# Patient Record
Sex: FEMALE | Race: WHITE | Employment: FULL TIME | URBAN - METROPOLITAN AREA
[De-identification: names, ages, dates, MRNs, and addresses within clinical notes are randomized per-mention and may not be internally consistent; named-entity substitution may affect disease eponyms.]

---

## 2017-01-04 ENCOUNTER — TELEPHONE (OUTPATIENT)
Dept: NEUROLOGY | Age: 27
End: 2017-01-04

## 2017-01-05 ENCOUNTER — TELEPHONE (OUTPATIENT)
Dept: NEUROLOGY | Age: 27
End: 2017-01-05

## 2017-01-05 NOTE — TELEPHONE ENCOUNTER
Attempted to contact patient regarding Botox received. No answer. Left message to return call to office.

## 2017-01-06 ENCOUNTER — OFFICE VISIT (OUTPATIENT)
Dept: NEUROLOGY | Age: 27
End: 2017-01-06

## 2017-01-06 VITALS
TEMPERATURE: 97.6 F | OXYGEN SATURATION: 98 % | HEART RATE: 96 BPM | SYSTOLIC BLOOD PRESSURE: 98 MMHG | RESPIRATION RATE: 18 BRPM | DIASTOLIC BLOOD PRESSURE: 60 MMHG

## 2017-01-06 DIAGNOSIS — R63.4 WEIGHT LOSS: ICD-10-CM

## 2017-01-06 DIAGNOSIS — G43.719 INTRACTABLE CHRONIC MIGRAINE WITHOUT AURA AND WITHOUT STATUS MIGRAINOSUS: Primary | ICD-10-CM

## 2017-01-06 NOTE — PROGRESS NOTES
Botox Injection Note     2017     Patient:  Chen Baxter   YOB: 1990  Date of Visit: 2017      Indication: patient has chronic migraine headaches greater than 15 days per month lasting more than 4 hours each. She has failed or not tolerated 2 or more medication preventatives. Written consent was signed and verified by me. Risks and benefits were discussed to include possible cosmetic asymmetry which is not permament or life threatening. Patient name and  was confirmed prior to procedure. Time out performed. Procedure:   Botox concentration: 200 units in 2 ml of preservative-free normal saline. 1:1 dilution. LOT#:  [id number]  EXP:  AUG 2019    Injections and distribution as follow :      Units/site  Sites Loc Subtotal    procerus 5 1 ML 5   corrugaters 2.5 2 BL 5   frontalis 5 8 BL 40   Temporalis 10 4 BL 40   Occipitalis 10 2 BL 20   Cervical paraspinals 5 4 BL 20   Trapezius 10 4 BL 40         200 units Botox were reconstituted, 170 units injected as above and the remainder was unavoidably wasted. Patient tolerated procedure well. Return in 3 months for repeat injections.     _____________________________   Fernandez Goff DO  [address], ABPN

## 2017-01-06 NOTE — MR AVS SNAPSHOT
Visit Information Date & Time Provider Department Dept. Phone Encounter #  
 1/6/2017  8:05 AM DO [facility] at [address] [id number] Upcoming Health Maintenance Date Due  
 PAP AKA CERVICAL CYTOLOGY 6/29/2009 INFLUENZA AGE 9 TO ADULT 8/1/2016 DTaP/Tdap/Td series (2 - Td) 11/24/2022 Allergies as of 1/6/2017  Review Complete On: 1/6/2017 By: Javon Ovalle LPN Severity Noted Reaction Type Reactions Compazine [Prochlorperazine Edisylate]  11/21/2016    Anxiety Haldol [Haloperidol Lactate]  04/15/2016    Other (comments) Promethazine  01/06/2016    Other (comments) \"It makes me feel really funny, nausea and dizzy\" Reglan [Metoclopramide]  04/15/2016    Other (comments) Current Immunizations  Reviewed on 1/17/2016 Name Date DTaP 11/24/2012 Hib 7/23/2015 Influenza Vaccine 10/24/2015 Rho(D) Immune Globulin - IM 6/4/2016  4:46 PM  
  
 Not reviewed this visit You Were Diagnosed With   
  
 Codes Comments Intractable chronic migraine without aura and without status migrainosus    -  Primary ICD-10-CM: Y17.335 ICD-9-CM: 346.71 Weight loss     ICD-10-CM: R63.4 ICD-9-CM: 783.21 Vitals BP Pulse Temp Resp SpO2 OB Status 98/60 96 97.6 °F (36.4 °C) 18 98% Having regular periods Smoking Status Former Smoker Preferred Pharmacy Pharmacy Name Phone [facility] [phone] Your Updated Medication List  
  
   
This list is accurate as of: 1/6/17  8:11 AM.  Always use your most recent med list.  
  
  
  
  
 BOTOX INJECTION  
by IntraMUSCular route every three (3) months. clonazePAM 1 mg tablet Commonly known as:  [place] Take 2 mg by mouth nightly. ibuprofen 800 mg tablet Commonly known as:  MOTRIN  
 Take 1 Tab by mouth every eight (8) hours as needed for Pain. LEXAPRO 20 mg tablet Generic drug:  escitalopram oxalate Take 20 mg by mouth nightly. Indications: GENERALIZED ANXIETY DISORDER  
  
 oxyCODONE 5 mg capsule Commonly known as:  OXYIR Take 1 Cap by mouth every four (4) hours as needed. Max Daily Amount: 30 mg.  
  
 oxyCODONE-acetaminophen 5-325 mg per tablet Commonly known as:  PERCOCET Take 1 Tab by mouth every four (4) hours as needed for Pain. Max Daily Amount: 6 Tabs. * topiramate 50 mg Cspx Commonly known as:  QUDEXY XR Take 1 Cap by mouth nightly. * topiramate 50 mg Cspx Commonly known as:  QUDEXY XR Take 50 mg by mouth daily. * Notice: This list has 2 medication(s) that are the same as other medications prescribed for you. Read the directions carefully, and ask your doctor or other care provider to review them with you. Patient Instructions PRESCRIPTION REFILL POLICY [facility] Statement to Patients April 1, 2014 In an effort to ensure the large volume of patient prescription refills is processed in the most efficient and expeditious manner, we are asking our patients to assist us by calling your Pharmacy for all prescription refills, this will include also your  Mail Order Pharmacy. The pharmacy will contact our office electronically to continue the refill process. Please do not wait until the last minute to call your pharmacy. We need at least 48 hours (2days) to fill prescriptions. We also encourage you to call your pharmacy before going to  your prescription to make sure it is ready. With regard to controlled substance prescription refill requests (narcotic refills) that need to be picked up at our office, we ask your cooperation by providing us with at least 72 hours (3days) notice that you will need a refill.  
 
We will not refill narcotic prescription refill requests after 4:00pm on any weekday, Monday through Thursday, or after 2:00pm on Fridays, or on the weekends. We encourage everyone to explore another way of getting your prescription refill request processed using Pubster, our patient web portal through our electronic medical record system. Pubster is an efficient and effective way to communicate your medication request directly to the office and  downloadable as an charlee on your smart phone . Pubster also features a review functionality that allows you to view your medication list as well as leave messages for your physician. Are you ready to get connected? If so please review the attatched instructions or speak to any of our staff to get you set up right away! Thank you so much for your cooperation. Should you have any questions please contact our Practice Administrator. The Physicians and Staff,  [facility] Thank you for choosing [facility] for your  
 
care. You may receive a survey about your visit. We appreciate you taking time  
 
to complete this survey as we use your feedback to improve our services. We  
 
realize we are not perfect, but we strive to provide excellent care. Introducing [facility] & HEALTH SERVICES! [facility] introduces Pubster patient portal. Now you can access parts of your medical record, email your doctor's office, and request medication refills online. 1. In your internet browser, go to [email or web address] 2. Click on the First Time User? Click Here link in the Sign In box. You will see the New Member Sign Up page. 3. Enter your Pubster Access Code exactly as it appears below. You will not need to use this code after youve completed the sign-up process. If you do not sign up before the expiration date, you must request a new code. · Pubster Access Code: [email or web address] Expires: 1/11/2017 11:53 AM 
 
 4. Enter the last four digits of your Social Security Number (xxxx) and Date of Birth (mm/dd/yyyy) as indicated and click Submit. You will be taken to the next sign-up page. 5. Create a Maven ID. This will be your Maven login ID and cannot be changed, so think of one that is secure and easy to remember. 6. Create a Maven password. You can change your password at any time. 7. Enter your Password Reset Question and Answer. This can be used at a later time if you forget your password. 8. Enter your e-mail address. You will receive e-mail notification when new information is available in [address] 9. Click Sign Up. You can now view and download portions of your medical record. 10. Click the Download Summary menu link to download a portable copy of your medical information. If you have questions, please visit the Frequently Asked Questions section of the Maven website. Remember, Maven is NOT to be used for urgent needs. For medical emergencies, dial 911. Now available from your iPhone and Android! Please provide this summary of care documentation to your next provider. Your primary care clinician is listed as NONE. If you have any questions after today's visit, please call [phone].

## 2017-01-06 NOTE — PATIENT INSTRUCTIONS
[facility]   Statement to Patients  April 1, 2014      In an effort to ensure the large volume of patient prescription refills is processed in the most efficient and expeditious manner, we are asking our patients to assist us by calling your Pharmacy for all prescription refills, this will include also your  Mail Order Pharmacy. The pharmacy will contact our office electronically to continue the refill process. Please do not wait until the last minute to call your pharmacy. We need at least 48 hours (2days) to fill prescriptions. We also encourage you to call your pharmacy before going to  your prescription to make sure it is ready. With regard to controlled substance prescription refill requests (narcotic refills) that need to be picked up at our office, we ask your cooperation by providing us with at least 72 hours (3days) notice that you will need a refill. We will not refill narcotic prescription refill requests after 4:00pm on any weekday, Monday through Thursday, or after 2:00pm on Fridays, or on the weekends. We encourage everyone to explore another way of getting your prescription refill request processed using vChatter, our patient web portal through our electronic medical record system. vChatter is an efficient and effective way to communicate your medication request directly to the office and  downloadable as an charlee on your smart phone . vChatter also features a review functionality that allows you to view your medication list as well as leave messages for your physician. Are you ready to get connected? If so please review the attatched instructions or speak to any of our staff to get you set up right away! Thank you so much for your cooperation. Should you have any questions please contact our Practice Administrator.     The Physicians and Staff,  [facility]     Thank you for choosing [facility] for your     care. You may receive a survey about your visit. We appreciate you taking time     to complete this survey as we use your feedback to improve our services. We     realize we are not perfect, but we strive to provide excellent care.

## 2017-01-08 ENCOUNTER — IP HISTORICAL/CONVERTED ENCOUNTER (OUTPATIENT)
Dept: OTHER | Age: 27
End: 2017-01-08

## 2017-01-27 ENCOUNTER — OFFICE VISIT (OUTPATIENT)
Dept: FAMILY MEDICINE CLINIC | Age: 27
End: 2017-01-27

## 2017-01-27 VITALS
WEIGHT: 134 LBS | HEIGHT: 68 IN | BODY MASS INDEX: 20.31 KG/M2 | DIASTOLIC BLOOD PRESSURE: 70 MMHG | HEART RATE: 97 BPM | RESPIRATION RATE: 18 BRPM | SYSTOLIC BLOOD PRESSURE: 109 MMHG | TEMPERATURE: 97 F | OXYGEN SATURATION: 98 %

## 2017-01-27 DIAGNOSIS — Z00.00 WELL ADULT EXAM: ICD-10-CM

## 2017-01-27 DIAGNOSIS — J00 ACUTE NASOPHARYNGITIS: Primary | ICD-10-CM

## 2017-01-27 RX ORDER — BENZONATATE 100 MG/1
200 CAPSULE ORAL
Qty: 30 CAP | Refills: 0 | Status: SHIPPED | OUTPATIENT
Start: 2017-01-27 | End: 2017-02-03

## 2017-01-27 RX ORDER — AZITHROMYCIN 250 MG/1
250 TABLET, FILM COATED ORAL SEE ADMIN INSTRUCTIONS
Qty: 6 TAB | Refills: 0 | Status: SHIPPED | OUTPATIENT
Start: 2017-01-27 | End: 2017-02-01

## 2017-01-27 RX ORDER — TOPIRAMATE 100 MG/1
100 TABLET, FILM COATED ORAL DAILY
COMMUNITY
End: 2017-03-03

## 2017-01-27 NOTE — PROGRESS NOTES
SUBJECTIVE:   Chris Falcon is a 27 y.o. female who complains of congestion, sore throat, swollen glands, nasal blockage, dry cough and bilateral sinus pain for 3 days. She denies a history of chest pain, dizziness and fevers and denies a history of asthma. Patient denies smoke cigarettes. Respiratory ROS: positive for - cough    ROS:  Per HPI    Allergies   Allergen Reactions    Compazine [Prochlorperazine Edisylate] Anxiety    Haldol [Haloperidol Lactate] Other (comments)    Promethazine Other (comments)     \"It makes me feel really funny, nausea and dizzy\"    Reglan [Metoclopramide] Other (comments)       Outpatient Prescriptions Marked as Taking for the 1/27/17 encounter (Office Visit) with Rex Ramesh NP   Medication Sig Dispense Refill    topiramate (TOPAMAX) 100 mg tablet Take 100 mg by mouth daily.  azithromycin (ZITHROMAX) 250 mg tablet Take 1 Tab by mouth See Admin Instructions for 5 days. Take 2 tabs today by mouth, then take 1 tab daily for 4 more days 6 Tab 0    benzonatate (TESSALON) 100 mg capsule Take 2 Caps by mouth three (3) times daily as needed for Cough for up to 7 days. 30 Cap 0    ONABOTULINUMTOXINA (BOTOX INJECTION) by IntraMUSCular route every three (3) months. Indications: Given by Neuro into shoulders/ neck      clonazePAM (KLONOPIN) 1 mg tablet Take 2 mg by mouth nightly. 5    escitalopram (LEXAPRO) 20 mg tablet Take 20 mg by mouth nightly.  Indications: GENERALIZED ANXIETY DISORDER         Past Medical History   Diagnosis Date    Anxiety     Depression     Endometriosis     Migraine     Neurological disorder      migraines    Ovarian cyst     Panic attack        History   Smoking Status    Former Smoker   Smokeless Tobacco    Never Used       Social History     Social History    Marital status: SINGLE     Spouse name: N/A    Number of children: N/A    Years of education: N/A     Social History Main Topics    Smoking status: Former Smoker    Smokeless tobacco: Never Used    Alcohol use Yes      Comment: social    Drug use: No    Sexual activity: Yes     Partners: Male     Birth control/ protection: Pill     Other Topics Concern    None     Social History Narrative       Family History   Problem Relation Age of Onset    Diabetes Maternal Aunt     Heart Disease Maternal Aunt     Stroke Maternal Aunt     Heart Disease Maternal Grandmother     No Known Problems Mother          PE:  Visit Vitals    /70    Pulse 97    Temp 97 °F (36.1 °C) (Oral)    Resp 18    Ht 5' 8\" (1.727 m)    Wt 134 lb (60.8 kg)    LMP 01/08/2017 (Exact Date)    SpO2 98%    BMI 20.37 kg/m2     Gen: alert, oriented, no acute distress  Head: normocephalic, atraumatic  Ears: external auditory canals clear, TMs  bilaterally  Eyes:  sclera clear, conjunctiva clear  Nose: Sinuses nontender to palpation. Normal turbinates. No nasal drainage. Oral: moist mucus membranes, no oral lesions, no pharyngeal exudate or erythema  Neck:  No LAD  Resp: Normal work of breathing, lungs CTAB, no w/r/r  CV: S1, S2 normal.  No murmurs, rubs, or gallops. ASSESSMENT:   1. Acute nasopharyngitis          PLAN:  Symptomatic therapy suggested: push fluids, rest and return office visit prn if symptoms persist or worsen. Lack of antibiotic effectiveness discussed with her. Call or return to clinic prn if these symptoms worsen or fail to improve as anticipated. Orders Placed This Encounter    topiramate (TOPAMAX) 100 mg tablet     Sig: Take 100 mg by mouth daily.  azithromycin (ZITHROMAX) 250 mg tablet     Sig: Take 1 Tab by mouth See Admin Instructions for 5 days. Take 2 tabs today by mouth, then take 1 tab daily for 4 more days     Dispense:  6 Tab     Refill:  0    benzonatate (TESSALON) 100 mg capsule     Sig: Take 2 Caps by mouth three (3) times daily as needed for Cough for up to 7 days.      Dispense:  30 Cap     Refill:  0       Verbal and written instructions (see AVS) provided.  Patient expresses understanding of diagnosis and treatment plan.     Rex Ramesh NP

## 2017-01-27 NOTE — PATIENT INSTRUCTIONS

## 2017-01-27 NOTE — PROGRESS NOTES
Chief Complaint   Patient presents with    Cold Symptoms     cough, sore throat and runny nose for 1 week, has taken Mucinex and Zyrtec, have tested self for RSV, Flu and Strep and all were negative, mother and daughter have Bronchitis

## 2017-01-27 NOTE — MR AVS SNAPSHOT
Visit Information Date & Time Provider Department Dept. Phone Encounter #  
 1/27/2017  9:30 AM Virginia Jacobson NP [address] [facility] [phone] [id number] Follow-up Instructions Return if symptoms worsen or fail to improve. Your Appointments 1/27/2017  9:30 AM  
WALK IN with Virginia Jacobson NP [address] [facility] ([address]) Appt Note: cough, sore throat, runny nose [address] [facility] 204 [address] [facility] [address]  
[phone]  
  
   
 [address] [facility] [address]  
  
    
 2/17/2017  1:20 PM  
Follow Up with DO Qing Pires [facility] [address] [facility]) [address] [place] [address]  
[phone]  
  
   
 [address] [address] Upcoming Health Maintenance Date Due  
 PAP AKA CERVICAL CYTOLOGY 6/29/2009 INFLUENZA AGE 9 TO ADULT 8/1/2016 DTaP/Tdap/Td series (2 - Td) 11/24/2022 Allergies as of 1/27/2017  Review Complete On: 1/27/2017 By: Rosalva Barrett LPN Severity Noted Reaction Type Reactions Compazine [Prochlorperazine Edisylate]  11/21/2016    Anxiety Haldol [Haloperidol Lactate]  04/15/2016    Other (comments) Promethazine  01/06/2016    Other (comments) \"It makes me feel really funny, nausea and dizzy\" Reglan [Metoclopramide]  04/15/2016    Other (comments) Current Immunizations  Reviewed on 1/17/2016 Name Date DTaP 11/24/2012 Hib 7/23/2015 Influenza Vaccine 10/24/2015 Rho(D) Immune Globulin - IM 6/4/2016  4:46 PM  
  
 Not reviewed this visit You Were Diagnosed With   
  
 Codes Comments Acute nasopharyngitis    -  Primary ICD-10-CM: Milligan Aydee ICD-9-CM: 279 Vitals BP Pulse Temp Resp Height(growth percentile) Weight(growth percentile) 109/70 97 97 °F (36.1 °C) (Oral) 18 5' 8\" (1.727 m) 134 lb (60.8 kg) LMP SpO2 BMI OB Status Smoking Status 01/08/2017 (Exact Date) 98% 20.37 kg/m2 Having regular periods Former Smoker BMI and BSA Data Body Mass Index Body Surface Area  
 20.37 kg/m 2 1.71 m 2 Preferred Pharmacy Pharmacy Name Phone [facility] [phone] Your Updated Medication List  
  
   
This list is accurate as of: 1/27/17  9:02 AM.  Always use your most recent med list.  
  
  
  
  
 azithromycin 250 mg tablet Commonly known as:  Mike Rowels Take 1 Tab by mouth See Admin Instructions for 5 days. Take 2 tabs today by mouth, then take 1 tab daily for 4 more days  
  
 benzonatate 100 mg capsule Commonly known as:  TESSALON Take 2 Caps by mouth three (3) times daily as needed for Cough for up to 7 days. BOTOX INJECTION  
by IntraMUSCular route every three (3) months. Indications: Given by Neuro into shoulders/ neck  
  
 clonazePAM 1 mg tablet Commonly known as:  Elta Halsted Take 2 mg by mouth nightly. LEXAPRO 20 mg tablet Generic drug:  escitalopram oxalate Take 20 mg by mouth nightly. Indications: GENERALIZED ANXIETY DISORDER  
  
 TOPAMAX 100 mg tablet Generic drug:  topiramate Take 100 mg by mouth daily. Prescriptions Sent to Pharmacy Refills  
 azithromycin (ZITHROMAX) 250 mg tablet 0 Sig: Take 1 Tab by mouth See Admin Instructions for 5 days. Take 2 tabs today by mouth, then take 1 tab daily for 4 more days Class: Normal  
 Pharmacy: [address] #: [phone] Route: Oral  
 benzonatate (TESSALON) 100 mg capsule 0 Sig: Take 2 Caps by mouth three (3) times daily as needed for Cough for up to 7 days. Class: Normal  
 Pharmacy: [place], [address] Ph #: [phone] Route: Oral  
  
Follow-up Instructions Return if symptoms worsen or fail to improve. Patient Instructions Upper Respiratory Infection (Cold): Care Instructions Your Care Instructions An upper respiratory infection, or URI, is an infection of the nose, sinuses, or throat. URIs are spread by coughs, sneezes, and direct contact. The common cold is the most frequent kind of URI. The flu and sinus infections are other kinds of URIs. Almost all URIs are caused by viruses. Antibiotics won't cure them. But you can treat most infections with home care. This may include drinking lots of fluids and taking over-the-counter pain medicine. You will probably feel better in 4 to 10 days. The doctor has checked you carefully, but problems can develop later. If you notice any problems or new symptoms, get medical treatment right away. Follow-up care is a key part of your treatment and safety. Be sure to make and go to all appointments, and call your doctor if you are having problems. It's also a good idea to know your test results and keep a list of the medicines you take. How can you care for yourself at home? · To prevent dehydration, drink plenty of fluids, enough so that your urine is light yellow or clear like water. Choose water and other caffeine-free clear liquids until you feel better. If you have kidney, heart, or liver disease and have to limit fluids, talk with your doctor before you increase the amount of fluids you drink. · Take an over-the-counter pain medicine, such as acetaminophen (Tylenol), ibuprofen (Advil, Motrin), or naproxen (Aleve). Read and follow all instructions on the label. · Before you use cough and cold medicines, check the label. These medicines may not be safe for young children or for people with certain health problems. · Be careful when taking over-the-counter cold or flu medicines and Tylenol at the same time. Many of these medicines have acetaminophen, which is Tylenol.  Read the labels to make sure that you are not taking more than the recommended dose. Too much acetaminophen (Tylenol) can be harmful. · Get plenty of rest. 
· Do not smoke or allow others to smoke around you. If you need help quitting, talk to your doctor about stop-smoking programs and medicines. These can increase your chances of quitting for good. When should you call for help? Call 911 anytime you think you may need emergency care. For example, call if: 
· You have severe trouble breathing. Call your doctor now or seek immediate medical care if: 
· You seem to be getting much sicker. · You have new or worse trouble breathing. · You have a new or higher fever. · You have a new rash. Watch closely for changes in your health, and be sure to contact your doctor if: 
· You have a new symptom, such as a sore throat, an earache, or sinus pain. · You cough more deeply or more often, especially if you notice more mucus or a change in the color of your mucus. · You do not get better as expected. Where can you learn more? Go to http://michele-lucy.info/. Enter C722 in the search box to learn more about \"Upper Respiratory Infection (Cold): Care Instructions. \" Current as of: June 30, 2016 Content Version: 11.1 © 6148-7869 [employer]. Care instructions adapted under license by [employer] (which disclaims liability or warranty for this information). If you have questions about a medical condition or this instruction, always ask your healthcare professional. [place] [address] any warranty or liability for your use of this information. Introducing [facility] & HEALTH SERVICES! [facility] introduces ProcessUnity patient portal. Now you can access parts of your medical record, email your doctor's office, and request medication refills online. 1. In your internet browser, go to [email or web address] 2. Click on the First Time User? Click Here link in the Sign In box.  You will see the New Member Sign Up page. 3. Enter your Shmoop Access Code exactly as it appears below. You will not need to use this code after youve completed the sign-up process. If you do not sign up before the expiration date, you must request a new code. · Shmoop Access Code: [email or web address] Expires: 4/27/2017  9:02 AM 
 
4. Enter the last four digits of your Social Security Number (xxxx) and Date of Birth (mm/dd/yyyy) as indicated and click Submit. You will be taken to the next sign-up page. 5. Create a Shmoop ID. This will be your Shmoop login ID and cannot be changed, so think of one that is secure and easy to remember. 6. Create a Shmoop password. You can change your password at any time. 7. Enter your Password Reset Question and Answer. This can be used at a later time if you forget your password. 8. Enter your e-mail address. You will receive e-mail notification when new information is available in [address] 9. Click Sign Up. You can now view and download portions of your medical record. 10. Click the Download Summary menu link to download a portable copy of your medical information. If you have questions, please visit the Frequently Asked Questions section of the Shmoop website. Remember, Shmoop is NOT to be used for urgent needs. For medical emergencies, dial 911. Now available from your iPhone and Android! Please provide this summary of care documentation to your next provider. Your primary care clinician is listed as NONE. If you have any questions after today's visit, please call [phone].

## 2017-02-16 ENCOUNTER — TELEPHONE (OUTPATIENT)
Dept: NEUROLOGY | Age: 27
End: 2017-02-16

## 2017-02-16 NOTE — TELEPHONE ENCOUNTER
Pt was sent home sick from work with the flu, was to have f/u appt with Dr. Barney Salazar tomorrow to review medication, pt asked for a phone call from the nurse.

## 2017-03-03 ENCOUNTER — HOSPITAL ENCOUNTER (EMERGENCY)
Age: 27
Discharge: HOME | End: 2017-03-03
Attending: EMERGENCY MEDICINE
Payer: COMMERCIAL

## 2017-03-03 VITALS
DIASTOLIC BLOOD PRESSURE: 77 MMHG | SYSTOLIC BLOOD PRESSURE: 121 MMHG | OXYGEN SATURATION: 98 % | HEIGHT: 68 IN | TEMPERATURE: 97.3 F | WEIGHT: 135 LBS | RESPIRATION RATE: 18 BRPM | BODY MASS INDEX: 20.46 KG/M2 | HEART RATE: 77 BPM

## 2017-03-03 DIAGNOSIS — R51.9 NONINTRACTABLE HEADACHE, UNSPECIFIED CHRONICITY PATTERN, UNSPECIFIED HEADACHE TYPE: Primary | ICD-10-CM

## 2017-03-03 PROCEDURE — 96372 THER/PROPH/DIAG INJ SC/IM: CPT

## 2017-03-03 PROCEDURE — 74011250637 HC RX REV CODE- 250/637: Performed by: EMERGENCY MEDICINE

## 2017-03-03 PROCEDURE — 96365 THER/PROPH/DIAG IV INF INIT: CPT

## 2017-03-03 PROCEDURE — 96375 TX/PRO/DX INJ NEW DRUG ADDON: CPT

## 2017-03-03 PROCEDURE — 99284 EMERGENCY DEPT VISIT MOD MDM: CPT

## 2017-03-03 PROCEDURE — 74011250636 HC RX REV CODE- 250/636: Performed by: EMERGENCY MEDICINE

## 2017-03-03 PROCEDURE — 74011636637 HC RX REV CODE- 636/637: Performed by: EMERGENCY MEDICINE

## 2017-03-03 RX ORDER — KETOROLAC TROMETHAMINE 30 MG/ML
30 INJECTION, SOLUTION INTRAMUSCULAR; INTRAVENOUS
Status: COMPLETED | OUTPATIENT
Start: 2017-03-03 | End: 2017-03-03

## 2017-03-03 RX ORDER — BUSPIRONE HYDROCHLORIDE 15 MG/1
15 TABLET ORAL DAILY
COMMUNITY
End: 2017-09-19

## 2017-03-03 RX ORDER — DEXAMETHASONE SODIUM PHOSPHATE 10 MG/ML
10 INJECTION INTRAMUSCULAR; INTRAVENOUS
Status: COMPLETED | OUTPATIENT
Start: 2017-03-03 | End: 2017-03-03

## 2017-03-03 RX ORDER — DIPHENHYDRAMINE HYDROCHLORIDE 50 MG/ML
25 INJECTION, SOLUTION INTRAMUSCULAR; INTRAVENOUS
Status: COMPLETED | OUTPATIENT
Start: 2017-03-03 | End: 2017-03-03

## 2017-03-03 RX ORDER — SUMATRIPTAN 6 MG/.5ML
6 INJECTION, SOLUTION SUBCUTANEOUS
Status: COMPLETED | OUTPATIENT
Start: 2017-03-03 | End: 2017-03-03

## 2017-03-03 RX ORDER — MAGNESIUM SULFATE HEPTAHYDRATE 40 MG/ML
2 INJECTION, SOLUTION INTRAVENOUS ONCE
Status: COMPLETED | OUTPATIENT
Start: 2017-03-03 | End: 2017-03-03

## 2017-03-03 RX ADMIN — SODIUM CHLORIDE 1000 ML: 900 INJECTION, SOLUTION INTRAVENOUS at 21:22

## 2017-03-03 RX ADMIN — MAGNESIUM SULFATE HEPTAHYDRATE 2 G: 40 INJECTION, SOLUTION INTRAVENOUS at 21:36

## 2017-03-03 RX ADMIN — SUMATRIPTAN 6 MG: 6 INJECTION, SOLUTION SUBCUTANEOUS at 21:31

## 2017-03-03 RX ADMIN — DIPHENHYDRAMINE HYDROCHLORIDE 25 MG: 50 INJECTION, SOLUTION INTRAMUSCULAR; INTRAVENOUS at 21:25

## 2017-03-03 RX ADMIN — DEXAMETHASONE SODIUM PHOSPHATE 10 MG: 10 INJECTION, SOLUTION INTRAMUSCULAR; INTRAVENOUS at 21:26

## 2017-03-03 RX ADMIN — KETOROLAC TROMETHAMINE 30 MG: 30 INJECTION, SOLUTION INTRAMUSCULAR at 21:24

## 2017-03-04 ENCOUNTER — APPOINTMENT (OUTPATIENT)
Dept: CT IMAGING | Age: 27
End: 2017-03-04
Attending: EMERGENCY MEDICINE
Payer: COMMERCIAL

## 2017-03-04 ENCOUNTER — HOSPITAL ENCOUNTER (EMERGENCY)
Age: 27
Discharge: HOME | End: 2017-03-04
Attending: EMERGENCY MEDICINE
Payer: COMMERCIAL

## 2017-03-04 VITALS
WEIGHT: 135 LBS | OXYGEN SATURATION: 97 % | HEIGHT: 68 IN | BODY MASS INDEX: 20.46 KG/M2 | RESPIRATION RATE: 16 BRPM | HEART RATE: 70 BPM | DIASTOLIC BLOOD PRESSURE: 82 MMHG | SYSTOLIC BLOOD PRESSURE: 112 MMHG | TEMPERATURE: 98.5 F

## 2017-03-04 DIAGNOSIS — G43.019 INTRACTABLE MIGRAINE WITHOUT AURA AND WITHOUT STATUS MIGRAINOSUS: Primary | ICD-10-CM

## 2017-03-04 LAB
ALBUMIN SERPL BCP-MCNC: 3.8 G/DL (ref 3.5–5)
ALBUMIN/GLOB SERPL: 1.3 {RATIO} (ref 1.1–2.2)
ALP SERPL-CCNC: 44 U/L (ref 45–117)
ALT SERPL-CCNC: 17 U/L (ref 12–78)
ANION GAP BLD CALC-SCNC: 10 MMOL/L (ref 5–15)
AST SERPL W P-5'-P-CCNC: 13 U/L (ref 15–37)
BASOPHILS # BLD AUTO: 0 K/UL (ref 0–0.1)
BASOPHILS # BLD: 0 % (ref 0–1)
BILIRUB SERPL-MCNC: 0.4 MG/DL (ref 0.2–1)
BUN SERPL-MCNC: 9 MG/DL (ref 6–20)
BUN/CREAT SERPL: 13 (ref 12–20)
CALCIUM SERPL-MCNC: 8.2 MG/DL (ref 8.5–10.1)
CHLORIDE SERPL-SCNC: 105 MMOL/L (ref 97–108)
CO2 SERPL-SCNC: 27 MMOL/L (ref 21–32)
CREAT SERPL-MCNC: 0.71 MG/DL (ref 0.55–1.02)
EOSINOPHIL # BLD: 0 K/UL (ref 0–0.4)
EOSINOPHIL NFR BLD: 0 % (ref 0–7)
ERYTHROCYTE [DISTWIDTH] IN BLOOD BY AUTOMATED COUNT: 13.1 % (ref 11.5–14.5)
GLOBULIN SER CALC-MCNC: 2.9 G/DL (ref 2–4)
GLUCOSE SERPL-MCNC: 93 MG/DL (ref 65–100)
HCT VFR BLD AUTO: 33.5 % (ref 35–47)
HGB BLD-MCNC: 10.6 G/DL (ref 11.5–16)
LYMPHOCYTES # BLD AUTO: 33 % (ref 12–49)
LYMPHOCYTES # BLD: 2.9 K/UL (ref 0.8–3.5)
MCH RBC QN AUTO: 27.1 PG (ref 26–34)
MCHC RBC AUTO-ENTMCNC: 31.6 G/DL (ref 30–36.5)
MCV RBC AUTO: 85.7 FL (ref 80–99)
MONOCYTES # BLD: 0.8 K/UL (ref 0–1)
MONOCYTES NFR BLD AUTO: 9 % (ref 5–13)
NEUTS SEG # BLD: 5.2 K/UL (ref 1.8–8)
NEUTS SEG NFR BLD AUTO: 58 % (ref 32–75)
PLATELET # BLD AUTO: 217 K/UL (ref 150–400)
POTASSIUM SERPL-SCNC: 3.3 MMOL/L (ref 3.5–5.1)
PROT SERPL-MCNC: 6.7 G/DL (ref 6.4–8.2)
RBC # BLD AUTO: 3.91 M/UL (ref 3.8–5.2)
SODIUM SERPL-SCNC: 142 MMOL/L (ref 136–145)
WBC # BLD AUTO: 9 K/UL (ref 3.6–11)

## 2017-03-04 PROCEDURE — 74011250636 HC RX REV CODE- 250/636: Performed by: EMERGENCY MEDICINE

## 2017-03-04 PROCEDURE — 99284 EMERGENCY DEPT VISIT MOD MDM: CPT

## 2017-03-04 PROCEDURE — 80053 COMPREHEN METABOLIC PANEL: CPT | Performed by: EMERGENCY MEDICINE

## 2017-03-04 PROCEDURE — 70450 CT HEAD/BRAIN W/O DYE: CPT

## 2017-03-04 PROCEDURE — 96372 THER/PROPH/DIAG INJ SC/IM: CPT

## 2017-03-04 PROCEDURE — 36415 COLL VENOUS BLD VENIPUNCTURE: CPT | Performed by: EMERGENCY MEDICINE

## 2017-03-04 PROCEDURE — 96361 HYDRATE IV INFUSION ADD-ON: CPT

## 2017-03-04 PROCEDURE — 96365 THER/PROPH/DIAG IV INF INIT: CPT

## 2017-03-04 PROCEDURE — 85025 COMPLETE CBC W/AUTO DIFF WBC: CPT | Performed by: EMERGENCY MEDICINE

## 2017-03-04 PROCEDURE — 96375 TX/PRO/DX INJ NEW DRUG ADDON: CPT

## 2017-03-04 RX ORDER — KETOROLAC TROMETHAMINE 30 MG/ML
30 INJECTION, SOLUTION INTRAMUSCULAR; INTRAVENOUS
Status: COMPLETED | OUTPATIENT
Start: 2017-03-04 | End: 2017-03-04

## 2017-03-04 RX ORDER — ONDANSETRON 2 MG/ML
8 INJECTION INTRAMUSCULAR; INTRAVENOUS
Status: COMPLETED | OUTPATIENT
Start: 2017-03-04 | End: 2017-03-04

## 2017-03-04 RX ORDER — DEXAMETHASONE SODIUM PHOSPHATE 10 MG/ML
10 INJECTION INTRAMUSCULAR; INTRAVENOUS
Status: COMPLETED | OUTPATIENT
Start: 2017-03-04 | End: 2017-03-04

## 2017-03-04 RX ORDER — DEXAMETHASONE SODIUM PHOSPHATE 4 MG/ML
10 INJECTION, SOLUTION INTRA-ARTICULAR; INTRALESIONAL; INTRAMUSCULAR; INTRAVENOUS; SOFT TISSUE
Status: DISCONTINUED | OUTPATIENT
Start: 2017-03-04 | End: 2017-03-04

## 2017-03-04 RX ORDER — MAGNESIUM SULFATE HEPTAHYDRATE 40 MG/ML
2 INJECTION, SOLUTION INTRAVENOUS ONCE
Status: COMPLETED | OUTPATIENT
Start: 2017-03-04 | End: 2017-03-04

## 2017-03-04 RX ORDER — ONDANSETRON 8 MG/1
8 TABLET, ORALLY DISINTEGRATING ORAL
Qty: 15 TAB | Refills: 0 | Status: SHIPPED | OUTPATIENT
Start: 2017-03-04 | End: 2017-04-20

## 2017-03-04 RX ORDER — DIPHENHYDRAMINE HYDROCHLORIDE 50 MG/ML
50 INJECTION, SOLUTION INTRAMUSCULAR; INTRAVENOUS
Status: COMPLETED | OUTPATIENT
Start: 2017-03-04 | End: 2017-03-04

## 2017-03-04 RX ORDER — NAPROXEN 500 MG/1
500 TABLET ORAL 2 TIMES DAILY WITH MEALS
Qty: 20 TAB | Refills: 0 | Status: SHIPPED | OUTPATIENT
Start: 2017-03-04 | End: 2017-03-14

## 2017-03-04 RX ADMIN — DEXAMETHASONE SODIUM PHOSPHATE 10 MG: 10 INJECTION, SOLUTION INTRAMUSCULAR; INTRAVENOUS at 22:03

## 2017-03-04 RX ADMIN — SODIUM CHLORIDE 2000 ML: 900 INJECTION, SOLUTION INTRAVENOUS at 20:39

## 2017-03-04 RX ADMIN — DIPHENHYDRAMINE HYDROCHLORIDE 50 MG: 50 INJECTION, SOLUTION INTRAMUSCULAR; INTRAVENOUS at 20:39

## 2017-03-04 RX ADMIN — ONDANSETRON 8 MG: 2 INJECTION INTRAMUSCULAR; INTRAVENOUS at 20:40

## 2017-03-04 RX ADMIN — MAGNESIUM SULFATE HEPTAHYDRATE 2 G: 40 INJECTION, SOLUTION INTRAVENOUS at 22:03

## 2017-03-04 RX ADMIN — KETOROLAC TROMETHAMINE 30 MG: 30 INJECTION, SOLUTION INTRAMUSCULAR at 20:40

## 2017-03-04 NOTE — DISCHARGE INSTRUCTIONS
Headache: Care Instructions  Your Care Instructions    Headaches have many possible causes. Most headaches aren't a sign of a more serious problem, and they will get better on their own. Home treatment may help you feel better faster. The doctor has checked you carefully, but problems can develop later. If you notice any problems or new symptoms, get medical treatment right away. Follow-up care is a key part of your treatment and safety. Be sure to make and go to all appointments, and call your doctor if you are having problems. It's also a good idea to know your test results and keep a list of the medicines you take. How can you care for yourself at home? · Do not drive if you have taken a prescription pain medicine. · Rest in a quiet, dark room until your headache is gone. Close your eyes and try to relax or go to sleep. Don't watch TV or read. · Put a cold, moist cloth or cold pack on the painful area for 10 to 20 minutes at a time. Put a thin cloth between the cold pack and your skin. · Use a warm, moist towel or a heating pad set on low to relax tight shoulder and neck muscles. · Have someone gently massage your neck and shoulders. · Take pain medicines exactly as directed. ¨ If the doctor gave you a prescription medicine for pain, take it as prescribed. ¨ If you are not taking a prescription pain medicine, ask your doctor if you can take an over-the-counter medicine. · Be careful not to take pain medicine more often than the instructions allow, because you may get worse or more frequent headaches when the medicine wears off. · Do not ignore new symptoms that occur with a headache, such as a fever, weakness or numbness, vision changes, or confusion. These may be signs of a more serious problem. To prevent headaches  · Keep a headache diary so you can figure out what triggers your headaches. Avoiding triggers may help you prevent headaches.  Record when each headache began, how long it lasted, and what the pain was like (throbbing, aching, stabbing, or dull). Write down any other symptoms you had with the headache, such as nausea, flashing lights or dark spots, or sensitivity to bright light or loud noise. Note if the headache occurred near your period. List anything that might have triggered the headache, such as certain foods (chocolate, cheese, wine) or odors, smoke, bright light, stress, or lack of sleep. · Find healthy ways to deal with stress. Headaches are most common during or right after stressful times. Take time to relax before and after you do something that has caused a headache in the past.  · Try to keep your muscles relaxed by keeping good posture. Check your jaw, face, neck, and shoulder muscles for tension, and try relaxing them. When sitting at a desk, change positions often, and stretch for 30 seconds each hour. · Get plenty of sleep and exercise. · Eat regularly and well. Long periods without food can trigger a headache. · Treat yourself to a massage. Some people find that regular massages are very helpful in relieving tension. · Limit caffeine by not drinking too much coffee, tea, or soda. But don't quit caffeine suddenly, because that can also give you headaches. · Reduce eyestrain from computers by blinking frequently and looking away from the computer screen every so often. Make sure you have proper eyewear and that your monitor is set up properly, about an arm's length away. · Seek help if you have depression or anxiety. Your headaches may be linked to these conditions. Treatment can both prevent headaches and help with symptoms of anxiety or depression. When should you call for help? Call 911 anytime you think you may need emergency care. For example, call if:  · You have signs of a stroke. These may include:  ¨ Sudden numbness, paralysis, or weakness in your face, arm, or leg, especially on only one side of your body. ¨ Sudden vision changes.   ¨ Sudden trouble speaking. ¨ Sudden confusion or trouble understanding simple statements. ¨ Sudden problems with walking or balance. ¨ A sudden, severe headache that is different from past headaches. Call your doctor now or seek immediate medical care if:  · You have a new or worse headache. · Your headache gets much worse. Where can you learn more? Go to http://michele-lucy.info/. Enter M271 in the search box to learn more about \"Headache: Care Instructions. \"  Current as of: February 19, 2016  Content Version: 11.1  © 3632-1466 [employer]. Care instructions adapted under license by [employer] (which disclaims liability or warranty for this information). If you have questions about a medical condition or this instruction, always ask your healthcare professional. [address] any warranty or liability for your use of this information. We hope that we have addressed all of your medical concerns. The examination and treatment you received in the Emergency Department were for an emergent problem and were not intended as complete care. It is important that you follow up with your healthcare provider(s) for ongoing care. If your symptoms worsen or do not improve as expected, and you are unable to reach your usual health care provider(s), you should return to the Emergency Department. Today's healthcare is undergoing tremendous change, and patient satisfaction surveys are one of the many tools to assess the quality of medical care. You may receive a survey from the [employer] regarding your experience in the Emergency Department. I hope that your experience has been completely positive, particularly the medical care that I provided. As such, please participate in the survey; anything less than excellent does not meet my expectations or intentions.         [address] [facility] and [address] [place] [address] participate in nationally recognized quality of care measures. If your blood pressure is greater than 120/80, as reported below, we urge that you seek medical care to address the potential of high blood pressure, commonly known as hypertension. Hypertension can be hereditary or can be caused by certain medical conditions, pain, stress, or \"white coat syndrome. \"       Please make an appointment with your health care provider(s) for follow up of your Emergency Department visit. VITALS:   Patient Vitals for the past 8 hrs:   Temp Pulse Resp BP SpO2   03/03/17 2200 - - - 121/77 98 %   03/03/17 2136 - 77 - 109/73 -   03/03/17 2034 97.3 °F (36.3 °C) 72 18 119/80 98 %          Thank you for allowing us to provide you with medical care today. We realize that you have many choices for your emergency care needs. Please choose us in the future for any continued health care needs. Regards,           Bunny Bustillo MD    [address] [facility].   Office: [phone]            No results found for this or any previous visit (from the past 24 hour(s)). No results found.

## 2017-03-04 NOTE — ED PROVIDER NOTES
HPI Comments: 27 y.o. female with past medical history significant for migraines, panic attack, anxiety, depression, endometriosis, ovarian cyst, and lumpectomy who presents from home via EMS with chief complaint of HA. Pt reports that she woke up with a HA, went to work, came back from work, took 4 Advil, and went to sleep. Pt reports that when she woke up around 1730, the HA was ten times worse. Pt also reports seeing \"polka dots\". Pt ate dinner and tried to go back to sleep but could not due to the pain. Pt called her neurologist who recommended taking Compazine and Benadryl or visiting the ED. Pt reports that Compazine and Benadryl makes her go to sleep. Imitrex and a steroid medication has been used in the ED setting in the past. Pt reports that she started to receive Botox. Pt has not had a FITZPATRICK like this in about a year. Pt denies fever and vomiting. Pt denies taking other medications other than Advil PTA. There are no other acute medical concerns at this time. Social hx:   PCP: None  Dr. Arcelia Pringle MD   Note written by Hany Brunson, as dictated by Tigist Camilo MD 9:03 PM      The history is provided by the patient. No  was used.         Past Medical History:   Diagnosis Date    Anxiety     Depression     Endometriosis     Migraine     Neurological disorder     migraines    Ovarian cyst     Panic attack        Past Surgical History:   Procedure Laterality Date    HX BREAST LUMPECTOMY Right     HX BUNIONECTOMY      HX CYST REMOVAL      HX OTHER SURGICAL      laparascopy    HX WISDOM TEETH EXTRACTION           Family History:   Problem Relation Age of Onset    Diabetes Maternal Aunt     Heart Disease Maternal Aunt     Stroke Maternal Aunt     Heart Disease Maternal Grandmother     No Known Problems Mother        Social History     Social History    Marital status: SINGLE     Spouse name: N/A    Number of children: N/A    Years of education: N/A Occupational History    Not on file. Social History Main Topics    Smoking status: Former Smoker    Smokeless tobacco: Never Used    Alcohol use No      Comment: social    Drug use: No    Sexual activity: Yes     Partners: Male     Birth control/ protection: Pill     Other Topics Concern    Not on file     Social History Narrative         ALLERGIES: Compazine [prochlorperazine edisylate]; Haldol [haloperidol lactate]; Promethazine; and Reglan [metoclopramide]    Review of Systems   Constitutional: Negative for fever. HENT: Negative for facial swelling. Eyes: Positive for visual disturbance (polka dots). Respiratory: Negative for chest tightness. Cardiovascular: Negative for chest pain. Gastrointestinal: Negative for abdominal pain and vomiting. Genitourinary: Negative for dysuria. Musculoskeletal: Negative for arthralgias. Skin: Negative for rash. Neurological: Positive for headaches. Negative for dizziness. Hematological: Negative for adenopathy. Psychiatric/Behavioral: Negative for suicidal ideas. All other systems reviewed and are negative. Vitals:    03/03/17 2034   BP: 119/80   Pulse: 72   Resp: 18   Temp: 97.3 °F (36.3 °C)   SpO2: 98%   Weight: 61.2 kg (135 lb)   Height: 5' 8\" (1.727 m)            Physical Exam   Constitutional: She is oriented to person, place, and time. She appears well-developed and well-nourished. No distress. HENT:   Head: Normocephalic and atraumatic. Mouth/Throat: Oropharynx is clear and moist.   Eyes: Pupils are equal, round, and reactive to light. No scleral icterus. Neck: Normal range of motion. Neck supple. No thyromegaly present. Cardiovascular: Normal rate, regular rhythm, normal heart sounds and intact distal pulses. No murmur heard. Pulmonary/Chest: Effort normal and breath sounds normal. No respiratory distress. Abdominal: Soft. Bowel sounds are normal. She exhibits no distension. There is no tenderness. Musculoskeletal: Normal range of motion. She exhibits no edema. Neurological: She is alert and oriented to person, place, and time. Skin: Skin is warm and dry. No rash noted. She is not diaphoretic. Nursing note and vitals reviewed. Note written by Hany Early, as dictated by Aden Marlow MD 9:04 PM      [facility]  ED Course       Procedures    Assessment:  27 y.o. Female with no sig pmh presents with headache. No relief from home medications. Pt well appearing and in no distress. Afebrile with normal VS.  No concerning features suggestive of serious intracranial pathology such as ICH, meningitis/encephalitis, tumor/mass, trauma. DDx includes:  Migraine, tension, cluster, sinus disease, dehydration, visual disturbance, viral illness. Plan:  IVF  Decadron  magnesium  Benadryl  Toradol  Reassess    10:15 PM  Patient resting comfortably with no complaints at this time. VS remain stable. Repeat physical exam is unremarkable. Pt ambulatory without difficulty and tolerating po's well.

## 2017-03-04 NOTE — ED TRIAGE NOTES
Pt arives today with c/o a migraine that started today at around 1100 this AM.  Pt has a history of migrans and was receiving botox injections as treatment. Pt was having 4 to 5 a month prior to treatment.  Pt has not had a migraine for about a year

## 2017-03-04 NOTE — ED NOTES
Pt. Discharged by provider. Pt. Wheeled out to waiting room to wait for ride. Pt. Pain is much better, headache is almost gone per patient.

## 2017-03-05 NOTE — DISCHARGE INSTRUCTIONS
We hope that we have addressed all of your medical concerns. The examination and treatment you received in the Emergency Department were for an emergent problem and were not intended as complete care. It is important that you follow up with your healthcare provider(s) for ongoing care. If your symptoms worsen or do not improve as expected, and you are unable to reach your usual health care provider(s), you should return to the Emergency Department. Today's healthcare is undergoing tremendous change, and patient satisfaction surveys are one of the many tools to assess the quality of medical care. You may receive a survey from the [employer] regarding your experience in the Emergency Department. I hope that your experience has been completely positive, particularly the medical care that I provided. As such, please participate in the survey; anything less than excellent does not meet my expectations or intentions. [facility] and [facility] participate in nationally recognized quality of care measures. If your blood pressure is greater than 120/80, as reported below, we urge that you seek medical care to address the potential of high blood pressure, commonly known as hypertension. Hypertension can be hereditary or can be caused by certain medical conditions, pain, stress, or \"white coat syndrome. \"       Please make an appointment with your health care provider(s) for follow up of your Emergency Department visit. VITALS:   Patient Vitals for the past 8 hrs:   Temp Pulse Resp BP SpO2   03/04/17 2215 - - - 115/78 97 %   03/04/17 2203 - 70 - 117/81 -   03/04/17 2145 - - - 117/76 98 %   03/04/17 2115 - - - 127/84 100 %   03/04/17 2045 - - - 132/89 100 %   03/04/17 2030 - - - 109/71 99 %   03/04/17 2026 98.5 °F (36.9 °C) 78 16 110/73 100 %          Thank you for allowing us to provide you with medical care today.   We realize that you have many choices for your emergency care needs. Please choose us in the future for any continued health care needs. Sterling Cummings MD    [address] [facility].   Office: [phone]            Recent Results (from the past 24 hour(s))   CBC WITH AUTOMATED DIFF    Collection Time: 03/04/17  8:35 PM   Result Value Ref Range    WBC 9.0 3.6 - 11.0 K/uL    RBC 3.91 3.80 - 5.20 M/uL    HGB 10.6 (L) 11.5 - 16.0 g/dL    HCT 33.5 (L) 35.0 - 47.0 %    MCV 85.7 80.0 - 99.0 FL    MCH 27.1 26.0 - 34.0 PG    MCHC 31.6 30.0 - 36.5 g/dL    RDW 13.1 11.5 - 14.5 %    PLATELET 496 541 - 026 K/uL    NEUTROPHILS 58 32 - 75 %    LYMPHOCYTES 33 12 - 49 %    MONOCYTES 9 5 - 13 %    EOSINOPHILS 0 0 - 7 %    BASOPHILS 0 0 - 1 %    ABS. NEUTROPHILS 5.2 1.8 - 8.0 K/UL    ABS. LYMPHOCYTES 2.9 0.8 - 3.5 K/UL    ABS. MONOCYTES 0.8 0.0 - 1.0 K/UL    ABS. EOSINOPHILS 0.0 0.0 - 0.4 K/UL    ABS. BASOPHILS 0.0 0.0 - 0.1 K/UL   METABOLIC PANEL, COMPREHENSIVE    Collection Time: 03/04/17  8:35 PM   Result Value Ref Range    Sodium 142 136 - 145 mmol/L    Potassium 3.3 (L) 3.5 - 5.1 mmol/L    Chloride 105 97 - 108 mmol/L    CO2 27 21 - 32 mmol/L    Anion gap 10 5 - 15 mmol/L    Glucose 93 65 - 100 mg/dL    BUN 9 6 - 20 MG/DL    Creatinine 0.71 0.55 - 1.02 MG/DL    BUN/Creatinine ratio 13 12 - 20      GFR est AA >60 >60 ml/min/1.73m2    GFR est non-AA >60 >60 ml/min/1.73m2    Calcium 8.2 (L) 8.5 - 10.1 MG/DL    Bilirubin, total 0.4 0.2 - 1.0 MG/DL    ALT (SGPT) 17 12 - 78 U/L    AST (SGOT) 13 (L) 15 - 37 U/L    Alk. phosphatase 44 (L) 45 - 117 U/L    Protein, total 6.7 6.4 - 8.2 g/dL    Albumin 3.8 3.5 - 5.0 g/dL    Globulin 2.9 2.0 - 4.0 g/dL    A-G Ratio 1.3 1.1 - 2.2         Ct Head Wo Cont    Result Date: 3/4/2017  EXAM:  CT HEAD WO CONT INDICATION: Migraine headache and light sensitivity for 3 hours. Similar episodes last night. COMPARISON: CT head on 9/14/2016. MRI brain on 1/6/2016.  TECHNIQUE: Noncontrast head CT. Coronal and sagittal reformats. CT dose reduction was achieved through the use of a standardized protocol tailored for this examination and automatic exposure control for dose modulation. FINDINGS: The ventricles and sulci are age-appropriate without hydrocephalus. There is no mass effect or midline shift. There is no intracranial hemorrhage or extra-axial fluid collection. There is no abnormal area of decreased density to suggest infarct. The calvarium is intact. The visualized paranasal sinuses and mastoid air cells are clear. IMPRESSION: Normal noncontrast head CT. No change. Migraine Headache: Care Instructions  Your Care Instructions  Migraines are painful, throbbing headaches that often start on one side of the head. They may cause nausea and vomiting and make you sensitive to light, sound, or smell. Without treatment, migraines can last from 4 hours to a few days. Medicines can help prevent migraines or stop them after they have started. Your doctor can help you find which ones work best for you. Follow-up care is a key part of your treatment and safety. Be sure to make and go to all appointments, and call your doctor if you are having problems. It's also a good idea to know your test results and keep a list of the medicines you take. How can you care for yourself at home? · Do not drive if you have taken a prescription pain medicine. · Rest in a quiet, dark room until your headache is gone. Close your eyes, and try to relax or go to sleep. Don't watch TV or read. · Put a cold, moist cloth or cold pack on the painful area for 10 to 20 minutes at a time. Put a thin cloth between the cold pack and your skin. · Use a warm, moist towel or a heating pad set on low to relax tight shoulder and neck muscles. · Have someone gently massage your neck and shoulders. · Take your medicines exactly as prescribed. Call your doctor if you think you are having a problem with your medicine.  You will get more details on the specific medicines your doctor prescribes. · Be careful not to take pain medicine more often than the instructions allow. You could get worse or more frequent headaches when the medicine wears off. To prevent migraines  · Keep a headache diary so you can figure out what triggers your headaches. Avoiding triggers may help you prevent headaches. Record when each headache began, how long it lasted, and what the pain was like. (Was it throbbing, aching, stabbing, or dull?) Write down any other symptoms you had with the headache, such as nausea, flashing lights or dark spots, or sensitivity to bright light or loud noise. Note if the headache occurred near your period. List anything that might have triggered the headache. Triggers may include certain foods (chocolate, cheese, wine) or odors, smoke, bright light, stress, or lack of sleep. · If your doctor has prescribed medicine for your migraines, take it as directed. You may have medicine that you take only when you get a migraine and medicine that you take all the time to help prevent migraines. ¨ If your doctor has prescribed medicine for when you get a headache, take it at the first sign of a migraine, unless your doctor has given you other instructions. ¨ If your doctor has prescribed medicine to prevent migraines, take it exactly as prescribed. Call your doctor if you think you are having a problem with your medicine. · Find healthy ways to deal with stress. Migraines are most common during or right after stressful times. Take time to relax before and after you do something that has caused a migraine in the past.  · Try to keep your muscles relaxed by keeping good posture. Check your jaw, face, neck, and shoulder muscles for tension. Try to relax them. When you sit at a desk, change positions often. And make sure to stretch for 30 seconds each hour. · Get plenty of sleep and exercise. · Eat meals on a regular schedule.  Avoid foods and drinks that often trigger migraines. These include chocolate, alcohol (especially red wine and port), aspartame, monosodium glutamate (MSG), and some additives found in foods (such as hot dogs, boyce, cold cuts, aged cheeses, and pickled foods). · Limit caffeine. Don't drink too much coffee, tea, or soda. But don't quit caffeine suddenly. That can also give you migraines. · Do not smoke or allow others to smoke around you. If you need help quitting, talk to your doctor about stop-smoking programs and medicines. These can increase your chances of quitting for good. · If you are taking birth control pills or hormone therapy, talk to your doctor about whether they are triggering your migraines. When should you call for help? Call 911 anytime you think you may need emergency care. For example, call if:  · You have signs of a stroke. These may include:  ¨ Sudden numbness, paralysis, or weakness in your face, arm, or leg, especially on only one side of your body. ¨ Sudden vision changes. ¨ Sudden trouble speaking. ¨ Sudden confusion or trouble understanding simple statements. ¨ Sudden problems with walking or balance. ¨ A sudden, severe headache that is different from past headaches. Call your doctor now or seek immediate medical care if:  · You have new or worse nausea and vomiting. · You have a new or higher fever. · Your headache gets much worse. Watch closely for changes in your health, and be sure to contact your doctor if:  · You are not getting better after 2 days (48 hours). Where can you learn more? Go to http://michele-lucy.info/. Enter S310 in the search box to learn more about \"Migraine Headache: Care Instructions. \"  Current as of: February 19, 2016  Content Version: 11.1  © 0534-9695 [employer]. Care instructions adapted under license by [employer] (which disclaims liability or warranty for this information).  If you have questions about a medical condition or this instruction, always ask your healthcare professional. [place] [address] any warranty or liability for your use of this information.

## 2017-03-05 NOTE — ED NOTES
Patient discharged home by provider, patient returned verbal understanding of instructions.   Patient denies pain, transported home by friend

## 2017-03-05 NOTE — ED PROVIDER NOTES
HPI Comments: 27 y.o. female with past medical history significant for migraines, endometriosis, anxiety, depression, panic attacks who presents from home via EMS with chief complaint of headache. Pt reports 3 hour hx of moderate generalized headache. Pt notes hx of headaches and receives botox injectrons every 3 months for symptoms. Current headache with pain radiating down neck and into shoulders b/l. She states neck pain is atypical in comparison to previous migraines. Pt also c/o nausea and light sensitivity. Pt states she allergic to Compazine, Haldol, Promethazine and Reglan. She states \"Compazine and Reglan make me very anxious\". Pt denies vomiting, fever, chills, appetite change, cough, congestion, chest pain, SOB, abdominal pain, back pain, urinary symptoms, numbness, tingling or weakness. There are no other acute medical concerns at this time. Old chart review: Pt evaluated in ED yesterday for similar symptoms. She was given Toradol, Benadryl, Decadron and Imetrex. P tdischarged home to f/u with Neurologist.    Neurologist: Leora Guevara DO  PCP: None    Note written by Hany Luis, as dictated by Helen Ayoub MD 8:48 PM    The history is provided by the patient. No  was used.         Past Medical History:   Diagnosis Date    Anxiety     Depression     Endometriosis     Migraine     Neurological disorder     migraines    Ovarian cyst     Panic attack        Past Surgical History:   Procedure Laterality Date    HX BREAST LUMPECTOMY Right     HX BUNIONECTOMY      HX CYST REMOVAL      HX OTHER SURGICAL      laparascopy    HX WISDOM TEETH EXTRACTION           Family History:   Problem Relation Age of Onset    Diabetes Maternal Aunt     Heart Disease Maternal Aunt     Stroke Maternal Aunt     Heart Disease Maternal Grandmother     No Known Problems Mother        Social History     Social History    Marital status: SINGLE     Spouse name: N/A   Jeromysohaparrish Koch Number of children: N/A    Years of education: N/A     Occupational History    Not on file. Social History Main Topics    Smoking status: Former Smoker    Smokeless tobacco: Never Used    Alcohol use No      Comment: social    Drug use: No    Sexual activity: Yes     Partners: Male     Birth control/ protection: Pill     Other Topics Concern    Not on file     Social History Narrative         ALLERGIES: Compazine [prochlorperazine edisylate]; Haldol [haloperidol lactate]; Promethazine; and Reglan [metoclopramide]    Review of Systems   Constitutional: Negative for appetite change, chills and fever. HENT: Negative for congestion. Eyes: Positive for photophobia. Respiratory: Negative for cough and shortness of breath. Cardiovascular: Negative for chest pain. Gastrointestinal: Positive for nausea. Negative for abdominal pain, constipation, diarrhea and vomiting. Genitourinary: Negative for difficulty urinating. Musculoskeletal: Positive for neck pain. Negative for back pain. Neurological: Positive for headaches. Negative for weakness and numbness. All other systems reviewed and are negative. There were no vitals filed for this visit. Physical Exam   Nursing note and vitals reviewed. CONSTITUTIONAL: Well-appearing; well-nourished; in no apparent distress  HEAD: Normocephalic; atraumatic  EYES: PERRL; EOM intact; conjunctiva and sclera are clear bilaterally. ENT: No rhinorrhea; normal pharynx with no tonsillar hypertrophy; mucous membranes pink/moist, no erythema, no exudate. NECK: Supple; non-tender; no cervical lymphadenopathy  CARD: Normal S1, S2; no murmurs, rubs, or gallops. Regular rate and rhythm. RESP: Normal respiratory effort; breath sounds clear and equal bilaterally; no wheezes, rhonchi, or rales. ABD: Normal bowel sounds; non-distended; non-tender; no palpable organomegaly, no masses, no bruits.   Back Exam: Normal inspection; no vertebral point tenderness, no CVA tenderness. Normal range of motion. EXT: Normal ROM in all four extremities; non-tender to palpation; no swelling or deformity; distal pulses are normal, no edema. SKIN: Warm; dry; no rash. NEURO:Alert and oriented x 3, coherent, ARON-XII grossly intact, sensory and motor are non-focal.        MDM  Number of Diagnoses or Management Options  Intractable migraine without aura and without status migrainosus:   Diagnosis management comments: Assessment: acute exacerbation of chronic migraine headaches/ anxiety/ rule out ICH. Plan: CT scan head/ lab/ IVF/ antiemetics with analgesia/ steroid/ p.o. challenge/ Monitor and Reevaluate. Amount and/or Complexity of Data Reviewed  Clinical lab tests: ordered and reviewed  Tests in the radiology section of CPT®: ordered and reviewed  Tests in the medicine section of CPT®: reviewed and ordered  Discussion of test results with the performing providers: yes  Decide to obtain previous medical records or to obtain history from someone other than the patient: yes  Obtain history from someone other than the patient: yes  Review and summarize past medical records: yes  Discuss the patient with other providers: yes  Independent visualization of images, tracings, or specimens: yes    Risk of Complications, Morbidity, and/or Mortality  Presenting problems: moderate  Diagnostic procedures: moderate      ED Course       Procedures    Progress Note:   Pt has been reexamined by Myah Hernandez MD. Pt is feeling much better. Symptoms have improved. All available results have been reviewed with pt and any available family. Pt understands sx, dx, and tx in ED. Care plan has been outlined and questions have been answered. Pt is ready to go home. Will send home on migraine headaches instructions. Prescription of Zofran and naproxenOutpatient referral with PCP as needed. Written by Myah Hernandez MD,2:44 AM    .   .

## 2017-03-06 ENCOUNTER — TELEPHONE (OUTPATIENT)
Dept: NEUROLOGY | Age: 27
End: 2017-03-06

## 2017-03-06 ENCOUNTER — HOSPITAL ENCOUNTER (EMERGENCY)
Age: 27
Discharge: HOME | End: 2017-03-06
Attending: EMERGENCY MEDICINE
Payer: COMMERCIAL

## 2017-03-06 VITALS
WEIGHT: 136 LBS | HEART RATE: 80 BPM | SYSTOLIC BLOOD PRESSURE: 120 MMHG | OXYGEN SATURATION: 99 % | BODY MASS INDEX: 20.61 KG/M2 | HEIGHT: 68 IN | TEMPERATURE: 98.3 F | DIASTOLIC BLOOD PRESSURE: 70 MMHG | RESPIRATION RATE: 18 BRPM

## 2017-03-06 DIAGNOSIS — G43.801 OTHER MIGRAINE WITH STATUS MIGRAINOSUS, NOT INTRACTABLE: Primary | ICD-10-CM

## 2017-03-06 PROCEDURE — 74011636637 HC RX REV CODE- 636/637: Performed by: EMERGENCY MEDICINE

## 2017-03-06 PROCEDURE — 96375 TX/PRO/DX INJ NEW DRUG ADDON: CPT

## 2017-03-06 PROCEDURE — 96366 THER/PROPH/DIAG IV INF ADDON: CPT

## 2017-03-06 PROCEDURE — 96365 THER/PROPH/DIAG IV INF INIT: CPT

## 2017-03-06 PROCEDURE — 96372 THER/PROPH/DIAG INJ SC/IM: CPT

## 2017-03-06 PROCEDURE — 74011250636 HC RX REV CODE- 250/636: Performed by: EMERGENCY MEDICINE

## 2017-03-06 PROCEDURE — 99282 EMERGENCY DEPT VISIT SF MDM: CPT

## 2017-03-06 RX ORDER — MAGNESIUM SULFATE HEPTAHYDRATE 40 MG/ML
2 INJECTION, SOLUTION INTRAVENOUS ONCE
Status: COMPLETED | OUTPATIENT
Start: 2017-03-06 | End: 2017-03-06

## 2017-03-06 RX ORDER — KETOROLAC TROMETHAMINE 30 MG/ML
30 INJECTION, SOLUTION INTRAMUSCULAR; INTRAVENOUS
Status: COMPLETED | OUTPATIENT
Start: 2017-03-06 | End: 2017-03-06

## 2017-03-06 RX ORDER — DEXAMETHASONE SODIUM PHOSPHATE 10 MG/ML
10 INJECTION INTRAMUSCULAR; INTRAVENOUS ONCE
Status: COMPLETED | OUTPATIENT
Start: 2017-03-06 | End: 2017-03-06

## 2017-03-06 RX ORDER — DIPHENHYDRAMINE HYDROCHLORIDE 50 MG/ML
25 INJECTION, SOLUTION INTRAMUSCULAR; INTRAVENOUS
Status: COMPLETED | OUTPATIENT
Start: 2017-03-06 | End: 2017-03-06

## 2017-03-06 RX ORDER — SUMATRIPTAN 6 MG/.5ML
6 INJECTION, SOLUTION SUBCUTANEOUS ONCE
Status: COMPLETED | OUTPATIENT
Start: 2017-03-06 | End: 2017-03-06

## 2017-03-06 RX ADMIN — SUMATRIPTAN 6 MG: 6 INJECTION, SOLUTION SUBCUTANEOUS at 22:07

## 2017-03-06 RX ADMIN — DEXAMETHASONE SODIUM PHOSPHATE 10 MG: 10 INJECTION, SOLUTION INTRAMUSCULAR; INTRAVENOUS at 21:59

## 2017-03-06 RX ADMIN — DIPHENHYDRAMINE HYDROCHLORIDE 25 MG: 50 INJECTION, SOLUTION INTRAMUSCULAR; INTRAVENOUS at 22:01

## 2017-03-06 RX ADMIN — MAGNESIUM SULFATE HEPTAHYDRATE 2 G: 40 INJECTION, SOLUTION INTRAVENOUS at 22:03

## 2017-03-06 RX ADMIN — SODIUM CHLORIDE 1000 ML: 900 INJECTION, SOLUTION INTRAVENOUS at 21:58

## 2017-03-06 RX ADMIN — KETOROLAC TROMETHAMINE 30 MG: 30 INJECTION, SOLUTION INTRAMUSCULAR at 21:58

## 2017-03-06 NOTE — TELEPHONE ENCOUNTER
Pt at the ER this weekend, spoke with Dr. Rosario Bardales, she was told to call and ask Luz Maria Golden to call in Imitrex injections, to [facility] pharmacy.  Pt would also like a call please, would like to f/u on botox injections due to cancellations due to her GI bug.

## 2017-03-06 NOTE — TELEPHONE ENCOUNTER
Pt said her Imitrex was never called in. Please give her a call back, she would like it called in today.

## 2017-03-06 NOTE — TELEPHONE ENCOUNTER
Attempted to return call, no answer. Left message to  medication in office per MD. Matthias Brooks with MD order for [facility] patient may .

## 2017-03-07 ENCOUNTER — TELEPHONE (OUTPATIENT)
Dept: NEUROLOGY | Age: 27
End: 2017-03-07

## 2017-03-07 RX ORDER — DEXAMETHASONE 2 MG/1
TABLET ORAL
Qty: 35 TAB | Refills: 0 | Status: SHIPPED | OUTPATIENT
Start: 2017-03-07 | End: 2017-03-07 | Stop reason: SDUPTHER

## 2017-03-07 RX ORDER — DEXAMETHASONE 2 MG/1
TABLET ORAL
Qty: 35 TAB | Refills: 0 | Status: SHIPPED | OUTPATIENT
Start: 2017-03-07 | End: 2017-04-20

## 2017-03-07 NOTE — ED PROVIDER NOTES
HPI Comments: 27 y.o. female with past medical history significant for anxiety/depression, endometriosis, and migraines who presents from home with chief complaint of headache. Pt states that over the course of the past few days, she has been experiencing severe headaches which have prompted her to go to the ED on 3/3, 3/4 and today. Pt reports that she developed another severe frontal HA with associated photophobia, nausea and vomiting around 1800 today. Pt notes that she contacted her neurologist earlier today about possibly refilling her Imitrex and pt claims that the neurologist told her to come to the ED and that they would see the pt in the morning. Pt says that she was unable to get Rx for Imitrex today. Pt claims that she hasn't needed the Imitrex in over a year due to success with Botox injections. Pt states that the medications she received in the ED 3 days ago relieved her HA. Pt denies hx of heart disease or diabetes. There are no other acute medical concerns at this time. Social hx: Denies use of EtOH or tobacco.    Old Chart Review:  Pt was seen in the ED on 3/3 and 3/4, given Toradol, Decadron, Magnesium and IV fluids w/ relief both days. Pt's neurologist is Dr. Davin Waddell. Pt tried to call clinic earlier today to get Imitrex refilled. Note written by Dorothea Krueger. Genevia File, as dictated by Arnold Carlton MD 9:29 PM      The history is provided by the patient. No  was used.         Past Medical History:   Diagnosis Date    Anxiety     Depression     Endometriosis     Migraine     Neurological disorder     migraines    Ovarian cyst     Panic attack        Past Surgical History:   Procedure Laterality Date    HX BREAST LUMPECTOMY Right     HX BUNIONECTOMY      HX CYST REMOVAL      HX OTHER SURGICAL      laparascopy    HX WISDOM TEETH EXTRACTION           Family History:   Problem Relation Age of Onset    Diabetes Maternal Aunt     Heart Disease Maternal Aunt     Stroke Maternal Aunt     Heart Disease Maternal Grandmother     No Known Problems Mother        Social History     Social History    Marital status: SINGLE     Spouse name: N/A    Number of children: N/A    Years of education: N/A     Occupational History    Not on file. Social History Main Topics    Smoking status: Former Smoker    Smokeless tobacco: Never Used    Alcohol use No      Comment: social    Drug use: No    Sexual activity: Yes     Partners: Male     Birth control/ protection: Pill     Other Topics Concern    Not on file     Social History Narrative         ALLERGIES: Compazine [prochlorperazine edisylate]; Haldol [haloperidol lactate]; Promethazine; and Reglan [metoclopramide]    Review of Systems   Constitutional: Negative for fever. HENT: Negative for facial swelling and nosebleeds. Eyes: Positive for photophobia. Negative for pain. Respiratory: Negative for cough, chest tightness and shortness of breath. Cardiovascular: Negative for chest pain and leg swelling. Gastrointestinal: Positive for nausea and vomiting. Negative for abdominal pain and diarrhea. Endocrine: Negative for polyuria. Genitourinary: Negative for difficulty urinating and flank pain. Musculoskeletal: Negative for arthralgias and back pain. Skin: Negative for color change. Allergic/Immunologic: Negative for immunocompromised state. Neurological: Positive for headaches. Negative for dizziness. Hematological: Does not bruise/bleed easily. Psychiatric/Behavioral: Negative for agitation. All other systems reviewed and are negative. Vitals:    03/06/17 2111   BP: 122/69   Pulse: 87   Resp: 16   Temp: 98.3 °F (36.8 °C)   SpO2: 99%   Weight: 61.7 kg (136 lb)   Height: 5' 8\" (1.727 m)            Physical Exam   Constitutional: She is oriented to person, place, and time. She appears well-developed and well-nourished. HENT:   Head: Normocephalic and atraumatic.    Right Ear: External ear normal.   Left Ear: External ear normal.   Nose: Nose normal.   Mouth/Throat: Oropharynx is clear and moist.   Eyes: EOM are normal. Pupils are equal, round, and reactive to light. No scleral icterus. Photophobic, pupils 4 mm and reactive. Neck: Normal range of motion. Neck supple. No JVD present. No tracheal deviation present. No thyromegaly present. Cardiovascular: Normal rate, regular rhythm, normal heart sounds and intact distal pulses. Exam reveals no friction rub. No murmur heard. Pulmonary/Chest: Effort normal and breath sounds normal. No stridor. No respiratory distress. She has no wheezes. She has no rales. She exhibits no tenderness. Abdominal: Soft. Bowel sounds are normal. She exhibits no distension. There is no tenderness. There is no rebound and no guarding. Musculoskeletal: Normal range of motion. She exhibits no edema or tenderness. Lymphadenopathy:     She has no cervical adenopathy. Neurological: She is alert and oriented to person, place, and time. She has normal reflexes. No cranial nerve deficit. Coordination normal.   No facial droop. 5/5 hand  and 5/5 plantar dorsi flexion of feet. Normal sensation of arms and legs. Skin: Skin is warm and dry. No rash noted. No erythema. Psychiatric: She has a normal mood and affect. Her behavior is normal. Judgment and thought content normal.   Nursing note and vitals reviewed. Note written by Simran Vences. Jacob Jones, as dictated by Saji Wray MD 9:29 PM      MDM  Number of Diagnoses or Management Options  Diagnosis management comments: 22-year-old white female presents to the emergency department for migraine headaches. Patient has been here 3 times in the last 4 days with migraine headaches. She says the medication she received 3 days ago helped her. Patient reports she cannot take nausea medications because they make her anxious.  She reports that when she was here and received Decadron, Imitrex, magnesium, Toradol, these helped her symptoms. She has an appointment with her neurologist at 8 AM tomorrow to discuss additional medications. We'll give her the pre-mentioned medications. We'll reassess her after treatment. Patient and significant other agree with this plan       Amount and/or Complexity of Data Reviewed  Decide to obtain previous medical records or to obtain history from someone other than the patient: yes  Obtain history from someone other than the patient: yes  Review and summarize past medical records: yes  Independent visualization of images, tracings, or specimens: yes    Risk of Complications, Morbidity, and/or Mortality  Presenting problems: moderate  Diagnostic procedures: moderate  Management options: moderate    Patient Progress  Patient progress: improved    ED Course       Procedures    PROGRESS NOTE:  10:42 PM  Pt is feeling much better, HA has resolved and pt would like to go home. Will follow up with neurology tomorrow. Good return precautions given to patient. Close follow up with PCP recommended. Patient and/or family voices understanding of this plan. Discharge instructions were explained by me and all concerns were addressed.

## 2017-03-07 NOTE — DISCHARGE INSTRUCTIONS
Migraine Headache: Care Instructions  Your Care Instructions  Migraines are painful, throbbing headaches that often start on one side of the head. They may cause nausea and vomiting and make you sensitive to light, sound, or smell. Without treatment, migraines can last from 4 hours to a few days. Medicines can help prevent migraines or stop them after they have started. Your doctor can help you find which ones work best for you. Follow-up care is a key part of your treatment and safety. Be sure to make and go to all appointments, and call your doctor if you are having problems. It's also a good idea to know your test results and keep a list of the medicines you take. How can you care for yourself at home? · Do not drive if you have taken a prescription pain medicine. · Rest in a quiet, dark room until your headache is gone. Close your eyes, and try to relax or go to sleep. Don't watch TV or read. · Put a cold, moist cloth or cold pack on the painful area for 10 to 20 minutes at a time. Put a thin cloth between the cold pack and your skin. · Use a warm, moist towel or a heating pad set on low to relax tight shoulder and neck muscles. · Have someone gently massage your neck and shoulders. · Take your medicines exactly as prescribed. Call your doctor if you think you are having a problem with your medicine. You will get more details on the specific medicines your doctor prescribes. · Be careful not to take pain medicine more often than the instructions allow. You could get worse or more frequent headaches when the medicine wears off. To prevent migraines  · Keep a headache diary so you can figure out what triggers your headaches. Avoiding triggers may help you prevent headaches. Record when each headache began, how long it lasted, and what the pain was like.  (Was it throbbing, aching, stabbing, or dull?) Write down any other symptoms you had with the headache, such as nausea, flashing lights or dark spots, or sensitivity to bright light or loud noise. Note if the headache occurred near your period. List anything that might have triggered the headache. Triggers may include certain foods (chocolate, cheese, wine) or odors, smoke, bright light, stress, or lack of sleep. · If your doctor has prescribed medicine for your migraines, take it as directed. You may have medicine that you take only when you get a migraine and medicine that you take all the time to help prevent migraines. ¨ If your doctor has prescribed medicine for when you get a headache, take it at the first sign of a migraine, unless your doctor has given you other instructions. ¨ If your doctor has prescribed medicine to prevent migraines, take it exactly as prescribed. Call your doctor if you think you are having a problem with your medicine. · Find healthy ways to deal with stress. Migraines are most common during or right after stressful times. Take time to relax before and after you do something that has caused a migraine in the past.  · Try to keep your muscles relaxed by keeping good posture. Check your jaw, face, neck, and shoulder muscles for tension. Try to relax them. When you sit at a desk, change positions often. And make sure to stretch for 30 seconds each hour. · Get plenty of sleep and exercise. · Eat meals on a regular schedule. Avoid foods and drinks that often trigger migraines. These include chocolate, alcohol (especially red wine and port), aspartame, monosodium glutamate (MSG), and some additives found in foods (such as hot dogs, boyce, cold cuts, aged cheeses, and pickled foods). · Limit caffeine. Don't drink too much coffee, tea, or soda. But don't quit caffeine suddenly. That can also give you migraines. · Do not smoke or allow others to smoke around you. If you need help quitting, talk to your doctor about stop-smoking programs and medicines. These can increase your chances of quitting for good.   · If you are taking birth control pills or hormone therapy, talk to your doctor about whether they are triggering your migraines. When should you call for help? Call 911 anytime you think you may need emergency care. For example, call if:  · You have signs of a stroke. These may include:  ¨ Sudden numbness, paralysis, or weakness in your face, arm, or leg, especially on only one side of your body. ¨ Sudden vision changes. ¨ Sudden trouble speaking. ¨ Sudden confusion or trouble understanding simple statements. ¨ Sudden problems with walking or balance. ¨ A sudden, severe headache that is different from past headaches. Call your doctor now or seek immediate medical care if:  · You have new or worse nausea and vomiting. · You have a new or higher fever. · Your headache gets much worse. Watch closely for changes in your health, and be sure to contact your doctor if:  · You are not getting better after 2 days (48 hours). Where can you learn more? Go to http://michele-lucy.info/. Enter G072 in the search box to learn more about \"Migraine Headache: Care Instructions. \"  Current as of: February 19, 2016  Content Version: 11.1  © 3475-3338 [email or web address]. Care instructions adapted under license by [employer] (which disclaims liability or warranty for this information). If you have questions about a medical condition or this instruction, always ask your healthcare professional. [address] any warranty or liability for your use of this information. We hope that we have addressed all of your medical concerns. The examination and treatment you received in the Emergency Department were for an emergent problem and were not intended as complete care. It is important that you follow up with your healthcare provider(s) for ongoing care.  If your symptoms worsen or do not improve as expected, and you are unable to reach your usual health care provider(s), you should return to the Emergency Department. Today's healthcare is undergoing tremendous change, and patient satisfaction surveys are one of the many tools to assess the quality of medical care. You may receive a survey from the [employer] regarding your experience in the Emergency Department. I hope that your experience has been completely positive, particularly the medical care that I provided. As such, please participate in the survey; anything less than excellent does not meet my expectations or intentions. [facility] and [facility] participate in nationally recognized quality of care measures. If your blood pressure is greater than 120/80, as reported below, we urge that you seek medical care to address the potential of high blood pressure, commonly known as hypertension. Hypertension can be hereditary or can be caused by certain medical conditions, pain, stress, or \"white coat syndrome. \"       Please make an appointment with your health care provider(s) for follow up of your Emergency Department visit. VITALS:   Patient Vitals for the past 8 hrs:   Temp Pulse Resp BP SpO2   03/06/17 2111 98.3 °F (36.8 °C) 87 16 122/69 99 %          Thank you for allowing us to provide you with medical care today. We realize that you have many choices for your emergency care needs. Please choose us in the future for any continued health care needs. MD NICK Cline [facility]: [phone]            No results found for this or any previous visit (from the past 24 hour(s)). No results found.

## 2017-03-28 ENCOUNTER — HOSPITAL ENCOUNTER (EMERGENCY)
Age: 27
Discharge: HOME | End: 2017-03-29
Attending: EMERGENCY MEDICINE
Payer: COMMERCIAL

## 2017-03-28 DIAGNOSIS — G43.009 MIGRAINE WITHOUT AURA AND WITHOUT STATUS MIGRAINOSUS, NOT INTRACTABLE: Primary | ICD-10-CM

## 2017-03-28 PROCEDURE — 96372 THER/PROPH/DIAG INJ SC/IM: CPT

## 2017-03-28 PROCEDURE — 74011250636 HC RX REV CODE- 250/636: Performed by: EMERGENCY MEDICINE

## 2017-03-28 PROCEDURE — 96365 THER/PROPH/DIAG IV INF INIT: CPT

## 2017-03-28 PROCEDURE — 99284 EMERGENCY DEPT VISIT MOD MDM: CPT

## 2017-03-28 PROCEDURE — 96375 TX/PRO/DX INJ NEW DRUG ADDON: CPT

## 2017-03-28 RX ORDER — MAGNESIUM SULFATE 1 G/100ML
1 INJECTION INTRAVENOUS
Status: COMPLETED | OUTPATIENT
Start: 2017-03-28 | End: 2017-03-29

## 2017-03-28 RX ORDER — SUMATRIPTAN 6 MG/.5ML
6 INJECTION, SOLUTION SUBCUTANEOUS
Status: COMPLETED | OUTPATIENT
Start: 2017-03-28 | End: 2017-03-28

## 2017-03-28 RX ORDER — DIPHENHYDRAMINE HYDROCHLORIDE 50 MG/ML
50 INJECTION, SOLUTION INTRAMUSCULAR; INTRAVENOUS
Status: COMPLETED | OUTPATIENT
Start: 2017-03-28 | End: 2017-03-28

## 2017-03-28 RX ORDER — KETOROLAC TROMETHAMINE 30 MG/ML
30 INJECTION, SOLUTION INTRAMUSCULAR; INTRAVENOUS
Status: COMPLETED | OUTPATIENT
Start: 2017-03-28 | End: 2017-03-28

## 2017-03-28 RX ADMIN — KETOROLAC TROMETHAMINE 30 MG: 30 INJECTION, SOLUTION INTRAMUSCULAR at 22:48

## 2017-03-28 RX ADMIN — DIPHENHYDRAMINE HYDROCHLORIDE 50 MG: 50 INJECTION, SOLUTION INTRAMUSCULAR; INTRAVENOUS at 22:46

## 2017-03-28 RX ADMIN — SUMATRIPTAN 6 MG: 6 INJECTION, SOLUTION SUBCUTANEOUS at 22:51

## 2017-03-28 RX ADMIN — MAGNESIUM SULFATE HEPTAHYDRATE 1 G: 1 INJECTION, SOLUTION INTRAVENOUS at 22:49

## 2017-03-29 VITALS
HEART RATE: 88 BPM | OXYGEN SATURATION: 99 % | DIASTOLIC BLOOD PRESSURE: 80 MMHG | TEMPERATURE: 98.4 F | RESPIRATION RATE: 18 BRPM | HEIGHT: 68 IN | SYSTOLIC BLOOD PRESSURE: 109 MMHG | WEIGHT: 131 LBS | BODY MASS INDEX: 19.85 KG/M2

## 2017-03-29 NOTE — ED PROVIDER NOTES
HPI Comments: The patient is a 59-year-old female  With a past medical history significant for panic attacks, anxiety, depression, endometriosis, migraine headaches who presents to the ED with complaint of intractable headache that began approximately 3 hours ago, described as throbbing in nature, severity 8/10, accompanied by nausea, vomiting, light sensitivity and photophobia. The symptoms are similar to prior episodes of migraine headaches. The patient denies any fever, cough, congestion, neck and back pain, chest pain, shortness of breath, dizziness, weakness and numbness. The patient usually get Botox for preventative treatment as well as Topamax. Past Medical History:   Diagnosis Date    Anxiety     Depression     Endometriosis     Migraine     Neurological disorder     migraines    Ovarian cyst     Panic attack        Past Surgical History:   Procedure Laterality Date    HX BREAST LUMPECTOMY Right     HX BUNIONECTOMY      HX CYST REMOVAL      HX OTHER SURGICAL      laparascopy    HX WISDOM TEETH EXTRACTION           Family History:   Problem Relation Age of Onset    Diabetes Maternal Aunt     Heart Disease Maternal Aunt     Stroke Maternal Aunt     Heart Disease Maternal Grandmother     No Known Problems Mother        Social History     Social History    Marital status: SINGLE     Spouse name: N/A    Number of children: N/A    Years of education: N/A     Occupational History    Not on file. Social History Main Topics    Smoking status: Former Smoker    Smokeless tobacco: Never Used    Alcohol use No      Comment: social    Drug use: No    Sexual activity: Yes     Partners: Male     Birth control/ protection: Pill     Other Topics Concern    Not on file     Social History Narrative         ALLERGIES: Compazine [prochlorperazine edisylate]; Haldol [haloperidol lactate];  Promethazine; Reglan [metoclopramide]; and Zofran [ondansetron hcl (pf)]    Review of Systems   All other systems reviewed and are negative. Vitals:    03/28/17 2249 03/28/17 2300 03/28/17 2315 03/28/17 2330   BP: 109/73 (!) 135/98 (!) 125/91 117/84   Pulse: 88      Resp:       Temp:       SpO2:  99% 98% 99%   Weight:       Height:                Physical Exam   Nursing note and vitals reviewed. CONSTITUTIONAL: Well-appearing; well-nourished; in no apparent distress  HEAD: Normocephalic; atraumatic  EYES: PERRL; EOM intact; conjunctiva and sclera are clear bilaterally. ENT: No rhinorrhea; normal pharynx with no tonsillar hypertrophy; mucous membranes pink/moist, no erythema, no exudate. NECK: Supple; non-tender; no cervical lymphadenopathy  CARD: Normal S1, S2; no murmurs, rubs, or gallops. Regular rate and rhythm. RESP: Normal respiratory effort; breath sounds clear and equal bilaterally; no wheezes, rhonchi, or rales. ABD: Normal bowel sounds; non-distended; non-tender; no palpable organomegaly, no masses, no bruits. Back Exam: Normal inspection; no vertebral point tenderness, no CVA tenderness. Normal range of motion. EXT: Normal ROM in all four extremities; non-tender to palpation; no swelling or deformity; distal pulses are normal, no edema. SKIN: Warm; dry; no rash. NEURO:Alert and oriented x 3, coherent, ARON-XII grossly intact, sensory and motor are non-focal.        MDM  Number of Diagnoses or Management Options  Migraine without aura and without status migrainosus, not intractable:   Diagnosis management comments: Assessment: acute exacerbation of migraine headache    Plan: IV fluid/ antiemetics and analgesia/ steroid and magnesium/ serial exam/ Monitor and Reevaluate.          Amount and/or Complexity of Data Reviewed  Clinical lab tests: ordered and reviewed  Tests in the radiology section of CPT®: ordered and reviewed  Tests in the medicine section of CPT®: ordered and reviewed  Discussion of test results with the performing providers: yes  Decide to obtain previous medical records or to obtain history from someone other than the patient: yes  Obtain history from someone other than the patient: yes  Review and summarize past medical records: yes  Discuss the patient with other providers: yes  Independent visualization of images, tracings, or specimens: yes    Risk of Complications, Morbidity, and/or Mortality  Presenting problems: moderate  Diagnostic procedures: moderate  Management options: moderate      ED Course       Procedures      PROGRESS NOTE:  11:53 PM  The pt is feeling better. She will be discharged home. Progress Note:   Pt has been reexamined by Rodríguez Moran MD. Pt is feeling much better. Symptoms have improved. All available results have been reviewed with pt and any available family. Pt understands sx, dx, and tx in ED. Care plan has been outlined and questions have been answered. Pt is ready to go home. Will send home on Migraines Headaches Instructions. Prescription of Immitrex. Outpatient referral with PCP/ Neurology as needed. Written by Rodríguez Moran MD,11:56 PM    .   .

## 2017-03-29 NOTE — DISCHARGE INSTRUCTIONS
We hope that we have addressed all of your medical concerns. The examination and treatment you received in the Emergency Department were for an emergent problem and were not intended as complete care. It is important that you follow up with your healthcare provider(s) for ongoing care. If your symptoms worsen or do not improve as expected, and you are unable to reach your usual health care provider(s), you should return to the Emergency Department. Today's healthcare is undergoing tremendous change, and patient satisfaction surveys are one of the many tools to assess the quality of medical care. You may receive a survey from the [employer] regarding your experience in the Emergency Department. I hope that your experience has been completely positive, particularly the medical care that I provided. As such, please participate in the survey; anything less than excellent does not meet my expectations or intentions. Formerly [facility] and 8 [facility] participate in nationally recognized quality of care measures. If your blood pressure is greater than 120/80, as reported below, we urge that you seek medical care to address the potential of high blood pressure, commonly known as hypertension. Hypertension can be hereditary or can be caused by certain medical conditions, pain, stress, or \"white coat syndrome. \"       Please make an appointment with your health care provider(s) for follow up of your Emergency Department visit.        VITALS:   Patient Vitals for the past 8 hrs:   Temp Pulse Resp BP SpO2   03/28/17 2345 - - - 97/59 98 %   03/28/17 2330 - - - 117/84 99 %   03/28/17 2315 - - - (!) 125/91 98 %   03/28/17 2300 - - - (!) 135/98 99 %   03/28/17 2249 - 88 - 109/73 -   03/28/17 2247 - - - 109/73 100 %   03/28/17 2215 - - - 111/66 100 %   03/28/17 2203 98.4 °F (36.9 °C) 92 18 105/70 100 %   03/28/17 2202 - - - 105/70 -          Thank you for allowing us to provide you with medical care today. We realize that you have many choices for your emergency care needs. Please choose us in the future for any continued health care needs. MD NICK Goodwin [place] [facility] 70: [phone]            No results found for this or any previous visit (from the past 24 hour(s)). No results found. Migraine Headache: Care Instructions  Your Care Instructions  Migraines are painful, throbbing headaches that often start on one side of the head. They may cause nausea and vomiting and make you sensitive to light, sound, or smell. Without treatment, migraines can last from 4 hours to a few days. Medicines can help prevent migraines or stop them after they have started. Your doctor can help you find which ones work best for you. Follow-up care is a key part of your treatment and safety. Be sure to make and go to all appointments, and call your doctor if you are having problems. It's also a good idea to know your test results and keep a list of the medicines you take. How can you care for yourself at home? · Do not drive if you have taken a prescription pain medicine. · Rest in a quiet, dark room until your headache is gone. Close your eyes, and try to relax or go to sleep. Don't watch TV or read. · Put a cold, moist cloth or cold pack on the painful area for 10 to 20 minutes at a time. Put a thin cloth between the cold pack and your skin. · Use a warm, moist towel or a heating pad set on low to relax tight shoulder and neck muscles. · Have someone gently massage your neck and shoulders. · Take your medicines exactly as prescribed. Call your doctor if you think you are having a problem with your medicine. You will get more details on the specific medicines your doctor prescribes. · Be careful not to take pain medicine more often than the instructions allow.  You could get worse or more frequent headaches when the medicine wears off. To prevent migraines  · Keep a headache diary so you can figure out what triggers your headaches. Avoiding triggers may help you prevent headaches. Record when each headache began, how long it lasted, and what the pain was like. (Was it throbbing, aching, stabbing, or dull?) Write down any other symptoms you had with the headache, such as nausea, flashing lights or dark spots, or sensitivity to bright light or loud noise. Note if the headache occurred near your period. List anything that might have triggered the headache. Triggers may include certain foods (chocolate, cheese, wine) or odors, smoke, bright light, stress, or lack of sleep. · If your doctor has prescribed medicine for your migraines, take it as directed. You may have medicine that you take only when you get a migraine and medicine that you take all the time to help prevent migraines. ¨ If your doctor has prescribed medicine for when you get a headache, take it at the first sign of a migraine, unless your doctor has given you other instructions. ¨ If your doctor has prescribed medicine to prevent migraines, take it exactly as prescribed. Call your doctor if you think you are having a problem with your medicine. · Find healthy ways to deal with stress. Migraines are most common during or right after stressful times. Take time to relax before and after you do something that has caused a migraine in the past.  · Try to keep your muscles relaxed by keeping good posture. Check your jaw, face, neck, and shoulder muscles for tension. Try to relax them. When you sit at a desk, change positions often. And make sure to stretch for 30 seconds each hour. · Get plenty of sleep and exercise. · Eat meals on a regular schedule. Avoid foods and drinks that often trigger migraines.  These include chocolate, alcohol (especially red wine and port), aspartame, monosodium glutamate (MSG), and some additives found in foods (such as hot dogs, boyce, cold cuts, aged cheeses, and pickled foods). · Limit caffeine. Don't drink too much coffee, tea, or soda. But don't quit caffeine suddenly. That can also give you migraines. · Do not smoke or allow others to smoke around you. If you need help quitting, talk to your doctor about stop-smoking programs and medicines. These can increase your chances of quitting for good. · If you are taking birth control pills or hormone therapy, talk to your doctor about whether they are triggering your migraines. When should you call for help? Call 911 anytime you think you may need emergency care. For example, call if:  · You have signs of a stroke. These may include:  ¨ Sudden numbness, paralysis, or weakness in your face, arm, or leg, especially on only one side of your body. ¨ Sudden vision changes. ¨ Sudden trouble speaking. ¨ Sudden confusion or trouble understanding simple statements. ¨ Sudden problems with walking or balance. ¨ A sudden, severe headache that is different from past headaches. Call your doctor now or seek immediate medical care if:  · You have new or worse nausea and vomiting. · You have a new or higher fever. · Your headache gets much worse. Watch closely for changes in your health, and be sure to contact your doctor if:  · You are not getting better after 2 days (48 hours). Where can you learn more? Go to http://michele-lucy.info/. Enter K070 in the search box to learn more about \"Migraine Headache: Care Instructions. \"  Current as of: October 14, 2016  Content Version: 11.2  © 0455-5796 [employer]. Care instructions adapted under license by [employer] (which disclaims liability or warranty for this information). If you have questions about a medical condition or this instruction, always ask your healthcare professional. [address] any warranty or liability for your use of this information.

## 2017-04-20 ENCOUNTER — APPOINTMENT (OUTPATIENT)
Dept: ULTRASOUND IMAGING | Age: 27
End: 2017-04-20
Attending: EMERGENCY MEDICINE
Payer: COMMERCIAL

## 2017-04-20 ENCOUNTER — APPOINTMENT (OUTPATIENT)
Dept: CT IMAGING | Age: 27
End: 2017-04-20
Attending: EMERGENCY MEDICINE
Payer: COMMERCIAL

## 2017-04-20 ENCOUNTER — HOSPITAL ENCOUNTER (OUTPATIENT)
Age: 27
Setting detail: OBSERVATION
Discharge: HOME | End: 2017-04-22
Attending: EMERGENCY MEDICINE | Admitting: STUDENT IN AN ORGANIZED HEALTH CARE EDUCATION/TRAINING PROGRAM
Payer: COMMERCIAL

## 2017-04-20 DIAGNOSIS — R10.31 ABDOMINAL PAIN, RIGHT LOWER QUADRANT: Primary | ICD-10-CM

## 2017-04-20 PROBLEM — R10.9 ABDOMINAL PAIN: Status: ACTIVE | Noted: 2017-04-20

## 2017-04-20 LAB
ALBUMIN SERPL BCP-MCNC: 4.1 G/DL (ref 3.5–5)
ALBUMIN/GLOB SERPL: 1.2 {RATIO} (ref 1.1–2.2)
ALP SERPL-CCNC: 46 U/L (ref 45–117)
ALT SERPL-CCNC: 16 U/L (ref 12–78)
ANION GAP BLD CALC-SCNC: 12 MMOL/L (ref 5–15)
APPEARANCE UR: CLEAR
AST SERPL W P-5'-P-CCNC: 9 U/L (ref 15–37)
BACTERIA URNS QL MICRO: NEGATIVE /HPF
BASOPHILS # BLD AUTO: 0.1 K/UL (ref 0–0.1)
BASOPHILS # BLD: 1 % (ref 0–1)
BILIRUB SERPL-MCNC: 0.5 MG/DL (ref 0.2–1)
BILIRUB UR QL: NEGATIVE
BUN SERPL-MCNC: 9 MG/DL (ref 6–20)
BUN/CREAT SERPL: 11 (ref 12–20)
CALCIUM SERPL-MCNC: 8.9 MG/DL (ref 8.5–10.1)
CHLORIDE SERPL-SCNC: 109 MMOL/L (ref 97–108)
CHOLEST SERPL-MCNC: 186 MG/DL
CO2 SERPL-SCNC: 21 MMOL/L (ref 21–32)
COLOR UR: ABNORMAL
CREAT SERPL-MCNC: 0.82 MG/DL (ref 0.55–1.02)
EOSINOPHIL # BLD: 0.1 K/UL (ref 0–0.4)
EOSINOPHIL NFR BLD: 1 % (ref 0–7)
EPITH CASTS URNS QL MICRO: ABNORMAL /LPF
ERYTHROCYTE [DISTWIDTH] IN BLOOD BY AUTOMATED COUNT: 14.1 % (ref 11.5–14.5)
GLOBULIN SER CALC-MCNC: 3.4 G/DL (ref 2–4)
GLUCOSE SERPL-MCNC: 86 MG/DL (ref 65–100)
GLUCOSE UR STRIP.AUTO-MCNC: NEGATIVE MG/DL
HCG UR QL: NEGATIVE
HCT VFR BLD AUTO: 36.8 % (ref 35–47)
HDLC SERPL-MCNC: 94 MG/DL
HDLC SERPL: 2 {RATIO} (ref 0–5)
HGB BLD-MCNC: 12 G/DL (ref 11.5–16)
HGB UR QL STRIP: ABNORMAL
HYALINE CASTS URNS QL MICRO: ABNORMAL /LPF (ref 0–5)
KETONES UR QL STRIP.AUTO: NEGATIVE MG/DL
LACTATE SERPL-SCNC: 0.7 MMOL/L (ref 0.4–2)
LDLC SERPL CALC-MCNC: 81.6 MG/DL (ref 0–100)
LEUKOCYTE ESTERASE UR QL STRIP.AUTO: NEGATIVE
LIPASE SERPL-CCNC: 164 U/L (ref 73–393)
LIPID PROFILE,FLP: NORMAL
LYMPHOCYTES # BLD AUTO: 35 % (ref 12–49)
LYMPHOCYTES # BLD: 2.8 K/UL (ref 0.8–3.5)
MCH RBC QN AUTO: 26.7 PG (ref 26–34)
MCHC RBC AUTO-ENTMCNC: 32.6 G/DL (ref 30–36.5)
MCV RBC AUTO: 82 FL (ref 80–99)
MONOCYTES # BLD: 0.5 K/UL (ref 0–1)
MONOCYTES NFR BLD AUTO: 6 % (ref 5–13)
NEUTS SEG # BLD: 4.5 K/UL (ref 1.8–8)
NEUTS SEG NFR BLD AUTO: 57 % (ref 32–75)
NITRITE UR QL STRIP.AUTO: NEGATIVE
PH UR STRIP: 7.5 [PH] (ref 5–8)
PLATELET # BLD AUTO: 245 K/UL (ref 150–400)
POTASSIUM SERPL-SCNC: 3.5 MMOL/L (ref 3.5–5.1)
PROT SERPL-MCNC: 7.5 G/DL (ref 6.4–8.2)
PROT UR STRIP-MCNC: NEGATIVE MG/DL
RBC # BLD AUTO: 4.49 M/UL (ref 3.8–5.2)
RBC #/AREA URNS HPF: ABNORMAL /HPF (ref 0–5)
SODIUM SERPL-SCNC: 142 MMOL/L (ref 136–145)
SP GR UR REFRACTOMETRY: 1.01 (ref 1–1.03)
TRIGL SERPL-MCNC: 52 MG/DL (ref ?–150)
UROBILINOGEN UR QL STRIP.AUTO: 0.2 EU/DL (ref 0.2–1)
VLDLC SERPL CALC-MCNC: 10.4 MG/DL
WBC # BLD AUTO: 8 K/UL (ref 3.6–11)
WBC URNS QL MICRO: ABNORMAL /HPF (ref 0–4)

## 2017-04-20 PROCEDURE — 74011000250 HC RX REV CODE- 250: Performed by: EMERGENCY MEDICINE

## 2017-04-20 PROCEDURE — 96361 HYDRATE IV INFUSION ADD-ON: CPT

## 2017-04-20 PROCEDURE — 80053 COMPREHEN METABOLIC PANEL: CPT | Performed by: EMERGENCY MEDICINE

## 2017-04-20 PROCEDURE — 74011636320 HC RX REV CODE- 636/320: Performed by: EMERGENCY MEDICINE

## 2017-04-20 PROCEDURE — 74011000258 HC RX REV CODE- 258: Performed by: EMERGENCY MEDICINE

## 2017-04-20 PROCEDURE — 96375 TX/PRO/DX INJ NEW DRUG ADDON: CPT

## 2017-04-20 PROCEDURE — 74177 CT ABD & PELVIS W/CONTRAST: CPT

## 2017-04-20 PROCEDURE — 81001 URINALYSIS AUTO W/SCOPE: CPT | Performed by: EMERGENCY MEDICINE

## 2017-04-20 PROCEDURE — 96376 TX/PRO/DX INJ SAME DRUG ADON: CPT

## 2017-04-20 PROCEDURE — 96374 THER/PROPH/DIAG INJ IV PUSH: CPT

## 2017-04-20 PROCEDURE — 74011250636 HC RX REV CODE- 250/636: Performed by: FAMILY MEDICINE

## 2017-04-20 PROCEDURE — 99218 HC RM OBSERVATION: CPT

## 2017-04-20 PROCEDURE — 36415 COLL VENOUS BLD VENIPUNCTURE: CPT | Performed by: EMERGENCY MEDICINE

## 2017-04-20 PROCEDURE — 85025 COMPLETE CBC W/AUTO DIFF WBC: CPT | Performed by: EMERGENCY MEDICINE

## 2017-04-20 PROCEDURE — 74011250637 HC RX REV CODE- 250/637: Performed by: FAMILY MEDICINE

## 2017-04-20 PROCEDURE — 76856 US EXAM PELVIC COMPLETE: CPT

## 2017-04-20 PROCEDURE — 80061 LIPID PANEL: CPT | Performed by: FAMILY MEDICINE

## 2017-04-20 PROCEDURE — 83690 ASSAY OF LIPASE: CPT | Performed by: EMERGENCY MEDICINE

## 2017-04-20 PROCEDURE — 76830 TRANSVAGINAL US NON-OB: CPT

## 2017-04-20 PROCEDURE — 81025 URINE PREGNANCY TEST: CPT

## 2017-04-20 PROCEDURE — 94761 N-INVAS EAR/PLS OXIMETRY MLT: CPT

## 2017-04-20 PROCEDURE — 83605 ASSAY OF LACTIC ACID: CPT | Performed by: EMERGENCY MEDICINE

## 2017-04-20 PROCEDURE — 99285 EMERGENCY DEPT VISIT HI MDM: CPT

## 2017-04-20 PROCEDURE — 74011250636 HC RX REV CODE- 250/636: Performed by: EMERGENCY MEDICINE

## 2017-04-20 RX ORDER — SODIUM CHLORIDE 0.9 % (FLUSH) 0.9 %
10 SYRINGE (ML) INJECTION
Status: COMPLETED | OUTPATIENT
Start: 2017-04-20 | End: 2017-04-20

## 2017-04-20 RX ORDER — HYDROMORPHONE HYDROCHLORIDE 1 MG/ML
1 INJECTION, SOLUTION INTRAMUSCULAR; INTRAVENOUS; SUBCUTANEOUS
Status: COMPLETED | OUTPATIENT
Start: 2017-04-20 | End: 2017-04-20

## 2017-04-20 RX ORDER — FENTANYL CITRATE 50 UG/ML
25 INJECTION, SOLUTION INTRAMUSCULAR; INTRAVENOUS ONCE
Status: COMPLETED | OUTPATIENT
Start: 2017-04-20 | End: 2017-04-20

## 2017-04-20 RX ORDER — ESCITALOPRAM OXALATE 10 MG/1
20 TABLET ORAL
Status: DISCONTINUED | OUTPATIENT
Start: 2017-04-21 | End: 2017-04-22 | Stop reason: HOSPADM

## 2017-04-20 RX ORDER — DIAZEPAM 10 MG/2ML
2 INJECTION INTRAMUSCULAR
Status: COMPLETED | OUTPATIENT
Start: 2017-04-20 | End: 2017-04-20

## 2017-04-20 RX ORDER — CLONAZEPAM 1 MG/1
2 TABLET ORAL
Status: DISCONTINUED | OUTPATIENT
Start: 2017-04-20 | End: 2017-04-22 | Stop reason: HOSPADM

## 2017-04-20 RX ORDER — SODIUM CHLORIDE 0.9 % (FLUSH) 0.9 %
5-10 SYRINGE (ML) INJECTION EVERY 8 HOURS
Status: DISCONTINUED | OUTPATIENT
Start: 2017-04-20 | End: 2017-04-22 | Stop reason: HOSPADM

## 2017-04-20 RX ORDER — FAMOTIDINE 10 MG/ML
20 INJECTION INTRAVENOUS
Status: DISCONTINUED | OUTPATIENT
Start: 2017-04-20 | End: 2017-04-20 | Stop reason: SDUPTHER

## 2017-04-20 RX ORDER — KETOROLAC TROMETHAMINE 30 MG/ML
30 INJECTION, SOLUTION INTRAMUSCULAR; INTRAVENOUS
Status: COMPLETED | OUTPATIENT
Start: 2017-04-20 | End: 2017-04-20

## 2017-04-20 RX ORDER — TOPIRAMATE 25 MG/1
50 TABLET ORAL
Status: DISCONTINUED | OUTPATIENT
Start: 2017-04-20 | End: 2017-04-22 | Stop reason: HOSPADM

## 2017-04-20 RX ORDER — SODIUM CHLORIDE 9 MG/ML
100 INJECTION, SOLUTION INTRAVENOUS CONTINUOUS
Status: DISCONTINUED | OUTPATIENT
Start: 2017-04-20 | End: 2017-04-22 | Stop reason: HOSPADM

## 2017-04-20 RX ORDER — ONDANSETRON 2 MG/ML
4 INJECTION INTRAMUSCULAR; INTRAVENOUS
Status: COMPLETED | OUTPATIENT
Start: 2017-04-20 | End: 2017-04-20

## 2017-04-20 RX ORDER — TOPIRAMATE 50 MG/1
50 TABLET, FILM COATED ORAL
COMMUNITY
End: 2017-09-19

## 2017-04-20 RX ORDER — HYDROMORPHONE HYDROCHLORIDE 1 MG/ML
1 INJECTION, SOLUTION INTRAMUSCULAR; INTRAVENOUS; SUBCUTANEOUS
Status: DISCONTINUED | OUTPATIENT
Start: 2017-04-20 | End: 2017-04-22

## 2017-04-20 RX ORDER — SODIUM CHLORIDE 0.9 % (FLUSH) 0.9 %
5-10 SYRINGE (ML) INJECTION AS NEEDED
Status: DISCONTINUED | OUTPATIENT
Start: 2017-04-20 | End: 2017-04-22 | Stop reason: HOSPADM

## 2017-04-20 RX ADMIN — ONDANSETRON 4 MG: 2 INJECTION INTRAMUSCULAR; INTRAVENOUS at 15:14

## 2017-04-20 RX ADMIN — TOPIRAMATE 50 MG: 25 TABLET, FILM COATED ORAL at 21:56

## 2017-04-20 RX ADMIN — SODIUM CHLORIDE 1000 ML: 900 INJECTION, SOLUTION INTRAVENOUS at 15:13

## 2017-04-20 RX ADMIN — FAMOTIDINE 20 MG: 10 INJECTION, SOLUTION INTRAVENOUS at 15:18

## 2017-04-20 RX ADMIN — SODIUM CHLORIDE 100 ML: 900 INJECTION, SOLUTION INTRAVENOUS at 15:58

## 2017-04-20 RX ADMIN — KETOROLAC TROMETHAMINE 30 MG: 30 INJECTION, SOLUTION INTRAMUSCULAR at 15:15

## 2017-04-20 RX ADMIN — IOPAMIDOL 100 ML: 755 INJECTION, SOLUTION INTRAVENOUS at 15:57

## 2017-04-20 RX ADMIN — DIAZEPAM 2 MG: 5 INJECTION, SOLUTION INTRAMUSCULAR; INTRAVENOUS at 18:29

## 2017-04-20 RX ADMIN — HYDROMORPHONE HYDROCHLORIDE 1 MG: 1 INJECTION, SOLUTION INTRAMUSCULAR; INTRAVENOUS; SUBCUTANEOUS at 15:16

## 2017-04-20 RX ADMIN — HYDROMORPHONE HYDROCHLORIDE 1 MG: 1 INJECTION, SOLUTION INTRAMUSCULAR; INTRAVENOUS; SUBCUTANEOUS at 16:43

## 2017-04-20 RX ADMIN — HYDROMORPHONE HYDROCHLORIDE 1 MG: 1 INJECTION, SOLUTION INTRAMUSCULAR; INTRAVENOUS; SUBCUTANEOUS at 21:56

## 2017-04-20 RX ADMIN — Medication 10 ML: at 21:56

## 2017-04-20 RX ADMIN — SODIUM CHLORIDE 100 ML/HR: 900 INJECTION, SOLUTION INTRAVENOUS at 21:48

## 2017-04-20 RX ADMIN — Medication 10 ML: at 15:57

## 2017-04-20 RX ADMIN — CLONAZEPAM 2 MG: 1 TABLET ORAL at 21:56

## 2017-04-20 RX ADMIN — FENTANYL CITRATE 25 MCG: 50 INJECTION, SOLUTION INTRAMUSCULAR; INTRAVENOUS at 20:11

## 2017-04-20 NOTE — ED TRIAGE NOTES
Patient had an IUD placed x1 month ago and began to experience pain this morning. Patient seen at OBGYN this morning, IUD in place, no ovarian cysts seen. Possible appendicitis. Reports nausea. Pain to RLQ since last night.

## 2017-04-20 NOTE — CONSULTS
Surgery Consult    Subjective:      Krisnha Morrell is a 27 y.o. female who presents for evaluation of Abdominal pain. The patient began having lower abdominal pain this morning. She did have some vaginal bleeding that was not part of her Period. This is not completely abnormal for her. She does have a history of endometriosis but has not had a flare up of this many years. She is not on BCP due to headaches. She does have a history of ovarian cysts. The last one was a hemorraghic cyst. She states that most of her pain on the Right. She    Had a normal BM this morning. It was a little loose. States that she does get fiber in her diet. She has had this  Before and was worked up each time for appendicitis which has been negative. Past Medical History:   Diagnosis Date    Anxiety     Depression     Endometriosis     Migraine     Neurological disorder     migraines    Ovarian cyst     Panic attack      Past Surgical History:   Procedure Laterality Date    HX BREAST LUMPECTOMY Right     HX BUNIONECTOMY      HX CYST REMOVAL      HX OTHER SURGICAL      laparascopy    HX WISDOM TEETH EXTRACTION        Family History   Problem Relation Age of Onset    Diabetes Maternal Aunt     Heart Disease Maternal Aunt     Stroke Maternal Aunt     Heart Disease Maternal Grandmother     No Known Problems Mother      Social History     Social History    Marital status: SINGLE     Spouse name: N/A    Number of children: N/A    Years of education: N/A     Social History Main Topics    Smoking status: Former Smoker    Smokeless tobacco: Never Used    Alcohol use No      Comment: social    Drug use: No    Sexual activity: Yes     Partners: Male     Birth control/ protection: Pill     Other Topics Concern    None     Social History Narrative      Current Outpatient Prescriptions   Medication Sig Dispense Refill    Cetirizine (ZYRTEC) 10 mg cap Take 1 Cap by mouth daily.       levonorgestrel (MIRENA) 20 mcg/24 hr (5 years) IUD 1 Each by IntraUTERine route once.  topiramate (TOPAMAX) 50 mg tablet Take 50 mg by mouth nightly.  busPIRone (BUSPAR) 15 mg tablet Take 15 mg by mouth daily.  ONABOTULINUMTOXINA (BOTOX INJECTION) by IntraMUSCular route every three (3) months. Indications: Given by Neuro into shoulders/ neck      clonazePAM (KLONOPIN) 1 mg tablet Take 2 mg by mouth nightly. 5    escitalopram (LEXAPRO) 20 mg tablet Take 20 mg by mouth every morning. Indications: GENERALIZED ANXIETY DISORDER          Allergies   Allergen Reactions    Compazine [Prochlorperazine Edisylate] Anxiety    Haldol [Haloperidol Lactate] Other (comments)    Promethazine Other (comments)     \"It makes me feel really funny, nausea and dizzy\"    Reglan [Metoclopramide] Other (comments)    Zofran [Ondansetron Hcl (Pf)] Other (comments)     Restless legs and \"I want to crawl out of my skin\"       Review of Systems:  Constitutional: positive for chills and sweats  Eyes: negative  Ears, Nose, Mouth, Throat, and Face: negative  Respiratory: negative  Cardiovascular: negative  Gastrointestinal: positive for abdominal pain  Genitourinary:positive for ovarian cysts  Integument/Breast: negative  Hematologic/Lymphatic: negative  Musculoskeletal:negative  Neurological: negative  Behavioral/Psychiatric: negative  Endocrine: negative  Allergic/Immunologic: negative    Objective:      Patient Vitals for the past 8 hrs:   BP Temp Pulse Resp SpO2 Height Weight   17 1713 109/69 98.1 °F (36.7 °C) (!) 105 14 100 % - -   17 1448 105/73 98.2 °F (36.8 °C) (!) 104 20 97 % 5' 8\" (1.727 m) 135 lb (61.2 kg)       Temp (24hrs), Av.2 °F (36.8 °C), Min:98.1 °F (36.7 °C), Max:98.2 °F (36.8 °C)      Physical Exam:  GENERAL: alert, cooperative, mild distress, appears stated age, EYE: negative, THROAT & NECK: normal, LUNG: clear to auscultation bilaterally, HEART: regular rate and rhythm, ABDOMEN: soft tender in the RLQ and suprapubic region.  , EXTREMITIES:  no edema, SKIN: Normal., NEUROLOGIC: negative, PSYCHIATRIC: non focal    Imaging reviewed in person with Radiology  CT scan-  IMPRESSION:  2.6 cm left ovarian cyst. Intrauterine device satisfactory position. No acute  abnormality. Appendix - normal - no inflammation or secondary signs of infection  Colon- Stool filled    Ultrasound-   Simple cyst left ovary. Blood flow documented within both ovaries. Intrauterine device satisfactory position. Recent Results (from the past 24 hour(s))   CBC WITH AUTOMATED DIFF    Collection Time: 04/20/17  2:55 PM   Result Value Ref Range    WBC 8.0 3.6 - 11.0 K/uL    RBC 4.49 3.80 - 5.20 M/uL    HGB 12.0 11.5 - 16.0 g/dL    HCT 36.8 35.0 - 47.0 %    MCV 82.0 80.0 - 99.0 FL    MCH 26.7 26.0 - 34.0 PG    MCHC 32.6 30.0 - 36.5 g/dL    RDW 14.1 11.5 - 14.5 %    PLATELET 375 250 - 137 K/uL    NEUTROPHILS 57 32 - 75 %    LYMPHOCYTES 35 12 - 49 %    MONOCYTES 6 5 - 13 %    EOSINOPHILS 1 0 - 7 %    BASOPHILS 1 0 - 1 %    ABS. NEUTROPHILS 4.5 1.8 - 8.0 K/UL    ABS. LYMPHOCYTES 2.8 0.8 - 3.5 K/UL    ABS. MONOCYTES 0.5 0.0 - 1.0 K/UL    ABS. EOSINOPHILS 0.1 0.0 - 0.4 K/UL    ABS. BASOPHILS 0.1 0.0 - 0.1 K/UL   METABOLIC PANEL, COMPREHENSIVE    Collection Time: 04/20/17  2:55 PM   Result Value Ref Range    Sodium 142 136 - 145 mmol/L    Potassium 3.5 3.5 - 5.1 mmol/L    Chloride 109 (H) 97 - 108 mmol/L    CO2 21 21 - 32 mmol/L    Anion gap 12 5 - 15 mmol/L    Glucose 86 65 - 100 mg/dL    BUN 9 6 - 20 MG/DL    Creatinine 0.82 0.55 - 1.02 MG/DL    BUN/Creatinine ratio 11 (L) 12 - 20      GFR est AA >60 >60 ml/min/1.73m2    GFR est non-AA >60 >60 ml/min/1.73m2    Calcium 8.9 8.5 - 10.1 MG/DL    Bilirubin, total 0.5 0.2 - 1.0 MG/DL    ALT (SGPT) 16 12 - 78 U/L    AST (SGOT) 9 (L) 15 - 37 U/L    Alk.  phosphatase 46 45 - 117 U/L    Protein, total 7.5 6.4 - 8.2 g/dL    Albumin 4.1 3.5 - 5.0 g/dL    Globulin 3.4 2.0 - 4.0 g/dL    A-G Ratio 1.2 1.1 - 2.2     URINALYSIS W/ RFLX MICROSCOPIC    Collection Time: 04/20/17  2:55 PM   Result Value Ref Range    Color YELLOW/STRAW      Appearance CLEAR CLEAR      Specific gravity 1.006 1.003 - 1.030      pH (UA) 7.5 5.0 - 8.0      Protein NEGATIVE  NEG mg/dL    Glucose NEGATIVE  NEG mg/dL    Ketone NEGATIVE  NEG mg/dL    Bilirubin NEGATIVE  NEG      Blood SMALL (A) NEG      Urobilinogen 0.2 0.2 - 1.0 EU/dL    Nitrites NEGATIVE  NEG      Leukocyte Esterase NEGATIVE  NEG      WBC 0-4 0 - 4 /hpf    RBC 0-5 0 - 5 /hpf    Epithelial cells FEW FEW /lpf    Bacteria NEGATIVE  NEG /hpf    Hyaline cast 0-2 0 - 5 /lpf   LIPASE    Collection Time: 04/20/17  2:55 PM   Result Value Ref Range    Lipase 164 73 - 393 U/L   HCG URINE, QL. - POC    Collection Time: 04/20/17  3:00 PM   Result Value Ref Range    Pregnancy test,urine (POC) NEGATIVE  NEG     LACTIC ACID, PLASMA    Collection Time: 04/20/17  6:30 PM   Result Value Ref Range    Lactic acid 0.7 0.4 - 2.0 MMOL/L         Assessment:     Hospital Problems  Date Reviewed: 11/15/2016    None          Lower abdominal pain- It is unclear the source of the pain. While this seems on physical exam this may be appendiceal in nature , the appendix is visualized and  Completely normal on CT. She also has no elevated WBC and no left shift making appendicitis less likely. Plan:   I do  Not believe she requires emergent Lap Appendectomy at this time. Would get GYN consult as her previous pelvic issues have  Been GYN in nature. She has required 4-5 laparoscopies in the past for ovarian cysts and endometriosis. . I would say that should she require laparoscopy then it would be reasonable to remove her appendix at that time to take it out of the equation for further pelvic pain.     Signed By: Fransisco Ibarra MD     April 20, 2017

## 2017-04-20 NOTE — IP AVS SNAPSHOT
Summary of Care Report The Summary of Care report has been created to help improve care coordination. Users with access to [employer] or [employer] [address] (Web-based application) may access additional patient information including the Discharge Summary. If you are not currently a [address] user and need more information, please call the number listed below in the [place] [address] section and ask to be connected with Medical Records. Facility Information Name Address Phone [phone] [phone] Patient Information Patient Name Sex  Lawerence Harada ([id number]) Female 1990 Discharge Information Admitting Provider Service Area Unit Raphael Posey MD / [address] / [phone] Discharge Provider Discharge Date/Time Discharge Disposition Destination (none) 2017 (Pending) AHR (none) Patient Language Language ENGLISH [13] Hospital Problems as of 2017  Reviewed: 2017  8:04 PM by Garrett Dang MD  
  
  
  
 Class Noted - Resolved Last Modified POA Active Problems Depression (Chronic)  2016 - Present 2017 by Garrett Dang MD Yes Entered by Augustin Holden MD  
  Anxiety (Chronic)  2016 - Present 2017 by Garrett Dang MD Yes Entered by Augustin Holden MD  
  * (Principal)Abdominal pain  2017 - Present 2017 by Garrett Dang MD Unknown Entered by Garrett Dang MD  
  
Non-Hospital Problems as of 2017  Reviewed: 2017  8:04 PM by Garrett Dang MD  
  
  
  
 Class Noted - Resolved Last Modified Active Problems Status migrainosus  2016 - Present 2016 by Regina Hollingsworth,  Entered by Regina Hollingsworth, DO You are allergic to the following Allergen Reactions Compazine (Prochlorperazine Edisylate) Anxiety Haldol (Haloperidol Lactate) Other (comments) Promethazine Other (comments) \"It makes me feel really funny, nausea and dizzy\" Reglan (Metoclopramide) Other (comments) Current Discharge Medication List  
  
START taking these medications Dose & Instructions Dispensing Information Comments  
 oxyCODONE-acetaminophen 5-325 mg per tablet Commonly known as:  PERCOCET Dose:  1 Tab Take 1 Tab by mouth every four (4) hours as needed for Pain. Max Daily Amount: 6 Tabs. Quantity:  20 Tab Refills:  0 ASK your doctor about these medications Dose & Instructions Dispensing Information Comments BOTOX INJECTION  
 by IntraMUSCular route every three (3) months. Indications: Given by Neuro into shoulders/ neck Refills:  0  
   
 busPIRone 15 mg tablet Commonly known as:  BUSPAR Dose:  15 mg Take 15 mg by mouth daily. Refills:  0  
   
 clonazePAM 1 mg tablet Commonly known as:  Kasandra [place] Dose:  2 mg Take 2 mg by mouth nightly. Refills:  5 LEXAPRO 20 mg tablet Generic drug:  escitalopram oxalate Dose:  20 mg Take 20 mg by mouth every morning. Indications: GENERALIZED ANXIETY DISORDER Refills:  0 MIRENA 20 mcg/24 hr (5 years) IUD Generic drug:  levonorgestrel Dose:  1 Each  
1 Each by IntraUTERine route once. Refills:  0  
   
 TOPAMAX 50 mg tablet Generic drug:  topiramate Dose:  50 mg Take 50 mg by mouth nightly. Refills:  0 ZyrTEC 10 mg Cap Generic drug:  Cetirizine Dose:  1 Cap Take 1 Cap by mouth daily. Refills:  0 Current Immunizations Name Date DTaP 11/24/2012 Hib 7/23/2015 Influenza Vaccine 10/24/2015 Rho(D) Immune Globulin - IM 6/4/2016 Surgery Information ID Date/Time Status Primary Surgeon All Procedures Location  [id number] 4/21/2017 [address] [place] [address] MD Kamron LAPAROSCOPIC APPENDECTOMY [facility] MAIN OR Follow-up Information Follow up With Details Comments Contact Info None   None (395) Patient stated that they have no PCP Discharge Instructions 1. Do not drive while on pain medication. You should take a stool softener while on pain medication to prevent constipation. 2.  Do not lift anything greater than 15 pounds for 2 weeks. 3.  You may resume your home medications. 4.  You may shower but do not swim or soak in tub for 2 weeks. 5.  Please call [phone] to schedule a follow-up with Dr. Kelly Valdivia within 2 weeks. Appendectomy: What to Expect at [facility] Your Recovery Your doctor removed your appendix either by making many small cuts, called incisions, in your belly (laparoscopic surgery) or through open surgery. In open surgery, the doctor makes one large incision. The incisions leave scars that usually fade over time. After your surgery, it is normal to feel weak and tired for several days after you return home. Your belly may be swollen and may be painful. If you had laparoscopic surgery, you may have pain in your shoulder for about 24 hours. You may also feel sick to your stomach and have diarrhea, constipation, gas, or a headache. This usually goes away in a few days. Your recovery time depends on the type of surgery you had. If you had laparoscopic surgery, you will probably be able to return to work or a normal routine 1 to 3 weeks after surgery. If you had an open surgery, it may take 2 to 4 weeks. If your appendix ruptured, you may have a drain in your incision. Your body will work fine without an appendix. You will not have to make any changes in your diet or lifestyle. This care sheet gives you a general idea about how long it will take for you to recover. But each person recovers at a different pace. Follow the steps below to get better as quickly as possible. How can you care for yourself at home? Activity · Rest when you feel tired. Getting enough sleep will help you recover. · Try to walk each day. Start by walking a little more than you did the day before. Bit by bit, increase the amount you walk. Walking boosts blood flow and helps prevent pneumonia and constipation. · For about 2 weeks, avoid lifting anything that would make you strain. This may include a child, heavy grocery bags and milk containers, a heavy briefcase or backpack, cat litter or dog food bags, or a vacuum . · Avoid strenuous activities, such as bicycle riding, jogging, weight lifting, or aerobic exercise, until your doctor says it is okay. · You may be able to take showers (unless you have a drain near your incision) 24 to 48 hours after surgery. Pat the incision dry. Do not take a bath for the first 2 weeks, or until your doctor tells you it is okay. If you have a drain near your incision, follow your doctor's instructions. · You may drive when you are no longer taking pain medicine and can quickly move your foot from the gas pedal to the brake. You must also be able to sit comfortably for a long period of time, even if you do not plan on going far. You might get caught in traffic. · You will probably be able to go back to work in 1 to 3 weeks. If you had an open surgery, it may take 3 to 4 weeks. · Your doctor will tell you when you can have sex again. Diet · You can eat your normal diet. If your stomach is upset, try bland, low-fat foods like plain rice, broiled chicken, toast, and yogurt. · Drink plenty of fluids (unless your doctor tells you not to). · You may notice that your bowel movements are not regular right after your surgery. This is common. Try to avoid constipation and straining with bowel movements. You may want to take a fiber supplement every day. If you have not had a bowel movement after a couple of days, ask your doctor about taking a mild laxative. Medicines · Your doctor will tell you if and when you can restart your medicines. He or she will also give you instructions about taking any new medicines. · If you take blood thinners, such as warfarin (Coumadin), clopidogrel (Plavix), or aspirin, be sure to talk to your doctor. He or she will tell you if and when to start taking those medicines again. Make sure that you understand exactly what your doctor wants you to do. · If your appendix ruptured, you will need to take antibiotics. Take them as directed. Do not stop taking them just because you feel better. You need to take the full course of antibiotics. · Be safe with medicines. Take pain medicines exactly as directed. ¨ If the doctor gave you a prescription medicine for pain, take it as prescribed. ¨ If you are not taking a prescription pain medicine, take an over-the-counter medicine such as acetaminophen (Tylenol), ibuprofen (Advil, Motrin), or naproxen (Aleve). Read and follow all instructions on the label. ¨ Do not take two or more pain medicines at the same time unless the doctor told you to. Many pain medicines have acetaminophen, which is Tylenol. Too much Tylenol can be harmful. · If you think your pain medicine is making you sick to your stomach: 
¨ Take your medicine after meals (unless your doctor has told you not to). ¨ Ask your doctor for a different pain medicine. Incision care · If you had an open surgery, you may have staples in your incision. The doctor will take these out in 7 to 10 days. · If you have strips of tape on the incision, leave the tape on for a week or until it falls off. · You may wash the area with warm, soapy water 24 to 48 hours after your surgery, unless your doctor tells you not to. Pat the area dry. · Keep the area clean and dry. You may cover it with a gauze bandage if it weeps or rubs against clothing. Change the bandage every day. · If your appendix ruptured, you may have an incision with packing in it. Change the packing as often as your doctor tells you to. ¨ Packing changes may hurt at first. Taking pain medicine about half an hour before you change the dressing can help. ¨ If your dressing sticks to your wound, try soaking it with warm water for about 10 minutes before you remove it. You can do this in the shower or by placing a wet washcloth over the dressing. ¨ Remove the old packing and flush the incision with water. Gently pat the top area dry. ¨ The size of the incision determines how much gauze you need to put inside. Fold the gauze over once, but do not wad it up so that it hurts. Put it in the wound carefully. You want to keep the sides of the wound from touching. A cotton swab may help you push the gauze in as needed. ¨ Put a gauze pad over the wound, and tape it down. ¨ You may notice greenish gray fluid seeping from your wound as you start to heal. This is normal. It is a sign that your wound is healing. Follow-up care is a key part of your treatment and safety. Be sure to make and go to all appointments, and call your doctor if you are having problems. It's also a good idea to know your test results and keep a list of the medicines you take. When should you call for help? Call 911 anytime you think you may need emergency care. For example, call if: 
· You passed out (lost consciousness). · You have sudden chest pain and shortness of breath, or you cough up blood. · You have severe trouble breathing. Call your doctor now or seek immediate medical care if: 
· You are sick to your stomach and cannot drink fluids. · You have severe diarrhea. · You have pain that does not get better when you take your pain medicine. · You have signs of infection, such as: 
¨ Increased pain, swelling, warmth, or redness. ¨ Red streaks leading from the wound. ¨ Pus draining from the wound. ¨ A fever. · You have loose stitches, or your incision comes open. · Bright red blood has soaked through a large bandage. · You have signs of a blood clot, such as: 
¨ Pain in your calf, back of knee, thigh, or groin. ¨ Redness and swelling in your leg or groin. Watch closely for any changes in your health, and be sure to contact your doctor if: 
· You had a laparoscopic surgery and your shoulder pain lasts more than 24 hours. · You have leakage around your drain or you have no new fluid in the drain for 24 hours. · The amount of drainage suddenly increases, or the color and texture changes. · You do not have a bowel movement after taking a laxative. Where can you learn more? Go to http://michele-lucy.info/. Enter J776 in the search box to learn more about \"Appendectomy: What to Expect at Home. \" Current as of: August 9, 2016 Content Version: 11.2 © 5308-2868 [employer]. Care instructions adapted under license by [employer] (which disclaims liability or warranty for this information). If you have questions about a medical condition or this instruction, always ask your healthcare professional. [place] [address] any warranty or liability for your use of this information. Chart Review Routing History Recipient Method Report Sent By Celso Pacheco MD  
Fax: [phone] Phone: [phone] Fax David Barrios MD NOTES [employer] ROUT[employer] REPORT Mj Posadas MD [[id number]] 3/19/2015  1:02 PM 03/19/2015 Thee Pacheco MD  
Fax: [phone] Phone: [phone] Fax David Barrios MD NOTES [employer] ROUTING REPORT Berny Nuñez MD [[id number]] 3/21/2015  3:15 PM 03/21/2015 Nadya Wu MD  
Fax: [phone] Phone: [phone] [facility]ax [facility]O MO[facility] MD NOTES AUTO ROUTING REPORT Brittany Benites MD [[id number]] 1/6/2016 12:12 PM 01/06/2016 Nadya Wu MD  
Fax: [phone] Phone: [phone] Fax David Barrios MD NOTES [employer] ROUTING REPORT Berny Nuñez MD [[id number]] 1/9/2016 12:14 PM 01/09/2016 Jaswinder Rosa MD  
Fax: [phone] Phone: [phone] Fax Albina Ayoub MD NOTES [employer] ROUT[employer] REPORT Rizwan Fuller MD [[id number]] 1/9/2016 12:15 PM 01/09/2016 Jaswinder Rosa MD  
Fax: [phone] Phone: [phone] Fax Albina Ayoub MD NOTES [employer] ROUT[employer] REPORT Yoandy Servin MD [[id number]] 1/17/2016  1:47 PM 01/17/2016 Jaswinder Rosa MD  
Fax: [phone] Phone: [phone] Fax [facility] MD NOTES AUTO ROUTING REPORT Vernell Mccauley MD [[id number]] 1/20/2016  9:30 AM 01/20/2016

## 2017-04-20 NOTE — IP AVS SNAPSHOT
Current Discharge Medication List  
  
START taking these medications Dose & Instructions Dispensing Information Comments Morning Noon Evening Bedtime  
 oxyCODONE-acetaminophen 5-325 mg per tablet Commonly known as:  PERCOCET Your last dose was: Your next dose is:    
   
   
 Dose:  1 Tab Take 1 Tab by mouth every four (4) hours as needed for Pain. Max Daily Amount: 6 Tabs. Quantity:  20 Tab Refills:  0 ASK your doctor about these medications Dose & Instructions Dispensing Information Comments Morning Noon Evening Bedtime BOTOX INJECTION Your last dose was: Your next dose is:    
   
   
 by IntraMUSCular route every three (3) months. Indications: Given by Neuro into shoulders/ neck Refills:  0  
     
   
   
   
  
 busPIRone 15 mg tablet Commonly known as:  BUSPAR Your last dose was: Your next dose is:    
   
   
 Dose:  15 mg Take 15 mg by mouth daily. Refills:  0  
     
   
   
   
  
 clonazePAM 1 mg tablet Commonly known as:  Aleck Creamer Your last dose was: Your next dose is:    
   
   
 Dose:  2 mg Take 2 mg by mouth nightly. Refills:  5 LEXAPRO 20 mg tablet Generic drug:  escitalopram oxalate Your last dose was: Your next dose is:    
   
   
 Dose:  20 mg Take 20 mg by mouth every morning. Indications: GENERALIZED ANXIETY DISORDER Refills:  0 MIRENA 20 mcg/24 hr (5 years) IUD Generic drug:  levonorgestrel Your last dose was: Your next dose is:    
   
   
 Dose:  1 Each  
1 Each by IntraUTERine route once. Refills:  0  
     
   
   
   
  
 TOPAMAX 50 mg tablet Generic drug:  topiramate Your last dose was: Your next dose is:    
   
   
 Dose:  50 mg Take 50 mg by mouth nightly. Refills:  0 ZyrTEC 10 mg Cap Generic drug:  Cetirizine Your last dose was: Your next dose is:    
   
   
 Dose:  1 Cap Take 1 Cap by mouth daily. Refills:  0 Where to Get Your Medications Information on where to get these meds will be given to you by the nurse or doctor. ! Ask your nurse or doctor about these medications  
  oxyCODONE-acetaminophen 5-325 mg per tablet

## 2017-04-20 NOTE — ED PROVIDER NOTES
Patient is a 27 y.o. female presenting with abdominal pain. Abdominal Pain    Associated symptoms include nausea. Pertinent negatives include no diarrhea, no vomiting, no dysuria, no headaches and no back pain. Pt states that she had an abrupt onset of lower abdominal pain this morning. She had an IUD placed about 1 month ago so she went to her OB/GYN  (Dr. Cheryl Anand) who evaluated her and felt that the pain was not related to her pelvic organs or the IUD. She was referred to the ED to rule out appendicitis. Denies fever, cold symptoms, headache, neck pain, visual changes, focal weakness or rash. Denies any difficulty breathing, difficulty swallowing, SOB or chest pain. She reports nausea but denies any vomiting or diarrhea. Pt. Reports that she has not had any medications or food today prior to arrival.Pain worsens with walking and palpation. Past Medical History:   Diagnosis Date    Anxiety     Depression     Endometriosis     Migraine     Neurological disorder     migraines    Ovarian cyst     Panic attack        Past Surgical History:   Procedure Laterality Date    HX BREAST LUMPECTOMY Right     HX BUNIONECTOMY      HX CYST REMOVAL      HX OTHER SURGICAL      laparascopy    HX WISDOM TEETH EXTRACTION           Family History:   Problem Relation Age of Onset    Diabetes Maternal Aunt     Heart Disease Maternal Aunt     Stroke Maternal Aunt     Heart Disease Maternal Grandmother     No Known Problems Mother        Social History     Social History    Marital status: SINGLE     Spouse name: N/A    Number of children: N/A    Years of education: N/A     Occupational History    Not on file.      Social History Main Topics    Smoking status: Former Smoker    Smokeless tobacco: Never Used    Alcohol use No      Comment: social    Drug use: No    Sexual activity: Yes     Partners: Male     Birth control/ protection: Pill     Other Topics Concern    Not on file     Social History Narrative         ALLERGIES: Compazine [prochlorperazine edisylate]; Haldol [haloperidol lactate]; Promethazine; Reglan [metoclopramide]; and Zofran [ondansetron hcl (pf)]    Review of Systems   Constitutional: Negative for activity change and appetite change. HENT: Negative for facial swelling, sore throat and trouble swallowing. Eyes: Negative. Respiratory: Negative for shortness of breath. Cardiovascular: Negative. Gastrointestinal: Positive for abdominal pain and nausea. Negative for diarrhea and vomiting. Genitourinary: Negative for dysuria. Musculoskeletal: Negative for back pain and neck pain. Skin: Negative for color change. Neurological: Negative for headaches. Psychiatric/Behavioral: Negative. Vitals:    04/20/17 1448   BP: 105/73   Pulse: (!) 104   Resp: 20   Temp: 98.2 °F (36.8 °C)   SpO2: 97%   Weight: 61.2 kg (135 lb)   Height: 5' 8\" (1.727 m)            Physical Exam   Constitutional: She is oriented to person, place, and time. She appears well-nourished. White female; non smoker; health care provider   HENT:   Head: Normocephalic. Right Ear: External ear normal.   Left Ear: External ear normal.   Mouth/Throat: Oropharynx is clear and moist.   Eyes: Pupils are equal, round, and reactive to light. Neck: Normal range of motion. Neck supple. Cardiovascular: Normal rate and regular rhythm. Pulmonary/Chest: Effort normal and breath sounds normal.   Abdominal: Soft. Bowel sounds are normal. She exhibits no distension and no mass. There is tenderness. There is no rebound and no guarding. RLQ pain with palpation, walking and movement   Musculoskeletal: Normal range of motion. Neurological: She is alert and oriented to person, place, and time. Skin: Skin is warm and dry. No rash noted. Nursing note and vitals reviewed. [facility]  ED Course       Procedures    Pt has been re-examined and reports increased pain after her CT abdomen and pelvis.    Ultrasound was repeated without abnormality found. She continues to report uncontrollable pain in the RLQ. Dr. Sb Chavarria (covering for Dr. Cheryl Anand OB/GYN) consulted and does not feel that the pain is related to her pelvic organs. Dr. Sunni Chu (general surgery) was consulted and came to the ED to evaluate the pt. Discussed plan of care with Dr. Bladimir Gutierrez. Marquita Briones NP  Adult hospitalist consulted (Dr. Pavan Nieto)  7:27 PM  Patient's results and plan of care  have been reviewed with her and her mom. Patient has verbally conveyed her understanding and agreement of her signs, symptoms, diagnosis, treatment and prognosis and additionally agrees to be admitted.  Maruqita Briones NP

## 2017-04-20 NOTE — IP AVS SNAPSHOT
[address] [facility] 
[phone] Patient: Mendel Combes MRN: [id number] BKR:[id number] You are allergic to the following Allergen Reactions Compazine (Prochlorperazine Edisylate) Anxiety Haldol (Haloperidol Lactate) Other (comments) Promethazine Other (comments) \"It makes me feel really funny, nausea and dizzy\" Reglan (Metoclopramide) Other (comments) Recent Documentation Height Weight Breastfeeding? BMI OB Status Smoking Status 1.727 m 61.2 kg No 20.53 kg/m2 Having regular periods Former Smoker Emergency Contacts Name Discharge Info Relation Home Work Mobile AnaalisAlbin DISCHARGE CAREGIVER [3] Father [15] [phone] About your hospitalization You were admitted on:  April 20, 2017 You last received care in the:  [facility] [address] You were discharged on:  April 22, 2017 Unit phone number:  [phone] Why you were hospitalized Your primary diagnosis was:  Abdominal Pain Your diagnoses also included:  Depression, Migraine, Anxiety Providers Seen During Your Hospitalizations Provider Role Specialty Primary office phone Brynn Severin, MD Attending Provider Emergency Medicine [phone] Gil Adams MD Attending Provider Hospitalist [phone] Sharif Garcia MD Attending Provider Internal Medicine [phone] Your Primary Care Physician (PCP) Primary Care Physician Office Phone Office Fax NONE ** None ** ** None ** Follow-up Information Follow up With Details Comments Contact Info None   None (395) Patient stated that they have no PCP Current Discharge Medication List  
  
START taking these medications Dose & Instructions Dispensing Information Comments Morning Noon Evening Bedtime  
 oxyCODONE-acetaminophen 5-325 mg per tablet Commonly known as:  PERCOCET Your last dose was: Your next dose is:    
   
   
 Dose:  1 Tab Take 1 Tab by mouth every four (4) hours as needed for Pain. Max Daily Amount: 6 Tabs. Quantity:  20 Tab Refills:  0 ASK your doctor about these medications Dose & Instructions Dispensing Information Comments Morning Noon Evening Bedtime BOTOX INJECTION Your last dose was: Your next dose is:    
   
   
 by IntraMUSCular route every three (3) months. Indications: Given by Neuro into shoulders/ neck Refills:  0  
     
   
   
   
  
 busPIRone 15 mg tablet Commonly known as:  BUSPAR Your last dose was: Your next dose is:    
   
   
 Dose:  15 mg Take 15 mg by mouth daily. Refills:  0  
     
   
   
   
  
 clonazePAM 1 mg tablet Commonly known as:  Magdalena Terry Your last dose was: Your next dose is:    
   
   
 Dose:  2 mg Take 2 mg by mouth nightly. Refills:  5 LEXAPRO 20 mg tablet Generic drug:  escitalopram oxalate Your last dose was: Your next dose is:    
   
   
 Dose:  20 mg Take 20 mg by mouth every morning. Indications: GENERALIZED ANXIETY DISORDER Refills:  0 MIRENA 20 mcg/24 hr (5 years) IUD Generic drug:  levonorgestrel Your last dose was: Your next dose is:    
   
   
 Dose:  1 Each  
1 Each by IntraUTERine route once. Refills:  0  
     
   
   
   
  
 TOPAMAX 50 mg tablet Generic drug:  topiramate Your last dose was: Your next dose is:    
   
   
 Dose:  50 mg Take 50 mg by mouth nightly. Refills:  0 ZyrTEC 10 mg Cap Generic drug:  Cetirizine Your last dose was: Your next dose is:    
   
   
 Dose:  1 Cap Take 1 Cap by mouth daily. Refills:  0 Where to Get Your Medications Information on where to get these meds will be given to you by the nurse or doctor. ! Ask your nurse or doctor about these medications  
  oxyCODONE-acetaminophen 5-325 mg per tablet Discharge Instructions 1. Do not drive while on pain medication. You should take a stool softener while on pain medication to prevent constipation. 2.  Do not lift anything greater than 15 pounds for 2 weeks. 3.  You may resume your home medications. 4.  You may shower but do not swim or soak in tub for 2 weeks. 5.  Please call [phone] to schedule a follow-up with Dr. Lanre Larson within 2 weeks. Appendectomy: What to Expect at [facility] Your Recovery Your doctor removed your appendix either by making many small cuts, called incisions, in your belly (laparoscopic surgery) or through open surgery. In open surgery, the doctor makes one large incision. The incisions leave scars that usually fade over time. After your surgery, it is normal to feel weak and tired for several days after you return home. Your belly may be swollen and may be painful. If you had laparoscopic surgery, you may have pain in your shoulder for about 24 hours. You may also feel sick to your stomach and have diarrhea, constipation, gas, or a headache. This usually goes away in a few days. Your recovery time depends on the type of surgery you had. If you had laparoscopic surgery, you will probably be able to return to work or a normal routine 1 to 3 weeks after surgery. If you had an open surgery, it may take 2 to 4 weeks. If your appendix ruptured, you may have a drain in your incision. Your body will work fine without an appendix. You will not have to make any changes in your diet or lifestyle. This care sheet gives you a general idea about how long it will take for you to recover. But each person recovers at a different pace. Follow the steps below to get better as quickly as possible. How can you care for yourself at home? Activity · Rest when you feel tired. Getting enough sleep will help you recover. · Try to walk each day. Start by walking a little more than you did the day before. Bit by bit, increase the amount you walk. Walking boosts blood flow and helps prevent pneumonia and constipation. · For about 2 weeks, avoid lifting anything that would make you strain. This may include a child, heavy grocery bags and milk containers, a heavy briefcase or backpack, cat litter or dog food bags, or a vacuum . · Avoid strenuous activities, such as bicycle riding, jogging, weight lifting, or aerobic exercise, until your doctor says it is okay. · You may be able to take showers (unless you have a drain near your incision) 24 to 48 hours after surgery. Pat the incision dry. Do not take a bath for the first 2 weeks, or until your doctor tells you it is okay. If you have a drain near your incision, follow your doctor's instructions. · You may drive when you are no longer taking pain medicine and can quickly move your foot from the gas pedal to the brake. You must also be able to sit comfortably for a long period of time, even if you do not plan on going far. You might get caught in traffic. · You will probably be able to go back to work in 1 to 3 weeks. If you had an open surgery, it may take 3 to 4 weeks. · Your doctor will tell you when you can have sex again. Diet · You can eat your normal diet. If your stomach is upset, try bland, low-fat foods like plain rice, broiled chicken, toast, and yogurt. · Drink plenty of fluids (unless your doctor tells you not to). · You may notice that your bowel movements are not regular right after your surgery. This is common. Try to avoid constipation and straining with bowel movements. You may want to take a fiber supplement every day. If you have not had a bowel movement after a couple of days, ask your doctor about taking a mild laxative. Medicines · Your doctor will tell you if and when you can restart your medicines. He or she will also give you instructions about taking any new medicines. · If you take blood thinners, such as warfarin (Coumadin), clopidogrel (Plavix), or aspirin, be sure to talk to your doctor. He or she will tell you if and when to start taking those medicines again. Make sure that you understand exactly what your doctor wants you to do. · If your appendix ruptured, you will need to take antibiotics. Take them as directed. Do not stop taking them just because you feel better. You need to take the full course of antibiotics. · Be safe with medicines. Take pain medicines exactly as directed. ¨ If the doctor gave you a prescription medicine for pain, take it as prescribed. ¨ If you are not taking a prescription pain medicine, take an over-the-counter medicine such as acetaminophen (Tylenol), ibuprofen (Advil, Motrin), or naproxen (Aleve). Read and follow all instructions on the label. ¨ Do not take two or more pain medicines at the same time unless the doctor told you to. Many pain medicines have acetaminophen, which is Tylenol. Too much Tylenol can be harmful. · If you think your pain medicine is making you sick to your stomach: 
¨ Take your medicine after meals (unless your doctor has told you not to). ¨ Ask your doctor for a different pain medicine. Incision care · If you had an open surgery, you may have staples in your incision. The doctor will take these out in 7 to 10 days. · If you have strips of tape on the incision, leave the tape on for a week or until it falls off. · You may wash the area with warm, soapy water 24 to 48 hours after your surgery, unless your doctor tells you not to. Pat the area dry. · Keep the area clean and dry. You may cover it with a gauze bandage if it weeps or rubs against clothing. Change the bandage every day. · If your appendix ruptured, you may have an incision with packing in it. Change the packing as often as your doctor tells you to. ¨ Packing changes may hurt at first. Taking pain medicine about half an hour before you change the dressing can help. ¨ If your dressing sticks to your wound, try soaking it with warm water for about 10 minutes before you remove it. You can do this in the shower or by placing a wet washcloth over the dressing. ¨ Remove the old packing and flush the incision with water. Gently pat the top area dry. ¨ The size of the incision determines how much gauze you need to put inside. Fold the gauze over once, but do not wad it up so that it hurts. Put it in the wound carefully. You want to keep the sides of the wound from touching. A cotton swab may help you push the gauze in as needed. ¨ Put a gauze pad over the wound, and tape it down. ¨ You may notice greenish gray fluid seeping from your wound as you start to heal. This is normal. It is a sign that your wound is healing. Follow-up care is a key part of your treatment and safety. Be sure to make and go to all appointments, and call your doctor if you are having problems. It's also a good idea to know your test results and keep a list of the medicines you take. When should you call for help? Call 911 anytime you think you may need emergency care. For example, call if: 
· You passed out (lost consciousness). · You have sudden chest pain and shortness of breath, or you cough up blood. · You have severe trouble breathing. Call your doctor now or seek immediate medical care if: 
· You are sick to your stomach and cannot drink fluids. · You have severe diarrhea. · You have pain that does not get better when you take your pain medicine. · You have signs of infection, such as: 
¨ Increased pain, swelling, warmth, or redness. ¨ Red streaks leading from the wound. ¨ Pus draining from the wound. ¨ A fever. · You have loose stitches, or your incision comes open. · Bright red blood has soaked through a large bandage. · You have signs of a blood clot, such as: 
¨ Pain in your calf, back of knee, thigh, or groin. ¨ Redness and swelling in your leg or groin. Watch closely for any changes in your health, and be sure to contact your doctor if: 
· You had a laparoscopic surgery and your shoulder pain lasts more than 24 hours. · You have leakage around your drain or you have no new fluid in the drain for 24 hours. · The amount of drainage suddenly increases, or the color and texture changes. · You do not have a bowel movement after taking a laxative. Where can you learn more? Go to http://michele-lucy.info/. Enter B723 in the search box to learn more about \"Appendectomy: What to Expect at Home. \" Current as of: August 9, 2016 Content Version: 11.2 © 7082-8380 [employer]. Care instructions adapted under license by [employer] (which disclaims liability or warranty for this information). If you have questions about a medical condition or this instruction, always ask your healthcare professional. [place] [address] any warranty or liability for your use of this information. Discharge Orders None Introducing [facility]! [employer] introduces Osmopure patient portal. Now you can access parts of your medical record, email your doctor's office, and request medication refills online. 1. In your internet browser, go to [email or web address] 2. Click on the First Time User? Click Here link in the Sign In box. You will see the New Member Sign Up page. 3. Enter your Osmopure Access Code exactly as it appears below. You will not need to use this code after youve completed the sign-up process. If you do not sign up before the expiration date, you must request a new code. · Osmopure Access Code: [email or web address] Expires: 4/27/2017 10:02 AM 
 
 4. Enter the last four digits of your Social Security Number (xxxx) and Date of Birth (mm/dd/yyyy) as indicated and click Submit. You will be taken to the next sign-up page. 5. Create a MOAEC ID. This will be your MOAEC login ID and cannot be changed, so think of one that is secure and easy to remember. 6. Create a MOAEC password. You can change your password at any time. 7. Enter your Password Reset Question and Answer. This can be used at a later time if you forget your password. 8. Enter your e-mail address. You will receive e-mail notification when new information is available in [address] 9. Click Sign Up. You can now view and download portions of your medical record. 10. Click the Download Summary menu link to download a portable copy of your medical information. If you have questions, please visit the Frequently Asked Questions section of the MOAEC website. Remember, MOAEC is NOT to be used for urgent needs. For medical emergencies, dial 911. Now available from your iPhone and Android! General Information Please provide this summary of care documentation to your next provider. Patient Signature:  ____________________________________________________________ Date:  ____________________________________________________________  
  
[facility] Provider Signature:  ____________________________________________________________ Date:  ____________________________________________________________

## 2017-04-21 ENCOUNTER — ANESTHESIA EVENT (OUTPATIENT)
Dept: SURGERY | Age: 27
End: 2017-04-21
Payer: COMMERCIAL

## 2017-04-21 ENCOUNTER — ANESTHESIA (OUTPATIENT)
Dept: SURGERY | Age: 27
End: 2017-04-21
Payer: COMMERCIAL

## 2017-04-21 PROCEDURE — 77030005518 HC CATH URETH FOL 2W BARD -B: Performed by: OBSTETRICS & GYNECOLOGY

## 2017-04-21 PROCEDURE — 77030003580 HC NDL INSUF VERES J&J -B: Performed by: OBSTETRICS & GYNECOLOGY

## 2017-04-21 PROCEDURE — 74011250637 HC RX REV CODE- 250/637: Performed by: FAMILY MEDICINE

## 2017-04-21 PROCEDURE — 77030008756 HC TU IRR SUC STRY -B: Performed by: OBSTETRICS & GYNECOLOGY

## 2017-04-21 PROCEDURE — 77030032490 HC SLV COMPR SCD KNE COVD -B: Performed by: OBSTETRICS & GYNECOLOGY

## 2017-04-21 PROCEDURE — 77030022473 HC HNDL ENDO GIA UNIV USDA -C: Performed by: OBSTETRICS & GYNECOLOGY

## 2017-04-21 PROCEDURE — 74011000250 HC RX REV CODE- 250

## 2017-04-21 PROCEDURE — 96376 TX/PRO/DX INJ SAME DRUG ADON: CPT

## 2017-04-21 PROCEDURE — 77030002933 HC SUT MCRYL J&J -A: Performed by: OBSTETRICS & GYNECOLOGY

## 2017-04-21 PROCEDURE — 77030008598 HC TRCR ENDOSC BLDLS J&J -B: Performed by: OBSTETRICS & GYNECOLOGY

## 2017-04-21 PROCEDURE — 74011250636 HC RX REV CODE- 250/636: Performed by: ANESTHESIOLOGY

## 2017-04-21 PROCEDURE — 74011250636 HC RX REV CODE- 250/636: Performed by: OBSTETRICS & GYNECOLOGY

## 2017-04-21 PROCEDURE — 74011250636 HC RX REV CODE- 250/636: Performed by: NURSE PRACTITIONER

## 2017-04-21 PROCEDURE — 99218 HC RM OBSERVATION: CPT

## 2017-04-21 PROCEDURE — 88304 TISSUE EXAM BY PATHOLOGIST: CPT | Performed by: OBSTETRICS & GYNECOLOGY

## 2017-04-21 PROCEDURE — 74011250637 HC RX REV CODE- 250/637: Performed by: STUDENT IN AN ORGANIZED HEALTH CARE EDUCATION/TRAINING PROGRAM

## 2017-04-21 PROCEDURE — 77030011640 HC PAD GRND REM COVD -A: Performed by: OBSTETRICS & GYNECOLOGY

## 2017-04-21 PROCEDURE — 96361 HYDRATE IV INFUSION ADD-ON: CPT

## 2017-04-21 PROCEDURE — 77030031508 HC PRT CLSR SYS COOP -C: Performed by: OBSTETRICS & GYNECOLOGY

## 2017-04-21 PROCEDURE — 74011250636 HC RX REV CODE- 250/636

## 2017-04-21 PROCEDURE — 76060000034 HC ANESTHESIA 1.5 TO 2 HR: Performed by: OBSTETRICS & GYNECOLOGY

## 2017-04-21 PROCEDURE — 76010000153 HC OR TIME 1.5 TO 2 HR: Performed by: OBSTETRICS & GYNECOLOGY

## 2017-04-21 PROCEDURE — 74011250636 HC RX REV CODE- 250/636: Performed by: FAMILY MEDICINE

## 2017-04-21 PROCEDURE — 77030013079 HC BLNKT BAIR HGGR 3M -A: Performed by: ANESTHESIOLOGY

## 2017-04-21 PROCEDURE — 77030018835 HC SOL IRR LR ICUM -A: Performed by: OBSTETRICS & GYNECOLOGY

## 2017-04-21 PROCEDURE — 77030026438 HC STYL ET INTUB CARD -A: Performed by: ANESTHESIOLOGY

## 2017-04-21 PROCEDURE — 77030033271 HC TRCR ENDOSC EPATH2 J&J -B: Performed by: OBSTETRICS & GYNECOLOGY

## 2017-04-21 PROCEDURE — 77030008684 HC TU ET CUF COVD -B: Performed by: ANESTHESIOLOGY

## 2017-04-21 PROCEDURE — 74011250637 HC RX REV CODE- 250/637: Performed by: OBSTETRICS & GYNECOLOGY

## 2017-04-21 PROCEDURE — 76210000006 HC OR PH I REC 0.5 TO 1 HR: Performed by: OBSTETRICS & GYNECOLOGY

## 2017-04-21 PROCEDURE — 77030031139 HC SUT VCRL2 J&J -A: Performed by: OBSTETRICS & GYNECOLOGY

## 2017-04-21 PROCEDURE — 77030027497 HC RELD STLPR EGIA MTHK COVD -C: Performed by: OBSTETRICS & GYNECOLOGY

## 2017-04-21 PROCEDURE — 77030007955 HC PCH ENDOSC SPEC J&J -B: Performed by: OBSTETRICS & GYNECOLOGY

## 2017-04-21 PROCEDURE — 77030008771 HC TU NG SALEM SUMP -A: Performed by: ANESTHESIOLOGY

## 2017-04-21 RX ORDER — MIDAZOLAM HYDROCHLORIDE 1 MG/ML
1 INJECTION, SOLUTION INTRAMUSCULAR; INTRAVENOUS AS NEEDED
Status: DISCONTINUED | OUTPATIENT
Start: 2017-04-21 | End: 2017-04-21 | Stop reason: HOSPADM

## 2017-04-21 RX ORDER — SODIUM CHLORIDE 0.9 % (FLUSH) 0.9 %
5-10 SYRINGE (ML) INJECTION EVERY 8 HOURS
Status: DISCONTINUED | OUTPATIENT
Start: 2017-04-21 | End: 2017-04-22 | Stop reason: HOSPADM

## 2017-04-21 RX ORDER — OXYCODONE AND ACETAMINOPHEN 5; 325 MG/1; MG/1
1 TABLET ORAL
Status: DISCONTINUED | OUTPATIENT
Start: 2017-04-21 | End: 2017-04-22 | Stop reason: HOSPADM

## 2017-04-21 RX ORDER — DIPHENHYDRAMINE HYDROCHLORIDE 50 MG/ML
12.5 INJECTION, SOLUTION INTRAMUSCULAR; INTRAVENOUS
Status: DISCONTINUED | OUTPATIENT
Start: 2017-04-21 | End: 2017-04-22 | Stop reason: HOSPADM

## 2017-04-21 RX ORDER — SODIUM CHLORIDE 0.9 % (FLUSH) 0.9 %
5-10 SYRINGE (ML) INJECTION AS NEEDED
Status: DISCONTINUED | OUTPATIENT
Start: 2017-04-21 | End: 2017-04-21 | Stop reason: HOSPADM

## 2017-04-21 RX ORDER — ROCURONIUM BROMIDE 10 MG/ML
INJECTION, SOLUTION INTRAVENOUS AS NEEDED
Status: DISCONTINUED | OUTPATIENT
Start: 2017-04-21 | End: 2017-04-21 | Stop reason: HOSPADM

## 2017-04-21 RX ORDER — ZOLPIDEM TARTRATE 5 MG/1
5 TABLET ORAL
Status: DISCONTINUED | OUTPATIENT
Start: 2017-04-21 | End: 2017-04-22 | Stop reason: HOSPADM

## 2017-04-21 RX ORDER — GLYCOPYRROLATE 0.2 MG/ML
INJECTION INTRAMUSCULAR; INTRAVENOUS AS NEEDED
Status: DISCONTINUED | OUTPATIENT
Start: 2017-04-21 | End: 2017-04-21 | Stop reason: HOSPADM

## 2017-04-21 RX ORDER — DIPHENHYDRAMINE HYDROCHLORIDE 50 MG/ML
12.5 INJECTION, SOLUTION INTRAMUSCULAR; INTRAVENOUS AS NEEDED
Status: DISCONTINUED | OUTPATIENT
Start: 2017-04-21 | End: 2017-04-21 | Stop reason: HOSPADM

## 2017-04-21 RX ORDER — ROPIVACAINE HYDROCHLORIDE 5 MG/ML
30 INJECTION, SOLUTION EPIDURAL; INFILTRATION; PERINEURAL
Status: DISCONTINUED | OUTPATIENT
Start: 2017-04-21 | End: 2017-04-21 | Stop reason: HOSPADM

## 2017-04-21 RX ORDER — DOCUSATE SODIUM 100 MG/1
100 CAPSULE, LIQUID FILLED ORAL DAILY
Status: DISCONTINUED | OUTPATIENT
Start: 2017-04-21 | End: 2017-04-22 | Stop reason: HOSPADM

## 2017-04-21 RX ORDER — IBUPROFEN 400 MG/1
400 TABLET ORAL
Status: DISCONTINUED | OUTPATIENT
Start: 2017-04-21 | End: 2017-04-22 | Stop reason: HOSPADM

## 2017-04-21 RX ORDER — SODIUM CHLORIDE 9 MG/ML
25 INJECTION, SOLUTION INTRAVENOUS CONTINUOUS
Status: DISCONTINUED | OUTPATIENT
Start: 2017-04-21 | End: 2017-04-21 | Stop reason: HOSPADM

## 2017-04-21 RX ORDER — SODIUM CHLORIDE, SODIUM LACTATE, POTASSIUM CHLORIDE, CALCIUM CHLORIDE 600; 310; 30; 20 MG/100ML; MG/100ML; MG/100ML; MG/100ML
INJECTION, SOLUTION INTRAVENOUS
Status: DISCONTINUED | OUTPATIENT
Start: 2017-04-21 | End: 2017-04-21 | Stop reason: HOSPADM

## 2017-04-21 RX ORDER — ACETAMINOPHEN 10 MG/ML
INJECTION, SOLUTION INTRAVENOUS AS NEEDED
Status: DISCONTINUED | OUTPATIENT
Start: 2017-04-21 | End: 2017-04-21 | Stop reason: HOSPADM

## 2017-04-21 RX ORDER — LIDOCAINE HYDROCHLORIDE 20 MG/ML
INJECTION, SOLUTION EPIDURAL; INFILTRATION; INTRACAUDAL; PERINEURAL AS NEEDED
Status: DISCONTINUED | OUTPATIENT
Start: 2017-04-21 | End: 2017-04-21 | Stop reason: HOSPADM

## 2017-04-21 RX ORDER — SODIUM CHLORIDE, SODIUM LACTATE, POTASSIUM CHLORIDE, CALCIUM CHLORIDE 600; 310; 30; 20 MG/100ML; MG/100ML; MG/100ML; MG/100ML
125 INJECTION, SOLUTION INTRAVENOUS CONTINUOUS
Status: DISCONTINUED | OUTPATIENT
Start: 2017-04-21 | End: 2017-04-21 | Stop reason: HOSPADM

## 2017-04-21 RX ORDER — PROPOFOL 10 MG/ML
INJECTION, EMULSION INTRAVENOUS AS NEEDED
Status: DISCONTINUED | OUTPATIENT
Start: 2017-04-21 | End: 2017-04-21 | Stop reason: HOSPADM

## 2017-04-21 RX ORDER — NEOSTIGMINE METHYLSULFATE 1 MG/ML
INJECTION INTRAVENOUS AS NEEDED
Status: DISCONTINUED | OUTPATIENT
Start: 2017-04-21 | End: 2017-04-21 | Stop reason: HOSPADM

## 2017-04-21 RX ORDER — LIDOCAINE HYDROCHLORIDE 10 MG/ML
0.1 INJECTION, SOLUTION EPIDURAL; INFILTRATION; INTRACAUDAL; PERINEURAL AS NEEDED
Status: DISCONTINUED | OUTPATIENT
Start: 2017-04-21 | End: 2017-04-21 | Stop reason: HOSPADM

## 2017-04-21 RX ORDER — HYDROMORPHONE HYDROCHLORIDE 1 MG/ML
0.2 INJECTION, SOLUTION INTRAMUSCULAR; INTRAVENOUS; SUBCUTANEOUS
Status: DISCONTINUED | OUTPATIENT
Start: 2017-04-21 | End: 2017-04-21 | Stop reason: HOSPADM

## 2017-04-21 RX ORDER — KETOROLAC TROMETHAMINE 30 MG/ML
INJECTION, SOLUTION INTRAMUSCULAR; INTRAVENOUS AS NEEDED
Status: DISCONTINUED | OUTPATIENT
Start: 2017-04-21 | End: 2017-04-21 | Stop reason: HOSPADM

## 2017-04-21 RX ORDER — FENTANYL CITRATE 50 UG/ML
50 INJECTION, SOLUTION INTRAMUSCULAR; INTRAVENOUS AS NEEDED
Status: COMPLETED | OUTPATIENT
Start: 2017-04-21 | End: 2017-04-21

## 2017-04-21 RX ORDER — ONDANSETRON 2 MG/ML
4 INJECTION INTRAMUSCULAR; INTRAVENOUS
Status: DISCONTINUED | OUTPATIENT
Start: 2017-04-21 | End: 2017-04-21

## 2017-04-21 RX ORDER — MIDAZOLAM HYDROCHLORIDE 1 MG/ML
0.5 INJECTION, SOLUTION INTRAMUSCULAR; INTRAVENOUS
Status: DISCONTINUED | OUTPATIENT
Start: 2017-04-21 | End: 2017-04-21 | Stop reason: HOSPADM

## 2017-04-21 RX ORDER — ONDANSETRON 2 MG/ML
4 INJECTION INTRAMUSCULAR; INTRAVENOUS
Status: DISCONTINUED | OUTPATIENT
Start: 2017-04-21 | End: 2017-04-22 | Stop reason: HOSPADM

## 2017-04-21 RX ORDER — MORPHINE SULFATE 10 MG/ML
2 INJECTION, SOLUTION INTRAMUSCULAR; INTRAVENOUS
Status: DISCONTINUED | OUTPATIENT
Start: 2017-04-21 | End: 2017-04-21 | Stop reason: HOSPADM

## 2017-04-21 RX ORDER — SODIUM CHLORIDE 0.9 % (FLUSH) 0.9 %
5-10 SYRINGE (ML) INJECTION AS NEEDED
Status: DISCONTINUED | OUTPATIENT
Start: 2017-04-21 | End: 2017-04-22 | Stop reason: HOSPADM

## 2017-04-21 RX ORDER — FENTANYL CITRATE 50 UG/ML
INJECTION, SOLUTION INTRAMUSCULAR; INTRAVENOUS AS NEEDED
Status: DISCONTINUED | OUTPATIENT
Start: 2017-04-21 | End: 2017-04-21 | Stop reason: HOSPADM

## 2017-04-21 RX ORDER — HYDROMORPHONE HYDROCHLORIDE 1 MG/ML
INJECTION, SOLUTION INTRAMUSCULAR; INTRAVENOUS; SUBCUTANEOUS AS NEEDED
Status: DISCONTINUED | OUTPATIENT
Start: 2017-04-21 | End: 2017-04-21 | Stop reason: HOSPADM

## 2017-04-21 RX ORDER — FENTANYL CITRATE 50 UG/ML
25 INJECTION, SOLUTION INTRAMUSCULAR; INTRAVENOUS
Status: COMPLETED | OUTPATIENT
Start: 2017-04-21 | End: 2017-04-21

## 2017-04-21 RX ORDER — SODIUM CHLORIDE 0.9 % (FLUSH) 0.9 %
5-10 SYRINGE (ML) INJECTION EVERY 8 HOURS
Status: DISCONTINUED | OUTPATIENT
Start: 2017-04-21 | End: 2017-04-21 | Stop reason: HOSPADM

## 2017-04-21 RX ORDER — MIDAZOLAM HYDROCHLORIDE 1 MG/ML
INJECTION, SOLUTION INTRAMUSCULAR; INTRAVENOUS AS NEEDED
Status: DISCONTINUED | OUTPATIENT
Start: 2017-04-21 | End: 2017-04-21 | Stop reason: HOSPADM

## 2017-04-21 RX ORDER — HYDROMORPHONE HYDROCHLORIDE 1 MG/ML
1 INJECTION, SOLUTION INTRAMUSCULAR; INTRAVENOUS; SUBCUTANEOUS
Status: DISCONTINUED | OUTPATIENT
Start: 2017-04-21 | End: 2017-04-22 | Stop reason: HOSPADM

## 2017-04-21 RX ORDER — CEFAZOLIN SODIUM IN 0.9 % NACL 2 G/50 ML
2 INTRAVENOUS SOLUTION, PIGGYBACK (ML) INTRAVENOUS ONCE
Status: COMPLETED | OUTPATIENT
Start: 2017-04-21 | End: 2017-04-21

## 2017-04-21 RX ORDER — ONDANSETRON 2 MG/ML
INJECTION INTRAMUSCULAR; INTRAVENOUS AS NEEDED
Status: DISCONTINUED | OUTPATIENT
Start: 2017-04-21 | End: 2017-04-21 | Stop reason: HOSPADM

## 2017-04-21 RX ORDER — SUCCINYLCHOLINE CHLORIDE 20 MG/ML
INJECTION INTRAMUSCULAR; INTRAVENOUS AS NEEDED
Status: DISCONTINUED | OUTPATIENT
Start: 2017-04-21 | End: 2017-04-21 | Stop reason: HOSPADM

## 2017-04-21 RX ORDER — DEXAMETHASONE SODIUM PHOSPHATE 4 MG/ML
INJECTION, SOLUTION INTRA-ARTICULAR; INTRALESIONAL; INTRAMUSCULAR; INTRAVENOUS; SOFT TISSUE AS NEEDED
Status: DISCONTINUED | OUTPATIENT
Start: 2017-04-21 | End: 2017-04-21 | Stop reason: HOSPADM

## 2017-04-21 RX ORDER — MIDAZOLAM HYDROCHLORIDE 1 MG/ML
INJECTION, SOLUTION INTRAMUSCULAR; INTRAVENOUS
Status: DISPENSED
Start: 2017-04-21 | End: 2017-04-22

## 2017-04-21 RX ORDER — SODIUM CHLORIDE, SODIUM LACTATE, POTASSIUM CHLORIDE, CALCIUM CHLORIDE 600; 310; 30; 20 MG/100ML; MG/100ML; MG/100ML; MG/100ML
75 INJECTION, SOLUTION INTRAVENOUS CONTINUOUS
Status: DISCONTINUED | OUTPATIENT
Start: 2017-04-21 | End: 2017-04-21 | Stop reason: HOSPADM

## 2017-04-21 RX ORDER — NALOXONE HYDROCHLORIDE 0.4 MG/ML
0.4 INJECTION, SOLUTION INTRAMUSCULAR; INTRAVENOUS; SUBCUTANEOUS AS NEEDED
Status: DISCONTINUED | OUTPATIENT
Start: 2017-04-21 | End: 2017-04-22 | Stop reason: HOSPADM

## 2017-04-21 RX ADMIN — SUCCINYLCHOLINE CHLORIDE 120 MG: 20 INJECTION INTRAMUSCULAR; INTRAVENOUS at 18:19

## 2017-04-21 RX ADMIN — KETOROLAC TROMETHAMINE 30 MG: 30 INJECTION, SOLUTION INTRAMUSCULAR; INTRAVENOUS at 19:19

## 2017-04-21 RX ADMIN — HYDROMORPHONE HYDROCHLORIDE 1 MG: 1 INJECTION, SOLUTION INTRAMUSCULAR; INTRAVENOUS; SUBCUTANEOUS at 15:28

## 2017-04-21 RX ADMIN — ONDANSETRON 4 MG: 2 INJECTION INTRAMUSCULAR; INTRAVENOUS at 02:09

## 2017-04-21 RX ADMIN — FENTANYL CITRATE 25 MCG: 50 INJECTION, SOLUTION INTRAMUSCULAR; INTRAVENOUS at 19:55

## 2017-04-21 RX ADMIN — GLYCOPYRROLATE 0.5 MG: 0.2 INJECTION INTRAMUSCULAR; INTRAVENOUS at 19:22

## 2017-04-21 RX ADMIN — MIDAZOLAM HYDROCHLORIDE 2 MG: 1 INJECTION, SOLUTION INTRAMUSCULAR; INTRAVENOUS at 18:10

## 2017-04-21 RX ADMIN — FENTANYL CITRATE 25 MCG: 50 INJECTION, SOLUTION INTRAMUSCULAR; INTRAVENOUS at 20:05

## 2017-04-21 RX ADMIN — DOCUSATE SODIUM 100 MG: 100 CAPSULE, LIQUID FILLED ORAL at 11:56

## 2017-04-21 RX ADMIN — ONDANSETRON 4 MG: 2 INJECTION INTRAMUSCULAR; INTRAVENOUS at 08:05

## 2017-04-21 RX ADMIN — BUSPIRONE HYDROCHLORIDE 15 MG: 10 TABLET ORAL at 08:06

## 2017-04-21 RX ADMIN — HYDROMORPHONE HYDROCHLORIDE 1 MG: 1 INJECTION, SOLUTION INTRAMUSCULAR; INTRAVENOUS; SUBCUTANEOUS at 05:30

## 2017-04-21 RX ADMIN — ROCURONIUM BROMIDE 5 MG: 10 INJECTION, SOLUTION INTRAVENOUS at 18:18

## 2017-04-21 RX ADMIN — HYDROMORPHONE HYDROCHLORIDE 0.5 MG: 1 INJECTION, SOLUTION INTRAMUSCULAR; INTRAVENOUS; SUBCUTANEOUS at 19:45

## 2017-04-21 RX ADMIN — CEFAZOLIN 2 G: 1 INJECTION, POWDER, FOR SOLUTION INTRAMUSCULAR; INTRAVENOUS; PARENTERAL at 18:25

## 2017-04-21 RX ADMIN — PROPOFOL 180 MG: 10 INJECTION, EMULSION INTRAVENOUS at 18:18

## 2017-04-21 RX ADMIN — NEOSTIGMINE METHYLSULFATE 3 MG: 1 INJECTION INTRAVENOUS at 19:22

## 2017-04-21 RX ADMIN — SODIUM CHLORIDE, SODIUM LACTATE, POTASSIUM CHLORIDE, AND CALCIUM CHLORIDE 125 ML/HR: 600; 310; 30; 20 INJECTION, SOLUTION INTRAVENOUS at 17:15

## 2017-04-21 RX ADMIN — HYDROMORPHONE HYDROCHLORIDE 1 MG: 1 INJECTION, SOLUTION INTRAMUSCULAR; INTRAVENOUS; SUBCUTANEOUS at 01:43

## 2017-04-21 RX ADMIN — FENTANYL CITRATE 25 MCG: 50 INJECTION, SOLUTION INTRAMUSCULAR; INTRAVENOUS at 20:00

## 2017-04-21 RX ADMIN — ACETAMINOPHEN 1000 MG: 10 INJECTION, SOLUTION INTRAVENOUS at 18:33

## 2017-04-21 RX ADMIN — FENTANYL CITRATE 50 MCG: 50 INJECTION, SOLUTION INTRAMUSCULAR; INTRAVENOUS at 18:18

## 2017-04-21 RX ADMIN — ESCITALOPRAM OXALATE 20 MG: 10 TABLET ORAL at 08:05

## 2017-04-21 RX ADMIN — SODIUM CHLORIDE 100 ML/HR: 900 INJECTION, SOLUTION INTRAVENOUS at 05:30

## 2017-04-21 RX ADMIN — Medication 10 ML: at 12:08

## 2017-04-21 RX ADMIN — ONDANSETRON 4 MG: 2 INJECTION INTRAMUSCULAR; INTRAVENOUS at 19:19

## 2017-04-21 RX ADMIN — MIDAZOLAM HYDROCHLORIDE 2 MG: 1 INJECTION, SOLUTION INTRAMUSCULAR; INTRAVENOUS at 19:52

## 2017-04-21 RX ADMIN — FENTANYL CITRATE 50 MCG: 50 INJECTION, SOLUTION INTRAMUSCULAR; INTRAVENOUS at 18:15

## 2017-04-21 RX ADMIN — HYDROMORPHONE HYDROCHLORIDE 1 MG: 1 INJECTION, SOLUTION INTRAMUSCULAR; INTRAVENOUS; SUBCUTANEOUS at 12:07

## 2017-04-21 RX ADMIN — TOPIRAMATE 50 MG: 25 TABLET, FILM COATED ORAL at 22:12

## 2017-04-21 RX ADMIN — LIDOCAINE HYDROCHLORIDE 60 MG: 20 INJECTION, SOLUTION EPIDURAL; INFILTRATION; INTRACAUDAL; PERINEURAL at 18:18

## 2017-04-21 RX ADMIN — SODIUM CHLORIDE, SODIUM LACTATE, POTASSIUM CHLORIDE, AND CALCIUM CHLORIDE 75 ML/HR: 600; 310; 30; 20 INJECTION, SOLUTION INTRAVENOUS at 20:04

## 2017-04-21 RX ADMIN — FENTANYL CITRATE 25 MCG: 50 INJECTION, SOLUTION INTRAMUSCULAR; INTRAVENOUS at 20:10

## 2017-04-21 RX ADMIN — HYDROMORPHONE HYDROCHLORIDE 1 MG: 1 INJECTION, SOLUTION INTRAMUSCULAR; INTRAVENOUS; SUBCUTANEOUS at 09:04

## 2017-04-21 RX ADMIN — Medication 10 ML: at 05:30

## 2017-04-21 RX ADMIN — HYDROMORPHONE HYDROCHLORIDE 1 MG: 1 INJECTION, SOLUTION INTRAMUSCULAR; INTRAVENOUS; SUBCUTANEOUS at 22:13

## 2017-04-21 RX ADMIN — Medication 10 ML: at 09:05

## 2017-04-21 RX ADMIN — HYDROMORPHONE HYDROCHLORIDE 1 MG: 1 INJECTION, SOLUTION INTRAMUSCULAR; INTRAVENOUS; SUBCUTANEOUS at 00:04

## 2017-04-21 RX ADMIN — DEXAMETHASONE SODIUM PHOSPHATE 8 MG: 4 INJECTION, SOLUTION INTRA-ARTICULAR; INTRALESIONAL; INTRAMUSCULAR; INTRAVENOUS; SOFT TISSUE at 18:27

## 2017-04-21 RX ADMIN — FENTANYL CITRATE 50 MCG: 50 INJECTION, SOLUTION INTRAMUSCULAR; INTRAVENOUS at 17:17

## 2017-04-21 RX ADMIN — IBUPROFEN 400 MG: 400 TABLET, FILM COATED ORAL at 23:49

## 2017-04-21 RX ADMIN — HYDROMORPHONE HYDROCHLORIDE 0.5 MG: 1 INJECTION, SOLUTION INTRAMUSCULAR; INTRAVENOUS; SUBCUTANEOUS at 19:26

## 2017-04-21 RX ADMIN — ONDANSETRON 4 MG: 2 INJECTION INTRAMUSCULAR; INTRAVENOUS at 22:12

## 2017-04-21 RX ADMIN — SODIUM CHLORIDE, SODIUM LACTATE, POTASSIUM CHLORIDE, CALCIUM CHLORIDE: 600; 310; 30; 20 INJECTION, SOLUTION INTRAVENOUS at 17:59

## 2017-04-21 RX ADMIN — ONDANSETRON 4 MG: 2 INJECTION INTRAMUSCULAR; INTRAVENOUS at 14:25

## 2017-04-21 RX ADMIN — Medication 10 ML: at 08:06

## 2017-04-21 RX ADMIN — CLONAZEPAM 2 MG: 1 TABLET ORAL at 22:12

## 2017-04-21 RX ADMIN — FENTANYL CITRATE 50 MCG: 50 INJECTION, SOLUTION INTRAMUSCULAR; INTRAVENOUS at 17:29

## 2017-04-21 NOTE — CONSULTS
Gynecology Consult    Name: Calvin Paez MRN: [id number] SSN: [id number]    YOB: 1990  Age: 27 y.o. Sex: female       Subjective:      Chief complaint:  Pelvic pain    Kenia Carbajal is a 27 y.o.  female with a history of several laparoscopies for endometriosis (most recent 2 years ago with Dr Dory Sesay at which time IUD was also removed). Patient had Mirena IUD placed 3-4 weeks ago. She was seen in the office by Dr Cheryl Anand yesterday for evaluation of this pain and the ultrasound showed a small simple cyst on the left ovary and confirmed proper placement of the IUD. Due to patient discomfort in the rlq, there was concern for appedicitis. She was sent to the ER where a tvus and CT were done. CT scan and Ultrasound include 2cm left ovarian cyst and properly placed IUD and normal appearing appendix. Previous treatment measures include iv dilaudid which doesn't seem to be helping the pain which pt reports as constant. 8/10 with dilaudid. 10/10 without. She reports nausea but no vomiting/diarrhea/constipation. The current method of family planning is IUD.     Past Medical History:   Diagnosis Date    Anxiety     Depression     Endometriosis     Migraine     Neurological disorder     migraines    Ovarian cyst     Panic attack      Past Surgical History:   Procedure Laterality Date    HX BREAST LUMPECTOMY Right     HX BUNIONECTOMY      HX CYST REMOVAL      HX OTHER SURGICAL      laparascopy    HX WISDOM TEETH EXTRACTION       OB History     No data available        Allergies   Allergen Reactions    Compazine [Prochlorperazine Edisylate] Anxiety    Haldol [Haloperidol Lactate] Other (comments)    Promethazine Other (comments)     \"It makes me feel really funny, nausea and dizzy\"    Reglan [Metoclopramide] Other (comments)    Zofran [Ondansetron Hcl (Pf)] Other (comments)     Restless legs and \"I want to crawl out of my skin\"     Current Facility-Administered Medications   Medication Dose Route Frequency Provider Last Rate Last Dose    docusate sodium (COLACE) capsule 100 mg  100 mg Oral DAILY Daija Sharpe MD   100 mg at 04/21/17 1156    ondansetron (ZOFRAN) injection 4 mg  4 mg IntraVENous Q4H PRN Link Mac NP        busPIRone (BUSPAR) tablet 15 mg  15 mg Oral DAILY Pedro Reason, MD   15 mg at 04/21/17 0806    clonazePAM (KlonoPIN) tablet 2 mg  2 mg Oral QHS Pedro Reason, MD   2 mg at 04/20/17 2156    escitalopram oxalate (LEXAPRO) tablet 20 mg  20 mg Oral 7am Pedro Reason, MD   20 mg at 04/21/17 0805    topiramate (TOPAMAX) tablet 50 mg  50 mg Oral QHS Pedro Reason, MD   50 mg at 04/20/17 2156    sodium chloride (NS) flush 5-10 mL  5-10 mL IntraVENous Q8H Pedro Reason, MD   10 mL at 04/21/17 1208    sodium chloride (NS) flush 5-10 mL  5-10 mL IntraVENous PRN Pedro Reason, MD   10 mL at 04/21/17 0905    0.9% sodium chloride infusion  100 mL/hr IntraVENous CONTINUOUS Pedro Reason,  mL/hr at 04/21/17 0530 100 mL/hr at 04/21/17 0530    HYDROmorphone (PF) (DILAUDID) injection 1 mg  1 mg IntraVENous Q2H PRN Pedro Reason, MD   1 mg at 04/21/17 1207       Family History   Problem Relation Age of Onset    Diabetes Maternal Aunt     Heart Disease Maternal Aunt     Stroke Maternal Aunt     Heart Disease Maternal Grandmother     No Known Problems Mother      Social History     Social History    Marital status: SINGLE     Spouse name: N/A    Number of children: N/A    Years of education: N/A     Occupational History    Not on file. Social History Main Topics    Smoking status: Former Smoker    Smokeless tobacco: Never Used    Alcohol use No      Comment: social    Drug use: No    Sexual activity: Yes     Partners: Male     Birth control/ protection: Pill     Other Topics Concern    Not on file     Social History Narrative       Review of Systems:  Pertinent items are noted in the History of Present Illness.      Objective:     Vitals:    04/21/17 0004 04/21/17 0523 04/21/17 0755 04/21/17 1212   BP: 93/61 96/61 97/59 98/59   Pulse: 76 100 98 (!) 109   Resp: 14 18 16 16   Temp: 97.6 °F (36.4 °C) 97.8 °F (36.6 °C) 98.7 °F (37.1 °C) 98.9 °F (37.2 °C)   SpO2: 99% 100% 98% 96%   Weight:       Height:           General:  alert, cooperative, appears stated age, mild distress                   Lungs:  clear to auscultation bilaterally   Heart:  regular rate and rhythm, S1, S2 normal, no murmur, click, rub or gallop   Abdomen: Soft, non-distended, no rigidity but significant tenderness diffusely but worse in lower midline of abdomen and rlq, no rebound       Genitourinary: Dark bloody discharge; diffuse pelvic pain during bme   Extremities:  extremities normal, atraumatic, no cyanosis or edema, no edema             Assessment:     28 y/o with h/o endometriosis with severe pelvic pain not responding to iv pain medication. No evidence of infection by white count and afebrile state. Discussed with Dr Noe Wu who recommends proceeding with diagnostic laparoscopy with appendectomy. Pt desires to proceed. I discussed risks/benefits of procedure including anesthesia risk, bleeding, infection, injury to internal organs which may require additional surgery for repair. Pt verbalized understanding and desires to proceed. I have spoken with the OR to place pt on the schedule for this afternoon as well as the hospitalist who will contact the general surgeon to have them available for appendectomy at the time. Plan:        Discussed the risks of surgery including the risks of bleeding, infection, deep vein thrombosis, and surgical injuries to internal organs including but not limited to the bowels, bladder, rectum, and female reproductive organs. The patient understands the risks; any and all questions were answered to the patient's satisfaction.     Signed By:  Theresa Corbin DO     April 21, 2017

## 2017-04-21 NOTE — PROGRESS NOTES
Problem: Patient Education: Go to Patient Education Activity  Goal: Patient/Family Education  Outcome: Progressing Towards Goal  Patient is resting in bed call bell and bedside table in reach. Patient instructed to call for assistance if needed due to pain medication and pain. She verbalizes understanding    Comments:   Call bell and bedside table in reach.

## 2017-04-21 NOTE — PROGRESS NOTES
· Surgery Progress Note    4/21/2017    Admit Date: 4/20/2017    Subjective:       Pt has complaint of pain and nausea. Pts pain location, lower abdomen, started 2 days ago, hx of endometriosis with previous laparoscopies by GYN, . No SOB. No CP. Patient 's current diet is NPO. [place] Bending Pt reports  no vomiting. Pt reports no fever or chills    Bowel Movements: None        Objective:     Blood pressure 97/59, pulse 98, temperature 98.7 °F (37.1 °C), resp. rate 16, height 5' 8\" (1.727 m), weight 135 lb (61.2 kg), last menstrual period 03/25/2017, SpO2 98 %. General appearance: alert, fatigued, cooperative, no distress  Lungs: clear to auscultation bilaterally  Heart: regular rate and rhythm  Abd:soft, protuberant, tenderness mild - in the lower abdomen, without guarding, without rebound  Extremities: no edema  Neurologic: Grossly normal    Data Review  No results found for this or any previous visit (from the past 12 hour(s)).     Assessment:     Principal Problem:    Abdominal pain (4/20/2017)    Active Problems:    Depression (1/20/2016)      Anxiety (1/20/2016)        Plan:   DietNPO  Pain management  GI Prophylaxis  OOB  Antibiotics:  na  Ct without any surgical indications, Left sided ovarian cyst noted , GYN to see   No acute general surgery plan at present   Further plan per Dr. Wendy Lock PA-C

## 2017-04-21 NOTE — PERIOP NOTES
Patient: Gin Ibrahim MRN: [id number]  SSN: [id number]   YOB: 1990  Age: 27 y.o. Sex: female     Patient is status post Procedure(s):  LAPAROSCOPIC APPENDECTOMY.     Surgeon(s) and Role:     * Pramod Jenkins MD - Primary     * Jimmy Renner MD - Co-Surgeon    Local/Dose/Irrigation:  NONE                   Peripheral IV 04/20/17 Right Antecubital (Active)   Site Assessment Clean, dry, & intact 4/21/2017  3:29 PM   Phlebitis Assessment 0 4/21/2017  3:29 PM   Infiltration Assessment 0 4/21/2017  3:29 PM   Dressing Status Clean, dry, & intact 4/21/2017  3:29 PM   Dressing Type Transparent 4/21/2017  3:29 PM   Hub Color/Line Status Pink 4/21/2017  3:29 PM   Action Taken Blood drawn 4/20/2017  3:00 PM   Alcohol Cap Used Yes 4/21/2017  3:29 PM       Peripheral IV 04/21/17 Left Hand (Active)            Airway - Endotracheal Tube 04/21/17 Oral (Active)                   Dressing/Packing:  Wound Abdomen-DRESSING TYPE: Topical skin adhesive/glue (04/21/17 1926)

## 2017-04-21 NOTE — PROGRESS NOTES
Admission Medication Reconciliation:    Information obtained from:  Patient    Comments/Recommendations:     1)  Last doses as below. 2)  Mirena placement ~3 weeks ago    3)  Patient is due for Botox injection (Neurologist administered)       Significant PMH/Disease States:   Past Medical History:   Diagnosis Date    Anxiety     Depression     Endometriosis     Migraine     Neurological disorder     migraines    Ovarian cyst     Panic attack        Chief Complaint for this Admission:    Chief Complaint   Patient presents with    Abdominal Pain       Allergies:  Compazine [prochlorperazine edisylate]; Haldol [haloperidol lactate]; Promethazine; Reglan [metoclopramide]; and Zofran [ondansetron hcl (pf)]    Prior to Admission Medications:   Prior to Admission Medications   Prescriptions Last Dose Informant Patient Reported? Taking? Cetirizine (ZYRTEC) 10 mg cap 2017 at AM  Yes Yes   Sig: Take 1 Cap by mouth daily. ONABOTULINUMTOXINA (BOTOX INJECTION) 2017 at Unknown time  Yes Yes   Sig: by IntraMUSCular route every three (3) months. Indications: Given by Neuro into shoulders/ neck   busPIRone (BUSPAR) 15 mg tablet 2017 at AM  Yes Yes   Sig: Take 15 mg by mouth daily. clonazePAM (KLONOPIN) 1 mg tablet 2017 at HS Self Yes Yes   Sig: Take 2 mg by mouth nightly. escitalopram (LEXAPRO) 20 mg tablet 2017 at AM Self Yes Yes   Sig: Take 20 mg by mouth every morning. Indications: GENERALIZED ANXIETY DISORDER   levonorgestrel (MIRENA) 20 mcg/24 hr (5 years) IUD 3/27/2017 at Placed ~3 weeks ago  Yes Yes   Si Each by IntraUTERine route once. topiramate (TOPAMAX) 50 mg tablet 2017 at HS  Yes Yes   Sig: Take 50 mg by mouth nightly.       Facility-Administered Medications: None

## 2017-04-21 NOTE — PROGRESS NOTES
Hospitalist Progress Note  Valdez Jasso NP  Office: [phone]  Cell: [phone]      Date of Service:  2017  NAME:  Maybell Bence  :  1990  MRN:  [id number]      Admission Summary:   27 yof with pmh of ovarian cyst, migrainous headaches, dysmenorrhea, endometriosis, recent IUD placement who presented with abdominal pain. Interval history / Subjective:   Patient continues to complain of abd pain, notes pain medication is only taking the edge off. Patient also notes nausea and menstrual spotting. Denies any other acute complaints     Assessment & Plan:     Abdominal pain with nausea  -? If related to endometriosis   -Ct abd shows no acute abnormalities  -Pelvic ultrasound showed left ovary cyst however does not explain pain  -surgery consult, spoke with Dr Melvina Bingham this is not the appendix seem GYN in nature, if GYN decides to do exp lap surgery could remove appendix at the same time if patient wishes  -GYN consult- ? Exp lap this evening   -if work up negative may need to consult GI  -keep NPO   -pain mgmt, antiemetics    H/o migraine headaches   -continue home meds     Code status: Full    Care Plan discussed with: Patient/Family and Nurse Dr Riddhi Foster, Dr Melvina Bingham  Disposition: Home w/Family and TBD     Hospital Problems  Date Reviewed: 2017          Codes Class Noted POA    * (Principal)Abdominal pain ICD-10-CM: R10.9  ICD-9-CM: 789.00  2017 Unknown        Depression (Chronic) ICD-10-CM: F32.9  ICD-9-CM: 653  2016 Yes        Anxiety (Chronic) ICD-10-CM: F41.9  ICD-9-CM: 300.00  2016 Yes                Review of Systems:   A comprehensive review of systems was negative except for that written in the HPI. Vital Signs:    Last 24hrs VS reviewed since prior progress note.  Most recent are:  Visit Vitals    BP 98/59 (BP 1 Location: Left arm, BP Patient Position: At rest)    Pulse (!) 109    Temp 98.9 °F (37.2 °C)    Resp 16    Ht 5' 8\" (1.727 m)    Wt 61.2 kg (135 lb)    SpO2 96%    BMI 20.53 kg/m2       No intake or output data in the 24 hours ending 04/21/17 1323     Physical Examination:             Constitutional:  No acute distress, cooperative, pleasant    ENT:  Oral mucous moist, oropharynx benign. Neck supple,    Resp:  CTA bilaterally. No wheezing/rhonchi/rales. No accessory muscle use   CV:  Regular rhythm, normal rate, no murmurs, gallops, rubs    GI:  Soft, non distended. Guarded with tenderness to gentle palpation to right lower quadrant. normoactive bowel sounds, no hepatosplenomegaly     Musculoskeletal:  No edema, warm, 2+ pulses throughout    Neurologic:  Moves all extremities. AAOx3, CN II-XII reviewed     Psych:  not anxious or agitated       Data Review:    Review and/or order of clinical lab test      Labs:     Recent Labs      04/20/17   1455   WBC  8.0   HGB  12.0   HCT  36.8   PLT  245     Recent Labs      04/20/17   1455   NA  142   K  3.5   CL  109*   CO2  21   BUN  9   CREA  0.82   GLU  86   CA  8.9     Recent Labs      04/20/17   1455   SGOT  9*   ALT  16   AP  46   TBILI  0.5   TP  7.5   ALB  4.1   GLOB  3.4   LPSE  164     No results for input(s): INR, PTP, APTT in the last 72 hours. No lab exists for component: INREXT   No results for input(s): FE, TIBC, PSAT, FERR in the last 72 hours. No results found for: FOL, RBCF   No results for input(s): PH, PCO2, PO2 in the last 72 hours. No results for input(s): CPK, CKNDX, TROIQ in the last 72 hours.     No lab exists for component: CPKMB  Lab Results   Component Value Date/Time    Cholesterol, total 186 04/20/2017 02:55 PM    HDL Cholesterol 94 04/20/2017 02:55 PM    LDL, calculated 81.6 04/20/2017 02:55 PM    Triglyceride 52 04/20/2017 02:55 PM    CHOL/HDL Ratio 2.0 04/20/2017 02:55 PM     Lab Results   Component Value Date/Time    Glucose (POC) 88 09/15/2012 12:18 PM     Lab Results   Component Value Date/Time    Color YELLOW/STRAW 04/20/2017 02:55 PM    Appearance CLEAR 04/20/2017 02:55 PM    Specific gravity 1.006 04/20/2017 02:55 PM    Specific gravity >1.030 11/21/2016 02:33 PM    pH (UA) 7.5 04/20/2017 02:55 PM    Protein NEGATIVE  04/20/2017 02:55 PM    Glucose NEGATIVE  04/20/2017 02:55 PM    Ketone NEGATIVE  04/20/2017 02:55 PM    Bilirubin NEGATIVE  04/20/2017 02:55 PM    Urobilinogen 0.2 04/20/2017 02:55 PM    Nitrites NEGATIVE  04/20/2017 02:55 PM    Leukocyte Esterase NEGATIVE  04/20/2017 02:55 PM    Epithelial cells FEW 04/20/2017 02:55 PM    Bacteria NEGATIVE  04/20/2017 02:55 PM    WBC 0-4 04/20/2017 02:55 PM    RBC 0-5 04/20/2017 02:55 PM         Medications Reviewed:     Current Facility-Administered Medications   Medication Dose Route Frequency    docusate sodium (COLACE) capsule 100 mg  100 mg Oral DAILY    ondansetron (ZOFRAN) injection 4 mg  4 mg IntraVENous Q4H PRN    busPIRone (BUSPAR) tablet 15 mg  15 mg Oral DAILY    clonazePAM (KlonoPIN) tablet 2 mg  2 mg Oral QHS    escitalopram oxalate (LEXAPRO) tablet 20 mg  20 mg Oral 7am    topiramate (TOPAMAX) tablet 50 mg  50 mg Oral QHS    sodium chloride (NS) flush 5-10 mL  5-10 mL IntraVENous Q8H    sodium chloride (NS) flush 5-10 mL  5-10 mL IntraVENous PRN    0.9% sodium chloride infusion  100 mL/hr IntraVENous CONTINUOUS    HYDROmorphone (PF) (DILAUDID) injection 1 mg  1 mg IntraVENous Q2H PRN     ______________________________________________________________________  EXPECTED LENGTH OF STAY: - - -  ACTUAL LENGTH OF STAY:          0                 Isis Quintana NP

## 2017-04-21 NOTE — ROUTINE PROCESS
TRANSFER - OUT REPORT:    Verbal report given to Leonor Parker RN (name) on Ledy Butcher  being transferred to [address] [place] [address] (unit) for routine progression of care       Report consisted of patients Situation, Background, Assessment and   Recommendations(SBAR). Information from the following report(s) SBAR, ED Summary, [facility] and Recent Results was reviewed with the receiving nurse. Lines:   Peripheral IV 04/20/17 Right Antecubital (Active)   Site Assessment Clean, dry, & intact 4/20/2017  3:00 PM   Phlebitis Assessment 0 4/20/2017  3:00 PM   Infiltration Assessment 0 4/20/2017  3:00 PM   Dressing Status Clean, dry, & intact 4/20/2017  3:00 PM   Dressing Type Transparent 4/20/2017  3:00 PM   Hub Color/Line Status Patent; Flushed 4/20/2017  3:00 PM   Action Taken Blood drawn 4/20/2017  3:00 PM        Opportunity for questions and clarification was provided.       Patient transported with:   Transport

## 2017-04-21 NOTE — PROGRESS NOTES
Spoke with Dr. Lizzy Medina, gave update and room number. 800 patient request Zofran for nausea, denies negative side effect. 900 C/O severe abdominal pain 10/10 right side constant ache with intermittent cramping. PRN meds given.

## 2017-04-21 NOTE — PERIOP NOTES
Patient's mother, Garth Sanchez, contacted for a surgical update upon start of procedure. Luiz Monroe RN

## 2017-04-21 NOTE — PROGRESS NOTES
Patient arrived from ED she is AOX4, VSS, she has right lower quad pain 10/10 pain medication to be given. Her RLQ is soft and tender and guarded. Will continue to monitor patient for changes in her condition. 2322 pain reassess patient is sleeping no signs of distress at this time. 6904 patient rang out for nausea and pain meds she says her pain is moving down lower right towards her groin. Pain meds and Zofran given. 0430 patient is restless in bed    0730 Bedside and Verbal shift change report given to Kristine Schuster RN  (oncoming nurse) by Tony Mojica RN  (offgoing nurse). Report included the following information SBAR, MAR, Recent Results and Med Rec Status.

## 2017-04-22 VITALS
BODY MASS INDEX: 20.46 KG/M2 | RESPIRATION RATE: 17 BRPM | HEIGHT: 68 IN | DIASTOLIC BLOOD PRESSURE: 61 MMHG | OXYGEN SATURATION: 99 % | WEIGHT: 135 LBS | HEART RATE: 72 BPM | TEMPERATURE: 98.7 F | SYSTOLIC BLOOD PRESSURE: 91 MMHG

## 2017-04-22 LAB
ANION GAP BLD CALC-SCNC: 6 MMOL/L (ref 5–15)
BASOPHILS # BLD AUTO: 0 K/UL (ref 0–0.1)
BASOPHILS # BLD: 0 % (ref 0–1)
BUN SERPL-MCNC: 9 MG/DL (ref 6–20)
BUN/CREAT SERPL: 12 (ref 12–20)
CALCIUM SERPL-MCNC: 8.1 MG/DL (ref 8.5–10.1)
CHLORIDE SERPL-SCNC: 108 MMOL/L (ref 97–108)
CO2 SERPL-SCNC: 24 MMOL/L (ref 21–32)
CREAT SERPL-MCNC: 0.73 MG/DL (ref 0.55–1.02)
DIFFERENTIAL METHOD BLD: ABNORMAL
EOSINOPHIL # BLD: 0 K/UL (ref 0–0.4)
EOSINOPHIL NFR BLD: 0 % (ref 0–7)
ERYTHROCYTE [DISTWIDTH] IN BLOOD BY AUTOMATED COUNT: 13.4 % (ref 11.5–14.5)
GLUCOSE SERPL-MCNC: 144 MG/DL (ref 65–100)
HCT VFR BLD AUTO: 34.9 % (ref 35–47)
HGB BLD-MCNC: 11.1 G/DL (ref 11.5–16)
LYMPHOCYTES # BLD AUTO: 10 % (ref 12–49)
LYMPHOCYTES # BLD: 0.8 K/UL (ref 0.8–3.5)
MCH RBC QN AUTO: 26.7 PG (ref 26–34)
MCHC RBC AUTO-ENTMCNC: 31.8 G/DL (ref 30–36.5)
MCV RBC AUTO: 84.1 FL (ref 80–99)
MONOCYTES # BLD: 0.2 K/UL (ref 0–1)
MONOCYTES NFR BLD AUTO: 3 % (ref 5–13)
NEUTS SEG # BLD: 7.1 K/UL (ref 1.8–8)
NEUTS SEG NFR BLD AUTO: 87 % (ref 32–75)
PLATELET # BLD AUTO: 191 K/UL (ref 150–400)
POTASSIUM SERPL-SCNC: 4.4 MMOL/L (ref 3.5–5.1)
RBC # BLD AUTO: 4.15 M/UL (ref 3.8–5.2)
RBC MORPH BLD: ABNORMAL
RBC MORPH BLD: ABNORMAL
SODIUM SERPL-SCNC: 138 MMOL/L (ref 136–145)
WBC # BLD AUTO: 8.1 K/UL (ref 3.6–11)

## 2017-04-22 PROCEDURE — 80048 BASIC METABOLIC PNL TOTAL CA: CPT | Performed by: NURSE PRACTITIONER

## 2017-04-22 PROCEDURE — 85025 COMPLETE CBC W/AUTO DIFF WBC: CPT | Performed by: NURSE PRACTITIONER

## 2017-04-22 PROCEDURE — 74011250636 HC RX REV CODE- 250/636: Performed by: OBSTETRICS & GYNECOLOGY

## 2017-04-22 PROCEDURE — 74011250637 HC RX REV CODE- 250/637: Performed by: FAMILY MEDICINE

## 2017-04-22 PROCEDURE — 74011250636 HC RX REV CODE- 250/636: Performed by: NURSE PRACTITIONER

## 2017-04-22 PROCEDURE — 99218 HC RM OBSERVATION: CPT

## 2017-04-22 PROCEDURE — 36415 COLL VENOUS BLD VENIPUNCTURE: CPT | Performed by: NURSE PRACTITIONER

## 2017-04-22 PROCEDURE — 74011250637 HC RX REV CODE- 250/637: Performed by: STUDENT IN AN ORGANIZED HEALTH CARE EDUCATION/TRAINING PROGRAM

## 2017-04-22 PROCEDURE — 74011250636 HC RX REV CODE- 250/636: Performed by: FAMILY MEDICINE

## 2017-04-22 RX ORDER — HYDROMORPHONE HYDROCHLORIDE 2 MG/ML
2 INJECTION, SOLUTION INTRAMUSCULAR; INTRAVENOUS; SUBCUTANEOUS
Status: DISCONTINUED | OUTPATIENT
Start: 2017-04-22 | End: 2017-04-22 | Stop reason: HOSPADM

## 2017-04-22 RX ORDER — OXYCODONE AND ACETAMINOPHEN 5; 325 MG/1; MG/1
1 TABLET ORAL
Qty: 20 TAB | Refills: 0 | Status: SHIPPED | OUTPATIENT
Start: 2017-04-22 | End: 2017-05-30 | Stop reason: ALTCHOICE

## 2017-04-22 RX ADMIN — HYDROMORPHONE HYDROCHLORIDE 1 MG: 1 INJECTION, SOLUTION INTRAMUSCULAR; INTRAVENOUS; SUBCUTANEOUS at 00:55

## 2017-04-22 RX ADMIN — SODIUM CHLORIDE 100 ML/HR: 900 INJECTION, SOLUTION INTRAVENOUS at 05:54

## 2017-04-22 RX ADMIN — BUSPIRONE HYDROCHLORIDE 15 MG: 10 TABLET ORAL at 09:26

## 2017-04-22 RX ADMIN — HYDROMORPHONE HYDROCHLORIDE 1 MG: 1 INJECTION, SOLUTION INTRAMUSCULAR; INTRAVENOUS; SUBCUTANEOUS at 06:47

## 2017-04-22 RX ADMIN — ESCITALOPRAM OXALATE 20 MG: 10 TABLET ORAL at 06:47

## 2017-04-22 RX ADMIN — DOCUSATE SODIUM 100 MG: 100 CAPSULE, LIQUID FILLED ORAL at 09:26

## 2017-04-22 RX ADMIN — HYDROMORPHONE HYDROCHLORIDE 2 MG: 2 INJECTION, SOLUTION INTRAMUSCULAR; INTRAVENOUS; SUBCUTANEOUS at 09:27

## 2017-04-22 RX ADMIN — ONDANSETRON 4 MG: 2 INJECTION INTRAMUSCULAR; INTRAVENOUS at 04:10

## 2017-04-22 NOTE — PROGRESS NOTES
Bedside shift change report given to [id number] (oncoming nurse) by Katrin Vitale (offgoing nurse). Report included the following information SBAR, Kardex, OR Summary, Procedure Summary, Intake/Output, MAR, Accordion, Recent Results and Med Rec Status.

## 2017-04-22 NOTE — PROGRESS NOTES
TRANSFER - IN REPORT:    Verbal report received from Wendy Zhang (name) on Danise Najjar  being received from PACU (unit) for routine post - op      Report consisted of patients Situation, Background, Assessment and   Recommendations(SBAR). Information from the following report(s) SBAR, Kardex, OR Summary, Procedure Summary, Intake/Output, MAR, Accordion, Recent Results and Med Rec Status was reviewed with the receiving nurse. Opportunity for questions and clarification was provided. Assessment completed upon patients arrival to unit and care assumed.

## 2017-04-22 NOTE — PROGRESS NOTES
Gynecology Progress Note    Patient doing well post-op day 1 from Procedure(s):  LAPAROSCOPIC APPENDECTOMY without significant complaints. Pain controlled on current medication. Voiding without difficulty. Patient is passing flatus. Vitals:  Blood pressure 91/61, pulse 72, temperature 98.7 °F (37.1 °C), resp. rate 17, height 5' 8\" (1.727 m), weight 61.2 kg (135 lb), last menstrual period 2017, SpO2 99 %, not currently breastfeeding. Temp (24hrs), Av.1 °F (36.7 °C), Min:97.5 °F (36.4 °C), Max:99 °F (37.2 °C)        Exam:  Patient without distress. Abdomen soft,  nontender. Incision dry and clean without erythema. Lower extremities are negative for swelling, cords, or tenderness. Lab/Data Review: All lab results for the last 24 hours reviewed. Assessment and Plan:  Patient appears to be having uncomplicated post Procedure(s):  LAPAROSCOPIC APPENDECTOMY course.    D/C home with f/u in 2 weeks

## 2017-04-22 NOTE — DISCHARGE SUMMARY
Discharge Summary       PATIENT ID: Mona Garcia  MRN: [id number]   YOB: 1990    DATE OF ADMISSION: 4/20/2017  2:48 PM    DATE OF DISCHARGE: 4/22/17   PRIMARY CARE PROVIDER: None     ATTENDING PHYSICIAN: Osbaldo Lazaro MD  DISCHARGING PROVIDER: Yeyo Zhong MD    To contact this individual call [phone] and ask the  to page. If unavailable ask to be transferred the Adult Hospitalist Department. CONSULTATIONS: IP CONSULT TO OB GYN  IP CONSULT TO GENERAL SURGERY  IP CONSULT TO HOSPITALIST    PROCEDURES/SURGERIES: Procedure(s):  LAPAROSCOPIC APPENDECTOMY    ADMITTING DIAGNOSES & HOSPITAL COURSE:   27year old female with history of ovarian cyst, migraines, dysmenorrhea, endometriosis, recent IUD placement presented on 4/20/17 with abdominal pain. 1.  Abdominal pain with nausea   - unclear etiology, known recurrent endometriosis   - CT abd/pelvis 4/20 - no acute abnormalities   - pelvic ultrasound - left ovarian cyst   - pain management with oxycodone, hydromorphone   - seen by OB-Gyn, general surgery, she had undergone diagnostic laparoscopy on 4/21 - normal pelvis and normal appearing appendix, however, given the nature of her recurrent pain, she had undergone an appendectomy   - improved pain post-surgery, she will be discharged with Percocet as per OB-Gyn            DISCHARGE DIAGNOSES / PLAN:      1.   See above       PENDING TEST RESULTS:   At the time of discharge the following test results are still pending: None    FOLLOW UP APPOINTMENTS:    Follow-up Information     Follow up With Details Comments Contact Info    None   None (395) Patient stated that they have no PCP             ADDITIONAL CARE RECOMMENDATIONS: See above    DIET: Regular Diet    ACTIVITY: Activity as tolerated    WOUND CARE: N/A    EQUIPMENT needed: N/A      DISCHARGE MEDICATIONS:  Current Discharge Medication List      START taking these medications    Details   oxyCODONE-acetaminophen (PERCOCET) 5-325 mg per tablet Take 1 Tab by mouth every four (4) hours as needed for Pain. Max Daily Amount: 6 Tabs. Qty: 20 Tab, Refills: 0         CONTINUE these medications which have NOT CHANGED    Details   Cetirizine (ZYRTEC) 10 mg cap Take 1 Cap by mouth daily. levonorgestrel (MIRENA) 20 mcg/24 hr (5 years) IUD 1 Each by IntraUTERine route once. topiramate (TOPAMAX) 50 mg tablet Take 50 mg by mouth nightly. busPIRone (BUSPAR) 15 mg tablet Take 15 mg by mouth daily. ONABOTULINUMTOXINA (BOTOX INJECTION) by IntraMUSCular route every three (3) months. Indications: Given by Neuro into shoulders/ neck      clonazePAM (KLONOPIN) 1 mg tablet Take 2 mg by mouth nightly. Refills: 5      escitalopram (LEXAPRO) 20 mg tablet Take 20 mg by mouth every morning. Indications: GENERALIZED ANXIETY DISORDER               NOTIFY YOUR PHYSICIAN FOR ANY OF THE FOLLOWING:   Fever over 101 degrees for 24 hours. Chest pain, shortness of breath, fever, chills, nausea, vomiting, diarrhea, change in mentation, falling, weakness, bleeding. Severe pain or pain not relieved by medications. Or, any other signs or symptoms that you may have questions about.     DISPOSITION:  x  Home With:   OT  PT  HH  RN       Long term SNF/Inpatient Rehab    Independent/assisted living    Hospice    Other:       PATIENT CONDITION AT DISCHARGE:     Functional status    Poor     Deconditioned    x Independent      Cognition   x  Lucid     Forgetful     Dementia      Catheters/lines (plus indication)    Goodman     PICC     PEG    x None      Code status   x  Full code     DNR      PHYSICAL EXAMINATION AT DISCHARGE:  Visit Vitals    BP 91/61 (BP 1 Location: Left arm, BP Patient Position: At rest)    Pulse 72    Temp 98.7 °F (37.1 °C)    Resp 17    Ht 5' 8\" (1.727 m)    Wt 61.2 kg (135 lb)    SpO2 99%    Breastfeeding No    BMI 20.53 kg/m2     See progress note      CHRONIC MEDICAL DIAGNOSES:  Problem List as of 4/22/2017  Date Reviewed: 4/20/2017          Codes Class Noted - Resolved    * (Principal)Abdominal pain ICD-10-CM: R10.9  ICD-9-CM: 789.00  4/20/2017 - Present        Depression (Chronic) ICD-10-CM: F32.9  ICD-9-CM: 927  1/20/2016 - Present        Anxiety (Chronic) ICD-10-CM: F41.9  ICD-9-CM: 300.00  1/20/2016 - Present        Status migrainosus ICD-10-CM: H94.898  ICD-9-CM: 346.20  1/13/2016 - Present        RESOLVED: Migraine ICD-10-CM: L13.219  ICD-9-CM: 346.90  1/12/2016 - 1/13/2016        RESOLVED: Migraine headache ICD-10-CM: A43.294  ICD-9-CM: 346.90  1/9/2016 - 1/9/2016        RESOLVED: Intractable headache ICD-10-CM: R51  ICD-9-CM: 784.0  1/6/2016 - 1/9/2016        RESOLVED: Cataracts, bilateral ICD-10-CM: H26.9  ICD-9-CM: 366.9  12/21/2015 - 1/9/2016        RESOLVED: Pyelonephritis ICD-10-CM: N12  ICD-9-CM: 590.80  3/19/2015 - 3/21/2015              Less than 30 minutes were spent with the patient on counseling and coordination of care    Signed:   Oscar Moreau MD  4/22/2017  11:33 AM

## 2017-04-22 NOTE — DISCHARGE INSTRUCTIONS
1.  Do not drive while on pain medication. You should take a stool softener while on pain medication to prevent constipation. 2.  Do not lift anything greater than 15 pounds for 2 weeks. 3.  You may resume your home medications. 4.  You may shower but do not swim or soak in tub for 2 weeks. 5.  Please call [phone] to schedule a follow-up with Dr. Tia Mcarthur within 2 weeks. Appendectomy: What to Expect at Home  Your Recovery    Your doctor removed your appendix either by making many small cuts, called incisions, in your belly (laparoscopic surgery) or through open surgery. In open surgery, the doctor makes one large incision. The incisions leave scars that usually fade over time. After your surgery, it is normal to feel weak and tired for several days after you return home. Your belly may be swollen and may be painful. If you had laparoscopic surgery, you may have pain in your shoulder for about 24 hours. You may also feel sick to your stomach and have diarrhea, constipation, gas, or a headache. This usually goes away in a few days. Your recovery time depends on the type of surgery you had. If you had laparoscopic surgery, you will probably be able to return to work or a normal routine 1 to 3 weeks after surgery. If you had an open surgery, it may take 2 to 4 weeks. If your appendix ruptured, you may have a drain in your incision. Your body will work fine without an appendix. You will not have to make any changes in your diet or lifestyle. This care sheet gives you a general idea about how long it will take for you to recover. But each person recovers at a different pace. Follow the steps below to get better as quickly as possible. How can you care for yourself at home? Activity  · Rest when you feel tired. Getting enough sleep will help you recover. · Try to walk each day. Start by walking a little more than you did the day before. Bit by bit, increase the amount you walk.  Walking boosts blood flow and helps prevent pneumonia and constipation. · For about 2 weeks, avoid lifting anything that would make you strain. This may include a child, heavy grocery bags and milk containers, a heavy briefcase or backpack, cat litter or dog food bags, or a vacuum . · Avoid strenuous activities, such as bicycle riding, jogging, weight lifting, or aerobic exercise, until your doctor says it is okay. · You may be able to take showers (unless you have a drain near your incision) 24 to 48 hours after surgery. Pat the incision dry. Do not take a bath for the first 2 weeks, or until your doctor tells you it is okay. If you have a drain near your incision, follow your doctor's instructions. · You may drive when you are no longer taking pain medicine and can quickly move your foot from the gas pedal to the brake. You must also be able to sit comfortably for a long period of time, even if you do not plan on going far. You might get caught in traffic. · You will probably be able to go back to work in 1 to 3 weeks. If you had an open surgery, it may take 3 to 4 weeks. · Your doctor will tell you when you can have sex again. Diet  · You can eat your normal diet. If your stomach is upset, try bland, low-fat foods like plain rice, broiled chicken, toast, and yogurt. · Drink plenty of fluids (unless your doctor tells you not to). · You may notice that your bowel movements are not regular right after your surgery. This is common. Try to avoid constipation and straining with bowel movements. You may want to take a fiber supplement every day. If you have not had a bowel movement after a couple of days, ask your doctor about taking a mild laxative. Medicines  · Your doctor will tell you if and when you can restart your medicines. He or she will also give you instructions about taking any new medicines. · If you take blood thinners, such as warfarin (Coumadin), clopidogrel (Plavix), or aspirin, be sure to talk to your doctor. He or she will tell you if and when to start taking those medicines again. Make sure that you understand exactly what your doctor wants you to do. · If your appendix ruptured, you will need to take antibiotics. Take them as directed. Do not stop taking them just because you feel better. You need to take the full course of antibiotics. · Be safe with medicines. Take pain medicines exactly as directed. ¨ If the doctor gave you a prescription medicine for pain, take it as prescribed. ¨ If you are not taking a prescription pain medicine, take an over-the-counter medicine such as acetaminophen (Tylenol), ibuprofen (Advil, Motrin), or naproxen (Aleve). Read and follow all instructions on the label. ¨ Do not take two or more pain medicines at the same time unless the doctor told you to. Many pain medicines have acetaminophen, which is Tylenol. Too much Tylenol can be harmful. · If you think your pain medicine is making you sick to your stomach:  ¨ Take your medicine after meals (unless your doctor has told you not to). ¨ Ask your doctor for a different pain medicine. Incision care  · If you had an open surgery, you may have staples in your incision. The doctor will take these out in 7 to 10 days. · If you have strips of tape on the incision, leave the tape on for a week or until it falls off. · You may wash the area with warm, soapy water 24 to 48 hours after your surgery, unless your doctor tells you not to. Pat the area dry. · Keep the area clean and dry. You may cover it with a gauze bandage if it weeps or rubs against clothing. Change the bandage every day. · If your appendix ruptured, you may have an incision with packing in it. Change the packing as often as your doctor tells you to. ¨ Packing changes may hurt at first. Taking pain medicine about half an hour before you change the dressing can help.   ¨ If your dressing sticks to your wound, try soaking it with warm water for about 10 minutes before you remove it. You can do this in the shower or by placing a wet washcloth over the dressing. ¨ Remove the old packing and flush the incision with water. Gently pat the top area dry. ¨ The size of the incision determines how much gauze you need to put inside. Fold the gauze over once, but do not wad it up so that it hurts. Put it in the wound carefully. You want to keep the sides of the wound from touching. A cotton swab may help you push the gauze in as needed. ¨ Put a gauze pad over the wound, and tape it down. ¨ You may notice greenish gray fluid seeping from your wound as you start to heal. This is normal. It is a sign that your wound is healing. Follow-up care is a key part of your treatment and safety. Be sure to make and go to all appointments, and call your doctor if you are having problems. It's also a good idea to know your test results and keep a list of the medicines you take. When should you call for help? Call 911 anytime you think you may need emergency care. For example, call if:  · You passed out (lost consciousness). · You have sudden chest pain and shortness of breath, or you cough up blood. · You have severe trouble breathing. Call your doctor now or seek immediate medical care if:  · You are sick to your stomach and cannot drink fluids. · You have severe diarrhea. · You have pain that does not get better when you take your pain medicine. · You have signs of infection, such as:  ¨ Increased pain, swelling, warmth, or redness. ¨ Red streaks leading from the wound. ¨ Pus draining from the wound. ¨ A fever. · You have loose stitches, or your incision comes open. · Bright red blood has soaked through a large bandage. · You have signs of a blood clot, such as:  ¨ Pain in your calf, back of knee, thigh, or groin. ¨ Redness and swelling in your leg or groin.   Watch closely for any changes in your health, and be sure to contact your doctor if:  · You had a laparoscopic surgery and your shoulder pain lasts more than 24 hours. · You have leakage around your drain or you have no new fluid in the drain for 24 hours. · The amount of drainage suddenly increases, or the color and texture changes. · You do not have a bowel movement after taking a laxative. Where can you learn more? Go to http://michele-lucy.info/. Enter H763 in the search box to learn more about \"Appendectomy: What to Expect at Home. \"  Current as of: August 9, 2016  Content Version: 11.2  © 5953-5271 [employer]. Care instructions adapted under license by [employer] (which disclaims liability or warranty for this information). If you have questions about a medical condition or this instruction, always ask your healthcare professional. [address] any warranty or liability for your use of this information.

## 2017-04-22 NOTE — PERIOP NOTES
TRANSFER - OUT REPORT:    Verbal report given to Harriett Petty RN(name) on Kala Nuñez  being transferred to 359(unit) for routine post - op       Report consisted of patients Situation, Background, Assessment and   Recommendations(SBAR). Time Pre op antibiotic given:ancef 2gm IV @ 8092  Anesthesia Stop time: 1950  Goodman Present on Transfer to floor:no  Order for Goodman on Chart:n/a    Information from the following report(s) SBAR, OR Summary, Intake/Output and MAR was reviewed with the receiving nurse. Opportunity for questions and clarification was provided. Is the patient on 02? YES       L/Min 2       Other n/a    Is the patient on a monitor? NO    Is the nurse transporting with the patient? YES    Surgical Waiting Area notified of patient's transfer from PACU?  YES      The following personal items collected during your admission accompanied patient upon transfer:   Dental Appliance: Dental Appliances: None  Vision: Visual Aid: Glasses  Hearing Aid:    Jewelry:    Clothing:    Other Valuables:    Valuables sent to safe:

## 2017-04-22 NOTE — OP NOTES
[address] [facility] [address] [place] [address], [address]   OP NOTE       Name:  Mey Borja   MR#:  [id number]   :  1990   Account #:  [de-identified]    Surgery Date:  2017   Date of Adm:  2017       PREOPERATIVE DIAGNOSIS: Severe right lower quadrant abdominal   and pelvic pain, possible appendicitis versus endometriosis. POSTOPERATIVE DIAGNOSIS: Normal pelvis and normal-appearing   appendix. PROCEDURES PERFORMED:   1. Laparoscopy. 2. Appendectomy. The appendectomy will be dictated by Dr. Adelina Silva. COSURGEONS:   1. Zoila Leavitt MD.   2. Sheridan Beltran MD.     ANESTHESIA: General.    ESTIMATED BLOOD LOSS: Less than 5 mL. COMPLICATIONS: None. TUBES AND DRAINS: Goodman catheter. SPECIMENS REMOVED:Appendix    OPERATIVE NOTE: After being properly identified the patient was   taken into the operating room, placed on the operating table in dorsal   supine position. After an adequate level of general anesthesia was   ensured she was placed in the lithotomy position and prepped and   draped in the usual manner. A speculum was inserted into the vagina   and the cervix was grasped with a Hulka tenaculum. The speculum   was removed and a Goodman catheter was inserted. A small transverse   incision was made approximately 3 cm superior to the umbilicus. The   Veress needle was inserted and 3 L of CO2 were insufflated into the   abdomen. Veress needle was removed and a laparoscope sleeve was   inserted. The obturator was removed and the laparoscope was   inserted. A 5 mm port was placed approximately 3 cm superior to the   pubis. Inspection of the patient's pelvis revealed the following. The   anterior surface of the uterus and bladder were normal. Inspection of   the ovaries revealed them to be normal. The fallopian tubes were of   normal caliber with good fimbriae at the ends. The ovarian fossae were   both clear of endometriosis.  The cul-de-sac was likewise clear of any evidence of endometriosis. There were no endometriotic windows nor   adhesions anywhere within the pelvis. Inspection of the appendix   showed no erythema. It was slightly firm in consistency. Dr. Arturo Lacy at this   point came in and added a third 5 mm port in the left lower quadrant. The appendectomy was performed. Using a 5 mm scope through a   smaller port the laparoscope sleeve was removed, and the fascia was   closed with a single interrupted stitch of 0 Vicryl suture. All remaining   ports were removed under direct vision, and the CO2 was allowed to   escape from the abdomen. The incisions were closed with interrupted   subcuticular stitches and 3-0 Vicryl suture. The patient tolerated the   procedure well. The sponge, needle, and instrument counts were   correct x3. The patient was taken to the recovery room in satisfactory   condition.         MD GIOVANA Maxwell / Viva Sox   D:  04/21/2017   19:33   T:  04/21/2017   23:30   Job #:  [id number]

## 2017-04-22 NOTE — ANESTHESIA POSTPROCEDURE EVALUATION
Post-Anesthesia Evaluation and Assessment    Patient: Devaughn Thornton MRN: [id number]  SSN: [id number]    YOB: 1990  Age: 27 y.o. Sex: female       Cardiovascular Function/Vital Signs  Visit Vitals    /56    Pulse 82    Temp 36.4 °C (97.6 °F)    Resp 12    Ht 5' 8\" (1.727 m)    Wt 61.2 kg (135 lb)    SpO2 100%    BMI 20.53 kg/m2       Patient is status post general anesthesia for Procedure(s):  LAPAROSCOPIC APPENDECTOMY. Nausea/Vomiting: None    Postoperative hydration reviewed and adequate. Pain:  Pain Scale 1: Numeric (0 - 10) (04/21/17 2000)  Pain Intensity 1:  (c/o pain upon awakening,but easily goes back to sleep) (04/21/17 2030)   Managed    Neurological Status:   Neuro (WDL): Within Defined Limits (04/21/17 2030)  Neuro  Neurologic State: Sleeping (04/21/17 2030)   At baseline    Mental Status and Level of Consciousness: Arousable    Pulmonary Status:   O2 Device: Nasal cannula (04/21/17 2045)   Adequate oxygenation and airway patent    Complications related to anesthesia: None    Post-anesthesia assessment completed.  No concerns    Signed By: Francisca Reyes MD     April 21, 2017

## 2017-04-22 NOTE — OP NOTES
[address] [facility] [address], [address]   OP NOTE       Name:  Glenys Womack   MR#:  [id number]   :  1990   Account #:  [de-identified]    Surgery Date:  2017   Date of Adm:  2017       PREOPERATIVE DIAGNOSIS: Right lower abdominal pain. POSTOPERATIVE DIAGNOSIS: Right lower abdominal pain. PROCEDURES PERFORMED:  Diagnostic laparoscopy by Magda Dias MD, and myself, the assisting surgeon, Sheldon Ayala MD.    ESTIMATED BLOOD LOSS: Minimal.    SPECIMENS REMOVED: Appendix. ANESTHESIA:  General endotracheal.    SURGEON: Vincent Bansal MD    ASSISTANT: Sheldon Ayala MD     FINDINGS: The appendix, from the general surgical prospective, was   otherwise unremarkable. COMPLICATIONS: None. DRAINS AND TUBES: None. INDICATIONS FOR OPERATION: The patient is a 18-year-old woman   who presented to the emergency department with 2 days' history of   right lower quadrant pain with nausea and anorexia. The patient had a   CT scan which was otherwise normal. There was a left ovarian cyst that   was noted. The patient's clinical presentation includes right lower   abdominal tenderness. She had no leukocytosis. Given the fact that   her pain persisted, there was concern that it may be acute   appendicitis, so General Surgery was called for an intraoperative   consultation. DESCRIPTION OF PROCEDURE: The main portion of the case will be   dictated by the primary surgeon, Dr. Marian Bai. For my part, I was   called in to see the patient for an appendectomy. The patient   intraoperatively was noted to have an otherwise normal appendix. I did   not see any stranding or inflammation to suggest acute appendicitis. The gallbladder appears normal. There was no evidence of bowel   obstruction. There was no diverticular disease. From a gynecologic   standpoint, I will defer to Dr. Debbie Alanis. It was, in my opinion, unremarkable   as well.  Given the fact that she continues to have pain without an   identifiable cause, the decision was made to perform an   appendectomy. We used a Harmonic scalpel to take down the   mesoappendix. An Endo-JAMAAL purple load was then used to divide the   appendix. The appendix was then retrieved through the infraumbilical   incision using an EndoCatch bag. To assist us with this procedure, we   did have to put a 5 mm trocar in the left lower quadrant as well. Two   additional trocars were replaced by Dr. Jovon Gtz, which included the   supraumbilical incision as well as the suprapubic incision. The patient tolerated the procedure well from a General Surgery   standpoint. There were no complications associated with the operation. I was present for the appendectomy portion of the case.         MD AZIZA Arias / Isidra Espinosa   D:  04/21/2017   19:45   T:  04/21/2017   23:34   Job #:  [id number]

## 2017-04-25 ENCOUNTER — HOSPITAL ENCOUNTER (EMERGENCY)
Age: 27
Discharge: HOME | End: 2017-04-25
Attending: STUDENT IN AN ORGANIZED HEALTH CARE EDUCATION/TRAINING PROGRAM
Payer: COMMERCIAL

## 2017-04-25 ENCOUNTER — APPOINTMENT (OUTPATIENT)
Dept: CT IMAGING | Age: 27
End: 2017-04-25
Attending: STUDENT IN AN ORGANIZED HEALTH CARE EDUCATION/TRAINING PROGRAM
Payer: COMMERCIAL

## 2017-04-25 VITALS
TEMPERATURE: 98.2 F | DIASTOLIC BLOOD PRESSURE: 79 MMHG | WEIGHT: 130 LBS | HEIGHT: 68 IN | BODY MASS INDEX: 19.7 KG/M2 | SYSTOLIC BLOOD PRESSURE: 121 MMHG | HEART RATE: 106 BPM | RESPIRATION RATE: 16 BRPM | OXYGEN SATURATION: 100 %

## 2017-04-25 DIAGNOSIS — K59.09 OTHER CONSTIPATION: Primary | ICD-10-CM

## 2017-04-25 LAB
ALBUMIN SERPL BCP-MCNC: 3.7 G/DL (ref 3.5–5)
ALBUMIN/GLOB SERPL: 1.1 {RATIO} (ref 1.1–2.2)
ALP SERPL-CCNC: 46 U/L (ref 45–117)
ALT SERPL-CCNC: 15 U/L (ref 12–78)
ANION GAP BLD CALC-SCNC: 9 MMOL/L (ref 5–15)
AST SERPL W P-5'-P-CCNC: 9 U/L (ref 15–37)
BASOPHILS # BLD AUTO: 0 K/UL (ref 0–0.1)
BASOPHILS # BLD: 1 % (ref 0–1)
BILIRUB SERPL-MCNC: 0.2 MG/DL (ref 0.2–1)
BUN SERPL-MCNC: 8 MG/DL (ref 6–20)
BUN/CREAT SERPL: 10 (ref 12–20)
CALCIUM SERPL-MCNC: 8.4 MG/DL (ref 8.5–10.1)
CHLORIDE SERPL-SCNC: 105 MMOL/L (ref 97–108)
CO2 SERPL-SCNC: 28 MMOL/L (ref 21–32)
CREAT SERPL-MCNC: 0.77 MG/DL (ref 0.55–1.02)
EOSINOPHIL # BLD: 0.1 K/UL (ref 0–0.4)
EOSINOPHIL NFR BLD: 2 % (ref 0–7)
ERYTHROCYTE [DISTWIDTH] IN BLOOD BY AUTOMATED COUNT: 13.9 % (ref 11.5–14.5)
GLOBULIN SER CALC-MCNC: 3.5 G/DL (ref 2–4)
GLUCOSE SERPL-MCNC: 107 MG/DL (ref 65–100)
HCT VFR BLD AUTO: 37.2 % (ref 35–47)
HGB BLD-MCNC: 12.4 G/DL (ref 11.5–16)
LIPASE SERPL-CCNC: 127 U/L (ref 73–393)
LYMPHOCYTES # BLD AUTO: 33 % (ref 12–49)
LYMPHOCYTES # BLD: 2 K/UL (ref 0.8–3.5)
MCH RBC QN AUTO: 27.7 PG (ref 26–34)
MCHC RBC AUTO-ENTMCNC: 33.3 G/DL (ref 30–36.5)
MCV RBC AUTO: 83.2 FL (ref 80–99)
MONOCYTES # BLD: 0.3 K/UL (ref 0–1)
MONOCYTES NFR BLD AUTO: 6 % (ref 5–13)
NEUTS SEG # BLD: 3.5 K/UL (ref 1.8–8)
NEUTS SEG NFR BLD AUTO: 58 % (ref 32–75)
PLATELET # BLD AUTO: 276 K/UL (ref 150–400)
POTASSIUM SERPL-SCNC: 3.8 MMOL/L (ref 3.5–5.1)
PROT SERPL-MCNC: 7.2 G/DL (ref 6.4–8.2)
RBC # BLD AUTO: 4.47 M/UL (ref 3.8–5.2)
SODIUM SERPL-SCNC: 142 MMOL/L (ref 136–145)
WBC # BLD AUTO: 6 K/UL (ref 3.6–11)

## 2017-04-25 PROCEDURE — 96374 THER/PROPH/DIAG INJ IV PUSH: CPT

## 2017-04-25 PROCEDURE — 80053 COMPREHEN METABOLIC PANEL: CPT | Performed by: EMERGENCY MEDICINE

## 2017-04-25 PROCEDURE — 74011250636 HC RX REV CODE- 250/636: Performed by: EMERGENCY MEDICINE

## 2017-04-25 PROCEDURE — 99283 EMERGENCY DEPT VISIT LOW MDM: CPT

## 2017-04-25 PROCEDURE — 74011636320 HC RX REV CODE- 636/320: Performed by: RADIOLOGY

## 2017-04-25 PROCEDURE — 83690 ASSAY OF LIPASE: CPT | Performed by: EMERGENCY MEDICINE

## 2017-04-25 PROCEDURE — 85025 COMPLETE CBC W/AUTO DIFF WBC: CPT | Performed by: EMERGENCY MEDICINE

## 2017-04-25 PROCEDURE — 96361 HYDRATE IV INFUSION ADD-ON: CPT

## 2017-04-25 PROCEDURE — 74177 CT ABD & PELVIS W/CONTRAST: CPT

## 2017-04-25 PROCEDURE — 36415 COLL VENOUS BLD VENIPUNCTURE: CPT | Performed by: EMERGENCY MEDICINE

## 2017-04-25 PROCEDURE — 96375 TX/PRO/DX INJ NEW DRUG ADDON: CPT

## 2017-04-25 PROCEDURE — 74011250636 HC RX REV CODE- 250/636: Performed by: STUDENT IN AN ORGANIZED HEALTH CARE EDUCATION/TRAINING PROGRAM

## 2017-04-25 RX ORDER — KETOROLAC TROMETHAMINE 30 MG/ML
30 INJECTION, SOLUTION INTRAMUSCULAR; INTRAVENOUS
Status: COMPLETED | OUTPATIENT
Start: 2017-04-25 | End: 2017-04-25

## 2017-04-25 RX ORDER — ONDANSETRON 4 MG/1
4 TABLET, ORALLY DISINTEGRATING ORAL
Qty: 6 TAB | Refills: 0 | Status: SHIPPED | OUTPATIENT
Start: 2017-04-25 | End: 2017-04-27

## 2017-04-25 RX ORDER — ONDANSETRON 2 MG/ML
4 INJECTION INTRAMUSCULAR; INTRAVENOUS
Status: COMPLETED | OUTPATIENT
Start: 2017-04-25 | End: 2017-04-25

## 2017-04-25 RX ORDER — KETOROLAC TROMETHAMINE 10 MG/1
10 TABLET, FILM COATED ORAL
Qty: 8 TAB | Refills: 0 | Status: SHIPPED | OUTPATIENT
Start: 2017-04-25 | End: 2017-04-27

## 2017-04-25 RX ADMIN — SODIUM CHLORIDE 1000 ML: 900 INJECTION, SOLUTION INTRAVENOUS at 20:14

## 2017-04-25 RX ADMIN — KETOROLAC TROMETHAMINE 30 MG: 30 INJECTION, SOLUTION INTRAMUSCULAR at 23:12

## 2017-04-25 RX ADMIN — ONDANSETRON 4 MG: 2 INJECTION INTRAMUSCULAR; INTRAVENOUS at 20:14

## 2017-04-25 RX ADMIN — IOPAMIDOL 100 ML: 755 INJECTION, SOLUTION INTRAVENOUS at 22:13

## 2017-04-26 ENCOUNTER — PATIENT OUTREACH (OUTPATIENT)
Dept: OTHER | Age: 27
End: 2017-04-26

## 2017-04-26 NOTE — ED PROVIDER NOTES
HPI Comments: 27 y.o. female with past medical history significant for endometriosis, migraines, ovarian cyst, panic attack, anxiety, and depression who presents to the ED with chief complaint of vomiting. Pt reports she had a laparoscopic appendectomy 4 nights ago at [address] [place] [address] and today she began having nausea and vomiting accompanied by worsening abdominal soreness at her incision sites, says the surgical site in her umbilical area is \"throbbing. \" Pt states she has also has constipation x 6 days since before the surgery. Pt states she has been taking Miralax since her surgery without relief. Pt states she tried to go to work yesterday but had to leave early due to her sx. Pt states she was taking Percocet after her surgery but stopped taking it 2 days ago. Pt denies fever. There are no other acute medical complaints voiced at this time. Social Hx: Works at [address] [place] [address]. PCP: None    Note written by Mikeal Opitz, Scribe, as dictated by Felicita Garcia MD 9:12 PM     The history is provided by the patient. Past Medical History:   Diagnosis Date    Anxiety     Depression     Endometriosis     Migraine     Neurological disorder     migraines    Ovarian cyst     Panic attack        Past Surgical History:   Procedure Laterality Date    HX BREAST LUMPECTOMY Right     HX BUNIONECTOMY      HX CYST REMOVAL      HX OTHER SURGICAL      laparascopy    HX WISDOM TEETH EXTRACTION           Family History:   Problem Relation Age of Onset    Diabetes Maternal Aunt     Heart Disease Maternal Aunt     Stroke Maternal Aunt     Heart Disease Maternal Grandmother     No Known Problems Mother        Social History     Social History    Marital status: SINGLE     Spouse name: N/A    Number of children: N/A    Years of education: N/A     Occupational History    Not on file.      Social History Main Topics    Smoking status: Former Smoker    Smokeless tobacco: Never Used    Alcohol use No Comment: social    Drug use: No    Sexual activity: Yes     Partners: Male     Birth control/ protection: Pill     Other Topics Concern    Not on file     Social History Narrative         ALLERGIES: Compazine [prochlorperazine edisylate]; Haldol [haloperidol lactate]; Promethazine; and Reglan [metoclopramide]    Review of Systems   Constitutional: Negative for activity change, diaphoresis, fatigue and fever. HENT: Negative for congestion and sore throat. Eyes: Negative for photophobia and visual disturbance. Respiratory: Negative for chest tightness and shortness of breath. Cardiovascular: Negative for chest pain, palpitations and leg swelling. Gastrointestinal: Positive for abdominal pain, constipation, nausea and vomiting. Negative for blood in stool and diarrhea. Genitourinary: Negative for difficulty urinating, dysuria, flank pain, frequency and hematuria. Musculoskeletal: Negative for back pain. Neurological: Negative for dizziness, syncope, numbness and headaches. All other systems reviewed and are negative. Vitals:    04/25/17 1830   BP: 133/69   Pulse: (!) 106   Resp: 16   Temp: 98.2 °F (36.8 °C)   SpO2: 97%   Weight: 59 kg (130 lb)   Height: 5' 8\" (1.727 m)            Physical Exam   Constitutional: She is oriented to person, place, and time. She appears well-developed and well-nourished. No distress. HENT:   Head: Normocephalic and atraumatic. Nose: Nose normal.   Mouth/Throat: Oropharynx is clear and moist. No oropharyngeal exudate. Eyes: Conjunctivae and EOM are normal. Right eye exhibits no discharge. Left eye exhibits no discharge. No scleral icterus. Neck: Normal range of motion. Neck supple. No JVD present. No tracheal deviation present. No thyromegaly present. Cardiovascular: Normal rate, regular rhythm, normal heart sounds and intact distal pulses. Exam reveals no gallop and no friction rub. No murmur heard.   Pulmonary/Chest: Effort normal and breath sounds normal. No stridor. No respiratory distress. She has no wheezes. She has no rales. She exhibits no tenderness. Abdominal: Bowel sounds are normal. She exhibits no distension and no mass. There is no tenderness. There is no rebound. Surgical sites CDI. Musculoskeletal: Normal range of motion. She exhibits no edema or tenderness. Lymphadenopathy:     She has no cervical adenopathy. Neurological: She is alert and oriented to person, place, and time. No cranial nerve deficit. Coordination normal.   Skin: Skin is warm and dry. No rash noted. She is not diaphoretic. No erythema. No pallor. Psychiatric: She has a normal mood and affect. Her behavior is normal. Judgment and thought content normal.   Nursing note and vitals reviewed.      Note written by Hany Horton, as dictated by Amarilys Casas MD 9:13 PM    MDM  Number of Diagnoses or Management Options  Other constipation:      Amount and/or Complexity of Data Reviewed  Clinical lab tests: ordered and reviewed  Tests in the radiology section of CPT®: ordered and reviewed  Review and summarize past medical records: yes    Risk of Complications, Morbidity, and/or Mortality  Presenting problems: moderate  Diagnostic procedures: moderate  Management options: moderate    Patient Progress  Patient progress: stable    ED Course       Procedures

## 2017-04-26 NOTE — PROGRESS NOTES
Telephone outreach to patient. Identity verified with two identifiers. [address] outreach for 04/25/17 ER visit for abdominal pain/vomiting following appendectomy. ER course reviewed with patient. She reports she had contacted her surgeon's office with these concerns and was directed to the ER for evaluation. Care Plan:  Red Flags: reviewed with patient. She reports her abdominal pain has resolved some. She denies any further vomiting. She reports she is able to keep liquids down and is not concerned about dehydration. Medications: Non were prescribed by ED, however patient reports she was directed to use dulcolax and miralax for bowel management. She has not started this yet. We reviewed importance of notifying her surgeon if this is not effective. Pain: She presently reports her pain is 3/10 in her abdomen. She states this is very tolerable and denies any concerns. Headaches: We discussed previous ER visits for headache management. She reports she is awaiting botox to be supplied from her pharmacy and then she will make a follow-up with neurology. She reports she is keeping a headache journal to help determine any possible triggers to her migraines. We reviewed common headache triggers. Follow-Up: She reports she has not yet arranged surgical follow-up. We did offer to call and obtain and appointment. She states she will do this today. We will outreach to Ms. Akil Hao in 1 week.

## 2017-04-26 NOTE — DISCHARGE INSTRUCTIONS
We hope that we have addressed all of your medical concerns. The examination and treatment you received in the Emergency Department were for an emergent problem and were not intended as complete care. It is important that you follow up with your healthcare provider(s) for ongoing care. If your symptoms worsen or do not improve as expected, and you are unable to reach your usual health care provider(s), you should return to the Emergency Department. Today's healthcare is undergoing tremendous change, and patient satisfaction surveys are one of the many tools to assess the quality of medical care. You may receive a survey from the [employer] regarding your experience in the Emergency Department. I hope that your experience has been completely positive, particularly the medical care that I provided. As such, please participate in the survey; anything less than excellent does not meet my expectations or intentions. [facility] and [address] [place] [address] participate in nationally recognized quality of care measures. If your blood pressure is greater than 120/80, as reported below, we urge that you seek medical care to address the potential of high blood pressure, commonly known as hypertension. Hypertension can be hereditary or can be caused by certain medical conditions, pain, stress, or \"white coat syndrome. \"       Please make an appointment with your health care provider(s) for follow up of your Emergency Department visit. VITALS:   Patient Vitals for the past 8 hrs:   Temp Pulse Resp BP SpO2   04/25/17 2130 - - - 113/72 100 %   04/25/17 1830 98.2 °F (36.8 °C) (!) 106 16 133/69 97 %          Thank you for allowing us to provide you with medical care today. We realize that you have many choices for your emergency care needs. Please choose us in the future for any continued health care needs. Melo Stinson, MD    [address] [facility].   Office: [phone]            Recent Results (from the past 24 hour(s))   CBC WITH AUTOMATED DIFF    Collection Time: 04/25/17  7:45 PM   Result Value Ref Range    WBC 6.0 3.6 - 11.0 K/uL    RBC 4.47 3.80 - 5.20 M/uL    HGB 12.4 11.5 - 16.0 g/dL    HCT 37.2 35.0 - 47.0 %    MCV 83.2 80.0 - 99.0 FL    MCH 27.7 26.0 - 34.0 PG    MCHC 33.3 30.0 - 36.5 g/dL    RDW 13.9 11.5 - 14.5 %    PLATELET 213 894 - 842 K/uL    NEUTROPHILS 58 32 - 75 %    LYMPHOCYTES 33 12 - 49 %    MONOCYTES 6 5 - 13 %    EOSINOPHILS 2 0 - 7 %    BASOPHILS 1 0 - 1 %    ABS. NEUTROPHILS 3.5 1.8 - 8.0 K/UL    ABS. LYMPHOCYTES 2.0 0.8 - 3.5 K/UL    ABS. MONOCYTES 0.3 0.0 - 1.0 K/UL    ABS. EOSINOPHILS 0.1 0.0 - 0.4 K/UL    ABS. BASOPHILS 0.0 0.0 - 0.1 K/UL   METABOLIC PANEL, COMPREHENSIVE    Collection Time: 04/25/17  7:45 PM   Result Value Ref Range    Sodium 142 136 - 145 mmol/L    Potassium 3.8 3.5 - 5.1 mmol/L    Chloride 105 97 - 108 mmol/L    CO2 28 21 - 32 mmol/L    Anion gap 9 5 - 15 mmol/L    Glucose 107 (H) 65 - 100 mg/dL    BUN 8 6 - 20 MG/DL    Creatinine 0.77 0.55 - 1.02 MG/DL    BUN/Creatinine ratio 10 (L) 12 - 20      GFR est AA >60 >60 ml/min/1.73m2    GFR est non-AA >60 >60 ml/min/1.73m2    Calcium 8.4 (L) 8.5 - 10.1 MG/DL    Bilirubin, total 0.2 0.2 - 1.0 MG/DL    ALT (SGPT) 15 12 - 78 U/L    AST (SGOT) 9 (L) 15 - 37 U/L    Alk. phosphatase 46 45 - 117 U/L    Protein, total 7.2 6.4 - 8.2 g/dL    Albumin 3.7 3.5 - 5.0 g/dL    Globulin 3.5 2.0 - 4.0 g/dL    A-G Ratio 1.1 1.1 - 2.2     LIPASE    Collection Time: 04/25/17  7:45 PM   Result Value Ref Range    Lipase 127 73 - 393 U/L       Ct Abd Pelv W Cont    Result Date: 4/25/2017  EXAM:  CT ABDOMEN PELVIS WITH CONTRAST INDICATION:  eval for post op complication with abd pain. Pt. had lap appy last week COMPARISON: CT of the abdomen and pelvis, 11/23/2016 and 4/20/2017. Haseeb Castanon  TECHNIQUE: Multislice helical CT was performed from the diaphragm to the symphysis pubis with oral and intravenous contrast administration. Contiguous 5 mm axial images were reconstructed and lung and soft tissue windows were generated. Coronal and sagittal reformations were generated. CT dose reduction was achieved through use of a standardized protocol tailored for this examination and automatic exposure control for dose modulation. Sly Guido FINDINGS: INCIDENTALLY IMAGED CHEST: Heart/vessels: Within normal limits. Lungs/Pleura: Within normal limits. . ABDOMEN: Liver: Small area of hypervascularity just below the liver capsule in the lateral dome which measures approximately 0.8 x 0.6 cm, incompletely characterized but suggestive of a flash fill hemangioma. Gallbladder/Biliary: Within normal limits. Spleen: Within normal limits. Pancreas: Within normal limits. Adrenals: Within normal limits. Kidneys: Within normal limits. Peritoneum/Mesenteries: Within normal limits. Extraperitoneum: Within normal limits. Gastrointestinal tract: The appendix is not visualized and there is suture material adjacent to the cecum suggesting previous appendectomy. Vascular: Within normal limits. Sly Guido PELVIS: Extraperitoneum: Within normal limits. Ureters: Within normal limits. Bladder: Within normal limits. Reproductive System: Within normal limits. IUD present. Tampon present. . MSK: Within normal limits. .    IMPRESSION: 1. The patient is status post appendectomy without visible complication. 2. Trace amount of fluid in the pelvis is likely physiologic. 3. Incidental findings as above. Constipation: Care Instructions  Your Care Instructions  Constipation means that you have a hard time passing stools (bowel movements). People pass stools from 3 times a day to once every 3 days. What is normal for you may be different. Constipation may occur with pain in the rectum and cramping. The pain may get worse when you try to pass stools.  Sometimes there are small amounts of bright red blood on toilet paper or the surface of stools. This is because of enlarged veins near the rectum (hemorrhoids). A few changes in your diet and lifestyle may help you avoid ongoing constipation. Your doctor may also prescribe medicine to help loosen your stool. Some medicines can cause constipation. These include pain medicines and antidepressants. Tell your doctor about all the medicines you take. Your doctor may want to make a medicine change to ease your symptoms. Follow-up care is a key part of your treatment and safety. Be sure to make and go to all appointments, and call your doctor if you are having problems. It's also a good idea to know your test results and keep a list of the medicines you take. How can you care for yourself at home? · Drink plenty of fluids, enough so that your urine is light yellow or clear like water. If you have kidney, heart, or liver disease and have to limit fluids, talk with your doctor before you increase the amount of fluids you drink. · Include high-fiber foods in your diet each day. These include fruits, vegetables, beans, and whole grains. · Get at least 30 minutes of exercise on most days of the week. Walking is a good choice. You also may want to do other activities, such as running, swimming, cycling, or playing tennis or team sports. · Take a fiber supplement, such as Citrucel or Metamucil, every day. Read and follow all instructions on the label. · Schedule time each day for a bowel movement. A daily routine may help. Take your time having your bowel movement. · Support your feet with a small step stool when you sit on the toilet. This helps flex your hips and places your pelvis in a squatting position. · Your doctor may recommend an over-the-counter laxative to relieve your constipation. Examples are Milk of Magnesia and MiraLax. Read and follow all instructions on the label. Do not use laxatives on a long-term basis. When should you call for help?   Call your doctor now or seek immediate medical care if:  · You have new or worse belly pain. · You have new or worse nausea or vomiting. · You have blood in your stools. Watch closely for changes in your health, and be sure to contact your doctor if:  · Your constipation is getting worse. · You do not get better as expected. Where can you learn more? Go to http://michele-lucy.info/. Enter [phone] in the search box to learn more about \"Constipation: Care Instructions. \"  Current as of: May 27, 2016  Content Version: 11.2  © 4446-9081 [employer]. Care instructions adapted under license by [employer] (which disclaims liability or warranty for this information). If you have questions about a medical condition or this instruction, always ask your healthcare professional. [address] any warranty or liability for your use of this information.

## 2017-05-03 ENCOUNTER — PATIENT OUTREACH (OUTPATIENT)
Dept: OTHER | Age: 27
End: 2017-05-03

## 2017-05-03 NOTE — PROGRESS NOTES
Telephone outreach attempted for [facility] follow-up. Ms. Lorenso Crigler reports she is currently at work and would like to return this call at her convenience.

## 2017-05-30 ENCOUNTER — OFFICE VISIT (OUTPATIENT)
Dept: FAMILY MEDICINE CLINIC | Age: 27
End: 2017-05-30

## 2017-05-30 VITALS
DIASTOLIC BLOOD PRESSURE: 72 MMHG | SYSTOLIC BLOOD PRESSURE: 104 MMHG | HEART RATE: 97 BPM | OXYGEN SATURATION: 98 % | RESPIRATION RATE: 18 BRPM | WEIGHT: 138 LBS | BODY MASS INDEX: 20.92 KG/M2 | HEIGHT: 68 IN | TEMPERATURE: 97.2 F

## 2017-05-30 DIAGNOSIS — N30.00 ACUTE CYSTITIS WITHOUT HEMATURIA: Primary | ICD-10-CM

## 2017-05-30 DIAGNOSIS — R30.0 DYSURIA: ICD-10-CM

## 2017-05-30 LAB
BILIRUB UR QL STRIP: ABNORMAL
GLUCOSE UR-MCNC: NEGATIVE MG/DL
KETONES P FAST UR STRIP-MCNC: ABNORMAL MG/DL
PH UR STRIP: 5.5 [PH] (ref 4.6–8)
PROT UR QL STRIP: ABNORMAL MG/DL
SP GR UR STRIP: 1.03 (ref 1–1.03)
UA UROBILINOGEN AMB POC: ABNORMAL (ref 0.2–1)
URINALYSIS CLARITY POC: ABNORMAL
URINALYSIS COLOR POC: ABNORMAL
URINE BLOOD POC: ABNORMAL
URINE LEUKOCYTES POC: ABNORMAL
URINE NITRITES POC: NEGATIVE

## 2017-05-30 RX ORDER — SULFAMETHOXAZOLE AND TRIMETHOPRIM 800; 160 MG/1; MG/1
1 TABLET ORAL 2 TIMES DAILY
Qty: 6 TAB | Refills: 0
Start: 2017-05-30 | End: 2017-06-02

## 2017-05-30 RX ORDER — PHENAZOPYRIDINE HYDROCHLORIDE 200 MG/1
200 TABLET, FILM COATED ORAL
Qty: 6 TAB | Refills: 0
Start: 2017-05-30 | End: 2017-06-02

## 2017-05-30 RX ORDER — PHENAZOPYRIDINE HYDROCHLORIDE 200 MG/1
200 TABLET, FILM COATED ORAL
Qty: 6 TAB | Refills: 0 | Status: SHIPPED | OUTPATIENT
Start: 2017-05-30 | End: 2017-05-30 | Stop reason: SDUPTHER

## 2017-05-30 RX ORDER — SULFAMETHOXAZOLE AND TRIMETHOPRIM 800; 160 MG/1; MG/1
1 TABLET ORAL 2 TIMES DAILY
Qty: 6 TAB | Refills: 0 | Status: SHIPPED | OUTPATIENT
Start: 2017-05-30 | End: 2017-05-30 | Stop reason: SDUPTHER

## 2017-05-31 LAB — BACTERIA UR CULT: NO GROWTH

## 2017-06-01 ENCOUNTER — TELEPHONE (OUTPATIENT)
Dept: PRIMARY CARE CLINIC | Age: 27
End: 2017-06-01

## 2017-06-01 NOTE — PROGRESS NOTES
Please inform pt that the urine culture showed no infection. She may discontinue the antibiotic. Thanks.

## 2017-06-27 NOTE — PROGRESS NOTES
HISTORY OF PRESENT ILLNESS  Zulema Garay is a 27 y.o. female. HPI   This lady has a 1-2 day hx of urinary frequency an ddysuria  No fever, no abdominal pain  No other complaints    Review of Systems   Constitutional: Negative for chills and fever. Respiratory: Negative for shortness of breath. Gastrointestinal: Negative for abdominal pain, nausea and vomiting. Genitourinary: Positive for dysuria, frequency and urgency. Musculoskeletal: Negative for myalgias. Physical Exam   Constitutional: She appears well-developed and well-nourished. No distress. Pulmonary/Chest: Effort normal and breath sounds normal. No respiratory distress. She has no wheezes. She has no rales. Abdominal: Soft. There is no tenderness. Neurological: She is alert. Skin: Skin is warm. She is not diaphoretic. ASSESSMENT and PLAN    ICD-10-CM ICD-9-CM    1. Acute cystitis without hematuria N30.00 595.0 CULTURE, URINE   2.  Dysuria R30.0 788.1 AMB POC URINALYSIS DIP STICK MANUAL W/O MICRO   likely uti  Will send urine for c and s  Start antibiotics  Precautions given

## 2017-06-29 ENCOUNTER — HOSPITAL ENCOUNTER (EMERGENCY)
Age: 27
Discharge: HOME | End: 2017-06-29
Attending: EMERGENCY MEDICINE | Admitting: EMERGENCY MEDICINE
Payer: COMMERCIAL

## 2017-06-29 VITALS
SYSTOLIC BLOOD PRESSURE: 107 MMHG | HEIGHT: 68 IN | DIASTOLIC BLOOD PRESSURE: 66 MMHG | WEIGHT: 139.8 LBS | RESPIRATION RATE: 12 BRPM | BODY MASS INDEX: 21.19 KG/M2 | TEMPERATURE: 98.3 F | HEART RATE: 78 BPM | OXYGEN SATURATION: 100 %

## 2017-06-29 DIAGNOSIS — N75.0 BARTHOLIN'S CYST: Primary | ICD-10-CM

## 2017-06-29 PROCEDURE — 99283 EMERGENCY DEPT VISIT LOW MDM: CPT

## 2017-06-29 RX ORDER — TRAMADOL HYDROCHLORIDE 50 MG/1
50 TABLET ORAL
Qty: 12 TAB | Refills: 0 | Status: SHIPPED | OUTPATIENT
Start: 2017-06-29 | End: 2017-07-19

## 2017-06-29 RX ORDER — AMOXICILLIN AND CLAVULANATE POTASSIUM 875; 125 MG/1; MG/1
1 TABLET, FILM COATED ORAL 2 TIMES DAILY
Qty: 14 TAB | Refills: 0 | Status: SHIPPED | OUTPATIENT
Start: 2017-06-29 | End: 2017-07-19

## 2017-06-29 RX ORDER — CLINDAMYCIN HYDROCHLORIDE 300 MG/1
300 CAPSULE ORAL 4 TIMES DAILY
Qty: 28 CAP | Refills: 0 | Status: SHIPPED | OUTPATIENT
Start: 2017-06-29 | End: 2017-07-06

## 2017-06-29 NOTE — DISCHARGE INSTRUCTIONS
We hope that we have addressed all of your medical concerns. The examination and treatment you received in the Emergency Department were for an emergent problem and were not intended as complete care. It is important that you follow up with your healthcare provider(s) for ongoing care. If your symptoms worsen or do not improve as expected, and you are unable to reach your usual health care provider(s), you should return to the Emergency Department. Today's healthcare is undergoing tremendous change, and patient satisfaction surveys are one of the many tools to assess the quality of medical care. You may receive a survey from the [employer] regarding your experience in the Emergency Department. I hope that your experience has been completely positive, particularly the medical care that I provided. As such, please participate in the survey; anything less than excellent does not meet my expectations or intentions. [facility] and [address] [place] [address] participate in nationally recognized quality of care measures. If your blood pressure is greater than 120/80, as reported below, we urge that you seek medical care to address the potential of high blood pressure, commonly known as hypertension. Hypertension can be hereditary or can be caused by certain medical conditions, pain, stress, or \"white coat syndrome. \"       Please make an appointment with your health care provider(s) for follow up of your Emergency Department visit. VITALS:   Patient Vitals for the past 8 hrs:   Temp Pulse Resp BP SpO2   06/29/17 0801 98.4 °F (36.9 °C) (!) 117 18 106/70 100 %          Thank you for allowing us to provide you with medical care today. We realize that you have many choices for your emergency care needs. Please choose us in the future for any continued health care needs. Judy Choi M.D.  [facility] [place]: [phone]            No results found for this or any previous visit (from the past 24 hour(s)). No results found. Bartholin Gland Cyst: Care Instructions  Your Care Instructions    The Bartholin glands are in a woman's vulva. This is the area around the vagina. The glands are normally about the size of a pea. They provide fluid to the vulvar area through a small opening. If the opening is blocked, the gland swells with fluid and forms a cyst. You can have a cyst for years with no symptoms. But if a cyst gets infected by bacteria, it can grow and become red and painful. This is called an abscess. Opening and draining the cyst usually cures the infection. You may have had a small tube (catheter) placed into the cyst or had minor surgery to let the cyst drain. The tube will usually be left in for at least 4 weeks. Your doctor may do a lab test to find out what kind of bacteria caused the infection. You may get antibiotics to kill the bacteria. You may have some drainage from the cyst for a few weeks. The gland should return to normal after the infection clears up. Follow-up care is a key part of your treatment and safety. Be sure to make and go to all appointments, and call your doctor if you are having problems. It's also a good idea to know your test results and keep a list of the medicines you take. How can you care for yourself at home? · If your doctor prescribed antibiotics, take them as directed. Do not stop taking them just because you feel better. You need to take the full course of antibiotics. · Sit in a few inches of warm water (sitz bath) 3 times a day and after bowel movements. The warm water helps the area heal and eases discomfort. · Take an over-the-counter pain medicine such as acetaminophen (Tylenol), ibuprofen (Advil, Motrin), or naproxen (Aleve). Read and follow all instructions on the label.   · Do not take two or more pain medicines at the same time unless the doctor told you to. Many pain medicines have acetaminophen, which is Tylenol. Too much acetaminophen (Tylenol) can be harmful. · Wear panty liners or pads if you have discharge from the draining cyst.  · If you are sexually active, avoid sex until:  ¨ You have finished the antibiotics. ¨ The area has healed. · If you had a catheter placed in the cyst to help it drain, follow your doctor's instructions for activities until the tube comes out. When should you call for help? Call your doctor now or seek immediate medical care if:  · Your pain gets worse. · You have a new or higher fever. · Your swelling increases. · The red area around the cyst gets bigger. Watch closely for changes in your health, and be sure to contact your doctor if:  · The catheter falls out. · Your symptoms do not improve in 2 days. Where can you learn more? Go to http://michele-lucy.info/. Enter H276 in the search box to learn more about \"Bartholin Gland Cyst: Care Instructions. \"  Current as of: October 13, 2016  Content Version: 11.3  © 1071-7265 [employer]. Care instructions adapted under license by [employer] (which disclaims liability or warranty for this information). If you have questions about a medical condition or this instruction, always ask your healthcare professional. [address] any warranty or liability for your use of this information.

## 2017-06-29 NOTE — ED PROVIDER NOTES
HPI Comments: 32 y.o. female with past medical history significant for panic attack, anxiety, depression, endometriosis, ovarian cyst, migraines, and s/p appendectomy (two months ago) who presents from home via private vehicle with chief complaint of cyst. Pt reports having a painful cyst to the perineal area without any associated fever or chills. She reports having had 3 prior Bartholin cysts that needed to be surgically removed. She tried calling the on-call doctor for her OB-Gyn 3 times but was unable to get through this morning, prompting the ED visit. She has not taken any pain medications PTA today and states that she is able to get a ride home if needed. There are no other acute medical concerns at this time. Social hx: Former smoker. Social alcohol use. Note written by Hany Perea, as dictated by Rocio Bustos MD 8:56 AM      The history is provided by the patient. Past Medical History:   Diagnosis Date    Anxiety     Depression     Endometriosis     Migraine     Neurological disorder     migraines    Ovarian cyst     Panic attack        Past Surgical History:   Procedure Laterality Date    HX APPENDECTOMY  04/26/2017    HX BREAST LUMPECTOMY Right     HX BUNIONECTOMY      HX CYST REMOVAL      HX OTHER SURGICAL      laparascopy    HX WISDOM TEETH EXTRACTION           Family History:   Problem Relation Age of Onset    Diabetes Maternal Aunt     Heart Disease Maternal Aunt     Stroke Maternal Aunt     Heart Disease Maternal Grandmother     No Known Problems Mother        Social History     Social History    Marital status: SINGLE     Spouse name: N/A    Number of children: N/A    Years of education: N/A     Occupational History    Not on file.      Social History Main Topics    Smoking status: Former Smoker    Smokeless tobacco: Never Used    Alcohol use No      Comment: social    Drug use: No    Sexual activity: Yes     Partners: Male     Birth control/ protection: Pill     Other Topics Concern    Not on file     Social History Narrative         ALLERGIES: Compazine [prochlorperazine edisylate]; Haldol [haloperidol lactate]; Promethazine; and Reglan [metoclopramide]    Review of Systems   Constitutional: Negative for chills, diaphoresis and fever. HENT: Negative for congestion, postnasal drip, rhinorrhea and sore throat. Eyes: Negative for photophobia, discharge, redness and visual disturbance. Respiratory: Negative for cough, chest tightness, shortness of breath and wheezing. Cardiovascular: Negative for chest pain, palpitations and leg swelling. Gastrointestinal: Negative for abdominal distention, abdominal pain, blood in stool, constipation, diarrhea, nausea and vomiting. Genitourinary: Negative for difficulty urinating, dysuria, frequency, hematuria and urgency. +Painful cyst to the perineum   Musculoskeletal: Negative for arthralgias, back pain, joint swelling and myalgias. Skin: Negative for color change and rash. Neurological: Negative for dizziness, speech difficulty, weakness, light-headedness, numbness and headaches. Psychiatric/Behavioral: Negative for confusion. The patient is not nervous/anxious. All other systems reviewed and are negative. Vitals:    06/29/17 0801   BP: 106/70   Pulse: (!) 117   Resp: 18   Temp: 98.4 °F (36.9 °C)   SpO2: 100%   Weight: 63.4 kg (139 lb 12.8 oz)   Height: 5' 8\" (1.727 m)            Physical Exam   Constitutional: She is oriented to person, place, and time. She appears well-developed and well-nourished. No distress. HENT:   Head: Normocephalic and atraumatic. Right Ear: External ear normal.   Left Ear: External ear normal.   Nose: Nose normal.   Mouth/Throat: Oropharynx is clear and moist.   Eyes: Conjunctivae and EOM are normal. Pupils are equal, round, and reactive to light. No scleral icterus. Neck: Normal range of motion. Neck supple. No JVD present.  No tracheal deviation present. No thyromegaly present. Cardiovascular: Normal rate, regular rhythm and normal heart sounds. Exam reveals no gallop and no friction rub. No murmur heard. Pulmonary/Chest: Effort normal and breath sounds normal. No respiratory distress. She has no wheezes. She has no rales. She exhibits no tenderness. Abdominal: Soft. Bowel sounds are normal. She exhibits no distension and no mass. There is no tenderness. There is no rebound and no guarding. Genitourinary:   Genitourinary Comments: Lower left labia majora with soft tissue swelling that is not erythematous and is without exudate. The area is tender to palpation. Musculoskeletal: Normal range of motion. She exhibits no edema or tenderness. Lymphadenopathy:     She has no cervical adenopathy. Neurological: She is alert and oriented to person, place, and time. She has normal strength. She displays no atrophy and no tremor. No cranial nerve deficit. She exhibits normal muscle tone. Coordination and gait normal.   Skin: Skin is warm and dry. No rash noted. She is not diaphoretic. No erythema. Psychiatric: She has a normal mood and affect. Her behavior is normal. Judgment and thought content normal.   Nursing note and vitals reviewed. Note written by Erling Primrose, Scribe, as dictated by Maylin Melgar MD 9:06 AM        MDM  Number of Diagnoses or Management Options  Bartholin's cyst:   Diagnosis management comments: FOSTER  Impression: 70-year-old female with history of recurrent Bartholin's cyst presents to the emergency department with painful area the perineum. She attempted calling the on-call for OB/GYN and return to morning without response. This prompted the ED visit. The patient has a nonfluctuant soft tissue area of tenderness and swelling in the lower left labia majora, we'll treat symptomatically for pain and discussed with her OB/GYN.     ED Course       Procedures      CONSULT NOTE:  9:26 AM Maylin Melgar MD spoke with Dr. Loli Johnson, Consult for [facility]. Discussed available diagnostic tests and clinical findings. She is in agreement with care plans for discharge and will arrange pt's outpatient appointment.

## 2017-06-30 ENCOUNTER — PATIENT OUTREACH (OUTPATIENT)
Dept: OTHER | Age: 27
End: 2017-06-30

## 2017-06-30 NOTE — PROGRESS NOTES
Telephone outreach to patient for [facility] follow-up for 06/29/17 ER visit for Bartholin cyst. Left discreet VM with my contact information. Will attempt outreach again.

## 2017-07-03 ENCOUNTER — PATIENT OUTREACH (OUTPATIENT)
Dept: OTHER | Age: 27
End: 2017-07-03

## 2017-07-03 NOTE — PROGRESS NOTES
Second attempt telephone outreach to patient for [facility] follow-up for 06/29/17 ER visit for Bartholins cyst. Left discreet VM with my contact information. Chart reviewed- no further ER or urgent center visits noted. Will send UTR letter if no return call.

## 2017-07-19 ENCOUNTER — APPOINTMENT (OUTPATIENT)
Dept: GENERAL RADIOLOGY | Age: 27
End: 2017-07-19
Attending: EMERGENCY MEDICINE
Payer: COMMERCIAL

## 2017-07-19 ENCOUNTER — HOSPITAL ENCOUNTER (EMERGENCY)
Age: 27
Discharge: HOME | End: 2017-07-19
Attending: EMERGENCY MEDICINE
Payer: COMMERCIAL

## 2017-07-19 VITALS
HEIGHT: 68 IN | HEART RATE: 83 BPM | WEIGHT: 138.45 LBS | DIASTOLIC BLOOD PRESSURE: 68 MMHG | SYSTOLIC BLOOD PRESSURE: 110 MMHG | BODY MASS INDEX: 20.98 KG/M2 | TEMPERATURE: 98.5 F | OXYGEN SATURATION: 100 % | RESPIRATION RATE: 16 BRPM

## 2017-07-19 DIAGNOSIS — R07.89 CHEST WALL PAIN: Primary | ICD-10-CM

## 2017-07-19 LAB
ALBUMIN SERPL BCP-MCNC: 3.7 G/DL (ref 3.5–5)
ALBUMIN/GLOB SERPL: 1.1 {RATIO} (ref 1.1–2.2)
ALP SERPL-CCNC: 47 U/L (ref 45–117)
ALT SERPL-CCNC: 16 U/L (ref 12–78)
ANION GAP BLD CALC-SCNC: 4 MMOL/L (ref 5–15)
AST SERPL W P-5'-P-CCNC: 14 U/L (ref 15–37)
ATRIAL RATE: 72 BPM
BASOPHILS # BLD AUTO: 0.1 K/UL (ref 0–0.1)
BASOPHILS # BLD: 2 % (ref 0–1)
BILIRUB SERPL-MCNC: 0.6 MG/DL (ref 0.2–1)
BUN SERPL-MCNC: 9 MG/DL (ref 6–20)
BUN/CREAT SERPL: 13 (ref 12–20)
CALCIUM SERPL-MCNC: 8.3 MG/DL (ref 8.5–10.1)
CALCULATED P AXIS, ECG09: 79 DEGREES
CALCULATED R AXIS, ECG10: 69 DEGREES
CALCULATED T AXIS, ECG11: 64 DEGREES
CHLORIDE SERPL-SCNC: 105 MMOL/L (ref 97–108)
CO2 SERPL-SCNC: 29 MMOL/L (ref 21–32)
CREAT SERPL-MCNC: 0.72 MG/DL (ref 0.55–1.02)
D DIMER PPP FEU-MCNC: 0.24 MG/L FEU (ref 0–0.65)
DIAGNOSIS, 93000: NORMAL
EOSINOPHIL # BLD: 0.2 K/UL (ref 0–0.4)
EOSINOPHIL NFR BLD: 4 % (ref 0–7)
ERYTHROCYTE [DISTWIDTH] IN BLOOD BY AUTOMATED COUNT: 14.1 % (ref 11.5–14.5)
GLOBULIN SER CALC-MCNC: 3.4 G/DL (ref 2–4)
GLUCOSE SERPL-MCNC: 82 MG/DL (ref 65–100)
HCG SERPL-ACNC: <1 MIU/ML (ref 0–6)
HCT VFR BLD AUTO: 37.8 % (ref 35–47)
HGB BLD-MCNC: 12.2 G/DL (ref 11.5–16)
LIPASE SERPL-CCNC: 154 U/L (ref 73–393)
LYMPHOCYTES # BLD AUTO: 47 % (ref 12–49)
LYMPHOCYTES # BLD: 1.7 K/UL (ref 0.8–3.5)
MCH RBC QN AUTO: 27.7 PG (ref 26–34)
MCHC RBC AUTO-ENTMCNC: 32.3 G/DL (ref 30–36.5)
MCV RBC AUTO: 85.7 FL (ref 80–99)
MONOCYTES # BLD: 0.4 K/UL (ref 0–1)
MONOCYTES NFR BLD AUTO: 9 % (ref 5–13)
NEUTS SEG # BLD: 1.4 K/UL (ref 1.8–8)
NEUTS SEG NFR BLD AUTO: 38 % (ref 32–75)
P-R INTERVAL, ECG05: 124 MS
PLATELET # BLD AUTO: 204 K/UL (ref 150–400)
POTASSIUM SERPL-SCNC: 4.4 MMOL/L (ref 3.5–5.1)
PROT SERPL-MCNC: 7.1 G/DL (ref 6.4–8.2)
Q-T INTERVAL, ECG07: 410 MS
QRS DURATION, ECG06: 76 MS
QTC CALCULATION (BEZET), ECG08: 448 MS
RBC # BLD AUTO: 4.41 M/UL (ref 3.8–5.2)
SODIUM SERPL-SCNC: 138 MMOL/L (ref 136–145)
VENTRICULAR RATE, ECG03: 72 BPM
WBC # BLD AUTO: 3.7 K/UL (ref 3.6–11)

## 2017-07-19 PROCEDURE — 36415 COLL VENOUS BLD VENIPUNCTURE: CPT

## 2017-07-19 PROCEDURE — 84702 CHORIONIC GONADOTROPIN TEST: CPT

## 2017-07-19 PROCEDURE — 85025 COMPLETE CBC W/AUTO DIFF WBC: CPT

## 2017-07-19 PROCEDURE — 85379 FIBRIN DEGRADATION QUANT: CPT

## 2017-07-19 PROCEDURE — 71020 XR CHEST PA LAT: CPT

## 2017-07-19 PROCEDURE — 74011250637 HC RX REV CODE- 250/637: Performed by: EMERGENCY MEDICINE

## 2017-07-19 PROCEDURE — 99284 EMERGENCY DEPT VISIT MOD MDM: CPT

## 2017-07-19 PROCEDURE — 93005 ELECTROCARDIOGRAM TRACING: CPT

## 2017-07-19 PROCEDURE — 83690 ASSAY OF LIPASE: CPT

## 2017-07-19 PROCEDURE — 96374 THER/PROPH/DIAG INJ IV PUSH: CPT

## 2017-07-19 PROCEDURE — 80053 COMPREHEN METABOLIC PANEL: CPT

## 2017-07-19 PROCEDURE — 74011250636 HC RX REV CODE- 250/636: Performed by: EMERGENCY MEDICINE

## 2017-07-19 PROCEDURE — 74011000250 HC RX REV CODE- 250: Performed by: EMERGENCY MEDICINE

## 2017-07-19 RX ORDER — KETOROLAC TROMETHAMINE 30 MG/ML
30 INJECTION, SOLUTION INTRAMUSCULAR; INTRAVENOUS
Status: COMPLETED | OUTPATIENT
Start: 2017-07-19 | End: 2017-07-19

## 2017-07-19 RX ORDER — ACETAMINOPHEN 325 MG/1
650 TABLET ORAL ONCE
Status: COMPLETED | OUTPATIENT
Start: 2017-07-19 | End: 2017-07-19

## 2017-07-19 RX ADMIN — LIDOCAINE HYDROCHLORIDE 40 ML: 20 SOLUTION ORAL; TOPICAL at 07:58

## 2017-07-19 RX ADMIN — KETOROLAC TROMETHAMINE 30 MG: 30 INJECTION, SOLUTION INTRAMUSCULAR at 09:11

## 2017-07-19 RX ADMIN — ACETAMINOPHEN 650 MG: 325 TABLET, FILM COATED ORAL at 07:57

## 2017-07-19 NOTE — ED PROVIDER NOTES
HPI Comments: 32year old female complains of chest pain and horse voice. Chest pain started yesterday, worse with a deep breath, feels she cannot catch her breath. No unilateral leg pain or leg swelling. No radiation. No relieving factors, tried tums without relief. No fever, sore throat, neck pain. PMH: panic attack, anxiety, depression, endometriosis, ovarian cyst, migraines, and s/p appendectomy    Social hx: Former smoker. Social alcohol use. Patient is a 32 y.o. female presenting with chest pain. The history is provided by the patient. No  was used. Chest Pain (Angina)    Pertinent negatives include no abdominal pain, no cough, no fever, no headaches, no nausea, no shortness of breath and no weakness. Past Medical History:   Diagnosis Date    Anxiety     Depression     Endometriosis     Migraine     Neurological disorder     migraines    Ovarian cyst     Panic attack        Past Surgical History:   Procedure Laterality Date    HX APPENDECTOMY  04/26/2017    HX BREAST LUMPECTOMY Right     HX BUNIONECTOMY      HX CYST REMOVAL      HX OTHER SURGICAL      laparascopy    HX WISDOM TEETH EXTRACTION           Family History:   Problem Relation Age of Onset    Diabetes Maternal Aunt     Heart Disease Maternal Aunt     Stroke Maternal Aunt     Heart Disease Maternal Grandmother     No Known Problems Mother        Social History     Social History    Marital status: SINGLE     Spouse name: N/A    Number of children: N/A    Years of education: N/A     Occupational History    Not on file. Social History Main Topics    Smoking status: Former Smoker    Smokeless tobacco: Never Used    Alcohol use No      Comment: social    Drug use: No    Sexual activity: Yes     Partners: Male     Birth control/ protection: Pill     Other Topics Concern    Not on file     Social History Narrative         ALLERGIES: Compazine [prochlorperazine edisylate];  Haldol [haloperidol lactate]; Promethazine; and Reglan [metoclopramide]    Review of Systems   Constitutional: Negative for chills, fatigue and fever. HENT: Negative for congestion and sore throat. Eyes: Negative for pain and visual disturbance. Respiratory: Negative for cough and shortness of breath. Cardiovascular: Positive for chest pain. Negative for leg swelling. Gastrointestinal: Negative for abdominal distention, abdominal pain, blood in stool, diarrhea and nausea. Endocrine: Negative for polyuria. Genitourinary: Negative for difficulty urinating, dysuria, flank pain, vaginal bleeding and vaginal discharge. Musculoskeletal: Negative for myalgias and neck pain. Skin: Negative for rash. Allergic/Immunologic: Negative for immunocompromised state. Neurological: Negative for weakness and headaches. Psychiatric/Behavioral: Negative for dysphoric mood. Vitals:    07/19/17 0744 07/19/17 0830 07/19/17 0900 07/19/17 0914   BP: 114/71 118/71 110/68    Pulse: 83      Resp: 16      Temp: 98.5 °F (36.9 °C)      SpO2: 100% 98% 98% 100%   Weight:       Height:                Physical Exam   Constitutional: She is oriented to person, place, and time. She appears well-developed and well-nourished. HENT:   Head: Normocephalic and atraumatic. Moist mucous membranes, horse voice. No trismus, no phargyneal erythema. No ttp of the hyoid, painless ROM   Eyes: Conjunctivae are normal. Pupils are equal, round, and reactive to light. Right eye exhibits no discharge. Left eye exhibits no discharge. Neck: Normal range of motion. Neck supple. No tracheal deviation present. Cardiovascular: Normal rate, regular rhythm, normal heart sounds and intact distal pulses. No murmur heard. Pulmonary/Chest: Effort normal and breath sounds normal. No respiratory distress. She has no wheezes. She has no rales. Abdominal: Soft. Bowel sounds are normal. There is no tenderness. There is no rebound and no guarding. Musculoskeletal: Normal range of motion. She exhibits no edema, tenderness or deformity. Neurological: She is alert and oriented to person, place, and time. Skin: Skin is warm and dry. No rash noted. No erythema. Psychiatric: She has a normal mood and affect. Her behavior is normal.   Nursing note and vitals reviewed. MDM  Number of Diagnoses or Management Options  Diagnosis management comments: Patient here with chest pain, non cardiac in nature, ekg reassuring. Given her Estrogen use we cannot apply PERC, but she is wells low risk. I clinically doubt PE, but will obtain D. Dimer To assess for risk of PE. Disposition pending labs and imaging. Amount and/or Complexity of Data Reviewed  Clinical lab tests: ordered  Tests in the radiology section of CPT®: ordered      ED Course       Procedures      Chief Complaint   Patient presents with    Chest Pain     pt c/o chest pain and \"losing her voice\" since yesterday. Pt denies cough or sore throat. 7:54 AM  The patients presenting problems have been discussed, and they are in agreement with the care plan formulated and outlined with them. I have encouraged them to ask questions as they arise throughout their visit.     MEDICATIONS GIVEN:  Medications   mylanta/viscous lidocaine (ALIREZA)(GI COCKTAIL) (40 mL Oral Given 7/19/17 6498)   acetaminophen (TYLENOL) tablet 650 mg (650 mg Oral Given 7/19/17 3087)   ketorolac (TORADOL) injection 30 mg (30 mg IntraVENous Given 7/19/17 0911)       LABS REVIEWED:  Recent Results (from the past 24 hour(s))   EKG, 12 LEAD, INITIAL    Collection Time: 07/19/17  7:37 AM   Result Value Ref Range    Ventricular Rate 72 BPM    Atrial Rate 72 BPM    P-R Interval 124 ms    QRS Duration 76 ms    Q-T Interval 410 ms    QTC Calculation (Bezet) 448 ms    Calculated P Axis 79 degrees    Calculated R Axis 69 degrees    Calculated T Axis 64 degrees    Diagnosis       Normal sinus rhythm  Normal ECG  When compared with ECG of 12-JAN-2016 20:47,  No significant change was found  Confirmed by Skip Estrada ([id number]) on 7/19/2017 8:19:57 AM     CBC WITH AUTOMATED DIFF    Collection Time: 07/19/17  7:56 AM   Result Value Ref Range    WBC 3.7 3.6 - 11.0 K/uL    RBC 4.41 3.80 - 5.20 M/uL    HGB 12.2 11.5 - 16.0 g/dL    HCT 37.8 35.0 - 47.0 %    MCV 85.7 80.0 - 99.0 FL    MCH 27.7 26.0 - 34.0 PG    MCHC 32.3 30.0 - 36.5 g/dL    RDW 14.1 11.5 - 14.5 %    PLATELET 333 116 - 165 K/uL    NEUTROPHILS 38 32 - 75 %    LYMPHOCYTES 47 12 - 49 %    MONOCYTES 9 5 - 13 %    EOSINOPHILS 4 0 - 7 %    BASOPHILS 2 (H) 0 - 1 %    ABS. NEUTROPHILS 1.4 (L) 1.8 - 8.0 K/UL    ABS. LYMPHOCYTES 1.7 0.8 - 3.5 K/UL    ABS. MONOCYTES 0.4 0.0 - 1.0 K/UL    ABS. EOSINOPHILS 0.2 0.0 - 0.4 K/UL    ABS. BASOPHILS 0.1 0.0 - 0.1 K/UL   METABOLIC PANEL, COMPREHENSIVE    Collection Time: 07/19/17  7:56 AM   Result Value Ref Range    Sodium 138 136 - 145 mmol/L    Potassium 4.4 3.5 - 5.1 mmol/L    Chloride 105 97 - 108 mmol/L    CO2 29 21 - 32 mmol/L    Anion gap 4 (L) 5 - 15 mmol/L    Glucose 82 65 - 100 mg/dL    BUN 9 6 - 20 MG/DL    Creatinine 0.72 0.55 - 1.02 MG/DL    BUN/Creatinine ratio 13 12 - 20      GFR est AA >60 >60 ml/min/1.73m2    GFR est non-AA >60 >60 ml/min/1.73m2    Calcium 8.3 (L) 8.5 - 10.1 MG/DL    Bilirubin, total 0.6 0.2 - 1.0 MG/DL    ALT (SGPT) 16 12 - 78 U/L    AST (SGOT) 14 (L) 15 - 37 U/L    Alk. phosphatase 47 45 - 117 U/L    Protein, total 7.1 6.4 - 8.2 g/dL    Albumin 3.7 3.5 - 5.0 g/dL    Globulin 3.4 2.0 - 4.0 g/dL    A-G Ratio 1.1 1.1 - 2.2     LIPASE    Collection Time: 07/19/17  7:56 AM   Result Value Ref Range    Lipase 154 73 - 393 U/L   D DIMER    Collection Time: 07/19/17  7:56 AM   Result Value Ref Range    D-dimer 0.24 0.00 - 0.65 mg/L FEU   TOTAL HCG, QT.     Collection Time: 07/19/17  7:56 AM   Result Value Ref Range    Beta HCG, QT <1 0 - 6 MIU/ML       VITAL SIGNS:  Patient Vitals for the past 12 hrs:   Temp Pulse Resp BP SpO2 07/19/17 0914 - - - - 100 %   07/19/17 0900 - - - 110/68 98 %   07/19/17 0830 - - - 118/71 98 %   07/19/17 0744 98.5 °F (36.9 °C) 83 16 114/71 100 %       RADIOLOGY RESULTS:  The following have been ordered and reviewed:  XR CHEST PA LAT   Final Result          Chest xr: 2 view PA and lateral: Shows no acute process, imagines reviewed by me. EKG interpretation: (Preliminary)  Rhythm: normal sinus rhythm; and regular . Rate (approx.): 72 ; Axis: normal; P wave: normal; QRS interval: normal ; ST/T wave: normal;     PROCEDURES:            PROGRESS NOTES  Patient had continued pain. GI cocktail did work, and Fleet Euler was then unsuccessful. I do not feel using opioids for her pain is appropriate at this time. Patient given close return precautions. Questions answered at bedside. DIAGNOSIS:    1. Chest wall pain        PLAN:  Follow-up Information     Follow up With Details Comments Contact Info       I have provided a list of primary care doctors in the area with whom you should follow up. Discharge Medication List as of 7/19/2017  9:14 AM        Patient discharged to home in stable condition. Questions answered at bedside. She was advised that we probably could not completely improve her pain, but that it will likely improve with time. She was given follow up and return precautions if her pain does not improve or it gets worse. ED COURSE: The patients hospital course has been uncomplicated.

## 2017-07-19 NOTE — LETTER
[address] 
[facility] EMERGENCY DEPT 
[address] [place] [address]. [address] [phone] 
[phone] Work/School Note Date: 7/19/2017 To Whom It May concern: 
 
Chary Bhat was seen and treated today in the emergency room by the following provider(s): 
Attending Provider: Danette Yancey DO. Chary Bhat may return to work on 7/21/17.  
 
Sincerely, 
 
 
 
 
Wally Puckett RN

## 2017-07-19 NOTE — DISCHARGE INSTRUCTIONS
[facility] Departments     For adult and child immunizations, family planning, TB screening, STD testing and women's health services. [facility]: [place] [phone]      [facility] 25   [address]   [address]   170 [facility] [address] [facility] [address] [phone]      [address]          [address] [place] [address] [address]     For primary care services, woman and child wellness, and some clinics providing specialty care. [facility] -- [facility]. [facility] [phone] 411 [facility] [place] 200 [facility] 3614 [facility] [phone]   [address] [facility] [address] [facility] [address] [phone]   [address] [place] [address] 5850  Community  [phone]   [address] [place] [address] [address] [phone]   [facility] 81 [facility] [phone]   [facility] [address] [place] [address] [phone]   [facility] [address], [address] [place] [address], [address] [phone]     [address] [phone]   [facility] Outreach [address]  Community  [phone]   [facility]  [address] ([email or web address]) [phone]         Sexual Health/Woman Wellness Clinics    For STD/HIV testing and treatment, pregnancy testing and services, men's health, birth control services, LGBT services, and hepatitis/HPV vaccine services. [facility] [place] [employer] [address] [facility] [address] [facility] [address] [facility][1] 1579 [address] BEVERLY Welsh [phone]   [facility] [address], 5th floor [phone]   Pregnancy [address] [facility] [address]  [facility] [phone]         Specialty Service 1705 [facility]   [phone]   [place]   [phone]   Women, Infant and Children's Services: [address] [phone]       600 [facility]   [phone]   [address] 7835 [facility] Psychiatry     [phone]   [address] Crisis   [address] [phone]     Local Primary Care Physicians  [facility] [phone]  Tiburcio Jenny, MD Kathi Starks, MD Severa Finders, MD [facility] Doctors [phone]  Katalina Eugene, [facility]  Berntasha Bosch, MD Yamila Jane MD [address] [place] [address] [phone]  MD Drake Carter [place] [address] [facility] [place] [phone]  [facility], MD Gita Rosen MD Becki Moeller, MD   [facility] [phone]  [facility] ARSALAN , MD Harmeet [facility], NP [address] [phone]  Jeryr Smith, MD Chet Elder, MD Loli Evans, MD Ulisses Renteria MD Wilhemena Crooks, MD   33 57 [facility], MD [address] [phone]  MD Rosa Rivas, NP  Tiffany Akhtar, MD Kecia Donald MD Nickola Porta, MD Jim Borrow, MD   38[address] [facility] [phone]  MD Deirdre Mitchell, P  Nitesh Cano, NP  MD Arline Gonzalez MD Barrington Hutch, MD Juaquin Li, MD [facility] [phone]  Caryn Felty, MD Rosemarie Paterson, MD Maralyn Kale, MD Belva Moats, MD Alfonzo Highman, MD   [address] [phone]  Carles Siemens, MD Pearley Evangelist, MD [place] [phone]  MD Jovita Hale MD Rhodia Nails Tarah Antonio, [address] [facility] Physicians [phone]  MD Sofie Dela Cruz MD Barbarann Pile, MD Leva Fellers, MD Willia Leys, MD Saralee Right, JUNIOR [place] MD [address]  66   [phone]  MD Bi Fuller MD Jalaine Kuster, MD   9788 [facility] [phone]  [address] [place] [address] MD Disha Poe, ERIK Ryder, CARLOS Ryder, CARLOS Mcginnis MD Susa Cable, JUNIOR Parkinson, DO Miscellaneous:  Merlin Iles, [phone]          Chest Pain: Care Instructions  Your Care Instructions  There are many things that can cause chest pain. Some are not serious and will get better on their own in a few days. But some kinds of chest pain need more testing and treatment. Your doctor may have recommended a follow-up visit in the next 8 to 12 hours. If you are not getting better, you may need more tests or treatment. Even though your doctor has released you, you still need to watch for any problems. The doctor carefully checked you, but sometimes problems can develop later. If you have new symptoms or if your symptoms do not get better, get medical care right away. If you have worse or different chest pain or pressure that lasts more than 5 minutes or you passed out (lost consciousness), call 911 or seek other emergency help right away. A medical visit is only one step in your treatment. Even if you feel better, you still need to do what your doctor recommends, such as going to all suggested follow-up appointments and taking medicines exactly as directed. This will help you recover and help prevent future problems. How can you care for yourself at home? · Rest until you feel better. · Take your medicine exactly as prescribed. Call your doctor if you think you are having a problem with your medicine.   · Do not drive after taking a prescription pain medicine. When should you call for help? Call 911 if:  · You passed out (lost consciousness). · You have severe difficulty breathing. · You have symptoms of a heart attack. These may include:  ¨ Chest pain or pressure, or a strange feeling in your chest.  ¨ Sweating. ¨ Shortness of breath. ¨ Nausea or vomiting. ¨ Pain, pressure, or a strange feeling in your back, neck, jaw, or upper belly or in one or both shoulders or arms. ¨ Lightheadedness or sudden weakness. ¨ A fast or irregular heartbeat. After you call 911, the  may tell you to chew 1 adult-strength or 2 to 4 low-dose aspirin. Wait for an ambulance. Do not try to drive yourself. Call your doctor today if:  · You have any trouble breathing. · Your chest pain gets worse. · You are dizzy or lightheaded, or you feel like you may faint. · You are not getting better as expected. · You are having new or different chest pain. Where can you learn more? Go to http://michele-lucy.info/. Enter A120 in the search box to learn more about \"Chest Pain: Care Instructions. \"  Current as of: March 20, 2017  Content Version: 11.3  © 8982-4778 Healthwise, Incorporated. Care instructions adapted under license by [employer] (which disclaims liability or warranty for this information). If you have questions about a medical condition or this instruction, always ask your healthcare professional. [place] [address] any warranty or liability for your use of this information.

## 2017-07-19 NOTE — ED TRIAGE NOTES
Assumed care of pt, pt ambulatory from triage, resting on stretcher in position of comfort, call bell within reach, pt reports she lost her voice beginning yesterday, denies sore throat, reports chest pain began yesterday, describes as \"indigestion\", took medication with no relief, reports pain when pushing on chest

## 2017-07-20 ENCOUNTER — PATIENT OUTREACH (OUTPATIENT)
Dept: OTHER | Age: 27
End: 2017-07-20

## 2017-07-20 NOTE — PROGRESS NOTES
Telephone outreach for 07/20/17 ER visit for chest wall pain. Patient answered phone and during introduction call was discontinued. Attempted call back. Left discreet VM with my contact information. Will send ALINA letter.

## 2017-07-25 ENCOUNTER — PATIENT OUTREACH (OUTPATIENT)
Dept: OTHER | Age: 27
End: 2017-07-25

## 2017-07-25 NOTE — PROGRESS NOTES
ALINA unable to reach letter sent via t-Art and mail. Contact information provided. Chart reviewed no further ER or urgent visits noted. No PCP follow-up noted.

## 2017-07-25 NOTE — LETTER
7/25/2017 2:39 PM 
 
Ms. Josue Mccloud Zach 
[address] [address] Dear Ms. Price Kera, My name is Allen Reaves, Employee Care Manager for [facility], and I have been trying to reach you. The Employee Care  is a free-of-charge, confidential service provided to our employees and their family members covered by the [employer]. Part of my job is to follow up with members who have recently been in the hospital or emergency room, to help them coordinate their care and answer questions they may have about their visit. I am able to provide assistance with medication questions, scheduling needed follow-up appointments, and arranging services like home health or home medical equipment. I can also provide education regarding your hospital or ER visit as well as your medical conditions. As healthcare providers, we know that patients do better when they have close follow up with a primary care provider (PCP), especially after a hospital or emergency department visit. If you do not have a PCP, I can help you find one that is convenient to you and covered by your insurance. I can also help you understand any after visit instructions, such as what symptoms to watch out for, or any new or changed medications. Remember that you can access your After Visit Summary by logging into your [employer] account. If you do not have a [employer] account, I can help you request access. Our program is designed to provide you with the opportunity to have a [facility] care manager partner with you for your healthcare needs. Please contact me at the below number if I can provide you with assistance for any of the above services. Sincerely, 
 
 
Ayesha Lloyd RN 
[phone] [email or web address]

## 2017-09-18 ENCOUNTER — TELEPHONE (OUTPATIENT)
Dept: NEUROLOGY | Age: 27
End: 2017-09-18

## 2017-09-18 NOTE — TELEPHONE ENCOUNTER
Spoke with patient, she's requesting if she can be seen sooner. And if any samples of Imitrex could be provided.

## 2017-09-18 NOTE — TELEPHONE ENCOUNTER
Pt said she has been headache free for the past 8 months but has had one every day this past week. She is scheduled for 10/13 and is wondering if Dr. Edith Aquino could call in Imitrex shot for if there were any samples she could get until her appt. Please call back.

## 2017-09-18 NOTE — TELEPHONE ENCOUNTER
Pt calling back, stated she is very nausea and was hoping to hear back before lunch. Pt stated she has not had a migraine like this in over 8 months.

## 2017-09-18 NOTE — TELEPHONE ENCOUNTER
Spoke with patient, informed her  said it was okay to provide Zembrace samples for her. She verbalized understanding and will come by later today to pick them up.

## 2017-09-19 ENCOUNTER — TELEPHONE (OUTPATIENT)
Dept: NEUROLOGY | Age: 27
End: 2017-09-19

## 2017-09-19 ENCOUNTER — HOSPITAL ENCOUNTER (EMERGENCY)
Age: 27
Discharge: HOME | End: 2017-09-19
Attending: FAMILY MEDICINE

## 2017-09-19 ENCOUNTER — HOSPITAL ENCOUNTER (EMERGENCY)
Age: 27
Discharge: HOME | End: 2017-09-19
Attending: EMERGENCY MEDICINE
Payer: COMMERCIAL

## 2017-09-19 VITALS
DIASTOLIC BLOOD PRESSURE: 78 MMHG | WEIGHT: 152.12 LBS | HEART RATE: 97 BPM | TEMPERATURE: 97.8 F | RESPIRATION RATE: 16 BRPM | OXYGEN SATURATION: 100 % | BODY MASS INDEX: 23.05 KG/M2 | SYSTOLIC BLOOD PRESSURE: 122 MMHG | HEIGHT: 68 IN

## 2017-09-19 VITALS
OXYGEN SATURATION: 98 % | SYSTOLIC BLOOD PRESSURE: 103 MMHG | HEIGHT: 68 IN | RESPIRATION RATE: 16 BRPM | WEIGHT: 149.3 LBS | BODY MASS INDEX: 22.63 KG/M2 | HEART RATE: 93 BPM | TEMPERATURE: 98.4 F | DIASTOLIC BLOOD PRESSURE: 59 MMHG

## 2017-09-19 DIAGNOSIS — F41.9 ANXIETY: ICD-10-CM

## 2017-09-19 DIAGNOSIS — F32.A DEPRESSION, UNSPECIFIED DEPRESSION TYPE: ICD-10-CM

## 2017-09-19 DIAGNOSIS — G43.919 INTRACTABLE MIGRAINE WITHOUT STATUS MIGRAINOSUS, UNSPECIFIED MIGRAINE TYPE: Primary | ICD-10-CM

## 2017-09-19 DIAGNOSIS — G43.919 INTRACTABLE MIGRAINE, UNSPECIFIED MIGRAINE TYPE: Primary | ICD-10-CM

## 2017-09-19 DIAGNOSIS — R11.0 NAUSEA WITHOUT VOMITING: ICD-10-CM

## 2017-09-19 LAB
APPEARANCE UR: CLEAR
BACTERIA URNS QL MICRO: ABNORMAL /HPF
BILIRUB UR QL: NEGATIVE
COLOR UR: ABNORMAL
EPITH CASTS URNS QL MICRO: ABNORMAL /LPF
GLUCOSE UR STRIP.AUTO-MCNC: NEGATIVE MG/DL
HCG UR QL: NEGATIVE
HGB UR QL STRIP: NEGATIVE
HYALINE CASTS URNS QL MICRO: ABNORMAL /LPF (ref 0–5)
KETONES UR QL STRIP.AUTO: NEGATIVE MG/DL
LEUKOCYTE ESTERASE UR QL STRIP.AUTO: NEGATIVE
NITRITE UR QL STRIP.AUTO: NEGATIVE
PH UR STRIP: 6 [PH] (ref 5–8)
PROT UR STRIP-MCNC: NEGATIVE MG/DL
RBC #/AREA URNS HPF: ABNORMAL /HPF (ref 0–5)
SP GR UR REFRACTOMETRY: 1.01 (ref 1–1.03)
UA: UC IF INDICATED,UAUC: ABNORMAL
UROBILINOGEN UR QL STRIP.AUTO: 0.2 EU/DL (ref 0.2–1)
WBC URNS QL MICRO: ABNORMAL /HPF (ref 0–4)

## 2017-09-19 PROCEDURE — 96361 HYDRATE IV INFUSION ADD-ON: CPT

## 2017-09-19 PROCEDURE — 81001 URINALYSIS AUTO W/SCOPE: CPT | Performed by: EMERGENCY MEDICINE

## 2017-09-19 PROCEDURE — 96374 THER/PROPH/DIAG INJ IV PUSH: CPT

## 2017-09-19 PROCEDURE — 87186 SC STD MICRODIL/AGAR DIL: CPT | Performed by: EMERGENCY MEDICINE

## 2017-09-19 PROCEDURE — 99283 EMERGENCY DEPT VISIT LOW MDM: CPT

## 2017-09-19 PROCEDURE — 81025 URINE PREGNANCY TEST: CPT | Performed by: EMERGENCY MEDICINE

## 2017-09-19 PROCEDURE — 74011250636 HC RX REV CODE- 250/636: Performed by: PHYSICIAN ASSISTANT

## 2017-09-19 PROCEDURE — 87077 CULTURE AEROBIC IDENTIFY: CPT | Performed by: EMERGENCY MEDICINE

## 2017-09-19 PROCEDURE — 87086 URINE CULTURE/COLONY COUNT: CPT | Performed by: EMERGENCY MEDICINE

## 2017-09-19 PROCEDURE — 96375 TX/PRO/DX INJ NEW DRUG ADDON: CPT

## 2017-09-19 RX ORDER — SODIUM CHLORIDE 0.9 % (FLUSH) 0.9 %
5-10 SYRINGE (ML) INJECTION AS NEEDED
Status: DISCONTINUED | OUTPATIENT
Start: 2017-09-19 | End: 2017-09-19 | Stop reason: HOSPADM

## 2017-09-19 RX ORDER — HYDROMORPHONE HYDROCHLORIDE 2 MG/ML
0.5 INJECTION, SOLUTION INTRAMUSCULAR; INTRAVENOUS; SUBCUTANEOUS
Status: COMPLETED | OUTPATIENT
Start: 2017-09-19 | End: 2017-09-19

## 2017-09-19 RX ORDER — KETOROLAC TROMETHAMINE 10 MG/1
10 TABLET, FILM COATED ORAL
Qty: 8 TAB | Refills: 0 | Status: SHIPPED | OUTPATIENT
Start: 2017-09-19 | End: 2017-10-23

## 2017-09-19 RX ORDER — PREDNISONE 20 MG/1
TABLET ORAL
Qty: 33 TAB | Refills: 0 | Status: SHIPPED | OUTPATIENT
Start: 2017-09-19 | End: 2017-10-23

## 2017-09-19 RX ORDER — KETOROLAC TROMETHAMINE 30 MG/ML
30 INJECTION, SOLUTION INTRAMUSCULAR; INTRAVENOUS
Status: COMPLETED | OUTPATIENT
Start: 2017-09-19 | End: 2017-09-19

## 2017-09-19 RX ORDER — ONDANSETRON 4 MG/1
4 TABLET, ORALLY DISINTEGRATING ORAL
Qty: 10 TAB | Refills: 0 | Status: SHIPPED | OUTPATIENT
Start: 2017-09-19 | End: 2017-10-23

## 2017-09-19 RX ORDER — SODIUM CHLORIDE 0.9 % (FLUSH) 0.9 %
5-10 SYRINGE (ML) INJECTION EVERY 8 HOURS
Status: DISCONTINUED | OUTPATIENT
Start: 2017-09-19 | End: 2017-09-19 | Stop reason: HOSPADM

## 2017-09-19 RX ORDER — DEXAMETHASONE SODIUM PHOSPHATE 4 MG/ML
10 INJECTION, SOLUTION INTRA-ARTICULAR; INTRALESIONAL; INTRAMUSCULAR; INTRAVENOUS; SOFT TISSUE
Status: COMPLETED | OUTPATIENT
Start: 2017-09-19 | End: 2017-09-19

## 2017-09-19 RX ORDER — ONDANSETRON 2 MG/ML
4 INJECTION INTRAMUSCULAR; INTRAVENOUS ONCE
Status: COMPLETED | OUTPATIENT
Start: 2017-09-19 | End: 2017-09-19

## 2017-09-19 RX ORDER — DIPHENHYDRAMINE HYDROCHLORIDE 50 MG/ML
25 INJECTION, SOLUTION INTRAMUSCULAR; INTRAVENOUS
Status: DISCONTINUED | OUTPATIENT
Start: 2017-09-19 | End: 2017-09-19

## 2017-09-19 RX ORDER — PREDNISONE 10 MG/1
TABLET ORAL
Qty: 21 TAB | Refills: 0 | Status: SHIPPED | OUTPATIENT
Start: 2017-09-19 | End: 2017-10-23

## 2017-09-19 RX ORDER — DOXYCYCLINE 100 MG/1
100 TABLET ORAL 2 TIMES DAILY
COMMUNITY
End: 2017-10-23

## 2017-09-19 RX ORDER — BUTALBITAL, ACETAMINOPHEN AND CAFFEINE 300; 40; 50 MG/1; MG/1; MG/1
CAPSULE ORAL
Qty: 20 CAP | Refills: 0 | Status: SHIPPED | OUTPATIENT
Start: 2017-09-19 | End: 2017-10-23

## 2017-09-19 RX ADMIN — KETOROLAC TROMETHAMINE 30 MG: 30 INJECTION, SOLUTION INTRAMUSCULAR; INTRAVENOUS at 12:46

## 2017-09-19 RX ADMIN — HYDROMORPHONE HYDROCHLORIDE 0.5 MG: 2 INJECTION, SOLUTION INTRAMUSCULAR; INTRAVENOUS; SUBCUTANEOUS at 16:14

## 2017-09-19 RX ADMIN — DEXAMETHASONE SODIUM PHOSPHATE 10 MG: 4 INJECTION, SOLUTION INTRAMUSCULAR; INTRAVENOUS at 16:13

## 2017-09-19 RX ADMIN — ONDANSETRON 4 MG: 2 INJECTION INTRAMUSCULAR; INTRAVENOUS at 12:46

## 2017-09-19 RX ADMIN — SODIUM CHLORIDE 1000 ML: 900 INJECTION, SOLUTION INTRAVENOUS at 16:14

## 2017-09-19 NOTE — DISCHARGE INSTRUCTIONS
Migraine Headache: Care Instructions  Your Care Instructions  Migraines are painful, throbbing headaches that often start on one side of the head. They may cause nausea and vomiting and make you sensitive to light, sound, or smell. Without treatment, migraines can last from 4 hours to a few days. Medicines can help prevent migraines or stop them after they have started. Your doctor can help you find which ones work best for you. Follow-up care is a key part of your treatment and safety. Be sure to make and go to all appointments, and call your doctor if you are having problems. It's also a good idea to know your test results and keep a list of the medicines you take. How can you care for yourself at home? · Do not drive if you have taken a prescription pain medicine. · Rest in a quiet, dark room until your headache is gone. Close your eyes, and try to relax or go to sleep. Don't watch TV or read. · Put a cold, moist cloth or cold pack on the painful area for 10 to 20 minutes at a time. Put a thin cloth between the cold pack and your skin. · Use a warm, moist towel or a heating pad set on low to relax tight shoulder and neck muscles. · Have someone gently massage your neck and shoulders. · Take your medicines exactly as prescribed. Call your doctor if you think you are having a problem with your medicine. You will get more details on the specific medicines your doctor prescribes. · Be careful not to take pain medicine more often than the instructions allow. You could get worse or more frequent headaches when the medicine wears off. To prevent migraines  · Keep a headache diary so you can figure out what triggers your headaches. Avoiding triggers may help you prevent headaches. Record when each headache began, how long it lasted, and what the pain was like.  (Was it throbbing, aching, stabbing, or dull?) Write down any other symptoms you had with the headache, such as nausea, flashing lights or dark spots, or sensitivity to bright light or loud noise. Note if the headache occurred near your period. List anything that might have triggered the headache. Triggers may include certain foods (chocolate, cheese, wine) or odors, smoke, bright light, stress, or lack of sleep. · If your doctor has prescribed medicine for your migraines, take it as directed. You may have medicine that you take only when you get a migraine and medicine that you take all the time to help prevent migraines. ¨ If your doctor has prescribed medicine for when you get a headache, take it at the first sign of a migraine, unless your doctor has given you other instructions. ¨ If your doctor has prescribed medicine to prevent migraines, take it exactly as prescribed. Call your doctor if you think you are having a problem with your medicine. · Find healthy ways to deal with stress. Migraines are most common during or right after stressful times. Take time to relax before and after you do something that has caused a migraine in the past.  · Try to keep your muscles relaxed by keeping good posture. Check your jaw, face, neck, and shoulder muscles for tension. Try to relax them. When you sit at a desk, change positions often. And make sure to stretch for 30 seconds each hour. · Get plenty of sleep and exercise. · Eat meals on a regular schedule. Avoid foods and drinks that often trigger migraines. These include chocolate, alcohol (especially red wine and port), aspartame, monosodium glutamate (MSG), and some additives found in foods (such as hot dogs, boyce, cold cuts, aged cheeses, and pickled foods). · Limit caffeine. Don't drink too much coffee, tea, or soda. But don't quit caffeine suddenly. That can also give you migraines. · Do not smoke or allow others to smoke around you. If you need help quitting, talk to your doctor about stop-smoking programs and medicines. These can increase your chances of quitting for good.   · If you are taking birth control pills or hormone therapy, talk to your doctor about whether they are triggering your migraines. When should you call for help? Call 911 anytime you think you may need emergency care. For example, call if:  · You have signs of a stroke. These may include:  ¨ Sudden numbness, paralysis, or weakness in your face, arm, or leg, especially on only one side of your body. ¨ Sudden vision changes. ¨ Sudden trouble speaking. ¨ Sudden confusion or trouble understanding simple statements. ¨ Sudden problems with walking or balance. ¨ A sudden, severe headache that is different from past headaches. Call your doctor now or seek immediate medical care if:  · You have new or worse nausea and vomiting. · You have a new or higher fever. · Your headache gets much worse. Watch closely for changes in your health, and be sure to contact your doctor if:  · You are not getting better after 2 days (48 hours). Where can you learn more? Go to http://michele-lucy.info/. Enter P209 in the search box to learn more about \"Migraine Headache: Care Instructions. \"  Current as of: October 14, 2016  Content Version: 11.3  © 9795-7754 [employer]. Care instructions adapted under license by [employer] (which disclaims liability or warranty for this information). If you have questions about a medical condition or this instruction, always ask your healthcare professional. [address] any warranty or liability for your use of this information.

## 2017-09-19 NOTE — ED PROVIDER NOTES
HPI Comments: Yael Gutierrez is a 32 y.o. female, pmhx significant for migraines, and depression/anxiety, who presents ambulatory to the ED c/o migraine headache x yesterday morning. Pt notes associated photophobia, nausea, and pain radiating into her neck which are all typical of her migraine headaches. Pt states that her pain is behind her eyes and wraps around her head \"like I'm wearing a crown. \" Pt has tried numerous times to reach her neurologist, but cannot be seen before 10/17. Pt was able to get Imitrex shots from her neurologist to alleviate some of the pain until her appointment. Pt gave herself an Imitrex shot last night, and one this morning without relief. Pt was seen at urgent care earlier today, and was given fluids, Zofran, and Toradol which did not help her. Pt was then referred to the ED. Pt denies vomiting, fever, chills, nasal congestion, and abdominal pain. PCP: None  Neurologist: Duane Cao DO    PSHx: Significant for right breast lumpectomy, bunionectomy, appendectomy  Social Hx: - tobacco, + EtOH (social), - Illicit Drugs    There are no other complaints, changes, or physical findings at this time. The history is provided by the patient. No  was used.         Past Medical History:   Diagnosis Date    Anxiety     Depression     Endometriosis     Migraine     Neurological disorder     migraines    Ovarian cyst     Panic attack        Past Surgical History:   Procedure Laterality Date    HX APPENDECTOMY  04/26/2017    HX BREAST LUMPECTOMY Right     HX BUNIONECTOMY      HX CYST REMOVAL      HX OTHER SURGICAL      laparascopy    HX WISDOM TEETH EXTRACTION           Family History:   Problem Relation Age of Onset    Diabetes Maternal Aunt     Heart Disease Maternal Aunt     Stroke Maternal Aunt     Heart Disease Maternal Grandmother     No Known Problems Mother        Social History     Social History    Marital status: SINGLE     Spouse name: N/A    Number of children: N/A    Years of education: N/A     Occupational History    Not on file. Social History Main Topics    Smoking status: Former Smoker    Smokeless tobacco: Never Used    Alcohol use No      Comment: social    Drug use: No    Sexual activity: Yes     Partners: Male     Birth control/ protection: Pill     Other Topics Concern    Not on file     Social History Narrative         ALLERGIES: Compazine [prochlorperazine edisylate]; Haldol [haloperidol lactate]; Promethazine; and Reglan [metoclopramide]    Review of Systems   Constitutional: Negative for chills and fever. HENT: Negative for congestion, rhinorrhea and sore throat. Eyes: Positive for photophobia. Respiratory: Negative for cough and shortness of breath. Cardiovascular: Negative for chest pain and palpitations. Gastrointestinal: Positive for nausea. Negative for abdominal pain, diarrhea and vomiting. Genitourinary: Negative for dysuria and hematuria. Musculoskeletal: Positive for neck pain. Negative for neck stiffness. Skin: Negative for rash and wound. Neurological: Positive for headaches. Negative for dizziness. Psychiatric/Behavioral: Negative for agitation and confusion. Patient Vitals for the past 12 hrs:   Temp Pulse Resp BP SpO2   09/19/17 1514 - 84 18 111/69 100 %   09/19/17 1405 97.8 °F (36.6 °C) 75 16 115/65 100 %     Physical Exam   Constitutional: She is oriented to person, place, and time. She appears well-developed and well-nourished. No distress. Noticeably distressed   HENT:   Head: Normocephalic and atraumatic. Nose: Nose normal.   Mouth/Throat: No oropharyngeal exudate. Eyes: Conjunctivae and EOM are normal. Right eye exhibits no discharge. Left eye exhibits no discharge. No scleral icterus. Neck: Normal range of motion. Neck supple. No JVD present. No tracheal deviation present. No thyromegaly present.    No nuchal rigidity   Cardiovascular: Normal rate, regular rhythm and normal heart sounds. Pulmonary/Chest: Effort normal and breath sounds normal. No respiratory distress. She has no wheezes. Abdominal: Soft. There is no tenderness. Musculoskeletal: Normal range of motion. She exhibits no edema. Lymphadenopathy:     She has no cervical adenopathy. Neurological: She is alert and oriented to person, place, and time. She exhibits normal muscle tone. Coordination normal.   Cranial nerves II-XII intact, FNF intact, no pronator drift   Skin: Skin is warm and dry. No rash noted. She is not diaphoretic. Psychiatric: She has a normal mood and affect. Her behavior is normal. Judgment normal.   Nursing note and vitals reviewed. MDM  Number of Diagnoses or Management Options     Amount and/or Complexity of Data Reviewed  Clinical lab tests: reviewed and ordered  Review and summarize past medical records: yes    Patient Progress  Patient progress: stable    ED Course       Procedures    Progress Note:  4:35 PM  Pt re-evaluated. She states that she feels much better. Anticipate discharge. Written by Zahdia Loera, ED Scribe, as dictated by Cathleen Mcginnis. Progress Note:  5:01 PM  Discussed workup results/findings, and counseled pt regarding her diagnosis. Pt has been encouraged to follow-up as instructed. All questions have been answered, and pt conveyed understanding. Written by Zahida Loera, ED Scribe, as dictated by Cathleen Mcginnis.     LABORATORY TESTS:  Recent Results (from the past 12 hour(s))   URINALYSIS W/ REFLEX CULTURE    Collection Time: 09/19/17  2:13 PM   Result Value Ref Range    Color YELLOW/STRAW      Appearance CLEAR CLEAR      Specific gravity 1.011 1.003 - 1.030      pH (UA) 6.0 5.0 - 8.0      Protein NEGATIVE  NEG mg/dL    Glucose NEGATIVE  NEG mg/dL    Ketone NEGATIVE  NEG mg/dL    Bilirubin NEGATIVE  NEG      Blood NEGATIVE  NEG      Urobilinogen 0.2 0.2 - 1.0 EU/dL    Nitrites NEGATIVE  NEG      Leukocyte Esterase NEGATIVE  NEG      WBC 0-4 0 - 4 /hpf    RBC 0-5 0 - 5 /hpf    Epithelial cells MODERATE (A) FEW /lpf    Bacteria 1+ (A) NEG /hpf    UA:UC IF INDICATED URINE CULTURE ORDERED (A) CNI      Hyaline cast 0-2 0 - 5 /lpf   HCG URINE, QL    Collection Time: 09/19/17  2:13 PM   Result Value Ref Range    HCG urine, Ql. NEGATIVE  NEG         MEDICATIONS GIVEN:  Medications   sodium chloride (NS) flush 5-10 mL (not administered)   sodium chloride (NS) flush 5-10 mL (not administered)   sodium chloride 0.9 % bolus infusion 1,000 mL (1,000 mL IntraVENous New Bag 9/19/17 1614)   dexamethasone (DECADRON) 4 mg/mL injection 10 mg (10 mg IntraVENous Given 9/19/17 1613)   HYDROmorphone (PF) (DILAUDID) injection 0.5 mg (0.5 mg IntraVENous Given 9/19/17 1614)       IMPRESSION:  1. Intractable migraine, unspecified migraine type    2. Nausea without vomiting    3. Anxiety    4. Depression, unspecified depression type        PLAN:  1. Discharge home. Current Discharge Medication List      START taking these medications    Details   ketorolac (TORADOL) 10 mg tablet Take 1 Tab by mouth every six (6) hours as needed for Pain for up to 8 doses. Qty: 8 Tab, Refills: 0      butalbital-acetaminophen-caff (FIORICET) -40 mg per capsule Take 1 to 2 caps every 4 to 6 hours as needed for pain  Qty: 20 Cap, Refills: 0      !! ondansetron (ZOFRAN ODT) 4 mg disintegrating tablet Take 1 Tab by mouth every eight (8) hours as needed for Nausea for up to 10 doses. Qty: 10 Tab, Refills: 0       !! - Potential duplicate medications found. Please discuss with provider. CONTINUE these medications which have NOT CHANGED    Details   !! ondansetron (ZOFRAN ODT) 4 mg disintegrating tablet Take 1 Tab by mouth every eight (8) hours as needed for Nausea. Qty: 10 Tab, Refills: 0       !! - Potential duplicate medications found. Please discuss with provider.         2.   Follow-up Information     Follow up With Details Comments [address] [place], [address]  [address] [facility] [address]  [phone]      [facility] EMERGENCY DEPT  If symptoms worsen [address]  [address] [facility]  [phone]        3. Return to ED if worse       DISCHARGE NOTE:  5:01 PM  Patient's results have been reviewed with them. Patient and/or family have verbally conveyed their understanding and agreement of the patient's signs, symptoms, diagnosis, treatment and prognosis and additionally agree to follow up as recommended or return to the Emergency Room should their condition change prior to follow-up. Discharge instructions have also been provided to the patient with some educational information regarding their diagnosis as well a list of reasons why they would want to return to the ER prior to their follow-up appointment should their condition change. This note is prepared by Gold Haque, acting as Scribe for [employer]: The scribe's documentation has been prepared under my direction and personally reviewed by me in its entirety. I confirm that the note above accurately reflects all work, treatment, procedures, and medical decision making performed by me.

## 2017-09-19 NOTE — TELEPHONE ENCOUNTER
Message from [address] [place] [address]. \"Pls call re migraine again today (after having one yesterday)-needs a solution prior to appt 10/17. She is in pain every day, which started last week after no pain for 8 months. \"

## 2017-09-19 NOTE — TELEPHONE ENCOUNTER
Spoke with patient, informed her of new order for Prednisone, requesting order be filled at [facility] pharmacy.

## 2017-09-19 NOTE — DISCHARGE INSTRUCTIONS
Migraine Headache: Care Instructions  Your Care Instructions  Migraines are painful, throbbing headaches that often start on one side of the head. They may cause nausea and vomiting and make you sensitive to light, sound, or smell. Without treatment, migraines can last from 4 hours to a few days. Medicines can help prevent migraines or stop them after they have started. Your doctor can help you find which ones work best for you. Follow-up care is a key part of your treatment and safety. Be sure to make and go to all appointments, and call your doctor if you are having problems. It's also a good idea to know your test results and keep a list of the medicines you take. How can you care for yourself at home? · Do not drive if you have taken a prescription pain medicine. · Rest in a quiet, dark room until your headache is gone. Close your eyes, and try to relax or go to sleep. Don't watch TV or read. · Put a cold, moist cloth or cold pack on the painful area for 10 to 20 minutes at a time. Put a thin cloth between the cold pack and your skin. · Use a warm, moist towel or a heating pad set on low to relax tight shoulder and neck muscles. · Have someone gently massage your neck and shoulders. · Take your medicines exactly as prescribed. Call your doctor if you think you are having a problem with your medicine. You will get more details on the specific medicines your doctor prescribes. · Be careful not to take pain medicine more often than the instructions allow. You could get worse or more frequent headaches when the medicine wears off. To prevent migraines  · Keep a headache diary so you can figure out what triggers your headaches. Avoiding triggers may help you prevent headaches. Record when each headache began, how long it lasted, and what the pain was like.  (Was it throbbing, aching, stabbing, or dull?) Write down any other symptoms you had with the headache, such as nausea, flashing lights or dark spots, or sensitivity to bright light or loud noise. Note if the headache occurred near your period. List anything that might have triggered the headache. Triggers may include certain foods (chocolate, cheese, wine) or odors, smoke, bright light, stress, or lack of sleep. · If your doctor has prescribed medicine for your migraines, take it as directed. You may have medicine that you take only when you get a migraine and medicine that you take all the time to help prevent migraines. ¨ If your doctor has prescribed medicine for when you get a headache, take it at the first sign of a migraine, unless your doctor has given you other instructions. ¨ If your doctor has prescribed medicine to prevent migraines, take it exactly as prescribed. Call your doctor if you think you are having a problem with your medicine. · Find healthy ways to deal with stress. Migraines are most common during or right after stressful times. Take time to relax before and after you do something that has caused a migraine in the past.  · Try to keep your muscles relaxed by keeping good posture. Check your jaw, face, neck, and shoulder muscles for tension. Try to relax them. When you sit at a desk, change positions often. And make sure to stretch for 30 seconds each hour. · Get plenty of sleep and exercise. · Eat meals on a regular schedule. Avoid foods and drinks that often trigger migraines. These include chocolate, alcohol (especially red wine and port), aspartame, monosodium glutamate (MSG), and some additives found in foods (such as hot dogs, boyce, cold cuts, aged cheeses, and pickled foods). · Limit caffeine. Don't drink too much coffee, tea, or soda. But don't quit caffeine suddenly. That can also give you migraines. · Do not smoke or allow others to smoke around you. If you need help quitting, talk to your doctor about stop-smoking programs and medicines. These can increase your chances of quitting for good.   · If you are taking birth control pills or hormone therapy, talk to your doctor about whether they are triggering your migraines. When should you call for help? Call 911 anytime you think you may need emergency care. For example, call if:  · You have signs of a stroke. These may include:  ¨ Sudden numbness, paralysis, or weakness in your face, arm, or leg, especially on only one side of your body. ¨ Sudden vision changes. ¨ Sudden trouble speaking. ¨ Sudden confusion or trouble understanding simple statements. ¨ Sudden problems with walking or balance. ¨ A sudden, severe headache that is different from past headaches. Call your doctor now or seek immediate medical care if:  · You have new or worse nausea and vomiting. · You have a new or higher fever. · Your headache gets much worse. Watch closely for changes in your health, and be sure to contact your doctor if:  · You are not getting better after 2 days (48 hours). Where can you learn more? Go to http://michele-lucy.info/. Enter Q502 in the search box to learn more about \"Migraine Headache: Care Instructions. \"  Current as of: October 14, 2016  Content Version: 11.3  © 6318-6330 [employer]. Care instructions adapted under license by [employer] (which disclaims liability or warranty for this information). If you have questions about a medical condition or this instruction, always ask your healthcare professional. [address] any warranty or liability for your use of this information.

## 2017-09-19 NOTE — ED TRIAGE NOTES
Pt. Presents to ED today for complaints of a migraine that has persisted for about 2 days. Patient has been given immitrex shots as well as zofran and torodol. Pt. Has had no relief. Pt. Describes a long history of migraines. Pt. States she is sensitive to light and sound. PT. Alert and oriented x4. Pt. Ralf Darby in brenner bed at this time.

## 2017-09-19 NOTE — UC PROVIDER NOTE
Patient is a 32 y.o. female presenting with migraines. The history is provided by the patient. Migraine    This is a recurrent problem. The current episode started 2 days ago (intermittent for the past week, but persistent since yesterday; was given Imitrex IM yesterday without relief). The problem occurs constantly. The problem has been gradually worsening. The headache is aggravated by photophobia, loud noise and bright light. The pain is located in the bilateral and temporal region. The pain is at a severity of 9/10. The pain is moderate. Associated symptoms include anorexia and nausea. Pertinent negatives include no fever, no malaise/fatigue, no palpitations, no syncope, no shortness of breath, no weakness, no dizziness, no visual change and no vomiting. She has tried triptan therapy and NSAIDs for the symptoms. The treatment provided no relief. Past Medical History:   Diagnosis Date    Anxiety     Depression     Endometriosis     Migraine     Neurological disorder     migraines    Ovarian cyst     Panic attack         Past Surgical History:   Procedure Laterality Date    HX APPENDECTOMY  04/26/2017    HX BREAST LUMPECTOMY Right     HX BUNIONECTOMY      HX CYST REMOVAL      HX OTHER SURGICAL      laparascopy    HX WISDOM TEETH EXTRACTION           Family History   Problem Relation Age of Onset    Diabetes Maternal Aunt     Heart Disease Maternal Aunt     Stroke Maternal Aunt     Heart Disease Maternal Grandmother     No Known Problems Mother         Social History     Social History    Marital status: SINGLE     Spouse name: N/A    Number of children: N/A    Years of education: N/A     Occupational History    Not on file.      Social History Main Topics    Smoking status: Former Smoker    Smokeless tobacco: Never Used    Alcohol use No      Comment: social    Drug use: No    Sexual activity: Yes     Partners: Male     Birth control/ protection: Pill     Other Topics Concern    Not on file     Social History Narrative                ALLERGIES: Compazine [prochlorperazine edisylate]; Haldol [haloperidol lactate]; Promethazine; and Reglan [metoclopramide]    Review of Systems   Constitutional: Positive for activity change and appetite change. Negative for chills, fever and malaise/fatigue. Respiratory: Negative for shortness of breath and wheezing. Cardiovascular: Negative for chest pain, palpitations and syncope. Gastrointestinal: Positive for anorexia and nausea. Negative for vomiting. Musculoskeletal: Negative for myalgias. Skin: Negative for rash. Neurological: Positive for headaches. Negative for dizziness and weakness. Vitals:    09/19/17 1227   BP: 103/59   Pulse: 93   Resp: 16   Temp: 98.4 °F (36.9 °C)   SpO2: 98%   Weight: 67.7 kg (149 lb 4.8 oz)   Height: 5' 8\" (1.727 m)       Physical Exam   Constitutional: She appears well-developed and well-nourished. She appears distressed. Eyes: Pupils are equal, round, and reactive to light. Cardiovascular: Normal rate, regular rhythm and normal heart sounds. Pulmonary/Chest: Effort normal and breath sounds normal. No respiratory distress. She has no wheezes. She has no rales. Neurological: She is alert. No cranial nerve deficit. Skin: She is not diaphoretic. Psychiatric: She has a normal mood and affect. Her behavior is normal. Judgment and thought content normal.   Nursing note and vitals reviewed. MDM     Differential Diagnosis; Clinical Impression; Plan:     CLINICAL IMPRESSION:  Intractable migraine without status migrainosus, unspecified migraine type  (primary encounter diagnosis)    Plan:  1. 1L NS IVF, IV Toradol, IV Zofran administered with no improvement  2. Will try Pred surendra  3. If no improvement or worsening HA advised to go to ER  4. F/u with neurologist  Risk of Significant Complications, Morbidity, and/or Mortality:   Presenting problems:   Moderate  Management options: Moderate      Procedures

## 2017-09-20 ENCOUNTER — PATIENT OUTREACH (OUTPATIENT)
Dept: OTHER | Age: 27
End: 2017-09-20

## 2017-09-20 NOTE — PROGRESS NOTES
ALINA NOTE:     Ms. Jw Norwood  has been contacted regarding recent ED visit . Verified  and address for HIPAA security. Explained role and verified PCP. Discharge medications were reviewed with the patient by telephone. 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 * prednisone taper is started and apt with Dr. Shanita Martinez is made for Oct 17. * Botox treatment has held for 6 months. * using exercise running/ stretching. Looking for less invasive ways to manage chronic pain. - Used chiropractor/ massage/ looking for acupuncture. - Shared Dr. Heloise Hamman within the palliative care services. See AVS  *   Date of ED Admission:       Facility patient visited:   ED [facility]   Reason for Visit:   Aryan Abebe - sent from [address] [place] [address] scripts given by ED? Yes      Able to obtain prescribed medication? Yes - scripts not filled. Migraine 'broke' in ED. Dr. Shanita Martinez called in prescription for prednisone taper. How is the patient feeling? Pt stated no HA today - she is at work. Does the patient have any questions or problems? Not at this time   Any barriers with transportation? no   Follow-up Appointment date: Yes  - Oct 17 with Dr. Shanita Martinez      Reviewed Discharge instructions & Red Flags:  Per AVS.   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 Offered to assist patient in scheduling a follow up with PCP and they declined at this time. Patient states she will follow up with neruology, and PCP is not needed. FU apt is made for 10/17.     Provided patient with my name and contact information and instructed patient if there are any question to call. No further needs at this time. FU call: For 2 weeks. Chart Review:    IMPRESSION:  1. Intractable migraine, unspecified migraine type    2. Nausea without vomiting    3. Anxiety    4. Depression, unspecified depression type          PLAN:  1. Discharge home. Current Discharge Medication List           START taking these medications     Details   ketorolac (TORADOL) 10 mg tablet Take 1 Tab by mouth every six (6) hours as needed for Pain for up to 8 doses. Qty: 8 Tab, Refills: 0       butalbital-acetaminophen-caff (FIORICET) -40 mg per capsule Take 1 to 2 caps every 4 to 6 hours as needed for pain  Qty: 20 Cap, Refills: 0       !! ondansetron (ZOFRAN ODT) 4 mg disintegrating tablet Take 1 Tab by mouth every eight (8) hours as needed for Nausea for up to 10 doses. Qty: 10 Tab, Refills: 0        !! - Potential duplicate medications found. Please discuss with provider.

## 2017-09-22 LAB
BACTERIA SPEC CULT: ABNORMAL
CC UR VC: ABNORMAL
SERVICE CMNT-IMP: ABNORMAL

## 2017-10-02 ENCOUNTER — TELEPHONE (OUTPATIENT)
Dept: NEUROLOGY | Age: 27
End: 2017-10-02

## 2017-10-02 NOTE — TELEPHONE ENCOUNTER
----- Message from [employer] sent at 10/2/2017  1:03 PM EDT -----  Regarding: Dr. Emily Capone / Vivian Adjutant from [facility] stated pt took Prednisone incorrectly and is out of tables, and is needing a new Rx.      [phone]

## 2017-10-02 NOTE — TELEPHONE ENCOUNTER
Outpatient Pharmacy calling because the pt has been taking her prednisone wrong and now she is completely out.  Please call back for clarification

## 2017-10-03 NOTE — TELEPHONE ENCOUNTER
Pharmacy calling in re to pt's prednisone. They said the pt keeps calling about her medication.  Please call pharmacy back [phone]

## 2017-10-03 NOTE — TELEPHONE ENCOUNTER
Spoke [employer] form pharmacy. Patient took prednisone rx 9/19/2017 completely wrong. Finished prednisone pack  on Sunday. How would you like to proceed?

## 2017-10-04 ENCOUNTER — TELEPHONE (OUTPATIENT)
Dept: NEUROLOGY | Age: 27
End: 2017-10-04

## 2017-10-04 ENCOUNTER — PATIENT OUTREACH (OUTPATIENT)
Dept: OTHER | Age: 27
End: 2017-10-04

## 2017-10-13 ENCOUNTER — HOSPITAL ENCOUNTER (EMERGENCY)
Age: 27
Discharge: HOME | End: 2017-10-13
Attending: EMERGENCY MEDICINE
Payer: COMMERCIAL

## 2017-10-13 VITALS
DIASTOLIC BLOOD PRESSURE: 74 MMHG | SYSTOLIC BLOOD PRESSURE: 110 MMHG | OXYGEN SATURATION: 100 % | TEMPERATURE: 98.3 F | HEART RATE: 108 BPM | RESPIRATION RATE: 16 BRPM | HEIGHT: 68 IN | WEIGHT: 152.78 LBS | BODY MASS INDEX: 23.15 KG/M2

## 2017-10-13 DIAGNOSIS — G43.009 MIGRAINE WITHOUT AURA AND WITHOUT STATUS MIGRAINOSUS, NOT INTRACTABLE: Primary | ICD-10-CM

## 2017-10-13 LAB
APPEARANCE UR: CLEAR
BACTERIA URNS QL MICRO: NEGATIVE /HPF
BILIRUB UR QL: NEGATIVE
COLOR UR: ABNORMAL
EPITH CASTS URNS QL MICRO: ABNORMAL /LPF
GLUCOSE UR STRIP.AUTO-MCNC: NEGATIVE MG/DL
HCG UR QL: NEGATIVE
HGB UR QL STRIP: NEGATIVE
HYALINE CASTS URNS QL MICRO: ABNORMAL /LPF (ref 0–5)
KETONES UR QL STRIP.AUTO: ABNORMAL MG/DL
LEUKOCYTE ESTERASE UR QL STRIP.AUTO: NEGATIVE
NITRITE UR QL STRIP.AUTO: NEGATIVE
PH UR STRIP: 6.5 [PH] (ref 5–8)
PROT UR STRIP-MCNC: NEGATIVE MG/DL
RBC #/AREA URNS HPF: ABNORMAL /HPF (ref 0–5)
SP GR UR REFRACTOMETRY: 1.02 (ref 1–1.03)
UA: UC IF INDICATED,UAUC: ABNORMAL
UROBILINOGEN UR QL STRIP.AUTO: 1 EU/DL (ref 0.2–1)
WBC URNS QL MICRO: ABNORMAL /HPF (ref 0–4)

## 2017-10-13 PROCEDURE — 81025 URINE PREGNANCY TEST: CPT

## 2017-10-13 PROCEDURE — 99283 EMERGENCY DEPT VISIT LOW MDM: CPT

## 2017-10-13 PROCEDURE — 81001 URINALYSIS AUTO W/SCOPE: CPT | Performed by: EMERGENCY MEDICINE

## 2017-10-13 PROCEDURE — 96365 THER/PROPH/DIAG IV INF INIT: CPT

## 2017-10-13 PROCEDURE — 96375 TX/PRO/DX INJ NEW DRUG ADDON: CPT

## 2017-10-13 PROCEDURE — 74011250636 HC RX REV CODE- 250/636: Performed by: EMERGENCY MEDICINE

## 2017-10-13 RX ORDER — DEXAMETHASONE SODIUM PHOSPHATE 4 MG/ML
10 INJECTION, SOLUTION INTRA-ARTICULAR; INTRALESIONAL; INTRAMUSCULAR; INTRAVENOUS; SOFT TISSUE
Status: COMPLETED | OUTPATIENT
Start: 2017-10-13 | End: 2017-10-13

## 2017-10-13 RX ORDER — MAGNESIUM SULFATE HEPTAHYDRATE 40 MG/ML
2 INJECTION, SOLUTION INTRAVENOUS
Status: COMPLETED | OUTPATIENT
Start: 2017-10-13 | End: 2017-10-13

## 2017-10-13 RX ORDER — DIPHENHYDRAMINE HYDROCHLORIDE 50 MG/ML
25 INJECTION, SOLUTION INTRAMUSCULAR; INTRAVENOUS
Status: DISCONTINUED | OUTPATIENT
Start: 2017-10-13 | End: 2017-10-14 | Stop reason: HOSPADM

## 2017-10-13 RX ORDER — HYDROMORPHONE HYDROCHLORIDE 1 MG/ML
1 INJECTION, SOLUTION INTRAMUSCULAR; INTRAVENOUS; SUBCUTANEOUS
Status: COMPLETED | OUTPATIENT
Start: 2017-10-13 | End: 2017-10-13

## 2017-10-13 RX ORDER — ONDANSETRON 2 MG/ML
4 INJECTION INTRAMUSCULAR; INTRAVENOUS
Status: COMPLETED | OUTPATIENT
Start: 2017-10-13 | End: 2017-10-13

## 2017-10-13 RX ADMIN — ONDANSETRON 4 MG: 2 INJECTION INTRAMUSCULAR; INTRAVENOUS at 20:36

## 2017-10-13 RX ADMIN — HYDROMORPHONE HYDROCHLORIDE 1 MG: 1 INJECTION, SOLUTION INTRAMUSCULAR; INTRAVENOUS; SUBCUTANEOUS at 21:40

## 2017-10-13 RX ADMIN — DEXAMETHASONE SODIUM PHOSPHATE 10 MG: 4 INJECTION, SOLUTION INTRAMUSCULAR; INTRAVENOUS at 20:36

## 2017-10-13 RX ADMIN — MAGNESIUM SULFATE HEPTAHYDRATE 2 G: 40 INJECTION, SOLUTION INTRAVENOUS at 21:05

## 2017-10-13 RX ADMIN — SODIUM CHLORIDE 1000 ML: 900 INJECTION, SOLUTION INTRAVENOUS at 21:05

## 2017-10-13 NOTE — ED TRIAGE NOTES
Pt states that she gets migraine headaches, but unable to relieve pain with current medications at home.   States this headache started at 0900 this am.

## 2017-10-13 NOTE — ED PROVIDER NOTES
[facility].  EMERGENCY DEPARTMENT HISTORY AND PHYSICAL EXAM       Date of Service: 10/13/2017   Patient Name: Lynette Escalante   YOB: 1990  Medical Record Number: [id number]    History of Presenting Illness     Chief Complaint   Patient presents with    Headache     Onset 0900 this morning. Pt has hx of migraines. Pt has taken her Imitrex, Zofran, Toradol, and Fioricet with no relief. History Provided By:  patient    Additional History:   Lynette Escalante is a 32 y.o. female with PMhx significant for migraine headaches, anxiety, depression who presents ambulatory to the ED with cc of a constant frontal and posterior migraine headache x 09:00, rated 9/10 in severity. Patient states her headache feels like one of her typical migraines, but reports no improvement with Zofran, Toradol, Fioricet, or subcutaneous Imitrex at home. Last similar headache was one month ago. Today she c/o exacerbation of pain with light and sound, and constant nausea. Patient specifically denies F/C, abd pain, CP, cough, dysuria, hematuria, or vomiting. She states that she was last seen for a migraine headache in the ER a month ago and improved with IVF, Dilaudid and Dexamethasone. She is scheduled to establish care with a new neurologist, Dr. Chino Alvarez on 11/13/2017 after experiencing difficulties with seeing Dr. Kristene Goodpasture in office. Patient has been taking Topamax 100 mg qs for prophylaxis, prescribed by her psychiatrist while she is in between neurologists. Lastly, she reports management of her migraines over a year ago with botox injections. Social Hx: - (former) Tobacco, - EtOH, - Illicit Drugs    There are no other complaints, changes or physical findings at this time.     Primary Care Provider: None   Specialist: Dr. Ofelia Zuñiga at [facility] & Dr. Daniel Marin (neurology)    Past History     Past Medical History:   Past Medical History:   Diagnosis Date    Anxiety     Depression  Endometriosis     Migraine     Neurological disorder     migraines    Ovarian cyst     Panic attack         Past Surgical History:   Past Surgical History:   Procedure Laterality Date    HX APPENDECTOMY  04/26/2017    HX BREAST LUMPECTOMY Right     HX BUNIONECTOMY      HX CYST REMOVAL      HX OTHER SURGICAL      laparascopy    HX WISDOM TEETH EXTRACTION          Family History:   Family History   Problem Relation Age of Onset    Diabetes Maternal Aunt     Heart Disease Maternal Aunt     Stroke Maternal Aunt     Heart Disease Maternal Grandmother     No Known Problems Mother         Social History:   Social History   Substance Use Topics    Smoking status: Former Smoker    Smokeless tobacco: Never Used    Alcohol use No      Comment: social        Allergies: Allergies   Allergen Reactions    Compazine [Prochlorperazine Edisylate] Anxiety    Haldol [Haloperidol Lactate] Other (comments)    Promethazine Other (comments)     \"It makes me feel really funny, nausea and dizzy\"    Reglan [Metoclopramide] Other (comments)         Review of Systems   Review of Systems   Constitutional: Negative for chills, fatigue and fever. HENT: Negative for congestion, rhinorrhea and sore throat. Eyes: Negative for pain, discharge and visual disturbance. Respiratory: Negative for cough, chest tightness, shortness of breath and wheezing. Cardiovascular: Negative for chest pain, palpitations and leg swelling. Gastrointestinal: Positive for nausea. Negative for abdominal pain, constipation, diarrhea and vomiting. Genitourinary: Negative for dysuria, frequency and hematuria. Musculoskeletal: Negative for arthralgias, back pain and myalgias. Skin: Negative for rash. Neurological: Positive for headaches. Negative for dizziness, weakness and light-headedness. Psychiatric/Behavioral: Negative. Physical Exam  Physical Exam   Constitutional: She is oriented to person, place, and time.  She appears well-developed and well-nourished. No distress. HENT:   Head: Normocephalic and atraumatic. Eyes: EOM are normal. Pupils are equal, round, and reactive to light. Right eye exhibits no discharge. Left eye exhibits no discharge. No scleral icterus. Neck: Normal range of motion. Neck supple. No tracheal deviation present. No meningismus    Cardiovascular: Normal rate, regular rhythm, normal heart sounds and intact distal pulses. Exam reveals no gallop and no friction rub. No murmur heard. Pulmonary/Chest: Effort normal and breath sounds normal. No respiratory distress. She has no wheezes. She has no rales. Abdominal: Soft. She exhibits no distension. There is no tenderness. Musculoskeletal: Normal range of motion. She exhibits no edema. Lymphadenopathy:     She has no cervical adenopathy. Neurological: She is alert and oriented to person, place, and time. nml speech. Facial symmetry. 5/5 strength of all four extremities. Skin: Skin is warm and dry. No rash noted. Psychiatric: She has a normal mood and affect. Nursing note and vitals reviewed. Medical Decision Making   I am the first provider for this patient. I reviewed the vital signs, available nursing notes, past medical history, past surgical history, family history and social history. Old Medical Records: Old medical records. Nursing notes. Provider Notes:   Patient presents to ED with a migraine consistent with her prior migraines. She appears well on exam and is neurologically intact. Headache likely primary headache vs migraine vs tension headache. Do not suspect acute intracranial process including ICH, SAH, aneurysm, meningitis. Will check UA, provide symptomatic treatment and reevaluate.   Patient requesting dilaudid since this has worked well for her headaches in the past.  I explained that we generally do not treat migraines with narcotics as there are other options that are more optimal for migraine management. Patient expressed understanding. Critical Care Time: none    ED Course:  7:13 PM   Initial assessment performed. The patients presenting problems have been discussed, and they are in agreement with the care plan formulated and outlined with them. I have encouraged them to ask questions as they arise throughout their visit. Progress Notes:   9:19 PM  On reevaluation, patient still c/o HA and insists that her migraine headaches only improve with Dilaudid. Discussed with patient that narcotics are generally not used for tx of migraine headaches. Patient still insists that her pain will only improve with narcotic medication. She expresses understanding of the risks of rebound headaches. Will tx with Dilaudid 1 mg IV. Awaiting UA results. Written by Mable Reno ED Scribe, as dictated by Margie Marcos MD.       9:54 PM  On reevaluation, patient reports much improvement of her pain with Dilaudid, administered 10 minutes ago. Will continue to monitor patient. Written by FIORDALIZA Jimenezibkati, as dictated by Margie Marcos MD.       10:45 PM  Patient reports she is feeling much better and is comfortable with plan for discharge. Discussed results, prescriptions and follow up plan with patient. Provided customary return to ED instructions. Patient expressed understanding.   Lucina Millard MD    Procedures: none    Diagnostic Study Results     Labs -      Recent Results (from the past 12 hour(s))   URINALYSIS W/ REFLEX CULTURE    Collection Time: 10/13/17  9:13 PM   Result Value Ref Range    Color YELLOW/STRAW      Appearance CLEAR CLEAR      Specific gravity 1.019 1.003 - 1.030      pH (UA) 6.5 5.0 - 8.0      Protein NEGATIVE  NEG mg/dL    Glucose NEGATIVE  NEG mg/dL    Ketone TRACE (A) NEG mg/dL    Bilirubin NEGATIVE  NEG      Blood NEGATIVE  NEG      Urobilinogen 1.0 0.2 - 1.0 EU/dL    Nitrites NEGATIVE  NEG      Leukocyte Esterase NEGATIVE  NEG      WBC 0-4 0 - 4 /hpf RBC 0-5 0 - 5 /hpf    Epithelial cells FEW FEW /lpf    Bacteria NEGATIVE  NEG /hpf    UA:UC IF INDICATED CULTURE NOT INDICATED BY UA RESULT CNI      Hyaline cast 0-2 0 - 5 /lpf   HCG URINE, QL. - POC    Collection Time: 10/13/17  9:16 PM   Result Value Ref Range    Pregnancy test,urine (POC) NEGATIVE  NEG         Vital Signs-Reviewed the patient's vital signs. Patient Vitals for the past 12 hrs:   Temp Pulse Resp BP SpO2   10/13/17 1853 - (!) 108 16 110/74 100 %   10/13/17 1753 98.3 °F (36.8 °C) 100 16 119/83 100 %       Medications Given in the ED:  Medications   diphenhydrAMINE (BENADRYL) injection 25 mg (0 mg IntraVENous Held 10/13/17 2036)   magnesium sulfate 2 g/50 ml IVPB (premix or compounded) (2 g IntraVENous New Bag 10/13/17 2105)   sodium chloride 0.9 % bolus infusion 1,000 mL (1,000 mL IntraVENous New Bag 10/13/17 2105)   ondansetron (ZOFRAN) injection 4 mg (4 mg IntraVENous Given 10/13/17 2036)   dexamethasone (DECADRON) 4 mg/mL injection 10 mg (10 mg IntraVENous Given 10/13/17 2036)   HYDROmorphone (PF) (DILAUDID) injection 1 mg (1 mg IntraVENous Given 10/13/17 2140)       Pulse Oximetry Analysis - Normal 100% on RA     Cardiac Monitor:   Rate: 105  Rhythm: Sinus Tachycardia        Diagnosis   Clinical Impression:   1. Migraine without aura and without status migrainosus, not intractable         Plan:  1:   Follow-up Information     Follow up With Details Comments Contact Info    Isabel Pichardo MD  Please follow up with your neurologist as scheduled [address]  [address] [facility] EMERGENCY DEPT  As needed, If symptoms worsen [address] [facility] [place] [address] [phone]        2:   Current Discharge Medication List        Return to ED if Worse    Disposition Note:    Discharge Note:  10:45 PM  The pt is ready for discharge.  The pt's signs, symptoms, diagnosis, and discharge instructions have been discussed and pt has conveyed their understanding. The pt is to follow up as recommended or return to ER should their symptoms worsen. Plan has been discussed and pt is in agreement.   _______________________________   Attestations: This note is prepared by Naima Jha, acting as Scribe for Evelyne Motta MD.       The scribe's documentation has been prepared under my direction and personally reviewed by me in its entirety.  I confirm that the note above accurately reflects all work, treatment, procedures, and medical decision making performed by me.   _______________________________

## 2017-10-14 NOTE — ED NOTES
Rounded on pt and visitor, pt still complains of migraine. Will continue to monitor. Call bell within reach.

## 2017-10-14 NOTE — DISCHARGE INSTRUCTIONS
Migraine Headache: Care Instructions  Your Care Instructions  Migraines are painful, throbbing headaches that often start on one side of the head. They may cause nausea and vomiting and make you sensitive to light, sound, or smell. Without treatment, migraines can last from 4 hours to a few days. Medicines can help prevent migraines or stop them after they have started. Your doctor can help you find which ones work best for you. Follow-up care is a key part of your treatment and safety. Be sure to make and go to all appointments, and call your doctor if you are having problems. It's also a good idea to know your test results and keep a list of the medicines you take. How can you care for yourself at home? · Do not drive if you have taken a prescription pain medicine. · Rest in a quiet, dark room until your headache is gone. Close your eyes, and try to relax or go to sleep. Don't watch TV or read. · Put a cold, moist cloth or cold pack on the painful area for 10 to 20 minutes at a time. Put a thin cloth between the cold pack and your skin. · Use a warm, moist towel or a heating pad set on low to relax tight shoulder and neck muscles. · Have someone gently massage your neck and shoulders. · Take your medicines exactly as prescribed. Call your doctor if you think you are having a problem with your medicine. You will get more details on the specific medicines your doctor prescribes. · Be careful not to take pain medicine more often than the instructions allow. You could get worse or more frequent headaches when the medicine wears off. To prevent migraines  · Keep a headache diary so you can figure out what triggers your headaches. Avoiding triggers may help you prevent headaches. Record when each headache began, how long it lasted, and what the pain was like.  (Was it throbbing, aching, stabbing, or dull?) Write down any other symptoms you had with the headache, such as nausea, flashing lights or dark spots, or sensitivity to bright light or loud noise. Note if the headache occurred near your period. List anything that might have triggered the headache. Triggers may include certain foods (chocolate, cheese, wine) or odors, smoke, bright light, stress, or lack of sleep. · If your doctor has prescribed medicine for your migraines, take it as directed. You may have medicine that you take only when you get a migraine and medicine that you take all the time to help prevent migraines. ¨ If your doctor has prescribed medicine for when you get a headache, take it at the first sign of a migraine, unless your doctor has given you other instructions. ¨ If your doctor has prescribed medicine to prevent migraines, take it exactly as prescribed. Call your doctor if you think you are having a problem with your medicine. · Find healthy ways to deal with stress. Migraines are most common during or right after stressful times. Take time to relax before and after you do something that has caused a migraine in the past.  · Try to keep your muscles relaxed by keeping good posture. Check your jaw, face, neck, and shoulder muscles for tension. Try to relax them. When you sit at a desk, change positions often. And make sure to stretch for 30 seconds each hour. · Get plenty of sleep and exercise. · Eat meals on a regular schedule. Avoid foods and drinks that often trigger migraines. These include chocolate, alcohol (especially red wine and port), aspartame, monosodium glutamate (MSG), and some additives found in foods (such as hot dogs, boyce, cold cuts, aged cheeses, and pickled foods). · Limit caffeine. Don't drink too much coffee, tea, or soda. But don't quit caffeine suddenly. That can also give you migraines. · Do not smoke or allow others to smoke around you. If you need help quitting, talk to your doctor about stop-smoking programs and medicines. These can increase your chances of quitting for good.   · If you are taking birth control pills or hormone therapy, talk to your doctor about whether they are triggering your migraines. When should you call for help? Call 911 anytime you think you may need emergency care. For example, call if:  · You have signs of a stroke. These may include:  ¨ Sudden numbness, paralysis, or weakness in your face, arm, or leg, especially on only one side of your body. ¨ Sudden vision changes. ¨ Sudden trouble speaking. ¨ Sudden confusion or trouble understanding simple statements. ¨ Sudden problems with walking or balance. ¨ A sudden, severe headache that is different from past headaches. Call your doctor now or seek immediate medical care if:  · You have new or worse nausea and vomiting. · You have a new or higher fever. · Your headache gets much worse. Watch closely for changes in your health, and be sure to contact your doctor if:  · You are not getting better after 2 days (48 hours). Where can you learn more? Go to http://michele-lucy.info/. Enter G350 in the search box to learn more about \"Migraine Headache: Care Instructions. \"  Current as of: October 14, 2016  Content Version: 11.3  © 9196-6443 [employer]. Care instructions adapted under license by [employer] (which disclaims liability or warranty for this information). If you have questions about a medical condition or this instruction, always ask your healthcare professional. [address] any warranty or liability for your use of this information.

## 2017-10-14 NOTE — ED NOTES
MD Allen President has reviewed discharge instructions with the patient. The patient verbalized understanding. Pt discharged with written instructions. No further concerns at this time. IV removed. Pt given prescriptions and ED excuse note. Pt ambulatory to exit.

## 2017-10-14 NOTE — ED NOTES
Bedside and Verbal shift change report given to Alta Emanuel RN (oncoming nurse) by Michelle Martin RN (offgoing nurse). Report included the following information SBAR, Kardex and ED Summary.

## 2017-10-16 ENCOUNTER — PATIENT OUTREACH (OUTPATIENT)
Dept: OTHER | Age: 27
End: 2017-10-16

## 2017-10-16 ENCOUNTER — TELEPHONE (OUTPATIENT)
Dept: NEUROLOGY | Age: 27
End: 2017-10-16

## 2017-10-16 NOTE — TELEPHONE ENCOUNTER
Spoke with patient and gave her an appointment for October 23, 2017 to arrive at 3:20 pm for a 3:40 pm.

## 2017-10-16 NOTE — TELEPHONE ENCOUNTER
----- Message from Eboni Zavala sent at 10/16/2017 10:39 AM EDT -----  Regarding: Dr. 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 For hospital follow up. Her contact number is .

## 2017-10-17 ENCOUNTER — PATIENT OUTREACH (OUTPATIENT)
Dept: OTHER | Age: 27
End: 2017-10-17

## 2017-10-17 NOTE — PROGRESS NOTES
ALINA follow up. Patient with ED visit 10/03 for second migraine within 30 days. Contacted patient for transitions of care services. Verified  and address for HIPAA security. * Ms. Cornelius answered but is at work. She is in office environment M-F and gets off at [address] to help with arranging apts or calls to Neruology practice in her behalf. - She will keep this in mind/ but declines at this time. * Requested that she call me if any needs arise, or if she has time during her busy day. Will follow for FRANK ZIEGLER services in three weeks.

## 2017-10-23 ENCOUNTER — OFFICE VISIT (OUTPATIENT)
Dept: NEUROLOGY | Age: 27
End: 2017-10-23

## 2017-10-23 VITALS
SYSTOLIC BLOOD PRESSURE: 120 MMHG | WEIGHT: 152 LBS | DIASTOLIC BLOOD PRESSURE: 62 MMHG | HEIGHT: 68 IN | BODY MASS INDEX: 23.04 KG/M2 | OXYGEN SATURATION: 97 % | HEART RATE: 90 BPM

## 2017-10-23 DIAGNOSIS — G43.009 MIGRAINE WITHOUT AURA AND WITHOUT STATUS MIGRAINOSUS, NOT INTRACTABLE: Primary | ICD-10-CM

## 2017-10-23 RX ORDER — SUMATRIPTAN AND NAPROXEN SODIUM 85; 500 MG/1; MG/1
TABLET, FILM COATED ORAL
Qty: 18 TAB | Refills: 2 | Status: SHIPPED | OUTPATIENT
Start: 2017-10-23 | End: 2018-01-15

## 2017-10-23 RX ORDER — TOPIRAMATE 100 MG/1
100 TABLET, FILM COATED ORAL
COMMUNITY
End: 2018-09-11 | Stop reason: SDUPTHER

## 2017-10-23 RX ORDER — DIVALPROEX SODIUM 500 MG/1
500 TABLET, DELAYED RELEASE ORAL DAILY
Qty: 30 TAB | Refills: 2 | Status: SHIPPED | OUTPATIENT
Start: 2017-10-23 | End: 2018-01-15

## 2017-10-23 NOTE — MR AVS SNAPSHOT
Visit Information Date & Time Provider Department Dept. Phone Encounter #  
 10/23/2017  3:40 PM Jose E Guerra MD Neurology Clinic at [facility] [phone] [id number] Your Appointments 12/4/2017  2:00 PM  
Follow Up with Jose E Guerra MD  
Neurology Clinic at [facility]) Appt Note: Follow up $0CP tdb 10/23/17  
 [address] [place] [address], 
[address], [address] [phone]  
[address], [address], [address] [address] Upcoming Health Maintenance Date Due  
 PAP AKA CERVICAL CYTOLOGY 6/29/2009 INFLUENZA AGE 9 TO ADULT 8/1/2017 DTaP/Tdap/Td series (2 - Td) 11/24/2022 Allergies as of 10/23/2017  Review Complete On: 10/13/2017 By: Maddie Zuñiga RN Severity Noted Reaction Type Reactions Compazine [Prochlorperazine Edisylate]  11/21/2016    Anxiety Haldol [Haloperidol Lactate]  04/15/2016    Other (comments) Promethazine  01/06/2016    Other (comments) \"It makes me feel really funny, nausea and dizzy\" Reglan [Metoclopramide]  04/15/2016    Other (comments) Current Immunizations  Reviewed on 4/21/2017 Name Date DTaP 11/24/2012 Hib 7/23/2015 Influenza Vaccine 10/24/2015 Rho(D) Immune Globulin - IM 6/4/2016  4:46 PM  
  
 Not reviewed this visit Vitals BP Pulse Height(growth percentile) Weight(growth percentile) SpO2 BMI  
 120/62 90 5' 8\" (1.727 m) 152 lb (68.9 kg) 97% 23.11 kg/m2 OB Status Smoking Status IUD Former Smoker Vitals History BMI and BSA Data Body Mass Index Body Surface Area  
 23.11 kg/m 2 1.82 m 2 Preferred Pharmacy Pharmacy Name Phone [facility] [phone] Your Updated Medication List  
  
   
This list is accurate as of: 10/23/17  4:05 PM.  Always use your most recent med list.  
  
  
  
 butalbital-acetaminophen-caff -40 mg per capsule Commonly known as:  [employer] Take 1 to 2 caps every 4 to 6 hours as needed for pain  
  
 clonazePAM 1 mg tablet Commonly known as:  Reyes Altes Take 2 mg by mouth nightly. doxycycline 100 mg tablet Commonly known as:  ADOXA Take 100 mg by mouth two (2) times a day.  
  
 ketorolac 10 mg tablet Commonly known as:  TORADOL Take 1 Tab by mouth every six (6) hours as needed for Pain for up to 8 doses. LEXAPRO 20 mg tablet Generic drug:  escitalopram oxalate Take 20 mg by mouth every morning. Indications: GENERALIZED ANXIETY DISORDER  
  
 MIRENA 20 mcg/24 hr (5 years) IUD Generic drug:  levonorgestrel 1 Each by IntraUTERine route once. * ondansetron 4 mg disintegrating tablet Commonly known as:  ZOFRAN ODT Take 1 Tab by mouth every eight (8) hours as needed for Nausea. * ondansetron 4 mg disintegrating tablet Commonly known as:  ZOFRAN ODT Take 1 Tab by mouth every eight (8) hours as needed for Nausea for up to 10 doses. * predniSONE 20 mg tablet Commonly known as:  DELTASONE  
WEEK 1:  Take 3 tablets daily for 5 days WEEK 2: Take 2 tablets daily for 5 days WEEK 3: Take 1 tablet daily for 5 days WEEK 4: Take 1/2 tablet daily for 5 days * predniSONE 10 mg dose pack Commonly known as:  STERAPRED DS Take as directed, with food SUMAtriptan succinate 3 mg/0.5 mL Pnij Commonly known as:  Melissa Dasen  
3 mg by SubCUTAneous route as needed. TOPAMAX 100 mg tablet Generic drug:  topiramate Take  by mouth two (2) times a day. * Notice: This list has 4 medication(s) that are the same as other medications prescribed for you. Read the directions carefully, and ask your doctor or other care provider to review them with you. Patient Instructions 1. Sent Treximet to [facility] 2. Depakote 500 mg p.o. nightly 3. Follow-up in 6 weeks PRESCRIPTION REFILL POLICY [facility] Statement to Patients April 1, 2014 In an effort to ensure the large volume of patient prescription refills is processed in the most efficient and expeditious manner, we are asking our patients to assist us by calling your Pharmacy for all prescription refills, this will include also your  Mail Order Pharmacy. The pharmacy will contact our office electronically to continue the refill process. Please do not wait until the last minute to call your pharmacy. We need at least 48 hours (2days) to fill prescriptions. We also encourage you to call your pharmacy before going to  your prescription to make sure it is ready. With regard to controlled substance prescription refill requests (narcotic refills) that need to be picked up at our office, we ask your cooperation by providing us with at least 72 hours (3days) notice that you will need a refill. We will not refill narcotic prescription refill requests after 4:00pm on any weekday, Monday through Thursday, or after 2:00pm on Fridays, or on the weekends. We encourage everyone to explore another way of getting your prescription refill request processed using Retidoc, our patient web portal through our electronic medical record system. Retidoc is an efficient and effective way to communicate your medication request directly to the office and  downloadable as an charlee on your smart phone . Retidoc also features a review functionality that allows you to view your medication list as well as leave messages for your physician. Are you ready to get connected? If so please review the attatched instructions or speak to any of our staff to get you set up right away! Thank you so much for your cooperation. Should you have any questions please contact our Practice Administrator. The Physicians and Staff,  [facility] A Healthy Lifestyle: Care Instructions Your Care Instructions A healthy lifestyle can help you feel good, stay at a healthy weight, and have plenty of energy for both work and play. A healthy lifestyle is something you can share with your whole family. A healthy lifestyle also can lower your risk for serious health problems, such as high blood pressure, heart disease, and diabetes. You can follow a few steps listed below to improve your health and the health of your family. Follow-up care is a key part of your treatment and safety. Be sure to make and go to all appointments, and call your doctor if you are having problems. Its also a good idea to know your test results and keep a list of the medicines you take. How can you care for yourself at home? · Do not eat too much sugar, fat, or fast foods. You can still have dessert and treats now and then. The goal is moderation. · Start small to improve your eating habits. Pay attention to portion sizes, drink less juice and soda pop, and eat more fruits and vegetables. ¨ Eat a healthy amount of food. A 3-ounce serving of meat, for example, is about the size of a deck of cards. Fill the rest of your plate with vegetables and whole grains. ¨ Limit the amount of soda and sports drinks you have every day. Drink more water when you are thirsty. ¨ Eat at least 5 servings of fruits and vegetables every day. It may seem like a lot, but it is not hard to reach this goal. A serving or helping is 1 piece of fruit, 1 cup of vegetables, or 2 cups of leafy, raw vegetables. Have an apple or some carrot sticks as an afternoon snack instead of a candy bar. Try to have fruits and/or vegetables at every meal. 
· Make exercise part of your daily routine. You may want to start with simple activities, such as walking, bicycling, or slow swimming. Try to be active 30 to 60 minutes every day. You do not need to do all 30 to 60 minutes all at once.  For example, you can exercise 3 times a day for 10 or 20 minutes. Moderate exercise is safe for most people, but it is always a good idea to talk to your doctor before starting an exercise program. 
· Keep moving. Dada Иван the lawn, work in the garden, or [employer]. Take the stairs instead of the elevator at work. · If you smoke, quit. People who smoke have an increased risk for heart attack, stroke, cancer, and other lung illnesses. Quitting is hard, but there are ways to boost your chance of quitting tobacco for good. ¨ Use nicotine gum, patches, or lozenges. ¨ Ask your doctor about stop-smoking programs and medicines. ¨ Keep trying. In addition to reducing your risk of diseases in the future, you will notice some benefits soon after you stop using tobacco. If you have shortness of breath or asthma symptoms, they will likely get better within a few weeks after you quit. · Limit how much alcohol you drink. Moderate amounts of alcohol (up to 2 drinks a day for men, 1 drink a day for women) are okay. But drinking too much can lead to liver problems, high blood pressure, and other health problems. Family health If you have a family, there are many things you can do together to improve your health. · Eat meals together as a family as often as possible. · Eat healthy foods. This includes fruits, vegetables, lean meats and dairy, and whole grains. · Include your family in your fitness plan. Most people think of activities such as jogging or tennis as the way to fitness, but there are many ways you and your family can be more active. Anything that makes you breathe hard and gets your heart pumping is exercise. Here are some tips: 
¨ Walk to do errands or to take your child to school or the bus. ¨ Go for a family bike ride after dinner instead of watching TV. Where can you learn more? Go to http://michele-lucy.info/. Enter S542 in the search box to learn more about \"A Healthy Lifestyle: Care Instructions. \" Current as of: July 26, 2016 Content Version: 11.3 © 4760-7618 Girls Guide To, AnyLeaf. Care instructions adapted under license by [employer] (which disclaims liability or warranty for this information). If you have questions about a medical condition or this instruction, always ask your healthcare professional. [address] any warranty or liability for your use of this information. Introducing \[facility]\"" & HEALTH SERVICES! Veda Gracia introduces Gymtrack patient portal. Now you can access parts of your medical record, email your doctor's office, and request medication refills online. 1. In your internet browser, go to [email or web address] 2. Click on the First Time User? Click Here link in the Sign In box. You will see the New Member Sign Up page. 3. Enter your Gymtrack Access Code exactly as it appears below. You will not need to use this code after youve completed the sign-up process. If you do not sign up before the expiration date, you must request a new code. · Gymtrack Access Code: [email or web address]  Expires: 12/18/2017  1:21 PM 
 
4. Enter the last four digits of your Social Security Number (xxxx) and Date of Birth (mm/dd/yyyy) as indicated and click Submit. You will be taken to the next sign-up page. 5. Create a Gymtrack ID. This will be your Gymtrack login ID and cannot be changed, so think of one that is secure and easy to remember. 6. Create a Gymtrack password. You can change your password at any time. 7. Enter your Password Reset Question and Answer. This can be used at a later time if you forget your password. 8. Enter your e-mail address. You will receive e-mail notification when new information is available in [address] 9. Click Sign Up. You can now view and download portions of your medical record. 10. Click the Download Summary menu link to download a portable copy of your medical information. If you have questions, please visit the Frequently Asked Questions section of the [employer] website. Remember, [employer] is NOT to be used for urgent needs. For medical emergencies, dial 911. Now available from your iPhone and Android! Please provide this summary of care documentation to your next provider. Your primary care clinician is listed as NONE. If you have any questions after today's visit, please call [phone].

## 2017-10-23 NOTE — PATIENT INSTRUCTIONS
1.  Sent Treximet to [facility]  2. Depakote 500 mg p.o. nightly  3. Follow-up in 6 weeks    [facility]   Statement to Patients  April 1, 2014      In an effort to ensure the large volume of patient prescription refills is processed in the most efficient and expeditious manner, we are asking our patients to assist us by calling your Pharmacy for all prescription refills, this will include also your  Mail Order Pharmacy. The pharmacy will contact our office electronically to continue the refill process. Please do not wait until the last minute to call your pharmacy. We need at least 48 hours (2days) to fill prescriptions. We also encourage you to call your pharmacy before going to  your prescription to make sure it is ready. With regard to controlled substance prescription refill requests (narcotic refills) that need to be picked up at our office, we ask your cooperation by providing us with at least 72 hours (3days) notice that you will need a refill. We will not refill narcotic prescription refill requests after 4:00pm on any weekday, Monday through Thursday, or after 2:00pm on Fridays, or on the weekends. We encourage everyone to explore another way of getting your prescription refill request processed using [employer], our patient web portal through our electronic medical record system. [employer] is an efficient and effective way to communicate your medication request directly to the office and  downloadable as an charlee on your smart phone . [employer] also features a review functionality that allows you to view your medication list as well as leave messages for your physician. Are you ready to get connected? If so please review the attatched instructions or speak to any of our staff to get you set up right away! Thank you so much for your cooperation. Should you have any questions please contact our Practice Administrator.     The Physicians and Staff,  [facility]          A Healthy Lifestyle: Care Instructions  Your Care Instructions  A healthy lifestyle can help you feel good, stay at a healthy weight, and have plenty of energy for both work and play. A healthy lifestyle is something you can share with your whole family. A healthy lifestyle also can lower your risk for serious health problems, such as high blood pressure, heart disease, and diabetes. You can follow a few steps listed below to improve your health and the health of your family. Follow-up care is a key part of your treatment and safety. Be sure to make and go to all appointments, and call your doctor if you are having problems. Its also a good idea to know your test results and keep a list of the medicines you take. How can you care for yourself at home? · Do not eat too much sugar, fat, or fast foods. You can still have dessert and treats now and then. The goal is moderation. · Start small to improve your eating habits. Pay attention to portion sizes, drink less juice and soda pop, and eat more fruits and vegetables. ¨ Eat a healthy amount of food. A 3-ounce serving of meat, for example, is about the size of a deck of cards. Fill the rest of your plate with vegetables and whole grains. ¨ Limit the amount of soda and sports drinks you have every day. Drink more water when you are thirsty. ¨ Eat at least 5 servings of fruits and vegetables every day. It may seem like a lot, but it is not hard to reach this goal. A serving or helping is 1 piece of fruit, 1 cup of vegetables, or 2 cups of leafy, raw vegetables. Have an apple or some carrot sticks as an afternoon snack instead of a candy bar. Try to have fruits and/or vegetables at every meal.  · Make exercise part of your daily routine. You may want to start with simple activities, such as walking, bicycling, or slow swimming. Try to be active 30 to 60 minutes every day.  You do not need to do all 30 to 60 minutes all at once. For example, you can exercise 3 times a day for 10 or 20 minutes. Moderate exercise is safe for most people, but it is always a good idea to talk to your doctor before starting an exercise program.  · Keep moving. [employer] the lawn, work in the garden, or [employer]. Take the stairs instead of the elevator at work. · If you smoke, quit. People who smoke have an increased risk for heart attack, stroke, cancer, and other lung illnesses. Quitting is hard, but there are ways to boost your chance of quitting tobacco for good. ¨ Use nicotine gum, patches, or lozenges. ¨ Ask your doctor about stop-smoking programs and medicines. ¨ Keep trying. In addition to reducing your risk of diseases in the future, you will notice some benefits soon after you stop using tobacco. If you have shortness of breath or asthma symptoms, they will likely get better within a few weeks after you quit. · Limit how much alcohol you drink. Moderate amounts of alcohol (up to 2 drinks a day for men, 1 drink a day for women) are okay. But drinking too much can lead to liver problems, high blood pressure, and other health problems. Family health  If you have a family, there are many things you can do together to improve your health. · Eat meals together as a family as often as possible. · Eat healthy foods. This includes fruits, vegetables, lean meats and dairy, and whole grains. · Include your family in your fitness plan. Most people think of activities such as jogging or tennis as the way to fitness, but there are many ways you and your family can be more active. Anything that makes you breathe hard and gets your heart pumping is exercise. Here are some tips:  ¨ Walk to do errands or to take your child to school or the bus. ¨ Go for a family bike ride after dinner instead of watching TV. Where can you learn more? Go to http://michele-lucy.info/.   Enter I387 in the search box to learn more about \"A Healthy Lifestyle: Care Instructions. \"  Current as of: July 26, 2016  Content Version: 11.3  © 2168-0564 [employer], TapFwd. Care instructions adapted under license by [employer] (which disclaims liability or warranty for this information). If you have questions about a medical condition or this instruction, always ask your healthcare professional. [place] [address] any warranty or liability for your use of this information.

## 2017-11-15 ENCOUNTER — PATIENT OUTREACH (OUTPATIENT)
Dept: OTHER | Age: 27
End: 2017-11-15

## 2017-11-15 NOTE — PROGRESS NOTES
ALINA  Wrap Up  Resolving current episode (Transitions of care complete). No further ED/UC or hospital admissions within 30 days post discharge. Patient attended follow-up appointments as directed, neuro FU apt 10/23 with med changes. Final attempt to contact patient for transitions of care. Left discreet message on voicemail with this CM contact information. * Ms. Mick Marie declined further CM in previous calls. Open to ALINA. No outreach from patient to [address] [place] [address].

## 2018-01-15 ENCOUNTER — HOSPITAL ENCOUNTER (EMERGENCY)
Age: 28
Discharge: HOME | End: 2018-01-15
Attending: STUDENT IN AN ORGANIZED HEALTH CARE EDUCATION/TRAINING PROGRAM
Payer: COMMERCIAL

## 2018-01-15 VITALS
OXYGEN SATURATION: 100 % | WEIGHT: 154 LBS | TEMPERATURE: 98.2 F | RESPIRATION RATE: 16 BRPM | HEIGHT: 68 IN | BODY MASS INDEX: 23.34 KG/M2 | HEART RATE: 84 BPM | SYSTOLIC BLOOD PRESSURE: 126 MMHG | DIASTOLIC BLOOD PRESSURE: 85 MMHG

## 2018-01-15 DIAGNOSIS — N75.0 BARTHOLIN CYST: Primary | ICD-10-CM

## 2018-01-15 PROCEDURE — 96374 THER/PROPH/DIAG INJ IV PUSH: CPT

## 2018-01-15 PROCEDURE — 96375 TX/PRO/DX INJ NEW DRUG ADDON: CPT

## 2018-01-15 PROCEDURE — 96361 HYDRATE IV INFUSION ADD-ON: CPT

## 2018-01-15 PROCEDURE — 74011250636 HC RX REV CODE- 250/636: Performed by: NURSE PRACTITIONER

## 2018-01-15 PROCEDURE — 74011250637 HC RX REV CODE- 250/637: Performed by: NURSE PRACTITIONER

## 2018-01-15 PROCEDURE — 99284 EMERGENCY DEPT VISIT MOD MDM: CPT

## 2018-01-15 PROCEDURE — 96376 TX/PRO/DX INJ SAME DRUG ADON: CPT

## 2018-01-15 RX ORDER — HYDROCODONE BITARTRATE AND ACETAMINOPHEN 7.5; 325 MG/1; MG/1
1 TABLET ORAL
Status: COMPLETED | OUTPATIENT
Start: 2018-01-15 | End: 2018-01-15

## 2018-01-15 RX ORDER — FENTANYL CITRATE 50 UG/ML
50 INJECTION, SOLUTION INTRAMUSCULAR; INTRAVENOUS
Status: COMPLETED | OUTPATIENT
Start: 2018-01-15 | End: 2018-01-15

## 2018-01-15 RX ORDER — AMOXICILLIN AND CLAVULANATE POTASSIUM 875; 125 MG/1; MG/1
1 TABLET, FILM COATED ORAL 2 TIMES DAILY
Qty: 20 TAB | Refills: 0 | Status: SHIPPED | OUTPATIENT
Start: 2018-01-15 | End: 2018-01-25

## 2018-01-15 RX ORDER — KETOROLAC TROMETHAMINE 30 MG/ML
30 INJECTION, SOLUTION INTRAMUSCULAR; INTRAVENOUS
Status: COMPLETED | OUTPATIENT
Start: 2018-01-15 | End: 2018-01-15

## 2018-01-15 RX ORDER — IBUPROFEN 800 MG/1
800 TABLET ORAL
Qty: 20 TAB | Refills: 0 | Status: SHIPPED | OUTPATIENT
Start: 2018-01-15 | End: 2018-01-22

## 2018-01-15 RX ORDER — HYDROCODONE BITARTRATE AND ACETAMINOPHEN 7.5; 325 MG/1; MG/1
1 TABLET ORAL
Qty: 20 TAB | Refills: 0 | Status: SHIPPED | OUTPATIENT
Start: 2018-01-15 | End: 2018-02-27

## 2018-01-15 RX ORDER — DOXYCYCLINE 100 MG/1
100 TABLET ORAL 2 TIMES DAILY
COMMUNITY
End: 2018-07-05 | Stop reason: ALTCHOICE

## 2018-01-15 RX ORDER — ONDANSETRON 2 MG/ML
4 INJECTION INTRAMUSCULAR; INTRAVENOUS
Status: COMPLETED | OUTPATIENT
Start: 2018-01-15 | End: 2018-01-15

## 2018-01-15 RX ADMIN — SODIUM CHLORIDE 1000 ML: 900 INJECTION, SOLUTION INTRAVENOUS at 15:36

## 2018-01-15 RX ADMIN — FENTANYL CITRATE 50 MCG: 50 INJECTION, SOLUTION INTRAMUSCULAR; INTRAVENOUS at 15:36

## 2018-01-15 RX ADMIN — ONDANSETRON 4 MG: 2 INJECTION INTRAMUSCULAR; INTRAVENOUS at 15:36

## 2018-01-15 RX ADMIN — FENTANYL CITRATE 50 MCG: 50 INJECTION, SOLUTION INTRAMUSCULAR; INTRAVENOUS at 17:10

## 2018-01-15 RX ADMIN — HYDROCODONE BITARTRATE AND ACETAMINOPHEN 1 TABLET: 7.5; 325 TABLET ORAL at 18:50

## 2018-01-15 RX ADMIN — KETOROLAC TROMETHAMINE 30 MG: 30 INJECTION, SOLUTION INTRAMUSCULAR at 15:36

## 2018-01-15 NOTE — DISCHARGE INSTRUCTIONS
Bartholin Cyst Surgery: What to Expect at Home  Your Recovery  After surgery, you may have pain and discomfort in your vulva for several days. It may be uncomfortable to sit for long periods of time. You may also have pain if your urine comes into contact with your wound. Your doctor may have put a small rubber tube, called a catheter, in the cut (incision). The catheter keeps the area open so fluid can drain out of it. The catheter may fall out on its own during the first 2 weeks. If not, your doctor will remove it after several weeks. You can expect to feel better and stronger each day, although you may get tired quickly and need pain medicine for a week or two. You may need about 2 to 4 weeks to fully recover. This care sheet gives you a general idea about how long it will take for you to recover. But each person recovers at a different pace. Follow the steps below to get better as quickly as possible. How can you care for yourself at home? Activity  ? · Rest when you feel tired. Getting enough sleep will help you recover. ? · Try to walk each day. Start out by walking a little more than you did the day before. Bit by bit, increase the amount you walk. Walking boosts blood flow and helps prevent pneumonia and constipation. ? · Avoid strenuous activities, such as biking, jogging, weight lifting, and aerobic exercise, for 4 to 6 weeks. ? · You may have some blood or fluid draining from the cyst. Wear sanitary pads if needed. Do not douche.   ? · Ask your doctor when you can drive again. ? · You will probably need to take 2 to 5 days off work. It depends on the type of work you do and how you feel. ? · Do not have sex until your doctor tells you it is okay. Diet  ? · You can eat your normal diet. If your stomach is upset, try bland, low-fat foods like plain rice, broiled chicken, toast, and yogurt. ? · Drink plenty of fluids (unless your doctor tells you not to).    ? · You may notice that your bowels are not regular right after your surgery. This is common. Try to avoid constipation and straining with bowel movements. Take a fiber supplement such as Citrucel or Metamucil every day. If you have not had a bowel movement after a couple of days, take a mild laxative. Medicines  ? · Your doctor will tell you if and when you can restart your medicines. He or she will also give you instructions about taking any new medicines. ? · If you take blood thinners, such as warfarin (Coumadin), clopidogrel (Plavix), or aspirin, be sure to talk to your doctor. He or she will tell you if and when to start taking those medicines again. Make sure that you understand exactly what your doctor wants you to do. ? · Be safe with medicines. Take pain medicines exactly as directed. ¨ If the doctor gave you a prescription medicine for pain, take it as prescribed. ¨ If you are not taking a prescription pain medicine, ask your doctor if you can take an over-the-counter medicine. ? · If you think your pain medicine is making you sick to your stomach:  ¨ Take your medicine after meals (unless your doctor tells you not to). ¨ Ask your doctor for a different pain medicine. ? · If your doctor prescribed antibiotics, take them as directed. Do not stop taking them just because you feel better. You need to take the full course of antibiotics. Incision care  ? · Follow your doctor's instructions about removing any gauze from inside your wound. ? · Wash the area daily with warm, soapy water, and pat it dry. ? · Keep the area clean and dry. You may cover it with a gauze bandage if it weeps or rubs against clothing. Change the bandage every day. ? · Sit in a few inches of warm water (sitz bath) for 15 to 20 minutes 3 times a day. Then pat the area dry. The warm water helps the area heal and eases discomfort. ? · If you cannot take a bath, put a warm, clean washcloth on your vulva to help with healing and pain.    ? · If you have had a catheter placed in the cyst to help it drain, follow your doctor's instructions for activities until the tube comes out. Other instructions  ? · Wear cotton underwear. Avoid underwear made from nylon, polyester, or silk. ? · Do not wear tight clothing until your wound has healed. ? · If sitting is painful, you may want to try sitting on a doughnut-shaped pillow. Follow-up care is a key part of your treatment and safety. Be sure to make and go to all appointments, and call your doctor if you are having problems. It's also a good idea to know your test results and keep a list of the medicines you take. When should you call for help? Call 911 anytime you think you may need emergency care. For example, call if:  ? · You passed out (lost consciousness). ? · You have chest pain, are short of breath, or cough up blood. ?Call your doctor now or seek immediate medical care if:  ? · You have pain that does not get better after you take pain medicine. ? · You cannot pass stools or gas. ? · You have vaginal discharge that has increased in amount or smells bad.   ? · You are sick to your stomach or cannot drink fluids. ? · You have loose stitches, or your incision comes open. ? · Bright red blood has soaked through the bandage over your incision. ? · You have signs of infection, such as:  ¨ Increased pain, swelling, warmth, or redness. ¨ Red streaks leading from the incision. ¨ Pus draining from the incision. ¨ A fever. ? · You have bright red vaginal bleeding that soaks one or more pads in an hour, or you have large clots. ? · You have signs of a blood clot in your leg (called a deep vein thrombosis), such as:  ¨ Pain in your calf, back of the knee, thigh, or groin. ¨ Redness and swelling in your leg or groin. ? Watch closely for changes in your health, and be sure to contact your doctor if you have any problems. Where can you learn more?   Go to http://michele-lucy.info/. Moreno Nguyen in the search box to learn more about \"Bartholin Cyst Surgery: What to Expect at Home. \"  Current as of: October 13, 2016  Content Version: 11.4  © 6970-9357 [employer]. Care instructions adapted under license by [employer] (which disclaims liability or warranty for this information). If you have questions about a medical condition or this instruction, always ask your healthcare professional. [address] any warranty or liability for your use of this information. We hope that we have addressed all of your medical concerns. The examination and treatment you received in the Emergency Department were for an emergent problem and were not intended as complete care. It is important that you follow up with your healthcare provider(s) for ongoing care. If your symptoms worsen or do not improve as expected, and you are unable to reach your usual health care provider(s), you should return to the Emergency Department. Today's healthcare is undergoing tremendous change, and patient satisfaction surveys are one of the many tools to assess the quality of medical care. You may receive a survey from the [employer] organization regarding your experience in the Emergency Department. I hope that your experience has been completely positive, particularly the medical care that I provided. As such, please participate in the survey; anything less than excellent does not meet my expectations or intentions. [facility] and [facility] participate in nationally recognized quality of care measures. If your blood pressure is greater than 120/80, as reported below, we urge that you seek medical care to address the potential of high blood pressure, commonly known as hypertension.    Hypertension can be hereditary or can be caused by certain medical conditions, pain, stress, or \"white coat syndrome. \"       Please make an appointment with your health care provider(s) for follow up of your Emergency Department visit. VITALS:   Patient Vitals for the past 8 hrs:   Temp Pulse Resp BP SpO2   01/15/18 1607 - - 16 117/67 100 %   01/15/18 1326 98.4 °F (36.9 °C) (!) 114 18 108/78 100 %          Thank you for allowing us to provide you with medical care today. We realize that you have many choices for your emergency care needs. Please choose us in the future for any continued health care needs. Blyane Ramirez, [facility]: [phone]            No results found for this or any previous visit (from the past 24 hour(s)). No results found.

## 2018-01-15 NOTE — ED NOTES
Discharge instructions given to pt. All questions answered and pt verbalized understanding. V/S stable @ time of discharge. Pt ambulatory out of unit.  Pt transported home by father

## 2018-01-15 NOTE — ED PROVIDER NOTES
HPI Comments: 32 y.o. female with past medical history significant for anxiety, depression, and migraines who presents from home via private vehicle with chief complaint of vaginal pain. Pt reports having a Bartholin cyst on the L side of her vagina with intense pain and nausea. Pt states this is her 6th cyst in the same spot. Pt states she was at her GYN's office this morning, who did not feel comfortable draining it in the office, as the last time it \"was a mess\". Pt reports she has gone to the OR twice for past cysts. Pt reports her GYN wanted to do surgery this week, but the pt works here and her insurance wouldn't cover it. Pt denies any fever or chills. There are no other acute medical concerns at this time. Pt denies fevers, chills, night sweats, chest pain, pressure, SOB, GARCÍA, PND, orthopnea, abdominal pain, n/v/d, melena, hematuria, dysuria, constipation, HA, dizziness, and syncope      Social hx: Former smoker; Social EtOH use  OB/GYN: Mallory Hoyt MD     Note written by Cayden Torres Shireen Ryder, as dictated by Tae Nolasco NP 3:27 PM       The history is provided by the patient. No  was used.         Past Medical History:   Diagnosis Date    Anxiety     Depression     Endometriosis     Migraine     Neurological disorder     migraines    Ovarian cyst     Panic attack        Past Surgical History:   Procedure Laterality Date    HX APPENDECTOMY  04/26/2017    HX BREAST LUMPECTOMY Right     HX BUNIONECTOMY      HX CYST REMOVAL      HX OTHER SURGICAL      laparascopy    HX WISDOM TEETH EXTRACTION           Family History:   Problem Relation Age of Onset    Diabetes Maternal Aunt     Heart Disease Maternal Aunt     Stroke Maternal Aunt     Heart Disease Maternal Grandmother     No Known Problems Mother        Social History     Social History    Marital status: SINGLE     Spouse name: N/A    Number of children: N/A    Years of education: N/A     Occupational History    Not on file. Social History Main Topics    Smoking status: Former Smoker    Smokeless tobacco: Never Used    Alcohol use No      Comment: social    Drug use: No    Sexual activity: Yes     Partners: Male     Birth control/ protection: Pill     Other Topics Concern    Not on file     Social History Narrative         ALLERGIES: Compazine [prochlorperazine edisylate]; Haldol [haloperidol lactate]; Promethazine; and Reglan [metoclopramide]    Review of Systems   Constitutional: Negative for activity change, appetite change, chills, diaphoresis, fatigue, fever and unexpected weight change. HENT: Negative for congestion, ear pain, rhinorrhea, sinus pressure, sore throat and tinnitus. Eyes: Negative for photophobia, pain, discharge and visual disturbance. Respiratory: Negative for apnea, cough, choking, chest tightness, shortness of breath, wheezing and stridor. Cardiovascular: Negative for chest pain, palpitations and leg swelling. Gastrointestinal: Negative for abdominal pain, constipation, diarrhea, nausea and vomiting. Endocrine: Negative for polydipsia, polyphagia and polyuria. Genitourinary: Positive for vaginal pain. Negative for decreased urine volume, dyspareunia, dysuria, enuresis, flank pain, frequency, hematuria and urgency. Musculoskeletal: Negative for arthralgias, back pain, gait problem, myalgias and neck pain. Skin: Negative for color change, pallor, rash and wound. Allergic/Immunologic: Negative for immunocompromised state. Neurological: Negative for dizziness, seizures, syncope, weakness, light-headedness and headaches. Hematological: Does not bruise/bleed easily. Psychiatric/Behavioral: Negative for agitation and confusion. The patient is not nervous/anxious.         Vitals:    01/15/18 1326   BP: 108/78   Pulse: (!) 114   Resp: 18   Temp: 98.4 °F (36.9 °C)   SpO2: 100%   Weight: 69.9 kg (154 lb)   Height: 5' 8\" (1.727 m)            Physical Exam Constitutional: She is oriented to person, place, and time. She appears well-developed and well-nourished. No distress. HENT:   Head: Normocephalic. Right Ear: External ear normal.   Left Ear: External ear normal.   Mouth/Throat: Oropharynx is clear and moist. No oropharyngeal exudate. Eyes: Conjunctivae and EOM are normal. Pupils are equal, round, and reactive to light. Right eye exhibits no discharge. Left eye exhibits no discharge. No scleral icterus. Neck: Normal range of motion. Neck supple. No JVD present. No tracheal deviation present. No thyromegaly present. Cardiovascular: Normal rate, regular rhythm, normal heart sounds and intact distal pulses. Exam reveals no gallop and no friction rub. No murmur heard. Pulmonary/Chest: Effort normal and breath sounds normal. No stridor. No respiratory distress. She has no wheezes. She has no rales. She exhibits no tenderness. Abdominal: Soft. Bowel sounds are normal. She exhibits no distension and no mass. There is no tenderness. There is no rebound and no guarding. Genitourinary:         Musculoskeletal: Normal range of motion. She exhibits no edema or tenderness. Lymphadenopathy:     She has no cervical adenopathy. Neurological: She is alert and oriented to person, place, and time. She displays normal reflexes. No cranial nerve deficit or sensory deficit. GCS eye subscore is 4. GCS verbal subscore is 5. GCS motor subscore is 6. Skin: Skin is warm and dry. No rash noted. She is not diaphoretic. No erythema. No pallor. Psychiatric: She has a normal mood and affect. Her behavior is normal. Judgment and thought content normal.   Nursing note and vitals reviewed.        MDM  Number of Diagnoses or Management Options  Bartholin cyst:   Diagnosis management comments:    * analgesia   * Consult to GYN       Amount and/or Complexity of Data Reviewed  Discuss the patient with other providers: yes    Risk of Complications, Morbidity, and/or Mortality  General comments:    - stable, ambulatory pt in NAD    Patient Progress  Patient progress: stable    ED Course       Procedures      CONSULT NOTE:  4:53 PM Evaristo Lozano NP spoke with Dr. Allyssa Womack, Consult for OB/GYN. Discussed available diagnostic tests and clinical findings. She is in agreement with care plans as outlined. Dr. Allyssa Womack reports that the pt was scheduled for a procedure this Wednesday, but the pt declined because she was in too much pain. She states the pt was given a prescription for narco today in the office. PROGRESS NOTE:  6:14 PM  Evaristo Lozano NP Spoke with Dr. Salinas Donis, he will have the office try to set up OR time at [facility] tomorrow or Wednesday, otherwise the pt can go to the office tomorrow morning and he could try to drain it there. He recommends adding Augmentin to her medications. 7:48 PM  Pt has been reevaluated. There are no new complaints, changes, or physical findings at this time. Medications have been reviewed w/ pt and/or family. Pt and/or family's questions have been answered. Pt and/or family expressed good understanding of the dx/tx/rx and is in agreement with plan of care. Pt instructed and agreed to f/u w/ GYN and to return to ED upon further deterioration. Pt is ready for discharge. LABORATORY TESTS:  No results found for this or any previous visit (from the past 12 hour(s)). IMAGING RESULTS:  No orders to display     No results found.       MEDICATIONS GIVEN:  Medications   ketorolac (TORADOL) injection 30 mg (30 mg IntraVENous Given 1/15/18 1536)   ondansetron (ZOFRAN) injection 4 mg (4 mg IntraVENous Given 1/15/18 1536)   sodium chloride 0.9 % bolus infusion 1,000 mL (0 mL IntraVENous IV Completed 1/15/18 1710)   fentaNYL citrate (PF) injection 50 mcg (50 mcg IntraVENous Given 1/15/18 1536)   fentaNYL citrate (PF) injection 50 mcg (50 mcg IntraVENous Given 1/15/18 1710)   HYDROcodone-acetaminophen (NORCO) 7.5-325 mg per tablet 1 Tab (1 Tab Oral Given 1/15/18 1850)       IMPRESSION:  1. Bartholin cyst        PLAN:  1. Discharge Medication List as of 1/15/2018  6:42 PM      START taking these medications    Details   HYDROcodone-acetaminophen (NORCO) 7.5-325 mg per tablet Take 1 Tab by mouth every six (6) hours as needed for Pain. Max Daily Amount: 4 Tabs., Print, Disp-20 Tab, R-0      ibuprofen (MOTRIN) 800 mg tablet Take 1 Tab by mouth every six (6) hours as needed for Pain for up to 7 days. , Print, Disp-20 Tab, R-0      amoxicillin-clavulanate (AUGMENTIN) 875-125 mg per tablet Take 1 Tab by mouth two (2) times a day for 10 days. , Print, Disp-20 Tab, R-0         CONTINUE these medications which have NOT CHANGED    Details   doxycycline (ADOXA) 100 mg tablet Take 100 mg by mouth two (2) times a day. Indications: ACNE ROSACEA, Historical Med      topiramate (TOPAMAX) 100 mg tablet Take 200 mg by mouth nightly., Historical Med      levonorgestrel (MIRENA) 20 mcg/24 hr (5 years) IUD 1 Each by IntraUTERine route once., Historical Med      clonazePAM (KLONOPIN) 1 mg tablet Take 2 mg by mouth nightly., Historical Med, R-5      escitalopram (LEXAPRO) 20 mg tablet Take 20 mg by mouth every morning. Indications: GENERALIZED ANXIETY DISORDER, Historical Med           2. Follow-up Information     Follow up With Details Comments [address] MD In 1 day  [address] [place] [id number] [phone]      [facility]  As needed, If symptoms worsen [facility]  [phone]        3.  Supportive care    Return to ED if worse       Ny Mejía NP  7:49 PM

## 2018-01-16 ENCOUNTER — PATIENT OUTREACH (OUTPATIENT)
Dept: OTHER | Age: 28
End: 2018-01-16

## 2018-01-16 NOTE — PROGRESS NOTES
ALINA call. Verified  and address for HIPAA security. Introduced  [facility] services for patient. Patient declines services and requests I remove her from my call list.   Abruptly hung up. I will follow for ALINA needs electronically. Chart Review:      IMPRESSION:  1. Bartholin cyst          PLAN:  1. Discharge Medication List as of 1/15/2018  6:42 PM           START taking these medications     Details   HYDROcodone-acetaminophen (NORCO) 7.5-325 mg per tablet Take 1 Tab by mouth every six (6) hours as needed for Pain. Max Daily Amount: 4 Tabs., Print, Disp-20 Tab, R-0       ibuprofen (MOTRIN) 800 mg tablet Take 1 Tab by mouth every six (6) hours as needed for Pain for up to 7 days. , Print, Disp-20 Tab, R-0       amoxicillin-clavulanate (AUGMENTIN) 875-125 mg per tablet Take 1 Tab by mouth two (2) times a day for 10 days. , Print, Disp-20 Tab, R-0                     CONSULT NOTE:  4:53 PM Yana Velázquez NP spoke with Dr. Akash Wei for OB/GYN. Discussed available diagnostic tests and clinical findings. She is in agreement with care plans as outlined. Dr. Mehran Coleman reports that the pt was scheduled for a procedure this Wednesday, but the pt declined because she was in too much pain. She states the pt was given a prescription for narco today in the office.         PROGRESS NOTE:  6:14 PM  Yana Velázquez NP Spoke with Dr. Trung Banerjee, he will have the office try to set up OR time at [facility] tomorrow or Wednesday, otherwise the pt can go to the office tomorrow morning and he could try to drain it there. He recommends adding Augmentin to her medications.

## 2018-02-16 ENCOUNTER — PATIENT OUTREACH (OUTPATIENT)
Dept: OTHER | Age: 28
End: 2018-02-16

## 2018-02-16 NOTE — PROGRESS NOTES
ALINA  Wrap Up  Resolving current episode (Transitions of care complete). No further ED/UC or hospital admissions within 30 days post discharge. Unsuccessful with contact with this patient to confirm any follow up appointment. None found in [facility] care. Patient answered, but hung up when I initially called. Final attempt to contact patient for transitions of care. Left discreet message on voicemail with this CM contact information. Made phone attempts and a letter sent encouraging contact with CM. FInal letter closing episode will be sent. No outreach from patient to [address] [place] [address].

## 2018-02-16 NOTE — LETTER
2/16/2018 5:06 PM 
 
Ms. Stef Trevino [address] [place] [id number] Dear Ms. Mariah Jonel, My name is Christine Obrien , Employee Care Manager for [employer], and I have been trying to reach you. The Employee Care Management program is a free-of-charge, confidential service provided to our employees and their family members covered by the [employer] [facility]. Part of my job is to follow up with members who have recently been in the hospital or emergency room, to help them coordinate their care and answer questions they may have about their visit. As healthcare providers, we know that patients do better when they have close follow up with a primary care provider (PCP), especially after a hospital or emergency department visit. If you do not have a PCP, I can help you find one that is convenient to you and covered by your insurance. I can also help you understand any after visit instructions, such as what symptoms to watch out for, or any new or changed medications. Remember that you can access your After Visit Summary by logging into your [employer] account. If you do not have a [employer] account, I can help you request access. Our program is designed to provide you with the opportunity to have a [employer] care manager partner with you for your healthcare needs. [employer] now partners with [facility]. If you are a qualifying employee, you may receive an additional 10 wellness incentive points for every month of active participation with an Employee Care Manager. Please contact me at the below number if I can provide you with assistance for any of the above services.  
 
 
Sincerely, 
 
 
 
 
 
JOHN Vázquez RN, [place] [address], [address] [address]  
Phone:  [phone]     Fax:  [phone]    [email or web address]

## 2018-02-24 ENCOUNTER — HOSPITAL ENCOUNTER (EMERGENCY)
Age: 28
Discharge: HOME | End: 2018-02-24
Attending: EMERGENCY MEDICINE
Payer: COMMERCIAL

## 2018-02-24 VITALS
BODY MASS INDEX: 23.25 KG/M2 | HEART RATE: 101 BPM | TEMPERATURE: 98.2 F | SYSTOLIC BLOOD PRESSURE: 112 MMHG | HEIGHT: 68 IN | DIASTOLIC BLOOD PRESSURE: 70 MMHG | OXYGEN SATURATION: 100 % | RESPIRATION RATE: 16 BRPM | WEIGHT: 153.38 LBS

## 2018-02-24 DIAGNOSIS — N76.4 VULVAR ABSCESS: Primary | ICD-10-CM

## 2018-02-24 PROCEDURE — 74011250637 HC RX REV CODE- 250/637: Performed by: PHYSICIAN ASSISTANT

## 2018-02-24 PROCEDURE — 96374 THER/PROPH/DIAG INJ IV PUSH: CPT

## 2018-02-24 PROCEDURE — 96372 THER/PROPH/DIAG INJ SC/IM: CPT

## 2018-02-24 PROCEDURE — 74011250636 HC RX REV CODE- 250/636: Performed by: PHYSICIAN ASSISTANT

## 2018-02-24 PROCEDURE — 99283 EMERGENCY DEPT VISIT LOW MDM: CPT

## 2018-02-24 RX ORDER — HYDROMORPHONE HYDROCHLORIDE 1 MG/ML
1 INJECTION, SOLUTION INTRAMUSCULAR; INTRAVENOUS; SUBCUTANEOUS
Status: COMPLETED | OUTPATIENT
Start: 2018-02-24 | End: 2018-02-24

## 2018-02-24 RX ORDER — KETOROLAC TROMETHAMINE 30 MG/ML
30 INJECTION, SOLUTION INTRAMUSCULAR; INTRAVENOUS
Status: COMPLETED | OUTPATIENT
Start: 2018-02-24 | End: 2018-02-24

## 2018-02-24 RX ORDER — OXYCODONE AND ACETAMINOPHEN 5; 325 MG/1; MG/1
1 TABLET ORAL
Status: COMPLETED | OUTPATIENT
Start: 2018-02-24 | End: 2018-02-24

## 2018-02-24 RX ORDER — OXYCODONE HYDROCHLORIDE 5 MG/1
5 TABLET ORAL
Status: COMPLETED | OUTPATIENT
Start: 2018-02-24 | End: 2018-02-24

## 2018-02-24 RX ORDER — OXYCODONE AND ACETAMINOPHEN 5; 325 MG/1; MG/1
1 TABLET ORAL
Qty: 15 TAB | Refills: 0 | Status: SHIPPED | OUTPATIENT
Start: 2018-02-24 | End: 2018-03-15 | Stop reason: ALTCHOICE

## 2018-02-24 RX ADMIN — OXYCODONE HYDROCHLORIDE 5 MG: 5 TABLET ORAL at 15:37

## 2018-02-24 RX ADMIN — OXYCODONE AND ACETAMINOPHEN 1 TABLET: 5; 325 TABLET ORAL at 14:27

## 2018-02-24 RX ADMIN — HYDROMORPHONE HYDROCHLORIDE 1 MG: 1 INJECTION, SOLUTION INTRAMUSCULAR; INTRAVENOUS; SUBCUTANEOUS at 16:26

## 2018-02-24 RX ADMIN — KETOROLAC TROMETHAMINE 30 MG: 30 INJECTION, SOLUTION INTRAMUSCULAR at 15:37

## 2018-02-24 NOTE — ED TRIAGE NOTES
Triage Note: Patient reports vaginal pain. Hx of a cyst with surgery in January.  Patient sent by the on call doc for I & D.

## 2018-02-24 NOTE — ED NOTES
12:54 PM  I have evaluated the patient as the Provider in Triage. I have reviewed Her vital signs and the triage nurse assessment. I have talked with the patient and any available family and advised that I am the provider in triage and have ordered the appropriate study to initiate their work up based on the clinical presentation during my assessment. I have advised that the patient will be accommodated in the Main ED as soon as possible. I have also requested to contact the triage nurse or myself immediately if the patient experiences any changes in their condition during this brief waiting period. Patient had marsupialization surgery in January. States that she stopped draining fluid approximately 3 days ago and has noticed increased in size of her bartholin gland cyst with increased pain.  She already spoke with the on call OB/GYN  Jackee Lesch, NP

## 2018-02-24 NOTE — ED PROVIDER NOTES
HPI Comments: 32year old female hx Bartholin cysts presenting for vaginal pain. Had marsupialization on the left side about 5 weeks ago; reports that this was her 8th procedure on the same area, 2nd surgery, has had several I&Ds. Pt notes that she started 2 days ago with increased pain and swelling at the site, notes that it felt tight. Yesterday pt notes that she was really uncomfortable in the car. Felt a large knot. Was advised last night by the on call to try pain medicine and if needed come to the ED. No fever or chills. Has seen Dr. Saji Acosta at [place] [address]. PMHx: panic attack, anxiety, depression, migraines  Social: former smoker. Engaged. Patient is a 32 y.o. female presenting with female genitourinary complaint. The history is provided by the patient. Vaginal Pain    Pertinent negatives include no fever, no diarrhea and no vomiting. Past Medical History:   Diagnosis Date    Anxiety     Depression     Endometriosis     Migraine     Neurological disorder     migraines    Ovarian cyst     Panic attack        Past Surgical History:   Procedure Laterality Date    HX APPENDECTOMY  04/26/2017    HX BREAST LUMPECTOMY Right     HX BUNIONECTOMY      HX CYST REMOVAL      HX GYN      HX OTHER SURGICAL      laparascopy    HX WISDOM TEETH EXTRACTION           Family History:   Problem Relation Age of Onset    Diabetes Maternal Aunt     Heart Disease Maternal Aunt     Stroke Maternal Aunt     Heart Disease Maternal Grandmother     No Known Problems Mother        Social History     Social History    Marital status: SINGLE     Spouse name: N/A    Number of children: N/A    Years of education: N/A     Occupational History    Not on file.      Social History Main Topics    Smoking status: Former Smoker    Smokeless tobacco: Never Used    Alcohol use No      Comment: social    Drug use: No    Sexual activity: Yes     Partners: Male     Birth control/ protection: Pill     Other Topics Concern    Not on file     Social History Narrative         ALLERGIES: Compazine [prochlorperazine edisylate]; Haldol [haloperidol lactate]; Promethazine; and Reglan [metoclopramide]    Review of Systems   Constitutional: Negative for fever. HENT: Negative for congestion. Eyes: Negative for discharge. Respiratory: Negative for shortness of breath. Cardiovascular: Negative for chest pain. Gastrointestinal: Negative for diarrhea and vomiting. Genitourinary: Positive for vaginal pain. Negative for difficulty urinating. Skin: Negative for wound. Neurological: Negative for syncope. All other systems reviewed and are negative. Vitals:    02/24/18 1252   BP: 118/77   Pulse: (!) 101   Resp: 18   Temp: 98.6 °F (37 °C)   SpO2: 100%   Weight: 69.6 kg (153 lb 6 oz)   Height: 5' 8\" (1.727 m)            Physical Exam   Constitutional: She is oriented to person, place, and time. She appears well-developed and well-nourished. No distress. Pleasant WF   HENT:   Head: Normocephalic and atraumatic. Right Ear: External ear normal.   Left Ear: External ear normal.   Eyes: Conjunctivae are normal. No scleral icterus. Neck: Neck supple. No tracheal deviation present. Cardiovascular: Normal rate, regular rhythm and normal heart sounds. Exam reveals no gallop and no friction rub. No murmur heard. Pulmonary/Chest: Effort normal and breath sounds normal. No stridor. No respiratory distress. She has no wheezes. Abdominal: Soft. She exhibits no distension. Genitourinary:         Musculoskeletal: Normal range of motion. Neurological: She is alert and oriented to person, place, and time. Skin: Skin is warm and dry. Psychiatric: She has a normal mood and affect. Her behavior is normal.   Nursing note and vitals reviewed. 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   Pt with area of swelling, TTP to the left vulva, just alteral to the bartholin gland. No fluctuance. No F/C or other systemic symptoms. Pt seen by GYN in the ED, recommended pain medicine, soaks, close f/u. Amount and/or Complexity of Data Reviewed  Discuss the patient with other providers: yes (Dr. Bessy Lomeli, ED attending. Dr. Tommie Briseno and Dr. Beverly Davies, GYN)          ED Course       Procedures               Discussed with Dr. Tommie Briseno [facility]. Dr. Beverly Davies, [facility] hospitalist will come see the patient. NAVNEET Bustamante  2:44 PM        Dr. Beverly Davies, GYN saw pt int he ED. Noted that are of swelling appears more c/w early vulvar abscess. Recommended warm compresses, pain medicine, GYN f/u on Monday.   NAVNEET Bustamante  3:17 PM

## 2018-02-24 NOTE — DISCHARGE INSTRUCTIONS

## 2018-02-24 NOTE — CONSULTS
Gyn Consult    Subjective:     CC:  I think my Bartholin's cyst is back    Wade Barger is a 32 y.o.  premenopausal female who is being seen for vaginal pain. Patient has a h/o recurrent left Bartholin's cyst.  She had this cyst marsupialized 5 weeks ago and had a normal follow up exam.  3 days ago she started noticing some \"tightness\" in the area which progressed to tenderness and yesterday she felt a bulge. She has had no fevers, chills, abnormal vaginal discharge or drainage. She has not had intercourse since her surgery, has not used a tampon, and has only done sitz baths and showers. Patient reports having had at least 8 Bartholin's cysts, always on the left. She had a marsupialization 6 years ago that did well until this most recent marsupialization. OB/GYN ROS: see above    Patient Active Problem List    Diagnosis Date Noted    Vulvar abscess 02/24/2018    Abdominal pain 04/20/2017    Depression 01/20/2016    Anxiety 01/20/2016    Status migrainosus 01/13/2016     Past Medical History:   Diagnosis Date    Anxiety     Depression     Endometriosis     Migraine     Neurological disorder     migraines    Ovarian cyst     Panic attack       Past Surgical History:   Procedure Laterality Date    HX APPENDECTOMY  04/26/2017    HX BREAST LUMPECTOMY Right     HX BUNIONECTOMY      HX CYST REMOVAL      HX GYN      HX OTHER SURGICAL      laparascopy    HX WISDOM TEETH EXTRACTION        Social History   Substance Use Topics    Smoking status: Former Smoker    Smokeless tobacco: Never Used    Alcohol use No      Comment: social      Family History   Problem Relation Age of Onset    Diabetes Maternal Aunt     Heart Disease Maternal Aunt     Stroke Maternal Aunt     Heart Disease Maternal Grandmother     No Known Problems Mother       Prior to Admission Medications   Prescriptions Last Dose Informant Patient Reported? Taking?    HYDROcodone-acetaminophen (NORCO) 7.5-325 mg per tablet   No No   Sig: Take 1 Tab by mouth every six (6) hours as needed for Pain. Max Daily Amount: 4 Tabs. clonazePAM (KLONOPIN) 1 mg tablet  Self Yes No   Sig: Take 2 mg by mouth nightly. doxycycline (ADOXA) 100 mg tablet   Yes No   Sig: Take 100 mg by mouth two (2) times a day. Indications: ACNE ROSACEA   escitalopram (LEXAPRO) 20 mg tablet  Self Yes No   Sig: Take 20 mg by mouth every morning. Indications: GENERALIZED ANXIETY DISORDER   levonorgestrel (MIRENA) 20 mcg/24 hr (5 years) IUD   Yes No   Si Each by IntraUTERine route once. topiramate (TOPAMAX) 100 mg tablet   Yes No   Sig: Take 200 mg by mouth nightly. Facility-Administered Medications: None     Allergies   Allergen Reactions    Compazine [Prochlorperazine Edisylate] Anxiety    Haldol [Haloperidol Lactate] Other (comments)    Promethazine Other (comments)     \"It makes me feel really funny, nausea and dizzy\"    Reglan [Metoclopramide] Other (comments)        Review of Systems:  10 point ROS,  Pertinent items are noted in the History of Present Illness. Objective:     Patient Vitals for the past 8 hrs:   BP Temp Pulse Resp SpO2 Height Weight   18 1252 118/77 98.6 °F (37 °C) (!) 101 18 100 % 5' 8\" (1.727 m) 69.6 kg (153 lb 6 oz)     Temp (24hrs), Av.6 °F (37 °C), Min:98.6 °F (37 °C), Max:98.6 °F (37 °C)         Physical Exam:   GEN in NAD; exam limited to     asymetry with slight bulge in left inferior vulva; no drainage noted from vagina; left Bartholin's marsupialization site is healing well without evidence of infection/drainage; Bartholin's area is not swollen or tender on either site; there is an approximately 3 cm inferior vulvar cyst/abscess forming, tender to touch, not yet very fluctuant without any obvious \"pointing\" spot; no erythema    Labs:  No results found for this or any previous visit (from the past 24 hour(s)). Assessment:     Principal Problem:    Vulvar abscess (2018)        Plan:      I reviewed with Isadora Brown her medical records, physical exam, and review of symptoms.    --Outpatient pain meds to be provided by ER provider  --Recommended warm compresses over weekend  --Area will likely need to be drained however it is not ready yet  --Patient to follow up Monday in office for reevaluation with Dr. Delfina Roa (alternatively to return to ER over weekend if worse)  --Dr. Sumi Alexander will touch bases with the patient by phone today & Dr. Delfina Roa to help arrange f/u    Signed By: Ashley Colvin MD     February 24, 2018

## 2018-02-26 ENCOUNTER — OFFICE VISIT (OUTPATIENT)
Dept: GYNECOLOGY | Age: 28
End: 2018-02-26

## 2018-02-26 ENCOUNTER — HOSPITAL ENCOUNTER (EMERGENCY)
Age: 28
Discharge: HOME | End: 2018-02-26
Attending: EMERGENCY MEDICINE
Payer: COMMERCIAL

## 2018-02-26 VITALS
SYSTOLIC BLOOD PRESSURE: 101 MMHG | HEIGHT: 68 IN | OXYGEN SATURATION: 100 % | WEIGHT: 153 LBS | TEMPERATURE: 98.6 F | HEART RATE: 89 BPM | RESPIRATION RATE: 18 BRPM | DIASTOLIC BLOOD PRESSURE: 67 MMHG | BODY MASS INDEX: 23.19 KG/M2

## 2018-02-26 VITALS
BODY MASS INDEX: 23.16 KG/M2 | HEIGHT: 68 IN | DIASTOLIC BLOOD PRESSURE: 68 MMHG | SYSTOLIC BLOOD PRESSURE: 111 MMHG | HEART RATE: 111 BPM | WEIGHT: 152.8 LBS

## 2018-02-26 DIAGNOSIS — N75.1 BARTHOLIN'S GLAND ABSCESS: Primary | ICD-10-CM

## 2018-02-26 DIAGNOSIS — N76.4 VULVAR ABSCESS: Primary | ICD-10-CM

## 2018-02-26 PROCEDURE — 99283 EMERGENCY DEPT VISIT LOW MDM: CPT

## 2018-02-26 RX ORDER — AMOXICILLIN AND CLAVULANATE POTASSIUM 875; 125 MG/1; MG/1
1 TABLET, FILM COATED ORAL EVERY 12 HOURS
Qty: 20 TAB | Refills: 0 | Status: SHIPPED | OUTPATIENT
Start: 2018-02-26 | End: 2018-03-15 | Stop reason: ALTCHOICE

## 2018-02-26 NOTE — PROGRESS NOTES
New patient referred by Dr. Charan Anderson for recurrent Bartholin's Abscess. Pt had marsupialization  5-6 weeks ago.

## 2018-02-26 NOTE — PROGRESS NOTES
[address], [address], [address] [facility]  P [phone]  F [phone]    Office Note  Patient ID:  Name:  Tanner Patel  MRN:  [id number]  :   y.o. Date:  2018      HISTORY OF PRESENT ILLNESS:  Tanner Patel is a 32 y.o.  premenopausal female who is being seen for a Bartholin's gland abscess. She is referred by Dr. Ofelia Street. 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 She reports at least 7 or 8 times. She has undergone multiple I&Ds and has had 2 marsupialization procedures, with the most recent being on 18 with Dr. Jaki Franklin. Starting about 4 days ago she noted increased swelling and tenderness in the area and without drainage. She has been seen in the ER twice. She was seen there earlier this morning. She was then seen in Dr. Kun Calvin office. I have been asked to see her in consultation for possible Bartholin's gland excision. She has never had any issues on the right side, only on the left. ROS:   and GI review:  Negative  Cardiopulmonary review:  Negative   Musculoskeletal:  Negative    A comprehensive review of systems was negative except for that written in the History of Present Illness. , 10 point ROS      OB/GYN ROS:  [id number]  Hx of multiple I&Ds and marsupialization x 2 of left-sided Bartholin's  Patient denies any abnormal bleeding or vaginal discharge.          Problem List:  Patient Active Problem List    Diagnosis Date Noted    Vulvar abscess 2018    Abdominal pain 2017    Depression 2016    Anxiety 2016    Status migrainosus 2016     PMH:  Past Medical History:   Diagnosis Date    Anxiety     Depression     Endometriosis     Migraine     Neurological disorder     migraines    Ovarian cyst     Panic attack       PSH:  Past Surgical History:   Procedure Laterality Date    HX APPENDECTOMY  2017    HX BREAST LUMPECTOMY Right     HX BUNIONECTOMY      HX CYST REMOVAL      HX GYN      HX OTHER SURGICAL      laparascopy    HX WISDOM TEETH EXTRACTION        Social History:  Social History   Substance Use Topics    Smoking status: Former Smoker    Smokeless tobacco: Never Used    Alcohol use No      Comment: social      Family History:  Family History   Problem Relation Age of Onset    Diabetes Maternal Aunt     Heart Disease Maternal Aunt     Stroke Maternal Aunt     Heart Disease Maternal Grandmother     No Known Problems Mother       Medications: (reviewed)  Current Outpatient Prescriptions   Medication Sig    oxyCODONE-acetaminophen (PERCOCET) 5-325 mg per tablet Take 1 Tab by mouth every four (4) hours as needed for Pain. Max Daily Amount: 6 Tabs.  doxycycline (ADOXA) 100 mg tablet Take 100 mg by mouth two (2) times a day. Indications: ACNE ROSACEA    HYDROcodone-acetaminophen (NORCO) 7.5-325 mg per tablet Take 1 Tab by mouth every six (6) hours as needed for Pain. Max Daily Amount: 4 Tabs.  topiramate (TOPAMAX) 100 mg tablet Take 200 mg by mouth nightly.  levonorgestrel (MIRENA) 20 mcg/24 hr (5 years) IUD 1 Each by IntraUTERine route once.  clonazePAM (KLONOPIN) 1 mg tablet Take 2 mg by mouth nightly.  escitalopram (LEXAPRO) 20 mg tablet Take 20 mg by mouth every morning. Indications: GENERALIZED ANXIETY DISORDER     No current facility-administered medications for this visit. Allergies: (reviewed)  Allergies   Allergen Reactions    Compazine [Prochlorperazine Edisylate] Anxiety    Haldol [Haloperidol Lactate] Other (comments)    Promethazine Other (comments)     \"It makes me feel really funny, nausea and dizzy\"    Reglan [Metoclopramide] Other (comments)          OBJECTIVE:    Physical Exam:  VITAL SIGNS: There were no vitals filed for this visit. There is no height or weight on file to calculate BMI. GENERAL STANTON: Conversant, alert, oriented. No acute distress. HEENT: HEENT.  No thyroid enlargement. No JVD. Neck: Supple without restrictions. RESPIRATORY: Clear to auscultation and percussion to the bases. No CVAT. CARDIOVASC: RRR without murmur/rub. GASTROINT: soft, non-tender, without masses or organomegaly   MUSCULOSKEL: no joint tenderness, deformity or swelling   EXTREMITIES: extremities normal, atraumatic, no cyanosis or edema   PELVIC: Swelling of left lower labia majora. Firmness noted in the region of the Bartholin's gland, measuring about 4 x 4 cm. No drainage noted. I tried to express some fluid but this was very painful to her. No spreading erythema. No tenderness outside of the Bartholin's. The right side appeared completely normal.   RECTAL: Deferred   SHAREE SURVEY: No suspicious lymphadenopathy or edema noted. NEURO: Grossly intact. No acute deficit. Lab Results   Component Value Date/Time    WBC 3.7 07/19/2017 07:56 AM    HGB 12.2 07/19/2017 07:56 AM    HCT 37.8 07/19/2017 07:56 AM    PLATELET 705 02/74/3914 07:56 AM    MCV 85.7 07/19/2017 07:56 AM     Lab Results   Component Value Date/Time    Sodium 138 07/19/2017 07:56 AM    Potassium 4.4 07/19/2017 07:56 AM    Chloride 105 07/19/2017 07:56 AM    CO2 29 07/19/2017 07:56 AM    Anion gap 4 (L) 07/19/2017 07:56 AM    Glucose 82 07/19/2017 07:56 AM    BUN 9 07/19/2017 07:56 AM    Creatinine 0.72 07/19/2017 07:56 AM    BUN/Creatinine ratio 13 07/19/2017 07:56 AM    GFR est AA >60 07/19/2017 07:56 AM    GFR est non-AA >60 07/19/2017 07:56 AM    Calcium 8.3 (L) 07/19/2017 07:56 AM         IMPRESSION/PLAN:  Ibrahima Velasco is a 32 y.o. female with a working diagnosis of recurrent Bartholin's gland cyst/abscess. I reviewed with Ibrahima Velasco her medical records, physical exam, and review of symptoms. She is very uncomfortable today in the office. Since she has failed numerous I&Ds and marsupializations, I have recommended surgical excision of the left Bartholin's gland.   She was counseled on the risks, benefits, indications, and alternatives of surgery. Her questions were answered and she wishes to proceed. We will try to schedule the procedure ASAP. I am going to start her on Augmentin at this time and will continue it after surgery. We should be able to get her on the schedule in a couple of days.          Signed By: Tess Martinez MD     2/26/2018/1:46 PM

## 2018-02-26 NOTE — ED TRIAGE NOTES
Patient reports she has a vulvar abscess that she was seen here for 2 days ago. More redness and more swelling today. Hot compress not working. She was told to come back if it got worse.

## 2018-02-26 NOTE — ED PROVIDER NOTES
HPI Comments: 32 y.o. female with past medical history significant for anxiety, depression, endometriosis, and mirgraines who presents from home via private vehicle with chief complaint of skin problem. Pt reports she has a vulvar abscess over a site of her last bartholin cyst marsupialization. Pt reports being seen here 2 days ago and states the site has gotten bigger and more red since. Pt states she was supposed to try to make an appointment today, but they were closed when she called, so she called her OB/GYN's office on call doctor who said she should just come into the ED. Pt reports nausea from the pain and dysuria. Pt denies any fever, chills, vomiting, CP, diarrhea, or constipation. There are no other acute medical concerns at this time. Old Chart Review: Pt was seen here 2/24 for the same, at that time stated pain started 2 days prior, noting it felt tight. Dr. Lavinia Quesada saw the pt here, reccommened warm compresses, pain medication, and GYN f/u today in the office. Social hx: Former smoker; Social EtOH use  OB/GYN: [facility]. Estephania Arnold ([facility])    Note written by Hany Priest, as dictated by Tien Goode MD 9:04 AM      The history is provided by the patient and medical records. No  was used.         Past Medical History:   Diagnosis Date    Anxiety     Depression     Endometriosis     Migraine     Neurological disorder     migraines    Ovarian cyst     Panic attack        Past Surgical History:   Procedure Laterality Date    HX APPENDECTOMY  04/26/2017    HX BREAST LUMPECTOMY Right     HX BUNIONECTOMY      HX CYST REMOVAL      HX GYN      HX OTHER SURGICAL      laparascopy    HX WISDOM TEETH EXTRACTION           Family History:   Problem Relation Age of Onset    Diabetes Maternal Aunt     Heart Disease Maternal Aunt     Stroke Maternal Aunt     Heart Disease Maternal Grandmother     No Known Problems Mother        Social History     Social History    Marital status: SINGLE     Spouse name: N/A    Number of children: N/A    Years of education: N/A     Occupational History    Not on file. Social History Main Topics    Smoking status: Former Smoker    Smokeless tobacco: Never Used    Alcohol use No      Comment: social    Drug use: No    Sexual activity: Yes     Partners: Male     Birth control/ protection: Pill     Other Topics Concern    Not on file     Social History Narrative         ALLERGIES: Compazine [prochlorperazine edisylate]; Haldol [haloperidol lactate]; Promethazine; and Reglan [metoclopramide]    Review of Systems   Constitutional: Negative for chills and fever. HENT: Negative for rhinorrhea and sore throat. Respiratory: Negative for cough and shortness of breath. Cardiovascular: Negative for chest pain. Gastrointestinal: Positive for nausea. Negative for abdominal pain, constipation, diarrhea and vomiting. Genitourinary: Positive for dysuria and vaginal pain. Negative for urgency. Musculoskeletal: Negative for arthralgias and back pain. Skin: Negative for rash. Neurological: Negative for dizziness, weakness and light-headedness. All other systems reviewed and are negative. Vitals:    02/26/18 0835   BP: 101/67   Resp: 18   Temp: 98.6 °F (37 °C)   SpO2: 100%   Weight: 69.4 kg (153 lb)   Height: 5' 8\" (1.727 m)            Physical Exam   Vital signs reviewed. Nursing notes reviewed. Const:  No acute distress, well developed, well nourished. Appears uncomfortable. Tearful. Head:  Atraumatic, normocephalic  Eyes:  PERRL, conjunctiva normal, no scleral icterus  Neck:  Supple, trachea midline  Cardiovascular:  RRR, no murmurs, no gallops, no rubs  Resp:  No resp distress, no increased work of breathing, no wheezes, no rhonchi, no rales,  Abd:  Soft, non-tender, non-distended, no rebound, no guarding, no CVA tenderness  :  Small area to the posterior L labia that is tender, firm, with no obvious fluctuance.    MSK:  No pedal edema, normal ROM  Neuro:  Alert and oriented x3, no cranial nerve defect   Skin:  Warm, dry, intact  Psych: normal mood and affect, behavior is normal, judgement and thought content is normal    Note written by Hany Weathers, as dictated by Weston Marie MD 9:04 AM    MDM  Number of Diagnoses or Management Options  Vulvar abscess:      Amount and/or Complexity of Data Reviewed  Clinical lab tests: ordered and reviewed  Tests in the radiology section of CPT®: ordered and reviewed  Review and summarize past medical records: yes    Patient Progress  Patient progress: stable      ED Course     Pt. Presents to the ER with complaints of recurrent vulvar abscess. She has had multiple surgeries on this site. Pt. Was seen two days ago for the same. Her abscess is tender, but I do not feel any obvious fluctuance today. Pt. Was supposed to go to the GYN office today, but she said that she was in too much pain to call them this morning. So, she came to the ER. I spoke with Dr. Joann Castellanos who will see her in the office today. Pt. Felicita Frankel to go to their office or return to the ER with worsening sx. Procedures      CONSULT NOTE:  9:38 AM Weston Marie MD spoke with Dr. Joann Castellanos, Consult for OB/GYN. Discussed available diagnostic tests and clinical findings. Dr. Joann Castellanos will see the pt in the office as soon as she leaves.

## 2018-02-26 NOTE — LETTER
2018 3:50 PM 
 
Patient:  Tanner Patel YOB: 1990 Date of Visit: 2018 Dear MD [address] [place] [id number] VIA Facsimile: [phone] 
 : Thank you for referring Ms. Hannah Rasmussen to me for evaluation/treatment. Below are the relevant portions of my assessment and plan of care. [facility], [address] 
P [phone]  F [phone] Office Note Patient ID: 
Name:  Tanner Patel MRN:  [id number] :   y.o. Date:  2018 HISTORY OF PRESENT ILLNESS: 
Tanner Patel is a 32 y.o.  premenopausal female who is being seen for a Bartholin's gland abscess. She is referred by Dr. Ofelia Street. 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 She reports at least 7 or 8 times. She has undergone multiple I&Ds and has had 2 marsupialization procedures, with the most recent being on 18 with Dr. Jaki Franklin. Starting about 4 days ago she noted increased swelling and tenderness in the area and without drainage. She has been seen in the ER twice. She was seen there earlier this morning. She was then seen in Dr. Kun Calvin office. I have been asked to see her in consultation for possible Bartholin's gland excision. She has never had any issues on the right side, only on the left. ROS: 
 and GI review:  Negative Cardiopulmonary review:  Negative Musculoskeletal:  Negative A comprehensive review of systems was negative except for that written in the History of Present Illness. , 10 point ROS 
 
 
OB/GYN ROS: 
[id number] Hx of multiple I&Ds and marsupialization x 2 of left-sided Bartholin's Patient denies any abnormal bleeding or vaginal discharge. Problem List: 
Patient Active Problem List  
 Diagnosis Date Noted  Vulvar abscess 2018  Abdominal pain 2017  Depression 2016  Anxiety 01/20/2016  Status migrainosus 01/13/2016 PMH: 
Past Medical History:  
Diagnosis Date  Anxiety  Depression  Endometriosis  Migraine  Neurological disorder   
 migraines  Ovarian cyst   
 Panic attack PSH: 
Past Surgical History:  
Procedure Laterality Date  HX APPENDECTOMY  04/26/2017  HX BREAST LUMPECTOMY Right  HX BUNIONECTOMY  HX CYST REMOVAL    
 HX GYN    
 HX OTHER SURGICAL    
 laparascopy  HX WISDOM TEETH EXTRACTION Social History: 
Social History Substance Use Topics  Smoking status: Former Smoker  Smokeless tobacco: Never Used  Alcohol use No  
   Comment: social  
  
Family History: 
Family History Problem Relation Age of Onset  Diabetes Maternal Aunt  Heart Disease Maternal Aunt  Stroke Maternal Aunt  Heart Disease Maternal Grandmother  No Known Problems Mother Medications: (reviewed) Current Outpatient Prescriptions Medication Sig  
 oxyCODONE-acetaminophen (PERCOCET) 5-325 mg per tablet Take 1 Tab by mouth every four (4) hours as needed for Pain. Max Daily Amount: 6 Tabs.  doxycycline (ADOXA) 100 mg tablet Take 100 mg by mouth two (2) times a day. Indications: ACNE ROSACEA  
 HYDROcodone-acetaminophen (NORCO) 7.5-325 mg per tablet Take 1 Tab by mouth every six (6) hours as needed for Pain. Max Daily Amount: 4 Tabs.  topiramate (TOPAMAX) 100 mg tablet Take 200 mg by mouth nightly.  levonorgestrel (MIRENA) 20 mcg/24 hr (5 years) IUD 1 Each by IntraUTERine route once.  clonazePAM (KLONOPIN) 1 mg tablet Take 2 mg by mouth nightly.  escitalopram (LEXAPRO) 20 mg tablet Take 20 mg by mouth every morning. Indications: GENERALIZED ANXIETY DISORDER No current facility-administered medications for this visit. Allergies: (reviewed) Allergies Allergen Reactions  Compazine [Prochlorperazine Edisylate] Anxiety  Haldol [Haloperidol Lactate] Other (comments)  Promethazine Other (comments) \"It makes me feel really funny, nausea and dizzy\"  Reglan [Metoclopramide] Other (comments) OBJECTIVE: 
 
Physical Exam: VITAL SIGNS: There were no vitals filed for this visit. There is no height or weight on file to calculate BMI. GENERAL STANTON: Conversant, alert, oriented. No acute distress. HEENT: HEENT. No thyroid enlargement. No JVD. Neck: Supple without restrictions. RESPIRATORY: Clear to auscultation and percussion to the bases. No CVAT. CARDIOVASC: RRR without murmur/rub. GASTROINT: soft, non-tender, without masses or organomegaly MUSCULOSKEL: no joint tenderness, deformity or swelling EXTREMITIES: extremities normal, atraumatic, no cyanosis or edema PELVIC: Swelling of left lower labia majora. Firmness noted in the region of the Bartholin's gland, measuring about 4 x 4 cm. No drainage noted. I tried to express some fluid but this was very painful to her. No spreading erythema. No tenderness outside of the Bartholin's. The right side appeared completely normal.  
RECTAL: Deferred SHAREE SURVEY: No suspicious lymphadenopathy or edema noted. NEURO: Grossly intact. No acute deficit. Lab Results Component Value Date/Time WBC 3.7 07/19/2017 07:56 AM  
 HGB 12.2 07/19/2017 07:56 AM  
 HCT 37.8 07/19/2017 07:56 AM  
 PLATELET 146 01/50/9350 07:56 AM  
 MCV 85.7 07/19/2017 07:56 AM  
 
Lab Results Component Value Date/Time  Sodium 138 07/19/2017 07:56 AM  
 Potassium 4.4 07/19/2017 07:56 AM  
 Chloride 105 07/19/2017 07:56 AM  
 CO2 29 07/19/2017 07:56 AM  
 Anion gap 4 (L) 07/19/2017 07:56 AM  
 Glucose 82 07/19/2017 07:56 AM  
 BUN 9 07/19/2017 07:56 AM  
 Creatinine 0.72 07/19/2017 07:56 AM  
 BUN/Creatinine ratio 13 07/19/2017 07:56 AM  
 GFR est AA >60 07/19/2017 07:56 AM  
 GFR est non-AA >60 07/19/2017 07:56 AM  
 Calcium 8.3 (L) 07/19/2017 07:56 AM  
 
 
 
IMPRESSION/PLAN: 
 Alejandrina Stephenson is a 32 y.o. female with a working diagnosis of recurrent Bartholin's gland cyst/abscess. I reviewed with Alejandrina Stephenson her medical records, physical exam, and review of symptoms. She is very uncomfortable today in the office. Since she has failed numerous I&Ds and marsupializations, I have recommended surgical excision of the left Bartholin's gland. She was counseled on the risks, benefits, indications, and alternatives of surgery. Her questions were answered and she wishes to proceed. We will try to schedule the procedure ASAP. I am going to start her on Augmentin at this time and will continue it after surgery. We should be able to get her on the schedule in a couple of days. Signed By: Nayan Espitia MD   
 0/82/4330/5:47 PM  
 
 
New patient referred by Dr. Jaison Banegas for recurrent Bartholin's Abscess. Pt had marsupialization  5-6 weeks ago. If you have questions, please do not hesitate to call me. I look forward to following Ms. William Lopez along with you.  
 
 
 
Sincerely, 
 
 
Nayan Espitia MD

## 2018-02-27 ENCOUNTER — ANESTHESIA EVENT (OUTPATIENT)
Dept: SURGERY | Age: 28
End: 2018-02-27
Payer: COMMERCIAL

## 2018-02-27 ENCOUNTER — TELEPHONE (OUTPATIENT)
Dept: GYNECOLOGY | Age: 28
End: 2018-02-27

## 2018-02-27 ENCOUNTER — HOSPITAL ENCOUNTER (OUTPATIENT)
Age: 28
Setting detail: OBSERVATION
Discharge: HOME | End: 2018-03-01
Attending: EMERGENCY MEDICINE | Admitting: OBSTETRICS & GYNECOLOGY
Payer: COMMERCIAL

## 2018-02-27 DIAGNOSIS — R10.2 VAGINAL PAIN: ICD-10-CM

## 2018-02-27 DIAGNOSIS — N75.1 ABSCESS OF BARTHOLIN'S GLAND: ICD-10-CM

## 2018-02-27 DIAGNOSIS — N75.1 BARTHOLIN'S GLAND ABSCESS: Primary | ICD-10-CM

## 2018-02-27 PROBLEM — F41.8 ANXIETY ASSOCIATED WITH DEPRESSION: Status: ACTIVE | Noted: 2018-02-27

## 2018-02-27 PROBLEM — N75.8 BARTHOLIN'S GLAND INFECTION: Status: ACTIVE | Noted: 2018-02-27

## 2018-02-27 PROBLEM — F43.10 PTSD (POST-TRAUMATIC STRESS DISORDER): Status: ACTIVE | Noted: 2018-02-27

## 2018-02-27 PROBLEM — G43.909 MIGRAINE: Status: ACTIVE | Noted: 2018-02-27

## 2018-02-27 LAB
ALBUMIN SERPL-MCNC: 3.5 G/DL (ref 3.5–5)
ALBUMIN/GLOB SERPL: 1 {RATIO} (ref 1.1–2.2)
ALP SERPL-CCNC: 47 U/L (ref 45–117)
ALT SERPL-CCNC: 16 U/L (ref 12–78)
ANION GAP SERPL CALC-SCNC: 8 MMOL/L (ref 5–15)
APPEARANCE UR: ABNORMAL
AST SERPL-CCNC: 21 U/L (ref 15–37)
BACTERIA URNS QL MICRO: ABNORMAL /HPF
BASOPHILS # BLD: 0 K/UL (ref 0–0.1)
BASOPHILS NFR BLD: 1 % (ref 0–1)
BILIRUB SERPL-MCNC: 0.6 MG/DL (ref 0.2–1)
BILIRUB UR QL: NEGATIVE
BUN SERPL-MCNC: 6 MG/DL (ref 6–20)
BUN/CREAT SERPL: 9 (ref 12–20)
CALCIUM SERPL-MCNC: 8.4 MG/DL (ref 8.5–10.1)
CHLORIDE SERPL-SCNC: 113 MMOL/L (ref 97–108)
CO2 SERPL-SCNC: 21 MMOL/L (ref 21–32)
COLOR UR: ABNORMAL
CREAT SERPL-MCNC: 0.7 MG/DL (ref 0.55–1.02)
DIFFERENTIAL METHOD BLD: ABNORMAL
EOSINOPHIL # BLD: 0.1 K/UL (ref 0–0.4)
EOSINOPHIL NFR BLD: 1 % (ref 0–7)
EPITH CASTS URNS QL MICRO: ABNORMAL /LPF
ERYTHROCYTE [DISTWIDTH] IN BLOOD BY AUTOMATED COUNT: 12.6 % (ref 11.5–14.5)
GLOBULIN SER CALC-MCNC: 3.5 G/DL (ref 2–4)
GLUCOSE SERPL-MCNC: 93 MG/DL (ref 65–100)
GLUCOSE UR STRIP.AUTO-MCNC: NEGATIVE MG/DL
HCG UR QL: NEGATIVE
HCT VFR BLD AUTO: 37.9 % (ref 35–47)
HGB BLD-MCNC: 12.9 G/DL (ref 11.5–16)
HGB UR QL STRIP: ABNORMAL
IMM GRANULOCYTES # BLD: 0 K/UL (ref 0–0.04)
IMM GRANULOCYTES NFR BLD AUTO: 0 % (ref 0–0.5)
KETONES UR QL STRIP.AUTO: NEGATIVE MG/DL
LEUKOCYTE ESTERASE UR QL STRIP.AUTO: ABNORMAL
LYMPHOCYTES # BLD: 1.4 K/UL (ref 0.8–3.5)
LYMPHOCYTES NFR BLD: 16 % (ref 12–49)
MCH RBC QN AUTO: 30.3 PG (ref 26–34)
MCHC RBC AUTO-ENTMCNC: 34 G/DL (ref 30–36.5)
MCV RBC AUTO: 89 FL (ref 80–99)
MONOCYTES # BLD: 0.6 K/UL (ref 0–1)
MONOCYTES NFR BLD: 7 % (ref 5–13)
NEUTS SEG # BLD: 6.5 K/UL (ref 1.8–8)
NEUTS SEG NFR BLD: 76 % (ref 32–75)
NITRITE UR QL STRIP.AUTO: NEGATIVE
NRBC # BLD: 0 K/UL (ref 0–0.01)
NRBC BLD-RTO: 0 PER 100 WBC
PH UR STRIP: 7.5 [PH] (ref 5–8)
PLATELET # BLD AUTO: 182 K/UL (ref 150–400)
PMV BLD AUTO: 10.1 FL (ref 8.9–12.9)
POTASSIUM SERPL-SCNC: 3.9 MMOL/L (ref 3.5–5.1)
PROT SERPL-MCNC: 7 G/DL (ref 6.4–8.2)
PROT UR STRIP-MCNC: ABNORMAL MG/DL
RBC # BLD AUTO: 4.26 M/UL (ref 3.8–5.2)
RBC #/AREA URNS HPF: ABNORMAL /HPF (ref 0–5)
SODIUM SERPL-SCNC: 142 MMOL/L (ref 136–145)
SP GR UR REFRACTOMETRY: 1.02 (ref 1–1.03)
UR CULT HOLD, URHOLD: NORMAL
UROBILINOGEN UR QL STRIP.AUTO: 1 EU/DL (ref 0.2–1)
WBC # BLD AUTO: 8.5 K/UL (ref 3.6–11)
WBC URNS QL MICRO: ABNORMAL /HPF (ref 0–4)

## 2018-02-27 PROCEDURE — 74011000258 HC RX REV CODE- 258: Performed by: NURSE PRACTITIONER

## 2018-02-27 PROCEDURE — 96375 TX/PRO/DX INJ NEW DRUG ADDON: CPT

## 2018-02-27 PROCEDURE — 96376 TX/PRO/DX INJ SAME DRUG ADON: CPT

## 2018-02-27 PROCEDURE — 99284 EMERGENCY DEPT VISIT MOD MDM: CPT

## 2018-02-27 PROCEDURE — 96365 THER/PROPH/DIAG IV INF INIT: CPT

## 2018-02-27 PROCEDURE — 74011250637 HC RX REV CODE- 250/637: Performed by: NURSE PRACTITIONER

## 2018-02-27 PROCEDURE — 85025 COMPLETE CBC W/AUTO DIFF WBC: CPT | Performed by: PHYSICIAN ASSISTANT

## 2018-02-27 PROCEDURE — 74011250636 HC RX REV CODE- 250/636: Performed by: EMERGENCY MEDICINE

## 2018-02-27 PROCEDURE — 74011250636 HC RX REV CODE- 250/636: Performed by: NURSE PRACTITIONER

## 2018-02-27 PROCEDURE — 99218 HC RM OBSERVATION: CPT

## 2018-02-27 PROCEDURE — 96366 THER/PROPH/DIAG IV INF ADDON: CPT

## 2018-02-27 PROCEDURE — 80053 COMPREHEN METABOLIC PANEL: CPT | Performed by: PHYSICIAN ASSISTANT

## 2018-02-27 PROCEDURE — 36415 COLL VENOUS BLD VENIPUNCTURE: CPT | Performed by: PHYSICIAN ASSISTANT

## 2018-02-27 PROCEDURE — 81001 URINALYSIS AUTO W/SCOPE: CPT | Performed by: PHYSICIAN ASSISTANT

## 2018-02-27 PROCEDURE — 81025 URINE PREGNANCY TEST: CPT

## 2018-02-27 PROCEDURE — 96361 HYDRATE IV INFUSION ADD-ON: CPT

## 2018-02-27 RX ORDER — SODIUM CHLORIDE 0.9 % (FLUSH) 0.9 %
5-10 SYRINGE (ML) INJECTION AS NEEDED
Status: DISCONTINUED | OUTPATIENT
Start: 2018-02-27 | End: 2018-03-01 | Stop reason: HOSPADM

## 2018-02-27 RX ORDER — DIPHENHYDRAMINE HYDROCHLORIDE 50 MG/ML
12.5 INJECTION, SOLUTION INTRAMUSCULAR; INTRAVENOUS
Status: DISCONTINUED | OUTPATIENT
Start: 2018-02-27 | End: 2018-03-01 | Stop reason: HOSPADM

## 2018-02-27 RX ORDER — CLONAZEPAM 1 MG/1
2 TABLET ORAL
Status: DISCONTINUED | OUTPATIENT
Start: 2018-02-27 | End: 2018-03-01 | Stop reason: HOSPADM

## 2018-02-27 RX ORDER — ESCITALOPRAM OXALATE 10 MG/1
20 TABLET ORAL
Status: DISCONTINUED | OUTPATIENT
Start: 2018-02-28 | End: 2018-03-01 | Stop reason: HOSPADM

## 2018-02-27 RX ORDER — LORAZEPAM 2 MG/ML
1 INJECTION INTRAMUSCULAR
Status: DISCONTINUED | OUTPATIENT
Start: 2018-02-27 | End: 2018-03-01 | Stop reason: HOSPADM

## 2018-02-27 RX ORDER — DOCUSATE SODIUM 100 MG/1
100 CAPSULE, LIQUID FILLED ORAL 2 TIMES DAILY
Status: DISCONTINUED | OUTPATIENT
Start: 2018-02-27 | End: 2018-03-01 | Stop reason: HOSPADM

## 2018-02-27 RX ORDER — TOPIRAMATE 100 MG/1
100 TABLET, FILM COATED ORAL
Status: DISCONTINUED | OUTPATIENT
Start: 2018-02-27 | End: 2018-03-01 | Stop reason: HOSPADM

## 2018-02-27 RX ORDER — SODIUM CHLORIDE 0.9 % (FLUSH) 0.9 %
5-10 SYRINGE (ML) INJECTION EVERY 8 HOURS
Status: DISCONTINUED | OUTPATIENT
Start: 2018-02-27 | End: 2018-03-01 | Stop reason: HOSPADM

## 2018-02-27 RX ORDER — HYDROMORPHONE HYDROCHLORIDE 1 MG/ML
1 INJECTION, SOLUTION INTRAMUSCULAR; INTRAVENOUS; SUBCUTANEOUS
Status: COMPLETED | OUTPATIENT
Start: 2018-02-27 | End: 2018-02-27

## 2018-02-27 RX ORDER — ACETAMINOPHEN 325 MG/1
650 TABLET ORAL
Status: DISCONTINUED | OUTPATIENT
Start: 2018-02-27 | End: 2018-03-01 | Stop reason: HOSPADM

## 2018-02-27 RX ORDER — ONDANSETRON 2 MG/ML
4 INJECTION INTRAMUSCULAR; INTRAVENOUS
Status: COMPLETED | OUTPATIENT
Start: 2018-02-27 | End: 2018-02-27

## 2018-02-27 RX ORDER — OXYCODONE AND ACETAMINOPHEN 5; 325 MG/1; MG/1
1 TABLET ORAL
Status: DISCONTINUED | OUTPATIENT
Start: 2018-02-27 | End: 2018-03-01 | Stop reason: HOSPADM

## 2018-02-27 RX ORDER — HYDROMORPHONE HYDROCHLORIDE 1 MG/ML
0.5 INJECTION, SOLUTION INTRAMUSCULAR; INTRAVENOUS; SUBCUTANEOUS
Status: DISCONTINUED | OUTPATIENT
Start: 2018-02-27 | End: 2018-03-01 | Stop reason: HOSPADM

## 2018-02-27 RX ORDER — SODIUM CHLORIDE 9 MG/ML
125 INJECTION, SOLUTION INTRAVENOUS CONTINUOUS
Status: DISCONTINUED | OUTPATIENT
Start: 2018-02-27 | End: 2018-02-28

## 2018-02-27 RX ORDER — ONDANSETRON 2 MG/ML
4 INJECTION INTRAMUSCULAR; INTRAVENOUS
Status: DISCONTINUED | OUTPATIENT
Start: 2018-02-27 | End: 2018-03-01 | Stop reason: HOSPADM

## 2018-02-27 RX ADMIN — OXYCODONE AND ACETAMINOPHEN 1 TABLET: 5; 325 TABLET ORAL at 16:26

## 2018-02-27 RX ADMIN — HYDROMORPHONE HYDROCHLORIDE 1 MG: 1 INJECTION, SOLUTION INTRAMUSCULAR; INTRAVENOUS; SUBCUTANEOUS at 12:47

## 2018-02-27 RX ADMIN — PIPERACILLIN AND TAZOBACTAM 10.12 G: 3; .375 INJECTION, POWDER, FOR SOLUTION INTRAVENOUS at 17:51

## 2018-02-27 RX ADMIN — Medication 10 ML: at 19:23

## 2018-02-27 RX ADMIN — DOCUSATE SODIUM 100 MG: 100 CAPSULE, LIQUID FILLED ORAL at 19:25

## 2018-02-27 RX ADMIN — Medication 10 ML: at 23:10

## 2018-02-27 RX ADMIN — TOPIRAMATE 100 MG: 100 TABLET, FILM COATED ORAL at 23:10

## 2018-02-27 RX ADMIN — OXYCODONE AND ACETAMINOPHEN 1 TABLET: 5; 325 TABLET ORAL at 20:14

## 2018-02-27 RX ADMIN — PIPERACILLIN SODIUM,TAZOBACTAM SODIUM 3.38 G: 3; .375 INJECTION, POWDER, FOR SOLUTION INTRAVENOUS at 16:55

## 2018-02-27 RX ADMIN — ONDANSETRON 4 MG: 2 INJECTION INTRAMUSCULAR; INTRAVENOUS at 12:48

## 2018-02-27 RX ADMIN — CLONAZEPAM 2 MG: 1 TABLET ORAL at 23:10

## 2018-02-27 RX ADMIN — HYDROMORPHONE HYDROCHLORIDE 0.5 MG: 1 INJECTION, SOLUTION INTRAMUSCULAR; INTRAVENOUS; SUBCUTANEOUS at 23:10

## 2018-02-27 RX ADMIN — SODIUM CHLORIDE 125 ML/HR: 900 INJECTION, SOLUTION INTRAVENOUS at 12:47

## 2018-02-27 RX ADMIN — SODIUM CHLORIDE 125 ML/HR: 900 INJECTION, SOLUTION INTRAVENOUS at 23:26

## 2018-02-27 RX ADMIN — ONDANSETRON 4 MG: 2 INJECTION INTRAMUSCULAR; INTRAVENOUS at 16:27

## 2018-02-27 RX ADMIN — HYDROMORPHONE HYDROCHLORIDE 0.5 MG: 1 INJECTION, SOLUTION INTRAMUSCULAR; INTRAVENOUS; SUBCUTANEOUS at 19:23

## 2018-02-27 NOTE — PROGRESS NOTES
Assumed care of pt this afternoon. Pt is alert and oriented with no known needs at this time. Pt is now resting in bed with vaginal pain due to cyst. Pt called nurse into the room, pt was found to have brown discharge coming from the vaginal area, OBGYN has been paged. OB notified, no new orders. TRANSFER - OUT REPORT:    Verbal report given to karina(name) on Oscar Viramontes  being transferred to [facility](unit) for routine progression of care       Report consisted of patients Situation, Background, Assessment and   Recommendations(SBAR). Information from the following report(s) ED Summary was reviewed with the receiving nurse. Opportunity for questions and clarification was provided.

## 2018-02-27 NOTE — ED TRIAGE NOTES
Triage Note: Patient sent by Dr. Terence Alvares for admission for surgery tomorrow of a vaginal cyst.

## 2018-02-27 NOTE — ED NOTES

## 2018-02-27 NOTE — PROGRESS NOTES
TRANSFER - IN REPORT:    Verbal report received from [facility] (name) on Harrison De La Cruz  being received from ED (unit) for routine progression of care      Report consisted of patients Situation, Background, Assessment and   Recommendations(SBAR). Information from the following report(s) ED Summary, MAR and Recent Results was reviewed with the receiving nurse. Opportunity for questions and clarification was provided. Assessment completed upon patients arrival to unit and care assumed.

## 2018-02-27 NOTE — ED PROVIDER NOTES
HPI Comments: 32 y.o. female with past medical history significant for panic attack, anxiety, depression, endometriosis, ovarian cyst, migraines, and bartholin's gland abscess x8 who presents from home via private vehicle with chief complaint of severe vaginal pain. Pt reports that she was seen in the ED yesterday for vaginal pain that first started 5 days ago and was dx with a bartholin's gland abscess. She was discharged to f/u with her OB and she was seen by Dr. Brittany Sotomayor, who has planned for the pt to have surgery tomorrow. However, despite following instructions from OB and taking percocet, pt reports that the pain only continues to increase and is now \"too bad. \" Pt notes associated nausea and chills at night. She took her first dose of Augmentin last night but has been unable to eat today and has not take a second dose. Pt has not eaten today, and has only taken a sip of water to swallow her last percocet at 0900, ~3.5 hours ago. Pt reports that this is her 8th such abscess with 2 trips to the OR and 2 prior hospitalizations. Pt states that Dr. Brittany Sotomayor was planning on removing \"the gland and everything\" tomorrow. Pt denies fever and vomiting. There are no other acute medical concerns at this time. OBGyn: Carmel Huffman MD     Old Chart Review: Pt was seen in the ED yesterday for Bartholin gland abscess, and followed up yesterday with Dr. Brittany Sotomayor, [facility], in his office as well. Note written by Hany Ochoa, as dictated by Nimesh Robertson DO 12:37 PM      The history is provided by the patient. No  was used.         Past Medical History:   Diagnosis Date    Anxiety     Bartholin's gland abscess     x8    Depression     Endometriosis     Migraine     Neurological disorder     migraines    Ovarian cyst     Panic attack        Past Surgical History:   Procedure Laterality Date    HX APPENDECTOMY  04/26/2017    HX BARTHOLIN CYST MARSUPIALIZATION      2011, 2018    HX BREAST LUMPECTOMY Right     HX BUNIONECTOMY      HX CYST REMOVAL      HX GYN      HX OTHER SURGICAL      laparascopy    HX WISDOM TEETH EXTRACTION           Family History:   Problem Relation Age of Onset    Diabetes Maternal Aunt     Heart Disease Maternal Aunt     Stroke Maternal Aunt     Heart Disease Maternal Grandmother     No Known Problems Mother        Social History     Social History    Marital status: SINGLE     Spouse name: N/A    Number of children: N/A    Years of education: N/A     Occupational History    Not on file. Social History Main Topics    Smoking status: Former Smoker    Smokeless tobacco: Never Used    Alcohol use No      Comment: social    Drug use: No    Sexual activity: Yes     Partners: Male     Birth control/ protection: Pill     Other Topics Concern    Not on file     Social History Narrative         ALLERGIES: Compazine [prochlorperazine edisylate]; Haldol [haloperidol lactate]; Promethazine; and Reglan [metoclopramide]    Review of Systems   Constitutional: Positive for chills. Negative for fever. Respiratory: Negative for shortness of breath. Cardiovascular: Negative for chest pain. Gastrointestinal: Positive for nausea. Negative for vomiting. Genitourinary: Positive for vaginal pain. All other systems reviewed and are negative. Vitals:    02/27/18 1202   BP: 116/82   Pulse: (!) 136   Resp: 18   Temp: 98.3 °F (36.8 °C)   SpO2: 98%   Weight: 69.5 kg (153 lb 4 oz)   Height: 5' 7\" (1.702 m)            Physical Exam   Genitourinary:                 Constitutional: Pt is awake and alert. Pt appears well-developed and well-nourished. NAD. In a moderate amount of pain. HENT:   Head: Normocephalic and atraumatic. Nose: Nose normal.   Mouth/Throat: Oropharynx is clear and moist. No oropharyngeal exudate. Eyes: Conjunctivae and extraocular motions are normal. Pupils are equal, round, and reactive to light. Right eye exhibits no discharge.  Left eye exhibits no discharge. No scleral icterus. Neck: No tracheal deviation present. Supple neck. Cardiovascular: Tachycardic, regular rhythm, normal heart sounds and intact distal pulses. Exam reveals no gallop and no friction rub. No murmur heard. Pulmonary/Chest: Effort normal and breath sounds normal.  Pt  has no wheezes. Pt  has no rales. Abdominal: Soft. Pt  exhibits no distension and no mass. No tenderness. Pt  has no rebound and no guarding. Musculoskeletal:  Pt  exhibits no edema and no tenderness. Ext: Normal ROM in all four extremities; not tender to palpation; distal pulses are normal, no edema. Neurological:  Pt is alert. nonfocal neuro exam.  : as above  Skin: Skin is warm and dry. Pt  is not diaphoretic. Psychiatric:  Pt  has a normal mood and affect. Behavior is normal.     Note written by Hany Hanna, as dictated by Damari Claudio,  12:40 PM      [facility]      ED Course       Procedures             1:00 PM - d/w Dr Virgen Kovacs - will send his NP over to admit the pt.       Labs reviewed  Pain controlled  Admit to hospital.

## 2018-02-27 NOTE — TELEPHONE ENCOUNTER
The pt called back, she tried everything Rudy Bingham advised her to do for the pain. She has not had any relief and feels it has worsened. I advised her per Lenka's advice that she can come to the er at [facility] but may be on a stretcher throughout the night due to no available rooms or to go to [address] and they may not have a room as well. The pt prefer's to come here where  is located. She will have her father bring her here.

## 2018-02-27 NOTE — TELEPHONE ENCOUNTER
Pt is scheduled for removal of Bartholin's gland tomorrow and set up for PAT today. She called saying she could not tolerate the pain any more. Denied fever/chills or change in size of cyst.   We attempted to bring her in to be a direct admit, but there hospital not only has no beds available, but at least 20 pt's waiting for any that open up. Was told pt would probably be waiting in the ER on a stretcher all night prior to surgery. Called pt back to discuss options. 1) she can go to the ER here being aware she may stay all night in the ER on a stretcher. 2) go to her local ER and be evaluated, but that I can not guarantee that they too will not be full to capasity  3) try to continue to manage it at home with taking another percocet now (last was at [address] and it is now 0955), take 400 mg of ibuprofen at noon and then another 2 percocet's at 3. After this, she would return to a single percocet. Also, to try Ice to the area or a warm pack. Which ever gives her the most relief. 4)If she does not go to the ER, we discussed that she still needed to try to get to the PAT apt today, otherwise we may not be able to to her procedure tomorrow. She will try to manage at home as her father si coming to take her to the PAT apt (so she can take the Percocet and not have to drive) and she will let us know if she does go to the ER or if she is unable to make it to her PAT apt.

## 2018-02-27 NOTE — H&P
[address]  P [phone]  F [phone]    Office Note  Patient ID:  Name:  Patricia Torrez  MRN:  [id number]  :   y.o. Date:  2018      HISTORY OF PRESENT ILLNESS:  Patricia Torrez is a 32 y.o.  premenopausal female who is being seen for a Bartholin's gland abscess. She was referred to Dr. Anabell Hill by Dr. Taylor Oliva. 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 She reports at least 7 or 8 times. She has undergone multiple I&Ds and has had 2 marsupialization procedures, with the most recent being on 18 with Dr. Hussein Comment. Starting about 4 days ago she noted increased swelling and tenderness in the area and without drainage. She has been seen in the ER twice prior to today. She was seen in Dr. Jagdeep Oneill office yesterday prior to being seen by Dr. Anabell Hill yesterday afternoon  in consultation for possible Bartholin's gland excision. She has never had any issues on the right side, only on the left. After Dr. Anabell Hill examined her and they discussed options, it was decided to proceed with excision of the left Bartholin's gland and it was scheduled for tomorrow. She called the office this morning complaining of increasing pain in the area. Due to hospital over full capacity, I reviewed rob management options with her on the phone. After about 2 hours, she said the pain was too bad and proceeded on to the ER.      OB/GYN ROS:  [id number]  Hx of multiple I&Ds and marsupialization x 2 of left-sided Bartholin's  Patient denies any abnormal bleeding or vaginal discharge.          Problem List:  Patient Active Problem List    Diagnosis Date Noted    Bartholin's gland abscess 2018    Abdominal pain 2017    Depression 2016    Anxiety 2016    Status migrainosus 2016     PMH:  Past Medical History:   Diagnosis Date    Anxiety     Bartholin's gland abscess     x8    Depression     Endometriosis     Migraine     Neurological disorder     migraines    Ovarian cyst     Panic attack       PSH:  Past Surgical History:   Procedure Laterality Date    HX APPENDECTOMY  04/26/2017    HX BARTHOLIN CYST MARSUPIALIZATION      2011, 2018    HX BREAST LUMPECTOMY Right     HX BUNIONECTOMY      HX CYST REMOVAL      HX GYN      HX OTHER SURGICAL      laparascopy    HX WISDOM TEETH EXTRACTION        Social History:  Social History   Substance Use Topics    Smoking status: Former Smoker    Smokeless tobacco: Never Used    Alcohol use No      Comment: social      Family History:  Family History   Problem Relation Age of Onset    Diabetes Maternal Aunt     Heart Disease Maternal Aunt     Stroke Maternal Aunt     Heart Disease Maternal Grandmother     No Known Problems Mother       Medications: (reviewed)  Current Facility-Administered Medications   Medication Dose Route Frequency    0.9% sodium chloride infusion  125 mL/hr IntraVENous CONTINUOUS     Current Outpatient Prescriptions   Medication Sig    amoxicillin-clavulanate (AUGMENTIN) 875-125 mg per tablet Take 1 Tab by mouth every twelve (12) hours.  oxyCODONE-acetaminophen (PERCOCET) 5-325 mg per tablet Take 1 Tab by mouth every four (4) hours as needed for Pain. Max Daily Amount: 6 Tabs.  doxycycline (ADOXA) 100 mg tablet Take 100 mg by mouth two (2) times a day. Indications: ACNE ROSACEA    clonazePAM (KLONOPIN) 1 mg tablet Take 2 mg by mouth nightly.  escitalopram (LEXAPRO) 20 mg tablet Take 20 mg by mouth every morning. Indications: GENERALIZED ANXIETY DISORDER    topiramate (TOPAMAX) 100 mg tablet Take 100 mg by mouth nightly.  levonorgestrel (MIRENA) 20 mcg/24 hr (5 years) IUD 1 Each by IntraUTERine route once.      Allergies: (reviewed)  Allergies   Allergen Reactions    Compazine [Prochlorperazine Edisylate] Anxiety    Haldol [Haloperidol Lactate] Other (comments)    Promethazine Other (comments)     \"It makes me feel really funny, nausea and dizzy\"    Reglan [Metoclopramide] Other (comments)      Review of Systems:  General: Denies wt loss, fatigue  HEENT: Has history of migraines, but none recently. Denies visual changes, dysphagia. Resp: Denies SOB, GARCÍA, wheezing or cough  CV: Denies CP, palpitations  GI/:Acknowledeges some constipation. Denies N/V, bloating, diarrhea  or dysuria  GYN: As stated above and in yesterdays note, recurrent Bartholin's gland cyst/abcess  MuskSkel: Denies muscle ache or joint pain  Neuro: Denies neuropathy, dizzyness or syncope  Psych:Acknowledges some anxiety and acknowledged PTSD. Denies depression or feelings of sadness at this time. OBJECTIVE:    Physical Exam:  VITAL SIGNS: Vitals:    02/27/18 1202   BP: 116/82   Pulse: (!) 136   Resp: 18   Temp: 98.3 °F (36.8 °C)   SpO2: 98%   Weight: 153 lb 4 oz (69.5 kg)   Height: 5' 7\" (1.702 m)     Body mass index is 24 kg/(m^2). GENERAL STANTON: Conversant, alert, oriented. Anxious   HEENT: HEENT. No thyroid enlargement. No JVD. Neck:  Supple without restrictions. No adenopathy appreciated   RESPIRATORY: CTA bilat. CARDIOVASC: Regular without M/R/G   GASTROINT: soft, NT/ND, without masses or organomegaly appreciated. + BS   MUSCULOSKEL: no joint tenderness, deformity or swelling   EXTREMITIES: extremities without edema bilat and + radial and pedal pulses bilat   PELVIC: Swelling of left lower labia majora persist.  Firmness noted in the region of the Bartholin's gland, measuring about 4 x 4 cm. No drainage noted. No spreading erythema noted. No tenderness outside of the Bartholin's. The right side appeared completely normal.   RECTAL: Deferred   NEURO: Grossly intact. No acute deficit.          Lab Results   Component Value Date/Time    WBC 8.5 02/27/2018 12:25 PM    HGB 12.9 02/27/2018 12:25 PM    HCT 37.9 02/27/2018 12:25 PM    PLATELET 263 15/63/6450 12:25 PM    MCV 89.0 02/27/2018 12:25 PM     Lab Results   Component Value Date/Time    Sodium 142 02/27/2018 12:25 PM    Potassium 3.9 02/27/2018 12:25 PM    Chloride 113 (H) 02/27/2018 12:25 PM    CO2 21 02/27/2018 12:25 PM    Anion gap 8 02/27/2018 12:25 PM    Glucose 93 02/27/2018 12:25 PM    BUN 6 02/27/2018 12:25 PM    Creatinine 0.70 02/27/2018 12:25 PM    BUN/Creatinine ratio 9 (L) 02/27/2018 12:25 PM    GFR est AA >60 02/27/2018 12:25 PM    GFR est non-AA >60 02/27/2018 12:25 PM    Calcium 8.4 (L) 02/27/2018 12:25 PM         IMPRESSION/PLAN:  Yury Gonzalez is a 32 y.o. female with a working diagnosis of recurrent Bartholin's gland cyst/abscess. It is better to excise the gland with the area filled rather than performing another I&D which would only return again. Due to high level of pain, will admit under observation and continue with plan to excise Bartholin's gland tomorrow. Change Augmentin to Zosyn and monitor for fevers. Add Ativan Q8 prn anxiety  NPO after midnight, regular diet until them  Add colace   Reviewed plan with pt and she is pleased with it.    Discussed with Dr. Thu Bautista who agrees  Signed By: Mona Jernigan NP     2/27/2018/1:46 PM

## 2018-02-27 NOTE — IP AVS SNAPSHOT
[address] 
[phone] Patient: Ora Cuevas MRN: [id number] NRX:[id number]/43/3852 About your hospitalization You were admitted on:  February 27, 2018 You last received care in the:  [address] [place] [address] MED SURG You were discharged on:  March 1, 2018 Why you were hospitalized Your primary diagnosis was:  Not on File Your diagnoses also included:  Migraine, Ptsd (Post-Traumatic Stress Disorder), Anxiety Associated With Depression, Bartholin's Gland Infection Follow-up Information Follow up With Details Comments Contact Info None   None ([phone]) Patient stated that they have no PCP Helena Beatty MD Schedule an appointment as soon as possible for a visit in 2 weeks Follow up after Hospitalization [address] [place] [address] [address] 
[phone] Discharge Orders None A check alvaro indicates which time of day the medication should be taken. My Medications START taking these medications Instructions Each Dose to Equal  
 Morning Noon Evening Bedtime  
 docusate sodium 100 mg capsule Commonly known as:  Darby Finn Your last dose was: Your next dose is: Take 1 Cap by mouth two (2) times a day. Hold for loose stools. 100 mg  
    
   
   
   
  
 ibuprofen 200 mg tablet Commonly known as:  MOTRIN Your last dose was: Your next dose is: Take 3 Tabs by mouth every eight (8) hours as needed for Pain. 600 mg CHANGE how you take these medications Instructions Each Dose to Equal  
 Morning Noon Evening Bedtime * oxyCODONE-acetaminophen 5-325 mg per tablet Commonly known as:  PERCOCET What changed:  Another medication with the same name was added. Make sure you understand how and when to take each. Your last dose was: Your next dose is: Take 1 Tab by mouth every four (4) hours as needed for Pain. Max Daily Amount: 6 Tabs. 1 Tab * oxyCODONE-acetaminophen 5-325 mg per tablet Commonly known as:  PERCOCET What changed: You were already taking a medication with the same name, and this prescription was added. Make sure you understand how and when to take each. Your last dose was: Your next dose is: Take 1 Tab by mouth every four (4) hours as needed. Max Daily Amount: 6 Tabs. 1 Tab * Notice: This list has 2 medication(s) that are the same as other medications prescribed for you. Read the directions carefully, and ask your doctor or other care provider to review them with you. CONTINUE taking these medications Instructions Each Dose to Equal  
 Morning Noon Evening Bedtime  
 amoxicillin-clavulanate 875-125 mg per tablet Commonly known as:  AUGMENTIN Your last dose was: Your next dose is: Take 1 Tab by mouth every twelve (12) hours. 1 Tab  
    
   
   
   
  
 clonazePAM 1 mg tablet Commonly known as:  Irina Gary Your last dose was: Your next dose is: Take 2 mg by mouth nightly. 2 mg  
    
   
   
   
  
 doxycycline 100 mg tablet Commonly known as:  ADOXA Your last dose was: Your next dose is: Take 100 mg by mouth two (2) times a day. Indications: ACNE ROSACEA  
 100 mg  
    
   
   
   
  
 LEXAPRO 20 mg tablet Generic drug:  escitalopram oxalate Your last dose was: Your next dose is: Take 20 mg by mouth every morning. Indications: GENERALIZED ANXIETY DISORDER  
 20 mg  
    
   
   
   
  
 MIRENA 20 mcg/24 hr (5 years) Iud  
Generic drug:  levonorgestrel Your last dose was: Your next dose is:    
   
   
 1 Each by IntraUTERine route once. 1 Each  
    
   
   
   
  
 TOPAMAX 100 mg tablet Generic drug:  topiramate Your last dose was: Your next dose is: Take 100 mg by mouth nightly. 100 mg Where to Get Your Medications Information on where to get these meds will be given to you by the nurse or doctor. ! Ask your nurse or doctor about these medications  
  docusate sodium 100 mg capsule  
 ibuprofen 200 mg tablet  
 oxyCODONE-acetaminophen 5-325 mg per tablet Discharge Instructions [address] [place] [address], [address] Hayes Byrd 
P [phone]  F [phone] YOUR DISCHARGE INSTRUCTIONS Aide Blanc, Please review your instructions with your nurse and ask any questions so you have all the information you need to recover well at home. If you do not feel you have everything you need to succeed and be safe after you leave the hospital, please discuss these concerns with your nurse. As always, call for any questions at home. Your doctor: Jesus Emery MD 
Diagnosis: Bartholin's gland infection Procedure: Procedure(s): RESECTION LEFT BARTHOLIN GLAND Date of Discharge: 3/1/2018 Take Home Medications See Discharge Medication Review provided to you by your nurse. If you did not receive one, request this prior to your discharge. · It is important that you take your medications as they are prescribed. · Keep your medications in the bottles provided by the pharmacist and keep a list of the medication names, dosages, times to be taken and what they are for in your wallet. · Do not take other medications without consulting your doctor. · If you are prescribed enoxaparin (Lovenox) and are taking a baby aspirin daily, please hold this medication until your course of enoxaparin is completed. · If you no longer need your prescribed pain medication, you may take Tylenol or Aleve alone. · You should take a daily gentle stool softener such as a colace pill or dulcolax suppository for constipation as this is not uncommon after surgery and/or while on pain medication. If not prescribed, this can be found over the counter. If constipation persists for >24 hours you should take a dose of Milk of Magnesia. Call if your constipation continues. Diet · Stay hydrated and drink fluids such as gatorade and water. This will also help prevent constipation and dehydration. Limit somewhat any usual caffeine intake of beverages such as soda, tea and coffee as this may serve to dehydrate you. · For the first 2-3 days keep a low fiber diet avoiding raw vegetables or fruits with skin. A diet consisting of soup, cereal, yogurt, eggs, fish, Boost/Ensure. Avoid fatty/greasy foods. · If nauseated, keep your diet limited to liquids and call if this persists. Activity · If possible, have someone with you at all times until you feel stable. · Gradually increase your activity each day. There are generally no restrictions on walking, climbing stairs or riding in a car. Try to walk at least 4 times per day. · Showers are okay. Cleans area similar to your prior procedures. · No driving for 1 week and/or while on pain medication. 1. If you had any type of vaginal procedure, you may experience vaginal spotting. Should the bleeding require more that 4 pads a day please call our office. Incision · You should expect some discomfort in the area of your incision, particularly as you increase your activity. If you notice an area of increasing redness or new drainage, please call your doctor. · Staples are generally removed in 10-14 days. · Many incisions will have buried absorbable sutures, which do not need to be removed and are covered by protective glue. This will come off over time. Causes For Concern If any of the following occur, please call our office and speak with the Nurse/aid who will help you with your problem or ask the doctor to call you. Problems with the incision, including increasing pain, swelling, redness or drainage. Increasing abdominal pain despite medication Persisting nausea or vomiting. Fever or chills and a temperature >101F Constipation (no bowel movement for three days). Diarrhea (more than three watery stools within 24 hours). Excessive vaginal or wound bleeding. If after hours and you cannot reach an on-call physician, call 911. Follow-Up Call [phone] to schedule an appointment with Frank Waldron MD in 2 weeks. Information obtained by : 
I understand that if any problems occur once I am at home I am to contact my physician. I understand and acknowledge receipt of the instructions indicated above. Physician's or R.N.'s Signature                                                                  Date/Time Patient or Representative Signature                                                          Date/Time [email or web address] Announcement We are excited to announce that we are making your provider's discharge notes available to you in [email or web address]. You will see these notes when they are completed and signed by the physician that discharged you from your recent hospital stay. If you have any questions or concerns about any information you see in [email or web address], please call the Health Information Department where you were seen or reach out to your Primary Care Provider for more information about your plan of care. Introducing [facility]! Dear Cristofer Roque: Thank you for requesting a [email or web address] account.   Our records indicate that you already have an active [employer] account. You can access your account anytime at [email or web address] Did you know that you can access your hospital and ER discharge instructions at any time in [employer]? You can also review all of your test results from your hospital stay or ER visit. Additional Information If you have questions, please visit the Frequently Asked Questions section of the [employer] website at [email or web address] Remember, [employer] is NOT to be used for urgent needs. For medical emergencies, dial 911. Now available from your iPhone and Android! Providers Seen During Your Hospitalization Provider Specialty Primary office phone López Grissom DO Emergency Medicine [phone] Juan Alberto Castro MD Gynecologic Oncology [phone] Your Primary Care Physician (PCP) Primary Care Physician Office Phone Office Fax NONE ** None ** ** None ** You are allergic to the following Allergen Reactions Compazine (Prochlorperazine Edisylate) Anxiety Haldol (Haloperidol Lactate) Other (comments) Promethazine Other (comments) \"It makes me feel really funny, nausea and dizzy\" Reglan (Metoclopramide) Other (comments) Recent Documentation Height Weight BMI OB Status Smoking Status 1.702 m 69.5 kg 24 kg/m2 IUD Former Smoker Emergency Contacts Name Discharge Info Relation Home Work Mobile Richardson Eaton DISCHARGE CAREGIVER [3] Boyfriend [17] [phone] Ena Henry CAREGIVER [3] Mother [14] [phone] Patient Belongings The following personal items are in your possession at time of discharge: 
  Dental Appliances: None  Visual Aid: Glasses Discharge Instructions Attachments/References MEFS - OXYCODONE/ACETAMINOPHEN (PERCOCET, ROXICET) - (BY MOUTH) (ENGLISH) MEFS - IBUPROFEN (ADVIL, ADVIL CHILDREN'S, MOTRIN, CHILDREN'S IBUPROFEN) - (BY MOUTH) (ENGLISH) MEFS - DOCUSATE (DOCUSOL, DOCUSOL KIDS, ENEMEEZ MINI ENEMA) - (INTO THE RECTUM) (ENGLISH) Patient Handouts Oxycodone/Acetaminophen (Percocet, Roxicet) - (By mouth) Why this medicine is used:  
Treats pain. This medicine contains a narcotic pain reliever. Contact a nurse or doctor right away if you have: 
· Extreme weakness, shallow breathing, slow heartbeat · Sweating or cold, clammy skin · Skin blisters, rash, or peeling Common side effects: 
· Constipation · Nausea, vomiting · Tiredness © 2017 [employer] Information is for End User's use only and may not be sold, redistributed or otherwise used for commercial purposes. Ibuprofen (Advil, Advil Children's, Motrin, Children's Ibuprofen) - (By mouth) Why this medicine is used:  
Treats pain and fever. This medicine is an NSAID. Contact a nurse or doctor right away if you have: 
· Change in how much or how often you urinate · Severe stomach pain, vomiting blood, bloody or black tarry stools · Swelling in your hands, ankles, or feet; rapid weight gain Common side effects: 
· Constipation, diarrhea, gas, mild upset stomach · Ringing in your ears, dizziness, headache © 2017 [employer] Information is for End User's use only and may not be sold, redistributed or otherwise used for commercial purposes. Docusate (Docusol, DocuSol Kids, Enemeez Mini Enema) - (Into the rectum) Why this medicine is used:  
Treats constipation. Contact a nurse or doctor right away if you have: · Bleeding from your rectum, bloody or black tarry stool · Nausea, vomiting, or stomach pain, diarrhea Common side effects: 
· Itching or irritation around your rectum © 2017 [employer] Information is for End User's use only and may not be sold, redistributed or otherwise used for commercial purposes. Please provide this summary of care documentation to your next provider. Signatures-by signing, you are acknowledging that this After Visit Summary has been reviewed with you and you have received a copy. Patient Signature:  ____________________________________________________________ Date:  ____________________________________________________________  
  
[facility] Current Provider Signature:  ____________________________________________________________ Date:  ____________________________________________________________

## 2018-02-27 NOTE — IP AVS SNAPSHOT
[address] [facility] 
[phone] Patient: Dorothy Valles MRN: [id number]:[id number] A check alvaro indicates which time of day the medication should be taken. My Medications START taking these medications Instructions Each Dose to Equal  
 Morning Noon Evening Bedtime  
 docusate sodium 100 mg capsule Commonly known as:  Theola Duet Your last dose was: Your next dose is: Take 1 Cap by mouth two (2) times a day. Hold for loose stools. 100 mg  
    
   
   
   
  
 ibuprofen 200 mg tablet Commonly known as:  MOTRIN Your last dose was: Your next dose is: Take 3 Tabs by mouth every eight (8) hours as needed for Pain. 600 mg CHANGE how you take these medications Instructions Each Dose to Equal  
 Morning Noon Evening Bedtime * oxyCODONE-acetaminophen 5-325 mg per tablet Commonly known as:  PERCOCET What changed:  Another medication with the same name was added. Make sure you understand how and when to take each. Your last dose was: Your next dose is: Take 1 Tab by mouth every four (4) hours as needed for Pain. Max Daily Amount: 6 Tabs. 1 Tab * oxyCODONE-acetaminophen 5-325 mg per tablet Commonly known as:  PERCOCET What changed: You were already taking a medication with the same name, and this prescription was added. Make sure you understand how and when to take each. Your last dose was: Your next dose is: Take 1 Tab by mouth every four (4) hours as needed. Max Daily Amount: 6 Tabs. 1 Tab * Notice: This list has 2 medication(s) that are the same as other medications prescribed for you. Read the directions carefully, and ask your doctor or other care provider to review them with you. CONTINUE taking these medications Instructions Each Dose to Equal  
 Morning Noon Evening Bedtime  
 amoxicillin-clavulanate 875-125 mg per tablet Commonly known as:  AUGMENTIN Your last dose was: Your next dose is: Take 1 Tab by mouth every twelve (12) hours. 1 Tab  
    
   
   
   
  
 clonazePAM 1 mg tablet Commonly known as:  Lendel Janet Your last dose was: Your next dose is: Take 2 mg by mouth nightly. 2 mg  
    
   
   
   
  
 doxycycline 100 mg tablet Commonly known as:  ADOXA Your last dose was: Your next dose is: Take 100 mg by mouth two (2) times a day. Indications: ACNE ROSACEA  
 100 mg  
    
   
   
   
  
 LEXAPRO 20 mg tablet Generic drug:  escitalopram oxalate Your last dose was: Your next dose is: Take 20 mg by mouth every morning. Indications: GENERALIZED ANXIETY DISORDER  
 20 mg  
    
   
   
   
  
 MIRENA 20 mcg/24 hr (5 years) Iud  
Generic drug:  levonorgestrel Your last dose was: Your next dose is:    
   
   
 1 Each by IntraUTERine route once. 1 Each  
    
   
   
   
  
 TOPAMAX 100 mg tablet Generic drug:  topiramate Your last dose was: Your next dose is: Take 100 mg by mouth nightly. 100 mg Where to Get Your Medications Information on where to get these meds will be given to you by the nurse or doctor. ! Ask your nurse or doctor about these medications  
  docusate sodium 100 mg capsule  
 ibuprofen 200 mg tablet  
 oxyCODONE-acetaminophen 5-325 mg per tablet

## 2018-02-27 NOTE — PROGRESS NOTES
Admission Medication Reconciliation:    Information obtained from:  Patient    Comments/Recommendations: All medications/allergies have been reviewed and updated; last medication administration times reviewed and recorded. The patient was an excellent historian and could recall names, doses, and frequencies of medications. She reported that she has not taken any morning medications this morning due to pain, except for the Percocet. Changes made to Prior to Admission (PTA) Medication List:   ?   Medications Added:   - None   ? Medications Changed:   - Updated dose of topiramate from 200 mg QHS to 100 mg QHS  ? Medications Removed:   - Norco 7.5/325 mg Q6H prn pain       Significant PMH/Disease States:   Past Medical History:   Diagnosis Date    Anxiety     Bartholin's gland abscess     x8    Depression     Endometriosis     Migraine     Neurological disorder     migraines    Ovarian cyst     Panic attack        Chief Complaint for this Admission:    Chief Complaint   Patient presents with    Vaginal Pain       Allergies:  Compazine [prochlorperazine edisylate]; Haldol [haloperidol lactate]; Promethazine; and Reglan [metoclopramide]    Prior to Admission Medications:   Prior to Admission Medications   Prescriptions Last Dose Informant Patient Reported? Taking?   amoxicillin-clavulanate (AUGMENTIN) 875-125 mg per tablet 2018 at PM  No Yes   Sig: Take 1 Tab by mouth every twelve (12) hours. clonazePAM (KLONOPIN) 1 mg tablet 2018 at PM Self Yes Yes   Sig: Take 2 mg by mouth nightly. doxycycline (ADOXA) 100 mg tablet 2018 at PM  Yes Yes   Sig: Take 100 mg by mouth two (2) times a day. Indications: ACNE ROSACEA   escitalopram (LEXAPRO) 20 mg tablet 2018 at AM Self Yes Yes   Sig: Take 20 mg by mouth every morning. Indications: GENERALIZED ANXIETY DISORDER   levonorgestrel (MIRENA) 20 mcg/24 hr (5 years) IUD   Yes No   Si Each by IntraUTERine route once. oxyCODONE-acetaminophen (PERCOCET) 5-325 mg per tablet 2/27/2018 at AM  No Yes   Sig: Take 1 Tab by mouth every four (4) hours as needed for Pain. Max Daily Amount: 6 Tabs. topiramate (TOPAMAX) 100 mg tablet 2/26/2018 at PM  Yes No   Sig: Take 100 mg by mouth nightly. Facility-Administered Medications: None     Thank you for allowing pharmacy to participate in the coordination of this patient's care. If you have any other questions, please contact the medication reconciliation pharmacist at [phone]. Hollie Loera, Pharm. D.

## 2018-02-28 ENCOUNTER — ANESTHESIA (OUTPATIENT)
Dept: SURGERY | Age: 28
End: 2018-02-28
Payer: COMMERCIAL

## 2018-02-28 PROCEDURE — 74011250636 HC RX REV CODE- 250/636: Performed by: ANESTHESIOLOGY

## 2018-02-28 PROCEDURE — 74011250636 HC RX REV CODE- 250/636

## 2018-02-28 PROCEDURE — 74011250637 HC RX REV CODE- 250/637: Performed by: NURSE PRACTITIONER

## 2018-02-28 PROCEDURE — 88304 TISSUE EXAM BY PATHOLOGIST: CPT | Performed by: OBSTETRICS & GYNECOLOGY

## 2018-02-28 PROCEDURE — 77030011640 HC PAD GRND REM COVD -A: Performed by: OBSTETRICS & GYNECOLOGY

## 2018-02-28 PROCEDURE — 77030010509 HC AIRWY LMA MSK TELE -A: Performed by: ANESTHESIOLOGY

## 2018-02-28 PROCEDURE — 77030002933 HC SUT MCRYL J&J -A: Performed by: OBSTETRICS & GYNECOLOGY

## 2018-02-28 PROCEDURE — 77030005537 HC CATH URETH BARD -A: Performed by: OBSTETRICS & GYNECOLOGY

## 2018-02-28 PROCEDURE — 96366 THER/PROPH/DIAG IV INF ADDON: CPT

## 2018-02-28 PROCEDURE — 74011250637 HC RX REV CODE- 250/637

## 2018-02-28 PROCEDURE — 99218 HC RM OBSERVATION: CPT

## 2018-02-28 PROCEDURE — 76010000149 HC OR TIME 1 TO 1.5 HR: Performed by: OBSTETRICS & GYNECOLOGY

## 2018-02-28 PROCEDURE — 74011000250 HC RX REV CODE- 250

## 2018-02-28 PROCEDURE — 76210000016 HC OR PH I REC 1 TO 1.5 HR: Performed by: OBSTETRICS & GYNECOLOGY

## 2018-02-28 PROCEDURE — 77030011265 HC ELECTRD BLD HEX COVD -A: Performed by: OBSTETRICS & GYNECOLOGY

## 2018-02-28 PROCEDURE — 74011250636 HC RX REV CODE- 250/636: Performed by: NURSE PRACTITIONER

## 2018-02-28 PROCEDURE — 77030031139 HC SUT VCRL2 J&J -A: Performed by: OBSTETRICS & GYNECOLOGY

## 2018-02-28 PROCEDURE — 74011250636 HC RX REV CODE- 250/636: Performed by: EMERGENCY MEDICINE

## 2018-02-28 PROCEDURE — 77030032490 HC SLV COMPR SCD KNE COVD -B: Performed by: OBSTETRICS & GYNECOLOGY

## 2018-02-28 PROCEDURE — 76060000033 HC ANESTHESIA 1 TO 1.5 HR: Performed by: OBSTETRICS & GYNECOLOGY

## 2018-02-28 RX ORDER — MIDAZOLAM HYDROCHLORIDE 1 MG/ML
1 INJECTION, SOLUTION INTRAMUSCULAR; INTRAVENOUS AS NEEDED
Status: DISCONTINUED | OUTPATIENT
Start: 2018-02-28 | End: 2018-02-28 | Stop reason: HOSPADM

## 2018-02-28 RX ORDER — HYDROMORPHONE HYDROCHLORIDE 2 MG/ML
INJECTION, SOLUTION INTRAMUSCULAR; INTRAVENOUS; SUBCUTANEOUS AS NEEDED
Status: DISCONTINUED | OUTPATIENT
Start: 2018-02-28 | End: 2018-02-28 | Stop reason: HOSPADM

## 2018-02-28 RX ORDER — SODIUM CHLORIDE 0.9 % (FLUSH) 0.9 %
5-10 SYRINGE (ML) INJECTION AS NEEDED
Status: DISCONTINUED | OUTPATIENT
Start: 2018-02-28 | End: 2018-03-01 | Stop reason: HOSPADM

## 2018-02-28 RX ORDER — MIDAZOLAM HYDROCHLORIDE 1 MG/ML
0.5 INJECTION, SOLUTION INTRAMUSCULAR; INTRAVENOUS
Status: DISCONTINUED | OUTPATIENT
Start: 2018-02-28 | End: 2018-02-28

## 2018-02-28 RX ORDER — SODIUM CHLORIDE 9 MG/ML
25 INJECTION, SOLUTION INTRAVENOUS CONTINUOUS
Status: DISCONTINUED | OUTPATIENT
Start: 2018-02-28 | End: 2018-02-28 | Stop reason: HOSPADM

## 2018-02-28 RX ORDER — SODIUM CHLORIDE, SODIUM LACTATE, POTASSIUM CHLORIDE, CALCIUM CHLORIDE 600; 310; 30; 20 MG/100ML; MG/100ML; MG/100ML; MG/100ML
25 INJECTION, SOLUTION INTRAVENOUS CONTINUOUS
Status: DISCONTINUED | OUTPATIENT
Start: 2018-02-28 | End: 2018-02-28 | Stop reason: HOSPADM

## 2018-02-28 RX ORDER — SODIUM CHLORIDE 0.9 % (FLUSH) 0.9 %
5-10 SYRINGE (ML) INJECTION EVERY 8 HOURS
Status: DISCONTINUED | OUTPATIENT
Start: 2018-02-28 | End: 2018-02-28 | Stop reason: HOSPADM

## 2018-02-28 RX ORDER — OXYCODONE HYDROCHLORIDE 5 MG/1
5 TABLET ORAL AS NEEDED
Status: DISCONTINUED | OUTPATIENT
Start: 2018-02-28 | End: 2018-02-28

## 2018-02-28 RX ORDER — OXYCODONE AND ACETAMINOPHEN 5; 325 MG/1; MG/1
1 TABLET ORAL
Qty: 30 TAB | Refills: 0 | Status: SHIPPED | OUTPATIENT
Start: 2018-02-28 | End: 2018-03-15 | Stop reason: ALTCHOICE

## 2018-02-28 RX ORDER — DEXMEDETOMIDINE HYDROCHLORIDE 4 UG/ML
INJECTION, SOLUTION INTRAVENOUS AS NEEDED
Status: DISCONTINUED | OUTPATIENT
Start: 2018-02-28 | End: 2018-02-28 | Stop reason: HOSPADM

## 2018-02-28 RX ORDER — SCOLOPAMINE TRANSDERMAL SYSTEM 1 MG/1
PATCH, EXTENDED RELEASE TRANSDERMAL AS NEEDED
Status: DISCONTINUED | OUTPATIENT
Start: 2018-02-28 | End: 2018-02-28 | Stop reason: HOSPADM

## 2018-02-28 RX ORDER — DEXAMETHASONE SODIUM PHOSPHATE 4 MG/ML
INJECTION, SOLUTION INTRA-ARTICULAR; INTRALESIONAL; INTRAMUSCULAR; INTRAVENOUS; SOFT TISSUE AS NEEDED
Status: DISCONTINUED | OUTPATIENT
Start: 2018-02-28 | End: 2018-02-28 | Stop reason: HOSPADM

## 2018-02-28 RX ORDER — LIDOCAINE HYDROCHLORIDE 20 MG/ML
INJECTION, SOLUTION EPIDURAL; INFILTRATION; INTRACAUDAL; PERINEURAL AS NEEDED
Status: DISCONTINUED | OUTPATIENT
Start: 2018-02-28 | End: 2018-02-28 | Stop reason: HOSPADM

## 2018-02-28 RX ORDER — FENTANYL CITRATE 50 UG/ML
25 INJECTION, SOLUTION INTRAMUSCULAR; INTRAVENOUS
Status: DISCONTINUED | OUTPATIENT
Start: 2018-02-28 | End: 2018-02-28

## 2018-02-28 RX ORDER — LIDOCAINE HYDROCHLORIDE 10 MG/ML
0.1 INJECTION, SOLUTION EPIDURAL; INFILTRATION; INTRACAUDAL; PERINEURAL AS NEEDED
Status: DISCONTINUED | OUTPATIENT
Start: 2018-02-28 | End: 2018-02-28 | Stop reason: HOSPADM

## 2018-02-28 RX ORDER — ONDANSETRON 2 MG/ML
INJECTION INTRAMUSCULAR; INTRAVENOUS AS NEEDED
Status: DISCONTINUED | OUTPATIENT
Start: 2018-02-28 | End: 2018-02-28 | Stop reason: HOSPADM

## 2018-02-28 RX ORDER — KETOROLAC TROMETHAMINE 30 MG/ML
INJECTION, SOLUTION INTRAMUSCULAR; INTRAVENOUS AS NEEDED
Status: DISCONTINUED | OUTPATIENT
Start: 2018-02-28 | End: 2018-02-28 | Stop reason: HOSPADM

## 2018-02-28 RX ORDER — MIDAZOLAM HYDROCHLORIDE 1 MG/ML
INJECTION, SOLUTION INTRAMUSCULAR; INTRAVENOUS AS NEEDED
Status: DISCONTINUED | OUTPATIENT
Start: 2018-02-28 | End: 2018-02-28 | Stop reason: HOSPADM

## 2018-02-28 RX ORDER — SODIUM CHLORIDE 0.9 % (FLUSH) 0.9 %
5-10 SYRINGE (ML) INJECTION AS NEEDED
Status: DISCONTINUED | OUTPATIENT
Start: 2018-02-28 | End: 2018-02-28 | Stop reason: HOSPADM

## 2018-02-28 RX ORDER — DIPHENHYDRAMINE HYDROCHLORIDE 50 MG/ML
12.5 INJECTION, SOLUTION INTRAMUSCULAR; INTRAVENOUS AS NEEDED
Status: ACTIVE | OUTPATIENT
Start: 2018-02-28 | End: 2018-02-28

## 2018-02-28 RX ORDER — ONDANSETRON 2 MG/ML
4 INJECTION INTRAMUSCULAR; INTRAVENOUS AS NEEDED
Status: DISCONTINUED | OUTPATIENT
Start: 2018-02-28 | End: 2018-03-01 | Stop reason: HOSPADM

## 2018-02-28 RX ORDER — FENTANYL CITRATE 50 UG/ML
INJECTION, SOLUTION INTRAMUSCULAR; INTRAVENOUS AS NEEDED
Status: DISCONTINUED | OUTPATIENT
Start: 2018-02-28 | End: 2018-02-28 | Stop reason: HOSPADM

## 2018-02-28 RX ORDER — SODIUM CHLORIDE, SODIUM LACTATE, POTASSIUM CHLORIDE, CALCIUM CHLORIDE 600; 310; 30; 20 MG/100ML; MG/100ML; MG/100ML; MG/100ML
125 INJECTION, SOLUTION INTRAVENOUS CONTINUOUS
Status: DISCONTINUED | OUTPATIENT
Start: 2018-02-28 | End: 2018-03-01 | Stop reason: HOSPADM

## 2018-02-28 RX ORDER — PROPOFOL 10 MG/ML
INJECTION, EMULSION INTRAVENOUS AS NEEDED
Status: DISCONTINUED | OUTPATIENT
Start: 2018-02-28 | End: 2018-02-28 | Stop reason: HOSPADM

## 2018-02-28 RX ORDER — MORPHINE SULFATE 10 MG/ML
2 INJECTION, SOLUTION INTRAMUSCULAR; INTRAVENOUS
Status: DISCONTINUED | OUTPATIENT
Start: 2018-02-28 | End: 2018-02-28

## 2018-02-28 RX ORDER — HYDROMORPHONE HYDROCHLORIDE 1 MG/ML
0.2 INJECTION, SOLUTION INTRAMUSCULAR; INTRAVENOUS; SUBCUTANEOUS
Status: DISCONTINUED | OUTPATIENT
Start: 2018-02-28 | End: 2018-02-28

## 2018-02-28 RX ORDER — FENTANYL CITRATE 50 UG/ML
50 INJECTION, SOLUTION INTRAMUSCULAR; INTRAVENOUS AS NEEDED
Status: DISCONTINUED | OUTPATIENT
Start: 2018-02-28 | End: 2018-02-28 | Stop reason: HOSPADM

## 2018-02-28 RX ADMIN — HYDROMORPHONE HYDROCHLORIDE 0.5 MG: 1 INJECTION, SOLUTION INTRAMUSCULAR; INTRAVENOUS; SUBCUTANEOUS at 04:52

## 2018-02-28 RX ADMIN — Medication 10 ML: at 04:52

## 2018-02-28 RX ADMIN — HYDROMORPHONE HYDROCHLORIDE 0.5 MG: 2 INJECTION, SOLUTION INTRAMUSCULAR; INTRAVENOUS; SUBCUTANEOUS at 15:12

## 2018-02-28 RX ADMIN — FENTANYL CITRATE 25 MCG: 50 INJECTION, SOLUTION INTRAMUSCULAR; INTRAVENOUS at 14:59

## 2018-02-28 RX ADMIN — OXYCODONE AND ACETAMINOPHEN 1 TABLET: 5; 325 TABLET ORAL at 19:11

## 2018-02-28 RX ADMIN — OXYCODONE AND ACETAMINOPHEN 1 TABLET: 5; 325 TABLET ORAL at 23:32

## 2018-02-28 RX ADMIN — HYDROMORPHONE HYDROCHLORIDE 0.5 MG: 2 INJECTION, SOLUTION INTRAMUSCULAR; INTRAVENOUS; SUBCUTANEOUS at 15:06

## 2018-02-28 RX ADMIN — MIDAZOLAM HYDROCHLORIDE 2 MG: 1 INJECTION, SOLUTION INTRAMUSCULAR; INTRAVENOUS at 14:33

## 2018-02-28 RX ADMIN — DEXAMETHASONE SODIUM PHOSPHATE 8 MG: 4 INJECTION, SOLUTION INTRA-ARTICULAR; INTRALESIONAL; INTRAMUSCULAR; INTRAVENOUS; SOFT TISSUE at 14:52

## 2018-02-28 RX ADMIN — HYDROMORPHONE HYDROCHLORIDE 0.5 MG: 1 INJECTION, SOLUTION INTRAMUSCULAR; INTRAVENOUS; SUBCUTANEOUS at 17:27

## 2018-02-28 RX ADMIN — PROPOFOL 250 MG: 10 INJECTION, EMULSION INTRAVENOUS at 14:44

## 2018-02-28 RX ADMIN — FENTANYL CITRATE 25 MCG: 50 INJECTION, SOLUTION INTRAMUSCULAR; INTRAVENOUS at 15:58

## 2018-02-28 RX ADMIN — HYDROMORPHONE HYDROCHLORIDE 0.5 MG: 1 INJECTION, SOLUTION INTRAMUSCULAR; INTRAVENOUS; SUBCUTANEOUS at 09:06

## 2018-02-28 RX ADMIN — HYDROMORPHONE HYDROCHLORIDE 0.5 MG: 2 INJECTION, SOLUTION INTRAMUSCULAR; INTRAVENOUS; SUBCUTANEOUS at 15:03

## 2018-02-28 RX ADMIN — DOCUSATE SODIUM 100 MG: 100 CAPSULE, LIQUID FILLED ORAL at 09:07

## 2018-02-28 RX ADMIN — FENTANYL CITRATE 25 MCG: 50 INJECTION, SOLUTION INTRAMUSCULAR; INTRAVENOUS at 16:10

## 2018-02-28 RX ADMIN — SODIUM CHLORIDE 125 ML/HR: 900 INJECTION, SOLUTION INTRAVENOUS at 07:39

## 2018-02-28 RX ADMIN — FENTANYL CITRATE 25 MCG: 50 INJECTION, SOLUTION INTRAMUSCULAR; INTRAVENOUS at 14:33

## 2018-02-28 RX ADMIN — SODIUM CHLORIDE, SODIUM LACTATE, POTASSIUM CHLORIDE, AND CALCIUM CHLORIDE 125 ML/HR: 600; 310; 30; 20 INJECTION, SOLUTION INTRAVENOUS at 17:50

## 2018-02-28 RX ADMIN — SODIUM CHLORIDE, SODIUM LACTATE, POTASSIUM CHLORIDE, AND CALCIUM CHLORIDE 25 ML/HR: 600; 310; 30; 20 INJECTION, SOLUTION INTRAVENOUS at 12:31

## 2018-02-28 RX ADMIN — TOPIRAMATE 100 MG: 100 TABLET, FILM COATED ORAL at 21:35

## 2018-02-28 RX ADMIN — Medication 10 ML: at 21:35

## 2018-02-28 RX ADMIN — HYDROMORPHONE HYDROCHLORIDE 0.5 MG: 2 INJECTION, SOLUTION INTRAMUSCULAR; INTRAVENOUS; SUBCUTANEOUS at 15:52

## 2018-02-28 RX ADMIN — CLONAZEPAM 2 MG: 1 TABLET ORAL at 21:35

## 2018-02-28 RX ADMIN — DEXMEDETOMIDINE HYDROCHLORIDE 4 MCG: 4 INJECTION, SOLUTION INTRAVENOUS at 16:07

## 2018-02-28 RX ADMIN — DEXMEDETOMIDINE HYDROCHLORIDE 10 MCG: 4 INJECTION, SOLUTION INTRAVENOUS at 15:58

## 2018-02-28 RX ADMIN — OXYCODONE AND ACETAMINOPHEN 1 TABLET: 5; 325 TABLET ORAL at 07:37

## 2018-02-28 RX ADMIN — LIDOCAINE HYDROCHLORIDE 60 MG: 20 INJECTION, SOLUTION EPIDURAL; INFILTRATION; INTRACAUDAL; PERINEURAL at 14:44

## 2018-02-28 RX ADMIN — MIDAZOLAM 0.5 MG: 1 INJECTION INTRAMUSCULAR; INTRAVENOUS at 16:15

## 2018-02-28 RX ADMIN — ESCITALOPRAM OXALATE 20 MG: 10 TABLET ORAL at 07:36

## 2018-02-28 RX ADMIN — FENTANYL CITRATE 25 MCG: 50 INJECTION, SOLUTION INTRAMUSCULAR; INTRAVENOUS at 15:52

## 2018-02-28 RX ADMIN — DOCUSATE SODIUM 100 MG: 100 CAPSULE, LIQUID FILLED ORAL at 17:26

## 2018-02-28 RX ADMIN — ONDANSETRON 4 MG: 2 INJECTION INTRAMUSCULAR; INTRAVENOUS at 14:52

## 2018-02-28 RX ADMIN — FENTANYL CITRATE 25 MCG: 50 INJECTION, SOLUTION INTRAMUSCULAR; INTRAVENOUS at 16:15

## 2018-02-28 RX ADMIN — SCOLOPAMINE TRANSDERMAL SYSTEM 1 PATCH: 1 PATCH, EXTENDED RELEASE TRANSDERMAL at 14:33

## 2018-02-28 RX ADMIN — KETOROLAC TROMETHAMINE 30 MG: 30 INJECTION, SOLUTION INTRAMUSCULAR; INTRAVENOUS at 15:38

## 2018-02-28 RX ADMIN — PIPERACILLIN AND TAZOBACTAM 10.12 G: 3; .375 INJECTION, POWDER, FOR SOLUTION INTRAVENOUS at 14:33

## 2018-02-28 RX ADMIN — ONDANSETRON 4 MG: 2 INJECTION INTRAMUSCULAR; INTRAVENOUS at 15:36

## 2018-02-28 RX ADMIN — MIDAZOLAM HYDROCHLORIDE 2 MG: 1 INJECTION, SOLUTION INTRAMUSCULAR; INTRAVENOUS at 14:35

## 2018-02-28 NOTE — PROGRESS NOTES
Primary Nurse Shamir Bacon RN and Dena Judd RN performed a dual skin assessment on this patient Impairment noted - Patient has a draining abscess of the bartholin gland.   Devon score is 22

## 2018-02-28 NOTE — ROUTINE PROCESS
Bedside and Verbal shift change report given to Loulou Godinez RN by Juan Loja RN. Report included the following information SBAR, Kardex, Intake/Output and MAR.

## 2018-02-28 NOTE — PERIOP NOTES
Patient: Dorothy Valles MRN: [id number]  SSN: [id number]   YOB: 1990  Age: 32 y.o. Sex: female     Patient is status post Procedure(s):  RESECTION LEFT BARTHOLIN GLAND.     Surgeon(s) and Role:     * Desean Shah MD - Primary                      Peripheral IV 02/27/18 Right Antecubital (Active)   Site Assessment Clean, dry, & intact 2/28/2018  9:00 AM   Phlebitis Assessment 0 2/28/2018  9:00 AM   Infiltration Assessment 0 2/28/2018  9:00 AM   Dressing Status Clean, dry, & intact 2/28/2018  9:00 AM   Dressing Type Transparent 2/28/2018  9:00 AM   Hub Color/Line Status Infusing 2/28/2018  9:00 AM   Action Taken Open ports on tubing capped 2/27/2018  8:36 PM   Alcohol Cap Used Yes 2/27/2018  8:36 PM       Peripheral IV 02/28/18 Left Wrist (Active)                           Dressing/Packing:  Wound Vagina Left-DRESSING TYPE: Romina-pad (02/28/18 1500)

## 2018-02-28 NOTE — ANESTHESIA PREPROCEDURE EVALUATION
Anesthetic History   No history of anesthetic complications            Review of Systems / Medical History  Patient summary reviewed, nursing notes reviewed and pertinent labs reviewed    Pulmonary  Within defined limits                 Neuro/Psych         Headaches and psychiatric history     Cardiovascular  Within defined limits                Exercise tolerance: >4 METS     GI/Hepatic/Renal  Within defined limits              Endo/Other  Within defined limits           Other Findings              Physical Exam    Airway  Mallampati: II  TM Distance: > 6 cm  Neck ROM: normal range of motion   Mouth opening: Normal     Cardiovascular  Regular rate and rhythm,  S1 and S2 normal,  no murmur, click, rub, or gallop             Dental  No notable dental hx       Pulmonary  Breath sounds clear to auscultation               Abdominal  GI exam deferred       Other Findings            Anesthetic Plan    ASA: 2  Anesthesia type: general          Induction: Intravenous  Anesthetic plan and risks discussed with: Patient

## 2018-02-28 NOTE — PERIOP NOTES
TRANSFER - OUT REPORT:    Verbal report given to [address] RN on Sherrill Lama  being transferred to [facility] for routine post - op       Report consisted of patients Situation, Background, Assessment and   Recommendations(SBAR). Time Pre op antibiotic given:continuous zosyn  Anesthesia Stop time: 4617  Goodman Present on Transfer to floor:no  Order for Goodman on Chart:no  Discharge Prescriptions with Chart:n/a    Information from the following report(s) SBAR, OR Summary, Intake/Output and MAR was reviewed with the receiving nurse. Opportunity for questions and clarification was provided. Is the patient on 02? NO       L/Min        Other     Is the patient on a monitor? NO    Is the nurse transporting with the patient? NO    Surgical Waiting Area notified of patient's transfer from PACU?  YES, mother called at home and given room number       The following personal items collected during your admission accompanied patient upon transfer:   Dental Appliance: Dental Appliances: None  Vision: Visual Aid: Glasses  Hearing Aid:    Jewelry:    Clothing:    Other Valuables:    Valuables sent to safe:

## 2018-02-28 NOTE — PROGRESS NOTES
Bedside shift change report given to [address] [place] [address] (oncoming nurse) by Yuly Dumont (offgoing nurse). Report included the following information SBAR.

## 2018-02-28 NOTE — BRIEF OP NOTE
BRIEF OPERATIVE NOTE    Date of Procedure: 2/28/2018   Preoperative Diagnosis: BARTHOLIN GLAND ABSCESS  Postoperative Diagnosis: BARTHOLIN GLAND ABSCESS    Procedure(s): RESECTION LEFT BARTHOLIN GLAND  Surgeon(s) and Role:     * Juan Alberto Castro MD - Primary  Assistant Staff: None  Surgical Staff:  Circ-1: Diana Lewis RN  Circ-Relief: Joselin Rosenberg RN  Scrub Tech-1: [facility]  Float Staff: Shanta Stinson RN  Event Time In   Incision Start 1500   Incision Close 1536     Anesthesia: General   Estimated Blood Loss: 25 cc  Specimens:   ID Type Source Tests Collected by Time Destination   1 : left Bartholin gland excision Fresh Tissue  Juan Alberto Castro MD 2/28/2018 1520 Pathology      Findings: Enlarged and inflamed left Bartholin's gland. Site of recent marsupialization noted. Gland completely excised.      Complications: None    Juan Alberto Castro MD

## 2018-02-28 NOTE — OP NOTES
Gynecologic Oncology Operative Report    Ibrahima Velasco    Pre-operative dx: Left Bartholin's gland abscess    Post-operative dx: Left Bartholin's gland absess    Procedure:  Excision/resection of left Bartholin's gland    Surgeon:  Maribel Vences MD    Assistant: n/a     Anesthesia:  GETA    EBL:  25 cc    Complications:  None    Specimens: left Bartholin's gland    Operative indications: 33 yo WF with recurrent Bartholin's gland abscess. She has undergone multiple I&Ds and at least 2 marsupializations, with the most recent being about a month ago. She was referred to me for excision of the gland. Operative findings: Enlarged and inflamed left Bartholin's gland. Site of recent marsupialization noted. Gland completely excised. Procedure in detail:  After the risks, benefits, indications, and alternatives of the procedure were discussed with the patient and informed consent was obtained, the patient was taken to the operating room. She was positively identified, administered general anesthesia, and then placed in the dorsal lithotomy position in [address] [place] [address]. An exam under anesthesia was performed. She was then prepped and draped in the usual fashion. The bladder was drained with a red rubber catheter. The site of the recent marsupialization was identified and grasped with an Allis clamp. This was just inside the vaginal introitus, about 2 cm from the distal portion of the labia minora. Using a #15 bladed scalpel I then made a curvilinear incision on either side of this site, staying within the vaginal introitus. Using cautery I then began dissecting into the subcutaneous tissues while maintaining traction on the Bartholin's gland. Using a combination of sharp and blunt dissection I was able to work my way around the gland and identify its borders. I then continued the dissection with cautery until the gland was completely excised.   The bed of the excision was then made hemostatic with cautery. I then approximated the subcutaneous tissues with interrupted 2-0 Vicryl sutures. The incision was then closed with a running 3-0 Monocryl suture. Good approximation and cosmesis was noted. The patient was taken out of stirrups, awakened from anesthesia, and taken to the recovery room in stable condition. All instrument, sponge, and needle counts were correct.         Amanda Cuba MD  2/28/2018  5:05 PM

## 2018-03-01 VITALS
DIASTOLIC BLOOD PRESSURE: 72 MMHG | TEMPERATURE: 97.8 F | WEIGHT: 153.25 LBS | SYSTOLIC BLOOD PRESSURE: 109 MMHG | RESPIRATION RATE: 16 BRPM | HEIGHT: 67 IN | BODY MASS INDEX: 24.05 KG/M2 | HEART RATE: 78 BPM | OXYGEN SATURATION: 100 %

## 2018-03-01 PROCEDURE — 74011250636 HC RX REV CODE- 250/636: Performed by: ANESTHESIOLOGY

## 2018-03-01 PROCEDURE — 74011250637 HC RX REV CODE- 250/637: Performed by: NURSE PRACTITIONER

## 2018-03-01 PROCEDURE — 99218 HC RM OBSERVATION: CPT

## 2018-03-01 RX ORDER — IBUPROFEN 200 MG
600 TABLET ORAL
Qty: 50 TAB | Refills: 0 | Status: SHIPPED | OUTPATIENT
Start: 2018-03-01 | End: 2018-04-13

## 2018-03-01 RX ORDER — DOCUSATE SODIUM 100 MG/1
100 CAPSULE, LIQUID FILLED ORAL 2 TIMES DAILY
Qty: 40 CAP | Refills: 1 | Status: SHIPPED | OUTPATIENT
Start: 2018-03-01 | End: 2018-03-15 | Stop reason: ALTCHOICE

## 2018-03-01 RX ADMIN — OXYCODONE AND ACETAMINOPHEN 1 TABLET: 5; 325 TABLET ORAL at 09:04

## 2018-03-01 RX ADMIN — SODIUM CHLORIDE, SODIUM LACTATE, POTASSIUM CHLORIDE, AND CALCIUM CHLORIDE 125 ML/HR: 600; 310; 30; 20 INJECTION, SOLUTION INTRAVENOUS at 02:29

## 2018-03-01 RX ADMIN — Medication 10 ML: at 06:03

## 2018-03-01 RX ADMIN — OXYCODONE AND ACETAMINOPHEN 1 TABLET: 5; 325 TABLET ORAL at 03:30

## 2018-03-01 RX ADMIN — DOCUSATE SODIUM 100 MG: 100 CAPSULE, LIQUID FILLED ORAL at 09:04

## 2018-03-01 RX ADMIN — ESCITALOPRAM OXALATE 20 MG: 10 TABLET ORAL at 06:02

## 2018-03-01 NOTE — DISCHARGE SUMMARY
[address]  P [phone]  F [phone]        Discharge Summary  Patient ID:  Yury Gonzalez  [id number]  42 y.o.  1990    Admit date: 2/27/2018    PCP: None    Admit MD: Dana Ovalle MD    Discharge MD: Tahir Waldrop MD    Discharge date and time: 3/1/2018    Admission Diagnoses:     Bartholin's gland infection  Anxiety associated with depression  PTSD (post-traumatic stress disorder)  Migraine  BARTHOLIN GLAND ABSCESS  Patient Active Problem List   Diagnosis Code    Status migrainosus G43.901    Depression F32.9    Anxiety F41.9    Abdominal pain R10.9    Bartholin's gland abscess N75.1    Migraine G43.909    PTSD (post-traumatic stress disorder) F43.10    Anxiety associated with depression F41.8    Bartholin's gland infection N75.8       Discharge Diagnoses:    Same as above    OR Date: 2/28/2018  OR procedure: Procedure(s):  RESECTION LEFT BARTHOLIN GLAND    Hospital Course:   32 y.o.  premenopausal female who is being seen for a Bartholin's gland abscess. She was referred to Dr. Thu Bautista by Dr. Davin Subramanian. 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 She reports at least 7 or 8 times. She has undergone multiple I&Ds and has had 2 marsupialization procedures, with the most recent being on 1/24/18 with Dr. Karen Pino. Starting about 4 days ago she noted increased swelling and tenderness in the area and without drainage. She was scheduled for removal but admitted via ED for pain control. She was taken to the OR the following day and underwent her scheduled excision without complication. She was admitted overnight for analgesia. No events overnight, seen and discussed her course with f/u in two weeks planned. Pathology: pending    Consults: None    Significant Diagnostic Studies:  Lab Results   Component Value Date/Time    WBC 8.5 02/27/2018 12:25 PM    ABS. NEUTROPHILS 6.5 02/27/2018 12:25 PM    HGB 12.9 02/27/2018 12:25 PM    HCT 37.9 02/27/2018 12:25 PM    MCV 89.0 02/27/2018 12:25 PM    MCH 30.3 02/27/2018 12:25 PM    PLATELET 051 56/87/6674 12:25 PM     Lab Results   Component Value Date/Time    Sodium 142 02/27/2018 12:25 PM    Potassium 3.9 02/27/2018 12:25 PM    Chloride 113 (H) 02/27/2018 12:25 PM    CO2 21 02/27/2018 12:25 PM    Glucose 93 02/27/2018 12:25 PM    BUN 6 02/27/2018 12:25 PM    Creatinine 0.70 02/27/2018 12:25 PM    Calcium 8.4 (L) 02/27/2018 12:25 PM    Albumin 3.5 02/27/2018 12:25 PM    Bilirubin, total 0.6 02/27/2018 12:25 PM    AST (SGOT) 21 02/27/2018 12:25 PM    ALT (SGPT) 16 02/27/2018 12:25 PM    Alk. phosphatase 47 02/27/2018 12:25 PM       Condition on Discharge: good    Disposition: home    Patient Instructions:   Current Discharge Medication List      START taking these medications    Details   docusate sodium (COLACE) 100 mg capsule Take 1 Cap by mouth two (2) times a day. Hold for loose stools. Qty: 40 Cap, Refills: 1      ibuprofen (MOTRIN) 200 mg tablet Take 3 Tabs by mouth every eight (8) hours as needed for Pain. Qty: 50 Tab, Refills: 0         CONTINUE these medications which have CHANGED    Details   !! oxyCODONE-acetaminophen (PERCOCET) 5-325 mg per tablet Take 1 Tab by mouth every four (4) hours as needed. Max Daily Amount: 6 Tabs. Qty: 30 Tab, Refills: 0    Associated Diagnoses: Bartholin's gland abscess       !! - Potential duplicate medications found. Please discuss with provider. CONTINUE these medications which have NOT CHANGED    Details   amoxicillin-clavulanate (AUGMENTIN) 875-125 mg per tablet Take 1 Tab by mouth every twelve (12) hours. Qty: 20 Tab, Refills: 0      !! oxyCODONE-acetaminophen (PERCOCET) 5-325 mg per tablet Take 1 Tab by mouth every four (4) hours as needed for Pain. Max Daily Amount: 6 Tabs.   Qty: 15 Tab, Refills: 0    Associated Diagnoses: Vulvar abscess      doxycycline (ADOXA) 100 mg tablet Take 100 mg by mouth two (2) times a day. Indications: ACNE ROSACEA      clonazePAM (KLONOPIN) 1 mg tablet Take 2 mg by mouth nightly. Refills: 5      escitalopram (LEXAPRO) 20 mg tablet Take 20 mg by mouth every morning. Indications: GENERALIZED ANXIETY DISORDER      topiramate (TOPAMAX) 100 mg tablet Take 100 mg by mouth nightly. levonorgestrel (MIRENA) 20 mcg/24 hr (5 years) IUD 1 Each by IntraUTERine route once. !! - Potential duplicate medications found. Please discuss with provider. Activity: no driving for 2 weeks and/or while on analgesics, no heavy lifting for 4 weeks. Nothing per vagina for 6 weeks  Walk four or more times daily  Showers okay, no tub bathing for 2 weeks if surgery  Stool softner for constipation  Diet: Regular Diet and Encourage fluids  Wound Care: Keep wound clean and dry    Follow-up with Clayton Card MD in 2 weeks.     Signed:  NAVNEET Bond  3/1/2018/7:52 AM

## 2018-03-01 NOTE — DISCHARGE INSTRUCTIONS
[facility]  [address] [facility], [address] [place] [address]  P [phone]  F [phone]     [address] [place] [address],      Please review your instructions with your nurse and ask any questions so you have all the information you need to recover well at home. If you do not feel you have everything you need to succeed and be safe after you leave the hospital, please discuss these concerns with your nurse. As always, call for any questions at home. Your doctor: Megan Chacon MD  Diagnosis: Bartholin's gland infection  Procedure: Procedure(s):  RESECTION LEFT BARTHOLIN GLAND  Date of Discharge: 3/1/2018      Take Home Medications     See Discharge Medication Review provided to you by your nurse. If you did not receive one, request this prior to your discharge. · It is important that you take your medications as they are prescribed. · Keep your medications in the bottles provided by the pharmacist and keep a list of the medication names, dosages, times to be taken and what they are for in your wallet. · Do not take other medications without consulting your doctor. · If you are prescribed enoxaparin (Lovenox) and are taking a baby aspirin daily, please hold this medication until your course of enoxaparin is completed. · If you no longer need your prescribed pain medication, you may take Tylenol or Aleve alone. · You should take a daily gentle stool softener such as a colace pill or dulcolax suppository for constipation as this is not uncommon after surgery and/or while on pain medication. If not prescribed, this can be found over the counter. If constipation persists for >24 hours you should take a dose of Milk of Magnesia. Call if your constipation continues. Diet    · Stay hydrated and drink fluids such as gatorade and water. This will also help prevent constipation and dehydration.  Limit somewhat any usual caffeine intake of beverages such as soda, tea and coffee as this may serve to dehydrate you. · For the first 2-3 days keep a low fiber diet avoiding raw vegetables or fruits with skin. A diet consisting of soup, cereal, yogurt, eggs, fish, Boost/Ensure. Avoid fatty/greasy foods. · If nauseated, keep your diet limited to liquids and call if this persists. Activity    · If possible, have someone with you at all times until you feel stable. · Gradually increase your activity each day. There are generally no restrictions on walking, climbing stairs or riding in a car. Try to walk at least 4 times per day. · Showers are okay. Cleans area similar to your prior procedures. · No driving for 1 week and/or while on pain medication. 1. If you had any type of vaginal procedure, you may experience vaginal spotting. Should the bleeding require more that 4 pads a day please call our office. Incision    · You should expect some discomfort in the area of your incision, particularly as you increase your activity. If you notice an area of increasing redness or new drainage, please call your doctor. · Staples are generally removed in 10-14 days. · Many incisions will have buried absorbable sutures, which do not need to be removed and are covered by protective glue. This will come off over time. Causes For Concern    If any of the following occur, please call our office and speak with the Nurse/aid who will help you with your problem or ask the doctor to call you. Problems with the incision, including increasing pain, swelling, redness or drainage. Increasing abdominal pain despite medication  Persisting nausea or vomiting. Fever or chills and a temperature >101F  Constipation (no bowel movement for three days). Diarrhea (more than three watery stools within 24 hours). Excessive vaginal or wound bleeding. If after hours and you cannot reach an on-call physician, call 911.       Follow-Up    Call [phone] to schedule an appointment with Claude Fisher, MD in 2 weeks. Information obtained by :  I understand that if any problems occur once I am at home I am to contact my physician. I understand and acknowledge receipt of the instructions indicated above.                                                                                                                                            Physician's or R.N.'s Signature                                                                  Date/Time                                                                                                                                              Patient or Representative Signature                                                          Date/Time

## 2018-03-01 NOTE — PROGRESS NOTES
Bedside and Verbal shift change report given to [address] [place] [address] 20 (oncoming nurse) by Dago Gray (offgoing nurse). Report included the following information SBAR, Kardex, Intake/Output, MAR and Recent Results.

## 2018-03-01 NOTE — PROGRESS NOTES
Gynecologic Oncology [address], [address]  P [phone]  F [phone]       Patient: Stef Trevino  Admit Date: 2/27/2018  Admit Dx: Bartholin's gland infection  Anxiety associated with depression  PTSD (post-traumatic stress disorder)  Migraine  BARTHOLIN GLAND ABSCESS    Subjective:     Slept well, pain improved. Continues q4hr opioid analgesia. No N/V, bleeding. Objective: Intake/Output Summary (Last 24 hours) at 03/01/18 0715  Last data filed at 02/28/18 1552   Gross per 24 hour   Intake              600 ml   Output               10 ml   Net              590 ml       Physical Exam  BP 96/57  Pulse 66  Temp 97.8 °F (36.6 °C)  Resp 16  Ht 5' 7\" (1.702 m)  Wt 153 lb 4 oz (69.5 kg)  LMP 02/04/2018  SpO2 97%  BMI 24 kg/m2     General:  alert, cooperative, no distress  Abdomen:  soft, nondistended, nontender       Wound:  clean, dry, dressed   Extremity: no edema      Data Review  Lab Results   Component Value Date/Time    WBC 8.5 02/27/2018 12:25 PM    ABS. NEUTROPHILS 6.5 02/27/2018 12:25 PM    HGB 12.9 02/27/2018 12:25 PM    HCT 37.9 02/27/2018 12:25 PM    MCV 89.0 02/27/2018 12:25 PM    MCH 30.3 02/27/2018 12:25 PM    PLATELET 772 25/45/6531 12:25 PM     Lab Results   Component Value Date/Time    Sodium 142 02/27/2018 12:25 PM    Potassium 3.9 02/27/2018 12:25 PM    Chloride 113 (H) 02/27/2018 12:25 PM    CO2 21 02/27/2018 12:25 PM    Glucose 93 02/27/2018 12:25 PM    BUN 6 02/27/2018 12:25 PM    Creatinine 0.70 02/27/2018 12:25 PM    Calcium 8.4 (L) 02/27/2018 12:25 PM    Albumin 3.5 02/27/2018 12:25 PM    Bilirubin, total 0.6 02/27/2018 12:25 PM    AST (SGOT) 21 02/27/2018 12:25 PM    ALT (SGPT) 16 02/27/2018 12:25 PM    Alk.  phosphatase 47 02/27/2018 12:25 PM         Assessment/Plan:     Admitted for Bartholin's gland infection  Anxiety associated with depression  PTSD (post-traumatic stress disorder)  Migraine  BARTHOLIN GLAND ABSCESS    Active Hospital Problems    Diagnosis Date Noted    Migraine 02/27/2018    PTSD (post-traumatic stress disorder) 02/27/2018    Anxiety associated with depression 02/27/2018    Bartholin's gland infection 02/27/2018       Cristian Burgess 1 Day Post-Op Procedure(s):  RESECTION LEFT BARTHOLIN GLAND for [place] overnight for pain control. Improved. Discussed expectations, alternate with NSAID use.   Colace/percocet/NSAIDs Rx  Home today, f/u 2 weeks    NAVNEET Patel

## 2018-03-02 ENCOUNTER — PATIENT OUTREACH (OUTPATIENT)
Dept: OTHER | Age: 28
End: 2018-03-02

## 2018-03-02 NOTE — PROGRESS NOTES
ALINA Call:      Patient on report as with discharge from hospital OP surgical stay for left sided Bartholin Gland resection on 3/1. Initial attempt to contact patient for transitions of care. Left discreet message on voicemail with this CM contact information. * Last interaction was hang up. * Previous CM for migraines have been accepted by patient. Will attempt to contact again. Chart Review:      Patient Instructions:        Current Discharge Medication List            START taking these medications     Details   docusate sodium (COLACE) 100 mg capsule Take 1 Cap by mouth two (2) times a day. Hold for loose stools. Qty: 40 Cap, Refills: 1       ibuprofen (MOTRIN) 200 mg tablet Take 3 Tabs by mouth every eight (8) hours as needed for Pain.   Qty: 50 Tab, Refills: 0                Date of Procedure: 2/28/2018   Preoperative Diagnosis: BARTHOLIN GLAND ABSCESS  Postoperative Diagnosis: BARTHOLIN GLAND ABSCESS    Procedure(s): RESECTION LEFT BARTHOLIN GLAND

## 2018-03-02 NOTE — ANESTHESIA POSTPROCEDURE EVALUATION
Post-Anesthesia Evaluation and Assessment    Patient: Yury Gonzalez MRN: [id number]  SSN: [id number]    YOB: 1990  Age: 32 y.o. Sex: female       Cardiovascular Function/Vital Signs  Visit Vitals    /72 (BP 1 Location: Right arm, BP Patient Position: At rest)    Pulse 78    Temp 36.6 °C (97.8 °F)    Resp 16    Ht 5' 7\" (1.702 m)    Wt 69.5 kg (153 lb 4 oz)    SpO2 100%    BMI 24 kg/m2       Patient is status post general anesthesia for Procedure(s):  RESECTION LEFT BARTHOLIN GLAND. Nausea/Vomiting: None    Postoperative hydration reviewed and adequate. Pain:  Pain Scale 1: Numeric (0 - 10) (03/01/18 0330)  Pain Intensity 1: 8 (03/01/18 0330)   Managed    Neurological Status:   Neuro (WDL): Within Defined Limits (02/28/18 1645)  Neuro  Neurologic State: Alert (03/01/18 5267)  Orientation Level: Oriented X4 (02/28/18 2130)  Cognition: Appropriate for age attention/concentration; Appropriate safety awareness; Follows commands; Appropriate decision making (02/28/18 2130)  Speech: Clear (02/28/18 2130)  Assessment L Pupil: Brisk (02/27/18 1618)  Size L Pupil (mm): 4 (02/27/18 1618)  Assessment R Pupil: Brisk (02/27/18 1618)  Size R Pupil (mm): 4 (02/27/18 1618)  LUE Motor Response: Purposeful (02/28/18 1715)  LLE Motor Response: Purposeful (02/28/18 1715)  RUE Motor Response: Purposeful (02/28/18 1715)  RLE Motor Response: Purposeful (02/28/18 1715)   At baseline    Mental Status and Level of Consciousness: Arousable    Pulmonary Status:   O2 Device: Room air (03/01/18 5253)   Adequate oxygenation and airway patent    Complications related to anesthesia: None    Post-anesthesia assessment completed.  No concerns    Signed By: Sanjuanita Bailon MD     March 1, 2018

## 2018-03-05 ENCOUNTER — PATIENT OUTREACH (OUTPATIENT)
Dept: OTHER | Age: 28
End: 2018-03-05

## 2018-03-05 ENCOUNTER — TELEPHONE (OUTPATIENT)
Dept: GYNECOLOGY | Age: 28
End: 2018-03-05

## 2018-03-05 NOTE — PROGRESS NOTES
CM - ALINA call:      Patient declines to share  and address for HIPAA security. No Med rec/  No discussion of medical condition or surgical procedure. * She does not remember speaking to me in the past.    * Patient does not identify any Care Management needs at this time and declines services including ALINA. \"I'm OK, you don't have to call me anymore. \"    \"Really,  I'm fine - I don't need you to call me. \"    * Follow electronically for ALINA and close episode in 30 day.

## 2018-03-05 NOTE — TELEPHONE ENCOUNTER
The pt has scheduled her two week follow up and would like to know how long she should be out of work. I advised the pt 2 weeks and 4 weeks for heavy lifting. The pt is in agreement with this plan.

## 2018-03-08 ENCOUNTER — TELEPHONE (OUTPATIENT)
Dept: GYNECOLOGY | Age: 28
End: 2018-03-08

## 2018-03-08 NOTE — TELEPHONE ENCOUNTER
The pt called to notify Dr. Denise Treviño that she stopped the Augmentin due to abdominal pains and diarrhea. She only has two days left.

## 2018-03-09 ENCOUNTER — TELEPHONE (OUTPATIENT)
Dept: GYNECOLOGY | Age: 28
End: 2018-03-09

## 2018-03-09 DIAGNOSIS — B37.9 YEAST INFECTION: Primary | ICD-10-CM

## 2018-03-09 RX ORDER — FLUCONAZOLE 150 MG/1
150 TABLET ORAL DAILY
Qty: 1 TAB | Refills: 0 | Status: SHIPPED | OUTPATIENT
Start: 2018-03-09 | End: 2018-03-10

## 2018-03-09 NOTE — TELEPHONE ENCOUNTER
The pt is calling to see if she can have something for a yeast infection. She c/o itching and a white clumpy discharge. She states she always gets them after antibiotic use. I advised the pt that I will send in a prescription for Diflucan.

## 2018-03-15 ENCOUNTER — OFFICE VISIT (OUTPATIENT)
Dept: GYNECOLOGY | Age: 28
End: 2018-03-15

## 2018-03-15 VITALS
HEIGHT: 67 IN | BODY MASS INDEX: 23.95 KG/M2 | HEART RATE: 112 BPM | SYSTOLIC BLOOD PRESSURE: 93 MMHG | DIASTOLIC BLOOD PRESSURE: 75 MMHG | WEIGHT: 152.6 LBS

## 2018-03-15 DIAGNOSIS — N75.1 BARTHOLIN'S GLAND ABSCESS: Primary | ICD-10-CM

## 2018-03-15 NOTE — PROGRESS NOTES
[facility]COL[facility]  [address] [facility], [facility] [address] [facility]  P [phone]  F [phone]    Postoperative Office Note  Patient ID:  Name: Alejandrina Stephenson  MRM: [id number]  :  y.o. Date: 3/15/2018    Diagnosis:     ICD-10-CM ICD-9-CM    1. Bartholin's gland abscess N75.1 616.3        Problem List:   Patient Active Problem List   Diagnosis Code    Status migrainosus G43.901    Depression F32.9    Anxiety F41.9    Abdominal pain R10.9    Bartholin's gland abscess N75.1    Migraine G43.909    PTSD (post-traumatic stress disorder) F43.10    Anxiety associated with depression F41.8    Bartholin's gland infection N75.8       SUBJECTIVE:    Alejandrina Stephenson is a 32 y.o. female who presents for followup postoperative care following excision of left Bartholin's gland. The patient's pathology revealed: FINAL PATHOLOGIC DIAGNOSIS   Bartholin's gland, left, biopsy:   Granulation and fibroinflammatory tissue   See comment   Comment   The findings are not specific but are compatible with a ruptured Bartholin gland cyst.       Currently she has no problems with eating, bowel movements, voiding, or their wound. Appetite is good. Eating a regular diet without difficulty. Bowel movements are regular. The patient is not having any pain. Medications:     Current Outpatient Prescriptions on File Prior to Visit   Medication Sig Dispense Refill    ibuprofen (MOTRIN) 200 mg tablet Take 3 Tabs by mouth every eight (8) hours as needed for Pain. 50 Tab 0    doxycycline (ADOXA) 100 mg tablet Take 100 mg by mouth two (2) times a day. Indications: ACNE ROSACEA      topiramate (TOPAMAX) 100 mg tablet Take 100 mg by mouth nightly.  levonorgestrel (MIRENA) 20 mcg/24 hr (5 years) IUD 1 Each by IntraUTERine route once.  clonazePAM (KLONOPIN) 1 mg tablet Take 2 mg by mouth nightly. 5    escitalopram (LEXAPRO) 20 mg tablet Take 20 mg by mouth every morning.  Indications: GENERALIZED ANXIETY DISORDER       No current facility-administered medications on file prior to visit. Allergies: Allergies   Allergen Reactions    Compazine [Prochlorperazine Edisylate] Anxiety    Haldol [Haloperidol Lactate] Other (comments)    Promethazine Other (comments)     \"It makes me feel really funny, nausea and dizzy\"    Reglan [Metoclopramide] Other (comments)     Past Medical History:   Diagnosis Date    Anxiety     Bartholin's gland abscess     x8    Depression     Endometriosis     Migraine     Neurological disorder     migraines    Ovarian cyst     Panic attack      Past Surgical History:   Procedure Laterality Date    HX APPENDECTOMY  04/26/2017    HX BARTHOLIN CYST MARSUPIALIZATION      2011, 2018    HX BREAST LUMPECTOMY Right     HX BUNIONECTOMY      HX CYST REMOVAL      HX GYN Left     bartholin cyst drainage X 6    HX GYN Left     bartholin cyst marsupilization    HX OTHER SURGICAL      laparascopy    HX PELVIC LAPAROSCOPY      HX WISDOM TEETH EXTRACTION       Social History     Social History    Marital status: SINGLE     Spouse name: N/A    Number of children: N/A    Years of education: N/A     Occupational History    Not on file.      Social History Main Topics    Smoking status: Former Smoker    Smokeless tobacco: Never Used    Alcohol use No      Comment: social    Drug use: No    Sexual activity: Yes     Partners: Male     Birth control/ protection: Pill     Other Topics Concern    Not on file     Social History Narrative       OBJECTIVE:    Vitals:   Vitals:    03/15/18 1408   BP: 93/75   Pulse: (!) 112   Weight: 152 lb 9.6 oz (69.2 kg)   Height: 5' 7.01\" (1.702 m)     Physical Examination:     General:  alert, cooperative, no distress   Lungs: lungs clear to auscultation   Cardiac: Regular rate and rhythm   Abdomen: soft, bowel sounds active, non-tender  No CVA tenderness   Incision: healing well   Pelvic: Vulva and vagina appear normal. Bimanual exam reveals normal uterus and adnexa. Rectal: not done   Extremity:   extremities normal, atraumatic, no cyanosis or edema     IMPRESSION:    Nnamdi Ramsey is doing well postoperatively. She has no apparent complications from surgery. Her wound looks really good. Medical problems:     Patient Active Problem List   Diagnosis Code    Status migrainosus G43.901    Depression F32.9    Anxiety F41.9    Abdominal pain R10.9    Bartholin's gland abscess N75.1    Migraine G43.909    PTSD (post-traumatic stress disorder) F43.10    Anxiety associated with depression F41.8    Bartholin's gland infection N75.8       PLAN:    The operative procedures and clinical results have been reviewed with the patient. Implications of diagnosis discussed at length. All questions answered. The patient may return to work next week. I will see the patient back prn. The patient is advised to call our office with any problems or concerns.     Luis Branch MD  3/15/2018/2:06 PM

## 2018-04-02 ENCOUNTER — PATIENT OUTREACH (OUTPATIENT)
Dept: OTHER | Age: 28
End: 2018-04-02

## 2018-04-02 NOTE — PROGRESS NOTES
ALINA  Wrap Up  Resolving current episode (Transitions of care complete). No further ED/UC or hospital admissions within 30 days post discharge. Patient declines ALINA/ CM services. Final attempt to contact patient for transitions of care. Left discreet message on voicemail with this CM contact information. No outreach from patient to [address] [place] [address]- declines further calls.

## 2018-04-13 ENCOUNTER — HOSPITAL ENCOUNTER (EMERGENCY)
Age: 28
Discharge: HOME | End: 2018-04-13
Attending: EMERGENCY MEDICINE
Payer: COMMERCIAL

## 2018-04-13 ENCOUNTER — APPOINTMENT (OUTPATIENT)
Dept: CT IMAGING | Age: 28
End: 2018-04-13
Attending: EMERGENCY MEDICINE
Payer: COMMERCIAL

## 2018-04-13 VITALS
HEART RATE: 84 BPM | DIASTOLIC BLOOD PRESSURE: 76 MMHG | TEMPERATURE: 98.6 F | OXYGEN SATURATION: 100 % | WEIGHT: 140 LBS | SYSTOLIC BLOOD PRESSURE: 114 MMHG | BODY MASS INDEX: 21.22 KG/M2 | HEIGHT: 68 IN | RESPIRATION RATE: 16 BRPM

## 2018-04-13 VITALS
SYSTOLIC BLOOD PRESSURE: 116 MMHG | HEART RATE: 90 BPM | BODY MASS INDEX: 21.22 KG/M2 | HEIGHT: 68 IN | OXYGEN SATURATION: 96 % | RESPIRATION RATE: 16 BRPM | TEMPERATURE: 98.2 F | DIASTOLIC BLOOD PRESSURE: 68 MMHG | WEIGHT: 140 LBS

## 2018-04-13 DIAGNOSIS — R51.9 NONINTRACTABLE HEADACHE, UNSPECIFIED CHRONICITY PATTERN, UNSPECIFIED HEADACHE TYPE: Primary | ICD-10-CM

## 2018-04-13 DIAGNOSIS — Z86.69 HISTORY OF MIGRAINE: ICD-10-CM

## 2018-04-13 DIAGNOSIS — R10.9 ABDOMINAL PAIN IN FEMALE: ICD-10-CM

## 2018-04-13 LAB
ALBUMIN SERPL-MCNC: 3.8 G/DL (ref 3.5–5)
ALBUMIN/GLOB SERPL: 1.3 {RATIO} (ref 1.1–2.2)
ALP SERPL-CCNC: 47 U/L (ref 45–117)
ALT SERPL-CCNC: 14 U/L (ref 12–78)
ANION GAP SERPL CALC-SCNC: 7 MMOL/L (ref 5–15)
APPEARANCE UR: CLEAR
AST SERPL-CCNC: 11 U/L (ref 15–37)
BACTERIA URNS QL MICRO: NEGATIVE /HPF
BASOPHILS # BLD: 0.1 K/UL (ref 0–0.1)
BASOPHILS NFR BLD: 1 % (ref 0–1)
BILIRUB SERPL-MCNC: 0.4 MG/DL (ref 0.2–1)
BILIRUB UR QL: NEGATIVE
BUN SERPL-MCNC: 11 MG/DL (ref 6–20)
BUN/CREAT SERPL: 13 (ref 12–20)
CALCIUM SERPL-MCNC: 8.7 MG/DL (ref 8.5–10.1)
CHLORIDE SERPL-SCNC: 112 MMOL/L (ref 97–108)
CO2 SERPL-SCNC: 23 MMOL/L (ref 21–32)
COLOR UR: NORMAL
CREAT SERPL-MCNC: 0.83 MG/DL (ref 0.55–1.02)
DIFFERENTIAL METHOD BLD: ABNORMAL
EOSINOPHIL # BLD: 0.1 K/UL (ref 0–0.4)
EOSINOPHIL NFR BLD: 1 % (ref 0–7)
EPITH CASTS URNS QL MICRO: NORMAL /LPF
ERYTHROCYTE [DISTWIDTH] IN BLOOD BY AUTOMATED COUNT: 12.4 % (ref 11.5–14.5)
GLOBULIN SER CALC-MCNC: 2.9 G/DL (ref 2–4)
GLUCOSE SERPL-MCNC: 80 MG/DL (ref 65–100)
GLUCOSE UR STRIP.AUTO-MCNC: NEGATIVE MG/DL
HCG UR QL: NEGATIVE
HCT VFR BLD AUTO: 37.4 % (ref 35–47)
HGB BLD-MCNC: 12.3 G/DL (ref 11.5–16)
HGB UR QL STRIP: NEGATIVE
HYALINE CASTS URNS QL MICRO: NORMAL /LPF (ref 0–5)
IMM GRANULOCYTES # BLD: 0 K/UL (ref 0–0.04)
IMM GRANULOCYTES NFR BLD AUTO: 1 % (ref 0–0.5)
KETONES UR QL STRIP.AUTO: NEGATIVE MG/DL
LEUKOCYTE ESTERASE UR QL STRIP.AUTO: NEGATIVE
LIPASE SERPL-CCNC: 168 U/L (ref 73–393)
LYMPHOCYTES # BLD: 2.2 K/UL (ref 0.8–3.5)
LYMPHOCYTES NFR BLD: 36 % (ref 12–49)
MCH RBC QN AUTO: 30.1 PG (ref 26–34)
MCHC RBC AUTO-ENTMCNC: 32.9 G/DL (ref 30–36.5)
MCV RBC AUTO: 91.4 FL (ref 80–99)
MONOCYTES # BLD: 0.4 K/UL (ref 0–1)
MONOCYTES NFR BLD: 6 % (ref 5–13)
NEUTS SEG # BLD: 3.3 K/UL (ref 1.8–8)
NEUTS SEG NFR BLD: 55 % (ref 32–75)
NITRITE UR QL STRIP.AUTO: NEGATIVE
NRBC # BLD: 0 K/UL (ref 0–0.01)
NRBC BLD-RTO: 0 PER 100 WBC
PH UR STRIP: 6.5 [PH] (ref 5–8)
PLATELET # BLD AUTO: 199 K/UL (ref 150–400)
PMV BLD AUTO: 10.5 FL (ref 8.9–12.9)
POTASSIUM SERPL-SCNC: 3.5 MMOL/L (ref 3.5–5.1)
PROT SERPL-MCNC: 6.7 G/DL (ref 6.4–8.2)
PROT UR STRIP-MCNC: NEGATIVE MG/DL
RBC # BLD AUTO: 4.09 M/UL (ref 3.8–5.2)
RBC #/AREA URNS HPF: NORMAL /HPF (ref 0–5)
SODIUM SERPL-SCNC: 142 MMOL/L (ref 136–145)
SP GR UR REFRACTOMETRY: 1.01 (ref 1–1.03)
UR CULT HOLD, URHOLD: NORMAL
UROBILINOGEN UR QL STRIP.AUTO: 0.2 EU/DL (ref 0.2–1)
WBC # BLD AUTO: 6 K/UL (ref 3.6–11)
WBC URNS QL MICRO: NORMAL /HPF (ref 0–4)

## 2018-04-13 PROCEDURE — 80053 COMPREHEN METABOLIC PANEL: CPT | Performed by: PHYSICIAN ASSISTANT

## 2018-04-13 PROCEDURE — 70450 CT HEAD/BRAIN W/O DYE: CPT

## 2018-04-13 PROCEDURE — 96372 THER/PROPH/DIAG INJ SC/IM: CPT

## 2018-04-13 PROCEDURE — 74011250636 HC RX REV CODE- 250/636: Performed by: EMERGENCY MEDICINE

## 2018-04-13 PROCEDURE — 96365 THER/PROPH/DIAG IV INF INIT: CPT

## 2018-04-13 PROCEDURE — 83690 ASSAY OF LIPASE: CPT | Performed by: PHYSICIAN ASSISTANT

## 2018-04-13 PROCEDURE — 96375 TX/PRO/DX INJ NEW DRUG ADDON: CPT

## 2018-04-13 PROCEDURE — 81001 URINALYSIS AUTO W/SCOPE: CPT | Performed by: PHYSICIAN ASSISTANT

## 2018-04-13 PROCEDURE — 74011000258 HC RX REV CODE- 258: Performed by: EMERGENCY MEDICINE

## 2018-04-13 PROCEDURE — 81025 URINE PREGNANCY TEST: CPT

## 2018-04-13 PROCEDURE — 74011000250 HC RX REV CODE- 250: Performed by: EMERGENCY MEDICINE

## 2018-04-13 PROCEDURE — 74011250636 HC RX REV CODE- 250/636: Performed by: PHYSICIAN ASSISTANT

## 2018-04-13 PROCEDURE — 99283 EMERGENCY DEPT VISIT LOW MDM: CPT

## 2018-04-13 PROCEDURE — 85025 COMPLETE CBC W/AUTO DIFF WBC: CPT | Performed by: PHYSICIAN ASSISTANT

## 2018-04-13 PROCEDURE — 36415 COLL VENOUS BLD VENIPUNCTURE: CPT | Performed by: PHYSICIAN ASSISTANT

## 2018-04-13 RX ORDER — KETOROLAC TROMETHAMINE 30 MG/ML
15 INJECTION, SOLUTION INTRAMUSCULAR; INTRAVENOUS
Status: COMPLETED | OUTPATIENT
Start: 2018-04-13 | End: 2018-04-13

## 2018-04-13 RX ORDER — DEXAMETHASONE SODIUM PHOSPHATE 10 MG/ML
10 INJECTION INTRAMUSCULAR; INTRAVENOUS ONCE
Status: COMPLETED | OUTPATIENT
Start: 2018-04-13 | End: 2018-04-13

## 2018-04-13 RX ORDER — LORAZEPAM 2 MG/ML
1 INJECTION INTRAMUSCULAR
Status: DISCONTINUED | OUTPATIENT
Start: 2018-04-13 | End: 2018-04-14 | Stop reason: HOSPADM

## 2018-04-13 RX ORDER — KETOROLAC TROMETHAMINE 30 MG/ML
30 INJECTION, SOLUTION INTRAMUSCULAR; INTRAVENOUS
Status: COMPLETED | OUTPATIENT
Start: 2018-04-13 | End: 2018-04-13

## 2018-04-13 RX ORDER — SUMATRIPTAN 6 MG/.5ML
6 INJECTION, SOLUTION SUBCUTANEOUS
Status: COMPLETED | OUTPATIENT
Start: 2018-04-13 | End: 2018-04-13

## 2018-04-13 RX ORDER — VALPROATE SODIUM 100 MG/ML
500 INJECTION INTRAVENOUS
Status: DISCONTINUED | OUTPATIENT
Start: 2018-04-13 | End: 2018-04-13

## 2018-04-13 RX ORDER — LORAZEPAM 2 MG/ML
1 INJECTION INTRAMUSCULAR
Status: COMPLETED | OUTPATIENT
Start: 2018-04-13 | End: 2018-04-13

## 2018-04-13 RX ORDER — ONDANSETRON 2 MG/ML
4 INJECTION INTRAMUSCULAR; INTRAVENOUS
Status: COMPLETED | OUTPATIENT
Start: 2018-04-13 | End: 2018-04-13

## 2018-04-13 RX ORDER — MAGNESIUM SULFATE HEPTAHYDRATE 40 MG/ML
2 INJECTION, SOLUTION INTRAVENOUS
Status: COMPLETED | OUTPATIENT
Start: 2018-04-13 | End: 2018-04-13

## 2018-04-13 RX ORDER — HYDROMORPHONE HYDROCHLORIDE 2 MG/ML
2 INJECTION, SOLUTION INTRAMUSCULAR; INTRAVENOUS; SUBCUTANEOUS
Status: COMPLETED | OUTPATIENT
Start: 2018-04-13 | End: 2018-04-13

## 2018-04-13 RX ORDER — ONDANSETRON 4 MG/1
4 TABLET, ORALLY DISINTEGRATING ORAL
Qty: 10 TAB | Refills: 0 | Status: SHIPPED | OUTPATIENT
Start: 2018-04-13 | End: 2018-05-07

## 2018-04-13 RX ORDER — ONDANSETRON 2 MG/ML
8 INJECTION INTRAMUSCULAR; INTRAVENOUS
Status: COMPLETED | OUTPATIENT
Start: 2018-04-13 | End: 2018-04-13

## 2018-04-13 RX ADMIN — VALPROATE SODIUM 500 MG: 100 INJECTION, SOLUTION INTRAVENOUS at 20:56

## 2018-04-13 RX ADMIN — ONDANSETRON HYDROCHLORIDE 4 MG: 2 INJECTION INTRAMUSCULAR; INTRAVENOUS at 13:27

## 2018-04-13 RX ADMIN — SODIUM CHLORIDE 1000 ML: 900 INJECTION, SOLUTION INTRAVENOUS at 13:31

## 2018-04-13 RX ADMIN — MAGNESIUM SULFATE HEPTAHYDRATE 2 G: 40 INJECTION, SOLUTION INTRAVENOUS at 13:31

## 2018-04-13 RX ADMIN — KETOROLAC TROMETHAMINE 30 MG: 30 INJECTION, SOLUTION INTRAMUSCULAR at 20:34

## 2018-04-13 RX ADMIN — LORAZEPAM 1 MG: 2 INJECTION INTRAMUSCULAR; INTRAVENOUS at 20:37

## 2018-04-13 RX ADMIN — ONDANSETRON 8 MG: 2 INJECTION INTRAMUSCULAR; INTRAVENOUS at 20:35

## 2018-04-13 RX ADMIN — HYDROMORPHONE HYDROCHLORIDE 2 MG: 2 INJECTION INTRAMUSCULAR; INTRAVENOUS; SUBCUTANEOUS at 22:24

## 2018-04-13 RX ADMIN — SODIUM CHLORIDE 1000 ML: 900 INJECTION, SOLUTION INTRAVENOUS at 20:31

## 2018-04-13 RX ADMIN — SUMATRIPTAN 6 MG: 6 INJECTION, SOLUTION SUBCUTANEOUS at 13:25

## 2018-04-13 RX ADMIN — DEXAMETHASONE SODIUM PHOSPHATE 10 MG: 10 INJECTION, SOLUTION INTRAMUSCULAR; INTRAVENOUS at 13:26

## 2018-04-13 RX ADMIN — KETOROLAC TROMETHAMINE 15 MG: 30 INJECTION, SOLUTION INTRAMUSCULAR at 13:26

## 2018-04-13 NOTE — LETTER
[address] 
[address] [place] [id number] 
[phone] Work/School Note Date: 4/13/2018 To Whom It May concern: 
 
Huber Petersen was seen and treated today in the emergency room by the following provider(s): 
Attending Provider: Chelsi Lock MD 
Physician Assistant: NAVNEET Barraza. Huber Petersen may return to work on 4/15/18.  
 
Sincerely, 
 
 
 
 
NAVNEET Barraza

## 2018-04-13 NOTE — ED TRIAGE NOTES
Patient reports migraine and abdominal pain since yesterday. Nausea that is worse than usual with migraine.  Patient took Excedrin without relief. +photophobia & dizzy

## 2018-04-13 NOTE — DISCHARGE INSTRUCTIONS
Abdominal Pain: Care Instructions  Your Care Instructions    Abdominal pain has many possible causes. Some aren't serious and get better on their own in a few days. Others need more testing and treatment. If your pain continues or gets worse, you need to be rechecked and may need more tests to find out what is wrong. You may need surgery to correct the problem. Don't ignore new symptoms, such as fever, nausea and vomiting, urination problems, pain that gets worse, and dizziness. These may be signs of a more serious problem. Your doctor may have recommended a follow-up visit in the next 8 to 12 hours. If you are not getting better, you may need more tests or treatment. The doctor has checked you carefully, but problems can develop later. If you notice any problems or new symptoms, get medical treatment right away. Follow-up care is a key part of your treatment and safety. Be sure to make and go to all appointments, and call your doctor if you are having problems. It's also a good idea to know your test results and keep a list of the medicines you take. How can you care for yourself at home? · Rest until you feel better. · To prevent dehydration, drink plenty of fluids, enough so that your urine is light yellow or clear like water. Choose water and other caffeine-free clear liquids until you feel better. If you have kidney, heart, or liver disease and have to limit fluids, talk with your doctor before you increase the amount of fluids you drink. · If your stomach is upset, eat mild foods, such as rice, dry toast or crackers, bananas, and applesauce. Try eating several small meals instead of two or three large ones. · Wait until 48 hours after all symptoms have gone away before you have spicy foods, alcohol, and drinks that contain caffeine. · Do not eat foods that are high in fat. · Avoid anti-inflammatory medicines such as aspirin, ibuprofen (Advil, Motrin), and naproxen (Aleve).  These can cause stomach upset. Talk to your doctor if you take daily aspirin for another health problem. When should you call for help? Call 911 anytime you think you may need emergency care. For example, call if:  ? · You passed out (lost consciousness). ? · You pass maroon or very bloody stools. ? · You vomit blood or what looks like coffee grounds. ? · You have new, severe belly pain. ?Call your doctor now or seek immediate medical care if:  ? · Your pain gets worse, especially if it becomes focused in one area of your belly. ? · You have a new or higher fever. ? · Your stools are black and look like tar, or they have streaks of blood. ? · You have unexpected vaginal bleeding. ? · You have symptoms of a urinary tract infection. These may include:  ¨ Pain when you urinate. ¨ Urinating more often than usual.  ¨ Blood in your urine. ? · You are dizzy or lightheaded, or you feel like you may faint. ? Watch closely for changes in your health, and be sure to contact your doctor if:  ? · You are not getting better after 1 day (24 hours). Where can you learn more? Go to http://michele-lucy.info/. Enter L996 in the search box to learn more about \"Abdominal Pain: Care Instructions. \"  Current as of: March 20, 2017  Content Version: 11.4  © 6460-0830 [employer]. Care instructions adapted under license by [employer] (which disclaims liability or warranty for this information). If you have questions about a medical condition or this instruction, always ask your healthcare professional. [place] [address] any warranty or liability for your use of this information. Headache: Care Instructions  Your Care Instructions    Headaches have many possible causes. Most headaches aren't a sign of a more serious problem, and they will get better on their own. Home treatment may help you feel better faster.   The doctor has checked you carefully, but problems can develop later. If you notice any problems or new symptoms, get medical treatment right away. Follow-up care is a key part of your treatment and safety. Be sure to make and go to all appointments, and call your doctor if you are having problems. It's also a good idea to know your test results and keep a list of the medicines you take. How can you care for yourself at home? · Do not drive if you have taken a prescription pain medicine. · Rest in a quiet, dark room until your headache is gone. Close your eyes and try to relax or go to sleep. Don't watch TV or read. · Put a cold, moist cloth or cold pack on the painful area for 10 to 20 minutes at a time. Put a thin cloth between the cold pack and your skin. · Use a warm, moist towel or a heating pad set on low to relax tight shoulder and neck muscles. · Have someone gently massage your neck and shoulders. · Take pain medicines exactly as directed. ¨ If the doctor gave you a prescription medicine for pain, take it as prescribed. ¨ If you are not taking a prescription pain medicine, ask your doctor if you can take an over-the-counter medicine. · Be careful not to take pain medicine more often than the instructions allow, because you may get worse or more frequent headaches when the medicine wears off. · Do not ignore new symptoms that occur with a headache, such as a fever, weakness or numbness, vision changes, or confusion. These may be signs of a more serious problem. To prevent headaches  · Keep a headache diary so you can figure out what triggers your headaches. Avoiding triggers may help you prevent headaches. Record when each headache began, how long it lasted, and what the pain was like (throbbing, aching, stabbing, or dull). Write down any other symptoms you had with the headache, such as nausea, flashing lights or dark spots, or sensitivity to bright light or loud noise. Note if the headache occurred near your period.  List anything that might have triggered the headache, such as certain foods (chocolate, cheese, wine) or odors, smoke, bright light, stress, or lack of sleep. · Find healthy ways to deal with stress. Headaches are most common during or right after stressful times. Take time to relax before and after you do something that has caused a headache in the past.  · Try to keep your muscles relaxed by keeping good posture. Check your jaw, face, neck, and shoulder muscles for tension, and try relaxing them. When sitting at a desk, change positions often, and stretch for 30 seconds each hour. · Get plenty of sleep and exercise. · Eat regularly and well. Long periods without food can trigger a headache. · Treat yourself to a massage. Some people find that regular massages are very helpful in relieving tension. · Limit caffeine by not drinking too much coffee, tea, or soda. But don't quit caffeine suddenly, because that can also give you headaches. · Reduce eyestrain from computers by blinking frequently and looking away from the computer screen every so often. Make sure you have proper eyewear and that your monitor is set up properly, about an arm's length away. · Seek help if you have depression or anxiety. Your headaches may be linked to these conditions. Treatment can both prevent headaches and help with symptoms of anxiety or depression. When should you call for help? Call 911 anytime you think you may need emergency care. For example, call if:  ? · You have signs of a stroke. These may include:  ¨ Sudden numbness, paralysis, or weakness in your face, arm, or leg, especially on only one side of your body. ¨ Sudden vision changes. ¨ Sudden trouble speaking. ¨ Sudden confusion or trouble understanding simple statements. ¨ Sudden problems with walking or balance. ¨ A sudden, severe headache that is different from past headaches. ?Call your doctor now or seek immediate medical care if:  ? · You have a new or worse headache.    ? · Your headache gets much worse. Where can you learn more? Go to http://michele-lucy.info/. Enter M271 in the search box to learn more about \"Headache: Care Instructions. \"  Current as of: October 14, 2016  Content Version: 11.4  © 9313-4020 [employer]. Care instructions adapted under license by [employer] (which disclaims liability or warranty for this information). If you have questions about a medical condition or this instruction, always ask your healthcare professional. [place] [address] any warranty or liability for your use of this information. We hope that we have addressed all of your medical concerns. The examination and treatment you received in the Emergency Department were for an emergent problem and were not intended as complete care. It is important that you follow up with your healthcare provider(s) for ongoing care. If your symptoms worsen or do not improve as expected, and you are unable to reach your usual health care provider(s), you should return to the Emergency Department. Today's healthcare is undergoing tremendous change, and patient satisfaction surveys are one of the many tools to assess the quality of medical care. You may receive a survey from the [employer] regarding your experience in the Emergency Department. I hope that your experience has been completely positive, particularly the medical care that I provided. As such, please participate in the survey; anything less than excellent does not meet my expectations or intentions. [address] and [facility] participate in nationally recognized quality of care measures. If your blood pressure is greater than 120/80, as reported below, we urge that you seek medical care to address the potential of high blood pressure, commonly known as hypertension.    Hypertension can be hereditary or can be caused by certain medical conditions, pain, stress, or \"white coat syndrome. \"       Please make an appointment with your health care provider(s) for follow up of your Emergency Department visit. VITALS:   Patient Vitals for the past 8 hrs:   Temp Pulse Resp BP SpO2   04/13/18 1219 98.1 °F (36.7 °C) 88 17 111/78 100 %          Thank you for allowing us to provide you with medical care today. We realize that you have many choices for your emergency care needs. Please choose us in the future for any continued health care needs. Gerardo Raymundo, [address] [place] [address].   Office: [phone]            Recent Results (from the past 24 hour(s))   CBC WITH AUTOMATED DIFF    Collection Time: 04/13/18  1:35 PM   Result Value Ref Range    WBC 6.0 3.6 - 11.0 K/uL    RBC 4.09 3.80 - 5.20 M/uL    HGB 12.3 11.5 - 16.0 g/dL    HCT 37.4 35.0 - 47.0 %    MCV 91.4 80.0 - 99.0 FL    MCH 30.1 26.0 - 34.0 PG    MCHC 32.9 30.0 - 36.5 g/dL    RDW 12.4 11.5 - 14.5 %    PLATELET 566 691 - 426 K/uL    MPV 10.5 8.9 - 12.9 FL    NRBC 0.0 0  WBC    ABSOLUTE NRBC 0.00 0.00 - 0.01 K/uL    NEUTROPHILS 55 32 - 75 %    LYMPHOCYTES 36 12 - 49 %    MONOCYTES 6 5 - 13 %    EOSINOPHILS 1 0 - 7 %    BASOPHILS 1 0 - 1 %    IMMATURE GRANULOCYTES 1 (H) 0.0 - 0.5 %    ABS. NEUTROPHILS 3.3 1.8 - 8.0 K/UL    ABS. LYMPHOCYTES 2.2 0.8 - 3.5 K/UL    ABS. MONOCYTES 0.4 0.0 - 1.0 K/UL    ABS. EOSINOPHILS 0.1 0.0 - 0.4 K/UL    ABS. BASOPHILS 0.1 0.0 - 0.1 K/UL    ABS. IMM.  GRANS. 0.0 0.00 - 0.04 K/UL    DF AUTOMATED     METABOLIC PANEL, COMPREHENSIVE    Collection Time: 04/13/18  1:35 PM   Result Value Ref Range    Sodium 142 136 - 145 mmol/L    Potassium 3.5 3.5 - 5.1 mmol/L    Chloride 112 (H) 97 - 108 mmol/L    CO2 23 21 - 32 mmol/L    Anion gap 7 5 - 15 mmol/L    Glucose 80 65 - 100 mg/dL    BUN 11 6 - 20 MG/DL    Creatinine 0.83 0.55 - 1.02 MG/DL    BUN/Creatinine ratio 13 12 - 20      GFR est AA >60 >60 ml/min/1.73m2    GFR est non-AA >60 >60 ml/min/1.73m2    Calcium 8.7 8.5 - 10.1 MG/DL    Bilirubin, total 0.4 0.2 - 1.0 MG/DL    ALT (SGPT) 14 12 - 78 U/L    AST (SGOT) 11 (L) 15 - 37 U/L    Alk. phosphatase 47 45 - 117 U/L    Protein, total 6.7 6.4 - 8.2 g/dL    Albumin 3.8 3.5 - 5.0 g/dL    Globulin 2.9 2.0 - 4.0 g/dL    A-G Ratio 1.3 1.1 - 2.2     URINALYSIS W/MICROSCOPIC    Collection Time: 04/13/18  1:35 PM   Result Value Ref Range    Color YELLOW/STRAW      Appearance CLEAR CLEAR      Specific gravity 1.011 1.003 - 1.030      pH (UA) 6.5 5.0 - 8.0      Protein NEGATIVE  NEG mg/dL    Glucose NEGATIVE  NEG mg/dL    Ketone NEGATIVE  NEG mg/dL    Bilirubin NEGATIVE  NEG      Blood NEGATIVE  NEG      Urobilinogen 0.2 0.2 - 1.0 EU/dL    Nitrites NEGATIVE  NEG      Leukocyte Esterase NEGATIVE  NEG      WBC 0-4 0 - 4 /hpf    RBC 0-5 0 - 5 /hpf    Epithelial cells FEW FEW /lpf    Bacteria NEGATIVE  NEG /hpf    Hyaline cast 0-2 0 - 5 /lpf   LIPASE    Collection Time: 04/13/18  1:35 PM   Result Value Ref Range    Lipase 168 73 - 393 U/L   URINE CULTURE HOLD SAMPLE    Collection Time: 04/13/18  1:35 PM   Result Value Ref Range    Urine culture hold        URINE ON HOLD IN MICROBIOLOGY DEPT FOR 3 DAYS. IF UNPRESERVED URINE IS SUBMITTED, IT CANNOT BE USED FOR ADDITIONAL TESTING AFTER 24 HRS, RECOLLECTION WILL BE REQUIRED. HCG URINE, QL. - POC    Collection Time: 04/13/18  1:38 PM   Result Value Ref Range    Pregnancy test,urine (POC) NEGATIVE  NEG         No results found. Nausea and Vomiting: Care Instructions  Your Care Instructions    When you are nauseated, you may feel weak and sweaty and notice a lot of saliva in your mouth. Nausea often leads to vomiting. Most of the time you do not need to worry about nausea and vomiting, but they can be signs of other illnesses. Two common causes of nausea and vomiting are stomach flu and food poisoning.  Nausea and vomiting from viral stomach flu will usually start to improve within 24 hours. Nausea and vomiting from food poisoning may last from 12 to 48 hours. The doctor has checked you carefully, but problems can develop later. If you notice any problems or new symptoms, get medical treatment right away. Follow-up care is a key part of your treatment and safety. Be sure to make and go to all appointments, and call your doctor if you are having problems. It's also a good idea to know your test results and keep a list of the medicines you take. How can you care for yourself at home? · To prevent dehydration, drink plenty of fluids, enough so that your urine is light yellow or clear like water. Choose water and other caffeine-free clear liquids until you feel better. If you have kidney, heart, or liver disease and have to limit fluids, talk with your doctor before you increase the amount of fluids you drink. · Rest in bed until you feel better. · When you are able to eat, try clear soups, mild foods, and liquids until all symptoms are gone for 12 to 48 hours. Other good choices include dry toast, crackers, cooked cereal, and gelatin dessert, such as Jell-O. When should you call for help? Call 911 anytime you think you may need emergency care. For example, call if:  ? · You passed out (lost consciousness). ?Call your doctor now or seek immediate medical care if:  ? · You have symptoms of dehydration, such as:  ¨ Dry eyes and a dry mouth. ¨ Passing only a little dark urine. ¨ Feeling thirstier than usual.   ? · You have new or worsening belly pain. ? · You have a new or higher fever. ? · You vomit blood or what looks like coffee grounds. ? Watch closely for changes in your health, and be sure to contact your doctor if:  ? · You have ongoing nausea and vomiting. ? · Your vomiting is getting worse. ? · Your vomiting lasts longer than 2 days. ? · You are not getting better as expected. Where can you learn more?   Go to http://neal.info/. Enter 25 [id number] in the search box to learn more about \"Nausea and Vomiting: Care Instructions. \"  Current as of: March 20, 2017  Content Version: 11.4  © 6512-4578 [employer]. Care instructions adapted under license by [employer] (which disclaims liability or warranty for this information). If you have questions about a medical condition or this instruction, always ask your healthcare professional. [place] [address] any warranty or liability for your use of this information.

## 2018-04-13 NOTE — ED TRIAGE NOTES
Patient left [facility] about an hour and a half ago, but her migraine has come back and the pain is worse.

## 2018-04-13 NOTE — ED PROVIDER NOTES
HPI Comments: 32 y.o. female with past medical history significant for anxiety, depression, and migraines who presents from work via wheelchair with chief complaint of headache. Pt reports she started with a headache yesterday that has progressively worsened with accompanying nausea and photophobia. Pt reports taking OTC medication with no relief. Pt reports a history of migraines, and notes this is the same pattern as her usual migraines. Pt also complains of some periumbilical abdominal pain with mild diarrhea yesterday, which has since resolved. Pt states she has multiple sensitivities to common migraine/nausea medications, notes Toradol, decadron, imitrex, magnesium, and zofran has provided relief in the past. Pt specifically denies any fevers, chills, vomiting, chest pain, urinary sx, shortness of breath, rash, diarrhea, sweating or weight loss. There are no other acute medical concerns at this time. Social hx: Former smoker; Social EtOH use    Note written by Hany Rahman, as dictated by Pamela Wallis PA-C 12:55 PM      The history is provided by the patient. No  was used.         Past Medical History:   Diagnosis Date    Anxiety     Bartholin's gland abscess     x8    Depression     Endometriosis     Migraine     Neurological disorder     migraines    Ovarian cyst     Panic attack        Past Surgical History:   Procedure Laterality Date    HX APPENDECTOMY  04/26/2017    HX BARTHOLIN CYST MARSUPIALIZATION      2011, 2018    HX BREAST LUMPECTOMY Right     HX BUNIONECTOMY      HX CYST REMOVAL      HX GYN Left     bartholin cyst drainage X 6    HX GYN Left     bartholin cyst marsupilization    HX OTHER SURGICAL      laparascopy    HX PELVIC LAPAROSCOPY      HX WISDOM TEETH EXTRACTION           Family History:   Problem Relation Age of Onset    Diabetes Maternal Aunt     Heart Disease Maternal Aunt     Stroke Maternal Aunt     Heart Disease Maternal Grandmother     No Known Problems Mother        Social History     Social History    Marital status: SINGLE     Spouse name: N/A    Number of children: N/A    Years of education: N/A     Occupational History    Not on file. Social History Main Topics    Smoking status: Former Smoker    Smokeless tobacco: Never Used    Alcohol use No      Comment: social    Drug use: No    Sexual activity: Yes     Partners: Male     Birth control/ protection: Pill     Other Topics Concern    Not on file     Social History Narrative         ALLERGIES: Compazine [prochlorperazine edisylate]; Haldol [haloperidol lactate]; Promethazine; and Reglan [metoclopramide]    Review of Systems   Constitutional: Negative for appetite change, chills, fatigue and fever. HENT: Negative for congestion, ear pain, postnasal drip, rhinorrhea, sneezing and sore throat. Eyes: Negative for redness and visual disturbance. Respiratory: Negative for cough, shortness of breath and wheezing. Cardiovascular: Negative for chest pain, palpitations and leg swelling. Gastrointestinal: Positive for nausea. Negative for abdominal pain (Resolved), anal bleeding, constipation, diarrhea (Resolved) and vomiting. Genitourinary: Negative for difficulty urinating, dysuria, frequency and hematuria. Musculoskeletal: Negative for arthralgias, back pain, myalgias and neck stiffness. Skin: Negative for rash. Allergic/Immunologic: Negative for immunocompromised state. Neurological: Positive for headaches. Negative for dizziness, syncope, weakness and light-headedness. Hematological: Negative for adenopathy. Patient Vitals for the past 12 hrs:   Temp Pulse Resp BP SpO2   04/13/18 1527 98.6 °F (37 °C) 84 16 114/76 100 %   04/13/18 1219 98.1 °F (36.7 °C) 88 17 111/78 100 %              Physical Exam   Constitutional: She is oriented to person, place, and time. She appears well-developed and well-nourished. No distress.    Appears in pain with wet cloth on forehead and lights off   HENT:   Head: Normocephalic and atraumatic. Right Ear: External ear normal.   Left Ear: External ear normal.   Nose: Nose normal.   Mouth/Throat: Oropharynx is clear and moist.   Eyes: EOM are normal. Pupils are equal, round, and reactive to light. Right eye exhibits no discharge. Left eye exhibits no discharge. Neck: Neck supple. No nuchal rigidity or meningismus   Cardiovascular: Normal rate, regular rhythm, normal heart sounds and intact distal pulses. Exam reveals no gallop and no friction rub. No murmur heard. Pulmonary/Chest: Effort normal and breath sounds normal. No stridor. No respiratory distress. She has no wheezes. She has no rales. She exhibits no tenderness. Abdominal: Soft. Bowel sounds are normal. She exhibits no distension and no mass. There is no tenderness. There is no rebound and no guarding. Musculoskeletal: Normal range of motion. She exhibits no edema, tenderness or deformity. Neurological: She is alert and oriented to person, place, and time. No cranial nerve deficit. Coordination normal.   Skin: No rash noted. No erythema. No pallor. Psychiatric: She has a normal mood and affect. Her behavior is normal.   Nursing note and vitals reviewed. MDM  Number of Diagnoses or Management Options  Abdominal pain in female:   History of migraine:   Nonintractable headache, unspecified chronicity pattern, unspecified headache type:      Amount and/or Complexity of Data Reviewed  Clinical lab tests: reviewed and ordered  Tests in the medicine section of CPT®: ordered and reviewed  Obtain history from someone other than the patient: yes (Co worker, father)  Review and summarize past medical records: yes  Independent visualization of images, tracings, or specimens: yes    Patient Progress  Patient progress: stable        ED Course       Procedures  2:04 PM  Discussed pt, sx, hx and currentfindings with Dr Ashlee Howell.  He is in agreement with plan. Will check abd labs,give fluids and migraine medications. Stefania Lopez. CARLOS Raymundo    PROGRESS NOTE:  3:19 PM  Pt states she is feeling much better and wants to go home. Will D/C home. LABORATORY TESTS:  Recent Results (from the past 12 hour(s))   CBC WITH AUTOMATED DIFF    Collection Time: 04/13/18  1:35 PM   Result Value Ref Range    WBC 6.0 3.6 - 11.0 K/uL    RBC 4.09 3.80 - 5.20 M/uL    HGB 12.3 11.5 - 16.0 g/dL    HCT 37.4 35.0 - 47.0 %    MCV 91.4 80.0 - 99.0 FL    MCH 30.1 26.0 - 34.0 PG    MCHC 32.9 30.0 - 36.5 g/dL    RDW 12.4 11.5 - 14.5 %    PLATELET 758 420 - 211 K/uL    MPV 10.5 8.9 - 12.9 FL    NRBC 0.0 0  WBC    ABSOLUTE NRBC 0.00 0.00 - 0.01 K/uL    NEUTROPHILS 55 32 - 75 %    LYMPHOCYTES 36 12 - 49 %    MONOCYTES 6 5 - 13 %    EOSINOPHILS 1 0 - 7 %    BASOPHILS 1 0 - 1 %    IMMATURE GRANULOCYTES 1 (H) 0.0 - 0.5 %    ABS. NEUTROPHILS 3.3 1.8 - 8.0 K/UL    ABS. LYMPHOCYTES 2.2 0.8 - 3.5 K/UL    ABS. MONOCYTES 0.4 0.0 - 1.0 K/UL    ABS. EOSINOPHILS 0.1 0.0 - 0.4 K/UL    ABS. BASOPHILS 0.1 0.0 - 0.1 K/UL    ABS. IMM. GRANS. 0.0 0.00 - 0.04 K/UL    DF AUTOMATED     METABOLIC PANEL, COMPREHENSIVE    Collection Time: 04/13/18  1:35 PM   Result Value Ref Range    Sodium 142 136 - 145 mmol/L    Potassium 3.5 3.5 - 5.1 mmol/L    Chloride 112 (H) 97 - 108 mmol/L    CO2 23 21 - 32 mmol/L    Anion gap 7 5 - 15 mmol/L    Glucose 80 65 - 100 mg/dL    BUN 11 6 - 20 MG/DL    Creatinine 0.83 0.55 - 1.02 MG/DL    BUN/Creatinine ratio 13 12 - 20      GFR est AA >60 >60 ml/min/1.73m2    GFR est non-AA >60 >60 ml/min/1.73m2    Calcium 8.7 8.5 - 10.1 MG/DL    Bilirubin, total 0.4 0.2 - 1.0 MG/DL    ALT (SGPT) 14 12 - 78 U/L    AST (SGOT) 11 (L) 15 - 37 U/L    Alk.  phosphatase 47 45 - 117 U/L    Protein, total 6.7 6.4 - 8.2 g/dL    Albumin 3.8 3.5 - 5.0 g/dL    Globulin 2.9 2.0 - 4.0 g/dL    A-G Ratio 1.3 1.1 - 2.2     URINALYSIS W/MICROSCOPIC    Collection Time: 04/13/18  1:35 PM   Result Value Ref Range Color YELLOW/STRAW      Appearance CLEAR CLEAR      Specific gravity 1.011 1.003 - 1.030      pH (UA) 6.5 5.0 - 8.0      Protein NEGATIVE  NEG mg/dL    Glucose NEGATIVE  NEG mg/dL    Ketone NEGATIVE  NEG mg/dL    Bilirubin NEGATIVE  NEG      Blood NEGATIVE  NEG      Urobilinogen 0.2 0.2 - 1.0 EU/dL    Nitrites NEGATIVE  NEG      Leukocyte Esterase NEGATIVE  NEG      WBC 0-4 0 - 4 /hpf    RBC 0-5 0 - 5 /hpf    Epithelial cells FEW FEW /lpf    Bacteria NEGATIVE  NEG /hpf    Hyaline cast 0-2 0 - 5 /lpf   LIPASE    Collection Time: 04/13/18  1:35 PM   Result Value Ref Range    Lipase 168 73 - 393 U/L   URINE CULTURE HOLD SAMPLE    Collection Time: 04/13/18  1:35 PM   Result Value Ref Range    Urine culture hold        URINE ON HOLD IN MICROBIOLOGY DEPT FOR 3 DAYS. IF UNPRESERVED URINE IS SUBMITTED, IT CANNOT BE USED FOR ADDITIONAL TESTING AFTER 24 HRS, RECOLLECTION WILL BE REQUIRED. HCG URINE, QL. - POC    Collection Time: 04/13/18  1:38 PM   Result Value Ref Range    Pregnancy test,urine (POC) NEGATIVE  NEG         IMAGING RESULTS:    No results found. MEDICATIONS GIVEN:  Medications   ketorolac (TORADOL) injection 15 mg (15 mg IntraVENous Given 4/13/18 1326)   dexamethasone (PF) (DECADRON) injection 10 mg (10 mg IntraVENous Given 4/13/18 1326)   magnesium sulfate 2 g/50 ml IVPB (premix or compounded) (0 g IntraVENous IV Completed 4/13/18 1431)   ondansetron (ZOFRAN) injection 4 mg (4 mg IntraVENous Given 4/13/18 1327)   SUMAtriptan (IMITREX) injection 6 mg (6 mg SubCUTAneous Given 4/13/18 1325)   sodium chloride 0.9 % bolus infusion 1,000 mL (0 mL IntraVENous IV Completed 4/13/18 1431)       IMPRESSION:  1. Nonintractable headache, unspecified chronicity pattern, unspecified headache type    2. History of migraine    3. Abdominal pain in female        PLAN:  1.    Discharge Medication List as of 4/13/2018  3:22 PM      START taking these medications    Details   ondansetron (ZOFRAN ODT) 4 mg disintegrating tablet Take 1 Tab by mouth every eight (8) hours as needed for Nausea. , Print, Disp-10 Tab, R-0         CONTINUE these medications which have NOT CHANGED    Details   ASPIRIN/ACETAMINOPHEN/CAFFEINE (MIGRAINE RELIEF PO) Take  by mouth., Historical Med      doxycycline (ADOXA) 100 mg tablet Take 100 mg by mouth two (2) times a day. Indications: ACNE ROSACEA, Historical Med      topiramate (TOPAMAX) 100 mg tablet Take 100 mg by mouth nightly., Historical Med      levonorgestrel (MIRENA) 20 mcg/24 hr (5 years) IUD 1 Each by IntraUTERine route once., Historical Med      clonazePAM (KLONOPIN) 1 mg tablet Take 2 mg by mouth nightly., Historical Med, R-5      escitalopram (LEXAPRO) 20 mg tablet Take 20 mg by mouth every morning. Indications: GENERALIZED ANXIETY DISORDER, Historical Med           2. Follow-up Information     Follow up With Details Comments Contact Info    None   None ([phone]) Patient stated that they have no PCP      Kori Bowser MD Schedule an appointment as soon as possible for a visit 2-4 days for recheck [address] [facility] [phone]      NICK Arceo 51 Schedule an appointment as soon as possible for a visit 2-4 days for recheck [address]  [phone]        Return to ED if worse     3:22  PM  Pt has been reexamined. Pt has no new complaints, changes or physical findings. Care plan outlined and precautions discussed. All available results were reviewed with pt. All medications were reviewed with pt. All of pt's questions and concerns were addressed. Pt agrees to F/U as instructed and agrees to return to ED upon further deterioration. Pt is ready to go home.   NAVNEET Thompson

## 2018-04-14 NOTE — DISCHARGE INSTRUCTIONS

## 2018-04-14 NOTE — ED PROVIDER NOTES
HPI Comments: 32 y.o. female with past medical history significant for endometriosis and migraines who presents from home with chief complaint of headache. Pt reports she began having a headache after arriving to work 12.5 hours ago w/ no relief by 2 doses of Excedrin 10 hours ago. Her pain continued to worsen and she began having nausea 9 hours ago, so her boss took her to [facility] ED. Pt states her pain reduced to 3/10 after medication in the ED. Several hours later, her pain significantly worsened and she was recommended by neurology, Dr. Alex Mccray, to return to the ED. She now c/o 9/10 pain over her frontal, bitemporal, and upper posterior neck regions with photophobia. She states it is painful for her to move her neck. Pt also notes she had abdominal pain and 5 episodes of diarrhea last night. She is on 100 mg Topamax nightly. Her last migraine headache was 6 months ago. Prior to starting medication, Pt had 2-3 migraine headaches per week. Pt denies recent illness and new medications. Pt denies vomiting, fever, cold-like sx, and cough. There are no other acute medical concerns at this time. Neurologist: Garima Degroot DO    Note written by Hany Huang, as dictated by Nicho Gutierrez MD 8:07 PM    The history is provided by the patient.         Past Medical History:   Diagnosis Date    Anxiety     Bartholin's gland abscess     x8    Depression     Endometriosis     Migraine     Neurological disorder     migraines    Ovarian cyst     Panic attack        Past Surgical History:   Procedure Laterality Date    HX APPENDECTOMY  04/26/2017    HX BARTHOLIN CYST MARSUPIALIZATION      2011, 2018    HX BREAST LUMPECTOMY Right     HX BUNIONECTOMY      HX CYST REMOVAL      HX GYN Left     bartholin cyst drainage X 6    HX GYN Left     bartholin cyst marsupilization    HX OTHER SURGICAL      laparascopy    HX PELVIC LAPAROSCOPY      HX WISDOM TEETH EXTRACTION           Family History: Problem Relation Age of Onset    Diabetes Maternal Aunt     Heart Disease Maternal Aunt     Stroke Maternal Aunt     Heart Disease Maternal Grandmother     No Known Problems Mother        Social History     Social History    Marital status: SINGLE     Spouse name: N/A    Number of children: N/A    Years of education: N/A     Occupational History    Not on file. Social History Main Topics    Smoking status: Former Smoker    Smokeless tobacco: Never Used    Alcohol use No      Comment: social    Drug use: No    Sexual activity: Yes     Partners: Male     Birth control/ protection: Pill     Other Topics Concern    Not on file     Social History Narrative         ALLERGIES: Benadryl [diphenhydramine hcl]; Compazine [prochlorperazine edisylate]; Haldol [haloperidol lactate]; Promethazine; and Reglan [metoclopramide]    Review of Systems   Constitutional: Negative for fever. Eyes: Positive for photophobia. Negative for visual disturbance. Respiratory: Negative for cough, shortness of breath and wheezing. Cardiovascular: Negative for chest pain and leg swelling. Gastrointestinal: Positive for abdominal pain, diarrhea and nausea. Negative for vomiting. Genitourinary: Negative for dysuria. Musculoskeletal: Negative. Negative for back pain and neck stiffness. Skin: Negative for rash. Neurological: Positive for headaches. Negative for syncope. Psychiatric/Behavioral: Negative for confusion. All other systems reviewed and are negative. Vitals:    04/13/18 1936 04/13/18 2035   BP: 110/71    Pulse: 90    Resp: 19    Temp: 98.2 °F (36.8 °C)    SpO2: 97% 99%   Weight: 63.5 kg (140 lb)    Height: 5' 8\" (1.727 m)             Physical Exam   Constitutional: She appears well-developed and well-nourished. No distress. HENT:   Head: Normocephalic. Eyes: Pupils are equal, round, and reactive to light. Neck: Normal range of motion. Cardiovascular: Normal rate and regular rhythm.     No murmur heard. Pulmonary/Chest: Effort normal and breath sounds normal. No respiratory distress. Abdominal: Soft. There is tenderness (epigastric, mild). Musculoskeletal: Normal range of motion. She exhibits no edema. Neurological: She is alert. She has normal strength. No cranial nerve deficit. Skin: Skin is warm and dry. Psychiatric: She has a normal mood and affect. Her behavior is normal.   Nursing note and vitals reviewed. Note written by Hany Babb, as dictated by Akash Mary MD 8:07 PM    [facility]      ED Course       Procedures      PROGRESS NOTE:  9:39 PM  Medications have not relieved Pt's pain while in the ED. She states she has been given dilaudid in the past which worked. She also usually takes a larger dose of Ativan because she is on Klonopin. PROGRESS NOTE:  10:32 PM  Pt's headache is now getting better. Discussed CT results which were normal. Pt would like to break the cycle of her headache, so she requests a dose of dilaudid. Will give 1 dose of dilaudid and discharge Pt home in ~30 minutes.

## 2018-04-14 NOTE — ED NOTES
Report received from [facility]. Assumed care of pt. Bed locked and in low position with call bell within reach. Using AIDET-Introduced self as Primary RN and plan discussed with pt with understanding was verbalized. Pt denies additional complaints at this time. White board updated with this nurse's name. Patient advised that medical information will be discussed and it is their own responsibility to tell nurse if such conversation should not take place in the presence of visitors. Pt verbalizes understanding. Pt reports her father is outside resting in the car.

## 2018-04-14 NOTE — ED NOTES
Patient discharged by provider. D/C instructions given. Pt educated to take their medications as instructed for management at home. Pt verbalized understanding, verbalized no questions. PIV removed, pressure dressing applied. Pt escorted off the unit via w/c and in NAD by this RN. Home with her father who was visualized by this RN and he reports he will be driving.   Patient Vitals for the past 4 hrs:   Temp Pulse Resp BP SpO2   04/13/18 2255 - - - 116/68 96 %   04/13/18 2230 - - - 115/72 96 %   04/13/18 2209 - - - 116/73 99 %   04/13/18 2130 - - - 109/73 98 %   04/13/18 2035 - - - - 99 %   04/13/18 1936 98.2 °F (36.8 °C) 90 19 110/71 97 %

## 2018-04-16 ENCOUNTER — TELEPHONE (OUTPATIENT)
Dept: NEUROLOGY | Age: 28
End: 2018-04-16

## 2018-04-16 NOTE — TELEPHONE ENCOUNTER
Have not seen her in over 12 months at this point. Last time I saw her we did Botox injections. She should schedule follow-up at this point. She can see Mrs. Enriquez if I have no availability. I think we need to resume Botox injections. Once we have the injections I should be able to squeeze her in for that procedure.

## 2018-04-16 NOTE — TELEPHONE ENCOUNTER
Message from hello:   15-Apr-2018 @10:32AM  Having another migraine today, pls call. 13-Apr-2018 @7:15PM  Pls cl: was discharged from [facility] around 3pm after being treated for severe migraine. Having another migraine and needs to be advised of what to do.

## 2018-04-17 ENCOUNTER — OFFICE VISIT (OUTPATIENT)
Dept: NEUROLOGY | Age: 28
End: 2018-04-17

## 2018-04-17 VITALS
DIASTOLIC BLOOD PRESSURE: 62 MMHG | WEIGHT: 153 LBS | RESPIRATION RATE: 18 BRPM | HEART RATE: 107 BPM | OXYGEN SATURATION: 97 % | SYSTOLIC BLOOD PRESSURE: 110 MMHG | BODY MASS INDEX: 23.26 KG/M2

## 2018-04-17 DIAGNOSIS — F41.8 ANXIETY ASSOCIATED WITH DEPRESSION: ICD-10-CM

## 2018-04-17 DIAGNOSIS — F43.10 PTSD (POST-TRAUMATIC STRESS DISORDER): ICD-10-CM

## 2018-04-17 DIAGNOSIS — G43.009 MIGRAINE WITHOUT AURA AND WITHOUT STATUS MIGRAINOSUS, NOT INTRACTABLE: Primary | ICD-10-CM

## 2018-04-17 RX ORDER — SUMATRIPTAN 6 MG/.5ML
6 INJECTION, SOLUTION SUBCUTANEOUS ONCE
Qty: 6 VIAL | Refills: 5 | Status: SHIPPED | OUTPATIENT
Start: 2018-04-17 | End: 2018-04-17

## 2018-04-17 NOTE — PROGRESS NOTES
Date:             2018    Name:  Fara Avery  :  1990  MRN:  [id number]     PCP:  None    No chief complaint on file. HISTORY OF PRESENT ILLNESS:  Raul Nelson is a 32 y.o., female who presents today for follow up for headaches. She was last seen by Dr. Rod Vides in October, she switched to that office due to location, but now would like to switch back to the [facility] office. In the past, she has been followed by Dr. Brittany Watters who had her on Botox. She was still getting migraines on botox, she does not think that she saw any improvement with that in frequency or intensity. Because she still ended up in the ER, she decided not to pursue Botox further. Migraines actually improved  For a period, she went 8 months last year off of her Botox without significant headaches. Recently, her headaches have returned. She does think that this may be due in part to going to her break about a month ago, even though is a good breakup it was pretty stressful. She does a significant history of have depression and anxiety, sees a psychiatrist at [facility] and a counselor. She does feel that Lexapro is working well to control her mood, making changes to that in the past did not go well. She was last prescribed depakote, but did not take it because she was concerned about weight gain. She gains a lot of weight when she takes prednisone. Has not tried elavil, per records she has been on Pamelor in the past.  Does have blood pressure and heart rate that run low at times, so not a candidate for blood pressure medications. She would prefer to try getting back on her Imitrex injection and seeing a psychologist for cognitive behavioral therapy to trying another preventative at this time.     +Nausea  +Photophobia  +Photophobia    Preventatives tried:  Botox (last injection 2017)  Topamax (still on it)  Nortriptyline  Depakote    Abortives tried:  Excedrin  Imitrex shots (worked well, but she is out)  Treximet (insurance will not cover)  Formerly [facility] (insurance will not cover)  Bad reactions to DHE, Reglan, prochlorperazine, Compazine, Benadryl, Haldol    10.23.2017 mey Angel is a 32 y.o. female who presented to the neurology office for management of headaches. She has been having headaches since high school and it got better but then she became pregnant at the age of 20 and her headaches started back again. At that time she saw the primary care physician and then was also seen at [facility] and was being followed later on by Dr. Angelica Ruiz. She has tried Topamax in the past.  She presently has 2-3 headaches every week, lasts for a full day, 9 out of 10 in severity and is sharp and throbbing. It is right-sided and associated with nausea, photophobia and phonophobia.     Risk Factors for headaches  Smoking: Denies  Coffee: 1 cups/day  Tea: 0 cups/day  Soda: 0 cans/day  Water: 15 glasses/day  Sleeps at 8:30 PM and wakes up at 6 AM. She does not snore.     Medications tried  Topamax  DHE could not tolerate  Botox     She does also have PTSD and depression. Current Outpatient Prescriptions   Medication Sig    ondansetron (ZOFRAN ODT) 4 mg disintegrating tablet Take 1 Tab by mouth every eight (8) hours as needed for Nausea.  ASPIRIN/ACETAMINOPHEN/CAFFEINE (MIGRAINE RELIEF PO) Take  by mouth.  doxycycline (ADOXA) 100 mg tablet Take 100 mg by mouth two (2) times a day. Indications: ACNE ROSACEA    topiramate (TOPAMAX) 100 mg tablet Take 100 mg by mouth nightly.  levonorgestrel (MIRENA) 20 mcg/24 hr (5 years) IUD 1 Each by IntraUTERine route once.  clonazePAM (KLONOPIN) 1 mg tablet Take 2 mg by mouth nightly.  escitalopram (LEXAPRO) 20 mg tablet Take 20 mg by mouth every morning. Indications: GENERALIZED ANXIETY DISORDER     No current facility-administered medications for this visit.       Allergies   Allergen Reactions    Benadryl [Diphenhydramine Hcl] Other (comments) Makes me feel funny. Need ativan if I get benadryl    Compazine [Prochlorperazine Edisylate] Anxiety    Haldol [Haloperidol Lactate] Other (comments)    Promethazine Other (comments)     \"It makes me feel really funny, nausea and dizzy\"    Reglan [Metoclopramide] Other (comments)     Past Medical History:   Diagnosis Date    Anxiety     Bartholin's gland abscess     x8    Depression     Endometriosis     Migraine     Neurological disorder     migraines    Ovarian cyst     Panic attack      Past Surgical History:   Procedure Laterality Date    HX APPENDECTOMY  04/26/2017    HX BARTHOLIN CYST MARSUPIALIZATION      2011, 2018    HX BREAST LUMPECTOMY Right     HX BUNIONECTOMY      HX CYST REMOVAL      HX GYN Left     bartholin cyst drainage X 6    HX GYN Left     bartholin cyst marsupilization    HX OTHER SURGICAL      laparascopy    HX PELVIC LAPAROSCOPY      HX WISDOM TEETH EXTRACTION       Social History     Social History    Marital status: SINGLE     Spouse name: N/A    Number of children: N/A    Years of education: N/A     Occupational History    Not on file. Social History Main Topics    Smoking status: Former Smoker    Smokeless tobacco: Never Used    Alcohol use No      Comment: social    Drug use: No    Sexual activity: Yes     Partners: Male     Birth control/ protection: Pill     Other Topics Concern    Not on file     Social History Narrative     Family History   Problem Relation Age of Onset    Diabetes Maternal Aunt     Heart Disease Maternal Aunt     Stroke Maternal Aunt     Heart Disease Maternal Grandmother     No Known Problems Mother          PHYSICAL EXAMINATION:    Visit Vitals    /62    Pulse (!) 107    Resp 18    Wt 69.4 kg (153 lb)    SpO2 97%    BMI 23.26 kg/m2     General:  Well defined, nourished, and groomed individual in no acute distress. Neck: Supple, nontender, no bruits, no pain with resistance to active range of motion. Heart: Regular rate and rhythm, no murmurs, rub, or gallop. Normal S1S2. Lungs:  Clear to auscultation bilaterally with equal chest expansion, no cough, no wheeze  Musculoskeletal:  Extremities revealed no edema and had full range of motion of joints. Psych:  Good mood and bright affect    NEUROLOGICAL EXAMINATION:     Mental Status:   Alert and oriented to person, place, and time with recent and remote memory intact. Attention span and concentration are normal. Speech is fluent with a full fund of knowledge. Cranial Nerves:    II, III, IV, VI:  Visual acuity grossly intact. Pupils are equal, round, and reactive to light. Extra-ocular movements are full and fluid. No ptosis or nystagmus. V-XII: Hearing is grossly intact. Facial features are symmetric, with normal sensation and strength. The palate rises symmetrically and the tongue protrudes midline. Sternocleidomastoids 5/5. Motor Examination: Normal tone, bulk, and strength, 5/5 muscle strength throughout. Coordination:  Finger to nose testing was normal.   No resting or intention tremor  Gait and Station:  Steady while walking and with tandem walking. Normal arm swing. No pronator drift. No muscle wasting or fasciculations noted. ASSESSMENT AND PLAN    ICD-10-CM ICD-9-CM    1. Migraine without aura and without status migrainosus, not intractable G43.009 346.10 SUMAtriptan (IMITREX) 6 mg/0.5 mL injection      REFERRAL TO PSYCHOLOGY   2. Anxiety associated with depression F41.8 300.4 REFERRAL TO PSYCHOLOGY   3. PTSD (post-traumatic stress disorder) F43.10 309.81 REFERRAL TO PSYCHOLOGY     32 year female seen in follow-up for migraines. She has tried and failed many preventatives, most recently was on Botox which did not improve migraine frequency or intensity per her report as she still ended up in the ER periodically. She was headache free for about 8 months, but they returned recently and are severe.   She no longer has any triptans at home, but sumatriptan injectable works well for her. Migraines do seem to be driven by her anxiety, depression, PTSD and she is interested in therapy for that. 1.  Continue Topamax 100 mg nightly for preventative  2. Continue to follow with psychiatrist for medication management of anxiety depression  3. Referral to Alonso Edwards PhD for pain psychology  4. Sumatriptan 6 mg injection as needed for migraine, should take with naproxen to make it more effective  5. Provided was Zembrace sample today, but insurance will not cover this    Follow-up in 6 weeks, call sooner with concerns    Christ Ludwin PACHECO    This note was created using voice recognition software. Despite editing, there may be syntax errors.

## 2018-04-17 NOTE — PATIENT INSTRUCTIONS

## 2018-04-17 NOTE — MR AVS SNAPSHOT
[address] 
[phone] Patient: Kamryn Hayden MRN: [id number] ENL:[id number] Visit Information Date & Time Provider Department Dept. Phone Encounter #  
 4/17/2018 11:30 AM [facility] at [address] [id number] Your Appointments 5/29/2018 11:30 AM  
Follow Up with [facility]) Appt Note: 6 wk f/u migraine, 4/17/18 scb [address] [facility] [address]  
[address]  [address]  
  
    
 6/1/2018 11:40 AM  
Follow Up with Minor Lamb, DO [facility]) Appt Note: f/up migraines SC 4/12/18 [address] [place] [address]  
[phone]  
  
   
 [address]  [address] Upcoming Health Maintenance Date Due  
 PAP AKA CERVICAL CYTOLOGY 6/29/2009 Influenza Age 5 to Adult 8/1/2017 DTaP/Tdap/Td series (2 - Tdap) 11/24/2022 Allergies as of 4/17/2018  Review Complete On: 4/17/2018 By: Osmin Iglesias NP Severity Noted Reaction Type Reactions Benadryl [Diphenhydramine Hcl]  04/13/2018    Other (comments) Makes me feel funny. Need ativan if I get benadryl Compazine [Prochlorperazine Edisylate]  11/21/2016    Anxiety Haldol [Haloperidol Lactate]  04/15/2016    Other (comments) Promethazine  01/06/2016    Other (comments) \"It makes me feel really funny, nausea and dizzy\" Reglan [Metoclopramide]  04/15/2016    Other (comments) Current Immunizations  Reviewed on 4/21/2017 Name Date DTaP 11/24/2012 Hib 7/23/2015 Influenza Vaccine 10/24/2015 Rho(D) Immune Globulin - IM 6/4/2016  4:46 PM  
  
 Not reviewed this visit You Were Diagnosed With   
  
 Codes Comments Migraine without aura and without status migrainosus, not intractable    -  Primary ICD-10-CM: G43.009 ICD-9-CM: 346.10 Anxiety associated with depression     ICD-10-CM: F41.8 ICD-9-CM: 300.4 PTSD (post-traumatic stress disorder)     ICD-10-CM: F43.10 ICD-9-CM: 309.81 Vitals BP Pulse Resp Weight(growth percentile) LMP SpO2  
 110/62 (!) 107 18 153 lb (69.4 kg) 04/02/2018 (Approximate) 97% BMI OB Status Smoking Status 23.26 kg/m2 IUD Former Smoker Vitals History BMI and BSA Data Body Mass Index Body Surface Area  
 23.26 kg/m 2 1.82 m 2 Preferred Pharmacy Pharmacy Name Phone [facility] [phone] Your Updated Medication List  
  
   
This list is accurate as of 4/17/18 12:14 PM.  Always use your most recent med list.  
  
  
  
  
 clonazePAM 1 mg tablet Commonly known as:  Riya Lockhart Take 2 mg by mouth nightly. doxycycline 100 mg tablet Commonly known as:  ADOXA Take 100 mg by mouth two (2) times a day. Indications: ACNE ROSACEA LEXAPRO 20 mg tablet Generic drug:  escitalopram oxalate Take 20 mg by mouth every morning. Indications: GENERALIZED ANXIETY DISORDER  
  
 MIGRAINE RELIEF PO Take  by mouth. MIRENA 20 mcg/24 hr (5 years) Iud  
Generic drug:  levonorgestrel 1 Each by IntraUTERine route once. ondansetron 4 mg disintegrating tablet Commonly known as:  ZOFRAN ODT Take 1 Tab by mouth every eight (8) hours as needed for Nausea. * SUMAtriptan 6 mg/0.5 mL injection Commonly known as:  IMITREX  
0.5 mL by SubCUTAneous route once for 1 dose. * SUMAtriptan succinate 3 mg/0.5 mL Pnij Commonly known as:  Sherryle Kand  
3 mg by SubCUTAneous route as needed. TOPAMAX 100 mg tablet Generic drug:  topiramate Take 100 mg by mouth nightly. * Notice: This list has 2 medication(s) that are the same as other medications prescribed for you. Read the directions carefully, and ask your doctor or other care provider to review them with you. Prescriptions Sent to Pharmacy Refills SUMAtriptan (IMITREX) 6 mg/0.5 mL injection 5 Si.5 mL by SubCUTAneous route once for 1 dose. Class: Normal  
 [facility]: [phone] Route: SubCUTAneous We Performed the Following REFERRAL TO PSYCHOLOGY [OFL56 Custom] Comments:  
 Pain psychology CBT for chronic pain, headache, PTSD [address] [facility] [address] [place] [address] [address] 
[phone] Referral Information Referral ID Referred By Referred To  
  
 [id number] Manuel Robertson V Not Available Visits Status Start Date End Date 1 New Request 18 If your referral has a status of pending review or denied, additional information will be sent to support the outcome of this decision. Patient Instructions A Healthy Lifestyle: Care Instructions Your Care Instructions A healthy lifestyle can help you feel good, stay at a healthy weight, and have plenty of energy for both work and play. A healthy lifestyle is something you can share with your whole family. A healthy lifestyle also can lower your risk for serious health problems, such as high blood pressure, heart disease, and diabetes. You can follow a few steps listed below to improve your health and the health of your family. Follow-up care is a key part of your treatment and safety. Be sure to make and go to all appointments, and call your doctor if you are having problems. It's also a good idea to know your test results and keep a list of the medicines you take. How can you care for yourself at home? · Do not eat too much sugar, fat, or fast foods. You can still have dessert and treats now and then. The goal is moderation. · Start small to improve your eating habits. Pay attention to portion sizes, drink less juice and soda pop, and eat more fruits and vegetables. ¨ Eat a healthy amount of food. A 3-ounce serving of meat, for example, is about the size of a deck of cards. Fill the rest of your plate with vegetables and whole grains. ¨ Limit the amount of soda and sports drinks you have every day. Drink more water when you are thirsty. ¨ Eat at least 5 servings of fruits and vegetables every day. It may seem like a lot, but it is not hard to reach this goal. A serving or helping is 1 piece of fruit, 1 cup of vegetables, or 2 cups of leafy, raw vegetables. Have an apple or some carrot sticks as an afternoon snack instead of a candy bar. Try to have fruits and/or vegetables at every meal. 
· Make exercise part of your daily routine. You may want to start with simple activities, such as walking, bicycling, or slow swimming. Try to be active 30 to 60 minutes every day. You do not need to do all 30 to 60 minutes all at once. For example, you can exercise 3 times a day for 10 or 20 minutes. Moderate exercise is safe for most people, but it is always a good idea to talk to your doctor before starting an exercise program. 
· Keep moving. Clent Cramp the lawn, work in the garden, or [employer]. Take the stairs instead of the elevator at work. · If you smoke, quit. People who smoke have an increased risk for heart attack, stroke, cancer, and other lung illnesses. Quitting is hard, but there are ways to boost your chance of quitting tobacco for good. ¨ Use nicotine gum, patches, or lozenges. ¨ Ask your doctor about stop-smoking programs and medicines. ¨ Keep trying.  
In addition to reducing your risk of diseases in the future, you will notice some benefits soon after you stop using tobacco. If you have shortness of breath or asthma symptoms, they will likely get better within a few weeks after you quit. · Limit how much alcohol you drink. Moderate amounts of alcohol (up to 2 drinks a day for men, 1 drink a day for women) are okay. But drinking too much can lead to liver problems, high blood pressure, and other health problems. Family health If you have a family, there are many things you can do together to improve your health. · Eat meals together as a family as often as possible. · Eat healthy foods. This includes fruits, vegetables, lean meats and dairy, and whole grains. · Include your family in your fitness plan. Most people think of activities such as jogging or tennis as the way to fitness, but there are many ways you and your family can be more active. Anything that makes you breathe hard and gets your heart pumping is exercise. Here are some tips: 
¨ Walk to do errands or to take your child to school or the bus. ¨ Go for a family bike ride after dinner instead of watching TV. Where can you learn more? Go to http://michele-lucy.info/. Enter D825 in the search box to learn more about \"A Healthy Lifestyle: Care Instructions. \" Current as of: May 12, 2017 Content Version: 11.4 © 4615-4095 Healthwise, Incorporated. Care instructions adapted under license by [employer] (which disclaims liability or warranty for this information). If you have questions about a medical condition or this instruction, always ask your healthcare professional. [place] [address] any warranty or liability for your use of this information. Introducing [facility] & HEALTH SERVICES! Dear Juan José Iraheta: Thank you for requesting a Typemock account. Our records indicate that you already have an active Typemock account. You can access your account anytime at [email or web address] Did you know that you can access your hospital and ER discharge instructions at any time in [employer]? You can also review all of your test results from your hospital stay or ER visit. Additional Information If you have questions, please visit the Frequently Asked Questions section of the [employer] website at [email or web address] Remember, [employer] is NOT to be used for urgent needs. For medical emergencies, dial 911. Now available from your iPhone and Android! Please provide this summary of care documentation to your next provider. Your primary care clinician is listed as NONE. If you have any questions after today's visit, please call [phone].

## 2018-05-07 ENCOUNTER — TELEPHONE (OUTPATIENT)
Dept: NEUROLOGY | Age: 28
End: 2018-05-07

## 2018-05-07 ENCOUNTER — HOSPITAL ENCOUNTER (EMERGENCY)
Age: 28
Discharge: HOME | End: 2018-05-07
Attending: EMERGENCY MEDICINE | Admitting: EMERGENCY MEDICINE
Payer: COMMERCIAL

## 2018-05-07 VITALS
BODY MASS INDEX: 22.55 KG/M2 | WEIGHT: 148.8 LBS | HEART RATE: 85 BPM | RESPIRATION RATE: 16 BRPM | TEMPERATURE: 98.3 F | HEIGHT: 68 IN | OXYGEN SATURATION: 98 % | SYSTOLIC BLOOD PRESSURE: 94 MMHG | DIASTOLIC BLOOD PRESSURE: 61 MMHG

## 2018-05-07 DIAGNOSIS — R51.9 INTRACTABLE HEADACHE, UNSPECIFIED CHRONICITY PATTERN, UNSPECIFIED HEADACHE TYPE: Primary | ICD-10-CM

## 2018-05-07 PROCEDURE — 74011000250 HC RX REV CODE- 250: Performed by: EMERGENCY MEDICINE

## 2018-05-07 PROCEDURE — 96375 TX/PRO/DX INJ NEW DRUG ADDON: CPT

## 2018-05-07 PROCEDURE — 96361 HYDRATE IV INFUSION ADD-ON: CPT

## 2018-05-07 PROCEDURE — 99284 EMERGENCY DEPT VISIT MOD MDM: CPT

## 2018-05-07 PROCEDURE — 74011250636 HC RX REV CODE- 250/636: Performed by: PHYSICIAN ASSISTANT

## 2018-05-07 PROCEDURE — 74011250636 HC RX REV CODE- 250/636: Performed by: EMERGENCY MEDICINE

## 2018-05-07 PROCEDURE — 74011250637 HC RX REV CODE- 250/637: Performed by: PHYSICIAN ASSISTANT

## 2018-05-07 PROCEDURE — 74011250637 HC RX REV CODE- 250/637: Performed by: EMERGENCY MEDICINE

## 2018-05-07 PROCEDURE — 74011000258 HC RX REV CODE- 258: Performed by: EMERGENCY MEDICINE

## 2018-05-07 PROCEDURE — 96365 THER/PROPH/DIAG IV INF INIT: CPT

## 2018-05-07 RX ORDER — VALPROATE SODIUM 100 MG/ML
250 INJECTION INTRAVENOUS
Status: DISCONTINUED | OUTPATIENT
Start: 2018-05-07 | End: 2018-05-07 | Stop reason: SDUPTHER

## 2018-05-07 RX ORDER — ONDANSETRON 4 MG/1
4 TABLET, ORALLY DISINTEGRATING ORAL
Qty: 12 TAB | Refills: 0 | Status: SHIPPED | OUTPATIENT
Start: 2018-05-07 | End: 2018-10-05

## 2018-05-07 RX ORDER — KETOROLAC TROMETHAMINE 30 MG/ML
30 INJECTION, SOLUTION INTRAMUSCULAR; INTRAVENOUS
Status: COMPLETED | OUTPATIENT
Start: 2018-05-07 | End: 2018-05-07

## 2018-05-07 RX ORDER — SUMATRIPTAN 6 MG/.5ML
6 INJECTION, SOLUTION SUBCUTANEOUS
Status: COMPLETED | OUTPATIENT
Start: 2018-05-07 | End: 2018-05-07

## 2018-05-07 RX ORDER — ONDANSETRON 2 MG/ML
4 INJECTION INTRAMUSCULAR; INTRAVENOUS
Status: COMPLETED | OUTPATIENT
Start: 2018-05-07 | End: 2018-05-07

## 2018-05-07 RX ORDER — METHYLPREDNISOLONE 4 MG/1
TABLET ORAL
Qty: 1 DOSE PACK | Refills: 0 | Status: SHIPPED | OUTPATIENT
Start: 2018-05-07 | End: 2018-07-05 | Stop reason: ALTCHOICE

## 2018-05-07 RX ORDER — DEXAMETHASONE SODIUM PHOSPHATE 10 MG/ML
10 INJECTION INTRAMUSCULAR; INTRAVENOUS
Status: COMPLETED | OUTPATIENT
Start: 2018-05-07 | End: 2018-05-07

## 2018-05-07 RX ORDER — ONDANSETRON 4 MG/1
4 TABLET, ORALLY DISINTEGRATING ORAL
Status: COMPLETED | OUTPATIENT
Start: 2018-05-07 | End: 2018-05-07

## 2018-05-07 RX ORDER — CETIRIZINE HCL 10 MG
10 TABLET ORAL
Status: COMPLETED | OUTPATIENT
Start: 2018-05-07 | End: 2018-05-07

## 2018-05-07 RX ADMIN — SUMATRIPTAN 6 MG: 6 INJECTION, SOLUTION SUBCUTANEOUS at 12:10

## 2018-05-07 RX ADMIN — DEXAMETHASONE SODIUM PHOSPHATE 10 MG: 10 INJECTION, SOLUTION INTRAMUSCULAR; INTRAVENOUS at 11:12

## 2018-05-07 RX ADMIN — KETOROLAC TROMETHAMINE 30 MG: 30 INJECTION, SOLUTION INTRAMUSCULAR at 11:12

## 2018-05-07 RX ADMIN — ONDANSETRON 4 MG: 4 TABLET, ORALLY DISINTEGRATING ORAL at 11:12

## 2018-05-07 RX ADMIN — ONDANSETRON 4 MG: 2 INJECTION INTRAMUSCULAR; INTRAVENOUS at 12:10

## 2018-05-07 RX ADMIN — SODIUM CHLORIDE 1000 ML: 900 INJECTION, SOLUTION INTRAVENOUS at 11:12

## 2018-05-07 RX ADMIN — CETIRIZINE HYDROCHLORIDE 10 MG: 10 TABLET, FILM COATED ORAL at 11:12

## 2018-05-07 RX ADMIN — VALPROATE SODIUM 250 MG: 100 INJECTION, SOLUTION INTRAVENOUS at 14:04

## 2018-05-07 NOTE — ED NOTES
10:49 AM  I have evaluated the patient as the Provider in Triage. I have reviewed Her vital signs and the triage nurse assessment. I have talked with the patient and any available family and advised that I am the provider in triage and have ordered the appropriate study to initiate their work up based on the clinical presentation during my assessment. I have advised that the patient will be accommodated in the Main ED as soon as possible. I have also requested to contact the triage nurse or myself immediately if the patient experiences any changes in their condition during this brief waiting period. Here for migraine since Saturday, c/w prior migraines. Transient relief with imitrex.     NAVNEET Mendez

## 2018-05-07 NOTE — PROGRESS NOTES
CM noted pt's treatment in ED and 6 previous ED visits this month. She has problems with recurrent migraines. However, pt doesn't have a PCP listed and CM wanted to confirm and offer assistance. Met briefly with the patient and she advised she has a neurologist, psychiatrist and therapist, and GYN but not a PCP. She has seen [facility] provider in past but no relationship with PCP. She agreed to have CM assist and she shared that she works 7:30am-4:30pm at a [address] [place] [address] office so prefers later appointment, lives in [place] but works here at [employer]. CM requested assistance from CM specialist in establishing new PCP for pt to follow up. TOMAS Sung    Care Management Interventions  PCP Verified by CM:  Yes (pt doesn't have a PCP, but was receptive to this ED CM establishing her with a new patient appointment at a practice)  Mode of Transport at Discharge: Self  Plan discussed with Pt/Family/Caregiver: Yes  Freedom of Choice Offered: Yes  Discharge Location  Discharge Placement: Home

## 2018-05-07 NOTE — LETTER
NOTIFICATION RETURN TO WORK / SCHOOL 
 
5/7/2018 1:51 PM 
 
Ms. Lluvia Gonsales [address] [address] To Whom It May Concern: 
 
Lluvia Gonsales is currently under the care of [address] She will return to work/school on: 5/10/18 If there are questions or concerns please have the patient contact our office. Sincerely, Peter Frankel NP

## 2018-05-07 NOTE — ED NOTES
Upon reevaluation, patient reports headache is not better at all, still 8/10. Patient also reports nausea. NP aware and to bedside.

## 2018-05-07 NOTE — ED NOTES
Patient given discharge instructions and prescriptions, all questions answered and patient verbalized understanding.   Patient ambulatory to ED lobby

## 2018-05-07 NOTE — TELEPHONE ENCOUNTER
Received call from ED. Pt c/o MHA since Saturday. Does not appear that she called Dr. Tasha Alcantara office. Was seen last month in ED for [facility]. They have given her a cocktail, minus Benadryl b/c pt refused stating it makes her feel funny, without relief. I suggested Depakote 250mg IV x 1 and home with a medrol dose pack. May have to tough this one out. According to chart notes she did not tolerate DHE, so I am not certain what we had to offer in the hospital.  Instructed to call Dr. Brandt Bingham to discuss next steps for [facility] prevention.

## 2018-05-07 NOTE — ED TRIAGE NOTES
Triage note: Pt states she has been experiencing a migraine since Saturday but she can't take anymore Imitrex. Pt states the HA is anterior and posterior. +sensitivity to light and sound. +nausea.

## 2018-05-07 NOTE — ED PROVIDER NOTES
HPI Pt states that she started with a migraine headache Saturday afternoon. She has been taking imitrex and zofran with minimal relief. She went to work this morning but was unable to focus to the headache pain. She is presently a pt of Dr. Nitesh Godinez (neurology). She states that her headache is comparable to previous miograines. She had a normal head CT scan last month. Denies fever, cold symptoms, neck pain, visual changes, focal weakness, unexplained weight changes or rash. Denies any difficulty breathing, difficulty swallowing, SOB, chest pain or abdominal pain. She reports nausea but denies any vomiting or diarrhea. Pt. Reports that she ate breakfast and had an imitrex injection without relief. Old charts reviewed. Past Medical History:   Diagnosis Date    Anxiety     Bartholin's gland abscess     x8    Depression     Endometriosis     Migraine     Neurological disorder     migraines    Ovarian cyst     Panic attack        Past Surgical History:   Procedure Laterality Date    HX APPENDECTOMY  04/26/2017    HX BARTHOLIN CYST MARSUPIALIZATION      2011, 2018    HX BREAST LUMPECTOMY Right     HX BUNIONECTOMY      HX CYST REMOVAL      HX GYN Left     bartholin cyst drainage X 6    HX GYN Left     bartholin cyst marsupilization    HX OTHER SURGICAL      laparascopy    HX PELVIC LAPAROSCOPY      HX WISDOM TEETH EXTRACTION           Family History:   Problem Relation Age of Onset    Diabetes Maternal Aunt     Heart Disease Maternal Aunt     Stroke Maternal Aunt     Heart Disease Maternal Grandmother     No Known Problems Mother        Social History     Social History    Marital status: SINGLE     Spouse name: N/A    Number of children: N/A    Years of education: N/A     Occupational History    Not on file.      Social History Main Topics    Smoking status: Former Smoker    Smokeless tobacco: Never Used    Alcohol use No      Comment: social    Drug use: No    Sexual activity: Yes Partners: Male     Birth control/ protection: Pill     Other Topics Concern    Not on file     Social History Narrative         ALLERGIES: Benadryl [diphenhydramine hcl]; Compazine [prochlorperazine edisylate]; Haldol [haloperidol lactate]; Promethazine; and Reglan [metoclopramide]    Review of Systems   Constitutional: Negative for activity change and appetite change. HENT: Negative for facial swelling, sore throat and trouble swallowing. Eyes: Negative. Respiratory: Negative for shortness of breath. Cardiovascular: Negative. Gastrointestinal: Positive for nausea. Negative for abdominal pain, diarrhea and vomiting. Genitourinary: Negative for dysuria. Musculoskeletal: Negative for back pain and neck pain. Skin: Negative for color change. Neurological: Positive for headaches. Psychiatric/Behavioral: Negative. Vitals:    05/07/18 1040   BP: 101/68   Pulse: (!) 106   Resp: 14   Temp: 98 °F (36.7 °C)   SpO2: 100%   Weight: 67.5 kg (148 lb 12.8 oz)   Height: 5' 8\" (1.727 m)            Physical Exam   Constitutional: She is oriented to person, place, and time. She appears well-nourished. White female; non smoker   HENT:   Head: Normocephalic. Mouth/Throat: Oropharynx is clear and moist.   Eyes: Pupils are equal, round, and reactive to light. Neck: Normal range of motion. Neck supple. Cardiovascular: Normal rate and regular rhythm. Pulmonary/Chest: Effort normal and breath sounds normal.   Abdominal: Soft. Bowel sounds are normal. She exhibits no distension and no mass. There is no tenderness. There is no rebound and no guarding. Musculoskeletal: Normal range of motion. Lymphadenopathy:     She has no cervical adenopathy. Neurological: She is alert and oriented to person, place, and time. Skin: Skin is warm and dry. No rash noted. Nursing note and vitals reviewed. [facility]      ED Course       Procedures    Pt has been re-examined and reports continued headache.    Nadia Cruz (neurology) consulted and recommends Depacon 250mg IV and then discharge home on medrol dose pack. She states that she can be followed up in their office in 1-2 days. 2:48 PM  Patient's results and plan of care have been reviewed with her. Patient has verbally conveyed her understanding and agreement of her signs, symptoms, diagnosis, treatment and prognosis and additionally agrees to follow up as recommended or return to the Emergency Room should her condition change prior to follow-up. Discharge instructions have also been provided to the patient with some educational information regarding her diagnosis as well a list of reasons why she would want to return to the ER prior to her follow-up appointment should her condition change. Aicha Schmitt NP

## 2018-05-07 NOTE — DISCHARGE INSTRUCTIONS

## 2018-05-08 ENCOUNTER — OFFICE VISIT (OUTPATIENT)
Dept: NEUROLOGY | Age: 28
End: 2018-05-08

## 2018-05-08 VITALS
RESPIRATION RATE: 18 BRPM | WEIGHT: 149.2 LBS | HEIGHT: 68 IN | SYSTOLIC BLOOD PRESSURE: 88 MMHG | HEART RATE: 98 BPM | TEMPERATURE: 98.3 F | DIASTOLIC BLOOD PRESSURE: 66 MMHG | OXYGEN SATURATION: 100 % | BODY MASS INDEX: 22.61 KG/M2

## 2018-05-08 DIAGNOSIS — G43.901 STATUS MIGRAINOSUS: Primary | ICD-10-CM

## 2018-05-08 RX ORDER — NALOXONE HYDROCHLORIDE 4 MG/.1ML
SPRAY NASAL
Qty: 1 EACH | Refills: 0 | Status: SHIPPED | OUTPATIENT
Start: 2018-05-08 | End: 2018-06-27

## 2018-05-08 RX ORDER — AMITRIPTYLINE HYDROCHLORIDE 10 MG/1
TABLET, FILM COATED ORAL
Qty: 60 TAB | Refills: 5 | Status: SHIPPED | OUTPATIENT
Start: 2018-05-08 | End: 2018-09-11

## 2018-05-08 RX ORDER — HYDROMORPHONE HYDROCHLORIDE 2 MG/1
2 TABLET ORAL
Qty: 2 TAB | Refills: 0 | Status: SHIPPED | OUTPATIENT
Start: 2018-05-08 | End: 2018-06-27

## 2018-05-08 NOTE — PROGRESS NOTES
Date:             May 8, 2018    Name:  Sinai Cisneros  :  1990  MRN:  [id number]     PCP:  None    Chief Complaint   Patient presents with    Follow-up     Discuss botox         HISTORY OF PRESENT ILLNESS:  Henri Fernandez is a 32 y.o., female who presents today for follow up for migraines. She was last seen on , is on Topamax for preventative without good control. She was in the ER recently for headache, returns today for ER follow-up. Bad headache started on Saturday, she took imitrex shot and zofran without relief. Pain was still present on , so she she took imitrex and zofran again, she stayed in bed all day and headache never did improve. Monday she came to work for a few hours and then went to the ER because she could not tolerate the pain. Nothing they gave her helped, she cannot take Compazine, Haldol, Reglan, promethazine, or Benadryl which limits her ER cocktail. The only combo that she has gotten in the ER that worked was dilaudid and ativan because they put her to sleep and she was able to wake up without headache, but she knows that she cannot be on either of these long term. She thinks that she is doing well with her stress, anxiety. She sleeps well. She is on good terms with her daughter's father, so no stress, there. Eating disorder is well controlled. She is seeing a counselor consistently and a psychiatrist. Was referred to pain psychology at her last visit and has not set that up yet. She is on Botox in the past, is interested in trying that again.     +Nausea  +Photophobia  +Phonophobia     Preventatives tried:  Botox (last injection 2017)  Topamax (still on it, did not tolerate higher doses)  Nortriptyline  Lexapro (helpful with anxiety, but not for headaches)  Depakote (did not start because of potential weight gain side effect)     Abortives tried:  Excedrin  Imitrex shots (worked well, but she is out)  Treximet [employer] will not cover)  [facility] (insurance will not cover)  Bad reactions to DHE, Reglan, prochlorperazine, Compazine, Benadryl, Haldol    4.17.2018 recap  Melani Aldrich is a 32 y.o., female who presents today for follow up for headaches. She was last seen by Dr. Obinna Parr in October, she switched to that office due to location, but now would like to switch back to the [facility] office. In the past, she has been followed by Dr. Dara Gonzalez who had her on Botox. She was still getting migraines on botox, she does not think that she saw any improvement with that in frequency or intensity. Because she still ended up in the ER, she decided not to pursue Botox further. Migraines actually improved  For a period, she went 8 months last year off of her Botox without significant headaches. Recently, her headaches have returned. She does think that this may be due in part to going to her break about a month ago, even though is a good breakup it was pretty stressful. She does a significant history of have depression and anxiety, sees a psychiatrist at [facility] and a counselor. She does feel that Lexapro is working well to control her mood, making changes to that in the past did not go well. She was last prescribed depakote, but did not take it because she was concerned about weight gain. She gains a lot of weight when she takes prednisone. Has not tried elavil, per records she has been on Pamelor in the past.  Does have blood pressure and heart rate that run low at times, so not a candidate for blood pressure medications. She would prefer to try getting back on her Imitrex injection and seeing a psychologist for cognitive behavioral therapy to trying another preventative at this time.          Current Outpatient Prescriptions   Medication Sig    ondansetron (ZOFRAN ODT) 4 mg disintegrating tablet Take 1 Tab by mouth every eight (8) hours as needed for Nausea.     methylPREDNISolone (MEDROL, CELESTINO,) 4 mg tablet As directed    SUMAtriptan succinate [facility] SYMUCH) 3 mg/0.5 mL pnij 3 mg by SubCUTAneous route as needed.  doxycycline (ADOXA) 100 mg tablet Take 100 mg by mouth two (2) times a day. Indications: ACNE ROSACEA    topiramate (TOPAMAX) 100 mg tablet Take 100 mg by mouth nightly.  levonorgestrel (MIRENA) 20 mcg/24 hr (5 years) IUD 1 Each by IntraUTERine route once.  clonazePAM (KLONOPIN) 1 mg tablet Take 2 mg by mouth nightly.  escitalopram (LEXAPRO) 20 mg tablet Take 20 mg by mouth every morning. Indications: GENERALIZED ANXIETY DISORDER    ASPIRIN/ACETAMINOPHEN/CAFFEINE (MIGRAINE RELIEF PO) Take  by mouth. No current facility-administered medications for this visit. Allergies   Allergen Reactions    Benadryl [Diphenhydramine Hcl] Other (comments)     Makes me feel funny. Need ativan if I get benadryl    Compazine [Prochlorperazine Edisylate] Anxiety    Haldol [Haloperidol Lactate] Other (comments)    Promethazine Other (comments)     \"It makes me feel really funny, nausea and dizzy\"    Reglan [Metoclopramide] Other (comments)     Past Medical History:   Diagnosis Date    Anxiety     Bartholin's gland abscess     x8    Depression     Endometriosis     Migraine     Neurological disorder     migraines    Ovarian cyst     Panic attack      Past Surgical History:   Procedure Laterality Date    HX APPENDECTOMY  04/26/2017    HX BARTHOLIN CYST MARSUPIALIZATION      2011, 2018    HX BREAST LUMPECTOMY Right     HX BUNIONECTOMY      HX CYST REMOVAL      HX GYN Left     bartholin cyst drainage X 6    HX GYN Left     bartholin cyst marsupilization    HX OTHER SURGICAL      laparascopy    HX PELVIC LAPAROSCOPY      HX WISDOM TEETH EXTRACTION       Social History     Social History    Marital status: SINGLE     Spouse name: N/A    Number of children: N/A    Years of education: N/A     Occupational History    Not on file.      Social History Main Topics    Smoking status: Former Smoker    Smokeless tobacco: Never Used    Alcohol use No      Comment: social    Drug use: No    Sexual activity: Yes     Partners: Male     Birth control/ protection: Pill     Other Topics Concern    Not on file     Social History Narrative     Family History   Problem Relation Age of Onset    Diabetes Maternal Aunt     Heart Disease Maternal Aunt     Stroke Maternal Aunt     Heart Disease Maternal Grandmother     No Known Problems Mother          PHYSICAL EXAMINATION:    Visit Vitals    BP (!) 88/66 (BP 1 Location: Left arm, BP Patient Position: Sitting)    Pulse 98    Temp 98.3 °F (36.8 °C) (Oral)    Resp 18    Ht 5' 8\" (1.727 m)    Wt 67.7 kg (149 lb 3.2 oz)    SpO2 100%    BMI 22.69 kg/m2     General:  Well defined, nourished, and groomed individual who is clearly in pain. Neck: Supple, nontender, no bruits, no pain with resistance to active range of motion. Heart: Regular rate and rhythm, no murmurs, rub, or gallop. Normal S1S2. Lungs:  Clear to auscultation bilaterally with equal chest expansion, no cough, no wheeze  Musculoskeletal:  Extremities revealed no edema and had full range of motion of joints. Psych: Calm, cooperative    NEUROLOGICAL EXAMINATION:     Mental Status:   Alert and oriented to person, place, and time with recent and remote memory intact. Attention span and concentration are normal. Speech is fluent with a full fund of knowledge. Cranial Nerves:    II, III, IV, VI:  Visual acuity grossly intact. Extra-ocular movements are full and fluid. No ptosis or nystagmus. V-XII: Hearing is grossly intact. Facial features are symmetric, with normal sensation and strength. The palate rises symmetrically and the tongue protrudes midline. Sternocleidomastoids 5/5. Motor Examination: Normal tone, bulk, and strength, 5/5 muscle strength throughout. Coordination:  Finger to nose testing was normal.   No resting or intention tremor  Gait and Station:  Steady while walking.   Normal arm swing.  No pronator drift. No muscle wasting or fasciculations noted. ASSESSMENT AND PLAN    ICD-10-CM ICD-9-CM    1. Status migrainosus G43.901 346.20 HYDROmorphone (DILAUDID) 2 mg tablet      naloxone (NARCAN) 4 mg/actuation nasal spray      amitriptyline (ELAVIL) 10 mg tablet   2. Chronic migraine G43.709 346.70      17-year-old female seen in follow-up for migraines. These are currently poorly controlled, with greater than 15 migraine days per month. She has tried and failed multiple preventatives including Topamax, Lexapro, nortriptyline. Will blood pressure today of 88/66, blood pressure medications for preventative would not be appropriate. She is intolerant of many medications of use for abortive. Does use some brace, which generally works but not with her current headache. 1.  Continue Topamax 100 mg nightly for migraine preventative  2. Add amitriptyline for migraine preventative 10 mg nightly ×1 week, then 20 mg if tolerated. Discussed side effects, including risk of suicidal ideation. If she experiences that, she will stop the medication and notify us  3. We will order Botox 155 units (reconstitute 200 units) every 12 weeks for chronic migraine protocol be done by Dr. Brandt Bingham  4. We will give one-time prescription of Dilaudid 2 mg, 2 tabs to help her get rid of her current headache. Discussed that we will not be able to give her this prescription again. Also provided with Narcan prescription because she takes Klonopin nightly for sleep, discussed associated risk of taking these 2 medications together and strongly encourage her to fill the Narcan.  was checked, she has received a few other opioid prescriptions this year for Bartholin gland cyst surgery from gynecology. Reports that she returned extra pills to the pharmacy  5.   Continues Zembrace and ibuprofen for migraine abortive    Follow-up in 1 month as scheduled with Dr. Brandt Bingham, sooner if Botox is approved    Whit Human Rodo Rivera NP    This note was created using voice recognition software. Despite editing, there may be syntax errors.

## 2018-05-08 NOTE — MR AVS SNAPSHOT
[facility] [address] [place] [address] 
[phone] Patient: Drake Royal MRN: [id number] HO:[id number] Visit Information Date & Time Provider Department Dept. Phone Encounter #  
 5/8/2018  3:30 PM Javon Florez NP [facility] at [address] [id number] Your Appointments 5/31/2018  2:30 PM  
New Patient with Leti Livingston NP [address] [facility] ([address]) Appt Note: establish PCP: ananth; establish PCP: Yahaira Huffman [address] [address]  
[phone]  
  
   
 [address] [address] [address]  
  
    
 6/1/2018 11:40 AM  
Follow Up with [address] [facility] at [facility] [address] Appt Note: f/up migraines SC 4/12/18 [address] [place] [address]  
[phone]  
  
   
 [address] [address] Upcoming Health Maintenance Date Due  
 PAP AKA CERVICAL CYTOLOGY 6/29/2009 Influenza Age 5 to Adult 8/1/2018 DTaP/Tdap/Td series (2 - Tdap) 11/24/2022 Allergies as of 5/8/2018  Review Complete On: 5/8/2018 By: Librado Kirk LPN Severity Noted Reaction Type Reactions Benadryl [Diphenhydramine Hcl]  04/13/2018    Other (comments) Makes me feel funny. Need ativan if I get benadryl Compazine [Prochlorperazine Edisylate]  11/21/2016    Anxiety Haldol [Haloperidol Lactate]  04/15/2016    Other (comments) Promethazine  01/06/2016    Other (comments) \"It makes me feel really funny, nausea and dizzy\" Reglan [Metoclopramide]  04/15/2016    Other (comments) Current Immunizations  Reviewed on 4/21/2017 Name Date DTaP 11/24/2012 Hib 7/23/2015 Influenza Vaccine 10/24/2015 Rho(D) Immune Globulin - IM 6/4/2016  4:46 PM  
  
 Not reviewed this visit You Were Diagnosed With   
 Codes Comments Status migrainosus    -  Primary ICD-10-CM: W20.227 ICD-9-CM: 346.20 Vitals BP Pulse Temp Resp Height(growth percentile) Weight(growth percentile) (!) 88/66 (BP 1 Location: Left arm, BP Patient Position: Sitting) 98 98.3 °F (36.8 °C) (Oral) 18 5' 8\" (1.727 m) 149 lb 3.2 oz (67.7 kg) SpO2 BMI OB Status Smoking Status 100% 22.69 kg/m2 IUD Former Smoker Vitals History BMI and BSA Data Body Mass Index Body Surface Area  
 22.69 kg/m 2 1.8 m 2 Preferred Pharmacy Pharmacy Name Phone [facility] [phone] Your Updated Medication List  
  
   
This list is accurate as of 5/8/18  4:22 PM.  Always use your most recent med list.  
  
  
  
  
 clonazePAM 1 mg tablet Commonly known as:  Layman Sella Take 2 mg by mouth nightly. doxycycline 100 mg tablet Commonly known as:  ADOXA Take 100 mg by mouth two (2) times a day. Indications: ACNE ROSACEA HYDROmorphone 2 mg tablet Commonly known as:  DILAUDID Take 1 Tab by mouth every four (4) hours as needed for Pain. Max Daily Amount: 12 mg. LEXAPRO 20 mg tablet Generic drug:  escitalopram oxalate Take 20 mg by mouth every morning. Indications: GENERALIZED ANXIETY DISORDER  
  
 methylPREDNISolone 4 mg tablet Commonly known as:  MEDROL (CELESTINO) As directed MIGRAINE RELIEF PO Take  by mouth. MIRENA 20 mcg/24 hr (5 years) Iud  
Generic drug:  levonorgestrel 1 Each by IntraUTERine route once. naloxone 4 mg/actuation nasal spray Commonly known as:  [employer] Use 1 spray intranasally into 1 nostril. Use a new Narcan nasal spray for subsequent doses and administer into alternating nostrils. May repeat every 2 to 3 minutes as needed. ondansetron 4 mg disintegrating tablet Commonly known as:  ZOFRAN ODT Take 1 Tab by mouth every eight (8) hours as needed for Nausea. SUMAtriptan succinate 3 mg/0.5 mL Pnij Commonly known as:  Sudha Agent  
3 mg by SubCUTAneous route as needed. TOPAMAX 100 mg tablet Generic drug:  topiramate Take 100 mg by mouth nightly. Prescriptions Printed Refills HYDROmorphone (DILAUDID) 2 mg tablet 0 Sig: Take 1 Tab by mouth every four (4) hours as needed for Pain. Max Daily Amount: 12 mg. Class: Print Route: Oral  
 naloxone (NARCAN) 4 mg/actuation nasal spray 0 Sig: Use 1 spray intranasally into 1 nostril. Use a new Narcan nasal spray for subsequent doses and administer into alternating nostrils. May repeat every 2 to 3 minutes as needed. Class: Print Introducing [address]! Dear Amanda Vega: Thank you for requesting a [employer] account. Our records indicate that you already have an active [employer] account. You can access your account anytime at [email or web address] Did you know that you can access your hospital and ER discharge instructions at any time in [employer]? You can also review all of your test results from your hospital stay or ER visit. Additional Information If you have questions, please visit the Frequently Asked Questions section of the [employer] website at [email or web address] Remember, [employer] is NOT to be used for urgent needs. For medical emergencies, dial 911. Now available from your iPhone and Android! Please provide this summary of care documentation to your next provider. Your primary care clinician is listed as NONE. If you have any questions after today's visit, please call [phone].

## 2018-05-08 NOTE — PROGRESS NOTES
Paty Green is a 32 y.o. female    Chief Complaint   Patient presents with    Follow-up     Discuss botox     1. Have you been to the ER, urgent care clinic since your last visit? Hospitalized since your last visit? Yes Reason for visit: Migraine seen at [facility] ER on 5/7/.2018    2. Have you seen or consulted any other health care providers outside of the [facility] since your last visit? Include any pap smears or colon screening.  No

## 2018-06-01 ENCOUNTER — TELEPHONE (OUTPATIENT)
Dept: NEUROLOGY | Age: 28
End: 2018-06-01

## 2018-06-01 ENCOUNTER — OFFICE VISIT (OUTPATIENT)
Dept: NEUROLOGY | Age: 28
End: 2018-06-01

## 2018-06-01 VITALS
HEART RATE: 92 BPM | WEIGHT: 148 LBS | RESPIRATION RATE: 18 BRPM | SYSTOLIC BLOOD PRESSURE: 118 MMHG | BODY MASS INDEX: 22.5 KG/M2 | OXYGEN SATURATION: 100 % | DIASTOLIC BLOOD PRESSURE: 74 MMHG

## 2018-06-01 RX ORDER — AMITRIPTYLINE HYDROCHLORIDE 25 MG/1
TABLET, FILM COATED ORAL
Qty: 30 TAB | Refills: 3 | Status: SHIPPED | OUTPATIENT
Start: 2018-06-01 | End: 2018-09-11

## 2018-06-01 NOTE — TELEPHONE ENCOUNTER
Re: Botox    PA for  submitted via CMM to [employer] Pending status:  \"Your information has been submitted to [employer]. To check for an updated outcome later, reopen this PA request from your dashboard. \"      Will update nurse when determination is made.

## 2018-06-01 NOTE — PROGRESS NOTES
Chief Complaint   Patient presents with    Headache       HPI    Kait Hu is a 78-year-old woman here to follow-up. I last saw her in the office for medication management on November 2016. She has chronic migraines. She last had Botox injections on January 2017. Currently she remains on topiramate 100 mg nightly for headache prophylaxis. She goes through cycles of severe migraine headaches every couple months lasting a few weeks if not longer. She ends up in the emergency room frequently for these long breakthrough headaches. When the headaches occur they are diffuse throbbing with nausea light sensitivity and she feels completely debilitated. She was prescribed amitriptyline last visit by the nurse practitioner but the medication failed to be received by the pharmacy. She cannot take any higher doses of topiramate due to side effects of cognitive slowing. Migraine medication history: Topiramate, Depakote, nortriptyline, amitriptyline          Review of Systems   Neurological: Positive for headaches. All other systems reviewed and are negative. Past Medical History:   Diagnosis Date    Anxiety     Bartholin's gland abscess     x8    Depression     Endometriosis     Migraine     Neurological disorder     migraines    Ovarian cyst     Panic attack      Family History   Problem Relation Age of Onset    Diabetes Maternal Aunt     Heart Disease Maternal Aunt     Stroke Maternal Aunt     Heart Disease Maternal Grandmother     No Known Problems Mother      Social History     Social History    Marital status: SINGLE     Spouse name: N/A    Number of children: N/A    Years of education: N/A     Occupational History    Not on file.      Social History Main Topics    Smoking status: Former Smoker    Smokeless tobacco: Never Used    Alcohol use No      Comment: social    Drug use: No    Sexual activity: Yes     Partners: Male     Birth control/ protection: Pill     Other Topics Concern  Not on file     Social History Narrative     Allergies   Allergen Reactions    Benadryl [Diphenhydramine Hcl] Other (comments)     Makes me feel funny. Need ativan if I get benadryl    Compazine [Prochlorperazine Edisylate] Anxiety    Haldol [Haloperidol Lactate] Other (comments)    Promethazine Other (comments)     \"It makes me feel really funny, nausea and dizzy\"    Reglan [Metoclopramide] Other (comments)         Current Outpatient Prescriptions   Medication Sig    amitriptyline (ELAVIL) 25 mg tablet One half tablet at bedtime for 1 week then increase to one whole tablet    HYDROmorphone (DILAUDID) 2 mg tablet Take 1 Tab by mouth every four (4) hours as needed for Pain. Max Daily Amount: 12 mg.    ondansetron (ZOFRAN ODT) 4 mg disintegrating tablet Take 1 Tab by mouth every eight (8) hours as needed for Nausea.  SUMAtriptan succinate (ZEMBRACE SYMTOUCH) 3 mg/0.5 mL pnij 3 mg by SubCUTAneous route as needed.  ASPIRIN/ACETAMINOPHEN/CAFFEINE (MIGRAINE RELIEF PO) Take  by mouth.  doxycycline (ADOXA) 100 mg tablet Take 100 mg by mouth two (2) times a day. Indications: ACNE ROSACEA    topiramate (TOPAMAX) 100 mg tablet Take 100 mg by mouth nightly.  levonorgestrel (MIRENA) 20 mcg/24 hr (5 years) IUD 1 Each by IntraUTERine route once.  clonazePAM (KLONOPIN) 1 mg tablet Take 2 mg by mouth nightly.  escitalopram (LEXAPRO) 20 mg tablet Take 20 mg by mouth every morning. Indications: GENERALIZED ANXIETY DISORDER    naloxone (NARCAN) 4 mg/actuation nasal spray Use 1 spray intranasally into 1 nostril. Use a new Narcan nasal spray for subsequent doses and administer into alternating nostrils. May repeat every 2 to 3 minutes as needed.  amitriptyline (ELAVIL) 10 mg tablet Take one half at bedtime for 1 week, then 2 tabs at bedtime  Indications: MIGRAINE PREVENTION    methylPREDNISolone (MEDROL, CELESTINO,) 4 mg tablet As directed     No current facility-administered medications for this visit. Neurologic Exam     Mental Status        WD/WN adult in NAD, normal grooming  VSS  A&O x 3    PERRL, nonicteric  Face is symmetric, tongue midline  Speech is fluent and clear  No limb ataxia. No abnl movements. Moving all extemities spontaneously and symmetric  Normal gait    CVS RRR  Lungs nonlabored  Skin is warm and dry         Visit Vitals    /74    Pulse 92    Resp 18    Wt 67.1 kg (148 lb)    SpO2 100%    BMI 22.5 kg/m2       Assessment and Plan   Diagnoses and all orders for this visit:    1. Chronic migraine    Other orders  -     amitriptyline (ELAVIL) 25 mg tablet; One half tablet at bedtime for 1 week then increase to one whole tablet    61-year-old woman with chronic migraines currently clinically controlled but has a tendency to have severe breakthroughs lasting weeks on end. I did recommend she initiate amitriptyline combined with Topamax she takes nightly. Start with a half a tablet only. Side effects have been discussed with her. Botox injections when needed. I will see her in 3 months.         Ryan Porras, [address]  Diplomate CASSIEN

## 2018-06-01 NOTE — MR AVS SNAPSHOT
[facility] [address] [place] [address] 
[phone] Patient: Jesus Villasenor MRN: [id number] GSB:[id number] Visit Information Date & Time Provider Department Dept. Phone Encounter #  
 6/1/2018 11:40 AM Gisselle Reagan DO [email or web address] Neurology Clinic at [address] [id number] Follow-up Instructions Return in about 3 months (around 9/1/2018). Upcoming Health Maintenance Date Due  
 PAP AKA CERVICAL CYTOLOGY 6/29/2009 Influenza Age 5 to Adult 8/1/2018 DTaP/Tdap/Td series (2 - Tdap) 11/24/2022 Allergies as of 6/1/2018  Review Complete On: 6/1/2018 By: Ita Horta LPN Severity Noted Reaction Type Reactions Benadryl [Diphenhydramine Hcl]  04/13/2018    Other (comments) Makes me feel funny. Need ativan if I get benadryl Compazine [Prochlorperazine Edisylate]  11/21/2016    Anxiety Haldol [Haloperidol Lactate]  04/15/2016    Other (comments) Promethazine  01/06/2016    Other (comments) \"It makes me feel really funny, nausea and dizzy\" Reglan [Metoclopramide]  04/15/2016    Other (comments) Current Immunizations  Reviewed on 4/21/2017 Name Date DTaP 11/24/2012 Hib 7/23/2015 Influenza Vaccine 10/24/2015 Rho(D) Immune Globulin - IM 6/4/2016  4:46 PM  
  
 Not reviewed this visit Vitals BP Pulse Resp Weight(growth percentile) SpO2 BMI  
 118/74 92 18 148 lb (67.1 kg) 100% 22.5 kg/m2 OB Status Smoking Status IUD Former Smoker Vitals History BMI and BSA Data Body Mass Index Body Surface Area  
 22.5 kg/m 2 1.79 m 2 Preferred Pharmacy Pharmacy Name Phone [facility] [phone] Your Updated Medication List  
  
   
This list is accurate as of 6/1/18 11:56 AM.  Always use your most recent med list.  
  
  
  
  
 * amitriptyline 10 mg tablet Commonly known as:  ELAVIL Take one half at bedtime for 1 week, then 2 tabs at bedtime  Indications: MIGRAINE PREVENTION  
  
 * amitriptyline 25 mg tablet Commonly known as:  ELAVIL One half tablet at bedtime for 1 week then increase to one whole tablet  
  
 clonazePAM 1 mg tablet Commonly known as:  Roberto Carlosia Banner Take 2 mg by mouth nightly. doxycycline 100 mg tablet Commonly known as:  ADOXA Take 100 mg by mouth two (2) times a day. Indications: ACNE ROSACEA HYDROmorphone 2 mg tablet Commonly known as:  DILAUDID Take 1 Tab by mouth every four (4) hours as needed for Pain. Max Daily Amount: 12 mg. LEXAPRO 20 mg tablet Generic drug:  escitalopram oxalate Take 20 mg by mouth every morning. Indications: GENERALIZED ANXIETY DISORDER  
  
 methylPREDNISolone 4 mg tablet Commonly known as:  MEDROL (CELESTINO) As directed MIGRAINE RELIEF PO Take  by mouth. MIRENA 20 mcg/24 hr (5 years) Iud  
Generic drug:  levonorgestrel 1 Each by IntraUTERine route once. naloxone 4 mg/actuation nasal spray Commonly known as:  [employer] Use 1 spray intranasally into 1 nostril. Use a new Narcan nasal spray for subsequent doses and administer into alternating nostrils. May repeat every 2 to 3 minutes as needed. ondansetron 4 mg disintegrating tablet Commonly known as:  ZOFRAN ODT Take 1 Tab by mouth every eight (8) hours as needed for Nausea. SUMAtriptan succinate 3 mg/0.5 mL Pnij Commonly known as:  Youlanda Plan  
3 mg by SubCUTAneous route as needed. TOPAMAX 100 mg tablet Generic drug:  topiramate Take 100 mg by mouth nightly. * Notice: This list has 2 medication(s) that are the same as other medications prescribed for you. Read the directions carefully, and ask your doctor or other care provider to review them with you. Prescriptions Sent to Pharmacy Refills amitriptyline (ELAVIL) 25 mg tablet 3 Sig: One half tablet at bedtime for 1 week then increase to one whole tablet Class: Normal  
 Pharmacy: [place], [address]  #: [phone] Follow-up Instructions Return in about 3 months (around 9/1/2018). Introducing [facility]! Dear Jayla Isbell: Thank you for requesting a [employer] account. Our records indicate that you already have an active [employer] account. You can access your account anytime at [email or web address] Did you know that you can access your hospital and ER discharge instructions at any time in [employer]? You can also review all of your test results from your hospital stay or ER visit. Additional Information If you have questions, please visit the Frequently Asked Questions section of the [employer] website at [email or web address] Remember, [employer] is NOT to be used for urgent needs. For medical emergencies, dial 911. Now available from your iPhone and Android! Please provide this summary of care documentation to your next provider. Your primary care clinician is listed as NONE. If you have any questions after today's visit, please call [phone].

## 2018-06-04 ENCOUNTER — TELEPHONE (OUTPATIENT)
Dept: NEUROLOGY | Age: 28
End: 2018-06-04

## 2018-06-04 NOTE — TELEPHONE ENCOUNTER
Re: Botox    Approval letter received from [facility] for . Botox [facility] # [id number]. Auth good 6/1/18 - 12/1/18. Botox [id number] no PA required per [facility]. [facility]. Phone # is [phone]. Forward to nurse. Please schedule shipment. Pt's appt for procedure needs to be scheduled as well.

## 2018-06-25 ENCOUNTER — HOSPITAL ENCOUNTER (EMERGENCY)
Age: 28
Discharge: HOME | End: 2018-06-25
Attending: STUDENT IN AN ORGANIZED HEALTH CARE EDUCATION/TRAINING PROGRAM
Payer: COMMERCIAL

## 2018-06-25 VITALS
HEIGHT: 68 IN | WEIGHT: 150 LBS | TEMPERATURE: 98.8 F | HEART RATE: 90 BPM | SYSTOLIC BLOOD PRESSURE: 105 MMHG | OXYGEN SATURATION: 98 % | DIASTOLIC BLOOD PRESSURE: 64 MMHG | BODY MASS INDEX: 22.73 KG/M2 | RESPIRATION RATE: 19 BRPM

## 2018-06-25 DIAGNOSIS — F41.0 PANIC ATTACK: Primary | ICD-10-CM

## 2018-06-25 LAB
ALBUMIN SERPL-MCNC: 3.8 G/DL (ref 3.5–5)
ALBUMIN/GLOB SERPL: 1.2 {RATIO} (ref 1.1–2.2)
ALP SERPL-CCNC: 50 U/L (ref 45–117)
ALT SERPL-CCNC: 15 U/L (ref 12–78)
ANION GAP SERPL CALC-SCNC: 12 MMOL/L (ref 5–15)
APPEARANCE UR: ABNORMAL
AST SERPL-CCNC: 13 U/L (ref 15–37)
ATRIAL RATE: 104 BPM
BACTERIA URNS QL MICRO: NEGATIVE /HPF
BASOPHILS # BLD: 0 K/UL (ref 0–0.1)
BASOPHILS NFR BLD: 0 % (ref 0–1)
BILIRUB SERPL-MCNC: 0.6 MG/DL (ref 0.2–1)
BILIRUB UR QL: NEGATIVE
BUN SERPL-MCNC: 9 MG/DL (ref 6–20)
BUN/CREAT SERPL: 10 (ref 12–20)
CALCIUM SERPL-MCNC: 8.5 MG/DL (ref 8.5–10.1)
CALCULATED P AXIS, ECG09: 83 DEGREES
CALCULATED R AXIS, ECG10: 81 DEGREES
CALCULATED T AXIS, ECG11: 50 DEGREES
CHLORIDE SERPL-SCNC: 108 MMOL/L (ref 97–108)
CO2 SERPL-SCNC: 19 MMOL/L (ref 21–32)
COLOR UR: ABNORMAL
CREAT SERPL-MCNC: 0.9 MG/DL (ref 0.55–1.02)
DIAGNOSIS, 93000: NORMAL
DIFFERENTIAL METHOD BLD: ABNORMAL
EOSINOPHIL # BLD: 0 K/UL (ref 0–0.4)
EOSINOPHIL NFR BLD: 0 % (ref 0–7)
EPITH CASTS URNS QL MICRO: ABNORMAL /LPF
ERYTHROCYTE [DISTWIDTH] IN BLOOD BY AUTOMATED COUNT: 12.5 % (ref 11.5–14.5)
GLOBULIN SER CALC-MCNC: 3.2 G/DL (ref 2–4)
GLUCOSE SERPL-MCNC: 99 MG/DL (ref 65–100)
GLUCOSE UR STRIP.AUTO-MCNC: NEGATIVE MG/DL
HCT VFR BLD AUTO: 40.1 % (ref 35–47)
HGB BLD-MCNC: 13.5 G/DL (ref 11.5–16)
HGB UR QL STRIP: NEGATIVE
HYALINE CASTS URNS QL MICRO: ABNORMAL /LPF (ref 0–5)
IMM GRANULOCYTES # BLD: 0 K/UL (ref 0–0.04)
IMM GRANULOCYTES NFR BLD AUTO: 0 % (ref 0–0.5)
KETONES UR QL STRIP.AUTO: NEGATIVE MG/DL
LEUKOCYTE ESTERASE UR QL STRIP.AUTO: ABNORMAL
LYMPHOCYTES # BLD: 1 K/UL (ref 0.8–3.5)
LYMPHOCYTES NFR BLD: 10 % (ref 12–49)
MCH RBC QN AUTO: 29.3 PG (ref 26–34)
MCHC RBC AUTO-ENTMCNC: 33.7 G/DL (ref 30–36.5)
MCV RBC AUTO: 87 FL (ref 80–99)
MONOCYTES # BLD: 0.7 K/UL (ref 0–1)
MONOCYTES NFR BLD: 6 % (ref 5–13)
NEUTS SEG # BLD: 8.9 K/UL (ref 1.8–8)
NEUTS SEG NFR BLD: 83 % (ref 32–75)
NITRITE UR QL STRIP.AUTO: NEGATIVE
NRBC # BLD: 0 K/UL (ref 0–0.01)
NRBC BLD-RTO: 0 PER 100 WBC
P-R INTERVAL, ECG05: 120 MS
PH UR STRIP: 8 [PH] (ref 5–8)
PLATELET # BLD AUTO: 211 K/UL (ref 150–400)
PMV BLD AUTO: 10 FL (ref 8.9–12.9)
POTASSIUM SERPL-SCNC: 3.5 MMOL/L (ref 3.5–5.1)
PROT SERPL-MCNC: 7 G/DL (ref 6.4–8.2)
PROT UR STRIP-MCNC: 100 MG/DL
Q-T INTERVAL, ECG07: 352 MS
QRS DURATION, ECG06: 80 MS
QTC CALCULATION (BEZET), ECG08: 462 MS
RBC # BLD AUTO: 4.61 M/UL (ref 3.8–5.2)
RBC #/AREA URNS HPF: ABNORMAL /HPF (ref 0–5)
SODIUM SERPL-SCNC: 139 MMOL/L (ref 136–145)
SP GR UR REFRACTOMETRY: 1.02 (ref 1–1.03)
T4 FREE SERPL-MCNC: 0.9 NG/DL (ref 0.8–1.5)
TROPONIN I SERPL-MCNC: <0.05 NG/ML
TSH SERPL DL<=0.05 MIU/L-ACNC: 0.82 UIU/ML (ref 0.36–3.74)
UR CULT HOLD, URHOLD: NORMAL
UROBILINOGEN UR QL STRIP.AUTO: 1 EU/DL (ref 0.2–1)
VENTRICULAR RATE, ECG03: 104 BPM
WBC # BLD AUTO: 10.7 K/UL (ref 3.6–11)
WBC URNS QL MICRO: ABNORMAL /HPF (ref 0–4)

## 2018-06-25 PROCEDURE — 36415 COLL VENOUS BLD VENIPUNCTURE: CPT | Performed by: STUDENT IN AN ORGANIZED HEALTH CARE EDUCATION/TRAINING PROGRAM

## 2018-06-25 PROCEDURE — 80053 COMPREHEN METABOLIC PANEL: CPT | Performed by: STUDENT IN AN ORGANIZED HEALTH CARE EDUCATION/TRAINING PROGRAM

## 2018-06-25 PROCEDURE — 81001 URINALYSIS AUTO W/SCOPE: CPT | Performed by: STUDENT IN AN ORGANIZED HEALTH CARE EDUCATION/TRAINING PROGRAM

## 2018-06-25 PROCEDURE — 84443 ASSAY THYROID STIM HORMONE: CPT | Performed by: STUDENT IN AN ORGANIZED HEALTH CARE EDUCATION/TRAINING PROGRAM

## 2018-06-25 PROCEDURE — 84484 ASSAY OF TROPONIN QUANT: CPT | Performed by: STUDENT IN AN ORGANIZED HEALTH CARE EDUCATION/TRAINING PROGRAM

## 2018-06-25 PROCEDURE — 84439 ASSAY OF FREE THYROXINE: CPT | Performed by: STUDENT IN AN ORGANIZED HEALTH CARE EDUCATION/TRAINING PROGRAM

## 2018-06-25 PROCEDURE — 85025 COMPLETE CBC W/AUTO DIFF WBC: CPT | Performed by: STUDENT IN AN ORGANIZED HEALTH CARE EDUCATION/TRAINING PROGRAM

## 2018-06-25 PROCEDURE — 99285 EMERGENCY DEPT VISIT HI MDM: CPT

## 2018-06-25 PROCEDURE — 93005 ELECTROCARDIOGRAM TRACING: CPT

## 2018-06-25 NOTE — ED PROVIDER NOTES
HPI Comments: 32 y.o. female with past medical history significant for anxiety, depression, migraines, ovarian cyst, endometriosis, panic attacks, PTSD, and Bartholin's gland abscess who presents to the ED via EMS with chief complaint of chest pain. Pt reports she woke up this morning with pleuritic midsternal chest pain accompanied by SOB and bilateral leg tingling and tenderness, says she \"felt like she couldn't catch her breath. \" Pt states she got in the shower and had a near syncopal episode so she sat down and noted her HR to be elevated at about 150. Pt states she took a Xanax at that time and called EMS to be taken to the ED for further evaluation. Pt states she has had 3-4 similar episodes in the past 2 weeks, says she thought her sx were due to anxiety but she has not had a panic attack in over 2 years and has never had chest pain associated with her anxiety in the past. Pt states she has hx of anxiety, depression, panic attacks, PTSD, endometriosis, and ovarian cysts. Pt states she takes Lexapro, Topamax, Klonopin, Xanax PRN, and started Accutane 14 days ago. Pt denies any recent travels. Pt states she recently had Bartholin gland resection on 2/28/18. Pt states she has a Mirena. Pt denies leg swelling. There are no other acute medical complaints voiced at this time. Social Hx: Denies smoking tobacco. Occasional EtOH use. Former marijuana use (no recent drug use). 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 PCP: None    Note written by Hany Sanchez Arm, as dictated by Alex Prieto MD 8:24 AM     The history is provided by the patient.         Past Medical History:   Diagnosis Date    Anxiety     Bartholin's gland abscess     x8    Depression     Endometriosis     Migraine     Neurological disorder     migraines    Ovarian cyst     Panic attack        Past Surgical History:   Procedure Laterality Date    HX APPENDECTOMY  04/26/2017    HX BARTHOLIN CYST MARSUPIALIZATION 2011, 2018    HX BREAST LUMPECTOMY Right     HX BUNIONECTOMY      HX CYST REMOVAL      HX GYN Left     bartholin cyst drainage X 6    HX GYN Left     bartholin cyst marsupilization    HX OTHER SURGICAL      laparascopy    HX PELVIC LAPAROSCOPY      HX WISDOM TEETH EXTRACTION           Family History:   Problem Relation Age of Onset    Diabetes Maternal Aunt     Heart Disease Maternal Aunt     Stroke Maternal Aunt     Heart Disease Maternal Grandmother     No Known Problems Mother        Social History     Social History    Marital status: SINGLE     Spouse name: N/A    Number of children: N/A    Years of education: N/A     Occupational History    Not on file. Social History Main Topics    Smoking status: Former Smoker    Smokeless tobacco: Never Used    Alcohol use No      Comment: social    Drug use: No    Sexual activity: Yes     Partners: Male     Birth control/ protection: Pill     Other Topics Concern    Not on file     Social History Narrative         ALLERGIES: Benadryl [diphenhydramine hcl]; Compazine [prochlorperazine edisylate]; Haldol [haloperidol lactate]; Promethazine; and Reglan [metoclopramide]    Review of Systems   Constitutional: Negative for activity change, diaphoresis, fatigue and fever. HENT: Negative for congestion and sore throat. Eyes: Negative for photophobia and visual disturbance. Respiratory: Positive for shortness of breath. Negative for chest tightness. Cardiovascular: Positive for chest pain. Negative for palpitations and leg swelling. Gastrointestinal: Negative for abdominal pain, blood in stool, constipation, diarrhea, nausea and vomiting. Genitourinary: Negative for difficulty urinating, dysuria, flank pain, frequency and hematuria. Musculoskeletal: Negative for back pain. Neurological: Positive for numbness (bilateral legs). Negative for dizziness and headaches.         +near syncope   Psychiatric/Behavioral: The patient is nervous/anxious. All other systems reviewed and are negative. Vitals:    06/25/18 0806   BP: 95/70   Resp: 14   Temp: 98.8 °F (37.1 °C)   SpO2: 98%   Weight: 68 kg (150 lb)   Height: 5' 8\" (1.727 m)            Physical Exam   Constitutional: She is oriented to person, place, and time. She appears well-developed and well-nourished. No distress. Anxious. HENT:   Head: Normocephalic and atraumatic. Nose: Nose normal.   Mouth/Throat: Oropharynx is clear and moist. No oropharyngeal exudate. Eyes: Conjunctivae and EOM are normal. Right eye exhibits no discharge. Left eye exhibits no discharge. No scleral icterus. Neck: Normal range of motion. Neck supple. No JVD present. No tracheal deviation present. No thyromegaly present. Cardiovascular: Normal rate, regular rhythm, normal heart sounds and intact distal pulses. Exam reveals no gallop and no friction rub. No murmur heard. Pulmonary/Chest: Effort normal and breath sounds normal. No stridor. No respiratory distress. She has no wheezes. She has no rales. She exhibits no tenderness. Abdominal: Bowel sounds are normal. She exhibits no distension and no mass. There is no tenderness. There is no rebound. Musculoskeletal: Normal range of motion. She exhibits no edema or tenderness. Lymphadenopathy:     She has no cervical adenopathy. Neurological: She is alert and oriented to person, place, and time. No cranial nerve deficit. Coordination normal.   Skin: Skin is warm and dry. No rash noted. She is not diaphoretic. No erythema. No pallor. Psychiatric: Her behavior is normal. Judgment and thought content normal.   Nursing note and vitals reviewed.      Note written by Hany Grier, as dictated by Sarah Milian MD 8:25 AM    MDM  Number of Diagnoses or Management Options  Panic attack:      Amount and/or Complexity of Data Reviewed  Clinical lab tests: reviewed and ordered  Independent visualization of images, tracings, or specimens: yes    Risk of Complications, Morbidity, and/or Mortality  Presenting problems: moderate  Diagnostic procedures: moderate  Management options: moderate    Patient Progress  Patient progress: resolved        ED Course       Procedures    ED EKG interpretation:  Rhythm: sinus tachycardia; and regular . Rate (approx.): 104; ST/T wave: No ST or T wave abnormalities. Note written by Hany Duggan, as dictated by Anila Olivo MD 2:39 PM    5:43 PM  The patient has been reevaluated. The patient is ready for discharge. The patient's signs, symptoms, diagnosis, and discharge instructions have been discussed and the patient/ family has conveyed their understanding. The patient is to follow up as recommended or return to the ED should their symptoms worsen. Plan has been discussed and the patient is in agreement. LABORATORY TESTS:  No results found for this or any previous visit (from the past 12 hour(s)). IMAGING RESULTS:  No orders to display     No results found. MEDICATIONS GIVEN:  Medications - No data to display    IMPRESSION:  1. Panic attack        PLAN:  1. Discharge Medication List as of 6/25/2018 10:18 AM        2.    Follow-up Information     Follow up With Details Comments Contact Info    [facility] EMERGENCY DEPT  If symptoms worsen [address] [facility]  [phone]            Return to ED for new or worsening symptoms       Anila Olivo MD

## 2018-06-25 NOTE — DISCHARGE INSTRUCTIONS
Panic Attacks: Care Instructions  Your Care Instructions    During a panic attack, you may have a feeling of intense fear or terror, trouble breathing, chest pain or tightness, heartbeat changes, dizziness, sweating, and shaking. A panic attack starts suddenly and usually lasts from 5 to 20 minutes but may last even longer. You have the most anxiety about 10 minutes after the attack starts. An attack can begin with a stressful event, or it can happen without a cause. Although panic attacks can cause scary symptoms, you can learn to manage them with self-care, counseling, and medicine. Follow-up care is a key part of your treatment and safety. Be sure to make and go to all appointments, and call your doctor if you are having problems. It's also a good idea to know your test results and keep a list of the medicines you take. How can you care for yourself at home? · Take your medicine exactly as directed. Call your doctor if you think you are having a problem with your medicine. · Go to your counseling sessions and follow-up appointments. · Recognize and accept your anxiety. Then, when you are in a situation that makes you anxious, say to yourself, \"This is not an emergency. I feel uncomfortable, but I am not in danger. I can keep going even if I feel anxious. \"  · Be kind to your body:  ¨ Relieve tension with exercise or a massage. ¨ Get enough rest.  ¨ Avoid alcohol, caffeine, nicotine, and illegal drugs. They can increase your anxiety level, cause sleep problems, or trigger a panic attack. ¨ Learn and do relaxation techniques. See below for more about these techniques. · Engage your mind. Get out and do something you enjoy. Go to a funny movie, or take a walk or hike. Plan your day. Having too much or too little to do can make you anxious. · Keep a record of your symptoms. Discuss your fears with a good friend or family member, or join a support group for people with similar problems.  Talking to others sometimes relieves stress. · Get involved in social groups, or volunteer to help others. Being alone sometimes makes things seem worse than they are. · Get at least 30 minutes of exercise on most days of the week to relieve stress. Walking is a good choice. You also may want to do other activities, such as running, swimming, cycling, or playing tennis or team sports. Relaxation techniques  Do relaxation exercises for 10 to 20 minutes a day. You can play soothing, relaxing music while you do them, if you wish. · Tell others in your house that you are going to do your relaxation exercises. Ask them not to disturb you. · Find a comfortable place, away from all distractions and noise. · Lie down on your back, or sit with your back straight. · Focus on your breathing. Make it slow and steady. · Breathe in through your nose. Breathe out through either your nose or mouth. · Breathe deeply, filling up the area between your navel and your rib cage. Breathe so that your belly goes up and down. · Do not hold your breath. · Breathe like this for 5 to 10 minutes. Notice the feeling of calmness throughout your whole body. As you continue to breathe slowly and deeply, relax by doing the following for another 5 to 10 minutes:  · Tighten and relax each muscle group in your body. You can begin at your toes and work your way up to your head. · Imagine your muscle groups relaxing and becoming heavy. · Empty your mind of all thoughts. · Let yourself relax more and more deeply. · Become aware of the state of calmness that surrounds you. · When your relaxation time is over, you can bring yourself back to alertness by moving your fingers and toes and then your hands and feet and then stretching and moving your entire body. Sometimes people fall asleep during relaxation, but they usually wake up shortly afterward.   · Always give yourself time to return to full alertness before you drive a car or do anything that might cause an accident if you are not fully alert. Never play a relaxation tape while driving a car. When should you call for help? Call 911 anytime you think you may need emergency care. For example, call if:  ? · You feel you cannot stop from hurting yourself or someone else. ? Watch closely for changes in your health, and be sure to contact your doctor if:  ? · Your panic attacks get worse. ? · You have new or different anxiety. ? · You are not getting better as expected. Where can you learn more? Go to http://michele-lucy.info/. Enter H601 in the search box to learn more about \"Panic Attacks: Care Instructions. \"  Current as of: May 12, 2017  Content Version: 11.4  © 1130-8455 [employer]. Care instructions adapted under license by [employer] (which disclaims liability or warranty for this information). If you have questions about a medical condition or this instruction, always ask your healthcare professional. [place] [address] any warranty or liability for your use of this information.

## 2018-06-25 NOTE — ED TRIAGE NOTES
Woke up with mid sternal chest pain; has happened several times over last weeks. On meds for anxiety, recently added accutane for acne.

## 2018-06-26 ENCOUNTER — PATIENT OUTREACH (OUTPATIENT)
Dept: OTHER | Age: 28
End: 2018-06-26

## 2018-06-26 NOTE — PROGRESS NOTES
* Spoke briefly with patient. Patient declines to share  and address for HIPAA security. * Patient does not identify any Care Management needs at this time and declines services    -\"I'm OK, you don't have to call me anymore. \"        *Contact information given. Will follow electronically for ALINA and close episode in 30 day.

## 2018-06-27 ENCOUNTER — APPOINTMENT (OUTPATIENT)
Dept: ULTRASOUND IMAGING | Age: 28
End: 2018-06-27
Attending: STUDENT IN AN ORGANIZED HEALTH CARE EDUCATION/TRAINING PROGRAM
Payer: COMMERCIAL

## 2018-06-27 ENCOUNTER — APPOINTMENT (OUTPATIENT)
Dept: CT IMAGING | Age: 28
End: 2018-06-27
Attending: STUDENT IN AN ORGANIZED HEALTH CARE EDUCATION/TRAINING PROGRAM
Payer: COMMERCIAL

## 2018-06-27 ENCOUNTER — TELEPHONE (OUTPATIENT)
Dept: GYNECOLOGY | Age: 28
End: 2018-06-27

## 2018-06-27 ENCOUNTER — HOSPITAL ENCOUNTER (EMERGENCY)
Age: 28
Discharge: HOME | End: 2018-06-27
Attending: STUDENT IN AN ORGANIZED HEALTH CARE EDUCATION/TRAINING PROGRAM
Payer: COMMERCIAL

## 2018-06-27 VITALS
DIASTOLIC BLOOD PRESSURE: 59 MMHG | BODY MASS INDEX: 22.73 KG/M2 | RESPIRATION RATE: 17 BRPM | HEART RATE: 71 BPM | HEIGHT: 68 IN | SYSTOLIC BLOOD PRESSURE: 97 MMHG | TEMPERATURE: 98.2 F | OXYGEN SATURATION: 100 % | WEIGHT: 150 LBS

## 2018-06-27 DIAGNOSIS — R10.2 PELVIC PAIN: Primary | ICD-10-CM

## 2018-06-27 LAB
ALBUMIN SERPL-MCNC: 3.7 G/DL (ref 3.5–5)
ALBUMIN/GLOB SERPL: 1 {RATIO} (ref 1.1–2.2)
ALP SERPL-CCNC: 50 U/L (ref 45–117)
ALT SERPL-CCNC: 15 U/L (ref 12–78)
ANION GAP SERPL CALC-SCNC: 10 MMOL/L (ref 5–15)
APPEARANCE UR: CLEAR
AST SERPL-CCNC: 13 U/L (ref 15–37)
ATRIAL RATE: 140 BPM
BACTERIA URNS QL MICRO: NEGATIVE /HPF
BASOPHILS # BLD: 0.1 K/UL (ref 0–0.1)
BASOPHILS NFR BLD: 1 % (ref 0–1)
BILIRUB SERPL-MCNC: 0.3 MG/DL (ref 0.2–1)
BILIRUB UR QL: NEGATIVE
BUN SERPL-MCNC: 9 MG/DL (ref 6–20)
BUN/CREAT SERPL: 10 (ref 12–20)
CALCIUM SERPL-MCNC: 8.6 MG/DL (ref 8.5–10.1)
CALCULATED P AXIS, ECG09: 76 DEGREES
CALCULATED R AXIS, ECG10: 83 DEGREES
CALCULATED T AXIS, ECG11: 86 DEGREES
CHLORIDE SERPL-SCNC: 111 MMOL/L (ref 97–108)
CO2 SERPL-SCNC: 21 MMOL/L (ref 21–32)
COLOR UR: NORMAL
CREAT SERPL-MCNC: 0.86 MG/DL (ref 0.55–1.02)
DIAGNOSIS, 93000: NORMAL
DIFFERENTIAL METHOD BLD: ABNORMAL
EOSINOPHIL # BLD: 0 K/UL (ref 0–0.4)
EOSINOPHIL NFR BLD: 1 % (ref 0–7)
EPITH CASTS URNS QL MICRO: NORMAL /LPF
ERYTHROCYTE [DISTWIDTH] IN BLOOD BY AUTOMATED COUNT: 12.4 % (ref 11.5–14.5)
GLOBULIN SER CALC-MCNC: 3.6 G/DL (ref 2–4)
GLUCOSE SERPL-MCNC: 99 MG/DL (ref 65–100)
GLUCOSE UR STRIP.AUTO-MCNC: NEGATIVE MG/DL
HCG UR QL: NEGATIVE
HCT VFR BLD AUTO: 40.8 % (ref 35–47)
HGB BLD-MCNC: 13.5 G/DL (ref 11.5–16)
HGB UR QL STRIP: NEGATIVE
IMM GRANULOCYTES # BLD: 0 K/UL (ref 0–0.04)
IMM GRANULOCYTES NFR BLD AUTO: 0 % (ref 0–0.5)
KETONES UR QL STRIP.AUTO: NEGATIVE MG/DL
LEUKOCYTE ESTERASE UR QL STRIP.AUTO: NEGATIVE
LIPASE SERPL-CCNC: 106 U/L (ref 73–393)
LYMPHOCYTES # BLD: 1 K/UL (ref 0.8–3.5)
LYMPHOCYTES NFR BLD: 12 % (ref 12–49)
MCH RBC QN AUTO: 29.5 PG (ref 26–34)
MCHC RBC AUTO-ENTMCNC: 33.1 G/DL (ref 30–36.5)
MCV RBC AUTO: 89.3 FL (ref 80–99)
MONOCYTES # BLD: 0.5 K/UL (ref 0–1)
MONOCYTES NFR BLD: 6 % (ref 5–13)
NEUTS SEG # BLD: 6.6 K/UL (ref 1.8–8)
NEUTS SEG NFR BLD: 80 % (ref 32–75)
NITRITE UR QL STRIP.AUTO: NEGATIVE
NRBC # BLD: 0 K/UL (ref 0–0.01)
NRBC BLD-RTO: 0 PER 100 WBC
P-R INTERVAL, ECG05: 64 MS
PH UR STRIP: 7 [PH] (ref 5–8)
PLATELET # BLD AUTO: 189 K/UL (ref 150–400)
PMV BLD AUTO: 10.1 FL (ref 8.9–12.9)
POTASSIUM SERPL-SCNC: 4.1 MMOL/L (ref 3.5–5.1)
PROT SERPL-MCNC: 7.3 G/DL (ref 6.4–8.2)
PROT UR STRIP-MCNC: NEGATIVE MG/DL
Q-T INTERVAL, ECG07: 348 MS
QRS DURATION, ECG06: 72 MS
QTC CALCULATION (BEZET), ECG08: 531 MS
RBC # BLD AUTO: 4.57 M/UL (ref 3.8–5.2)
RBC #/AREA URNS HPF: NORMAL /HPF (ref 0–5)
SODIUM SERPL-SCNC: 142 MMOL/L (ref 136–145)
SP GR UR REFRACTOMETRY: 1.01 (ref 1–1.03)
UR CULT HOLD, URHOLD: NORMAL
UROBILINOGEN UR QL STRIP.AUTO: 1 EU/DL (ref 0.2–1)
VENTRICULAR RATE, ECG03: 140 BPM
WBC # BLD AUTO: 8.3 K/UL (ref 3.6–11)
WBC URNS QL MICRO: NORMAL /HPF (ref 0–4)

## 2018-06-27 PROCEDURE — 85025 COMPLETE CBC W/AUTO DIFF WBC: CPT | Performed by: STUDENT IN AN ORGANIZED HEALTH CARE EDUCATION/TRAINING PROGRAM

## 2018-06-27 PROCEDURE — 74011000258 HC RX REV CODE- 258: Performed by: STUDENT IN AN ORGANIZED HEALTH CARE EDUCATION/TRAINING PROGRAM

## 2018-06-27 PROCEDURE — 83690 ASSAY OF LIPASE: CPT | Performed by: STUDENT IN AN ORGANIZED HEALTH CARE EDUCATION/TRAINING PROGRAM

## 2018-06-27 PROCEDURE — 96375 TX/PRO/DX INJ NEW DRUG ADDON: CPT

## 2018-06-27 PROCEDURE — 74177 CT ABD & PELVIS W/CONTRAST: CPT

## 2018-06-27 PROCEDURE — 80053 COMPREHEN METABOLIC PANEL: CPT | Performed by: STUDENT IN AN ORGANIZED HEALTH CARE EDUCATION/TRAINING PROGRAM

## 2018-06-27 PROCEDURE — 74011636320 HC RX REV CODE- 636/320: Performed by: STUDENT IN AN ORGANIZED HEALTH CARE EDUCATION/TRAINING PROGRAM

## 2018-06-27 PROCEDURE — 93005 ELECTROCARDIOGRAM TRACING: CPT

## 2018-06-27 PROCEDURE — 76856 US EXAM PELVIC COMPLETE: CPT

## 2018-06-27 PROCEDURE — 96361 HYDRATE IV INFUSION ADD-ON: CPT

## 2018-06-27 PROCEDURE — 96374 THER/PROPH/DIAG INJ IV PUSH: CPT

## 2018-06-27 PROCEDURE — 81001 URINALYSIS AUTO W/SCOPE: CPT | Performed by: STUDENT IN AN ORGANIZED HEALTH CARE EDUCATION/TRAINING PROGRAM

## 2018-06-27 PROCEDURE — 76830 TRANSVAGINAL US NON-OB: CPT

## 2018-06-27 PROCEDURE — 74011250636 HC RX REV CODE- 250/636: Performed by: STUDENT IN AN ORGANIZED HEALTH CARE EDUCATION/TRAINING PROGRAM

## 2018-06-27 PROCEDURE — 74011250637 HC RX REV CODE- 250/637: Performed by: STUDENT IN AN ORGANIZED HEALTH CARE EDUCATION/TRAINING PROGRAM

## 2018-06-27 PROCEDURE — 96372 THER/PROPH/DIAG INJ SC/IM: CPT

## 2018-06-27 PROCEDURE — 36415 COLL VENOUS BLD VENIPUNCTURE: CPT | Performed by: STUDENT IN AN ORGANIZED HEALTH CARE EDUCATION/TRAINING PROGRAM

## 2018-06-27 PROCEDURE — 99285 EMERGENCY DEPT VISIT HI MDM: CPT

## 2018-06-27 PROCEDURE — 81025 URINE PREGNANCY TEST: CPT

## 2018-06-27 RX ORDER — OXYCODONE AND ACETAMINOPHEN 5; 325 MG/1; MG/1
1 TABLET ORAL ONCE
Status: COMPLETED | OUTPATIENT
Start: 2018-06-27 | End: 2018-06-27

## 2018-06-27 RX ORDER — NAPROXEN 500 MG/1
500 TABLET ORAL 2 TIMES DAILY WITH MEALS
Qty: 14 TAB | Refills: 0 | Status: SHIPPED | OUTPATIENT
Start: 2018-06-27 | End: 2018-07-04

## 2018-06-27 RX ORDER — SODIUM CHLORIDE 0.9 % (FLUSH) 0.9 %
10 SYRINGE (ML) INJECTION
Status: COMPLETED | OUTPATIENT
Start: 2018-06-27 | End: 2018-06-27

## 2018-06-27 RX ORDER — KETOROLAC TROMETHAMINE 30 MG/ML
30 INJECTION, SOLUTION INTRAMUSCULAR; INTRAVENOUS ONCE
Status: COMPLETED | OUTPATIENT
Start: 2018-06-27 | End: 2018-06-27

## 2018-06-27 RX ORDER — DICYCLOMINE HYDROCHLORIDE 10 MG/ML
20 INJECTION INTRAMUSCULAR
Status: COMPLETED | OUTPATIENT
Start: 2018-06-27 | End: 2018-06-27

## 2018-06-27 RX ORDER — FENTANYL CITRATE 50 UG/ML
50 INJECTION, SOLUTION INTRAMUSCULAR; INTRAVENOUS
Status: COMPLETED | OUTPATIENT
Start: 2018-06-27 | End: 2018-06-27

## 2018-06-27 RX ORDER — ACETAMINOPHEN 325 MG/1
650 TABLET ORAL ONCE
Status: COMPLETED | OUTPATIENT
Start: 2018-06-27 | End: 2018-06-27

## 2018-06-27 RX ADMIN — KETOROLAC TROMETHAMINE 30 MG: 30 INJECTION, SOLUTION INTRAMUSCULAR at 08:29

## 2018-06-27 RX ADMIN — IOPAMIDOL 100 ML: 755 INJECTION, SOLUTION INTRAVENOUS at 12:58

## 2018-06-27 RX ADMIN — SODIUM CHLORIDE 1000 ML: 900 INJECTION, SOLUTION INTRAVENOUS at 08:29

## 2018-06-27 RX ADMIN — DICYCLOMINE HYDROCHLORIDE 20 MG: 20 INJECTION, SOLUTION INTRAMUSCULAR at 12:48

## 2018-06-27 RX ADMIN — ACETAMINOPHEN 650 MG: 325 TABLET ORAL at 11:02

## 2018-06-27 RX ADMIN — SODIUM CHLORIDE 1000 ML: 900 INJECTION, SOLUTION INTRAVENOUS at 12:43

## 2018-06-27 RX ADMIN — SODIUM CHLORIDE 100 ML: 900 INJECTION, SOLUTION INTRAVENOUS at 12:58

## 2018-06-27 RX ADMIN — FENTANYL CITRATE 50 MCG: 50 INJECTION, SOLUTION INTRAMUSCULAR; INTRAVENOUS at 12:43

## 2018-06-27 RX ADMIN — Medication 10 ML: at 12:58

## 2018-06-27 RX ADMIN — OXYCODONE HYDROCHLORIDE AND ACETAMINOPHEN 1 TABLET: 5; 325 TABLET ORAL at 11:02

## 2018-06-27 NOTE — DISCHARGE INSTRUCTIONS
Pelvic Pain: Care Instructions  Your Care Instructions    Pelvic pain, or pain in the lower belly, can have many causes. Often pelvic pain is not serious and gets better in a few days. If your pain continues or gets worse, you may need tests and treatment. Tell your doctor about any new symptoms. These may be signs of a serious problem. Follow-up care is a key part of your treatment and safety. Be sure to make and go to all appointments, and call your doctor if you are having problems. It's also a good idea to know your test results and keep a list of the medicines you take. How can you care for yourself at home? · Rest until you feel better. Lie down, and raise your legs by placing a pillow under your knees. · Drink plenty of fluids. You may find that small, frequent sips are easier on your stomach than if you drink a lot at once. Avoid drinks with carbonation or caffeine, such as soda pop, tea, or coffee. · Try eating several small meals instead of 2 or 3 large ones. Eat mild foods, such as rice, dry toast or crackers, bananas, and applesauce. Avoid fatty and spicy foods, other fruits, and alcohol until 48 hours after your symptoms have gone away. · Take an over-the-counter pain medicine, such as acetaminophen (Tylenol), ibuprofen (Advil, Motrin), or naproxen (Aleve). Read and follow all instructions on the label. · Do not take two or more pain medicines at the same time unless the doctor told you to. Many pain medicines have acetaminophen, which is Tylenol. Too much acetaminophen (Tylenol) can be harmful. · You can put a heating pad, a warm cloth, or moist heat on your belly to relieve pain. When should you call for help? Call your doctor now or seek immediate medical care if:  · You have a new or higher fever. · You have unusual vaginal bleeding. · You have new or worse belly or pelvic pain. · You have vaginal discharge that has increased in amount or smells bad.   Watch closely for changes in your health, and be sure to contact your doctor if:  · You do not get better as expected. Where can you learn more? Go to http://michele-lucy.info/. Enter [phone] in the search box to learn more about \"Pelvic Pain: Care Instructions. \"  Current as of: October 13, 2016  Content Version: 11.4  © 8053-2270 [employer]. Care instructions adapted under license by [employer] (which disclaims liability or warranty for this information). If you have questions about a medical condition or this instruction, always ask your healthcare professional. [place] [address] any warranty or liability for your use of this information.

## 2018-06-27 NOTE — TELEPHONE ENCOUNTER
The pt called stating she was seen in the er today for an ovarian cyst. She wants to know if she can follow up with  due to her history of cyst. She states she has had one removed and prefers to see him. I advised I would discuss with  and call her back tomorrow.

## 2018-06-27 NOTE — ED TRIAGE NOTES
Patient presents to the emergency department with her mother reporting lower abdominal pain and nausea. Patient has not vomited. Patient reports constipation. Patient reports last BM 5 days ago. Patient has a history of ovarian cysts. Patient denies vaginal bleeding or discharge. Patient reports dark urine, but no pain with urination, frequency, foul odor, or urgency. Patient is tachycardic in triage. Patient was evaluated for chest pain and tachycardia Monday, and was told it was anxiety. Patient's dermatologist took her off Accutane at that time.

## 2018-06-27 NOTE — ED PROVIDER NOTES
HPI Comments: 32 y.o. female with past medical history significant for anxiety, depression, endometriosis, ovarian cysts, and migraines who presents from home via private vehicle with chief complaint of abdominal pain. Pt reports gradual onset of suprapubic and LLQ abdominal pain radiating to her L lower back last night with accompanying nausea, diaphoresis, and constipation, LBM was 5 days ago. Pt reports no pain with urination, but reports an increase of the pain after emptying her bladder. Pt reports currently the pain is a 9/10, and feels similar to both her previous ovarian cysts and kidney infection. Pt reports she was recently on Accutane for 14 days that caused tachycardia, increase in her panic attacks, and a dry mouth with polydipsia. Pt reports a surgical history of an appendectomy and removal of ovarian cysts. Pt denies any history of kidney stones. Pt denies any vomiting or known fever. Pt denies taking any pain medications PTA. There are no other acute medical concerns at this time. Old Chart Review: Pt was seen here 2 days ago for a panic attack, HR was 104, lab work was unremarkable nad the pt was discharged home. Pt had her L Bartholin glank removed by Dr. Claus Justin on 2/28/18. Social hx: Former smoker; Social EtOH use    Note written by Hany Mueller, as dictated by Yossi Kumar MD 8:22 AM    The history is provided by the patient. No  was used.         Past Medical History:   Diagnosis Date    Anxiety     Bartholin's gland abscess     x8    Depression     Endometriosis     Migraine     Neurological disorder     migraines    Ovarian cyst     Panic attack        Past Surgical History:   Procedure Laterality Date    HX APPENDECTOMY  04/26/2017    HX BARTHOLIN CYST MARSUPIALIZATION      2011, 2018    HX BREAST LUMPECTOMY Right     HX BUNIONECTOMY      HX CYST REMOVAL      HX GYN Left     bartholin cyst drainage X 6    HX GYN Left     bartholin cyst marsupilization    HX OTHER SURGICAL      laparascopy    HX PELVIC LAPAROSCOPY      HX WISDOM TEETH EXTRACTION           Family History:   Problem Relation Age of Onset    Diabetes Maternal Aunt     Heart Disease Maternal Aunt     Stroke Maternal Aunt     Heart Disease Maternal Grandmother     No Known Problems Mother        Social History     Social History    Marital status: SINGLE     Spouse name: N/A    Number of children: N/A    Years of education: N/A     Occupational History    Not on file. Social History Main Topics    Smoking status: Former Smoker    Smokeless tobacco: Never Used    Alcohol use No      Comment: social    Drug use: No    Sexual activity: Yes     Partners: Male     Birth control/ protection: Pill     Other Topics Concern    Not on file     Social History Narrative         ALLERGIES: Benadryl [diphenhydramine hcl]; Compazine [prochlorperazine edisylate]; Haldol [haloperidol lactate]; Promethazine; and Reglan [metoclopramide]    Review of Systems   Constitutional: Positive for diaphoresis. Negative for chills and fever. HENT: Negative for sore throat. Respiratory: Negative for cough and shortness of breath. Cardiovascular: Negative for chest pain. Gastrointestinal: Positive for abdominal pain, constipation and nausea. Negative for vomiting. Endocrine: Positive for polydipsia. Genitourinary: Negative for dysuria. (+) Pain after urination   Musculoskeletal: Positive for back pain. Skin: Negative for rash. Neurological: Negative for syncope and headaches. Psychiatric/Behavioral: Negative for confusion. All other systems reviewed and are negative. Vitals:    06/27/18 0736   BP: 94/63   Pulse: (!) 136   Resp: 18   Temp: 99.2 °F (37.3 °C)   SpO2: 97%   Weight: 68 kg (150 lb)   Height: 5' 8\" (1.727 m)            Physical Exam   Constitutional: She is oriented to person, place, and time. She appears well-developed. No distress.    Anxious appearing. HENT:   Head: Normocephalic and atraumatic. Eyes: Conjunctivae and EOM are normal. Pupils are equal, round, and reactive to light. Neck: Normal range of motion. Neck supple. Cardiovascular: Regular rhythm and normal heart sounds. Tachycardia present. No murmur heard. Pulmonary/Chest: Effort normal and breath sounds normal. No respiratory distress. Abdominal: Soft. Bowel sounds are normal. She exhibits no distension. There is tenderness in the suprapubic area. There is no rebound. Mild suprapubic tenderness to palpation. Musculoskeletal: Normal range of motion. She exhibits no edema. Neurological: She is alert and oriented to person, place, and time. No cranial nerve deficit. She exhibits normal muscle tone. Coordination normal.   Skin: Skin is warm and dry. No rash noted. Psychiatric: Her behavior is normal. Her mood appears anxious. Nursing note and vitals reviewed. Note written by Hany Gallego, as dictated by Rakesh Mccall MD 8:22 AM    [facility]      ED Course       Procedures    ED EKG interpretation: 7:47  Rhythm: sinus tachycardia; and regular . Rate (approx.): 140; Axis: normal; Short MI interval, no delta waves seen; Nonspecific ST segments. Note written by Hany Gallego, as dictated by Rakesh Mccall MD 8:25 AM    PROGRESS NOTE:  12:09 PM  Asked nurse to move the pt out to the discharge area, pt told the nurse she is unable to walk due to her pain. Will order CT scan. Pt's HR is now 105. The patient is resting comfortably and feels better, is alert and in no distress. The repeat examination is unremarkable and benign; in particular, there is no discomfort at McBurney's point.  The history, exam, diagnostic testing, and current condition do not suggest acute appendicitis, bowel obstruction, tubovarian abscess, ectopic pregnancy, ovarian torsion, acute cholecystitis, bowel perforation, major gastrointestinal bleeding, severe diverticulitis, sepsis, or other significant pathology to warrant further testing, continued ED treatment, admission, or surgical evaluation at this point. The vital signs have been stable. The patient does not have uncontrollable pain, intractable vomiting, or other significant symptoms. The patient's condition is stable and appropriate for discharge. The patient will pursue further outpatient evaluation with the primary care physician or other designated or consulting physician as indicated in the discharge instructions.

## 2018-07-05 ENCOUNTER — OFFICE VISIT (OUTPATIENT)
Dept: GYNECOLOGY | Age: 28
End: 2018-07-05

## 2018-07-05 VITALS
HEIGHT: 68 IN | SYSTOLIC BLOOD PRESSURE: 112 MMHG | WEIGHT: 144.6 LBS | HEART RATE: 76 BPM | DIASTOLIC BLOOD PRESSURE: 74 MMHG | BODY MASS INDEX: 21.92 KG/M2

## 2018-07-05 DIAGNOSIS — R10.84 GENERALIZED ABDOMINAL PAIN: ICD-10-CM

## 2018-07-05 DIAGNOSIS — R10.2 PELVIC PAIN: Primary | ICD-10-CM

## 2018-07-05 NOTE — PROGRESS NOTES
[address] [place] [address] [address], [address] [place] [address]  P [phone]  F [phone]    Office Note  Patient ID:  Name:  Kayli Bustillo  MRN:  [id number]  :   y.o. Date:  2018      HISTORY OF PRESENT ILLNESS:  Kayli Bustillo is a 25 y.o.  premenopausal female who is being seen for pelvic pain and possible ruptured ovarian cyst following a recent ER visit. I had originally met her earlier this year when she was referred for recurrent left-sided Bartholin's gland cysts/abscesses. She had undergone multiple I&Ds and 2 marsupializations. I took her to the OR for excision of the left Bartholin's gland. She has had no further recurrences. She called last week after being seen in the ER for pain. She was noted to have some fluid in the pelvis, but otherwise her CT and ultrasound were normal.  She has an IUD in place for contraception. Since the ER visit her pain has resolved. ROS:   and GI review:  Negative  Cardiopulmonary review:  Negative   Musculoskeletal:  Negative    A comprehensive review of systems was negative except for that written in the History of Present Illness. , 10 point ROS      OB/GYN ROS:  [id number]  Hx of multiple I&Ds and marsupialization x 2 of left-sided Bartholin's, followed by excision in 2018  Patient denies any abnormal bleeding or vaginal discharge.          Problem List:  Patient Active Problem List    Diagnosis Date Noted    Migraine 2018    PTSD (post-traumatic stress disorder) 2018    Anxiety associated with depression 2018    Bartholin's gland infection 2018    Bartholin's gland abscess 2018    Abdominal pain 2017    Depression 2016    Anxiety 2016    Status migrainosus 2016     PMH:  Past Medical History:   Diagnosis Date    Anxiety     Bartholin's gland abscess     x8    Depression     Endometriosis     Migraine     Neurological disorder migraines    Ovarian cyst     Panic attack       PSH:  Past Surgical History:   Procedure Laterality Date    HX APPENDECTOMY  04/26/2017    HX BARTHOLIN CYST MARSUPIALIZATION      2011, 2018    HX BREAST LUMPECTOMY Right     HX BUNIONECTOMY      HX CYST REMOVAL      HX GYN Left     bartholin cyst drainage X 6    HX GYN Left     bartholin cyst marsupilization    HX OTHER SURGICAL      laparascopy    HX PELVIC LAPAROSCOPY      HX WISDOM TEETH EXTRACTION        Social History:  Social History   Substance Use Topics    Smoking status: Former Smoker    Smokeless tobacco: Never Used    Alcohol use No      Comment: social      Family History:  Family History   Problem Relation Age of Onset    Diabetes Maternal Aunt     Heart Disease Maternal Aunt     Stroke Maternal Aunt     Heart Disease Maternal Grandmother     No Known Problems Mother       Medications: (reviewed)  Current Outpatient Prescriptions   Medication Sig    ondansetron (ZOFRAN ODT) 4 mg disintegrating tablet Take 1 Tab by mouth every eight (8) hours as needed for Nausea.  SUMAtriptan succinate (ZEMBRACE SYMTOUCH) 3 mg/0.5 mL pnij 3 mg by SubCUTAneous route as needed.  topiramate (TOPAMAX) 100 mg tablet Take 100 mg by mouth nightly.  levonorgestrel (MIRENA) 20 mcg/24 hr (5 years) IUD 1 Each by IntraUTERine route once.  clonazePAM (KLONOPIN) 1 mg tablet Take 2 mg by mouth nightly.  escitalopram (LEXAPRO) 20 mg tablet Take 20 mg by mouth every morning. Indications: GENERALIZED ANXIETY DISORDER    amitriptyline (ELAVIL) 25 mg tablet One half tablet at bedtime for 1 week then increase to one whole tablet    amitriptyline (ELAVIL) 10 mg tablet Take one half at bedtime for 1 week, then 2 tabs at bedtime  Indications: MIGRAINE PREVENTION    ASPIRIN/ACETAMINOPHEN/CAFFEINE (MIGRAINE RELIEF PO) Take  by mouth. No current facility-administered medications for this visit.       Allergies: (reviewed)  Allergies   Allergen Reactions  Benadryl [Diphenhydramine Hcl] Other (comments)     Makes me feel funny. Need ativan if I get benadryl    Compazine [Prochlorperazine Edisylate] Anxiety    Haldol [Haloperidol Lactate] Other (comments)    Promethazine Other (comments)     \"It makes me feel really funny, nausea and dizzy\"    Reglan [Metoclopramide] Other (comments)          OBJECTIVE:    Physical Exam:  VITAL SIGNS: Vitals:    07/05/18 1548   BP: 112/74   Pulse: 76   Weight: 144 lb 9.6 oz (65.6 kg)   Height: 5' 7.99\" (1.727 m)     Body mass index is 21.99 kg/(m^2). GENERAL STANTON: Conversant, alert, oriented. No acute distress. HEENT: HEENT. No thyroid enlargement. No JVD. Neck: Supple without restrictions. RESPIRATORY: Clear to auscultation and percussion to the bases. No CVAT. CARDIOVASC: RRR without murmur/rub. GASTROINT: soft, non-tender, without masses or organomegaly   MUSCULOSKEL: no joint tenderness, deformity or swelling   EXTREMITIES: extremities normal, atraumatic, no cyanosis or edema   PELVIC: Vulva and vagina appear normal. Bimanual exam reveals normal uterus and adnexa. RECTAL: Deferred   SHAREE SURVEY: No suspicious lymphadenopathy or edema noted. NEURO: Grossly intact. No acute deficit.          Lab Results   Component Value Date/Time    WBC 8.3 06/27/2018 07:52 AM    HGB 13.5 06/27/2018 07:52 AM    HCT 40.8 06/27/2018 07:52 AM    PLATELET 822 74/79/9102 07:52 AM    MCV 89.3 06/27/2018 07:52 AM     Lab Results   Component Value Date/Time    Sodium 142 06/27/2018 07:52 AM    Potassium 4.1 06/27/2018 07:52 AM    Chloride 111 (H) 06/27/2018 07:52 AM    CO2 21 06/27/2018 07:52 AM    Anion gap 10 06/27/2018 07:52 AM    Glucose 99 06/27/2018 07:52 AM    BUN 9 06/27/2018 07:52 AM    Creatinine 0.86 06/27/2018 07:52 AM    BUN/Creatinine ratio 10 (L) 06/27/2018 07:52 AM    GFR est AA >60 06/27/2018 07:52 AM    GFR est non-AA >60 06/27/2018 07:52 AM    Calcium 8.6 06/27/2018 07:52 AM       CT of abdomen/pelvis (6/27/18)  LUNG BASES: Clear. LIVER: No mass or biliary dilatation. GALLBLADDER: Unremarkable. SPLEEN: No mass.     PANCREAS: No mass or ductal dilatation. ADRENALS: Unremarkable. KIDNEYS: No mass, calculus, or hydronephrosis.     GI: No dilatation or wall thickening.     APPENDIX: Absent  PERITONEUM: There is a small amount of fluid in the cul-de-sac. No other signs  of ascites. No free intraperitoneal air. RETROPERITONEUM: No lymphadenopathy or aortic aneurysm.        URINARY BLADDER: No mass or calculus. PELVIS: There is a dominant follicle in the left ovary. IUD noted in the uterus. BONES: No destructive bone lesion. ADDITIONAL COMMENTS: N/A     IMPRESSION:  Nonspecific trace of free fluid in the cul-de-sac. Study otherwise unremarkable. Pelvic ultrasound (6/27/18)  TRANSABDOMINAL FINDINGS:  UTERUS:  The uterus is normal in size and echotexture and measures 7.0 x 3.9 x 5.3 cm.     ENDOMETRIUM:  The endometrial stripe measures 3 mm IUD is noted overlying the uterus. .      RIGHT OVARY:  The right ovary obscured by bowel gas     LEFT OVARY:  The left ovary is obscured by bowel gas     CUL-DE-SAC:  There is no mass or fluid or other abnormality in the adnexa or cul-de-sac.     IMPRESSION:  Uterus is within normal limits. Ovaries are obscured by bowel gas  Transvaginal sonography will be performed to better evaluate. Please see  separate report.     TRANSVAGINAL FINDINGS:   UTERUS:  The uterus is normal. The uterus measures 9.9 x 4.1 x 6.1 cm. .      ENDOMETRIUM:  The endometrial stripe measures 1mm. IUD overlies the endometrial canal..     RIGHT OVARY:  The right ovary is normal. The right ovary measures 3.5 x 1.4 x 2.4 cm. There  is normal color flow to the right ovary.     LEFT OVARY:  The left ovary is normal. The left ovary measures 3.6 x 2.7 x 2.7 cm. There is  normal color flow to the left ovary.    This 2.4 x 2.1 cm follicular cyst.     CUL-DE-SAC:  There is small amount of free fluid in the cul-de-sac.     IMPRESSION: Uterus and ovaries are within normal limits. IUD overlies the  uterus. There is a small amount of free fluid in cul-de-sac. IMPRESSION/PLAN:  Uma Snow is a 25 y.o. female with a working diagnosis of pelvic pain, likely from a ruptured ovarian cyst.  The pain has since resolved. She has an IUD in place, so she is still ovulating regularly. If the pain continues on a frequent basis, I would suggest starting an OCP. We will have to talk with her neurologist first, as she has frequent migraines. I would hope that controlling her cycles might actually reduce her migraine frequency.         Signed By: Cindi Velasquez MD     7/5/2018/4:05 PM

## 2018-07-27 ENCOUNTER — PATIENT OUTREACH (OUTPATIENT)
Dept: OTHER | Age: 28
End: 2018-07-27

## 2018-07-27 NOTE — PROGRESS NOTES
Final call made today to attempt to contact patient. Discreet message left on voicemail with contact information. Patient does not identify any Care Management needs at this time and declines services.

## 2018-08-22 ENCOUNTER — OFFICE VISIT (OUTPATIENT)
Dept: FAMILY MEDICINE CLINIC | Age: 28
End: 2018-08-22

## 2018-08-22 VITALS
HEIGHT: 68 IN | BODY MASS INDEX: 22.04 KG/M2 | HEART RATE: 102 BPM | OXYGEN SATURATION: 100 % | DIASTOLIC BLOOD PRESSURE: 68 MMHG | TEMPERATURE: 95.7 F | RESPIRATION RATE: 16 BRPM | WEIGHT: 145.4 LBS | SYSTOLIC BLOOD PRESSURE: 110 MMHG

## 2018-08-22 DIAGNOSIS — J01.11 ACUTE RECURRENT FRONTAL SINUSITIS: ICD-10-CM

## 2018-08-22 DIAGNOSIS — J01.01 ACUTE RECURRENT MAXILLARY SINUSITIS: ICD-10-CM

## 2018-08-22 DIAGNOSIS — J04.0 LARYNGITIS: Primary | ICD-10-CM

## 2018-08-22 LAB
S PYO AG THROAT QL: NEGATIVE
VALID INTERNAL CONTROL?: YES

## 2018-08-22 RX ORDER — DAPSONE 75 MG/G
GEL TOPICAL
Refills: 3 | Status: ON HOLD | COMMUNITY
Start: 2018-07-05 | End: 2020-01-29

## 2018-08-22 RX ORDER — ADAPALENE AND BENZOYL PEROXIDE .1; 2.5 G/100G; G/100G
GEL TOPICAL
Refills: 3 | COMMUNITY
Start: 2018-07-03 | End: 2019-11-21

## 2018-08-22 RX ORDER — AMOXICILLIN AND CLAVULANATE POTASSIUM 875; 125 MG/1; MG/1
1 TABLET, FILM COATED ORAL 2 TIMES DAILY
Qty: 20 TAB | Refills: 0 | Status: SHIPPED | OUTPATIENT
Start: 2018-08-22 | End: 2018-09-01

## 2018-08-22 NOTE — PROGRESS NOTES
Subjective:   Hong Tyson is a 25 y.o. female who complains of congestion, post nasal drip, dry cough and hoarseness for 11 days, unchanged since that time. She denies a history of chest pain and shortness of breath. Evaluation to date: none. Treatment to date: Claritin Afrin, Nasonex. Patient does not smoke cigarettes. Relevant PMH: No pertinent additional PMH. Patient Active Problem List   Diagnosis Code    Status migrainosus G43.901    Depression F32.9    Anxiety F41.9    Abdominal pain R10.9    Bartholin's gland abscess N75.1    Migraine G43.909    PTSD (post-traumatic stress disorder) F43.10    Anxiety associated with depression F41.8    Bartholin's gland infection N75.8    Pelvic pain R10.2        Review of Systems  Pertinent items are noted in HPI. Objective:     Visit Vitals    /68    Pulse (!) 102    Temp 95.7 °F (35.4 °C) (Oral)    Resp 16    Ht 5' 7.99\" (1.727 m)    Wt 145 lb 6.4 oz (66 kg)    SpO2 100%    BMI 22.11 kg/m2     General:  alert, cooperative, no distress   Eyes: negative   Ears: normal TM's and external ear canals AU   Sinuses: tenderness over bilateral, lower, upper maxillary and frontal   Mouth:  Lips, mucosa, and tongue normal. Teeth and gums normal   Neck: supple, symmetrical, trachea midline and no adenopathy. Heart: S1 and S2 normal, no murmurs noted. Lungs: clear to auscultation bilaterally   Abdomen:         Assessment/Plan:   sinusitis  Discussed the dx and tx of sinusitis. Suggested symptomatic OTC remedies. Antibiotics per orders. RTC prn. Discussed the importance of avoiding unnecessary antibiotic therapy. ICD-10-CM ICD-9-CM    1. Laryngitis J04.0 464.00 AMB POC RAPID STREP A   2. Acute recurrent frontal sinusitis J01.11 461.1 amoxicillin-clavulanate (AUGMENTIN) 875-125 mg per tablet   3. Acute recurrent maxillary sinusitis J01.01 461.0 amoxicillin-clavulanate (AUGMENTIN) 875-125 mg per tablet   .

## 2018-08-22 NOTE — PROGRESS NOTES
Chief Complaint   Patient presents with    Laryngitis     Laryngitis, sinus pressure and pain for a week and a half. Nasal sprays, teas and OTC medications with no relief.

## 2018-09-11 ENCOUNTER — OFFICE VISIT (OUTPATIENT)
Dept: NEUROLOGY | Age: 28
End: 2018-09-11

## 2018-09-11 VITALS
OXYGEN SATURATION: 98 % | HEART RATE: 94 BPM | RESPIRATION RATE: 14 BRPM | HEIGHT: 67 IN | WEIGHT: 144 LBS | BODY MASS INDEX: 22.6 KG/M2 | SYSTOLIC BLOOD PRESSURE: 122 MMHG | DIASTOLIC BLOOD PRESSURE: 78 MMHG

## 2018-09-11 RX ORDER — TOPIRAMATE 100 MG/1
100 TABLET, FILM COATED ORAL
Qty: 90 TAB | Refills: 3 | Status: ON HOLD | OUTPATIENT
Start: 2018-09-11 | End: 2020-01-29

## 2018-09-11 NOTE — MR AVS SNAPSHOT
[facility] [address] 
[phone] Patient: Sergo Loza MRN: [id number] HALIMA:[id number] Visit Information Date & Time Provider Department Dept. Phone Encounter #  
 9/11/2018  9:40 AM Torin Elías Ponce ,  [facility] at [address] [id number] Follow-up Instructions Return in about 1 year (around 9/11/2019). Upcoming Health Maintenance Date Due  
 PAP AKA CERVICAL CYTOLOGY 6/29/2009 Influenza Age 5 to Adult 8/1/2018 DTaP/Tdap/Td series (2 - Tdap) 11/24/2022 Allergies as of 9/11/2018  Review Complete On: 9/11/2018 By: [address], DO Severity Noted Reaction Type Reactions Benadryl [Diphenhydramine Hcl]  04/13/2018    Other (comments) Makes me feel funny. Need ativan if I get benadryl Compazine [Prochlorperazine Edisylate]  11/21/2016    Anxiety Haldol [Haloperidol Lactate]  04/15/2016    Other (comments) Promethazine  01/06/2016    Other (comments) \"It makes me feel really funny, nausea and dizzy\" Reglan [Metoclopramide]  04/15/2016    Other (comments) Current Immunizations  Reviewed on 4/21/2017 Name Date DTaP 11/24/2012 Hib 7/23/2015 Influenza Vaccine 10/24/2015 Rho(D) Immune Globulin - IM 6/4/2016  4:46 PM  
  
 Not reviewed this visit You Were Diagnosed With   
  
 Codes Comments Chronic migraine    -  Primary ICD-10-CM: X09.512 ICD-9-CM: 346.70 Vitals BP Pulse Resp Height(growth percentile) Weight(growth percentile) SpO2  
 122/78 94 14 5' 7\" (1.702 m) 144 lb (65.3 kg) 98% BMI OB Status Smoking Status 22.55 kg/m2 IUD Former Smoker Vitals History BMI and BSA Data Body Mass Index Body Surface Area  
 22.55 kg/m 2 1.76 m 2 Preferred Pharmacy Pharmacy Name Phone  [place] [address] [phone] Your Updated Medication List  
  
   
This list is accurate as of 9/11/18 10:25 AM.  Always use your most recent med list. ACZONE 7.5 % Glwp Generic drug:  dapsone APPLY TO AFFECTED AREA ONCE DAILY IN THE MORNING  
  
 adapalene-benzoyl peroxide 0.1-2.5 % Glwp APPLY TO AFFECTED AREAS AT BEDTIME X 30 DAYS  
  
 clonazePAM 1 mg tablet Commonly known as:  Zetta Liz Take 2 mg by mouth nightly. LEXAPRO 20 mg tablet Generic drug:  escitalopram oxalate Take 20 mg by mouth every morning. Indications: GENERALIZED ANXIETY DISORDER  
  
 MIGRAINE RELIEF PO Take  by mouth. MIRENA 20 mcg/24 hr (5 years) Iud  
Generic drug:  levonorgestrel 1 Each by IntraUTERine route once. ondansetron 4 mg disintegrating tablet Commonly known as:  ZOFRAN ODT Take 1 Tab by mouth every eight (8) hours as needed for Nausea. SUMAtriptan succinate 3 mg/0.5 mL Pnij Commonly known as:  Harvie Jermaine  
3 mg by SubCUTAneous route as needed. topiramate 100 mg tablet Commonly known as:  TOPAMAX Take 1 Tab by mouth nightly. Prescriptions Sent to Pharmacy Refills  
 topiramate (TOPAMAX) 100 mg tablet 3 Sig: Take 1 Tab by mouth nightly. Class: Normal  
 Pharmacy: [place], [address]  #: [phone] Route: Oral  
  
Follow-up Instructions Return in about 1 year (around 9/11/2019). Patient Instructions A Healthy Lifestyle: Care Instructions Your Care Instructions A healthy lifestyle can help you feel good, stay at a healthy weight, and have plenty of energy for both work and play. A healthy lifestyle is something you can share with your whole family. A healthy lifestyle also can lower your risk for serious health problems, such as high blood pressure, heart disease, and diabetes.  
You can follow a few steps listed below to improve your health and the health of your family. Follow-up care is a key part of your treatment and safety. Be sure to make and go to all appointments, and call your doctor if you are having problems. It's also a good idea to know your test results and keep a list of the medicines you take. How can you care for yourself at home? · Do not eat too much sugar, fat, or fast foods. You can still have dessert and treats now and then. The goal is moderation. · Start small to improve your eating habits. Pay attention to portion sizes, drink less juice and soda pop, and eat more fruits and vegetables. ¨ Eat a healthy amount of food. A 3-ounce serving of meat, for example, is about the size of a deck of cards. Fill the rest of your plate with vegetables and whole grains. ¨ Limit the amount of soda and sports drinks you have every day. Drink more water when you are thirsty. ¨ Eat at least 5 servings of fruits and vegetables every day. It may seem like a lot, but it is not hard to reach this goal. A serving or helping is 1 piece of fruit, 1 cup of vegetables, or 2 cups of leafy, raw vegetables. Have an apple or some carrot sticks as an afternoon snack instead of a candy bar. Try to have fruits and/or vegetables at every meal. 
· Make exercise part of your daily routine. You may want to start with simple activities, such as walking, bicycling, or slow swimming. Try to be active 30 to 60 minutes every day. You do not need to do all 30 to 60 minutes all at once. For example, you can exercise 3 times a day for 10 or 20 minutes. Moderate exercise is safe for most people, but it is always a good idea to talk to your doctor before starting an exercise program. 
· Keep moving. Kimberley Folk the lawn, work in the garden, or [employer]. Take the stairs instead of the elevator at work. · If you smoke, quit. People who smoke have an increased risk for heart attack, stroke, cancer, and other lung illnesses.  Quitting is hard, but there are ways to boost your chance of quitting tobacco for good. ¨ Use nicotine gum, patches, or lozenges. ¨ Ask your doctor about stop-smoking programs and medicines. ¨ Keep trying. In addition to reducing your risk of diseases in the future, you will notice some benefits soon after you stop using tobacco. If you have shortness of breath or asthma symptoms, they will likely get better within a few weeks after you quit. · Limit how much alcohol you drink. Moderate amounts of alcohol (up to 2 drinks a day for men, 1 drink a day for women) are okay. But drinking too much can lead to liver problems, high blood pressure, and other health problems. Family health If you have a family, there are many things you can do together to improve your health. · Eat meals together as a family as often as possible. · Eat healthy foods. This includes fruits, vegetables, lean meats and dairy, and whole grains. · Include your family in your fitness plan. Most people think of activities such as jogging or tennis as the way to fitness, but there are many ways you and your family can be more active. Anything that makes you breathe hard and gets your heart pumping is exercise. Here are some tips: 
¨ Walk to do errands or to take your child to school or the bus. ¨ Go for a family bike ride after dinner instead of watching TV. Where can you learn more? Go to http://michele-lucy.info/. Enter Z822 in the search box to learn more about \"A Healthy Lifestyle: Care Instructions. \" Current as of: December 7, 2017 Content Version: 11.7 © 8622-8789 Healthwise, Incorporated. Care instructions adapted under license by [employer] (which disclaims liability or warranty for this information). If you have questions about a medical condition or this instruction, always ask your healthcare professional. [address] any warranty or liability for your use of this information. Introducing [facility]! Dear Sathish Martin: Thank you for requesting a Tappit account. Our records indicate that you already have an active Tappit account. You can access your account anytime at [email or web address] Did you know that you can access your hospital and ER discharge instructions at any time in Tappit? You can also review all of your test results from your hospital stay or ER visit. Additional Information If you have questions, please visit the Frequently Asked Questions section of the Tappit website at [email or web address] Remember, Tappit is NOT to be used for urgent needs. For medical emergencies, dial 911. Now available from your iPhone and Android! Please provide this summary of care documentation to your next provider. Your primary care clinician is listed as NONE. If you have any questions after today's visit, please call [phone].

## 2018-09-11 NOTE — PROGRESS NOTES
Chief Complaint   Patient presents with    Migraine       HPI    25-year-old woman who I see for chronic migraines. She is here to follow-up routinely. Last visit she continued Topamax 100 mg only at bedtime. She did not try the amitriptyline. She is doing remarkably well. She is only had one breakthrough migraine. She uses sumatriptan injection acutely. She has no complaints or concerns. She is exercising. She is gaining weight. She is happier. Migraine medication history: Topiramate, Depakote, nortriptyline, amitriptyline        Review of Systems   Eyes: Negative for double vision. Neurological: Positive for headaches. All other systems reviewed and are negative. Past Medical History:   Diagnosis Date    Anxiety     Bartholin's gland abscess     x8    Depression     Endometriosis     Migraine     Neurological disorder     migraines    Ovarian cyst     Panic attack      Family History   Problem Relation Age of Onset    Diabetes Maternal Aunt     Heart Disease Maternal Aunt     Stroke Maternal Aunt     Heart Disease Maternal Grandmother     No Known Problems Mother      Social History     Social History    Marital status: SINGLE     Spouse name: N/A    Number of children: N/A    Years of education: N/A     Occupational History    Not on file. Social History Main Topics    Smoking status: Former Smoker    Smokeless tobacco: Never Used    Alcohol use No      Comment: social    Drug use: No    Sexual activity: Yes     Partners: Male     Birth control/ protection: Pill     Other Topics Concern    Not on file     Social History Narrative     Allergies   Allergen Reactions    Benadryl [Diphenhydramine Hcl] Other (comments)     Makes me feel funny.   Need ativan if I get benadryl    Compazine [Prochlorperazine Edisylate] Anxiety    Haldol [Haloperidol Lactate] Other (comments)    Promethazine Other (comments)     \"It makes me feel really funny, nausea and dizzy\"    Reglan [Metoclopramide] Other (comments)         Current Outpatient Prescriptions   Medication Sig    topiramate (TOPAMAX) 100 mg tablet Take 1 Tab by mouth nightly.  adapalene-benzoyl peroxide 0.1-2.5 % glwp APPLY TO AFFECTED AREAS AT BEDTIME X 30 DAYS    ACZONE 7.5 % glwp APPLY TO AFFECTED AREA ONCE DAILY IN THE MORNING    ondansetron (ZOFRAN ODT) 4 mg disintegrating tablet Take 1 Tab by mouth every eight (8) hours as needed for Nausea.  SUMAtriptan succinate (ZEMBRACE SYMTOUCH) 3 mg/0.5 mL pnij 3 mg by SubCUTAneous route as needed.  ASPIRIN/ACETAMINOPHEN/CAFFEINE (MIGRAINE RELIEF PO) Take  by mouth.  levonorgestrel (MIRENA) 20 mcg/24 hr (5 years) IUD 1 Each by IntraUTERine route once.  clonazePAM (KLONOPIN) 1 mg tablet Take 2 mg by mouth nightly.  escitalopram (LEXAPRO) 20 mg tablet Take 20 mg by mouth every morning. Indications: GENERALIZED ANXIETY DISORDER     No current facility-administered medications for this visit. Neurologic Exam     Mental Status        WD/WN adult in NAD, normal grooming  VSS  A&O x 3    PERRL, nonicteric  Face is symmetric, tongue midline  Speech is fluent and clear  No limb ataxia. No abnl movements. Moving all extemities spontaneously and symmetric  Normal gait    [facility] RRR  Lungs nonlabored  Skin is warm and dry         Visit Vitals    /78    Pulse 94    Resp 14    Ht 5' 7\" (1.702 m)    Wt 65.3 kg (144 lb)    SpO2 98%    BMI 22.55 kg/m2       Assessment and Plan   Diagnoses and all orders for this visit:    1. Chronic migraine    Other orders  -     topiramate (TOPAMAX) 100 mg tablet; Take 1 Tab by mouth nightly. 68-year-old woman with chronic migraines doing very well currently. Headaches are very stable with minimal breakthrough. We are going to continue the current plan with just topiramate at bedtime. No additional medications or changes. I will see her annually or sooner if needed.       Alphonso Sauceda DO  NEUROLOGIST  Diplomate ABPN

## 2018-09-11 NOTE — PATIENT INSTRUCTIONS

## 2018-10-03 ENCOUNTER — HOSPITAL ENCOUNTER (EMERGENCY)
Age: 28
Discharge: HOME | End: 2018-10-04
Attending: EMERGENCY MEDICINE
Payer: COMMERCIAL

## 2018-10-03 DIAGNOSIS — G43.809 OTHER MIGRAINE WITHOUT STATUS MIGRAINOSUS, NOT INTRACTABLE: Primary | ICD-10-CM

## 2018-10-03 PROCEDURE — 99284 EMERGENCY DEPT VISIT MOD MDM: CPT

## 2018-10-03 PROCEDURE — 96365 THER/PROPH/DIAG IV INF INIT: CPT

## 2018-10-03 PROCEDURE — 96375 TX/PRO/DX INJ NEW DRUG ADDON: CPT

## 2018-10-03 PROCEDURE — 74011250636 HC RX REV CODE- 250/636: Performed by: EMERGENCY MEDICINE

## 2018-10-03 PROCEDURE — 74011250637 HC RX REV CODE- 250/637: Performed by: EMERGENCY MEDICINE

## 2018-10-03 RX ORDER — ONDANSETRON 2 MG/ML
4 INJECTION INTRAMUSCULAR; INTRAVENOUS
Status: COMPLETED | OUTPATIENT
Start: 2018-10-03 | End: 2018-10-03

## 2018-10-03 RX ORDER — DEXAMETHASONE SODIUM PHOSPHATE 10 MG/ML
10 INJECTION INTRAMUSCULAR; INTRAVENOUS
Status: COMPLETED | OUTPATIENT
Start: 2018-10-03 | End: 2018-10-03

## 2018-10-03 RX ORDER — BUTALBITAL, ACETAMINOPHEN AND CAFFEINE 50; 325; 40 MG/1; MG/1; MG/1
2 TABLET ORAL
Status: COMPLETED | OUTPATIENT
Start: 2018-10-03 | End: 2018-10-03

## 2018-10-03 RX ORDER — KETOROLAC TROMETHAMINE 30 MG/ML
30 INJECTION, SOLUTION INTRAMUSCULAR; INTRAVENOUS
Status: COMPLETED | OUTPATIENT
Start: 2018-10-03 | End: 2018-10-03

## 2018-10-03 RX ORDER — SODIUM CHLORIDE 0.9 % (FLUSH) 0.9 %
5-10 SYRINGE (ML) INJECTION AS NEEDED
Status: DISCONTINUED | OUTPATIENT
Start: 2018-10-03 | End: 2018-10-04 | Stop reason: HOSPADM

## 2018-10-03 RX ORDER — MAGNESIUM SULFATE HEPTAHYDRATE 40 MG/ML
2 INJECTION, SOLUTION INTRAVENOUS
Status: COMPLETED | OUTPATIENT
Start: 2018-10-03 | End: 2018-10-04

## 2018-10-03 RX ORDER — SODIUM CHLORIDE 0.9 % (FLUSH) 0.9 %
5-10 SYRINGE (ML) INJECTION EVERY 8 HOURS
Status: DISCONTINUED | OUTPATIENT
Start: 2018-10-03 | End: 2018-10-04 | Stop reason: HOSPADM

## 2018-10-03 RX ADMIN — DEXAMETHASONE SODIUM PHOSPHATE 10 MG: 10 INJECTION, SOLUTION INTRAMUSCULAR; INTRAVENOUS at 23:22

## 2018-10-03 RX ADMIN — MAGNESIUM SULFATE IN WATER 2 G: 40 INJECTION, SOLUTION INTRAVENOUS at 23:26

## 2018-10-03 RX ADMIN — ONDANSETRON 4 MG: 2 INJECTION INTRAMUSCULAR; INTRAVENOUS at 23:18

## 2018-10-03 RX ADMIN — SODIUM CHLORIDE 1000 ML: 900 INJECTION, SOLUTION INTRAVENOUS at 23:16

## 2018-10-03 RX ADMIN — KETOROLAC TROMETHAMINE 30 MG: 30 INJECTION, SOLUTION INTRAMUSCULAR at 23:19

## 2018-10-03 RX ADMIN — BUTALBITAL, ACETAMINOPHEN AND CAFFEINE 2 TABLET: 50; 325; 40 TABLET ORAL at 23:33

## 2018-10-04 ENCOUNTER — APPOINTMENT (OUTPATIENT)
Dept: CT IMAGING | Age: 28
End: 2018-10-04
Attending: EMERGENCY MEDICINE
Payer: COMMERCIAL

## 2018-10-04 ENCOUNTER — TELEPHONE (OUTPATIENT)
Dept: NEUROLOGY | Age: 28
End: 2018-10-04

## 2018-10-04 ENCOUNTER — HOSPITAL ENCOUNTER (OUTPATIENT)
Age: 28
Setting detail: OBSERVATION
Discharge: HOME | End: 2018-10-04
Attending: EMERGENCY MEDICINE | Admitting: INTERNAL MEDICINE
Payer: COMMERCIAL

## 2018-10-04 VITALS
TEMPERATURE: 98.4 F | DIASTOLIC BLOOD PRESSURE: 66 MMHG | BODY MASS INDEX: 21.58 KG/M2 | WEIGHT: 142.4 LBS | SYSTOLIC BLOOD PRESSURE: 106 MMHG | HEART RATE: 96 BPM | HEIGHT: 68 IN | RESPIRATION RATE: 16 BRPM | OXYGEN SATURATION: 98 %

## 2018-10-04 VITALS
TEMPERATURE: 97.8 F | HEART RATE: 78 BPM | RESPIRATION RATE: 18 BRPM | BODY MASS INDEX: 22.55 KG/M2 | WEIGHT: 144 LBS | SYSTOLIC BLOOD PRESSURE: 108 MMHG | OXYGEN SATURATION: 97 % | DIASTOLIC BLOOD PRESSURE: 73 MMHG

## 2018-10-04 VITALS
OXYGEN SATURATION: 100 % | WEIGHT: 140 LBS | HEART RATE: 105 BPM | BODY MASS INDEX: 21.93 KG/M2 | DIASTOLIC BLOOD PRESSURE: 66 MMHG | TEMPERATURE: 98.5 F | RESPIRATION RATE: 14 BRPM | SYSTOLIC BLOOD PRESSURE: 103 MMHG

## 2018-10-04 DIAGNOSIS — R51.9 NONINTRACTABLE HEADACHE, UNSPECIFIED CHRONICITY PATTERN, UNSPECIFIED HEADACHE TYPE: Primary | ICD-10-CM

## 2018-10-04 DIAGNOSIS — G43.901 STATUS MIGRAINOSUS: Primary | ICD-10-CM

## 2018-10-04 PROBLEM — R10.9 ABDOMINAL PAIN: Status: RESOLVED | Noted: 2017-04-20 | Resolved: 2018-10-04

## 2018-10-04 PROBLEM — N75.1 BARTHOLIN'S GLAND ABSCESS: Status: RESOLVED | Noted: 2018-02-24 | Resolved: 2018-10-04

## 2018-10-04 PROBLEM — R10.2 PELVIC PAIN: Status: RESOLVED | Noted: 2018-07-05 | Resolved: 2018-10-04

## 2018-10-04 PROBLEM — F41.8 ANXIETY ASSOCIATED WITH DEPRESSION: Status: RESOLVED | Noted: 2018-02-27 | Resolved: 2018-10-04

## 2018-10-04 PROBLEM — N75.8 BARTHOLIN'S GLAND INFECTION: Status: RESOLVED | Noted: 2018-02-27 | Resolved: 2018-10-04

## 2018-10-04 LAB
ALBUMIN SERPL-MCNC: 3.9 G/DL (ref 3.5–5)
ALBUMIN/GLOB SERPL: 1.2 {RATIO} (ref 1.1–2.2)
ALP SERPL-CCNC: 44 U/L (ref 45–117)
ALT SERPL-CCNC: 19 U/L (ref 12–78)
AMPHET UR QL SCN: NEGATIVE
ANION GAP SERPL CALC-SCNC: 11 MMOL/L (ref 5–15)
APPEARANCE UR: ABNORMAL
APTT PPP: 31.1 SEC (ref 22.1–32)
AST SERPL-CCNC: 12 U/L (ref 15–37)
BACTERIA URNS QL MICRO: NEGATIVE /HPF
BARBITURATES UR QL SCN: POSITIVE
BASOPHILS # BLD: 0 K/UL (ref 0–0.1)
BASOPHILS NFR BLD: 1 % (ref 0–1)
BENZODIAZ UR QL: POSITIVE
BILIRUB SERPL-MCNC: 0.5 MG/DL (ref 0.2–1)
BILIRUB UR QL CFM: NEGATIVE
BUN SERPL-MCNC: 8 MG/DL (ref 6–20)
BUN/CREAT SERPL: 9 (ref 12–20)
CALCIUM SERPL-MCNC: 8.8 MG/DL (ref 8.5–10.1)
CANNABINOIDS UR QL SCN: NEGATIVE
CHLORIDE SERPL-SCNC: 110 MMOL/L (ref 97–108)
CO2 SERPL-SCNC: 23 MMOL/L (ref 21–32)
COCAINE UR QL SCN: NEGATIVE
COLOR UR: ABNORMAL
COMMENT, HOLDF: NORMAL
CREAT SERPL-MCNC: 0.92 MG/DL (ref 0.55–1.02)
DIFFERENTIAL METHOD BLD: NORMAL
DRUG SCRN COMMENT,DRGCM: ABNORMAL
EOSINOPHIL # BLD: 0 K/UL (ref 0–0.4)
EOSINOPHIL NFR BLD: 0 % (ref 0–7)
EPITH CASTS URNS QL MICRO: ABNORMAL /LPF
ERYTHROCYTE [DISTWIDTH] IN BLOOD BY AUTOMATED COUNT: 12.6 % (ref 11.5–14.5)
GLOBULIN SER CALC-MCNC: 3.2 G/DL (ref 2–4)
GLUCOSE SERPL-MCNC: 116 MG/DL (ref 65–100)
GLUCOSE UR STRIP.AUTO-MCNC: NEGATIVE MG/DL
HCG UR QL: NEGATIVE
HCT VFR BLD AUTO: 41 % (ref 35–47)
HGB BLD-MCNC: 13.5 G/DL (ref 11.5–16)
HGB UR QL STRIP: NEGATIVE
HYALINE CASTS URNS QL MICRO: ABNORMAL /LPF (ref 0–5)
IMM GRANULOCYTES # BLD: 0 K/UL (ref 0–0.04)
IMM GRANULOCYTES NFR BLD AUTO: 0 % (ref 0–0.5)
INR PPP: 1.1 (ref 0.9–1.1)
KETONES UR QL STRIP.AUTO: ABNORMAL MG/DL
LEUKOCYTE ESTERASE UR QL STRIP.AUTO: NEGATIVE
LYMPHOCYTES # BLD: 1 K/UL (ref 0.8–3.5)
LYMPHOCYTES NFR BLD: 22 % (ref 12–49)
MCH RBC QN AUTO: 29.7 PG (ref 26–34)
MCHC RBC AUTO-ENTMCNC: 32.9 G/DL (ref 30–36.5)
MCV RBC AUTO: 90.1 FL (ref 80–99)
METHADONE UR QL: NEGATIVE
MONOCYTES # BLD: 0.2 K/UL (ref 0–1)
MONOCYTES NFR BLD: 5 % (ref 5–13)
NEUTS SEG # BLD: 3.3 K/UL (ref 1.8–8)
NEUTS SEG NFR BLD: 72 % (ref 32–75)
NITRITE UR QL STRIP.AUTO: NEGATIVE
NRBC # BLD: 0 K/UL (ref 0–0.01)
NRBC BLD-RTO: 0 PER 100 WBC
OPIATES UR QL: NEGATIVE
PCP UR QL: NEGATIVE
PH UR STRIP: 7 [PH] (ref 5–8)
PLATELET # BLD AUTO: 244 K/UL (ref 150–400)
PMV BLD AUTO: 9.9 FL (ref 8.9–12.9)
POTASSIUM SERPL-SCNC: 3.8 MMOL/L (ref 3.5–5.1)
PROT SERPL-MCNC: 7.1 G/DL (ref 6.4–8.2)
PROT UR STRIP-MCNC: ABNORMAL MG/DL
PROTHROMBIN TIME: 11 SEC (ref 9–11.1)
RBC # BLD AUTO: 4.55 M/UL (ref 3.8–5.2)
RBC #/AREA URNS HPF: ABNORMAL /HPF (ref 0–5)
SAMPLES BEING HELD,HOLD: NORMAL
SODIUM SERPL-SCNC: 144 MMOL/L (ref 136–145)
SP GR UR REFRACTOMETRY: 1.03 (ref 1–1.03)
THERAPEUTIC RANGE,PTTT: NORMAL SECS (ref 58–77)
UROBILINOGEN UR QL STRIP.AUTO: 1 EU/DL (ref 0.2–1)
WBC # BLD AUTO: 4.6 K/UL (ref 3.6–11)
WBC URNS QL MICRO: ABNORMAL /HPF (ref 0–4)

## 2018-10-04 PROCEDURE — 74011250636 HC RX REV CODE- 250/636: Performed by: EMERGENCY MEDICINE

## 2018-10-04 PROCEDURE — 85730 THROMBOPLASTIN TIME PARTIAL: CPT | Performed by: EMERGENCY MEDICINE

## 2018-10-04 PROCEDURE — 99284 EMERGENCY DEPT VISIT MOD MDM: CPT

## 2018-10-04 PROCEDURE — 74011250636 HC RX REV CODE- 250/636

## 2018-10-04 PROCEDURE — 85025 COMPLETE CBC W/AUTO DIFF WBC: CPT | Performed by: EMERGENCY MEDICINE

## 2018-10-04 PROCEDURE — 36415 COLL VENOUS BLD VENIPUNCTURE: CPT | Performed by: EMERGENCY MEDICINE

## 2018-10-04 PROCEDURE — 96375 TX/PRO/DX INJ NEW DRUG ADDON: CPT

## 2018-10-04 PROCEDURE — 80053 COMPREHEN METABOLIC PANEL: CPT | Performed by: EMERGENCY MEDICINE

## 2018-10-04 PROCEDURE — 74011000258 HC RX REV CODE- 258: Performed by: EMERGENCY MEDICINE

## 2018-10-04 PROCEDURE — 74011000250 HC RX REV CODE- 250: Performed by: EMERGENCY MEDICINE

## 2018-10-04 PROCEDURE — 99218 HC RM OBSERVATION: CPT

## 2018-10-04 PROCEDURE — 96361 HYDRATE IV INFUSION ADD-ON: CPT

## 2018-10-04 PROCEDURE — 65270000029 HC RM PRIVATE

## 2018-10-04 PROCEDURE — 87086 URINE CULTURE/COLONY COUNT: CPT | Performed by: INTERNAL MEDICINE

## 2018-10-04 PROCEDURE — 96374 THER/PROPH/DIAG INJ IV PUSH: CPT

## 2018-10-04 PROCEDURE — 85610 PROTHROMBIN TIME: CPT | Performed by: EMERGENCY MEDICINE

## 2018-10-04 PROCEDURE — 81001 URINALYSIS AUTO W/SCOPE: CPT | Performed by: INTERNAL MEDICINE

## 2018-10-04 PROCEDURE — 70450 CT HEAD/BRAIN W/O DYE: CPT

## 2018-10-04 PROCEDURE — 80307 DRUG TEST PRSMV CHEM ANLYZR: CPT | Performed by: INTERNAL MEDICINE

## 2018-10-04 PROCEDURE — 81025 URINE PREGNANCY TEST: CPT

## 2018-10-04 RX ORDER — DEXTROSE MONOHYDRATE AND SODIUM CHLORIDE 5; .9 G/100ML; G/100ML
1000 INJECTION, SOLUTION INTRAVENOUS CONTINUOUS
Status: DISPENSED | OUTPATIENT
Start: 2018-10-04 | End: 2018-10-04

## 2018-10-04 RX ORDER — ALPRAZOLAM 0.25 MG/1
1 TABLET ORAL
COMMUNITY
End: 2018-10-04

## 2018-10-04 RX ORDER — LIDOCAINE HYDROCHLORIDE 40 MG/ML
0.5 SOLUTION TOPICAL
Status: COMPLETED | OUTPATIENT
Start: 2018-10-04 | End: 2018-10-04

## 2018-10-04 RX ORDER — HYDROMORPHONE HYDROCHLORIDE 1 MG/ML
0.5 INJECTION, SOLUTION INTRAMUSCULAR; INTRAVENOUS; SUBCUTANEOUS ONCE
Status: DISCONTINUED | OUTPATIENT
Start: 2018-10-04 | End: 2018-10-04

## 2018-10-04 RX ORDER — KETOROLAC TROMETHAMINE 30 MG/ML
15 INJECTION, SOLUTION INTRAMUSCULAR; INTRAVENOUS
Status: DISCONTINUED | OUTPATIENT
Start: 2018-10-04 | End: 2018-10-04 | Stop reason: HOSPADM

## 2018-10-04 RX ORDER — ACETAMINOPHEN 325 MG/1
650 TABLET ORAL
Status: DISCONTINUED | OUTPATIENT
Start: 2018-10-04 | End: 2018-10-04 | Stop reason: HOSPADM

## 2018-10-04 RX ORDER — KETOROLAC TROMETHAMINE 30 MG/ML
15 INJECTION, SOLUTION INTRAMUSCULAR; INTRAVENOUS
Status: COMPLETED | OUTPATIENT
Start: 2018-10-04 | End: 2018-10-04

## 2018-10-04 RX ORDER — ENOXAPARIN SODIUM 100 MG/ML
40 INJECTION SUBCUTANEOUS EVERY 24 HOURS
Status: DISCONTINUED | OUTPATIENT
Start: 2018-10-04 | End: 2018-10-04 | Stop reason: HOSPADM

## 2018-10-04 RX ORDER — DEXAMETHASONE SODIUM PHOSPHATE 10 MG/ML
10 INJECTION INTRAMUSCULAR; INTRAVENOUS ONCE
Status: COMPLETED | OUTPATIENT
Start: 2018-10-04 | End: 2018-10-04

## 2018-10-04 RX ORDER — SODIUM CHLORIDE 9 MG/ML
100 INJECTION, SOLUTION INTRAVENOUS CONTINUOUS
Status: DISCONTINUED | OUTPATIENT
Start: 2018-10-04 | End: 2018-10-05 | Stop reason: HOSPADM

## 2018-10-04 RX ORDER — ONDANSETRON 2 MG/ML
4 INJECTION INTRAMUSCULAR; INTRAVENOUS
Status: DISCONTINUED | OUTPATIENT
Start: 2018-10-04 | End: 2018-10-04 | Stop reason: HOSPADM

## 2018-10-04 RX ORDER — ESCITALOPRAM OXALATE 10 MG/1
20 TABLET ORAL
Status: DISCONTINUED | OUTPATIENT
Start: 2018-10-05 | End: 2018-10-04 | Stop reason: HOSPADM

## 2018-10-04 RX ORDER — ONDANSETRON 2 MG/ML
4 INJECTION INTRAMUSCULAR; INTRAVENOUS
Status: COMPLETED | OUTPATIENT
Start: 2018-10-04 | End: 2018-10-04

## 2018-10-04 RX ORDER — CLONAZEPAM 1 MG/1
2 TABLET ORAL
Status: DISCONTINUED | OUTPATIENT
Start: 2018-10-04 | End: 2018-10-04 | Stop reason: HOSPADM

## 2018-10-04 RX ORDER — ALPRAZOLAM 1 MG/1
1 TABLET ORAL
Status: ON HOLD | COMMUNITY
End: 2020-01-29

## 2018-10-04 RX ORDER — TOPIRAMATE 100 MG/1
100 TABLET, FILM COATED ORAL
Status: DISCONTINUED | OUTPATIENT
Start: 2018-10-04 | End: 2018-10-04 | Stop reason: HOSPADM

## 2018-10-04 RX ORDER — DEXTROSE, SODIUM CHLORIDE, AND POTASSIUM CHLORIDE 5; .45; .15 G/100ML; G/100ML; G/100ML
125 INJECTION INTRAVENOUS CONTINUOUS
Status: DISCONTINUED | OUTPATIENT
Start: 2018-10-04 | End: 2018-10-04 | Stop reason: HOSPADM

## 2018-10-04 RX ORDER — LORAZEPAM 2 MG/ML
1 INJECTION INTRAMUSCULAR
Status: COMPLETED | OUTPATIENT
Start: 2018-10-04 | End: 2018-10-04

## 2018-10-04 RX ORDER — HYDROMORPHONE HYDROCHLORIDE 2 MG/ML
1 INJECTION, SOLUTION INTRAMUSCULAR; INTRAVENOUS; SUBCUTANEOUS ONCE
Status: COMPLETED | OUTPATIENT
Start: 2018-10-04 | End: 2018-10-04

## 2018-10-04 RX ORDER — ONDANSETRON 2 MG/ML
INJECTION INTRAMUSCULAR; INTRAVENOUS
Status: COMPLETED
Start: 2018-10-04 | End: 2018-10-04

## 2018-10-04 RX ORDER — SUMATRIPTAN 6 MG/.5ML
6 INJECTION, SOLUTION SUBCUTANEOUS ONCE
Status: DISCONTINUED | OUTPATIENT
Start: 2018-10-04 | End: 2018-10-04 | Stop reason: HOSPADM

## 2018-10-04 RX ADMIN — KETOROLAC TROMETHAMINE 15 MG: 30 INJECTION, SOLUTION INTRAMUSCULAR at 14:33

## 2018-10-04 RX ADMIN — LORAZEPAM 1 MG: 2 INJECTION INTRAMUSCULAR; INTRAVENOUS at 14:36

## 2018-10-04 RX ADMIN — SODIUM CHLORIDE 1000 ML: 900 INJECTION, SOLUTION INTRAVENOUS at 01:01

## 2018-10-04 RX ADMIN — LIDOCAINE HYDROCHLORIDE 0.5 ML: 40 SOLUTION TOPICAL at 14:38

## 2018-10-04 RX ADMIN — DEXTROSE MONOHYDRATE AND SODIUM CHLORIDE 1000 ML/HR: 5; .9 INJECTION, SOLUTION INTRAVENOUS at 14:38

## 2018-10-04 RX ADMIN — HYDROMORPHONE HYDROCHLORIDE 1 MG: 2 INJECTION, SOLUTION INTRAMUSCULAR; INTRAVENOUS; SUBCUTANEOUS at 01:02

## 2018-10-04 RX ADMIN — ONDANSETRON 4 MG: 2 INJECTION INTRAMUSCULAR; INTRAVENOUS at 14:32

## 2018-10-04 RX ADMIN — SODIUM CHLORIDE 1000 ML: 900 INJECTION, SOLUTION INTRAVENOUS at 14:38

## 2018-10-04 RX ADMIN — SODIUM CHLORIDE 100 ML/HR: 900 INJECTION, SOLUTION INTRAVENOUS at 21:24

## 2018-10-04 RX ADMIN — DEXAMETHASONE SODIUM PHOSPHATE 10 MG: 10 INJECTION, SOLUTION INTRAMUSCULAR; INTRAVENOUS at 16:13

## 2018-10-04 NOTE — PROGRESS NOTES
[facility]: MED RECONCILIATION Information obtained from: Patient Allergies: Benadryl [diphenhydramine hcl]; Compazine [prochlorperazine edisylate]; Depacon [valproate sodium]; Haldol [haloperidol lactate]; Promethazine; and Reglan [metoclopramide] Prior to Admission Medications:  
 
Medication Documentation Review Audit Reviewed by Jeronimo Pichardo PHARMD (Pharmacist) on 10/04/18 at 5516 Medication Sig Documenting Provider Last Dose Status Taking? ACZONE 7.5 % glwp APPLY TO AFFECTED AREA ONCE DAILY IN THE MORNING (AS NEEDED) Historical Provider  Active Yes  
        
  
  
 adapalene-benzoyl peroxide 0.1-2.5 % glwp APPLY TO AFFECTED AREAS AT BEDTIME X 30 DAYS (AS NEEDED) Historical Provider  Active Yes ALPRAZolam (XANAX) 1 mg tablet Take 1 mg by mouth once as needed (panic attack). Historical Provider 10/4/2018 Active Yes ASPIRIN/ACETAMINOPHEN/CAFFEINE (MIGRAINE RELIEF PO) Take 2 Tabs by mouth once as needed (Migraine). Historical Provider  Active Yes  
 clonazePAM (KLONOPIN) 1 mg tablet Take 2 mg by mouth nightly. Historical Provider 10/3/2018 Active Yes  
        
   
 escitalopram (LEXAPRO) 20 mg tablet Take 20 mg by mouth nightly. Indications: Generalized Anxiety Disorder Munir Prabhakar, MD 10/3/2018 Active Yes  
 levonorgestrel (MIRENA) 20 mcg/24 hr (5 years) IUD 1 Each by IntraUTERine route once. Historical Provider  Active Yes Med Note (Katia Plummer   Fri Apr 13, 2018 10:39 PM): Inserted 1.5 yrs ago per pt 
  
 ondansetron (ZOFRAN ODT) 4 mg disintegrating tablet Take 1 Tab by mouth every eight (8) hours as needed for Nausea. Se Morris NP 10/4/2018 Active Yes SUMAtriptan succinate (ZEMBRACE SYMTOUCH) 3 mg/0.5 mL pnij 3 mg by SubCUTAneous route as needed. Simmie Seip, NP 10/4/2018 Active Yes  
 topiramate (TOPAMAX) 100 mg tablet Take 1 Tab by mouth nightly. [address] [place] [address], DO 10/3/2018 Active Yes [address] [facility]   Contact: 2114

## 2018-10-04 NOTE — TELEPHONE ENCOUNTER
Medrol Dose pack [facility] [address] in [place] as directed.     Script left on provider voicemail at 9:50am.

## 2018-10-04 NOTE — TELEPHONE ENCOUNTER
Pt called. She went to ER last night with a migraine. Asking for a steroid.     [facility]  [phone]

## 2018-10-04 NOTE — TELEPHONE ENCOUNTER
Pt called at 1925 last night c/o MHA. Reported GI illness and probable dehydration contributing. Had used Imitrex injection x 2, last injection about 30 minutes earlier, OTC meds, Zofran, but HA continued. Still having nausea, trouble with POs due to this. Pt was going to wait another 30 minutes to see if the Imitrex injection helped, if not she was going to the ED.

## 2018-10-04 NOTE — ED NOTES
Pt and pt's  updated on plan of care and room assignment upstairs, report called and transport put in.  Pt's  requesting pt's pain be managed prior to transport. Pt's  informed only PRN medication available at this time is Tylenol. Pt's  requesting the admitting doctor or nursing supervisor be contacted and pt not be taken to floor prior to pain management is achieved. Dr. William Ramsey paged by charge RN and will be willing to place order for a one time dose of Toradol 15mg IV, will not prescribe a narcotic for pain and instructed to contact neurology. Pt's  left room stating pt should be \"stabilized prior transfer to floor. \" Pt's  informed that pt is medically stable and pain is not criteria for not transferring pt and admitted MD informed. Pt requesting  on call. Charge RN aware. Charge paged the consulted neurologist at [facility].

## 2018-10-04 NOTE — TELEPHONE ENCOUNTER
Pt called stating she was in the ER lastnight with a migraine. She is getting another one now. She has taken Motrin and Imitrex already but it is not working. Pt would like to know what she should do next.     Please call back

## 2018-10-04 NOTE — ED TRIAGE NOTES
Pt here for recurrent migraine, seen here over night for same. +Motrin, +imitrez, +zofran, +Xanax use PTA.

## 2018-10-04 NOTE — ED TRIAGE NOTES
Patient with migraine headache, hx of the same, no relief with Imitrex x2, zofran, xanax, ibuprofen.

## 2018-10-04 NOTE — PROGRESS NOTES
Received consult for this migraine pt of Dr Schmidt's/ [facility] who has status migrainosus. Discussed with Dr Kirti Yost ER around 5 PM.  Pt has reported multiple allergies to migraine pain relievers: Pt presented to [facility] ER last PM with c/o persistent migraine. Treated with IV fluids, Depacon (500 mg x 1), Ketorolac 30 mg IV x 1, Magnesium Sulfate 2 grams IV, Dexamethasone 10 mg IV x 1, Hydromorphone 1 mg IV x 1. Had residual HA at time of discharge, was advised to f/u with her Neurologist. She called Dr Babatunde Crum office and reported persistent headache so was e-Rx'd medrol dosepak. Due to persistent headache patient returned to [facility] ER for treatment. CT head didn't show any acute abnormalities (images reviewed). During current ER visit has received Toradol, IVF, Decadron and intranasal lidocaine, IV lorazepam, IV odansetron, without any improvement in headache. Allergy list: benadryl, compazine, haldol, promethazine, reglan, depacon. From Neurology Progress Note (April 2018): \"Bad reaction to: DHE, reglan, prochlorperazine, compazine, benadryl, haldol. \" Received a call from ER RN around 6 PM stating that pt was refusing transfer from ER to Floor bed as she continues to have migraine and want the Neurologist to come in to see her tonight. I d/w ER RN that I've already made my daily rounds so I wouldn't be coming back in, but I will review the chart to see which meds pt has received and I will enter orders for any other non-narcotic medication that I think may be helpful to break her current migraine cycle. Before I could review chart/ enter any applicable orders, pt requested discharge as she was not satisfied with her care.

## 2018-10-04 NOTE — DISCHARGE INSTRUCTIONS
Patient Discharge Instructions    Dean Burkitt / [id number] : 1990    Admitted 10/4/2018 Discharged: 10/4/2018     Primary Diagnoses  Problem List as of 10/4/2018  Date Reviewed: 10/4/2018          Codes Class Noted - Resolved   Anxiety and depression   Migraine   PTSD (post-traumatic stress disorder)   * (Principal)Status migrainosus          Take Home Medications     · It is important that you take the medication exactly as they are prescribed. · Keep your medication in the bottles provided by the pharmacist and keep a list of the medication names, dosages, and times to be taken in your wallet. · Do not take other medications without consulting your doctor. What to do at Home    Recommended diet: Regular Diet    Recommended activity: Activity as tolerated    If you experience worse symptoms, please follow up with our neurologist.    Follow-up with any PCP in a few weeks        Information obtained by :  I understand that if any problems occur once I am at home I am to contact my physician. I understand and acknowledge receipt of the instructions indicated above.                                                                                                                                            Physician's or R.N.'s Signature                                                                  Date/Time                                                                                                                                              Patient or Representative Signature                                                          Date/Time

## 2018-10-04 NOTE — ED NOTES
Pt declined Toradol and Imitrex, requesting to leave to be seen and treated at [facility]. Dr. Good Notice notified and placed orders to discharge pt.

## 2018-10-04 NOTE — ED PROVIDER NOTES
HPI Comments: 1:18 PM 
I have evaluated the patient as the Provider in Triage. I have reviewed Her vital signs and the triage nurse assessment. I have talked with the patient and any available family and advised that I am the provider in triage and have ordered the appropriate study to initiate their work up based on the clinical presentation during my assessment. I have advised that the patient will be accommodated in the Main ED as soon as possible. I have also requested to contact the triage nurse or myself immediately if the patient experiences any changes in their condition during this brief waiting period. Deirdre Ghosh MD 
 
 
Pt. Presents to the ER with complaints of migraine. Pt. Was seen last night and felt better when she left. Pt. Awoke this morning with continued headache. Pt. Imitrex, ibuprofen, zofran, and xanax around noon. Pt's migraine has returned. Pt. Attempted to speak with her neurologist, Dr. Kathia Duval, but she is out of the office for the day. +photophobia, + nausea. Pt. Says that she feels like her headache is worse than it was yesterday. 25 y.o. female with past medical history significant for migraine headaches, endometriosis, anxiety/depression, who presents ambulatory to the ED accompanied by family member, with chief complaint of migraine headache. Family states that pt started to experience a migraine headache  Yesterday ~1100. They state that pt was taken to [facility] ED last night and treated with \"cocktail\" of Toradol, Zofran, magnesium, dexamethazone and Fioricet. Pt notes that Fioricet normally does not help her migraine and makes her nauseous. Family states that pt was then given dilaudid which \"broke\" her migraine. Family reports pt woke up today with another migraine and states that it was rebound. Pt describes pain as \"aching\", and rates her current pain as 9/10 in intensity.  Reports that pain is exacerbated with movement. She states that she had a headache of similar intensity ~4 years ago, but none of similar intensity since. Pt notes that headache is located in the center of her forehead and radiates down her neck. Pt denies head injury. Family states that pt took Topamax, Imitrex, Xanax Zofran and Ibuprofen ~1200 today, without significant relief. Pt also complains of visual disturbance described as \"floaters everywhere\", nausea, congestion and right ear pain. Family also states that pt had diarrhea yesterday, but took Imodium and no longer has diarrhea. Pt though that she had a \"GI illness\" recently with nausea, but believes now that nausea was an aura for migraine. Pt denies pregnancy and states that she just finished her last menstrual period. Pt notes allergies to Benadryl, Compazine, Depacon, Haldol, Promethazine, and Reglan. Pt specifically denies vomiting or fever. There are no other acute medical concerns at this time. Social hx: Tobacco use (former smoker); EtOH use (social); Negative for Illicit Drug use PCP: None Neurologist: Neeraj Rivera DO Note written by Hany Vieira, as dictated by Adis Garcia MD 1:42 PM 
 
 
The history is provided by the patient, medical records and a relative. No  was used. Past Medical History:  
Diagnosis Date  Anxiety  Bartholin's gland abscess x8  Depression  Endometriosis  Migraine  Neurological disorder   
 migraines  Ovarian cyst   
 Panic attack Past Surgical History:  
Procedure Laterality Date  HX APPENDECTOMY  04/26/2017  HX BARTHOLIN CYST MARSUPIALIZATION    
 2011, 2018  HX BREAST LUMPECTOMY Right  HX BUNIONECTOMY  HX CYST REMOVAL    
 HX GYN Left   
 bartholin cyst drainage X 6  
 HX GYN Left   
 bartholin cyst marsupilization  HX OTHER SURGICAL    
 laparascopy  HX PELVIC LAPAROSCOPY    
 HX WISDOM TEETH EXTRACTION    
 
   
 Family History:  
Problem Relation Age of Onset  Diabetes Maternal Aunt  Heart Disease Maternal Aunt  Stroke Maternal Aunt  Heart Disease Maternal Grandmother  No Known Problems Mother Social History Social History  Marital status: SINGLE Spouse name: N/A  
 Number of children: N/A  
 Years of education: N/A Occupational History  Not on file. Social History Main Topics  Smoking status: Former Smoker  Smokeless tobacco: Never Used  Alcohol use No  
   Comment: social  
 Drug use: No  
 Sexual activity: Yes  
  Partners: Male Birth control/ protection: Pill Other Topics Concern  Not on file Social History Narrative ALLERGIES: Benadryl [diphenhydramine hcl]; Compazine [prochlorperazine edisylate]; Depacon [valproate sodium]; Haldol [haloperidol lactate]; Promethazine; and Reglan [metoclopramide] Review of Systems Constitutional: Negative for chills and fever. HENT: Positive for congestion and ear pain (Right). Eyes: Positive for visual disturbance. Respiratory: Negative for cough and shortness of breath. Cardiovascular: Negative for chest pain. Gastrointestinal: Positive for nausea. Negative for vomiting. Genitourinary: Negative for dysuria. Musculoskeletal: Negative for neck pain and neck stiffness. Skin: Negative for rash and wound. Neurological: Positive for headaches. Negative for weakness and numbness. All other systems reviewed and are negative. There were no vitals filed for this visit. Physical Exam  
Physical Examination: General appearance - alert, mild distress, sitting in dark room, oriented to person, place, and time and normal appearing weight Eyes - pupils equal and reactive, extraocular eye movements intact HEENT-b/l TMs clear, pharynx normal 
Neck - supple, no significant adenopathy, no nuchal rigidity or meningismus Chest - clear to auscultation, no wheezes, rales or rhonchi, symmetric air entry Heart - normal rate, regular rhythm, normal S1, S2, no murmurs, rubs, clicks or gallops Abdomen - soft, nontender, nondistended, no masses or organomegaly Back exam - full range of motion, no tenderness, palpable spasm or pain on motion Neurological - alert, oriented, normal speech, no focal findings or movement disorder noted, normal f-n-f, no nystagmus, no pronator drift Musculoskeletal - no joint tenderness, deformity or swelling Extremities - peripheral pulses normal, no pedal edema, no clubbing or cyanosis Skin - normal coloration and turgor, no rashes, no suspicious skin lesions noted MDM Number of Diagnoses or Management Options Nonintractable headache, unspecified chronicity pattern, unspecified headache type:  
  
Amount and/or Complexity of Data Reviewed Clinical lab tests: ordered and reviewed Tests in the radiology section of CPT®: ordered and reviewed Decide to obtain previous medical records or to obtain history from someone other than the patient: yes Obtain history from someone other than the patient: yes (spouse) Review and summarize past medical records: yes Discuss the patient with other providers: yes (ED physician) Independent visualization of images, tracings, or specimens: yes Patient Progress Patient progress: improved ED Course Procedures 3:00 PM 
Pt signed out to Dr. Lexii Castro pending test results and reevaluation.

## 2018-10-04 NOTE — ED PROVIDER NOTES
HPI Comments: This is a 57-year-old female who comes emergency room with chief complaint of headache. Patient states her headache started approximately clock this morning. Patient states she took a Zofran and Imitrex at about noon today after she got home from work. He states she's had some nausea but no vomiting. Patient has a fever. Patient states this is typical of her headaches I as she states that she called her on-call neurologist who told her to take another dose of Imitrex and Zofran. Patient also took motrin at that time. Patient states that she took that about 8:00 tonight without relief. Patient states other pain this frontal and temporal in location. Patient states it extends to be on bilateral is. Patient states it is sharp and throbbing in nature. Patient is a 25 y.o. female presenting with migraines. The history is provided by the patient. No  was used. Migraine This is a new problem. The current episode started 6 to 12 hours ago. The problem occurs constantly. The problem has been gradually worsening. The headache is aggravated by bright light, photophobia, loud noise and nausea. The pain is located in the bilateral, frontal and temporal region. The quality of the pain is described as throbbing and sharp. The pain is at a severity of 8/10. The pain is moderate. Associated symptoms include nausea. Pertinent negatives include no fever, no malaise/fatigue, no chest pressure, no near-syncope, no palpitations, no syncope, no shortness of breath, no weakness, no tingling, no dizziness, no visual change and no vomiting. She has tried triptan therapy and NSAIDs (zofran) for the symptoms. Past Medical History:  
Diagnosis Date  Anxiety  Bartholin's gland abscess x8  Depression  Endometriosis  Migraine  Neurological disorder   
 migraines  Ovarian cyst   
 Panic attack Past Surgical History:  
Procedure Laterality Date  HX APPENDECTOMY  04/26/2017  HX BARTHOLIN CYST MARSUPIALIZATION    
 2011, 2018  HX BREAST LUMPECTOMY Right  HX BUNIONECTOMY  HX CYST REMOVAL    
 HX GYN Left   
 bartholin cyst drainage X 6  
 HX GYN Left   
 bartholin cyst marsupilization  HX OTHER SURGICAL    
 laparascopy  HX PELVIC LAPAROSCOPY    
 HX WISDOM TEETH EXTRACTION Family History:  
Problem Relation Age of Onset  Diabetes Maternal Aunt  Heart Disease Maternal Aunt  Stroke Maternal Aunt  Heart Disease Maternal Grandmother  No Known Problems Mother Social History Social History  Marital status: SINGLE Spouse name: N/A  
 Number of children: N/A  
 Years of education: N/A Occupational History  Not on file. Social History Main Topics  Smoking status: Former Smoker  Smokeless tobacco: Never Used  Alcohol use No  
   Comment: social  
 Drug use: No  
 Sexual activity: Yes  
  Partners: Male Birth control/ protection: Pill Other Topics Concern  Not on file Social History Narrative ALLERGIES: Benadryl [diphenhydramine hcl]; Compazine [prochlorperazine edisylate]; Depacon [valproate sodium]; Haldol [haloperidol lactate]; Promethazine; and Reglan [metoclopramide] Review of Systems Constitutional: Negative for fever and malaise/fatigue. Respiratory: Negative for shortness of breath. Cardiovascular: Negative for palpitations, syncope and near-syncope. Gastrointestinal: Positive for nausea. Negative for vomiting. Neurological: Negative for dizziness, tingling and weakness. Vitals:  
 10/04/18 0045 10/04/18 0100 10/04/18 0115 10/04/18 0130 BP: 101/72 107/77 108/77 108/73 Pulse:      
Resp:      
Temp:      
SpO2: 100% 99% 95% 97% Weight:      
      
 
Physical Exam  
Constitutional: She is oriented to person, place, and time. She appears well-developed and well-nourished. No distress.   
HENT:  
 Head: Normocephalic and atraumatic. Right Ear: Hearing and external ear normal.  
Left Ear: Hearing and external ear normal.  
Nose: Nose normal.  
Mouth/Throat: Uvula is midline, oropharynx is clear and moist and mucous membranes are normal. Mucous membranes are not dry. No uvula swelling. No oropharyngeal exudate. Eyes: Conjunctivae and EOM are normal. Pupils are equal, round, and reactive to light. Right eye exhibits no discharge. Left eye exhibits no discharge. No scleral icterus. Fundoscopic exam reveals normal venous pulsation, no papilledema. Neck: Normal range of motion. Neck supple. No JVD present. No tracheal deviation present. No Brudzinski's sign and no Kernig's sign noted. Cardiovascular: Normal rate, regular rhythm, normal heart sounds and intact distal pulses. Exam reveals no gallop and no friction rub. No murmur heard. Pulmonary/Chest: Effort normal and breath sounds normal. No stridor. No respiratory distress. She has no decreased breath sounds. She has no wheezes. She has no rhonchi. She has no rales. She exhibits no tenderness. Abdominal: Soft. Bowel sounds are normal. She exhibits no distension. There is no tenderness. There is no rebound and no guarding. Musculoskeletal: Normal range of motion. She exhibits no edema or tenderness. Neurological: She is alert and oriented to person, place, and time. She has normal strength and normal reflexes. No cranial nerve deficit or sensory deficit. She exhibits normal muscle tone. Coordination normal. GCS eye subscore is 4. GCS verbal subscore is 5. GCS motor subscore is 6. Skin: Skin is warm and dry. No rash noted. She is not diaphoretic. No erythema. No pallor. Psychiatric: She has a normal mood and affect. Her behavior is normal. Judgment and thought content normal.  
Nursing note and vitals reviewed. MDM Number of Diagnoses or Management Options Risk of Complications, Morbidity, and/or Mortality Presenting problems: moderate Diagnostic procedures: low Management options: moderate Patient Progress Patient progress: stable ED Course Procedures Chief Complaint Patient presents with  Migraine The patient's presenting problems have been discussed, and they are in agreement with the care plan formulated and outlined with them. I have encouraged them to ask questions as they arise throughout their visit. MEDICATIONS GIVEN: 
Medications  
sodium chloride (NS) flush 5-10 mL (not administered)  
sodium chloride (NS) flush 5-10 mL (not administered)  
valproate (DEPACON) 500 mg in 0.9% sodium chloride 50 mL IVPB (0 mg IntraVENous Held 10/4/18 0041)  
ketorolac (TORADOL) injection 30 mg (30 mg IntraVENous Given 10/3/18 2319)  
ondansetron (ZOFRAN) injection 4 mg (4 mg IntraVENous Given 10/3/18 2318) magnesium sulfate 2 g/50 ml IVPB (premix or compounded) (0 g IntraVENous IV Completed 10/4/18 0022)  
sodium chloride 0.9 % bolus infusion 1,000 mL (0 mL IntraVENous IV Completed 10/4/18 0021) dexamethasone (PF) (DECADRON) injection 10 mg (10 mg IntraVENous Given 10/3/18 2322) butalbital-acetaminophen-caffeine (FIORICET, ESGIC) -40 mg per tablet 2 Tab (2 Tabs Oral Given 10/3/18 2333)  
sodium chloride 0.9 % bolus infusion 1,000 mL (0 mL IntraVENous IV Completed 10/4/18 0139) HYDROmorphone (PF) (DILAUDID) injection 1 mg (1 mg IntraVENous Given 10/4/18 0102) LABS REVIEWED: 
No results found for this or any previous visit (from the past 24 hour(s)). VITAL SIGNS: 
Patient Vitals for the past 12 hrs: 
 Temp Pulse Resp BP SpO2  
10/04/18 0130 - - - 108/73 97 % 10/04/18 0115 - - - 108/77 95 % 10/04/18 0100 - - - 107/77 99 % 10/04/18 0045 - - - 101/72 100 % 10/04/18 0015 - - - 99/77 99 % 10/04/18 0001 - - - - 98 % 10/04/18 0000 - - - 104/72 -  
10/03/18 2346 - - - 101/74 98 % 10/03/18 2326 - 78 - 101/79 -  
10/03/18 2154 97.8 °F (36.6 °C) 100 18 102/70 98 % RADIOLOGY RESULTS: 
The following have been ordered and reviewed: 
No results found. PROGRESS NOTES: 
Pt still having HA after initial meds. Pt reluctant to take depakote due to previous adverse side effects. Agreed upon one dose of dilaudid. Discussed results and plan with patient and significant other. Patient will be discharged home with neurology follow up. Patient instructed to return to the emergency room for any worsening symptoms or any other concerns. DIAGNOSIS: 
 
1. Other migraine without status migrainosus, not intractable PLAN: 
Follow-up Information Follow up With Details Comments Contact Info  
 your neurologist Schedule an appointment as soon as possible for a visit [facility] EMERGENCY DEPT  If symptoms worsen [address] 
[phone] Discharge Medication List as of 10/4/2018  1:30 AM  
  
CONTINUE these medications which have NOT CHANGED Details  
topiramate (TOPAMAX) 100 mg tablet Take 1 Tab by mouth nightly., Normal, Disp-90 Tab, R-3  
  
adapalene-benzoyl peroxide 0.1-2.5 % glwp APPLY TO AFFECTED AREAS AT BEDTIME X 30 DAYS, Historical Med, R-3  
  
ACZONE 7.5 % glwp APPLY TO AFFECTED AREA ONCE DAILY IN THE MORNING, Historical Med, R-3, [place]  
  
ondansetron (ZOFRAN ODT) 4 mg disintegrating tablet Take 1 Tab by mouth every eight (8) hours as needed for Nausea., Normal, Disp-12 Tab, R-0  
  
SUMAtriptan succinate (ZEMBRACE SYMTOUCH) 3 mg/0.5 mL pnij 3 mg by SubCUTAneous route as needed. , Sample, Disp-1 Syringe, R-0  
  
ASPIRIN/ACETAMINOPHEN/CAFFEINE (MIGRAINE RELIEF PO) Take  by mouth., Historical Med  
  
levonorgestrel (MIRENA) 20 mcg/24 hr (5 years) IUD 1 Each by IntraUTERine route once., Historical Med  
  
clonazePAM (KLONOPIN) 1 mg tablet Take 2 mg by mouth nightly., Historical Med, R-5  
  
escitalopram (LEXAPRO) 20 mg tablet Take 20 mg by mouth every morning.  Indications: GENERALIZED ANXIETY DISORDER, Historical Med  
  
  
 ED COURSE: The patient's hospital course has been uncomplicated.

## 2018-10-04 NOTE — ED NOTES
3:08 PM 
CT head is pending. Will reassess after medications. CT head is negative Labs are WNL Gave Toradol, IVF, Decadron, Lidocaine. Pt seen here last night and followed by neurology. 4:14 PM 
Pt still w/ her normal migraine w/ no relief. Will admit and discuss w/ neurology. Pt requests admission for pain control. 4:17 PM 
TC Contacted Dr Guillermo Owens hospialist to admit pt.

## 2018-10-04 NOTE — ED NOTES
Spoke with Dr Adonis Hodgkin via phone, he will come see patient in the morning, will review chart and medications. Reynaldo LOPEZ nursing supra visor at bedside.

## 2018-10-04 NOTE — TELEPHONE ENCOUNTER
Pt calling about an update of the medication being put in. I did state to pt that it was sent in over the phone.  Please call back

## 2018-10-04 NOTE — TELEPHONE ENCOUNTER
Called pharmacy to check on Rx called in this morning. They said it is ready for . Called pt and lm on her VM that script is ready. Start as directed and let us know how she is doing.

## 2018-10-04 NOTE — DISCHARGE INSTRUCTIONS
We hope that we have addressed all of your medical concerns. The examination and treatment you received in the Emergency Department were for an emergent problem and were not intended as complete care. It is important that you follow up with your healthcare provider(s) for ongoing care. If your symptoms worsen or do not improve as expected, and you are unable to reach your usual health care provider(s), you should return to the Emergency Department. Today's healthcare is undergoing tremendous change, and patient satisfaction surveys are one of the many tools to assess the quality of medical care. You may receive a survey from the [employer] regarding your experience in the Emergency Department. I hope that your experience has been completely positive, particularly the medical care that I provided. As such, please participate in the survey; anything less than excellent does not meet my expectations or intentions. [facility] and 8 [facility] participate in nationally recognized quality of care measures. If your blood pressure is greater than 120/80, as reported below, we urge that you seek medical care to address the potential of high blood pressure, commonly known as hypertension. Hypertension can be hereditary or can be caused by certain medical conditions, pain, stress, or \"white coat syndrome. \"       Please make an appointment with your health care provider(s) for follow up of your Emergency Department visit. VITALS:   Patient Vitals for the past 8 hrs:   Temp Pulse Resp BP SpO2   10/04/18 0045 - - - 101/72 100 %   10/04/18 0015 - - - 99/77 99 %   10/04/18 0001 - - - - 98 %   10/04/18 0000 - - - 104/72 -   10/03/18 2346 - - - 101/74 98 %   10/03/18 2326 - 78 - 101/79 -   10/03/18 2154 97.8 °F (36.6 °C) 100 18 102/70 98 %          Thank you for allowing us to provide you with medical care today.   We realize that you have many choices for your emergency care needs. Please choose us in the future for any continued health care needs. Pb Howard [address] [facility] [place]: [phone]            No results found for this or any previous visit (from the past 24 hour(s)). No results found. Migraine Headache: Care Instructions  Your Care Instructions  Migraines are painful, throbbing headaches that often start on one side of the head. They may cause nausea and vomiting and make you sensitive to light, sound, or smell. Without treatment, migraines can last from 4 hours to a few days. Medicines can help prevent migraines or stop them after they have started. Your doctor can help you find which ones work best for you. Follow-up care is a key part of your treatment and safety. Be sure to make and go to all appointments, and call your doctor if you are having problems. It's also a good idea to know your test results and keep a list of the medicines you take. How can you care for yourself at home? · Do not drive if you have taken a prescription pain medicine. · Rest in a quiet, dark room until your headache is gone. Close your eyes, and try to relax or go to sleep. Don't watch TV or read. · Put a cold, moist cloth or cold pack on the painful area for 10 to 20 minutes at a time. Put a thin cloth between the cold pack and your skin. · Use a warm, moist towel or a heating pad set on low to relax tight shoulder and neck muscles. · Have someone gently massage your neck and shoulders. · Take your medicines exactly as prescribed. Call your doctor if you think you are having a problem with your medicine. You will get more details on the specific medicines your doctor prescribes. · Be careful not to take pain medicine more often than the instructions allow. You could get worse or more frequent headaches when the medicine wears off.   To prevent migraines  · Keep a headache diary so you can figure out what triggers your headaches. Avoiding triggers may help you prevent headaches. Record when each headache began, how long it lasted, and what the pain was like. (Was it throbbing, aching, stabbing, or dull?) Write down any other symptoms you had with the headache, such as nausea, flashing lights or dark spots, or sensitivity to bright light or loud noise. Note if the headache occurred near your period. List anything that might have triggered the headache. Triggers may include certain foods (chocolate, cheese, wine) or odors, smoke, bright light, stress, or lack of sleep. · If your doctor has prescribed medicine for your migraines, take it as directed. You may have medicine that you take only when you get a migraine and medicine that you take all the time to help prevent migraines. ¨ If your doctor has prescribed medicine for when you get a headache, take it at the first sign of a migraine, unless your doctor has given you other instructions. ¨ If your doctor has prescribed medicine to prevent migraines, take it exactly as prescribed. Call your doctor if you think you are having a problem with your medicine. · Find healthy ways to deal with stress. Migraines are most common during or right after stressful times. Take time to relax before and after you do something that has caused a migraine in the past.  · Try to keep your muscles relaxed by keeping good posture. Check your jaw, face, neck, and shoulder muscles for tension. Try to relax them. When you sit at a desk, change positions often. And make sure to stretch for 30 seconds each hour. · Get plenty of sleep and exercise. · Eat meals on a regular schedule. Avoid foods and drinks that often trigger migraines. These include chocolate, alcohol (especially red wine and port), aspartame, monosodium glutamate (MSG), and some additives found in foods (such as hot dogs, boyce, cold cuts, aged cheeses, and pickled foods). · Limit caffeine.  Don't drink too much coffee, tea, or soda. But don't quit caffeine suddenly. That can also give you migraines. · Do not smoke or allow others to smoke around you. If you need help quitting, talk to your doctor about stop-smoking programs and medicines. These can increase your chances of quitting for good. · If you are taking birth control pills or hormone therapy, talk to your doctor about whether they are triggering your migraines. When should you call for help? Call 911 anytime you think you may need emergency care. For example, call if:    · You have signs of a stroke. These may include:  ¨ Sudden numbness, paralysis, or weakness in your face, arm, or leg, especially on only one side of your body. ¨ Sudden vision changes. ¨ Sudden trouble speaking. ¨ Sudden confusion or trouble understanding simple statements. ¨ Sudden problems with walking or balance. ¨ A sudden, severe headache that is different from past headaches.    Call your doctor now or seek immediate medical care if:    · You have new or worse nausea and vomiting.     · You have a new or higher fever.     · Your headache gets much worse.    Watch closely for changes in your health, and be sure to contact your doctor if:    · You are not getting better after 2 days (48 hours). Where can you learn more? Go to http://michele-lucy.info/. Enter H118 in the search box to learn more about \"Migraine Headache: Care Instructions. \"  Current as of: June 4, 2018  Content Version: 11.8  © 5556-1877 [employer]. Care instructions adapted under license by [employer] (which disclaims liability or warranty for this information). If you have questions about a medical condition or this instruction, always ask your healthcare professional. [place] [address] any warranty or liability for your use of this information.

## 2018-10-04 NOTE — DISCHARGE SUMMARY
Physician Discharge Summary     Patient ID:  Tila Gutierrez  [id number]  51 y.o.  1990    Admit date: 10/4/2018    Discharge date and time: 10/4/2018    Admission Diagnoses: Migraine    Discharge Diagnoses:    Principal Diagnosis   Status migrainosus                                             Other Diagnoses    Migraine (2/27/2018)    PTSD (post-traumatic stress disorder) (2/27/2018)    Anxiety and depression ()    Hospital Course:   Status migrainosus / Hx  Migraine - Admit to remote tele. Neuro consult. Hold off on usual sumatriptan and OTC meds. Continue topiramate. ER gave Toradol, IVF, Decadron, Lidocaine. Neurology was consulted to determine treatment, but prior to their arrival, the patient requested to be discharged. Since I see no danger to her life or health, I will comply with her request.     PTSD (post-traumatic stress disorder) / Anxiety and depression - Likely contributes to her sensation of headache. Continue prn clonazepam and lexapro. PCP: None    Consults: None    Significant Diagnostic Studies: See Hospital Course    Discharged home in improved condition. Patient Instructions:   Current Discharge Medication List      CONTINUE these medications which have NOT CHANGED    Details   ALPRAZolam (XANAX) 1 mg tablet Take 1 mg by mouth once as needed (panic attack). topiramate (TOPAMAX) 100 mg tablet Take 1 Tab by mouth nightly. Qty: 90 Tab, Refills: 3      adapalene-benzoyl peroxide 0.1-2.5 % glwp APPLY TO AFFECTED AREAS AT BEDTIME X 30 DAYS (AS NEEDED)  Refills: 3      ACZONE 7.5 % glwp APPLY TO AFFECTED AREA ONCE DAILY IN THE MORNING (AS NEEDED)  Refills: 3      ondansetron (ZOFRAN ODT) 4 mg disintegrating tablet Take 1 Tab by mouth every eight (8) hours as needed for Nausea. Qty: 12 Tab, Refills: 0      SUMAtriptan succinate (ZEMBRACE SYMTOUCH) 3 mg/0.5 mL pnij 3 mg by SubCUTAneous route as needed.   Qty: 1 Syringe, Refills: 0      ASPIRIN/ACETAMINOPHEN/CAFFEINE (MIGRAINE RELIEF PO) Take 2 Tabs by mouth once as needed (Migraine). levonorgestrel (MIRENA) 20 mcg/24 hr (5 years) IUD 1 Each by IntraUTERine route once. clonazePAM (KLONOPIN) 1 mg tablet Take 2 mg by mouth nightly. Refills: 5      escitalopram (LEXAPRO) 20 mg tablet Take 20 mg by mouth nightly. Indications: Generalized Anxiety Disorder           Activity: Activity as tolerated  Diet: Regular Diet  Wound Care: None needed    Follow-up with your neurologist in a few days.   Follow-up tests/labs - none    Signed:  Sobeida Shaikh MD  10/4/2018  6:55 PM

## 2018-10-04 NOTE — H&P
[facility] Hospitalist Admission Note NAME:  Ivan Lowe :   1990 MRN:  [id number] PCP:  None  
 
Date/Time:  10/4/2018 4:20 PM 
 
  
  
Subjective: CHIEF COMPLAINT: headache HISTORY OF PRESENT ILLNESS:    
Ms. Royal Connelly is a 25 y.o.  female who presented to the Emergency Department complaining of headache. Worsening over 24 hours, though it broke and was gone overnight, then returned suddenly this AM.  Not similar to prior unilateral migraine headaches. Today it is bilateral frontal, and extending down R posterior neck. Associated with nausea. Not relieved by usual home meds. Presented to ER last night, with relief. Multiple allergies to typical migraine meds. We will admit her for management. Past Medical History:  
Diagnosis Date  Anxiety and depression  Bartholin's gland abscess x8  Endometriosis  Migraine  Ovarian cyst   
 Panic attack Past Surgical History:  
Procedure Laterality Date  HX APPENDECTOMY  2017  HX BARTHOLIN CYST MARSUPIALIZATION    
 ,   HX BREAST LUMPECTOMY Right  HX BUNIONECTOMY  HX CYST REMOVAL    
 HX GYN Left   
 bartholin cyst drainage X 6  
 HX GYN Left   
 bartholin cyst marsupilization  HX OTHER SURGICAL    
 laparascopy  HX PELVIC LAPAROSCOPY    
 HX WISDOM TEETH EXTRACTION Social History Substance Use Topics  Smoking status: Former Smoker  Smokeless tobacco: Never Used  Alcohol use No  
   Comment: social  
  
 
Family History Problem Relation Age of Onset  Diabetes Maternal Aunt  Heart Disease Maternal Aunt  Stroke Maternal Aunt  Heart Disease Maternal Grandmother  No Known Problems Mother Allergies Allergen Reactions  Benadryl [Diphenhydramine Hcl] Other (comments) Makes me feel funny. Need ativan if I get benadryl  Compazine [Prochlorperazine Edisylate] Anxiety  Depacon [Valproate Sodium] Other (comments) Pt reports having restlessness with this medication.  Haldol [Haloperidol Lactate] Other (comments)  Promethazine Other (comments) \"It makes me feel really funny, nausea and dizzy\"  Reglan [Metoclopramide] Other (comments) Prior to Admission medications Medication Sig Start Date End Date Taking? Authorizing Provider ALPRAZolam (XANAX) 0.25 mg tablet Take 1 mg by mouth. Yes Munir Prabhakar MD  
topiramate (TOPAMAX) 100 mg tablet Take 1 Tab by mouth nightly. 9/11/18  Yes Kelli Schmidt DO  
ondansetron (ZOFRAN ODT) 4 mg disintegrating tablet Take 1 Tab by mouth every eight (8) hours as needed for Nausea. 5/7/18  Yes Janene Holloway NP  
SUMAtriptan succinate (ZEMBRACE SYMTOUCH) 3 mg/0.5 mL pnij 3 mg by SubCUTAneous route as needed. 4/17/18  Yes Yolanda Hinkle NP  
ASPIRIN/ACETAMINOPHEN/CAFFEINE (MIGRAINE RELIEF PO) Take  by mouth. Yes Historical Provider  
clonazePAM (KLONOPIN) 1 mg tablet Take 2 mg by mouth nightly. 10/15/15  Yes Historical Provider  
escitalopram (LEXAPRO) 20 mg tablet Take 20 mg by mouth every morning. Indications: GENERALIZED ANXIETY DISORDER   Yes Munir Prabhakar MD  
adapalene-benzoyl peroxide 0.1-2.5 % glwp APPLY TO AFFECTED AREAS AT BEDTIME X 30 DAYS 7/3/18   Historical Provider ACZONE 7.5 % glwp APPLY TO AFFECTED AREA ONCE DAILY IN THE MORNING 7/5/18   Historical Provider  
levonorgestrel (MIRENA) 20 mcg/24 hr (5 years) IUD 1 Each by IntraUTERine route once. Historical Provider Review of Systems: 
(bold if positive, if negative) Gen:  Eyes:  ENT:  CVS:  Pulm:  GI:  nausea :   
MS:  Skin:  Psych:  Endo:   
Hem:  Renal:   
Neuro:  headache Objective: VITALS:   
Vital signs reviewed; most recent are: 
 
Visit Vitals  /76 (BP 1 Location: Right arm, BP Patient Position: At rest)  Pulse (!) 105  Temp 98.5 °F (36.9 °C)  Resp 14  Wt 63.5 kg (140 lb)  SpO2 97%  BMI 21.93 kg/m2 SpO2 Readings from Last 6 Encounters:  
10/04/18 97% 10/04/18 97% 09/11/18 98% 08/22/18 100% 06/27/18 100% 06/25/18 98% No intake or output data in the 24 hours ending 10/04/18 1622 Exam:  
 
Physical Exam: 
 
Gen:  Thin, in mild acute distress HEENT:  Pink conjunctivae, PERRL, hearing intact to voice, moist mucous membranes Neck:  Supple, without masses, thyroid non-tender Resp:  No accessory muscle use, clear breath sounds without wheezes rales or rhonchi 
Card:  No murmurs, tachycardic S1, S2 without thrills, bruits or peripheral edema Abd:  Soft, non-tender, non-distended, normoactive bowel sounds are present, no mass Lymph:  No cervical or inguinal adenopathy Musc:  No cyanosis or clubbing Skin:  No rashes or ulcers, skin turgor is good Neuro:  Cranial nerves are grossly intact, no focal motor weakness, follows commands Psych:  Good insight, oriented to person, place and time, alert Labs: 
 
Recent Labs 10/04/18 
 1421 WBC  4.6 HGB  13.5 HCT  41.0  
PLT  244 Recent Labs 10/04/18 
 1421 NA  144  
K  3.8 CL  110* CO2  23 GLU  116* BUN  8  
CREA  0.92  
CA  8.8 ALB  3.9 TBILI  0.5 SGOT  12* ALT  19 Lab Results Component Value Date/Time Glucose (POC) 88 09/15/2012 12:18 PM  
 
No results for input(s): PH, PCO2, PO2, HCO3, FIO2 in the last 72 hours. Recent Labs 10/04/18 
 1421 INR  1.1 All Micro Results None I have reviewed previous records Assessment and Plan:  
  
Status migrainosus / Hx  Migraine - Admit to remote tele. Neuro consult. Hold off on usual sumatriptan and OTC meds. Continue topiramate. ER gave Toradol, IVF, Decadron, Lidocaine. Neurology to determine treatment. PTSD (post-traumatic stress disorder) / Anxiety and depression - Likely contributes to her sensation of headache. Continue prn clonazepam and lexapro. Telemetry reviewed:   normal sinus rhythm Risk of deterioration: medium Total time spent with patient: 50 Minutes Care Plan discussed with: Patient, Nursing Staff and >50% of time spent in counseling and coordination of care Discussed:  Care Plan      
___________________________________________________ Attending Physician: Xochitl Linda MD

## 2018-10-04 NOTE — ED NOTES
TRANSFER - OUT REPORT: 
 
Verbal report given to Alba MAYS(name) on Tony Fairbanks  being transferred to [facility](unit) for routine progression of care Report consisted of patients Situation, Background, Assessment and  
Recommendations(SBAR). Information from the following report(s) SBAR, Kardex, ED Summary, [facility] and Recent Results was reviewed with the receiving nurse. Lines:    
 
Opportunity for questions and clarification was provided. Patient transported with: 
 [employer]

## 2018-10-05 ENCOUNTER — TELEPHONE (OUTPATIENT)
Dept: NEUROLOGY | Age: 28
End: 2018-10-05

## 2018-10-05 ENCOUNTER — HOSPITAL ENCOUNTER (EMERGENCY)
Age: 28
Discharge: HOME | End: 2018-10-05
Attending: EMERGENCY MEDICINE | Admitting: EMERGENCY MEDICINE
Payer: COMMERCIAL

## 2018-10-05 VITALS
DIASTOLIC BLOOD PRESSURE: 69 MMHG | OXYGEN SATURATION: 97 % | BODY MASS INDEX: 21.92 KG/M2 | WEIGHT: 144.6 LBS | SYSTOLIC BLOOD PRESSURE: 112 MMHG | HEART RATE: 88 BPM | TEMPERATURE: 98.7 F | RESPIRATION RATE: 16 BRPM | HEIGHT: 68 IN

## 2018-10-05 DIAGNOSIS — R51.9 NONINTRACTABLE EPISODIC HEADACHE, UNSPECIFIED HEADACHE TYPE: Primary | ICD-10-CM

## 2018-10-05 LAB
COMMENT, HOLDF: NORMAL
SAMPLES BEING HELD,HOLD: NORMAL

## 2018-10-05 PROCEDURE — 96365 THER/PROPH/DIAG IV INF INIT: CPT

## 2018-10-05 PROCEDURE — 99284 EMERGENCY DEPT VISIT MOD MDM: CPT

## 2018-10-05 PROCEDURE — 96375 TX/PRO/DX INJ NEW DRUG ADDON: CPT

## 2018-10-05 PROCEDURE — 74011250636 HC RX REV CODE- 250/636: Performed by: EMERGENCY MEDICINE

## 2018-10-05 RX ORDER — DEXAMETHASONE SODIUM PHOSPHATE 4 MG/ML
9 INJECTION, SOLUTION INTRA-ARTICULAR; INTRALESIONAL; INTRAMUSCULAR; INTRAVENOUS; SOFT TISSUE
Status: COMPLETED | OUTPATIENT
Start: 2018-10-05 | End: 2018-10-05

## 2018-10-05 RX ORDER — ONDANSETRON 4 MG/1
4 TABLET, ORALLY DISINTEGRATING ORAL
Qty: 12 TAB | Refills: 0 | Status: SHIPPED | OUTPATIENT
Start: 2018-10-05 | End: 2018-12-05 | Stop reason: ALTCHOICE

## 2018-10-05 RX ORDER — KETOROLAC TROMETHAMINE 30 MG/ML
30 INJECTION, SOLUTION INTRAMUSCULAR; INTRAVENOUS
Status: COMPLETED | OUTPATIENT
Start: 2018-10-05 | End: 2018-10-05

## 2018-10-05 RX ORDER — ONDANSETRON 2 MG/ML
4 INJECTION INTRAMUSCULAR; INTRAVENOUS
Status: COMPLETED | OUTPATIENT
Start: 2018-10-05 | End: 2018-10-05

## 2018-10-05 RX ORDER — HYDROMORPHONE HYDROCHLORIDE 2 MG/ML
1 INJECTION, SOLUTION INTRAMUSCULAR; INTRAVENOUS; SUBCUTANEOUS ONCE
Status: COMPLETED | OUTPATIENT
Start: 2018-10-05 | End: 2018-10-05

## 2018-10-05 RX ORDER — LORAZEPAM 2 MG/ML
1 INJECTION INTRAMUSCULAR
Status: COMPLETED | OUTPATIENT
Start: 2018-10-05 | End: 2018-10-05

## 2018-10-05 RX ORDER — MAGNESIUM SULFATE HEPTAHYDRATE 40 MG/ML
2 INJECTION, SOLUTION INTRAVENOUS ONCE
Status: COMPLETED | OUTPATIENT
Start: 2018-10-05 | End: 2018-10-05

## 2018-10-05 RX ADMIN — KETOROLAC TROMETHAMINE 30 MG: 30 INJECTION, SOLUTION INTRAMUSCULAR at 16:33

## 2018-10-05 RX ADMIN — ONDANSETRON 4 MG: 2 INJECTION INTRAMUSCULAR; INTRAVENOUS at 16:56

## 2018-10-05 RX ADMIN — LORAZEPAM 1 MG: 2 INJECTION INTRAMUSCULAR; INTRAVENOUS at 16:30

## 2018-10-05 RX ADMIN — HYDROMORPHONE HYDROCHLORIDE 1 MG: 2 INJECTION INTRAMUSCULAR; INTRAVENOUS; SUBCUTANEOUS at 17:23

## 2018-10-05 RX ADMIN — MAGNESIUM SULFATE HEPTAHYDRATE 2 G: 40 INJECTION, SOLUTION INTRAVENOUS at 16:34

## 2018-10-05 RX ADMIN — DEXAMETHASONE SODIUM PHOSPHATE 9 MG: 4 INJECTION, SOLUTION INTRAMUSCULAR; INTRAVENOUS at 16:31

## 2018-10-05 NOTE — ED NOTES
Placed IV, unable to obtain labs. Pt states she was seen at [facility] ED last evening for the same migraine, but the medications did not help. She states she requested them to consult her neurologist and admit her for DHE administration, but they refused to talk to the physician. Pt states she can't have the DHE until 24 hours from her last Imitrex dose, which was noon today. Pt states she normally happens is she gets admitted with \"Dilaudid and Ativan\" until she can get the DHE, then the DHE works, but usually takes 2-3 days. Male  at bedside. Lights dimmed. Warm blanket provided.

## 2018-10-05 NOTE — ED PROVIDER NOTES
HPI Comments: 25 y.o. female with past medical history significant for migraines, endometriosis, anxiety, and depression who presents from home with chief complaint of headache. Pt reports constant 10/10 frontal and occipital headache for 2 days accompanied by fatigue. She states she spoke w/ her neurologist, Dr. Cresencio Wheeler, yesterday who called in a prescription for prednisone. Pt spoke with the on-call neurologist today who recommended taking Imitrex, ibuprofen, Zofran, and Xanax, but she has not taken any medication today because she \"didn't want to interfere with what (she) may be given here today\". She states she is unable to take migraine cocktails because each active ingredient causes her to feel \"skin crawling\". Pt notes she has been given both Dilaudid and Ativan in the past by Dr. Cresencio Wheeler, which significantly improved her headache. Pt states she has tried taking Toradol, dexamethasone, nasal lidocaine, and magnesium in the past w/ minimal relief. She currently requests a combination of Dilaudid, Ativan, and DHE. Pt denies head injury. There are no other acute medical concerns at this time. Note written by Hany Perales, as dictated by Justin Hernandez MD 4:14 PM  
 
The history is provided by the patient. Past Medical History:  
Diagnosis Date  Anxiety and depression  Bartholin's gland abscess x8  Endometriosis  Migraine  Ovarian cyst   
 Panic attack Past Surgical History:  
Procedure Laterality Date  HX APPENDECTOMY  04/26/2017  HX BARTHOLIN CYST MARSUPIALIZATION    
 2011, 2018  HX BREAST LUMPECTOMY Right  HX BUNIONECTOMY  HX CYST REMOVAL    
 HX GYN Left   
 bartholin cyst drainage X 6  
 HX GYN Left   
 bartholin cyst marsupilization  HX OTHER SURGICAL    
 laparascopy  HX PELVIC LAPAROSCOPY    
 HX WISDOM TEETH EXTRACTION Family History:  
Problem Relation Age of Onset  Diabetes Maternal Aunt  Heart Disease Maternal Aunt  Stroke Maternal Aunt  Heart Disease Maternal Grandmother  No Known Problems Mother Social History Social History  Marital status: SINGLE Spouse name: N/A  
 Number of children: N/A  
 Years of education: N/A Occupational History  Not on file. Social History Main Topics  Smoking status: Former Smoker  Smokeless tobacco: Never Used  Alcohol use No  
   Comment: social  
 Drug use: No  
 Sexual activity: Yes  
  Partners: Male Birth control/ protection: Pill Other Topics Concern  Not on file Social History Narrative ALLERGIES: Benadryl [diphenhydramine hcl]; Compazine [prochlorperazine edisylate]; Depacon [valproate sodium]; Haldol [haloperidol lactate]; Promethazine; and Reglan [metoclopramide] Review of Systems Constitutional: Positive for fatigue. Negative for appetite change, chills and fever. HENT: Negative for rhinorrhea, sore throat and trouble swallowing. Eyes: Negative for photophobia. Respiratory: Negative for cough and shortness of breath. Cardiovascular: Negative for chest pain and palpitations. Gastrointestinal: Negative for abdominal pain, nausea and vomiting. Genitourinary: Negative for dysuria, frequency and hematuria. Musculoskeletal: Negative for arthralgias. Neurological: Positive for headaches. Negative for dizziness, syncope and weakness. Psychiatric/Behavioral: Negative for behavioral problems. The patient is not nervous/anxious. All other systems reviewed and are negative. Vitals:  
 10/05/18 1509 BP: 108/72 Pulse: 88 Resp: 16 Temp: 98.7 °F (37.1 °C) SpO2: 100% Weight: 65.6 kg (144 lb 9.6 oz) Height: 5' 8\" (1.727 m) Physical Exam  
Constitutional: She appears well-developed and well-nourished. Appears to have significant headache. No distress. HENT:  
Head: Normocephalic and atraumatic. Mouth/Throat: Oropharynx is clear and moist.  
Eyes: EOM are normal. Pupils are equal, round, and reactive to light. Neck: Normal range of motion. Neck supple. Cardiovascular: Normal rate, regular rhythm, normal heart sounds and intact distal pulses. Exam reveals no gallop and no friction rub. No murmur heard. Pulmonary/Chest: Effort normal. No respiratory distress. She has no wheezes. She has no rales. Abdominal: Soft. There is no tenderness. There is no rebound. Musculoskeletal: Normal range of motion. She exhibits no tenderness. Neurological: She is alert. No cranial nerve deficit. Motor; symmetric Skin: No erythema. Psychiatric: She has a normal mood and affect. Her behavior is normal.  
Nursing note and vitals reviewed. Note written by Hany Louie, as dictated by Shade Delarosa MD 4:14 PM 
 
[facility] 
 
 
ED Course Procedures Note: Patient's headache is now resolved. Often when her headaches go away they don't come back for 6 months or so ; she is running out of Zofran so another prescription will be ordered. Neurology will be consulted to let them know how she is doing.  
Shade Delarosa MD 
6:04 PM

## 2018-10-05 NOTE — ED TRIAGE NOTES
Pt to ED for c/o frontal and midline migraine that began again today around noon. Pt seen at [address] [place] [address] yesterday for same issue. +photophobia. +nausea. Pt had normal CT labwork at [facility] yesterday. Imitrex taken at noon today. Pt given lidocaine intranasally, which pt states made pain worse. Reports DHE has helped in the past but can't be given within 24 hours of Imitrex per her neurologist, Dr. Lynn Lopez.

## 2018-10-05 NOTE — TELEPHONE ENCOUNTER
I spoke with pt. Advised we have nothing available today, Based on note from yesterday pt advised to return to ER for DHE therapy. Pt states she was told this could not be done until noon today as she has to wait 24hrs after taking imitrex to do so. Pt will return to ER by noon today. She asked that Dr Hilario Ceballos call her.

## 2018-10-05 NOTE — DISCHARGE INSTRUCTIONS
Recurring Migraine Headache: Care Instructions  Your Care Instructions  Migraines are painful, throbbing headaches. They often start on one side of the head. They may cause nausea and vomiting and make you sensitive to light, sound, or smell. Some people may have only a few migraines throughout life. Others have them as often as several times a month. The goal of treatment is to reduce the number of migraines you have and relieve your symptoms. Even with treatment, you may continue to have migraines. You play an important role in dealing with your headaches. Work on avoiding things that seem to trigger your migraines. When you feel a headache coming on, act quickly to stop it before it gets worse. Follow-up care is a key part of your treatment and safety. Be sure to make and go to all appointments, and call your doctor if you are having problems. It's also a good idea to know your test results and keep a list of the medicines you take. How can you care for yourself at home? · Do not drive if you have taken a prescription pain medicine. · Rest in a quiet, dark room until your headache is gone. Close your eyes and try to relax or go to sleep. Do not watch TV or read. · Put a cold, moist cloth or cold pack on the painful area for 10 to 20 minutes at a time. Put a thin cloth between the cold pack and your skin. · Have someone gently massage your neck and shoulders. · Take your medicines exactly as prescribed. Call your doctor if you think you are having a problem with your medicine. You will get more details on the specific medicines your doctor prescribes. To prevent migraines  · Keep a headache diary so you can figure out what triggers your headaches. Avoiding triggers may help you prevent headaches. Record when each headache began, how long it lasted, and what the pain was like. Use words like throbbing, aching, stabbing, or dull. Write down any other symptoms you had with the headache.  These may include nausea, flashing lights or dark spots, or sensitivity to bright light or loud noise. Note if the headache occurred near your period. List anything that might have triggered the headache. Triggers may include certain foods (chocolate, cheese, wine) or odors, smoke, bright light, stress, or lack of sleep. · If your doctor has prescribed medicine for your migraines, take it as directed. You may have medicine that you take only when you get a migraine and medicine that you take all the time to help prevent migraines. ¨ If your doctor has prescribed medicine for when you get a headache, take it at the first sign of a migraine, unless your doctor has given you other instructions. ¨ If your doctor has prescribed medicine to prevent migraines, take it exactly as prescribed. Call your doctor if you think you are having a problem with your medicine. · Find healthy ways to deal with stress. Migraines are most common during or right after stressful times. Take time to relax before and after you do something that has caused a migraine in the past.  · Try to keep your muscles relaxed by keeping good posture. Check your jaw, face, neck, and shoulder muscles for tension. Try to relax them. When sitting at a desk, change positions often. Stretch for 30 seconds each hour. · Get regular sleep and exercise. · Eat regular meals, and avoid foods and drinks that often trigger migraines. These include chocolate and alcohol, especially red wine and port. Chemicals used in food, such as aspartame and monosodium glutamate (MSG), also can trigger migraines. So can some food additives, such as those found in hot dogs, boyce, cold cuts, aged cheeses, and pickled foods. · Limit caffeine by not drinking too much coffee, tea, or soda. Do not quit caffeine suddenly, because that can also give you migraines. · Do not smoke or allow others to smoke around you.  If you need help quitting, talk to your doctor about stop-smoking programs and medicines. These can increase your chances of quitting for good. · If you are taking birth control pills or hormone therapy, talk to your doctor about whether they are triggering your migraines. When should you call for help? Call 911 anytime you think you may need emergency care. For example, call if:    · You have symptoms of a stroke. These may include:  ¨ Sudden numbness, tingling, weakness, or loss of movement in your face, arm, or leg, especially on only one side of your body. ¨ Sudden vision changes. ¨ Sudden trouble speaking. ¨ Sudden confusion or trouble understanding simple statements. ¨ Sudden problems with walking or balance. ¨ A sudden, severe headache that is different from past headaches.    Call your doctor now or seek immediate medical care if:    · You develop a fever and a stiff neck.     · You have new nausea and vomiting, or you cannot keep down food or liquids.    Watch closely for changes in your health, and be sure to contact your doctor if:    · You have a headache that does not get better within 1 or 2 days.     · Your headaches get worse or happen more often. Where can you learn more? Go to http://michele-lucy.info/. Enter V975 in the search box to learn more about \"Recurring Migraine Headache: Care Instructions. \"  Current as of: June 4, 2018  Content Version: 11.8  © 2751-0624 [employer]. Care instructions adapted under license by [employer] (which disclaims liability or warranty for this information). If you have questions about a medical condition or this instruction, always ask your healthcare professional. [place] [address] any warranty or liability for your use of this information.

## 2018-10-05 NOTE — DISCHARGE INSTRUCTIONS

## 2018-10-05 NOTE — TELEPHONE ENCOUNTER
Received a call from the [address] [place] [address] ED. The pt was admitted to [facility] but left there after the neurologist did not come in to see her and she was not given dilaudid. She is now in the [address] [place] [address] ED. I do not recommend narcotics and she can be placed on DHE protocol in the morning if she chooses to be admitted to [address] [place] [address] ED [facility]. Her behavior is concerning for drug seeking, and at best is inconsiderate and inappropriate.

## 2018-10-05 NOTE — TELEPHONE ENCOUNTER
She cannot take the steroids as directed or she can request to be admitted to the hospital for DHE protocol which she will need to be here for a few days. Dilaudid will not help the headache. It will only take it down briefly and then it will return as expected. I do not recommend Dilaudid.

## 2018-10-05 NOTE — TELEPHONE ENCOUNTER
Pt called again and put her boyfriend on the phone. He said the ER didn't do much for her and she is still having a lot of pain. ER refused to give her the Dilaudid after originally saying she could have it. Spoke with Dr Sebastian Baker and she advised pt to take the medication we sent in yesterday, or to return to the ER as advised earlier. Called pt back and relayed this information.

## 2018-10-05 NOTE — ED TRIAGE NOTES
Pt c/o migraine that won't resolve. Pt reports migraine started 3 days ago, has been seen twice at [facility] and here last night. Dr. Fernanda Kimball referred pt to return to ED.

## 2018-10-05 NOTE — ED PROVIDER NOTES
HPI Comments: 25 y.o. female with past medical history significant for migraine headaches, endometriosis, anxiety/depression, who presents ambulatory to the ED accompanied by family member, with chief complaint of headache. Pt c/o constant tight 10/10 frontal HA into eyes to the back of her head since 1100 this AM. Pt reports that her neurologist usually gives her narcotics, which she was given last night and initially helped. Associated sx include nausea, visual disturbance with floaters, photophobia, phonophobia. Denies vomiting or fever. There are no other acute medical concerns at this time. Social hx: Tobacco use (former smoker); EtOH use (social); Negative for Illicit Drug use Neurologist: DO Carlos Giang chart - Pt was seen in the ED yesterday and d.c home after being given toradol, zofran, and fioricet . Pt returned to the ED earlier today and had a negative head CT and was given ativan, zofran, and lidocaine. Pt admitted to [facility] and discharged at patient request.   
 
Note written by Hany Morrison, as dictated by Bebe Underwood MD 8:49 PM 
 
 
The history is provided by the patient. No  was used. Past Medical History:  
Diagnosis Date  Anxiety and depression  Bartholin's gland abscess x8  Endometriosis  Migraine  Ovarian cyst   
 Panic attack Past Surgical History:  
Procedure Laterality Date  HX APPENDECTOMY  04/26/2017  HX BARTHOLIN CYST MARSUPIALIZATION    
 2011, 2018  HX BREAST LUMPECTOMY Right  HX BUNIONECTOMY  HX CYST REMOVAL    
 HX GYN Left   
 bartholin cyst drainage X 6  
 HX GYN Left   
 bartholin cyst marsupilization  HX OTHER SURGICAL    
 laparascopy  HX PELVIC LAPAROSCOPY    
 HX WISDOM TEETH EXTRACTION Family History:  
Problem Relation Age of Onset  Diabetes Maternal Aunt  Heart Disease Maternal Aunt  Stroke Maternal Aunt  Heart Disease Maternal Grandmother  No Known Problems Mother Social History Social History  Marital status: SINGLE Spouse name: N/A  
 Number of children: N/A  
 Years of education: N/A Occupational History  Not on file. Social History Main Topics  Smoking status: Former Smoker  Smokeless tobacco: Never Used  Alcohol use No  
   Comment: social  
 Drug use: No  
 Sexual activity: Yes  
  Partners: Male Birth control/ protection: Pill Other Topics Concern  Not on file Social History Narrative ALLERGIES: Benadryl [diphenhydramine hcl]; Compazine [prochlorperazine edisylate]; Depacon [valproate sodium]; Haldol [haloperidol lactate]; Promethazine; and Reglan [metoclopramide] Review of Systems Constitutional: Negative for chills and fever. HENT: Negative for congestion and sore throat. Eyes: Positive for photophobia and visual disturbance. Respiratory: Negative for shortness of breath. Cardiovascular: Negative for chest pain. Gastrointestinal: Positive for nausea. Negative for abdominal pain, constipation, diarrhea and vomiting. Genitourinary: Negative for difficulty urinating, dysuria and hematuria. Musculoskeletal: Negative for back pain and neck pain. Neurological: Positive for headaches. Negative for dizziness and light-headedness. All other systems reviewed and are negative. Vitals:  
 10/04/18 2005 BP: 103/72 Pulse: (!) 107 Resp: 15 Temp: 98.4 °F (36.9 °C) SpO2: 97% Weight: 64.6 kg (142 lb 6.4 oz) Height: 5' 8\" (1.727 m) Physical Exam  
Nursing note and vitals reviewed. Constitutional: appears well-developed and well-nourished. No distress. HENT:  
Head: Normocephalic and atraumatic. Sclera anicteric Nose: No rhinorrhea Mouth/Throat: Oropharynx is clear and moist. Pharynx normal 
Eyes: Conjunctivae are normal. Pupils are equal, round, and reactive to light. Right eye exhibits no discharge. Left eye exhibits no discharge. No scleral icterus. Neck: Painless normal range of motion. Supple. NO MENINGISMUS. Cardiovascular: Normal rate, regular rhythm, normal heart sounds and intact distal pulses. Exam reveals no gallop and no friction rub. No murmur heard. Pulmonary/Chest: Effort normal and breath sounds normal. No respiratory distress. no wheezes. no rales. Abdominal: Soft. Bowel sounds are normal. Exhibits no distension and no mass. No tenderness. No guarding. Musculoskeletal: Normal range of motion. no tenderness. No edema Lymphadenopathy:   No cervical adenopathy. Neurological:  Alert and oriented to person, place, and time. Coordination normal. CN 2-12 intact. Moving all extremities. Skin: Skin is warm and dry. No rash noted. No pallor. Note written by Hany Tang, as dictated by Yolanda Jeter MD 9:35 PM 
 
 
MDM NORMAL NEURO EXAM.  AFEBRILE. CT HEAD NEGATIVE. MULTIPLE ED VISITS FOR SAME. WAS ADMITTED TO [facility] AND WAS DISCHARGED AT HER REQUEST.  REQUESTING DILAUDID AS IT HAS WORKED IN THE PAST. WILL D/W NEUROLOGY. ED Course Procedures CONSULT NOTE: 
9:20 Riana Contreras MD spoke with Dr. Ena Cox, Consult for Neurology. Discussed available diagnostic tests and clinical findings. Dr. Ena Cox recommends giving no narcotics and offering admission and giving toradol until she can be given DHE in the AM.  Other option is f/u with Dr. Cresencio Wheeler tomorrow. PROGRESS NOTE: 
9:35 PM 
Pt was offered admission and declined. She will be d/c home and call her neurologist in the AM. 9:38 PM 
Patient's results have been reviewed with them.   Patient and/or family have verbally conveyed their understanding and agreement of the patient's signs, symptoms, diagnosis, treatment and prognosis and additionally agree to follow up as recommended or return to the Emergency Room should their condition change prior to follow-up. Discharge instructions have also been provided to the patient with some educational information regarding their diagnosis as well a list of reasons why they would want to return to the ER prior to their follow-up appointment should their condition change. Recent Results (from the past 24 hour(s)) CBC WITH AUTOMATED DIFF Collection Time: 10/04/18  2:21 PM  
Result Value Ref Range WBC 4.6 3.6 - 11.0 K/uL  
 RBC 4.55 3.80 - 5.20 M/uL  
 HGB 13.5 11.5 - 16.0 g/dL HCT 41.0 35.0 - 47.0 % MCV 90.1 80.0 - 99.0 FL  
 MCH 29.7 26.0 - 34.0 PG  
 MCHC 32.9 30.0 - 36.5 g/dL  
 RDW 12.6 11.5 - 14.5 % PLATELET 771 136 - 346 K/uL MPV 9.9 8.9 - 12.9 FL  
 NRBC 0.0 0  WBC ABSOLUTE NRBC 0.00 0.00 - 0.01 K/uL NEUTROPHILS 72 32 - 75 % LYMPHOCYTES 22 12 - 49 % MONOCYTES 5 5 - 13 % EOSINOPHILS 0 0 - 7 % BASOPHILS 1 0 - 1 % IMMATURE GRANULOCYTES 0 0.0 - 0.5 % ABS. NEUTROPHILS 3.3 1.8 - 8.0 K/UL  
 ABS. LYMPHOCYTES 1.0 0.8 - 3.5 K/UL  
 ABS. MONOCYTES 0.2 0.0 - 1.0 K/UL  
 ABS. EOSINOPHILS 0.0 0.0 - 0.4 K/UL  
 ABS. BASOPHILS 0.0 0.0 - 0.1 K/UL  
 ABS. IMM. GRANS. 0.0 0.00 - 0.04 K/UL  
 DF AUTOMATED METABOLIC PANEL, COMPREHENSIVE Collection Time: 10/04/18  2:21 PM  
Result Value Ref Range Sodium 144 136 - 145 mmol/L Potassium 3.8 3.5 - 5.1 mmol/L Chloride 110 (H) 97 - 108 mmol/L  
 CO2 23 21 - 32 mmol/L Anion gap 11 5 - 15 mmol/L Glucose 116 (H) 65 - 100 mg/dL BUN 8 6 - 20 MG/DL Creatinine 0.92 0.55 - 1.02 MG/DL  
 BUN/Creatinine ratio 9 (L) 12 - 20 GFR est AA >60 >60 ml/min/1.73m2 GFR est non-AA >60 >60 ml/min/1.73m2 Calcium 8.8 8.5 - 10.1 MG/DL Bilirubin, total 0.5 0.2 - 1.0 MG/DL  
 ALT (SGPT) 19 12 - 78 U/L  
 AST (SGOT) 12 (L) 15 - 37 U/L Alk. phosphatase 44 (L) 45 - 117 U/L Protein, total 7.1 6.4 - 8.2 g/dL Albumin 3.9 3.5 - 5.0 g/dL Globulin 3.2 2.0 - 4.0 g/dL A-G Ratio 1.2 1.1 - 2.2 PTT Collection Time: 10/04/18  2:21 PM  
Result Value Ref Range aPTT 31.1 22.1 - 32.0 sec  
 aPTT, therapeutic range     58.0 - 77.0 SECS  
PROTHROMBIN TIME + INR Collection Time: 10/04/18  2:21 PM  
Result Value Ref Range INR 1.1 0.9 - 1.1 Prothrombin time 11.0 9.0 - 11.1 sec SAMPLES BEING HELD Collection Time: 10/04/18  2:21 PM  
Result Value Ref Range SAMPLES BEING HELD SST. RED   
 COMMENT Add-on orders for these samples will be processed based on acceptable specimen integrity and analyte stability, which may vary by analyte. HCG URINE, QL. - POC Collection Time: 10/04/18  2:44 PM  
Result Value Ref Range Pregnancy test,urine (POC) NEGATIVE  NEG    
URINALYSIS W/MICROSCOPIC Collection Time: 10/04/18  5:58 PM  
Result Value Ref Range Color DARK YELLOW Appearance TURBID (A) CLEAR Specific gravity 1.027 1.003 - 1.030    
 pH (UA) 7.0 5.0 - 8.0 Protein TRACE (A) NEG mg/dL Glucose NEGATIVE  NEG mg/dL Ketone TRACE (A) NEG mg/dL Blood NEGATIVE  NEG Urobilinogen 1.0 0.2 - 1.0 EU/dL Nitrites NEGATIVE  NEG Leukocyte Esterase NEGATIVE  NEG    
 WBC 0-4 0 - 4 /hpf  
 RBC 0-5 0 - 5 /hpf Epithelial cells MODERATE (A) FEW /lpf Bacteria NEGATIVE  NEG /hpf Hyaline cast 2-5 0 - 5 /lpf  
BILIRUBIN, CONFIRM Collection Time: 10/04/18  5:58 PM  
Result Value Ref Range Bilirubin UA, confirm NEGATIVE  NEG Ct Head Wo Cont Result Date: 10/4/2018 EXAM:  CT HEAD WO CONT INDICATION:   headache COMPARISON: CT head 3/4/2017. TECHNIQUE: Unenhanced CT of the head was performed using 5 mm images. Brain and bone windows were generated. CT dose reduction was achieved through use of a standardized protocol tailored for this examination and automatic exposure control for dose modulation. FINDINGS: The ventricles are normal in size and position.  Basilar cisterns are patent. No midline shift. There is no evidence of acute infarct, hemorrhage, or extraaxial fluid collection. The paranasal sinuses, mastoid air cells, and middle ears are clear. The orbital contents are within normal limits. There are no significant osseous or extracranial soft tissue lesions. IMPRESSION: 1. No evidence of intracranial abnormality.

## 2018-10-05 NOTE — ED NOTES
2153:  MD reviewed discharge instructions and options with patient and patient verbalized understanding. RN reviewed discharge instructions using teachback method. Pt ambulated to exit without difficulty and in no signs of acute distress escorted by spouse, and spouse will drive home. No complaints or needs expressed at this time. Patient was counseled on medications prescribed at discharge. VSS, verbalized relief from most intense pain. Patient to call Dr. Naif Abebe in the morning for appointment, is aware of protocol with home medications.

## 2018-10-05 NOTE — ED NOTES
1700: Patient requesting dilaudid for migraine. MD notified. ORders received. 1730: Patient requesting to have neurology contacted for potential administration of medication. MD notified, states to wait 30 minutes before taken further action.

## 2018-10-06 LAB
BACTERIA SPEC CULT: NORMAL
CC UR VC: NORMAL
SERVICE CMNT-IMP: NORMAL

## 2018-10-07 ENCOUNTER — HOSPITAL ENCOUNTER (OUTPATIENT)
Age: 28
Setting detail: OBSERVATION
Discharge: HOME | End: 2018-10-10
Attending: EMERGENCY MEDICINE | Admitting: HOSPITALIST
Payer: COMMERCIAL

## 2018-10-07 DIAGNOSIS — G43.901 STATUS MIGRAINOSUS: Primary | ICD-10-CM

## 2018-10-07 PROBLEM — G43.909 MIGRAINE HEADACHE: Status: ACTIVE | Noted: 2018-10-07

## 2018-10-07 PROCEDURE — 96361 HYDRATE IV INFUSION ADD-ON: CPT

## 2018-10-07 PROCEDURE — 96375 TX/PRO/DX INJ NEW DRUG ADDON: CPT

## 2018-10-07 PROCEDURE — 74011250637 HC RX REV CODE- 250/637: Performed by: EMERGENCY MEDICINE

## 2018-10-07 PROCEDURE — 99285 EMERGENCY DEPT VISIT HI MDM: CPT

## 2018-10-07 PROCEDURE — 74011250636 HC RX REV CODE- 250/636: Performed by: INTERNAL MEDICINE

## 2018-10-07 PROCEDURE — 99218 HC RM OBSERVATION: CPT

## 2018-10-07 PROCEDURE — 74011250636 HC RX REV CODE- 250/636: Performed by: EMERGENCY MEDICINE

## 2018-10-07 PROCEDURE — 96365 THER/PROPH/DIAG IV INF INIT: CPT

## 2018-10-07 PROCEDURE — 96376 TX/PRO/DX INJ SAME DRUG ADON: CPT

## 2018-10-07 RX ORDER — KETOROLAC TROMETHAMINE 30 MG/ML
30 INJECTION, SOLUTION INTRAMUSCULAR; INTRAVENOUS
Status: COMPLETED | OUTPATIENT
Start: 2018-10-07 | End: 2018-10-07

## 2018-10-07 RX ORDER — ONDANSETRON 2 MG/ML
4 INJECTION INTRAMUSCULAR; INTRAVENOUS
Status: DISCONTINUED | OUTPATIENT
Start: 2018-10-07 | End: 2018-10-08 | Stop reason: SDUPTHER

## 2018-10-07 RX ORDER — ACETAMINOPHEN 325 MG/1
650 TABLET ORAL
Status: DISCONTINUED | OUTPATIENT
Start: 2018-10-07 | End: 2018-10-10 | Stop reason: HOSPADM

## 2018-10-07 RX ORDER — ONDANSETRON 2 MG/ML
4 INJECTION INTRAMUSCULAR; INTRAVENOUS
Status: COMPLETED | OUTPATIENT
Start: 2018-10-07 | End: 2018-10-07

## 2018-10-07 RX ORDER — HYDROMORPHONE HYDROCHLORIDE 2 MG/ML
1 INJECTION, SOLUTION INTRAMUSCULAR; INTRAVENOUS; SUBCUTANEOUS
Status: DISCONTINUED | OUTPATIENT
Start: 2018-10-07 | End: 2018-10-09 | Stop reason: CLARIF

## 2018-10-07 RX ORDER — LORAZEPAM 2 MG/ML
1 INJECTION INTRAMUSCULAR
Status: COMPLETED | OUTPATIENT
Start: 2018-10-07 | End: 2018-10-07

## 2018-10-07 RX ORDER — MAGNESIUM SULFATE HEPTAHYDRATE 40 MG/ML
2 INJECTION, SOLUTION INTRAVENOUS ONCE
Status: COMPLETED | OUTPATIENT
Start: 2018-10-07 | End: 2018-10-07

## 2018-10-07 RX ORDER — DEXAMETHASONE SODIUM PHOSPHATE 10 MG/ML
9 INJECTION INTRAMUSCULAR; INTRAVENOUS ONCE
Status: COMPLETED | OUTPATIENT
Start: 2018-10-07 | End: 2018-10-07

## 2018-10-07 RX ORDER — OXYCODONE AND ACETAMINOPHEN 5; 325 MG/1; MG/1
1 TABLET ORAL
Status: DISCONTINUED | OUTPATIENT
Start: 2018-10-07 | End: 2018-10-10 | Stop reason: HOSPADM

## 2018-10-07 RX ORDER — DIHYDROERGOTAMINE MESYLATE 1 MG/ML
1 INJECTION, SOLUTION INTRAMUSCULAR; INTRAVENOUS; SUBCUTANEOUS ONCE
Status: COMPLETED | OUTPATIENT
Start: 2018-10-07 | End: 2018-10-07

## 2018-10-07 RX ORDER — BUTALBITAL, ACETAMINOPHEN AND CAFFEINE 50; 325; 40 MG/1; MG/1; MG/1
2 TABLET ORAL
Status: COMPLETED | OUTPATIENT
Start: 2018-10-07 | End: 2018-10-07

## 2018-10-07 RX ORDER — SUMATRIPTAN 6 MG/.5ML
6 INJECTION, SOLUTION SUBCUTANEOUS
Status: DISCONTINUED | OUTPATIENT
Start: 2018-10-07 | End: 2018-10-07

## 2018-10-07 RX ORDER — ONDANSETRON 2 MG/ML
INJECTION INTRAMUSCULAR; INTRAVENOUS
Status: DISPENSED
Start: 2018-10-07 | End: 2018-10-08

## 2018-10-07 RX ORDER — SODIUM CHLORIDE 9 MG/ML
125 INJECTION, SOLUTION INTRAVENOUS CONTINUOUS
Status: DISCONTINUED | OUTPATIENT
Start: 2018-10-07 | End: 2018-10-10 | Stop reason: HOSPADM

## 2018-10-07 RX ADMIN — SODIUM CHLORIDE 1000 ML: 900 INJECTION, SOLUTION INTRAVENOUS at 20:47

## 2018-10-07 RX ADMIN — LORAZEPAM 1 MG: 2 INJECTION INTRAMUSCULAR; INTRAVENOUS at 22:30

## 2018-10-07 RX ADMIN — BUTALBITAL, ACETAMINOPHEN AND CAFFEINE 2 TABLET: 50; 325; 40 TABLET ORAL at 20:46

## 2018-10-07 RX ADMIN — ONDANSETRON HYDROCHLORIDE 4 MG: 2 INJECTION INTRAMUSCULAR; INTRAVENOUS at 21:59

## 2018-10-07 RX ADMIN — MAGNESIUM SULFATE IN WATER 2 G: 40 INJECTION, SOLUTION INTRAVENOUS at 21:49

## 2018-10-07 RX ADMIN — HYDROMORPHONE HYDROCHLORIDE 1 MG: 2 INJECTION INTRAMUSCULAR; INTRAVENOUS; SUBCUTANEOUS at 23:25

## 2018-10-07 RX ADMIN — DIHYDROERGOTAMINE MESYLATE 1 MG: 1 INJECTION, SOLUTION INTRAMUSCULAR; INTRAVENOUS; SUBCUTANEOUS at 20:50

## 2018-10-07 RX ADMIN — KETOROLAC TROMETHAMINE 30 MG: 30 INJECTION, SOLUTION INTRAMUSCULAR at 20:30

## 2018-10-07 RX ADMIN — DEXAMETHASONE SODIUM PHOSPHATE 9 MG: 10 INJECTION, SOLUTION INTRAMUSCULAR; INTRAVENOUS at 20:30

## 2018-10-07 RX ADMIN — ONDANSETRON 4 MG: 2 INJECTION INTRAMUSCULAR; INTRAVENOUS at 20:30

## 2018-10-07 NOTE — ED TRIAGE NOTES
Patient arrives c/o migraine x 2.5 hours. Patient has been on prednisone taper and her migraine medications with no relief. Patient was seen here two days ago and felt better up until 2.5 hours ago.

## 2018-10-07 NOTE — IP AVS SNAPSHOT
[address] 
[phone] Patient: Jose Sims MRN: [id number] [facility] [place]1/[facility]/7[place] About your hospitalization You were admitted on:  October 7, 2018 You last received care in the:  [facility] 6S NEURO-SCI TELE You were discharged on:  October 10, 2018 Why you were hospitalized Your primary diagnosis was:  Status Migrainosus Your diagnoses also included:  Migraine Headache, Migraine With Status Migrainosus Follow-up Information Follow up With Details Comments Contact Info [address] [place] [address], DO  keep your appointment on Friday at 3 pm [address] [place] [address] [place] [address] [facility] Neurology [address] 
[phone] None   None (395) Patient stated that they have no PCP Your Scheduled Appointments Friday October 12, 2018  3:30 PM EDT Follow Up with Vanessa Sanchez NP [facility] Neurology Clinic at [address]  
[phone] Wednesday November 07, 2018  1:30 PM EST Follow Up with Vanessa Sanchez NP [facility] Neurology Clinic at [address]  
[phone] Discharge Orders None A check alvaro indicates which time of day the medication should be taken. My Medications CONTINUE taking these medications Instructions Each Dose to Equal  
 Morning Noon Evening Bedtime ACZONE 7.5 % Glwp Generic drug:  dapsone Your last dose was: Your next dose is:    
   
   
 APPLY TO AFFECTED AREA ONCE DAILY IN THE MORNING (AS NEEDED) adapalene-benzoyl peroxide 0.1-2.5 % Glwp Your last dose was: Your next dose is:    
   
   
 APPLY TO AFFECTED AREAS AT BEDTIME X 30 DAYS (AS NEEDED) clonazePAM 1 mg tablet Commonly known as:  Sarah Palacios Your last dose was: Your next dose is: Take 1 mg by mouth three (3) times daily. 1 mg LEXAPRO 20 mg tablet Generic drug:  escitalopram oxalate Your last dose was: Your next dose is: Take 20 mg by mouth nightly. Indications: Generalized Anxiety Disorder 20 mg  
    
   
   
   
  
 methylPREDNISolone 4 mg tablet Commonly known as:  Geetha Pinto Your last dose was: Your next dose is: Take 4 mg by mouth Specific Days and Specific Times. 4 mg MIGRAINE RELIEF PO Your last dose was: Your next dose is: Take 2 Tabs by mouth once as needed (Migraine). 2 Tab  
    
   
   
   
  
 ondansetron 4 mg disintegrating tablet Commonly known as:  ZOFRAN ODT Your last dose was: Your next dose is: Take 1 Tab by mouth every eight (8) hours as needed for Nausea. 4 mg  
    
   
   
   
  
 SUMAtriptan succinate 3 mg/0.5 mL Pnij Commonly known as:  Lanny Ax Your last dose was: Your next dose is:    
   
   
 3 mg by SubCUTAneous route as needed. 3 mg  
    
   
   
   
  
 topiramate 100 mg tablet Commonly known as:  TOPAMAX Your last dose was: Your next dose is: Take 1 Tab by mouth nightly. 100 mg  
    
   
   
   
  
 XANAX 1 mg tablet Generic drug:  ALPRAZolam  
   
Your last dose was: Your next dose is: Take 1 mg by mouth once as needed (panic attack). 1 mg STOP taking these medications MIRENA 20 mcg/24 hr (5 years) Iud  
Generic drug:  levonorgestrel Discharge Instructions Discharge Instructions PATIENT ID: Brissa Evans MRN: [id number] YOB: 1990 DATE OF ADMISSION: 10/7/2018  8:07 PM   
DATE OF DISCHARGE: 10/10/2018 PRIMARY CARE PROVIDER: None ATTENDING PHYSICIAN: Balta Lindo MD 
DISCHARGING PROVIDER: Lincoln Drake NP To contact this individual call [phone] and ask the  to page. If unavailable ask to be transferred the Adult Hospitalist Department. DISCHARGE DIAGNOSES: Migraine CONSULTATIONS: IP CONSULT TO NEUROLOGY PROCEDURES/SURGERIES: * No surgery found * PENDING TEST RESULTS:  
At the time of discharge the following test results are still pending: none FOLLOW UP APPOINTMENTS:  
Follow-up Information Follow up With Details Comments Contact Itaol Rendon DO  keep your appointment on Friday at 3 pm [address] [facility] [address] [facility] Neurology [address] 
[phone] ADDITIONAL CARE RECOMMENDATIONS:  
Finish steroid pack and follow up with Dr. Rosalinda Watkins on Friday Keep a headache journal 
Avoid alcohol DIET: Regular ACTIVITY: Activity as tolerated WOUND CARE: none EQUIPMENT needed: none DISCHARGE MEDICATIONS: 
 See Medication Reconciliation Form · It is important that you take the medication exactly as they are prescribed. · Keep your medication in the bottles provided by the pharmacist and keep a list of the medication names, dosages, and times to be taken in your wallet. · Do not take other medications without consulting your doctor. NOTIFY YOUR PHYSICIAN FOR ANY OF THE FOLLOWING:  
Fever over 101 degrees for 24 hours. Chest pain, shortness of breath, fever, chills, nausea, vomiting, diarrhea, change in mentation, falling, weakness, bleeding. Severe pain or pain not relieved by medications. Or, any other signs or symptoms that you may have questions about. DISPOSITION: 
x  Home With: 
 OT  PT  [facility]  RN  
  
 SNF/Inpatient Rehab/LTAC Independent/assisted living Hospice Other: CDMP Checked:  
Yes x PROBLEM LIST Updated: 
Yes x Signed:  
Lincoln Drake NP 
10/10/2018 3:12 PM 
 
 
 
  
Recurring Migraine Headache: Care Instructions Your Care Instructions Migraines are painful, throbbing headaches. They often start on one side of the head. They may cause nausea and vomiting and make you sensitive to light, sound, or smell. Some people may have only a few migraines throughout life. Others have them as often as several times a month. The goal of treatment is to reduce the number of migraines you have and relieve your symptoms. Even with treatment, you may continue to have migraines. You play an important role in dealing with your headaches. Work on avoiding things that seem to trigger your migraines. When you feel a headache coming on, act quickly to stop it before it gets worse. Follow-up care is a key part of your treatment and safety. Be sure to make and go to all appointments, and call your doctor if you are having problems. It's also a good idea to know your test results and keep a list of the medicines you take. How can you care for yourself at home? · Do not drive if you have taken a prescription pain medicine. · Rest in a quiet, dark room until your headache is gone. Close your eyes and try to relax or go to sleep. Do not watch TV or read. · Put a cold, moist cloth or cold pack on the painful area for 10 to 20 minutes at a time. Put a thin cloth between the cold pack and your skin. · Have someone gently massage your neck and shoulders. · Take your medicines exactly as prescribed. Call your doctor if you think you are having a problem with your medicine. You will get more details on the specific medicines your doctor prescribes. To prevent migraines · Keep a headache diary so you can figure out what triggers your headaches. Avoiding triggers may help you prevent headaches. Record when each headache began, how long it lasted, and what the pain was like. Use words like throbbing, aching, stabbing, or dull.  Write down any other symptoms you had with the headache. These may include nausea, flashing lights or dark spots, or sensitivity to bright light or loud noise. Note if the headache occurred near your period. List anything that might have triggered the headache. Triggers may include certain foods (chocolate, cheese, wine) or odors, smoke, bright light, stress, or lack of sleep. · If your doctor has prescribed medicine for your migraines, take it as directed. You may have medicine that you take only when you get a migraine and medicine that you take all the time to help prevent migraines. ¨ If your doctor has prescribed medicine for when you get a headache, take it at the first sign of a migraine, unless your doctor has given you other instructions. ¨ If your doctor has prescribed medicine to prevent migraines, take it exactly as prescribed. Call your doctor if you think you are having a problem with your medicine. · Find healthy ways to deal with stress. Migraines are most common during or right after stressful times. Take time to relax before and after you do something that has caused a migraine in the past. 
· Try to keep your muscles relaxed by keeping good posture. Check your jaw, face, neck, and shoulder muscles for tension. Try to relax them. When sitting at a desk, change positions often. Stretch for 30 seconds each hour. · Get regular sleep and exercise. · Eat regular meals, and avoid foods and drinks that often trigger migraines. These include chocolate and alcohol, especially red wine and port. Chemicals used in food, such as aspartame and monosodium glutamate (MSG), also can trigger migraines. So can some food additives, such as those found in hot dogs, boyce, cold cuts, aged cheeses, and pickled foods. · Limit caffeine by not drinking too much coffee, tea, or soda. Do not quit caffeine suddenly, because that can also give you migraines. · Do not smoke or allow others to smoke around you.  If you need help quitting, talk to your doctor about stop-smoking programs and medicines. These can increase your chances of quitting for good. · If you are taking birth control pills or hormone therapy, talk to your doctor about whether they are triggering your migraines. When should you call for help? Call 911 anytime you think you may need emergency care. For example, call if: 
  · You have symptoms of a stroke. These may include: 
¨ Sudden numbness, tingling, weakness, or loss of movement in your face, arm, or leg, especially on only one side of your body. ¨ Sudden vision changes. ¨ Sudden trouble speaking. ¨ Sudden confusion or trouble understanding simple statements. ¨ Sudden problems with walking or balance. ¨ A sudden, severe headache that is different from past headaches.  
 Call your doctor now or seek immediate medical care if: 
  · You develop a fever and a stiff neck.  
  · You have new nausea and vomiting, or you cannot keep down food or liquids.  
 Watch closely for changes in your health, and be sure to contact your doctor if: 
  · You have a headache that does not get better within 1 or 2 days.  
  · Your headaches get worse or happen more often. Where can you learn more? Go to http://michele-lucy.info/. Enter V975 in the search box to learn more about \"Recurring Migraine Headache: Care Instructions. \" Current as of: June 4, 2018 Content Version: 11.8 © 4313-6890 Healthwise, Incorporated. Care instructions adapted under license by [employer] O (which disclaims liability or warranty for this information). If you have questions about a medical condition or this instruction, always ask your healthcare professional. [place] [address] any warranty or liability for your use of this information. Kojami Announcement  We are excited to announce that we are making your provider's discharge notes available to you in FilterEasy. You will see these notes when they are completed and signed by the physician that discharged you from your recent hospital stay. If you have any questions or concerns about any information you see in FilterEasy, please call the Health Information Department where you were seen or reach out to your Primary Care Provider for more information about your plan of care. Introducing [facility]! Dear Sharda Bryson: Thank you for requesting a FilterEasy account. Our records indicate that you already have an active FilterEasy account. You can access your account anytime at [email or web address] Did you know that you can access your hospital and ER discharge instructions at any time in FilterEasy? You can also review all of your test results from your hospital stay or ER visit. Additional Information If you have questions, please visit the Frequently Asked Questions section of the FilterEasy website at [email or web address] Remember, FilterEasy is NOT to be used for urgent needs. For medical emergencies, dial 911. Now available from your iPhone and Android! Introducing Burton López As a [facility] patient, I wanted to make you aware of our electronic visit tool called Burton López. [facility] 24/7 allows you to connect within minutes with a medical provider 24 hours a day, seven days a week via a mobile device or tablet or logging into a secure website from your computer. You can access Burton López from anywhere in the [place]. A virtual visit might be right for you when you have a simple condition and feel like you just dont want to get out of bed, or cant get away from work for an appointment, when your regular [facility] provider is not available (evenings, weekends or holidays), or when youre out of town and need minor care.   Electronic visits cost only $49 and if the [facility] 24/7 provider determines a prescription is needed to treat your condition, one can be electronically transmitted to a nearby pharmacy*. Please take a moment to enroll today if you have not already done so. The enrollment process is free and takes just a few minutes. To enroll, please download the [employer] 24/7 charlee to your tablet or phone, or visit [email or web address] to enroll on your computer. And, as an [address] [place] [address] patient with a Aviate account, the results of your visits will be scanned into your electronic medical record and your primary care provider will be able to view the scanned results. We urge you to continue to see your regular [employer] provider for your ongoing medical care. And while your primary care provider may not be the one available when you seek a Zipscene virtual visit, the peace of mind you get from getting a real diagnosis real time can be priceless. For more information on Zipscene, view our Frequently Asked Questions (FAQs) at [email or web address]. Sincerely, 
 
Royal Rafa MD 
Chief Medical Officer [address] *:  certain medications cannot be prescribed via Zipscene Providers Seen During Your Hospitalization Provider Specialty Primary office phone Charis Hsieh MD Emergency Medicine [phone] Lakisha Ocampo MD Internal Medicine [phone] Fahad Andujar MD Internal Medicine [phone] Trinity Burnham MD Hospitalist [phone] [address] MD [place] Internal Medicine [phone] Immunizations Administered for This Admission Name Date Influenza Vaccine (Quad) PF 10/10/2018 Your Primary Care Physician (PCP) Primary Care Physician Office Phone Office Fax NONE ** None ** ** None ** You are allergic to the following Allergen Reactions Benadryl (Diphenhydramine Hcl) Other (comments) Makes me feel funny. Need ativan if I get benadryl Compazine (Prochlorperazine Edisylate) Anxiety Depacon (Valproate Sodium) Other (comments) Pt reports having restlessness with this medication. Haldol (Haloperidol Lactate) Other (comments) Promethazine Other (comments) \"It makes me feel really funny, nausea and dizzy\" Reglan (Metoclopramide) Other (comments) Recent Documentation Height Weight Breastfeeding? BMI OB Status Smoking Status 1.727 m 70.2 kg No 23.54 kg/m2 IUD Former Smoker Emergency Contacts Name Discharge Info Relation Home Work Mobile Pancho Welsh CAREGIVER [3] Mother [14] [phone] Reynaldo Crooks DISCHARGE CAREGIVER [3] Father [15] [phone] Patient Belongings The following personal items are in your possession at time of discharge: 
  Dental Appliances: None         Home Medications: Locked   Jewelry: None  Clothing: Footwear, Jacket/Coat, Pants, Shirt, Undergarments, With patient    Other Valuables: Cell Phone, Wallet  Personal Items Sent to Safe: No 
 
  
  
 Please provide this summary of care documentation to your next provider. Signatures-by signing, you are acknowledging that this After Visit Summary has been reviewed with you and you have received a copy. Patient Signature:  ____________________________________________________________ Date:  ____________________________________________________________  
  
Fayrene Southgate Provider Signature:  ____________________________________________________________ Date:  ____________________________________________________________

## 2018-10-08 LAB
ALBUMIN SERPL-MCNC: 3.6 G/DL (ref 3.5–5)
ALBUMIN/GLOB SERPL: 1.1 {RATIO} (ref 1.1–2.2)
ALP SERPL-CCNC: 41 U/L (ref 45–117)
ALT SERPL-CCNC: 46 U/L (ref 12–78)
AMPHET UR QL SCN: NEGATIVE
ANION GAP SERPL CALC-SCNC: 9 MMOL/L (ref 5–15)
APPEARANCE UR: ABNORMAL
AST SERPL-CCNC: 35 U/L (ref 15–37)
ATRIAL RATE: 54 BPM
BACTERIA URNS QL MICRO: NEGATIVE /HPF
BARBITURATES UR QL SCN: POSITIVE
BASOPHILS # BLD: 0 K/UL (ref 0–0.1)
BASOPHILS NFR BLD: 0 % (ref 0–1)
BENZODIAZ UR QL: NEGATIVE
BILIRUB SERPL-MCNC: 0.3 MG/DL (ref 0.2–1)
BILIRUB UR QL: NEGATIVE
BUN SERPL-MCNC: 7 MG/DL (ref 6–20)
BUN/CREAT SERPL: 13 (ref 12–20)
CALCIUM SERPL-MCNC: 7.8 MG/DL (ref 8.5–10.1)
CALCULATED P AXIS, ECG09: 80 DEGREES
CALCULATED R AXIS, ECG10: 64 DEGREES
CALCULATED T AXIS, ECG11: 73 DEGREES
CANNABINOIDS UR QL SCN: POSITIVE
CHLORIDE SERPL-SCNC: 112 MMOL/L (ref 97–108)
CO2 SERPL-SCNC: 21 MMOL/L (ref 21–32)
COCAINE UR QL SCN: NEGATIVE
COLOR UR: ABNORMAL
CREAT SERPL-MCNC: 0.54 MG/DL (ref 0.55–1.02)
DIAGNOSIS, 93000: NORMAL
DIFFERENTIAL METHOD BLD: ABNORMAL
DRUG SCRN COMMENT,DRGCM: ABNORMAL
EOSINOPHIL # BLD: 0 K/UL (ref 0–0.4)
EOSINOPHIL NFR BLD: 0 % (ref 0–7)
EPITH CASTS URNS QL MICRO: ABNORMAL /LPF
ERYTHROCYTE [DISTWIDTH] IN BLOOD BY AUTOMATED COUNT: 12.4 % (ref 11.5–14.5)
GLOBULIN SER CALC-MCNC: 3.2 G/DL (ref 2–4)
GLUCOSE SERPL-MCNC: 105 MG/DL (ref 65–100)
GLUCOSE UR STRIP.AUTO-MCNC: NEGATIVE MG/DL
HCT VFR BLD AUTO: 33.9 % (ref 35–47)
HGB BLD-MCNC: 10.9 G/DL (ref 11.5–16)
HGB UR QL STRIP: NEGATIVE
HYALINE CASTS URNS QL MICRO: ABNORMAL /LPF (ref 0–5)
IMM GRANULOCYTES # BLD: 0 K/UL (ref 0–0.04)
IMM GRANULOCYTES NFR BLD AUTO: 0 % (ref 0–0.5)
KETONES UR QL STRIP.AUTO: NEGATIVE MG/DL
LEUKOCYTE ESTERASE UR QL STRIP.AUTO: NEGATIVE
LYMPHOCYTES # BLD: 0.6 K/UL (ref 0.8–3.5)
LYMPHOCYTES NFR BLD: 13 % (ref 12–49)
MAGNESIUM SERPL-MCNC: 2.6 MG/DL (ref 1.6–2.4)
MCH RBC QN AUTO: 29.6 PG (ref 26–34)
MCHC RBC AUTO-ENTMCNC: 32.2 G/DL (ref 30–36.5)
MCV RBC AUTO: 92.1 FL (ref 80–99)
METHADONE UR QL: NEGATIVE
MONOCYTES # BLD: 0.1 K/UL (ref 0–1)
MONOCYTES NFR BLD: 2 % (ref 5–13)
NEUTS SEG # BLD: 4 K/UL (ref 1.8–8)
NEUTS SEG NFR BLD: 85 % (ref 32–75)
NITRITE UR QL STRIP.AUTO: NEGATIVE
NRBC # BLD: 0 K/UL (ref 0–0.01)
NRBC BLD-RTO: 0 PER 100 WBC
OPIATES UR QL: POSITIVE
P-R INTERVAL, ECG05: 126 MS
PCP UR QL: NEGATIVE
PH UR STRIP: 7 [PH] (ref 5–8)
PHOSPHATE SERPL-MCNC: 3.6 MG/DL (ref 2.6–4.7)
PLATELET # BLD AUTO: 165 K/UL (ref 150–400)
PMV BLD AUTO: 10.1 FL (ref 8.9–12.9)
POTASSIUM SERPL-SCNC: 4 MMOL/L (ref 3.5–5.1)
PROT SERPL-MCNC: 6.8 G/DL (ref 6.4–8.2)
PROT UR STRIP-MCNC: NEGATIVE MG/DL
Q-T INTERVAL, ECG07: 492 MS
QRS DURATION, ECG06: 78 MS
QTC CALCULATION (BEZET), ECG08: 466 MS
RBC # BLD AUTO: 3.68 M/UL (ref 3.8–5.2)
RBC #/AREA URNS HPF: ABNORMAL /HPF (ref 0–5)
RBC MORPH BLD: ABNORMAL
RBC MORPH BLD: ABNORMAL
SODIUM SERPL-SCNC: 142 MMOL/L (ref 136–145)
SP GR UR REFRACTOMETRY: 1.01 (ref 1–1.03)
TSH SERPL DL<=0.05 MIU/L-ACNC: 0.41 UIU/ML (ref 0.36–3.74)
UROBILINOGEN UR QL STRIP.AUTO: 0.2 EU/DL (ref 0.2–1)
VENTRICULAR RATE, ECG03: 54 BPM
WBC # BLD AUTO: 4.7 K/UL (ref 3.6–11)
WBC URNS QL MICRO: ABNORMAL /HPF (ref 0–4)

## 2018-10-08 PROCEDURE — 74011250637 HC RX REV CODE- 250/637: Performed by: PSYCHIATRY & NEUROLOGY

## 2018-10-08 PROCEDURE — 93005 ELECTROCARDIOGRAM TRACING: CPT

## 2018-10-08 PROCEDURE — 84443 ASSAY THYROID STIM HORMONE: CPT | Performed by: INTERNAL MEDICINE

## 2018-10-08 PROCEDURE — 74011250636 HC RX REV CODE- 250/636: Performed by: INTERNAL MEDICINE

## 2018-10-08 PROCEDURE — 74011000258 HC RX REV CODE- 258: Performed by: PSYCHIATRY & NEUROLOGY

## 2018-10-08 PROCEDURE — 96376 TX/PRO/DX INJ SAME DRUG ADON: CPT

## 2018-10-08 PROCEDURE — 96372 THER/PROPH/DIAG INJ SC/IM: CPT

## 2018-10-08 PROCEDURE — 83735 ASSAY OF MAGNESIUM: CPT | Performed by: INTERNAL MEDICINE

## 2018-10-08 PROCEDURE — 74011250637 HC RX REV CODE- 250/637: Performed by: INTERNAL MEDICINE

## 2018-10-08 PROCEDURE — 84100 ASSAY OF PHOSPHORUS: CPT | Performed by: INTERNAL MEDICINE

## 2018-10-08 PROCEDURE — 96361 HYDRATE IV INFUSION ADD-ON: CPT

## 2018-10-08 PROCEDURE — 74011250636 HC RX REV CODE- 250/636: Performed by: PSYCHIATRY & NEUROLOGY

## 2018-10-08 PROCEDURE — 99218 HC RM OBSERVATION: CPT

## 2018-10-08 PROCEDURE — 85025 COMPLETE CBC W/AUTO DIFF WBC: CPT | Performed by: INTERNAL MEDICINE

## 2018-10-08 PROCEDURE — 96367 TX/PROPH/DG ADDL SEQ IV INF: CPT

## 2018-10-08 PROCEDURE — 81001 URINALYSIS AUTO W/SCOPE: CPT | Performed by: INTERNAL MEDICINE

## 2018-10-08 PROCEDURE — 80307 DRUG TEST PRSMV CHEM ANLYZR: CPT | Performed by: INTERNAL MEDICINE

## 2018-10-08 PROCEDURE — 36415 COLL VENOUS BLD VENIPUNCTURE: CPT | Performed by: INTERNAL MEDICINE

## 2018-10-08 PROCEDURE — 80053 COMPREHEN METABOLIC PANEL: CPT | Performed by: INTERNAL MEDICINE

## 2018-10-08 RX ORDER — SODIUM CHLORIDE 0.9 % (FLUSH) 0.9 %
5-10 SYRINGE (ML) INJECTION EVERY 8 HOURS
Status: DISCONTINUED | OUTPATIENT
Start: 2018-10-08 | End: 2018-10-10 | Stop reason: HOSPADM

## 2018-10-08 RX ORDER — METHYLPREDNISOLONE 4 MG/1
4 TABLET ORAL
COMMUNITY
Start: 2018-10-05 | End: 2018-10-10

## 2018-10-08 RX ORDER — SUMATRIPTAN 6 MG/.5ML
3 INJECTION, SOLUTION SUBCUTANEOUS AS NEEDED
Status: DISCONTINUED | OUTPATIENT
Start: 2018-10-08 | End: 2018-10-10 | Stop reason: HOSPADM

## 2018-10-08 RX ORDER — BISACODYL 5 MG
5 TABLET, DELAYED RELEASE (ENTERIC COATED) ORAL DAILY PRN
Status: DISCONTINUED | OUTPATIENT
Start: 2018-10-08 | End: 2018-10-10 | Stop reason: HOSPADM

## 2018-10-08 RX ORDER — SODIUM CHLORIDE 0.9 % (FLUSH) 0.9 %
5-10 SYRINGE (ML) INJECTION AS NEEDED
Status: DISCONTINUED | OUTPATIENT
Start: 2018-10-08 | End: 2018-10-10 | Stop reason: HOSPADM

## 2018-10-08 RX ORDER — ESCITALOPRAM OXALATE 10 MG/1
20 TABLET ORAL
Status: DISCONTINUED | OUTPATIENT
Start: 2018-10-08 | End: 2018-10-10 | Stop reason: HOSPADM

## 2018-10-08 RX ORDER — NORTRIPTYLINE HYDROCHLORIDE 25 MG/1
25 CAPSULE ORAL
Status: DISCONTINUED | OUTPATIENT
Start: 2018-10-08 | End: 2018-10-09

## 2018-10-08 RX ORDER — ONDANSETRON 2 MG/ML
4 INJECTION INTRAMUSCULAR; INTRAVENOUS
Status: DISCONTINUED | OUTPATIENT
Start: 2018-10-08 | End: 2018-10-10 | Stop reason: HOSPADM

## 2018-10-08 RX ORDER — TOPIRAMATE 100 MG/1
100 TABLET, FILM COATED ORAL
Status: DISCONTINUED | OUTPATIENT
Start: 2018-10-08 | End: 2018-10-10 | Stop reason: HOSPADM

## 2018-10-08 RX ORDER — ENOXAPARIN SODIUM 100 MG/ML
40 INJECTION SUBCUTANEOUS EVERY 24 HOURS
Status: DISCONTINUED | OUTPATIENT
Start: 2018-10-08 | End: 2018-10-10 | Stop reason: HOSPADM

## 2018-10-08 RX ORDER — NALOXONE HYDROCHLORIDE 0.4 MG/ML
0.4 INJECTION, SOLUTION INTRAMUSCULAR; INTRAVENOUS; SUBCUTANEOUS AS NEEDED
Status: DISCONTINUED | OUTPATIENT
Start: 2018-10-08 | End: 2018-10-10 | Stop reason: HOSPADM

## 2018-10-08 RX ORDER — KETOROLAC TROMETHAMINE 30 MG/ML
30 INJECTION, SOLUTION INTRAMUSCULAR; INTRAVENOUS
Status: DISCONTINUED | OUTPATIENT
Start: 2018-10-08 | End: 2018-10-10 | Stop reason: HOSPADM

## 2018-10-08 RX ADMIN — HYDROMORPHONE HYDROCHLORIDE 1 MG: 2 INJECTION INTRAMUSCULAR; INTRAVENOUS; SUBCUTANEOUS at 12:32

## 2018-10-08 RX ADMIN — OXYCODONE HYDROCHLORIDE AND ACETAMINOPHEN 1 TABLET: 5; 325 TABLET ORAL at 03:39

## 2018-10-08 RX ADMIN — ONDANSETRON HYDROCHLORIDE 4 MG: 2 INJECTION INTRAMUSCULAR; INTRAVENOUS at 21:00

## 2018-10-08 RX ADMIN — ENOXAPARIN SODIUM 40 MG: 40 INJECTION SUBCUTANEOUS at 03:39

## 2018-10-08 RX ADMIN — HYDROMORPHONE HYDROCHLORIDE 1 MG: 2 INJECTION INTRAMUSCULAR; INTRAVENOUS; SUBCUTANEOUS at 08:05

## 2018-10-08 RX ADMIN — ONDANSETRON HYDROCHLORIDE 4 MG: 2 INJECTION INTRAMUSCULAR; INTRAVENOUS at 12:33

## 2018-10-08 RX ADMIN — ONDANSETRON HYDROCHLORIDE 4 MG: 2 INJECTION INTRAMUSCULAR; INTRAVENOUS at 03:39

## 2018-10-08 RX ADMIN — HYDROMORPHONE HYDROCHLORIDE 1 MG: 2 INJECTION INTRAMUSCULAR; INTRAVENOUS; SUBCUTANEOUS at 17:55

## 2018-10-08 RX ADMIN — Medication 10 ML: at 15:02

## 2018-10-08 RX ADMIN — TOPIRAMATE 100 MG: 100 TABLET, FILM COATED ORAL at 21:00

## 2018-10-08 RX ADMIN — SODIUM CHLORIDE 125 ML/HR: 900 INJECTION, SOLUTION INTRAVENOUS at 16:00

## 2018-10-08 RX ADMIN — KETOROLAC TROMETHAMINE 30 MG: 30 INJECTION, SOLUTION INTRAMUSCULAR at 16:17

## 2018-10-08 RX ADMIN — ONDANSETRON HYDROCHLORIDE 4 MG: 2 INJECTION INTRAMUSCULAR; INTRAVENOUS at 16:17

## 2018-10-08 RX ADMIN — Medication 10 ML: at 06:41

## 2018-10-08 RX ADMIN — Medication 10 ML: at 03:39

## 2018-10-08 RX ADMIN — ONDANSETRON HYDROCHLORIDE 4 MG: 2 INJECTION INTRAMUSCULAR; INTRAVENOUS at 08:05

## 2018-10-08 RX ADMIN — SODIUM CHLORIDE 500 MG: 900 INJECTION, SOLUTION INTRAVENOUS at 16:17

## 2018-10-08 RX ADMIN — SODIUM CHLORIDE 125 ML/HR: 900 INJECTION, SOLUTION INTRAVENOUS at 00:45

## 2018-10-08 RX ADMIN — ESCITALOPRAM OXALATE 20 MG: 10 TABLET ORAL at 21:00

## 2018-10-08 RX ADMIN — HYDROMORPHONE HYDROCHLORIDE 1 MG: 2 INJECTION INTRAMUSCULAR; INTRAVENOUS; SUBCUTANEOUS at 21:38

## 2018-10-08 RX ADMIN — NORTRIPTYLINE HYDROCHLORIDE 25 MG: 25 CAPSULE ORAL at 21:00

## 2018-10-08 NOTE — PROGRESS NOTES
Problem: Falls - Risk of 
Goal: *Absence of Falls Document Kumar Miner Fall Risk and appropriate interventions in the flowsheet. Outcome: Progressing Towards Goal 
Fall Risk Interventions: 
  
 
  
 
Medication Interventions: Assess postural VS orthostatic hypotension, Evaluate medications/consider consulting pharmacy, Patient to call before getting OOB, Teach patient to arise slowly, Utilize gait belt for transfers/ambulation

## 2018-10-08 NOTE — ED NOTES
2055 
Pt's HR dropped to 40 on cardiac monitor. Pt asymptomatic. Discussed with Dr. Naveed Otoole. Orders for 2nd L NS via pressure bag and hold mag infusion for now. 2110 Pt becoming nauseated and anxious. Discussed with Dr. Naveed Otoole. Orders for zofran 4mg.   
 
2200 Dr. Naveed Otoole at bedside to discuss plan of care and evaluate pt.   
 
2230 Pt's significant other at bedside expressing concerns that note writer is not making an adequate attempt to control pain. Pt's significant other suggested that this RN \"be an advocate on behalf of the patient and tell the doctor to order dilaudid\". Informed visitors that the physician had previously discussed the plan of care with them and that plan did not include dilaudid. Reminded them that they had agreed to this plan and verbalized understanding of MD's decision not to include dilaudid. Finally, informed pt's visitor that the RN's scope of practice does not include forcing the doctor to make any clinical decision. Pt's visitors did accept note writer's offer to inform Dr. Naveed Otoole that the pt is still experiencing pain. Charge RN, Zeinab Alvarado, to bedside. Pt did not provide any input during this discussion. Dr. Naveed Otoole made aware of pt's continued pain. [address] Dr. Kym Oliver to bedside to evaluate. Per Dr. Kym Oliver, ekg should be done when pt arrives to inpatient unit. 0015 TRANSFER - OUT REPORT: 
 
Verbal report given to Domi(name) erica Cruz  being transferred to NSTU(unit) for routine progression of care Report consisted of patients Situation, Background, Assessment and  
Recommendations(SBAR). Information from the following report(s) SBAR, ED Summary and Cardiac Rhythm Sinus Sarah Cutler was reviewed with the receiving nurse. Lines:    
 
Opportunity for questions and clarification was provided. Patient transported with: 
 Monitor Registered Nurse

## 2018-10-08 NOTE — ED PROVIDER NOTES
HPI Comments: Patient arrives c/o migraine x 2.5 hours. Patient has been on steroid taper and her migraine medications with no relief. Patient was seen here two days ago and felt better up until 2.5 hours ago. This is her fifth visit to the emergency department this week with headache. She is allergic to multiple antiemetics. The patient requested a trial of DHE to manage her symptoms instead of the Sumatriptan initially ordered. Patient is a 25 y.o. female presenting with headaches. The history is provided by the patient and medical records. Headache This is a recurrent problem. The current episode started 1 to 2 hours ago. The problem occurs constantly. The problem has been gradually worsening. The headache is aggravated by photophobia, nausea and vomiting. The quality of the pain is described as sharp and throbbing. The pain is at a severity of 10/10. Associated symptoms include dizziness and nausea. Pertinent negatives include no fever, no visual change and no vomiting. She has tried NSAIDs, acetaminophen, darkened room and cold packs for the symptoms. The treatment provided no relief. Past Medical History:  
Diagnosis Date  Anxiety and depression  Bartholin's gland abscess x8  Endometriosis  Migraine  Ovarian cyst   
 Panic attack Past Surgical History:  
Procedure Laterality Date  HX APPENDECTOMY  04/26/2017  HX BARTHOLIN CYST MARSUPIALIZATION    
 2011, 2018  HX BREAST LUMPECTOMY Right  HX BUNIONECTOMY  HX CYST REMOVAL    
 HX GYN Left   
 bartholin cyst drainage X 6  
 HX GYN Left   
 bartholin cyst marsupilization  HX OTHER SURGICAL    
 laparascopy  HX PELVIC LAPAROSCOPY    
 HX WISDOM TEETH EXTRACTION Family History:  
Problem Relation Age of Onset  Diabetes Maternal Aunt  Heart Disease Maternal Aunt  Stroke Maternal Aunt  Heart Disease Maternal Grandmother  No Known Problems Mother Social History Social History  Marital status: SINGLE Spouse name: N/A  
 Number of children: N/A  
 Years of education: N/A Occupational History  Not on file. Social History Main Topics  Smoking status: Former Smoker  Smokeless tobacco: Never Used  Alcohol use No  
   Comment: social  
 Drug use: No  
 Sexual activity: Yes  
  Partners: Male Birth control/ protection: Pill Other Topics Concern  Not on file Social History Narrative ALLERGIES: Benadryl [diphenhydramine hcl]; Compazine [prochlorperazine edisylate]; Depacon [valproate sodium]; Haldol [haloperidol lactate]; Promethazine; and Reglan [metoclopramide] Review of Systems Constitutional: Negative for fever. Gastrointestinal: Positive for nausea. Negative for vomiting. Neurological: Positive for dizziness and headaches. Negative for seizures and syncope. Vitals:  
 10/07/18 1958 10/07/18 2045 10/07/18 2100 10/07/18 2115 BP: 112/73 100/69 145/64 (!) 150/93 Pulse: 68 (!) 55 (!) 47 66 Resp: 16 12 21 15 Temp: 98.1 °F (36.7 °C) SpO2: 97% 98% 100% 100% Weight: 64.1 kg (141 lb 5 oz) Height: 5' 8\" (1.727 m) Physical Exam  
Constitutional: She is oriented to person, place, and time. She appears well-developed. No distress. HENT:  
Head: Normocephalic and atraumatic. Eyes: Pupils are equal, round, and reactive to light. No scleral icterus. Neck: Normal range of motion. Neck supple. Cardiovascular: Normal rate and regular rhythm. Pulmonary/Chest: Effort normal and breath sounds normal.  
Abdominal: Soft. She exhibits no distension. There is no tenderness. There is no rebound and no guarding. Musculoskeletal: Normal range of motion. Neurological: She is alert and oriented to person, place, and time. No cranial nerve deficit. Skin: Skin is warm and dry. She is not diaphoretic. Psychiatric: She has a normal mood and affect.  Her behavior is normal. Thought content normal.  
Nursing note and vitals reviewed. MDM Number of Diagnoses or Management Options Status migrainosus: established and worsening Diagnosis management comments: Patient presents with migraine headache that has been resistant to outpatient management. Unfortunately, the patient had an adverse reaction to the DHE and began having tremors, nausea , and vomiting. The patient appeared incredibly uncomfortable but as her blood pressure had improved to we began administering magnesium and give her a dose of Ativan to help combat her tremors. At this point the patient's boyfriend again and sustained that we give her dilaudid to manage her symptoms. I explained to him as well as the patient that narcotics are associated with a rebound headache and I do not recommends administering them at this time. I also explained that the narcotics may interfere with future treatments including ketamine and propofol if neurology decided to try those interventions. The patient expressed understanding and agreed with the plan; however, the patient's boyfriend continued to complain and reported that we had not given her anything for her pain in the emergency department. The patient will be admitted to the hospital for continued management of her migraine as well as her adverse reaction to the DHE. ED Course Procedures Patient is being admitted to the hospital.  The results of their tests and reasons for their admission have been discussed with them and/or available family. They convey agreement and understanding for the need to be admitted and for their admission diagnosis. Consultation will be made now with the inpatient physician for hospitalization.

## 2018-10-08 NOTE — PROGRESS NOTES
Spiritual Care Partner Volunteer visited patient in Rm 653 on 10/8/2018. Documented by: 
Chaplain Boyle MDiv, MS, [address] ([id number])

## 2018-10-08 NOTE — PROGRESS NOTES
Hospitalist Progress Note Yessenia Pruitt NP Answering service: [phone] OR [phone] from in house phone Date of Service:  10/8/2018 NAME:  Charles Cardenas :  1990 MRN:  [id number] Admission Summary: Ms. Macho Mendoza is a 24 y/o  female with PMHx of migraines, anxiety/depression who presented to the ED on 10/7 with status migrainous. Headache described as constant throbbing involving frontal and spreading to the back, 10/10 in severity and associated with phonophobia, photophobia, + nausea. No fever, no rigors, no chills. Pt has had multiple visits (5) to the ED this week for the same. Pt normally follows with Dr. Emily Decker from neurology. Pt has been tried on multiple medications with significant control. Pt reports her migraines are fairly well controlled with Dilaudid. She is aware this CANNOT be the long term solution for these migraines. Pt has also been referred to a pain specialist.  DHE was tried in the ED and the patient became bradycardic and developed a tremor. Interval history / Subjective:  
 frontal migraine, 6/10, received dilaudid ~ 1 hour ago Discussed with neuro, will try pt on solumedrol, toradol and zofran Assessment & Plan:  
 
Status migrainous 
- avoid narcotics on discharge but may have dilaudid as needed while here - c/w topamax, has been taking this as prophylaxis for ~ 3 years 
- neuro consulted 
- will need to see pain care specialist as outpatient Anxiety/depression - c/w lexapro Code status: Full DVT prophylaxis: Lovenox Care Plan discussed with: patient, nurse, attending MD 
Disposition:home when medically stable Hospital Problems  Date Reviewed: 10/8/2018 Codes Class Noted POA * (Principal)Migraine headache ICD-10-CM: Q87.157 ICD-9-CM: 346.90  10/7/2018 Yes Review of Systems:  
Pertinent items are noted in HPI. Vital Signs: Last 24hrs VS reviewed since prior progress note. Most recent are: 
Visit Vitals  /86 (BP 1 Location: Left arm, BP Patient Position: At rest)  Pulse 66  Temp 97 °F (36.1 °C)  Resp 19  
 Ht 5' 8\" (1.727 m)  Wt 66.5 kg (146 lb 9.7 oz)  SpO2 99%  Breastfeeding No  
 BMI 22.29 kg/m2 Intake/Output Summary (Last 24 hours) at 10/08/18 1311 Last data filed at 10/08/18 0956 Gross per 24 hour Intake          1248.75 ml Output              450 ml Net           798.75 ml Physical Examination:  
 
 
     
Constitutional:  No acute distress, cooperative, pleasant   
ENT:  Oral mucous moist, oropharynx benign. Neck supple Resp:  CTA bilaterally. No wheezing/rhonchi/rales. No accessory muscle use CV:  Regular rhythm, normal rate, no murmurs, gallops, rubs GI:  Soft, non distended, non tender. normoactive bowel sounds, no hepatosplenomegaly Musculoskeletal:  No edema, warm, 2+ pulses throughout Neurologic:  Moves all extremities. AAOx3, CN II-XII reviewed Psych:  Good insight, Not anxious nor agitated. Skin:  Good turgor, no rashes or ulcers Data Review:  
 Review and/or order of clinical lab test 
Review and/or order of tests in the radiology section of CPT Review and/or order of tests in the medicine section of CPT Labs:  
 
Recent Labs 10/08/18 
 8225 WBC  4.7 HGB  10.9* HCT  33.9*  
PLT  165 Recent Labs 10/08/18 
 5147 NA  142  
K  4.0  
CL  112* CO2  21 BUN  7  
CREA  0.54* GLU  105* CA  7.8*  
MG  2.6* PHOS  3.6 Recent Labs 10/08/18 
 5247 SGOT  35 ALT  46 AP  41* TBILI  0.3 TP  6.8 ALB  3.6 GLOB  3.2 No results for input(s): INR, PTP, APTT in the last 72 hours. No lab exists for component: INREXT No results for input(s): FE, TIBC, PSAT, FERR in the last 72 hours. No results found for: FOL, RBCF No results for input(s): PH, PCO2, PO2 in the last 72 hours. No results for input(s): CPK, CKNDX, TROIQ in the last 72 hours. No lab exists for component: CPKMB Lab Results Component Value Date/Time Cholesterol, total 186 04/20/2017 02:55 PM  
 HDL Cholesterol 94 04/20/2017 02:55 PM  
 LDL, calculated 81.6 04/20/2017 02:55 PM  
 Triglyceride 52 04/20/2017 02:55 PM  
 CHOL/HDL Ratio 2.0 04/20/2017 02:55 PM  
 
Lab Results Component Value Date/Time Glucose (POC) 88 09/15/2012 12:18 PM  
 
Lab Results Component Value Date/Time Color YELLOW/STRAW 10/08/2018 06:38 AM  
 Appearance CLOUDY (A) 10/08/2018 06:38 AM  
 Specific gravity 1.012 10/08/2018 06:38 AM  
 Specific gravity >1.030 (H) 11/21/2016 02:33 PM  
 pH (UA) 7.0 10/08/2018 06:38 AM  
 Protein NEGATIVE  10/08/2018 06:38 AM  
 Glucose NEGATIVE  10/08/2018 06:38 AM  
 Ketone NEGATIVE  10/08/2018 06:38 AM  
 Bilirubin NEGATIVE  10/08/2018 06:38 AM  
 Urobilinogen 0.2 10/08/2018 06:38 AM  
 Nitrites NEGATIVE  10/08/2018 06:38 AM  
 Leukocyte Esterase NEGATIVE  10/08/2018 06:38 AM  
 Epithelial cells FEW 10/08/2018 06:38 AM  
 Bacteria NEGATIVE  10/08/2018 06:38 AM  
 WBC 0-4 10/08/2018 06:38 AM  
 RBC 0-5 10/08/2018 06:38 AM  
 
 
 
Medications Reviewed:  
 
Current Facility-Administered Medications Medication Dose Route Frequency  influenza vaccine 2018-19 (6 mos+)(PF) (FLUARIX QUAD/FLULAVAL QUAD) injection 0.5 mL  0.5 mL IntraMUSCular PRIOR TO DISCHARGE  topiramate (TOPAMAX) tablet 100 mg  100 mg Oral QHS  SUMAtriptan (IMITREX) injection 3 mg  3 mg SubCUTAneous PRN  
 escitalopram oxalate (LEXAPRO) tablet 20 mg  20 mg Oral QHS  . PHARMACY TO SUBSTITUTE PER PROTOCOL (Reordered from: ASPIRIN/ACETAMINOPHEN/CAFFEINE (MIGRAINE RELIEF PO))    Per Protocol  sodium chloride (NS) flush 5-10 mL  5-10 mL IntraVENous Q8H  
 sodium chloride (NS) flush 5-10 mL  5-10 mL IntraVENous PRN  
 naloxone (NARCAN) injection 0.4 mg  0.4 mg IntraVENous PRN  
  ondansetron (ZOFRAN) injection 4 mg  4 mg IntraVENous Q4H PRN  
 bisacodyl (DULCOLAX) tablet 5 mg  5 mg Oral DAILY PRN  
 enoxaparin (LOVENOX) injection 40 mg  40 mg SubCUTAneous Q24H  
 HYDROmorphone (DILAUDID) injection 1 mg  1 mg IntraVENous Q4H PRN  
 acetaminophen (TYLENOL) tablet 650 mg  650 mg Oral Q4H PRN  
 oxyCODONE-acetaminophen (PERCOCET) 5-325 mg per tablet 1 Tab  1 Tab Oral Q4H PRN  
 0.9% sodium chloride infusion  125 mL/hr IntraVENous CONTINUOUS  
 
______________________________________________________________________ EXPECTED LENGTH OF STAY: - - - 
ACTUAL LENGTH OF STAY:          0 Trinity Cespedes NP

## 2018-10-08 NOTE — H&P
[address]  HISTORY AND PHYSICAL Rodger Hardin 
MR#: [id number] : 1990 ACCOUNT #: [de-identified] ADMIT DATE: 10/07/2018 PRIMARY CARE PHYSICIAN:  None. SOURCE OF INFORMATION:  The patient and patient's spouse who was present at the bedside. CHIEF COMPLAINT:  Headache. HISTORY OF PRESENT ILLNESS:  This is a 38-year-old woman with a past medical history significant for migraine headache, anxiety/depression, was in her usual state of health until the day of presentation at the emergency room when the patient developed a headache. The headache started 2-1/2 hours before coming to the emergency room. The patient described the headache as a constant throbbing headache involving the frontal part of the head and spreading to the back of the head, 10/10 in severity. The headache is worse when the patient is exposed to bright light. Associated with nausea and vomiting as well as dizziness. No fever, no rigors, no chills. The patient has had multiple visits to the emergency room because of the headache. This is the fifth visit to the emergency room this week because of the headache. The patient was admitted 10/04/2018 because of intractable migraine headache. The patient asked the doctor to discharge her from the hospital before she can be seen by the neurologist.  She was admitted for pain control and for further evaluation by the neurologist.  The patient is under the care of neurologist associated with the [address] [place] [address]. Multiple medications have been tried without significant control of the patient's headache. The patient's headache is fairly controlled with Dilaudid.   The patient has been told by the neurologist that this is not the ideal medication for her headache, although she received a prescription from the neurologist for a short period of time for the Dilaudid during a visit to the neurologist's office. The patient has also been referred to a pain care specialist.  The patient is here to follow up with the appointment with the pain care specialist.  When the patient arrived at the emergency room,  she was tried on DHE. It was reported that the patient developed a reaction to this medication in the form of a tremor. The report in the emergency room also showed that the patient developed bradycardia. The patient received IV fluid. Vital signs improved but the headache was persistent. The situation  in the patient's room was very tense because the patient and spouse wanted the patient to be prescribed Dilaudid since this is the only medication that has worked for the patient when she has the intractable migraine headache. The patient was subsequently referred to the hospitalist service for evaluation for admission. PAST MEDICAL HISTORY:  Migraine headache, anxiety/depression. ALLERGIES:  THE PATIENT IS ALLERGIC TO BENADRYL, COMPAZINE, VALPROIC ACID, HALDOL, REGLAN,  PROMETHAZINE. MEDICATIONS:  Xanax 1 mg as needed, Excedrin 2 tablets as needed, Klonopin 2 mg daily at bedtime, Lexapro 20 mg daily at bedtime, Zofran 4 mg every 8 hours as needed for nausea,  Topamax 100 mg daily at bedtime. FAMILY HISTORY:  This was reviewed. No family history of migraine headaches. PAST SURGICAL HISTORY:  This is significant for appendectomy, breast lumpectomy, wisdom teeth extraction. SOCIAL HISTORY:  The patient is a former smoker. No history of alcohol abuse. REVIEW OF SYSTEMS: 
HEAD, EYES, EARS, NOSE AND THROAT:  This is positive for headache and dizziness. No blurring of vision. No photophobia. RESPIRATORY:  No cough. No shortness of breath. No hemoptysis. CARDIOVASCULAR:  No chest pain. No orthopnea. No palpitations. GASTROINTESTINAL:  This is positive for nausea and vomiting. No diarrhea. No constipation. GENITOURINARY:  No dysuria, no urgency and no frequency. All other systems are reviewed and they are negative. PHYSICAL EXAMINATION: 
GENERAL APPEARANCE:  The patient appeared ill, in moderate distress. VITAL SIGNS:  On arrival at the emergency room, temperature 98.1, pulse 68, respiratory rate 16, blood pressure 112/73, oxygen saturation 97% on room air. HEAD:  Normocephalic, atraumatic. EYES:  Normal eye movement. No redness. No drainage. No discharge. EARS:  Normal external ears with no obvious drainage. NOSE:  No deformity. No drainage. MOUTH AND THROAT:  No visible oral lesions. NECK:  Supple. No JVD. No thyromegaly. CHEST:  Clear breath sounds. No wheezing. No crackles. HEART:  Normal S1 and S2, regular. No clinically appreciable murmur. ABDOMEN:  Soft, nontender. Normal bowel sounds. CENTRAL NERVOUS SYSTEM:  Alert and oriented x3. No gross focal neurological deficit. EXTREMITIES:  No edema. Pulses 2+ bilaterally. MUSCULOSKELETAL:  No obvious joint deformity or swelling. SKIN:  No active skin lesions seen in the exposed part of the body. PSYCHIATRIC:  Normal mood and affect. LYMPHATIC SYSTEM:  No cervical lymphadenopathy. DIAGNOSTIC DATA:  None. LABORATORY DATA:  None. ASSESSMENT: 
1.  Status migrainous. 2.  Anxiety/depression. PLAN: 
1. Status migrainous. We will  place the patient on observation. We will place the patient on Dilaudid for headache control. In the morning the patient can be seen by the neurologist for further evaluation and treatment. The patient will require the evaluation by the pain care specialist as an outpatient to address the patient's pain control due to headache as opposed to daily visits to the emergency room for Dilaudid. 2.  Anxiety/depression. We will resume preadmission medications. 3.  CODE STATUS:  THE PATIENT IS A FULL CODE. 4.  We will place the patient on Lovenox for DVT prophylaxis. MD JOSE ROBERTO Gentile/BRANDEN 
D: 10/08/2018 00:04    
T: 10/08/2018 02:08 JOB #: [id number]

## 2018-10-08 NOTE — CONSULTS
NEUROLOGY CONSULT NOTE    Name Andrea Gillespie Age 25 y.o. MRN [id number]  1990     Consulting Physician: Sonny Campos MD      Chief Complaint:  Headache     Assessment:     Principal Problem:    Status migrainosus (2016)    Active Problems:    Migraine headache (10/7/2018)      25year old female presenting with status migrainosus. Neurological examination is non-focal.  Head CT reviewed from 10/4 and without acute intracranial process. She reports allergies to DHE and several other medications. Difficult situation as she feels headaches only respond to Dilaudid. Recommendations:   Avoid narcotics  Start Nortriptyline 25mg qhs for migraine ppx. Cont. TPM as previously prescribed. HA cocktail including IVMP 500mg BID, Zofran 4mg IV PRN and Toradol 30mg IV PRN (ordered)  Will follow along    Thank you very much for this referral. I appreciate the opportunity to participate in this patient's care. History of Present Illness: This is a 25 y.o.   female, we were asked to see for headache. PMH notable for migraines, anxiety, depression. She is followed by Dr. Haley Lares for her headaches. She reports onset of daily headache x 6 days. She is afebrile and without meningeal s/s. Current headache 7/10 located bi-frontal since yesterday evening with photophobia, N/V. She reports this is her 5th ED visit this week for migraine. Migraines typically respond to Dilaudid. She endorses allergies to several other agents typically used in headache cocktails. Patient received DHE in ED and reported associated bradycardia. EKG with sinus bradycardia this admission. She is currently on TPM 100mg qhs for migraine ppx. Denies trial of TCA therapy in the past.  Head CT 10/4 did not reveal acute intracranial process. Tox screen +THC, barbiturates and opiates. Allergies   Allergen Reactions    Benadryl [Diphenhydramine Hcl] Other (comments)     Makes me feel funny. Need ativan if I get benadryl    Compazine [Prochlorperazine Edisylate] Anxiety    Depacon [Valproate Sodium] Other (comments)     Pt reports having restlessness with this medication.  Haldol [Haloperidol Lactate] Other (comments)    Promethazine Other (comments)     \"It makes me feel really funny, nausea and dizzy\"    Reglan [Metoclopramide] Other (comments)        Prior to Admission medications    Medication Sig Start Date End Date Taking? Authorizing Provider   methylPREDNISolone (MEDROL DOSEPACK) 4 mg tablet Take 4 mg by mouth Specific Days and Specific Times. 10/5/18 10/10/18 Yes Historical Provider   ondansetron (ZOFRAN ODT) 4 mg disintegrating tablet Take 1 Tab by mouth every eight (8) hours as needed for Nausea. 10/5/18  Yes Amy Gonzalez MD   ALPRAZolam Zoran Lubin) 1 mg tablet Take 1 mg by mouth once as needed (panic attack). Yes Historical Provider   topiramate (TOPAMAX) 100 mg tablet Take 1 Tab by mouth nightly. 9/11/18  Yes Kelli Schmidt DO   adapalene-benzoyl peroxide 0.1-2.5 % glwp APPLY TO AFFECTED AREAS AT BEDTIME X 30 DAYS (AS NEEDED) 7/3/18  Yes Historical Provider   ACZONE 7.5 % glwp APPLY TO AFFECTED AREA ONCE DAILY IN THE MORNING (AS NEEDED) 7/5/18  Yes Historical Provider   SUMAtriptan succinate (ZEMBRACE SYMTOUCH) 3 mg/0.5 mL pnij 3 mg by SubCUTAneous route as needed. 4/17/18  Yes Karly Soliman NP   ASPIRIN/ACETAMINOPHEN/CAFFEINE (MIGRAINE RELIEF PO) Take 2 Tabs by mouth once as needed (Migraine). Yes Historical Provider   clonazePAM (KLONOPIN) 1 mg tablet Take 1 mg by mouth three (3) times daily. 10/15/15  Yes Historical Provider   escitalopram (LEXAPRO) 20 mg tablet Take 20 mg by mouth nightly. Indications: Generalized Anxiety Disorder   Yes Munir Prabhakar MD   levonorgestrel (MIRENA) 20 mcg/24 hr (5 years) IUD 1 Each by IntraUTERine route once.     Historical Provider       Past Medical History:   Diagnosis Date    Anxiety and depression     Bartholin's gland abscess     x8  Endometriosis     Migraine     Ovarian cyst     Panic attack         Past Surgical History:   Procedure Laterality Date    HX APPENDECTOMY  04/26/2017    HX BARTHOLIN CYST MARSUPIALIZATION      2011, 2018    HX BREAST LUMPECTOMY Right     HX BUNIONECTOMY      HX CYST REMOVAL      HX GYN Left     bartholin cyst drainage X 6    HX GYN Left     bartholin cyst marsupilization    HX OTHER SURGICAL      laparascopy    HX PELVIC LAPAROSCOPY      HX WISDOM TEETH EXTRACTION          Social History   Substance Use Topics    Smoking status: Former Smoker    Smokeless tobacco: Never Used    Alcohol use No      Comment: social        Family History   Problem Relation Age of Onset    Diabetes Maternal Aunt     Heart Disease Maternal Aunt     Stroke Maternal Aunt     Heart Disease Maternal Grandmother     No Known Problems Mother         Review of Systems:   Comprehensive review of systems performed and negative except for as listed above. Exam:     Visit Vitals    /86 (BP 1 Location: Left arm, BP Patient Position: At rest)    Pulse 66    Temp 97 °F (36.1 °C)    Resp 19    Ht 5' 8\" (1.727 m)    Wt 66.5 kg (146 lb 9.7 oz)    SpO2 99%    Breastfeeding No    BMI 22.29 kg/m2        General: Well developed, well nourished. Patient in no apparent distress   Head: Normocephalic, atraumatic, anicteric sclera   Lungs:  Clear to auscultation bilaterally, No wheezes or rubs   Cardiac: Regular rate and rhythm with no murmurs. Abd: Bowel sounds were audible. No tenderness on palpation   Ext: No pedal edema   Skin: No overt signs of rash     Neurological Exam:  Mental Status: Alert and oriented to person place and time   Speech: Fluent no aphasia or dysarthria. Cranial Nerves:   Intact visual fields. Facial sensation is normal. Facial movement is symmetric. Palate is midline. Normal sternocleidomastoid strength. Tongue is midline. Hearing is intact bilaterally.    Eyes: PERRL, EOM's full, no nystagmus, no ptosis. Motor:  Full and symmetric strength of upper and lower proximal and distal muscles. Normal bulk and tone. Reflexes:   Deep tendon reflexes 2+/4 and symmetrical.  Plantar response is downgoing b/l. Sensory:   Symmetrically intact  with no perceived deficits modalities involving small or large fibers. Gait:  Gait is deferred   Tremor:   No tremor noted. Cerebellar:  Finger to nose and heel over shin to knee was demonstrated competently. Neurovascular: No carotid bruits. Imaging  CT Results (maximum last 3): Results from [place] encounter on 10/04/18   CT HEAD WO CONT   Narrative EXAM:  CT HEAD WO CONT    INDICATION:   headache    COMPARISON: CT head 3/4/2017. TECHNIQUE: Unenhanced CT of the head was performed using 5 mm images. Brain and  bone windows were generated. CT dose reduction was achieved through use of a  standardized protocol tailored for this examination and automatic exposure  control for dose modulation. FINDINGS:  The ventricles are normal in size and position. Basilar cisterns are patent. No  midline shift. There is no evidence of acute infarct, hemorrhage, or extraaxial  fluid collection. The paranasal sinuses, mastoid air cells, and middle ears are clear. The orbital  contents are within normal limits. There are no significant osseous or  extracranial soft tissue lesions. Impression IMPRESSION:  1. No evidence of intracranial abnormality. MRI Results (maximum last 3): Results from [place] encounter on 01/06/16   MRI BRAIN W WO CONT   Narrative **Final Report**      ICD Codes / Adm. Diagnosis: 346.13  G43.011 / Migraine without aura, with in    Migraine without aura, intra  Examination:  MR BRAIN W AND WO CON  - [id number] - Jan 6 2016  6:37PM  Accession No:  [id number]  Reason:  Persistent HA, not responding to meds r/o mass lesions      REPORT:  INDICATION: Intractable acute frontal headache for 2 weeks.    Exam: Multiplanar pre- and postgadolinium MRI of the brain is performed with   T1, T2, gradient, FLAIR and diffusion sequences. A total of 6 mL of   gadolinium contrast was administered intravenously. Direct comparison is made to prior CT dated December 31, 2015. FINDINGS: There is no restricted diffusion to suggest acute infarct. The   ventricular system is normal. The gray-white matter differentiation is   well-preserved. The cervicomedullary junction is normal and there is no   tonsillar ectopia. There is no acute intracranial hemorrhage, prior   intracranial hemorrhage or extra-axial fluid collection. The visualized   vascular flow-voids of the skull base are normal. There is no enhancing   intracranial mass lesion, mass effect or herniation. IMPRESSION: No acute intracranial hemorrhage, enhancing mass or infarct. Signing/Reading Doctor: DAMARIS Alves ([id number])    Approved: DAMARIS TEE ([id number])  Jan 6 2016  7:14PM                                      Lab Review  Lab Results   Component Value Date/Time    WBC 4.7 10/08/2018 03:42 AM    HCT 33.9 (L) 10/08/2018 03:42 AM    HGB 10.9 (L) 10/08/2018 03:42 AM    PLATELET 981 75/57/4732 03:42 AM     Lab Results   Component Value Date/Time    Sodium 142 10/08/2018 03:42 AM    Potassium 4.0 10/08/2018 03:42 AM    Chloride 112 (H) 10/08/2018 03:42 AM    CO2 21 10/08/2018 03:42 AM    Glucose 105 (H) 10/08/2018 03:42 AM    BUN 7 10/08/2018 03:42 AM    Creatinine 0.54 (L) 10/08/2018 03:42 AM    Calcium 7.8 (L) 10/08/2018 03:42 AM     No components found for: TROPQUANT  No results found for: GEMINI    Signed:  Ari Tang.  Dianna Morin DO  10/8/2018  1:54 PM

## 2018-10-08 NOTE — PROGRESS NOTES
Reason for Admission:  The patient presented with migrainosus RRAT Score:  9 Plan for utilizing home health:  Anticipate none- patient endorses being independent, no skilled nursing needs identified at this time Likelihood of Readmission:  Low Transition of Care Plan:  Anticipate home The CM met with patient at bedside in order to introduce the role of CM and assess for patient needs. The patient lives with her mother, father, and shares custody of her 11year-old son. The patient works full-time for [employer], has [employer]. The patient verified demographics. The patient denied difficulty affording or accessing medications prior to hospitalization. The patient endorses being independent with ADLs and mobility prior to hospitalization. The patient utilizes [facility] pharmacy or [facility] on [address] [place] [address]. The patient's family will provide transport home. The CM will continue to follow as discharge needs arise. TOMAS Pratt Care Management Interventions PCP Verified by CM: Yes (Patient does not have PCP- typically goes to [place], CM provided patient with a list of [facility] physicians) Mode of Transport at Discharge: Other (see comment) (Family will provide transportation upon discharge) Transition of Care Consult (CM Consult): Discharge Planning MyChart Signup: Yes Discharge Durable Medical Equipment: No 
Health Maintenance Reviewed: Yes Physical Therapy Consult: No 
Occupational Therapy Consult: No 
Speech Therapy Consult: No 
Current Support Network: Relative's Home, Other (Patient lives with her mother, father, and 11year-old son ) Confirm Follow Up Transport: Family Plan discussed with Pt/Family/Caregiver: Yes [facility] [employer] Information Provided?: No 
Discharge Location Discharge Placement: Home

## 2018-10-08 NOTE — PROGRESS NOTES
Problem: Pressure Injury - Risk of 
Goal: *Prevention of pressure injury Document Devon Scale and appropriate interventions in the flowsheet. Outcome: Progressing Towards Goal 
Pressure Injury Interventions:

## 2018-10-08 NOTE — ED NOTES
At beside to speak with pt. And her significant other regarding the significant other's request for Dilaudid for pt. Discussed with pt. And her significant other that Dr. Espinoza Resides was not including Dilaudid he the pt.'s plan of care. Reviewed with significant other what had been given pt. To help with her pain. Informed pt. And her significant other that I would inform pt. Was still having pain. Significant other requested to speak with supervisor; supervisor called to speak with pt.

## 2018-10-08 NOTE — PROGRESS NOTES
Problem: Falls - Risk of 
Goal: *Absence of Falls Document Marilee Heart Fall Risk and appropriate interventions in the flowsheet. Outcome: Progressing Towards Goal 
Fall Risk Interventions: 
  
 
  
 
Medication Interventions: Patient to call before getting OOB, Teach patient to arise slowly Patient aware to call out for assistance if feeling dizzy when getting out of bed.

## 2018-10-09 ENCOUNTER — APPOINTMENT (OUTPATIENT)
Dept: MRI IMAGING | Age: 28
End: 2018-10-09
Attending: PSYCHIATRY & NEUROLOGY
Payer: COMMERCIAL

## 2018-10-09 PROCEDURE — 96361 HYDRATE IV INFUSION ADD-ON: CPT

## 2018-10-09 PROCEDURE — 74011000258 HC RX REV CODE- 258: Performed by: PSYCHIATRY & NEUROLOGY

## 2018-10-09 PROCEDURE — 70544 MR ANGIOGRAPHY HEAD W/O DYE: CPT

## 2018-10-09 PROCEDURE — A9585 GADOBUTROL INJECTION: HCPCS | Performed by: HOSPITALIST

## 2018-10-09 PROCEDURE — 74011250636 HC RX REV CODE- 250/636: Performed by: INTERNAL MEDICINE

## 2018-10-09 PROCEDURE — 74011000258 HC RX REV CODE- 258: Performed by: HOSPITALIST

## 2018-10-09 PROCEDURE — 70553 MRI BRAIN STEM W/O & W/DYE: CPT

## 2018-10-09 PROCEDURE — 96366 THER/PROPH/DIAG IV INF ADDON: CPT

## 2018-10-09 PROCEDURE — 96372 THER/PROPH/DIAG INJ SC/IM: CPT

## 2018-10-09 PROCEDURE — 77030021566 MRA NECK W CONT

## 2018-10-09 PROCEDURE — 99218 HC RM OBSERVATION: CPT

## 2018-10-09 PROCEDURE — 74011250637 HC RX REV CODE- 250/637: Performed by: INTERNAL MEDICINE

## 2018-10-09 PROCEDURE — 94760 N-INVAS EAR/PLS OXIMETRY 1: CPT

## 2018-10-09 PROCEDURE — 74011250636 HC RX REV CODE- 250/636: Performed by: HOSPITALIST

## 2018-10-09 PROCEDURE — 77010033678 HC OXYGEN DAILY

## 2018-10-09 PROCEDURE — 96376 TX/PRO/DX INJ SAME DRUG ADON: CPT

## 2018-10-09 PROCEDURE — 74011250636 HC RX REV CODE- 250/636: Performed by: PSYCHIATRY & NEUROLOGY

## 2018-10-09 RX ORDER — LORAZEPAM 2 MG/ML
1 INJECTION INTRAMUSCULAR ONCE
Status: COMPLETED | OUTPATIENT
Start: 2018-10-09 | End: 2018-10-09

## 2018-10-09 RX ORDER — HYDROMORPHONE HYDROCHLORIDE 2 MG/ML
1 INJECTION, SOLUTION INTRAMUSCULAR; INTRAVENOUS; SUBCUTANEOUS
Status: DISCONTINUED | OUTPATIENT
Start: 2018-10-09 | End: 2018-10-10

## 2018-10-09 RX ADMIN — HYDROMORPHONE HYDROCHLORIDE 1 MG: 2 INJECTION, SOLUTION INTRAMUSCULAR; INTRAVENOUS; SUBCUTANEOUS at 06:43

## 2018-10-09 RX ADMIN — HYDROMORPHONE HYDROCHLORIDE 1 MG: 2 INJECTION, SOLUTION INTRAMUSCULAR; INTRAVENOUS; SUBCUTANEOUS at 20:53

## 2018-10-09 RX ADMIN — HYDROMORPHONE HYDROCHLORIDE 1 MG: 2 INJECTION, SOLUTION INTRAMUSCULAR; INTRAVENOUS; SUBCUTANEOUS at 15:34

## 2018-10-09 RX ADMIN — LORAZEPAM 1 MG: 2 INJECTION INTRAMUSCULAR; INTRAVENOUS at 15:35

## 2018-10-09 RX ADMIN — HYDROMORPHONE HYDROCHLORIDE 1 MG: 2 INJECTION, SOLUTION INTRAMUSCULAR; INTRAVENOUS; SUBCUTANEOUS at 10:57

## 2018-10-09 RX ADMIN — GADOBUTROL 7.5 ML: 604.72 INJECTION INTRAVENOUS at 16:41

## 2018-10-09 RX ADMIN — SODIUM CHLORIDE 500 MG: 900 INJECTION, SOLUTION INTRAVENOUS at 05:38

## 2018-10-09 RX ADMIN — Medication 10 ML: at 14:00

## 2018-10-09 RX ADMIN — SODIUM CHLORIDE 500 MG: 900 INJECTION, SOLUTION INTRAVENOUS at 17:00

## 2018-10-09 RX ADMIN — ESCITALOPRAM OXALATE 20 MG: 10 TABLET ORAL at 21:55

## 2018-10-09 RX ADMIN — Medication 10 ML: at 05:39

## 2018-10-09 RX ADMIN — SODIUM CHLORIDE 100 ML: 900 INJECTION, SOLUTION INTRAVENOUS at 16:41

## 2018-10-09 RX ADMIN — HYDROMORPHONE HYDROCHLORIDE 1 MG: 2 INJECTION, SOLUTION INTRAMUSCULAR; INTRAVENOUS; SUBCUTANEOUS at 02:47

## 2018-10-09 RX ADMIN — TOPIRAMATE 100 MG: 100 TABLET, FILM COATED ORAL at 21:55

## 2018-10-09 RX ADMIN — ONDANSETRON HYDROCHLORIDE 4 MG: 2 INJECTION INTRAMUSCULAR; INTRAVENOUS at 06:43

## 2018-10-09 RX ADMIN — SODIUM CHLORIDE 125 ML/HR: 900 INJECTION, SOLUTION INTRAVENOUS at 21:37

## 2018-10-09 RX ADMIN — KETOROLAC TROMETHAMINE 30 MG: 30 INJECTION, SOLUTION INTRAMUSCULAR at 09:07

## 2018-10-09 RX ADMIN — OXYCODONE HYDROCHLORIDE AND ACETAMINOPHEN 1 TABLET: 5; 325 TABLET ORAL at 09:07

## 2018-10-09 RX ADMIN — ENOXAPARIN SODIUM 40 MG: 40 INJECTION SUBCUTANEOUS at 02:47

## 2018-10-09 RX ADMIN — Medication 10 ML: at 21:37

## 2018-10-09 RX ADMIN — ONDANSETRON HYDROCHLORIDE 4 MG: 2 INJECTION INTRAMUSCULAR; INTRAVENOUS at 02:47

## 2018-10-09 RX ADMIN — ONDANSETRON HYDROCHLORIDE 4 MG: 2 INJECTION INTRAMUSCULAR; INTRAVENOUS at 10:57

## 2018-10-09 NOTE — PROGRESS NOTES
Neurology Progress Note Patient ID: Ede Colin 
[id number] 
14 y.o. 
1990 Subjective:  
Patient reports headache is unchanged. She notes numbness around the left eye and OS blurred vision. She remains afebrile, neck is supple. Current Facility-Administered Medications Medication Dose Route Frequency  HYDROmorphone (PF) (DILAUDID) injection 1 mg  1 mg IntraVENous Q4H PRN  
 gadobutrol (Gadavist) contrast solution 7.5 mL  7.5 mL IntraVENous RAD ONCE  
 sodium chloride 0.9 % bolus infusion 100 mL  100 mL IntraVENous RAD ONCE  
 influenza vaccine 2018-19 (6 mos+)(PF) (FLUARIX QUAD/FLULAVAL QUAD) injection 0.5 mL  0.5 mL IntraMUSCular PRIOR TO DISCHARGE  topiramate (TOPAMAX) tablet 100 mg  100 mg Oral QHS  SUMAtriptan (IMITREX) injection 3 mg  3 mg SubCUTAneous PRN  
 escitalopram oxalate (LEXAPRO) tablet 20 mg  20 mg Oral QHS  sodium chloride (NS) flush 5-10 mL  5-10 mL IntraVENous Q8H  
 sodium chloride (NS) flush 5-10 mL  5-10 mL IntraVENous PRN  
 naloxone (NARCAN) injection 0.4 mg  0.4 mg IntraVENous PRN  
 ondansetron (ZOFRAN) injection 4 mg  4 mg IntraVENous Q4H PRN  
 bisacodyl (DULCOLAX) tablet 5 mg  5 mg Oral DAILY PRN  
 enoxaparin (LOVENOX) injection 40 mg  40 mg SubCUTAneous Q24H  
 nortriptyline (PAMELOR) capsule 25 mg  25 mg Oral QHS  ketorolac (TORADOL) injection 30 mg  30 mg IntraVENous Q8H PRN  
 ondansetron (ZOFRAN) injection 4 mg  4 mg IntraVENous Q8H PRN  
 methylPREDNISolone (SOLU-MEDROL) 500 mg in 0.9% sodium chloride (MBP/ADV) 100 mL  500 mg IntraVENous Q12H  
 acetaminophen (TYLENOL) tablet 650 mg  650 mg Oral Q4H PRN  
 oxyCODONE-acetaminophen (PERCOCET) 5-325 mg per tablet 1 Tab  1 Tab Oral Q4H PRN  
 0.9% sodium chloride infusion  125 mL/hr IntraVENous CONTINUOUS Objective:  
 
Patient Vitals for the past 8 hrs: 
 BP Temp Pulse Resp SpO2  
10/09/18 1100 (!) 134/95 97.6 °F (36.4 °C) 89 12 100 % 10/07 1901 - 10/09 0700 In: 1886.3 [P.O.:480; I.V.:1406.3] Out: 450 [Urine:450] Lab Review No results found for this or any previous visit (from the past 24 hour(s)). Facial b/l malar erythema/warmth Neurological Exam: 
Mental Status: Alert and oriented to person place and time Speech: Fluent no aphasia or dysarthria. Cranial Nerves:   Intact visual fields. Facial sensation is decreased L. . Facial movement is asymmetric-L upper facial weakness. Palate is midline. Normal sternocleidomastoid strength. Tongue is midline. Hearing is intact bilaterally. Eyes: PERRL, EOM's full, no nystagmus, no ptosis. Motor:  Full and symmetric strength of upper and lower proximal and distal muscles. Normal bulk and tone. Reflexes:   Deferred Sensory:   Decreased LT L face and LUE Gait:  Gait is deferred Tremor:   No tremor noted. Cerebellar:  Finger to nose and heel over shin to knee was demonstrated competently. Assessment:  
 
Principal Problem: 
  Status migrainosus (1/13/2016) Active Problems: 
  Migraine headache (10/7/2018) 25year old female presenting with status migrainosus. Neurological examination is non-focal.  Head CT reviewed from 10/4 and without acute intracranial process. She reports allergies to DHE and several other medications. Headache persist today- she remains afebrile and neck supple. Examination with malar erythema b/l, L upper facial weakness, sensory deficit L carmelina-orbital region and LUE. Will obtain stat neuroimaging to exclude acute intracranial pathology. Continue to address pain with headache cocktail and preventatives as ordered. Plan: MRI Brain WWO Stat/MRA 
HA cocktail including IVMP 500mg BID, Zofran 4mg IV PRN and Toradol 30mg IV PRN- patient reports she has tolerate these medications well without side effects in the past.   
D/C Nortriptyline due to possible medication effect causing facial erythema. Cont. TPM 
 
Signed: 
Dylan Thomas DO 
10/9/2018

## 2018-10-09 NOTE — ROUTINE PROCESS
Bedside and Verbal shift change report given to Gif=gi (oncoming nurse) by Queta Silva RN (offgoing nurse). Report included the following information SBAR, Kardex, ED Summary, OR Summary, Procedure Summary, Intake/Output, MAR, Recent Results, Med Rec Status and Cardiac Rhythm NSR.

## 2018-10-09 NOTE — PROGRESS NOTES
Problem: Falls - Risk of 
Goal: *Absence of Falls Document Neftali Steen Fall Risk and appropriate interventions in the flowsheet. Outcome: Progressing Towards Goal 
Fall Risk Interventions: 
  
 
  
 
Medication Interventions: Evaluate medications/consider consulting pharmacy, Patient to call before getting OOB, Teach patient to arise slowly Problem: Pressure Injury - Risk of 
Goal: *Prevention of pressure injury Document Devon Scale and appropriate interventions in the flowsheet. Outcome: Progressing Towards Goal 
Pressure Injury Interventions:

## 2018-10-09 NOTE — PROGRESS NOTES
Problem: Falls - Risk of 
Goal: *Absence of Falls Document Noreen Waltono Fall Risk and appropriate interventions in the flowsheet. Outcome: Progressing Towards Goal 
Fall Risk Interventions: 
  
 
  
 
Medication Interventions: Evaluate medications/consider consulting pharmacy, Patient to call before getting OOB, Teach patient to arise slowly Problem: Pressure Injury - Risk of 
Goal: *Prevention of pressure injury Document Devon Scale and appropriate interventions in the flowsheet. Outcome: Progressing Towards Goal 
Pressure Injury Interventions:

## 2018-10-10 VITALS
WEIGHT: 154.8 LBS | BODY MASS INDEX: 23.46 KG/M2 | OXYGEN SATURATION: 96 % | TEMPERATURE: 98 F | HEART RATE: 54 BPM | DIASTOLIC BLOOD PRESSURE: 85 MMHG | SYSTOLIC BLOOD PRESSURE: 121 MMHG | RESPIRATION RATE: 12 BRPM | HEIGHT: 68 IN

## 2018-10-10 PROBLEM — G43.901 MIGRAINE WITH STATUS MIGRAINOSUS: Status: ACTIVE | Noted: 2018-10-10

## 2018-10-10 PROBLEM — G43.909 MIGRAINE HEADACHE: Status: RESOLVED | Noted: 2018-10-07 | Resolved: 2018-10-10

## 2018-10-10 PROCEDURE — 99218 HC RM OBSERVATION: CPT

## 2018-10-10 PROCEDURE — 96366 THER/PROPH/DIAG IV INF ADDON: CPT

## 2018-10-10 PROCEDURE — 90686 IIV4 VACC NO PRSV 0.5 ML IM: CPT | Performed by: INTERNAL MEDICINE

## 2018-10-10 PROCEDURE — 96372 THER/PROPH/DIAG INJ SC/IM: CPT

## 2018-10-10 PROCEDURE — 74011250636 HC RX REV CODE- 250/636: Performed by: PSYCHIATRY & NEUROLOGY

## 2018-10-10 PROCEDURE — 90471 IMMUNIZATION ADMIN: CPT

## 2018-10-10 PROCEDURE — 96376 TX/PRO/DX INJ SAME DRUG ADON: CPT

## 2018-10-10 PROCEDURE — 74011250636 HC RX REV CODE- 250/636: Performed by: INTERNAL MEDICINE

## 2018-10-10 PROCEDURE — 96361 HYDRATE IV INFUSION ADD-ON: CPT

## 2018-10-10 PROCEDURE — 74011000258 HC RX REV CODE- 258: Performed by: PSYCHIATRY & NEUROLOGY

## 2018-10-10 RX ADMIN — Medication 10 ML: at 07:44

## 2018-10-10 RX ADMIN — Medication 10 ML: at 15:00

## 2018-10-10 RX ADMIN — SODIUM CHLORIDE 500 MG: 900 INJECTION, SOLUTION INTRAVENOUS at 15:19

## 2018-10-10 RX ADMIN — ENOXAPARIN SODIUM 40 MG: 40 INJECTION SUBCUTANEOUS at 01:23

## 2018-10-10 RX ADMIN — INFLUENZA VIRUS VACCINE 0.5 ML: 15; 15; 15; 15 SUSPENSION INTRAMUSCULAR at 16:48

## 2018-10-10 RX ADMIN — HYDROMORPHONE HYDROCHLORIDE 1 MG: 2 INJECTION, SOLUTION INTRAMUSCULAR; INTRAVENOUS; SUBCUTANEOUS at 09:34

## 2018-10-10 RX ADMIN — SODIUM CHLORIDE 500 MG: 900 INJECTION, SOLUTION INTRAVENOUS at 04:00

## 2018-10-10 RX ADMIN — HYDROMORPHONE HYDROCHLORIDE 1 MG: 2 INJECTION, SOLUTION INTRAMUSCULAR; INTRAVENOUS; SUBCUTANEOUS at 01:23

## 2018-10-10 NOTE — PROGRESS NOTES
Problem: Falls - Risk of 
Goal: *Absence of Falls Document Alisa Evens Fall Risk and appropriate interventions in the flowsheet. Outcome: Progressing Towards Goal 
Fall Risk Interventions: 
  
 
  
 
Medication Interventions: Patient to call before getting OOB, Teach patient to arise slowly, Evaluate medications/consider consulting pharmacy Problem: Pressure Injury - Risk of 
Goal: *Prevention of pressure injury Document Devon Scale and appropriate interventions in the flowsheet. Outcome: Progressing Towards Goal 
Pressure Injury Interventions: 
  
 
  
 
  
 
  
 
Nutrition Interventions: Document food/fluid/supplement intake Friction and Shear Interventions: Minimize layers

## 2018-10-10 NOTE — ROUTINE PROCESS
I have reviewed discharge instructions with the patient. The patient verbalized understanding. Discharge medications reviewed with the patient and appropriate educational materials and side effects teaching were provided. PIV and Telemetry discontinued. Pt discharged home via car transferred by her boyfriend. Care plan and education resolved. F/U appt added to connect care. Signed AVS placed on chart. Pt has no questions or concerns at this time. No visible s/s of distress.

## 2018-10-10 NOTE — ROUTINE PROCESS
Pt over to OB-GYN office for IUD removal at 1:45p.m, transferred by this nurse. Pt tolerated procedure well. No visible s/s of distress. No complaints of pain or discomfort. Will continue to monitor.

## 2018-10-10 NOTE — PROGRESS NOTES
Per pt and care team's request, Mirena IUD was removed without difficulty. Pt was counseled about possibility of using Paragard (hormone- free IUD) in the future but that she should avoid all other hormone-containing methods. Advised to return to office prn. Paragard pamphlet given.

## 2018-10-10 NOTE — PROGRESS NOTES
CM met with patient and significant other at bedside to discuss transition home- CM offered to make PCP appointment with [facility] provider- patient declined, stated she will follow-up with neurologist and if she wants PCP later she will make appointment. The patient has list of [facility] providers at bedside. The patient's significant other will transport home. The patient denied having any needs or concerns at this time.  TOMAS Erwin

## 2018-10-10 NOTE — DISCHARGE INSTRUCTIONS
Discharge Instructions       PATIENT ID: Andrea Gillespie  MRN: [id number]   YOB: 1990    DATE OF ADMISSION: 10/7/2018  8:07 PM    DATE OF DISCHARGE: 10/10/2018    PRIMARY CARE PROVIDER: None     ATTENDING PHYSICIAN: Sherita Calvert MD  DISCHARGING PROVIDER: Fulton Harada, NP    To contact this individual call [phone] and ask the  to page. If unavailable ask to be transferred the Adult Hospitalist Department. DISCHARGE DIAGNOSES: Migraine    CONSULTATIONS: IP CONSULT TO NEUROLOGY    PROCEDURES/SURGERIES: * No surgery found *    PENDING TEST RESULTS:   At the time of discharge the following test results are still pending: none    FOLLOW UP APPOINTMENTS:   Follow-up Information     Follow up With Details Comments Contact Info    [address], DO  keep your appointment on Friday at 3 pm [address].  [phone]             ADDITIONAL CARE RECOMMENDATIONS:   Dayana Ly steroid pack and follow up with Dr. Haley Lares on Friday  Keep a headache journal  Avoid alcohol    DIET: Regular    ACTIVITY: Activity as tolerated    WOUND CARE: none    EQUIPMENT needed: none      DISCHARGE MEDICATIONS:   See Medication Reconciliation Form    · It is important that you take the medication exactly as they are prescribed. · Keep your medication in the bottles provided by the pharmacist and keep a list of the medication names, dosages, and times to be taken in your wallet. · Do not take other medications without consulting your doctor. NOTIFY YOUR PHYSICIAN FOR ANY OF THE FOLLOWING:   Fever over 101 degrees for 24 hours. Chest pain, shortness of breath, fever, chills, nausea, vomiting, diarrhea, change in mentation, falling, weakness, bleeding. Severe pain or pain not relieved by medications. Or, any other signs or symptoms that you may have questions about.       DISPOSITION:  x  Home With:   OT  PT  [facility]  RN SNF/Inpatient Rehab/LTAC    Independent/assisted living    Hospice    Other:     CDMP Checked:   Yes x     PROBLEM LIST Updated:  Yes x       Signed:   Royal Melvin BANDA NP  10/10/2018  3:12 PM           Recurring Migraine Headache: Care Instructions  Your Care Instructions  Migraines are painful, throbbing headaches. They often start on one side of the head. They may cause nausea and vomiting and make you sensitive to light, sound, or smell. Some people may have only a few migraines throughout life. Others have them as often as several times a month. The goal of treatment is to reduce the number of migraines you have and relieve your symptoms. Even with treatment, you may continue to have migraines. You play an important role in dealing with your headaches. Work on avoiding things that seem to trigger your migraines. When you feel a headache coming on, act quickly to stop it before it gets worse. Follow-up care is a key part of your treatment and safety. Be sure to make and go to all appointments, and call your doctor if you are having problems. It's also a good idea to know your test results and keep a list of the medicines you take. How can you care for yourself at home? · Do not drive if you have taken a prescription pain medicine. · Rest in a quiet, dark room until your headache is gone. Close your eyes and try to relax or go to sleep. Do not watch TV or read. · Put a cold, moist cloth or cold pack on the painful area for 10 to 20 minutes at a time. Put a thin cloth between the cold pack and your skin. · Have someone gently massage your neck and shoulders. · Take your medicines exactly as prescribed. Call your doctor if you think you are having a problem with your medicine. You will get more details on the specific medicines your doctor prescribes. To prevent migraines  · Keep a headache diary so you can figure out what triggers your headaches. Avoiding triggers may help you prevent headaches.  Record when each headache began, how long it lasted, and what the pain was like. Use words like throbbing, aching, stabbing, or dull. Write down any other symptoms you had with the headache. These may include nausea, flashing lights or dark spots, or sensitivity to bright light or loud noise. Note if the headache occurred near your period. List anything that might have triggered the headache. Triggers may include certain foods (chocolate, cheese, wine) or odors, smoke, bright light, stress, or lack of sleep. · If your doctor has prescribed medicine for your migraines, take it as directed. You may have medicine that you take only when you get a migraine and medicine that you take all the time to help prevent migraines. ¨ If your doctor has prescribed medicine for when you get a headache, take it at the first sign of a migraine, unless your doctor has given you other instructions. ¨ If your doctor has prescribed medicine to prevent migraines, take it exactly as prescribed. Call your doctor if you think you are having a problem with your medicine. · Find healthy ways to deal with stress. Migraines are most common during or right after stressful times. Take time to relax before and after you do something that has caused a migraine in the past.  · Try to keep your muscles relaxed by keeping good posture. Check your jaw, face, neck, and shoulder muscles for tension. Try to relax them. When sitting at a desk, change positions often. Stretch for 30 seconds each hour. · Get regular sleep and exercise. · Eat regular meals, and avoid foods and drinks that often trigger migraines. These include chocolate and alcohol, especially red wine and port. Chemicals used in food, such as aspartame and monosodium glutamate (MSG), also can trigger migraines. So can some food additives, such as those found in hot dogs, boyce, cold cuts, aged cheeses, and pickled foods. · Limit caffeine by not drinking too much coffee, tea, or soda.  Do not quit caffeine suddenly, because that can also give you migraines. · Do not smoke or allow others to smoke around you. If you need help quitting, talk to your doctor about stop-smoking programs and medicines. These can increase your chances of quitting for good. · If you are taking birth control pills or hormone therapy, talk to your doctor about whether they are triggering your migraines. When should you call for help? Call 911 anytime you think you may need emergency care. For example, call if:    · You have symptoms of a stroke. These may include:  ¨ Sudden numbness, tingling, weakness, or loss of movement in your face, arm, or leg, especially on only one side of your body. ¨ Sudden vision changes. ¨ Sudden trouble speaking. ¨ Sudden confusion or trouble understanding simple statements. ¨ Sudden problems with walking or balance. ¨ A sudden, severe headache that is different from past headaches.    Call your doctor now or seek immediate medical care if:    · You develop a fever and a stiff neck.     · You have new nausea and vomiting, or you cannot keep down food or liquids.    Watch closely for changes in your health, and be sure to contact your doctor if:    · You have a headache that does not get better within 1 or 2 days.     · Your headaches get worse or happen more often. Where can you learn more? Go to http://michele-lucy.info/. Enter V975 in the search box to learn more about \"Recurring Migraine Headache: Care Instructions. \"  Current as of: June 4, 2018  Content Version: 11.8  © 0807-1507 [employer]. Care instructions adapted under license by [employer] (which disclaims liability or warranty for this information). If you have questions about a medical condition or this instruction, always ask your healthcare professional. [place] [address] any warranty or liability for your use of this information.

## 2018-10-10 NOTE — PROGRESS NOTES
Neurology Progress Note Patient ID: Handy Ray 
[id number] 
62 y.o. 
1990 Subjective:  
Patient reports headache is improved today. L upper facial asymmetry/numbness and facial erythema also resolved. MRI Brain/MRA completed 10/9/18 and without acute intracranial process. Current Facility-Administered Medications Medication Dose Route Frequency  HYDROmorphone (PF) (DILAUDID) injection 1 mg  1 mg IntraVENous Q4H PRN  
 influenza vaccine 2018-19 (6 mos+)(PF) (FLUARIX QUAD/FLULAVAL QUAD) injection 0.5 mL  0.5 mL IntraMUSCular PRIOR TO DISCHARGE  topiramate (TOPAMAX) tablet 100 mg  100 mg Oral QHS  SUMAtriptan (IMITREX) injection 3 mg  3 mg SubCUTAneous PRN  
 escitalopram oxalate (LEXAPRO) tablet 20 mg  20 mg Oral QHS  sodium chloride (NS) flush 5-10 mL  5-10 mL IntraVENous Q8H  
 sodium chloride (NS) flush 5-10 mL  5-10 mL IntraVENous PRN  
 naloxone (NARCAN) injection 0.4 mg  0.4 mg IntraVENous PRN  
 ondansetron (ZOFRAN) injection 4 mg  4 mg IntraVENous Q4H PRN  
 bisacodyl (DULCOLAX) tablet 5 mg  5 mg Oral DAILY PRN  
 enoxaparin (LOVENOX) injection 40 mg  40 mg SubCUTAneous Q24H  
 ketorolac (TORADOL) injection 30 mg  30 mg IntraVENous Q8H PRN  
 ondansetron (ZOFRAN) injection 4 mg  4 mg IntraVENous Q8H PRN  
 methylPREDNISolone (SOLU-MEDROL) 500 mg in 0.9% sodium chloride (MBP/ADV) 100 mL  500 mg IntraVENous Q12H  
 acetaminophen (TYLENOL) tablet 650 mg  650 mg Oral Q4H PRN  
 oxyCODONE-acetaminophen (PERCOCET) 5-325 mg per tablet 1 Tab  1 Tab Oral Q4H PRN  
 0.9% sodium chloride infusion  125 mL/hr IntraVENous CONTINUOUS Objective:  
 
Patient Vitals for the past 8 hrs: 
 BP Temp Pulse Resp SpO2  
10/10/18 1100 134/84 98.2 °F (36.8 °C) 65 12 98 % 10/10/18 0800 - - - - 98 % 10/10/18 0700 (!) 132/91 98.4 °F (36.9 °C) 60 17 98 % Lab Review No results found for this or any previous visit (from the past 24 hour(s)). Facial b/l malar erythema/warmth-resolved Neurological Exam: 
Mental Status: Alert and oriented to person place and time Speech: Fluent no aphasia or dysarthria. Cranial Nerves:   Intact visual fields. Facial sensation is intact. Facial movement is symmetric. Palate is midline. Normal sternocleidomastoid strength. Tongue is midline. Hearing is intact bilaterally. Eyes: PERRL, EOM's full, no nystagmus, no ptosis. Motor:  Full and symmetric strength of upper and lower proximal and distal muscles. Normal bulk and tone. Reflexes:   Deferred Sensory:   Intact throughout Gait:  Gait is deferred Tremor:   No tremor noted. Cerebellar:  Finger to nose and heel over shin to knee was demonstrated competently. Assessment:  
 
Principal Problem: 
  Status migrainosus (1/13/2016) Active Problems: 
  Migraine headache (10/7/2018) 25year old female presenting with status migrainosus. Neurological examination is non-focal.  Head CT reviewed from 10/4 and without acute intracranial process. She reports allergies to DHE and several other medications. Headache is improved today. Facial erythema and L facial numbness/weakness resolved. Suspect migraine related phenomenon. MRI Brain/MRA reviewed and normal.   
Discontinued TCA therapy due to potential medication reaction causing malar rash. Discussed continued observation today- if headaches remain controlled without needing additional narcotics, she may be discharged. May consider Aimovig injections for migraine ppx moving forward but will defer to Dr. Nathanael Abbasi. Plan:  
Possible discharge later today if headache remains controlled off of narcotics F/U Dr. Nathanael Abbasi 1-2 weeks Signed: 
Dylan Thomas DO 
10/10/2018

## 2018-10-10 NOTE — PROGRESS NOTES
Bedside and Verbal shift change report given to Deena Lowe RN (oncoming nurse) by Felipe Samson RN (offgoing nurse). Report included the following information SBAR, Kardex, Intake/Output, MAR, Recent Results and Cardiac Rhythm NSR.

## 2018-10-10 NOTE — PHYSICIAN ADVISORY
Letter of Status Determination:  
Recommend hospitalization status upgraded from OBSERVATION  to INPATIENT  Status Pt Name:  Sly Mireles MR#  
LORENE # [id number] / 
[id number] Payor: Moiar Hollingsworth / Plan: Tang Kimbrough / Product Type: PPO /   
GRACE#  [id number] [facility]  653/01  @ [facility]. [facility]  
Hospitalization date  10/7/2018  8:07 PM  
Current Attending Physician  Robbie Lane MD  
Principal diagnosis  Status migrainosus Clinicals  25 y.o. y.o  female hospitalized with above diagnosis The pt has gone through extensive evaluation. She has been tried on DHE protocol but she didn't tolerate that due to unusual response with bradycardia. Her headache continues without complete relief. [place] (MCG) criteria Does  apply Status migrainosus STATUS DETERMINATION  Based on documented presenting clinical data, comorbid conditions, high risk of adverse events and deterioration, it is our recommendation that the patient's status should be upgraded from OBSERVATION to INPATIENT status. The final decision of the patient's hospitalization status depends on the attending physician's judgment. Additional comments Payor: Moira Hollingsworth / Plan: Tang Kimbrough / Product Type: PPO /   
  
 
Daly Mcginnis MD MPH FACP Cell: [phone] Physician Advisor [address]  
[facility], [address] [place] [address]   
Cell  [phone]   
 
 
[phone] [facility]

## 2018-10-10 NOTE — DISCHARGE SUMMARY
Discharge Summary       PATIENT ID: Betty Romero  MRN: [id number]   YOB: 1990    DATE OF ADMISSION: 10/7/2018  8:07 PM    DATE OF DISCHARGE: 10/10/2018   PRIMARY CARE PROVIDER: None     ATTENDING PHYSICIAN: Dr. Derrick Lopez  DISCHARGING PROVIDER: Myrtle Molina NP    To contact this individual call [phone] and ask the  to page. If unavailable ask to be transferred the Adult Hospitalist Department. CONSULTATIONS: IP CONSULT TO NEUROLOGY    PROCEDURES/SURGERIES: * No surgery found *    ADMITTING DIAGNOSES & HOSPITAL COURSE:   Ms. Kolton Finn is a 26 y/o  female with PMHx of migraines, anxiety/depression who presented to the ED on 10/7 with status migrainous. Headache described as constant throbbing involving frontal and spreading to the back, 10/10 in severity and associated with phonophobia, photophobia, + nausea. No fever, no rigors, no chills. Pt has had multiple visits (5) to the ED this week for the same. Pt normally follows with Dr. Madeline Sawant from neurology. Pt has been tried on multiple medications with significant control. Pt reports her migraines are fairly well controlled with Dilaudid. She is aware this CANNOT be the long term solution for these migraines. Pt has also been referred to a pain specialist.  DHE was tried in the ED and the patient became bradycardic and developed a tremor.      10/10/2018  Migraine has improved today. Left facial numbness has resolved. MRI/MRA is negative. TCA therapy was d/c'ed after facial erythema had developed  Pt reported tremor and noted bradycardia after DHE protocol. Has had several allergies or intolerances to a range of medication  She understands well that dilaudid is NOT a solution for her migraines and can cause rebound headaches as well. F/u with Dr. Madeline Sawant in one week. Pt reported this morning that her Mirena IUD may be contributing. Discussed with Dr. Evi Haywood, IUD has been removed.          DISCHARGE DIAGNOSES / PLAN: Status migrainous - improved on discharge  - avoid narcotics on discharge but may have dilaudid as needed while here  - c/w topamax, has been taking this as prophylaxis for ~ 3 years  - neuro consulted  - will need to see pain care specialist as outpatient     Anxiety/depression - c/w lexapro     Code status: Full       PENDING TEST RESULTS:   At the time of discharge the following test results are still pending: none    FOLLOW UP APPOINTMENTS:    Follow-up Information     Follow up With Details Comments Contact Info    [address], DO  keep your appointment on Friday at 3 pm [facility] Neurology [address]  [phone]      None   None (750) Patient stated that they have no PCP             ADDITIONAL CARE RECOMMENDATIONS:   Finish steroid pack and follow up with Dr. Flora Adler on Friday  Keep a headache journal  Avoid alcohol     DIET: Regular     ACTIVITY: Activity as tolerated     WOUND CARE: none     EQUIPMENT needed: none      DISCHARGE MEDICATIONS:  Current Discharge Medication List      CONTINUE these medications which have NOT CHANGED    Details   methylPREDNISolone (MEDROL DOSEPACK) 4 mg tablet Take 4 mg by mouth Specific Days and Specific Times. ondansetron (ZOFRAN ODT) 4 mg disintegrating tablet Take 1 Tab by mouth every eight (8) hours as needed for Nausea. Qty: 12 Tab, Refills: 0      ALPRAZolam (XANAX) 1 mg tablet Take 1 mg by mouth once as needed (panic attack). topiramate (TOPAMAX) 100 mg tablet Take 1 Tab by mouth nightly. Qty: 90 Tab, Refills: 3      adapalene-benzoyl peroxide 0.1-2.5 % glwp APPLY TO AFFECTED AREAS AT BEDTIME X 30 DAYS (AS NEEDED)  Refills: 3      ACZONE 7.5 % glwp APPLY TO AFFECTED AREA ONCE DAILY IN THE MORNING (AS NEEDED)  Refills: 3      SUMAtriptan succinate (ZEMBRACE SYMTOUCH) 3 mg/0.5 mL pnij 3 mg by SubCUTAneous route as needed.   Qty: 1 Syringe, Refills: 0      ASPIRIN/ACETAMINOPHEN/CAFFEINE (MIGRAINE RELIEF PO) Take 2 Tabs by mouth once as needed (Migraine). clonazePAM (KLONOPIN) 1 mg tablet Take 1 mg by mouth three (3) times daily. Refills: 5      escitalopram (LEXAPRO) 20 mg tablet Take 20 mg by mouth nightly. Indications: Generalized Anxiety Disorder         STOP taking these medications       levonorgestrel (MIRENA) 20 mcg/24 hr (5 years) IUD Comments:   Reason for Stopping:                 NOTIFY YOUR PHYSICIAN FOR ANY OF THE FOLLOWING:   Fever over 101 degrees for 24 hours. Chest pain, shortness of breath, fever, chills, nausea, vomiting, diarrhea, change in mentation, falling, weakness, bleeding. Severe pain or pain not relieved by medications. Or, any other signs or symptoms that you may have questions about. DISPOSITION:  x  Home With:   OT  PT  HH  RN       Long term SNF/Inpatient Rehab    Independent/assisted living    Hospice    Other:       PATIENT CONDITION AT DISCHARGE:     Functional status    Poor     Deconditioned    x Independent      Cognition    x Lucid     Forgetful     Dementia      Catheters/lines (plus indication)    Goodman     PICC     PEG    x None      Code status   x  Full code     DNR      PHYSICAL EXAMINATION AT DISCHARGE:  Constitutional:  No acute distress, cooperative, pleasant    ENT:  Oral mucous moist, oropharynx benign. Neck supple   Resp:  CTA bilaterally. No wheezing/rhonchi/rales. No accessory muscle use   CV:  Regular rhythm, normal rate, no murmurs, gallops, rubs    GI:  Soft, non distended, non tender. normoactive bowel sounds, no hepatosplenomegaly     Musculoskeletal:  No edema, warm, 2+ pulses throughout    Neurologic:  Moves all extremities. AAOx3, CN II-XII reviewed                                             Psych:  Good insight, Not anxious nor agitated.   Skin:  Good turgor, no rashes or ulcers        CHRONIC MEDICAL DIAGNOSES:  Problem List as of 10/10/2018  Date Reviewed: 10/10/2018          Codes Class Noted - Resolved    Migraine with status migrainosus ICD-10-CM: Q32.255  ICD-9-CM: 346.92  10/10/2018 - Present        Anxiety and depression ICD-10-CM: F41.9, F32.9  ICD-9-CM: 300.00, 311  Unknown - Present        Migraine ICD-10-CM: Q38.536  ICD-9-CM: 346.90  2/27/2018 - Present        PTSD (post-traumatic stress disorder) ICD-10-CM: F43.10  ICD-9-CM: 309.81  2/27/2018 - Present        RESOLVED: Migraine headache ICD-10-CM: Y98.650  ICD-9-CM: 346.90  10/7/2018 - 10/10/2018        RESOLVED: Endometriosis ICD-10-CM: N80.9  ICD-9-CM: 617.9  Unknown - 10/4/2018        RESOLVED: Ovarian cyst ICD-10-CM: K38.091  ICD-9-CM: 620.2  Unknown - 10/4/2018        RESOLVED: Panic attack ICD-10-CM: F41.0  ICD-9-CM: 300.01  Unknown - 10/4/2018        RESOLVED: Pelvic pain ICD-10-CM: R10.2  ICD-9-CM: DSQ9711  7/5/2018 - 10/4/2018        RESOLVED: Anxiety associated with depression ICD-10-CM: F41.8  ICD-9-CM: 300.4  2/27/2018 - 10/4/2018        RESOLVED: Bartholin's gland infection ICD-10-CM: N75.8  ICD-9-CM: 616.89  2/27/2018 - 10/4/2018        RESOLVED: Bartholin's gland abscess ICD-10-CM: N75.1  ICD-9-CM: 616.3  2/24/2018 - 10/4/2018        RESOLVED: Abdominal pain ICD-10-CM: R10.9  ICD-9-CM: 789.00  4/20/2017 - 10/4/2018        RESOLVED: Depression (Chronic) ICD-10-CM: F32.9  ICD-9-CM: 100  1/20/2016 - 10/4/2018        RESOLVED: Anxiety (Chronic) ICD-10-CM: F41.9  ICD-9-CM: 300.00  1/20/2016 - 10/4/2018        * (Principal)RESOLVED: Status migrainosus ICD-10-CM: E67.416  ICD-9-CM: 346.20  1/13/2016 - 10/10/2018        RESOLVED: Migraine ICD-10-CM: C50.241  ICD-9-CM: 346.90  1/12/2016 - 1/13/2016        RESOLVED: Migraine headache ICD-10-CM: B65.276  ICD-9-CM: 346.90  1/9/2016 - 1/9/2016        RESOLVED: Intractable headache ICD-10-CM: R51  ICD-9-CM: 784.0  1/6/2016 - 1/9/2016        RESOLVED: Cataracts, bilateral ICD-10-CM: H26.9  ICD-9-CM: 366.9  12/21/2015 - 1/9/2016        RESOLVED: Pyelonephritis ICD-10-CM: N12  ICD-9-CM: 590.80  3/19/2015 - 3/21/2015 Greater than 25 minutes were spent with the patient on counseling and coordination of care    Signed:   Perez Colon NP  10/10/2018  3:22 PM

## 2018-10-11 ENCOUNTER — PATIENT OUTREACH (OUTPATIENT)
Dept: OTHER | Age: 28
End: 2018-10-11

## 2018-10-11 NOTE — PROGRESS NOTES
ALINA Call:   
 
Patient on report as with discharge from hospital 10/10  Migraine HA. RRAT score for inpatient stay:  NA - obs stay. Initial attempt to contact patient for transitions of care. Left discreet message on voicemail with this CM contact information. Will attempt to contact again. * Noted Neuro visit Friday. Chart Review:    DC obs stay 10/10 ADMITTING DIAGNOSES & HOSPITAL COURSE:  
Ms. Kayode Holt is a 26 y/o  female with PMHx of migraines, anxiety/depression who presented to the ED on 10/7 with status migrainous. Headache described as constant throbbing involving frontal and spreading to the back, 10/10 in severity and associated with phonophobia, photophobia, + nausea.  No fever, no rigors, no chills.  Pt has had multiple visits (5) to the ED this week for the same.  Pt normally follows with Dr. Vincent Saldaña from neurology.  Pt has been tried on multiple medications with significant control.  Pt reports her migraines are fairly well controlled with Dilaudid. She is aware this CANNOT be the long term solution for these migraines.  Pt has also been referred to a pain specialist. Shruti Vilma was tried in the ED and the patient became bradycardic and developed a tremor.   
  
10/10/2018 Migraine has improved today. Left facial numbness has resolved. MRI/MRA is negative. TCA therapy was d/c'ed after facial erythema had developed Pt reported tremor and noted bradycardia after DHE protocol. Has had several allergies or intolerances to a range of medication She understands well that dilaudid is NOT a solution for her migraines and can cause rebound headaches as well. F/u with Dr. Vincent Saldaña in one week. Pt reported this morning that her Mirena IUD may be contributing. Discussed with Dr. Susana Zepeda, IUD has been removed.  
  
DISCHARGE DIAGNOSES / PLAN:   
  
Status migrainous - improved on discharge 
- avoid narcotics on discharge but may have dilaudid as needed while here - c/w topamax, has been taking this as prophylaxis for ~ 3 years 
- neuro consulted 
- will need to see pain care specialist as outpatient 
   
Anxiety/depression - c/w lexapro 
   
Code status: Full  
  
  
PENDING TEST RESULTS:  
At the time of discharge the following test results are still pending: none 
  
FOLLOW UP APPOINTMENTS:   
Follow-up Information Follow up With Details Comments Contact Info  
  [address], DO   keep your appointment on Friday at 3 pm [address] [place] [address] [address] [facility] Neurology [address] [facility] [address] [place] [address] 
[phone]  
  None     None (395) Patient stated that they have no PCP  
  
  
  
ADDITIONAL CARE RECOMMENDATIONS:  
Finish steroid pack and follow up with Dr. Adella Paget on Friday Keep a headache journal 
Avoid alcohol 
   
DIET: Regular 
   
ACTIVITY: Activity as tolerated 
   
WOUND CARE: none 
   
EQUIPMENT needed: none 
  
  
DISCHARGE MEDICATIONS: 
     
Current Discharge Medication List  
   
     
CONTINUE these medications which have NOT CHANGED  
  Details  
methylPREDNISolone (MEDROL DOSEPACK) 4 mg tablet Take 4 mg by mouth Specific Days and Specific Times.  
   
ondansetron (ZOFRAN ODT) 4 mg disintegrating tablet Take 1 Tab by mouth every eight (8) hours as needed for Nausea. Qty: 12 Tab, Refills: 0  
   
ALPRAZolam (XANAX) 1 mg tablet Take 1 mg by mouth once as needed (panic attack).  
   
topiramate (TOPAMAX) 100 mg tablet Take 1 Tab by mouth nightly. Qty: 90 Tab, Refills: 3  
   
adapalene-benzoyl peroxide 0.1-2.5 % glwp APPLY TO AFFECTED AREAS AT BEDTIME X 30 DAYS (AS NEEDED) Refills: 3  
   
ACZONE 7.5 % glwp APPLY TO AFFECTED AREA ONCE DAILY IN THE MORNING (AS NEEDED) Refills: 3  
   
SUMAtriptan succinate (ZEMBRACE SYMTOUCH) 3 mg/0.5 mL pnij 3 mg by SubCUTAneous route as needed. Qty: 1 Syringe, Refills: 0  
   
ASPIRIN/ACETAMINOPHEN/CAFFEINE (MIGRAINE RELIEF PO) Take 2 Tabs by mouth once as needed (Migraine).   
   
 clonazePAM (KLONOPIN) 1 mg tablet Take 1 mg by mouth three (3) times daily. Refills: 5  
   
escitalopram (LEXAPRO) 20 mg tablet Take 20 mg by mouth nightly. Indications: Generalized Anxiety Disorder  
   
   
 
IP CM assessment  :   No RRAT scoring/  obs stay. Trinity Colvin CM met with patient and significant other at bedside to discuss transition home- CM offered to make PCP appointment with [facility] provider- patient declined, stated she will follow-up with neurologist and if she wants PCP later she will make appointment. The patient has list of [facility] providers at bedside.  
  
The patient's significant other will transport home. The patient denied having any needs or concerns at this time.  TOMAS Gallegos

## 2018-10-12 ENCOUNTER — PATIENT OUTREACH (OUTPATIENT)
Dept: OTHER | Age: 28
End: 2018-10-12

## 2018-10-12 ENCOUNTER — OFFICE VISIT (OUTPATIENT)
Dept: NEUROLOGY | Age: 28
End: 2018-10-12

## 2018-10-12 VITALS
SYSTOLIC BLOOD PRESSURE: 84 MMHG | BODY MASS INDEX: 23.26 KG/M2 | HEART RATE: 114 BPM | DIASTOLIC BLOOD PRESSURE: 66 MMHG | RESPIRATION RATE: 18 BRPM | WEIGHT: 153 LBS | OXYGEN SATURATION: 98 %

## 2018-10-12 DIAGNOSIS — G43.111 INTRACTABLE MIGRAINE WITH AURA WITH STATUS MIGRAINOSUS: Primary | ICD-10-CM

## 2018-10-12 NOTE — LETTER
10/12/2018 4:15 PM 
 
Ms. Ivan Lowe [facility] [place] [id number] To Whom It May Concern, 
 
Ms. [facility] REMIGIO is a patient at the [facility]. She may return to work on October 15th, 2018 with no restrictions. If you have any questions, please have the patient contact our office. Sincerely, Gualberto Owens NP

## 2018-10-12 NOTE — PATIENT INSTRUCTIONS
Call the office if 70 mg dose of Lucina [place] does not improve your headache pattern, next month we could send in a higher dose     A Healthy Lifestyle: Care Instructions  Your Care Instructions    A healthy lifestyle can help you feel good, stay at a healthy weight, and have plenty of energy for both work and play. A healthy lifestyle is something you can share with your whole family. A healthy lifestyle also can lower your risk for serious health problems, such as high blood pressure, heart disease, and diabetes. You can follow a few steps listed below to improve your health and the health of your family. Follow-up care is a key part of your treatment and safety. Be sure to make and go to all appointments, and call your doctor if you are having problems. It's also a good idea to know your test results and keep a list of the medicines you take. How can you care for yourself at home? · Do not eat too much sugar, fat, or fast foods. You can still have dessert and treats now and then. The goal is moderation. · Start small to improve your eating habits. Pay attention to portion sizes, drink less juice and soda pop, and eat more fruits and vegetables. ¨ Eat a healthy amount of food. A 3-ounce serving of meat, for example, is about the size of a deck of cards. Fill the rest of your plate with vegetables and whole grains. ¨ Limit the amount of soda and sports drinks you have every day. Drink more water when you are thirsty. ¨ Eat at least 5 servings of fruits and vegetables every day. It may seem like a lot, but it is not hard to reach this goal. A serving or helping is 1 piece of fruit, 1 cup of vegetables, or 2 cups of leafy, raw vegetables. Have an apple or some carrot sticks as an afternoon snack instead of a candy bar. Try to have fruits and/or vegetables at every meal.  · Make exercise part of your daily routine. You may want to start with simple activities, such as walking, bicycling, or slow swimming.  Try to be active 30 to 60 minutes every day. You do not need to do all 30 to 60 minutes all at once. For example, you can exercise 3 times a day for 10 or 20 minutes. Moderate exercise is safe for most people, but it is always a good idea to talk to your doctor before starting an exercise program.  · Keep moving. Karissa Jung the lawn, work in the garden, or [facility]. Take the stairs instead of the elevator at work. · If you smoke, quit. People who smoke have an increased risk for heart attack, stroke, cancer, and other lung illnesses. Quitting is hard, but there are ways to boost your chance of quitting tobacco for good. ¨ Use nicotine gum, patches, or lozenges. ¨ Ask your doctor about stop-smoking programs and medicines. ¨ Keep trying. In addition to reducing your risk of diseases in the future, you will notice some benefits soon after you stop using tobacco. If you have shortness of breath or asthma symptoms, they will likely get better within a few weeks after you quit. · Limit how much alcohol you drink. Moderate amounts of alcohol (up to 2 drinks a day for men, 1 drink a day for women) are okay. But drinking too much can lead to liver problems, high blood pressure, and other health problems. Family health  If you have a family, there are many things you can do together to improve your health. · Eat meals together as a family as often as possible. · Eat healthy foods. This includes fruits, vegetables, lean meats and dairy, and whole grains. · Include your family in your fitness plan. Most people think of activities such as jogging or tennis as the way to fitness, but there are many ways you and your family can be more active. Anything that makes you breathe hard and gets your heart pumping is exercise. Here are some tips:  ¨ Walk to do errands or to take your child to school or the bus. ¨ Go for a family bike ride after dinner instead of watching TV. Where can you learn more?   Go to http://michele-lucy.info/. Enter M863 in the search box to learn more about \"A Healthy Lifestyle: Care Instructions. \"  Current as of: December 7, 2017  Content Version: 11.8  © 6103-9388 [employer]wise, [employer]. Care instructions adapted under license by [employer] (which disclaims liability or warranty for this information). If you have questions about a medical condition or this instruction, always ask your healthcare professional. [address] any warranty or liability for your use of this information.

## 2018-10-12 NOTE — MR AVS SNAPSHOT
[address] 13 
[phone] Patient: Clay Whitt MRN: [id number] POLLY:[id number] Visit Information Date & Time Provider Department Dept. Phone Encounter #  
 10/12/2018  3:30 PM Mattie Kimble NP [facility] at [address] [facility] [id number] Follow-up Instructions Return in about 4 weeks (around 11/9/2018). Your Appointments 11/7/2018  1:30 PM  
Follow Up with Mattie Kimble NP [facility] at [facility] [address]) Appt Note: hosp. f/u 10/10/18 sb  
 [address] [place] [id number]  
[phone]  
  
   
 [address] [place] [address]  [id number] Upcoming Health Maintenance Date Due  
 PAP AKA CERVICAL CYTOLOGY 6/29/2009 DTaP/Tdap/Td series (2 - Tdap) 11/24/2022 Allergies as of 10/12/2018  Review Complete On: 10/12/2018 By: Jeffry Shields LPN Severity Noted Reaction Type Reactions Benadryl [Diphenhydramine Hcl]  04/13/2018    Other (comments) Makes me feel funny. Need ativan if I get benadryl Compazine [Prochlorperazine Edisylate]  11/21/2016    Anxiety Depacon [Valproate Sodium]  10/04/2018    Other (comments) Pt reports having restlessness with this medication. Haldol [Haloperidol Lactate]  04/15/2016    Other (comments) Promethazine  01/06/2016    Other (comments) \"It makes me feel really funny, nausea and dizzy\" Reglan [Metoclopramide]  04/15/2016    Other (comments) Current Immunizations  Reviewed on 4/21/2017 Name Date DTaP 11/24/2012 Hib 7/23/2015 Influenza Vaccine 10/24/2015 Influenza Vaccine (Quad) PF 10/10/2018  4:48 PM  
 Rho(D) Immune Globulin - IM 6/4/2016  4:46 PM  
  
 Not reviewed this visit You Were Diagnosed With   
  
 Codes Comments Intractable migraine with aura with status migrainosus    -  Primary ICD-10-CM: Y45.282 ICD-9-CM: 346.03 Vitals BP Pulse Resp Weight(growth percentile) SpO2 BMI  
 (!) 84/66 (!) 114 18 153 lb (69.4 kg) 98% 23.26 kg/m2 OB Status Smoking Status IUD Former Smoker BMI and BSA Data Body Mass Index Body Surface Area  
 23.26 kg/m 2 1.82 m 2 Preferred Pharmacy [facility] Phone [facility] [address] [place], [address] [place] [address] [phone] Your Updated Medication List  
  
   
This list is accurate as of 10/12/18  4:06 PM.  Always use your most recent med list. ACZONE 7.5 % Glwp Generic drug:  dapsone APPLY TO AFFECTED AREA ONCE DAILY IN THE MORNING (AS NEEDED) adapalene-benzoyl peroxide 0.1-2.5 % Glwp APPLY TO AFFECTED AREAS AT BEDTIME X 30 DAYS (AS NEEDED) clonazePAM 1 mg tablet Commonly known as:  Eula Grad Take 1 mg by mouth three (3) times daily. erenumab-aooe 70 mg/mL Atin Commonly known as:  AIMOVIG AUTOINJECTOR  
70 mg by SubCUTAneous route every thirty (30) days. Indications: MIGRAINE PREVENTION  
  
 LEXAPRO 20 mg tablet Generic drug:  escitalopram oxalate Take 20 mg by mouth nightly. Indications: Generalized Anxiety Disorder MIGRAINE RELIEF PO Take 2 Tabs by mouth once as needed (Migraine). ondansetron 4 mg disintegrating tablet Commonly known as:  ZOFRAN ODT Take 1 Tab by mouth every eight (8) hours as needed for Nausea. topiramate 100 mg tablet Commonly known as:  TOPAMAX Take 1 Tab by mouth nightly. XANAX 1 mg tablet Generic drug:  ALPRAZolam  
Take 1 mg by mouth once as needed (panic attack). Prescriptions Sent to Pharmacy Refills  
 erenumab-aooe (AIMOVIG AUTOINJECTOR) 70 mg/mL atIn 5 Si mg by SubCUTAneous route every thirty (30) days. Indications: MIGRAINE PREVENTION  Class: Normal  
 Pharmacy: [facility] #: [phone] Route: SubCUTAneous Follow-up Instructions Return in about 4 weeks (around 11/9/2018). Patient Instructions Call the office if 70 mg dose of Martines Riggers does not improve your headache pattern, next month we could send in a higher dose A Healthy Lifestyle: Care Instructions Your Care Instructions A healthy lifestyle can help you feel good, stay at a healthy weight, and have plenty of energy for both work and play. A healthy lifestyle is something you can share with your whole family. A healthy lifestyle also can lower your risk for serious health problems, such as high blood pressure, heart disease, and diabetes. You can follow a few steps listed below to improve your health and the health of your family. Follow-up care is a key part of your treatment and safety. Be sure to make and go to all appointments, and call your doctor if you are having problems. It's also a good idea to know your test results and keep a list of the medicines you take. How can you care for yourself at home? · Do not eat too much sugar, fat, or fast foods. You can still have dessert and treats now and then. The goal is moderation. · Start small to improve your eating habits. Pay attention to portion sizes, drink less juice and soda pop, and eat more fruits and vegetables. ¨ Eat a healthy amount of food. A 3-ounce serving of meat, for example, is about the size of a deck of cards. Fill the rest of your plate with vegetables and whole grains. ¨ Limit the amount of soda and sports drinks you have every day. Drink more water when you are thirsty. ¨ Eat at least 5 servings of fruits and vegetables every day. It may seem like a lot, but it is not hard to reach this goal. A serving or helping is 1 piece of fruit, 1 cup of vegetables, or 2 cups of leafy, raw vegetables. Have an apple or some carrot sticks as an afternoon snack instead of a candy bar. Try to have fruits and/or vegetables at every meal. 
· Make exercise part of your daily routine. You may want to start with simple activities, such as walking, bicycling, or slow swimming. Try to be active 30 to 60 minutes every day. You do not need to do all 30 to 60 minutes all at once. For example, you can exercise 3 times a day for 10 or 20 minutes. Moderate exercise is safe for most people, but it is always a good idea to talk to your doctor before starting an exercise program. 
· Keep moving. Jereld Mcardle the lawn, work in the garden, or [place]. Take the stairs instead of the elevator at work. · If you smoke, quit. People who smoke have an increased risk for heart attack, stroke, cancer, and other lung illnesses. Quitting is hard, but there are ways to boost your chance of quitting tobacco for good. ¨ Use nicotine gum, patches, or lozenges. ¨ Ask your doctor about stop-smoking programs and medicines. ¨ Keep trying. In addition to reducing your risk of diseases in the future, you will notice some benefits soon after you stop using tobacco. If you have shortness of breath or asthma symptoms, they will likely get better within a few weeks after you quit. · Limit how much alcohol you drink. Moderate amounts of alcohol (up to 2 drinks a day for men, 1 drink a day for women) are okay. But drinking too much can lead to liver problems, high blood pressure, and other health problems. Family health If you have a family, there are many things you can do together to improve your health. · Eat meals together as a family as often as possible. · Eat healthy foods. This includes fruits, vegetables, lean meats and dairy, and whole grains. · Include your family in your fitness plan.  Most people think of activities such as jogging or tennis as the way to fitness, but there are many ways you and your family can be more active. Anything that makes you breathe hard and gets your heart pumping is exercise. Here are some tips: 
¨ Walk to do errands or to take your child to school or the bus. ¨ Go for a family bike ride after dinner instead of watching TV. Where can you learn more? Go to http://michele-lucy.info/. Enter Q773 in the search box to learn more about \"A Healthy Lifestyle: Care Instructions. \" Current as of: December 7, 2017 Content Version: 11.8 © 9053-3122 [employer]. Care instructions adapted under license by [employer] (which disclaims liability or warranty for this information). If you have questions about a medical condition or this instruction, always ask your healthcare professional. [address] any warranty or liability for your use of this information. Introducing [facility]! Dear Yasir Mckeon: Thank you for requesting a Radio NEXT account. Our records indicate that you already have an active Radio NEXT account. You can access your account anytime at [email or web address] Did you know that you can access your hospital and ER discharge instructions at any time in Radio NEXT? You can also review all of your test results from your hospital stay or ER visit. Additional Information If you have questions, please visit the Frequently Asked Questions section of the Radio NEXT website at [email or web address] Remember, Radio NEXT is NOT to be used for urgent needs. For medical emergencies, dial 911. Now available from your iPhone and Android! Please provide this summary of care documentation to your next provider. Your primary care clinician is listed as NONE. If you have any questions after today's visit, please call [phone].

## 2018-10-12 NOTE — LETTER
NOTIFICATION RETURN TO WORK / SCHOOL 
 
10/12/2018 4:14 PM 
 
Ms. Ivan Lowe [facility] [address] [address] To Whom It May Concern: 
 
Ivan Lowe is currently under the care of [address] [place] [address]. She will return to work/school on: 10/15/2018 If there are questions or concerns please have the patient contact our office. Sincerely, Gualberto Owens NP

## 2018-10-12 NOTE — PROGRESS NOTES
Date:            10/12/18     Name:  Keith Castano  :  1990  MRN:  [id number]     PCP:  None    Chief Complaint   Patient presents with    Headache         HISTORY OF PRESENT ILLNESS:  Dali Lopes is a 25 y.o., female who presents today for follow up for migraine. She was last seen by Dr. Kathia Duval in September, at that time migraines were well controlled. Migraine returned last Wednesday, she was working and got nauseated, it felt like a migraine aura. She took two imitrex and zofran, ibuprofen and headache did not improve. She went to the ER, got a migraine cocktail and ended up getting dilaudid and finally got rid of her pain, then woke up and could feel HA returning and could not get rid of it, ended up back in the ED multiple times and would get temporary relief and then headache with return. She was admitted last week for status migrainous, got DHE protocol but had bradycardia and tremors so this was discontinued. Nortriptyline was also discontinued while in the hospital because she had a rash on the left side of her face, she also complained of some numbness and feeling like she could not open her left eye when she had the rash. She denies major life changes before this migraine came on, no new stressors. She sleeps well, has one cup of coffee a day, mood was stable before migraine but now she feels a little depressed and feels ready to go back to work on Monday. She follows with psychiatry, mood has been stable. She thinks that she was told to stop her Zembrace when in the hospital, she is not sure why. Preventatives tried:  Botox (last injection 2017, helped but she still ended up in the ED)  Topamax (still on it, ineffective)  Nortriptyline  Depakote (intolerant)     Current Outpatient Prescriptions   Medication Sig    ondansetron (ZOFRAN ODT) 4 mg disintegrating tablet Take 1 Tab by mouth every eight (8) hours as needed for Nausea.     ALPRAZolam (XANAX) 1 mg tablet Take 1 mg by mouth once as needed (panic attack).  topiramate (TOPAMAX) 100 mg tablet Take 1 Tab by mouth nightly.  adapalene-benzoyl peroxide 0.1-2.5 % glwp APPLY TO AFFECTED AREAS AT BEDTIME X 30 DAYS (AS NEEDED)    ACZONE 7.5 % glwp APPLY TO AFFECTED AREA ONCE DAILY IN THE MORNING (AS NEEDED)    ASPIRIN/ACETAMINOPHEN/CAFFEINE (MIGRAINE RELIEF PO) Take 2 Tabs by mouth once as needed (Migraine).  clonazePAM (KLONOPIN) 1 mg tablet Take 1 mg by mouth three (3) times daily.  escitalopram (LEXAPRO) 20 mg tablet Take 20 mg by mouth nightly. Indications: Generalized Anxiety Disorder    SUMAtriptan succinate (ZEMBRACE SYMTOUCH) 3 mg/0.5 mL pnij 3 mg by SubCUTAneous route as needed. No current facility-administered medications for this visit. Allergies   Allergen Reactions    Benadryl [Diphenhydramine Hcl] Other (comments)     Makes me feel funny. Need ativan if I get benadryl    Compazine [Prochlorperazine Edisylate] Anxiety    Depacon [Valproate Sodium] Other (comments)     Pt reports having restlessness with this medication.      Haldol [Haloperidol Lactate] Other (comments)    Promethazine Other (comments)     \"It makes me feel really funny, nausea and dizzy\"    Reglan [Metoclopramide] Other (comments)     Past Medical History:   Diagnosis Date    Anxiety and depression     Bartholin's gland abscess     x8    Endometriosis     Migraine     Ovarian cyst     Panic attack      Past Surgical History:   Procedure Laterality Date    HX APPENDECTOMY  04/26/2017    HX BARTHOLIN CYST MARSUPIALIZATION      2011, 2018    HX BREAST LUMPECTOMY Right     HX BUNIONECTOMY      HX CYST REMOVAL      HX GYN Left     bartholin cyst drainage X 6    HX GYN Left     bartholin cyst marsupilization    HX OTHER SURGICAL      laparascopy    HX PELVIC LAPAROSCOPY      HX WISDOM TEETH EXTRACTION       Social History     Social History    Marital status: SINGLE     Spouse name: N/A    Number of children: N/A    Years of education: N/A     Occupational History    Not on file. Social History Main Topics    Smoking status: Former Smoker    Smokeless tobacco: Never Used    Alcohol use No      Comment: social    Drug use: No    Sexual activity: Yes     Partners: Male     Birth control/ protection: Pill     Other Topics Concern    Not on file     Social History Narrative     Family History   Problem Relation Age of Onset    Diabetes Maternal Aunt     Heart Disease Maternal Aunt     Stroke Maternal Aunt     Heart Disease Maternal Grandmother     No Known Problems Mother          PHYSICAL EXAMINATION:    Visit Vitals    BP (!) 84/66    Pulse (!) 114    Resp 18    Wt 69.4 kg (153 lb)    SpO2 98%    BMI 23.26 kg/m2     General:  Well defined, nourished, and groomed individual in no acute distress. Neck: Supple, nontender, no bruits, no pain with resistance to active range of motion. Heart: Regular rate and rhythm, no murmurs, rub, or gallop. Normal S1S2. Lungs:  Clear to auscultation bilaterally with equal chest expansion, no cough, no wheeze  Musculoskeletal:  Extremities revealed no edema and had full range of motion of joints. Psych:  Good mood and bright affect    NEUROLOGICAL EXAMINATION:     Mental Status:   Alert and oriented to person, place, and time with recent and remote memory intact. Attention span and concentration are normal. Speech is fluent with a full fund of knowledge. Cranial Nerves:    II, III, IV, VI:  Visual acuity grossly intact. Extra-ocular movements are full and fluid. No ptosis or nystagmus. V-XII: Hearing is grossly intact. Facial features are symmetric, with normal sensation and strength. The palate rises symmetrically and the tongue protrudes midline. Sternocleidomastoids 5/5. Motor Examination: Normal tone, bulk, and strength, 5/5 muscle strength throughout.    Coordination:  Finger to nose testing was normal.   No resting or intention tremor  Gait and Station:  Steady while walking. Normal arm swing. No pronator drift. No muscle wasting or fasciculations noted. ASSESSMENT AND PLAN    ICD-10-CM ICD-9-CM    1. Intractable migraine with aura with status migrainosus G43. 111 346.03 erenumab-aooe (AIMOVIG AUTOINJECTOR) 70 mg/mL atIn     25year-old feel seen in follow-up for migraine. These are well controlled until last week, and she went to the ER 5 times before being admitted for status migrainous. This is starting to improve, now she feels like she has a migraine hangover. He is still taking her steroids, will finish tomorrow. She has tried and failed topiramate, nortriptyline, valproic acid, Botox for preventative. While admitted, she had left-sided rash, erythema after starting nortriptyline so this was discontinued. She also reported some numbness in her face and difficulty moving her left eyelid when this happened, I do not think this is true hemiplegic migraine probably just part of drug reaction. 1.  Continue Topamax 100 mg nightly for migraine prevention  2. Start Aimovig 70 mg every 30 days for migraine prevention  3. Can resume Zembrace 3 mg as needed for migraine abortive    Follow-up in 4 weeks, 3 months with Dr. Rob Diaz NP    This note was created using voice recognition software. Despite editing, there may be syntax errors.

## 2018-10-12 NOTE — LETTER
10/12/2018 10:35 AM 
 
Ms. [facility]issa [facility] [address] Dear Ms. Yobani Miner, My name is Garrett Barrios , Employee Care Manager for [employer], and I have been trying to reach you. The Employee Care  is a free-of-charge, confidential service provided to our employees and their family members covered by the [employer]. Part of my job is to follow up with members who have recently been in the hospital or emergency room, to help them coordinate their care and answer questions they may have about their visit. I am able to provide assistance with medication questions, scheduling needed follow-up appointments, and arranging services like home health or home medical equipment. I can also provide education regarding your hospital or ER visit as well as your medical conditions. As healthcare providers, we know that patients do better when they have close follow up with a primary care provider (PCP), especially after a hospital or emergency department visit. If you do not have a PCP, I can help you find one that is convenient to you and covered by your insurance. I can also help you understand any after visit instructions, such as what symptoms to watch out for, or any new or changed medications. Remember that you can access your After Visit Summary by logging into your [employer] account. If you do not have a [employer] account, I can help you request access. Our program is designed to provide you with the opportunity to have a [employer] care manager partner with you for your healthcare needs. Please contact me at the below number if I can provide you with assistance for any of the above services.  
 
 
Sincerely, 
 
 
 
 
JOHN Samano RN, [place] [address], [address] [address].  
Phone:  [phone]     Fax:  [phone] [email or web address]

## 2018-10-12 NOTE — PROGRESS NOTES
ALINA 2nd call:   
 
Patient on report as with discharge from hospital 10/10  Migraine HA. RRAT score for inpatient stay:  NA - obs stay. Second attempt to contact patient for transitions of care. Left discreet message on voicemail with this CM contact information. Unable to reach letter sent via My Chart/  Mail * Noted FU neuro apt today at 1:30pm   
Will follow for one month for transitions of care needs.

## 2018-10-18 ENCOUNTER — TELEPHONE (OUTPATIENT)
Dept: NEUROLOGY | Age: 28
End: 2018-10-18

## 2018-10-18 NOTE — TELEPHONE ENCOUNTER
Re: [facility]    PA request received via CMM [facility]. PA submitted via CMM to [employer]. Pending status:  \"Your information has been submitted to [employer]. To check for an updated outcome later, reopen this PA request from your dashboard. If [employer] has not responded to your request within 24 hours, contact [employer] at [phone]. \"        Will update when determination is made.

## 2018-10-19 NOTE — TELEPHONE ENCOUNTER
Re: Lloyd Benton    Approval received from [address] [place] [address]. Aimovig approved. Auth # [id number]. Auth good 10/19/18 - 1/19/19. Faxed [facility]. Fax confirmation received 10/19/18.

## 2018-11-12 ENCOUNTER — PATIENT OUTREACH (OUTPATIENT)
Dept: OTHER | Age: 28
End: 2018-11-12

## 2018-11-12 NOTE — PROGRESS NOTES
ALINA  Wrap Up Resolving current episode (Transitions of care complete). No further ED/UC or hospital admissions within 30 days post discharge. No contact with this patient. Follow up appointment with neurology found in [facility] care. Final attempt to contact patient for transitions of care. Left discreet message on voicemail with this CM contact information. Three phone attempts and a letter sent encouraging contact with CM. * Final letter issued within My Chart. No outreach from patient to [address] [place] [address].

## 2018-11-12 NOTE — LETTER
11/12/2018 1:54 PM 
 
Ms. Clay Escal[place] [phone] Dear Ms. Kayode Jonathon, My name is Luzma Gonzales , Employee Care Manager for [employer], and I have been trying to reach you. The Employee Care Management program is a free-of-charge, confidential service provided to our employees and their family members covered by the [employer]. Part of my job is to follow up with members who have recently been in the hospital or emergency room, to help them coordinate their care and answer questions they may have about their visit. As healthcare providers, we know that patients do better when they have close follow up with a primary care provider (PCP), especially after a hospital or emergency department visit. If you do not have a PCP, I can help you find one that is convenient to you and covered by your insurance. I can also help you understand any after visit instructions, such as what symptoms to watch out for, or any new or changed medications. Remember that you can access your After Visit Summary by logging into your tic account. If you do not have a tic account, I can help you request access. Our program is designed to provide you with the opportunity to have a [employer] care manager partner with you for your healthcare needs. Please contact me at the below number if I can provide you with assistance for any of the above services.  
 
 
Sincerely, 
 
 
 
 
JOHN Victor RN, [facility] 87, [address] [address].  
Phone:  [phone]     Fax:  [phone]    [email or web address]

## 2018-11-16 ENCOUNTER — OFFICE VISIT (OUTPATIENT)
Dept: NEUROLOGY | Age: 28
End: 2018-11-16

## 2018-11-16 VITALS
OXYGEN SATURATION: 99 % | WEIGHT: 153 LBS | SYSTOLIC BLOOD PRESSURE: 110 MMHG | DIASTOLIC BLOOD PRESSURE: 66 MMHG | RESPIRATION RATE: 18 BRPM | HEIGHT: 68 IN | HEART RATE: 86 BPM | BODY MASS INDEX: 23.19 KG/M2

## 2018-11-16 DIAGNOSIS — G43.009 MIGRAINE WITHOUT AURA AND WITHOUT STATUS MIGRAINOSUS, NOT INTRACTABLE: Primary | ICD-10-CM

## 2018-11-16 NOTE — PATIENT INSTRUCTIONS
Emgality:  For your first dose, you will take 2 mL (240 mg) as a loading dose  For your second dose 30 days later and for all doses going forward, you will take 120 mg (1 mL) every 30 days     A Healthy Lifestyle: Care Instructions  Your Care Instructions    A healthy lifestyle can help you feel good, stay at a healthy weight, and have plenty of energy for both work and play. A healthy lifestyle is something you can share with your whole family. A healthy lifestyle also can lower your risk for serious health problems, such as high blood pressure, heart disease, and diabetes. You can follow a few steps listed below to improve your health and the health of your family. Follow-up care is a key part of your treatment and safety. Be sure to make and go to all appointments, and call your doctor if you are having problems. It's also a good idea to know your test results and keep a list of the medicines you take. How can you care for yourself at home? · Do not eat too much sugar, fat, or fast foods. You can still have dessert and treats now and then. The goal is moderation. · Start small to improve your eating habits. Pay attention to portion sizes, drink less juice and soda pop, and eat more fruits and vegetables. ? Eat a healthy amount of food. A 3-ounce serving of meat, for example, is about the size of a deck of cards. Fill the rest of your plate with vegetables and whole grains. ? Limit the amount of soda and sports drinks you have every day. Drink more water when you are thirsty. ? Eat at least 5 servings of fruits and vegetables every day. It may seem like a lot, but it is not hard to reach this goal. A serving or helping is 1 piece of fruit, 1 cup of vegetables, or 2 cups of leafy, raw vegetables. Have an apple or some carrot sticks as an afternoon snack instead of a candy bar. Try to have fruits and/or vegetables at every meal.  · Make exercise part of your daily routine.  You may want to start with simple activities, such as walking, bicycling, or slow swimming. Try to be active 30 to 60 minutes every day. You do not need to do all 30 to 60 minutes all at once. For example, you can exercise 3 times a day for 10 or 20 minutes. Moderate exercise is safe for most people, but it is always a good idea to talk to your doctor before starting an exercise program.  · Keep moving. Bita Morse the lawn, work in the garden, or [employer]. Take the stairs instead of the elevator at work. · If you smoke, quit. People who smoke have an increased risk for heart attack, stroke, cancer, and other lung illnesses. Quitting is hard, but there are ways to boost your chance of quitting tobacco for good. ? Use nicotine gum, patches, or lozenges. ? Ask your doctor about stop-smoking programs and medicines. ? Keep trying. In addition to reducing your risk of diseases in the future, you will notice some benefits soon after you stop using tobacco. If you have shortness of breath or asthma symptoms, they will likely get better within a few weeks after you quit. · Limit how much alcohol you drink. Moderate amounts of alcohol (up to 2 drinks a day for men, 1 drink a day for women) are okay. But drinking too much can lead to liver problems, high blood pressure, and other health problems. Family health  If you have a family, there are many things you can do together to improve your health. · Eat meals together as a family as often as possible. · Eat healthy foods. This includes fruits, vegetables, lean meats and dairy, and whole grains. · Include your family in your fitness plan. Most people think of activities such as jogging or tennis as the way to fitness, but there are many ways you and your family can be more active. Anything that makes you breathe hard and gets your heart pumping is exercise.  Here are some tips:  ? Walk to do errands or to take your child to school or the bus.  ? Go for a family bike ride after dinner instead of watching TV. Where can you learn more? Go to http://michele-lucy.info/. Enter N785 in the search box to learn more about \"A Healthy Lifestyle: Care Instructions. \"  Current as of: December 7, 2017  Content Version: 11.8  © 4835-3949 Healthwise, [employer]. Care instructions adapted under license by [employer] (which disclaims liability or warranty for this information). If you have questions about a medical condition or this instruction, always ask your healthcare professional. [address] any warranty or liability for your use of this information. - - -

## 2018-11-16 NOTE — PROGRESS NOTES
Date:            18     Name:  Jeffrey Gallegos  :  1990  MRN:  [id number]     PCP:  None    Chief Complaint   Patient presents with    Headache         HISTORY OF PRESENT ILLNESS:  Jose Gomez is a 25 y.o., female who presents today for follow up for migraine. She is a patient of Dr. Franchesca Alves. Migraines are well controlled in September, but then in October she ended up admitted for intractable migraine. When she was seen for hospital follow-up in , headaches were improving but not gone completely. She was put on Aimovig for preventative. She is constipated since she took it, she had to use miralax and enemas and is still having trouble producing a bowel movement. She has good headache control, has only had one mild headache since she was last seen and took Excedrin for that. Her pattern is that generally her headaches are well-managed and respond to Excedrin, but about once a year she has severe migraine that is difficult to get rid of. She is still taking topiramate. Preventatives tried:  Botox (last injection 2017, helped but she still ended up in the ED)  Topamax (still on it, ineffective)  Nortriptyline  Depakote (intolerant)  Aimovig (constipation)    10.12.2018 recap  Jose Gomez is a 25 y.o., female who presents today for follow up for migraine. She was last seen by Dr. Bonnie Portillo in September, at that time migraines were well controlled. Migraine returned last Wednesday, she was working and got nauseated, it felt like a migraine aura. She took two imitrex and zofran, ibuprofen and headache did not improve. She went to the ER, got a migraine cocktail and ended up getting dilaudid and finally got rid of her pain, then woke up and could feel HA returning and could not get rid of it, ended up back in the ED multiple times and would get temporary relief and then headache with return.   She was admitted last week for status migrainous, got DHE protocol but had bradycardia and tremors so this was discontinued. Nortriptyline was also discontinued while in the hospital because she had a rash on the left side of her face, she also complained of some numbness and feeling like she could not open her left eye when she had the rash. She denies major life changes before this migraine came on, no new stressors. She sleeps well, has one cup of coffee a day, mood was stable before migraine but now she feels a little depressed and feels ready to go back to work on Monday. She follows with psychiatry, mood has been stable. She thinks that she was told to stop her Zembrace when in the hospital, she is not sure why.        Current Outpatient Medications   Medication Sig    erenumab-aooe (AIMOVIG AUTOINJECTOR) 70 mg/mL atIn 70 mg by SubCUTAneous route every thirty (30) days. Indications: MIGRAINE PREVENTION    ondansetron (ZOFRAN ODT) 4 mg disintegrating tablet Take 1 Tab by mouth every eight (8) hours as needed for Nausea.  ALPRAZolam (XANAX) 1 mg tablet Take 1 mg by mouth once as needed (panic attack).  topiramate (TOPAMAX) 100 mg tablet Take 1 Tab by mouth nightly.  adapalene-benzoyl peroxide 0.1-2.5 % glwp APPLY TO AFFECTED AREAS AT BEDTIME X 30 DAYS (AS NEEDED)    ACZONE 7.5 % glwp APPLY TO AFFECTED AREA ONCE DAILY IN THE MORNING (AS NEEDED)    ASPIRIN/ACETAMINOPHEN/CAFFEINE (MIGRAINE RELIEF PO) Take 2 Tabs by mouth once as needed (Migraine).  clonazePAM (KLONOPIN) 1 mg tablet Take 1 mg by mouth three (3) times daily.  escitalopram (LEXAPRO) 20 mg tablet Take 20 mg by mouth nightly. Indications: Generalized Anxiety Disorder     No current facility-administered medications for this visit. Allergies   Allergen Reactions    Benadryl [Diphenhydramine Hcl] Other (comments)     Makes me feel funny.   Need ativan if I get benadryl    Compazine [Prochlorperazine Edisylate] Anxiety    Depacon [Valproate Sodium] Other (comments)     Pt reports having restlessness with this medication.      Haldol [Haloperidol Lactate] Other (comments)    Promethazine Other (comments)     \"It makes me feel really funny, nausea and dizzy\"    Reglan [Metoclopramide] Other (comments)     Past Medical History:   Diagnosis Date    Anxiety and depression     Bartholin's gland abscess     x8    Endometriosis     Migraine     Ovarian cyst     Panic attack      Past Surgical History:   Procedure Laterality Date    HX APPENDECTOMY  04/26/2017    HX BARTHOLIN CYST MARSUPIALIZATION      2011, 2018    HX BREAST LUMPECTOMY Right     HX BUNIONECTOMY      HX CYST REMOVAL      HX GYN Left     bartholin cyst drainage X 6    HX GYN Left     bartholin cyst marsupilization    HX OTHER SURGICAL      laparascopy    HX PELVIC LAPAROSCOPY      HX WISDOM TEETH EXTRACTION       Social History     Socioeconomic History    Marital status: SINGLE     Spouse name: Not on file    Number of children: Not on file    Years of education: Not on file    Highest education level: Not on file   Social Needs    Financial resource strain: Not on file    Food insecurity - worry: Not on file    Food insecurity - inability: Not on file   [place]vers[place] needs - medical: Not on file   [place] needs - non-medical: Not on file   Occupational History    Not on file   Tobacco Use    Smoking status: Former Smoker    Smokeless tobacco: Never Used   Substance and Sexual Activity    Alcohol use: No     Comment: social    Drug use: No    Sexual activity: Yes     Partners: Male     Birth control/protection: Pill   Other Topics Concern    Not on file   Social History Narrative    Not on file     Family History   Problem Relation Age of Onset    Diabetes Maternal Aunt     Heart Disease Maternal Aunt     Stroke Maternal Aunt     Heart Disease Maternal Grandmother     No Known Problems Mother          PHYSICAL EXAMINATION:    Visit Vitals  Ht 5' 8\" (1.727 m)   BMI 23.26 kg/m²     General:  Well defined, nourished, and groomed individual in no acute distress. Neck: Supple, nontender, no bruits, no pain with resistance to active range of motion. Heart: Regular rate and rhythm, no murmurs, rub, or gallop. Normal S1S2. Lungs:  Clear to auscultation bilaterally with equal chest expansion, no cough, no wheeze  Musculoskeletal:  Extremities revealed no edema and had full range of motion of joints. Psych:  Good mood and bright affect    NEUROLOGICAL EXAMINATION:     Mental Status:   Alert and oriented to person, place, and time with recent and remote memory intact. Attention span and concentration are normal. Speech is fluent with a full fund of knowledge. Cranial Nerves:    II, III, IV, VI:  Visual acuity grossly intact. Extra-ocular movements are full and fluid. No ptosis or nystagmus. V-XII: Hearing is grossly intact. Facial features are symmetric, with normal sensation and strength. The palate rises symmetrically and the tongue protrudes midline. Sternocleidomastoids 5/5. Motor Examination: Normal tone, bulk, and strength, 5/5 muscle strength throughout. Coordination:  Finger to nose testing was normal.   No resting or intention tremor  Gait and Station:  Steady while walking and with tandem walking. Normal arm swing. No pronator drift. No muscle wasting or fasciculations noted. ASSESSMENT AND PLAN    ICD-10-CM ICD-9-CM    1. Migraine without aura and without status migrainosus, not intractable G43.009 346.10 galcanezumab-gnlm (EMGALITY) 120 mg/mL injection     38-year-old female seen in follow-up for migraines. She tried Aimovig for preventative, her headaches have been well controlled but she suffers from severe constipation since starting that medication. She has tried and failed many other preventatives including topiramate, valproic acid, Botox, nortriptyline. When her headaches are low-grade, she can get rid of them with Excedrin.   She done stools have sometimes severe headaches that are difficult to abort, was admitted for 1 of these recently. 1. Start Emgality 240 mg first dose, then 120 mg q30 days for migraine prevention  2. Continue topiramate 100 mg twice daily for migraine prevention  3. Continue Zembrace for migraine abortive, Excedrin for low-grade headaches  4. Continue massage therapy if helpful, will also refer to physical therapy to be evaluated for dry needling, tension headache and neck pain treatment    Follow-up in January as scheduled with Dr. Madeline Sawant, call sooner with concerns      Ruy Celis NP    This note was created using voice recognition software. Despite editing, there may be syntax errors.

## 2018-11-19 ENCOUNTER — TELEPHONE (OUTPATIENT)
Dept: NEUROLOGY | Age: 28
End: 2018-11-19

## 2018-11-19 ENCOUNTER — DOCUMENTATION ONLY (OUTPATIENT)
Dept: NEUROLOGY | Age: 28
End: 2018-11-19

## 2018-11-19 NOTE — TELEPHONE ENCOUNTER
Re: Emgality    PA request received from pt's [facility] pharmacy. PA submitted via CMM to [facility]. Pending status:  \"Your information has been submitted to [facility]. To check for an updated outcome later, reopen this PA request from your dashboard. If Caterina has not responded to your request within 24 hours, contact [facility] at [phone]. \"      Will update when determination has been made.

## 2018-11-20 NOTE — TELEPHONE ENCOUNTER
Re: Emgality    [place] [facility] [id number]. Auth good 11/19/18 - 2/19/19. Faxed approval to pt's [facility] pharmacy. Fax confirmation received 11/20/18.

## 2018-12-05 ENCOUNTER — OFFICE VISIT (OUTPATIENT)
Dept: FAMILY MEDICINE CLINIC | Age: 28
End: 2018-12-05

## 2018-12-05 VITALS
DIASTOLIC BLOOD PRESSURE: 73 MMHG | BODY MASS INDEX: 23.64 KG/M2 | HEIGHT: 68 IN | HEART RATE: 80 BPM | TEMPERATURE: 95.9 F | OXYGEN SATURATION: 100 % | RESPIRATION RATE: 18 BRPM | SYSTOLIC BLOOD PRESSURE: 107 MMHG | WEIGHT: 156 LBS

## 2018-12-05 DIAGNOSIS — B37.31 VAGINAL CANDIDA: ICD-10-CM

## 2018-12-05 DIAGNOSIS — N30.01 ACUTE CYSTITIS WITH HEMATURIA: Primary | ICD-10-CM

## 2018-12-05 RX ORDER — FLUCONAZOLE 150 MG/1
150 TABLET ORAL DAILY
Qty: 1 TAB | Refills: 0 | Status: SHIPPED | OUTPATIENT
Start: 2018-12-05 | End: 2018-12-06

## 2018-12-05 RX ORDER — ERENUMAB-AOOE 70 MG/ML
INJECTION SUBCUTANEOUS
Refills: 5 | COMMUNITY
Start: 2018-10-15 | End: 2018-12-05 | Stop reason: ALTCHOICE

## 2018-12-05 RX ORDER — CEFUROXIME AXETIL 250 MG/1
TABLET ORAL
COMMUNITY
Start: 2018-10-08 | End: 2018-12-05 | Stop reason: ALTCHOICE

## 2018-12-05 RX ORDER — NITROFURANTOIN 25; 75 MG/1; MG/1
100 CAPSULE ORAL 2 TIMES DAILY
Qty: 10 CAP | Refills: 0 | Status: SHIPPED | OUTPATIENT
Start: 2018-12-05 | End: 2019-01-25

## 2018-12-05 NOTE — PROGRESS NOTES
Subjective:  
 
Nayeli Alves is a 25 y.o. female who complains of dysuria, frequency, urgency for 2 days. Patient denies flank pain, vomiting, fever, unusual vaginal discharge. Patient does not have a history of recurrent UTI. Patient does not have a history of pyelonephritis. Patient Active Problem List  
Diagnosis Code  Migraine G43.909  PTSD (post-traumatic stress disorder) F43.10  Anxiety and depression F41.9, F32.9  Migraine with status migrainosus G43.901 Patient Active Problem List  
 Diagnosis Date Noted  Migraine with status migrainosus 10/10/2018  Anxiety and depression  Migraine 02/27/2018  PTSD (post-traumatic stress disorder) 02/27/2018 Current Outpatient Medications Medication Sig Dispense Refill  nitrofurantoin, macrocrystal-monohydrate, (MACROBID) 100 mg capsule Take 1 Cap by mouth two (2) times a day. 10 Cap 0  
 fluconazole (DIFLUCAN) 150 mg tablet Take 1 Tab by mouth daily for 1 day. FDA advises cautious prescribing of oral fluconazole in pregnancy. 1 Tab 0  ALPRAZolam (XANAX) 1 mg tablet Take 1 mg by mouth once as needed (panic attack).  topiramate (TOPAMAX) 100 mg tablet Take 1 Tab by mouth nightly. 90 Tab 3  
 adapalene-benzoyl peroxide 0.1-2.5 % glwp APPLY TO AFFECTED AREAS AT BEDTIME X 30 DAYS (AS NEEDED)  3  ACZONE 7.5 % glwp APPLY TO AFFECTED AREA ONCE DAILY IN THE MORNING (AS NEEDED)  3  
 ASPIRIN/ACETAMINOPHEN/CAFFEINE (MIGRAINE RELIEF PO) Take 2 Tabs by mouth once as needed (Migraine).  clonazePAM (KLONOPIN) 1 mg tablet Take 1 mg by mouth three (3) times daily. 5  
 escitalopram (LEXAPRO) 20 mg tablet Take 20 mg by mouth nightly. Indications: Generalized Anxiety Disorder Allergies Allergen Reactions  Benadryl [Diphenhydramine Hcl] Other (comments) Makes me feel funny. Need ativan if I get benadryl  Compazine [Prochlorperazine Edisylate] Anxiety  Depacon [Valproate Sodium] Other (comments) Pt reports having restlessness with this medication.  Haldol [Haloperidol Lactate] Other (comments)  Promethazine Other (comments) \"It makes me feel really funny, nausea and dizzy\"  Reglan [Metoclopramide] Other (comments) Past Medical History:  
Diagnosis Date  Anxiety and depression  Bartholin's gland abscess x8  Endometriosis  Migraine  Ovarian cyst   
 Panic attack Past Surgical History:  
Procedure Laterality Date  HX APPENDECTOMY  04/26/2017  HX BARTHOLIN CYST MARSUPIALIZATION    
 2011, 2018  HX BREAST LUMPECTOMY Right  HX BUNIONECTOMY  HX CYST REMOVAL    
 HX GYN Left   
 bartholin cyst drainage X 6  
 HX GYN Left   
 bartholin cyst marsupilization  HX OTHER SURGICAL    
 laparascopy  HX PELVIC LAPAROSCOPY    
 HX WISDOM TEETH EXTRACTION Family History Problem Relation Age of Onset  Diabetes Maternal Aunt  Heart Disease Maternal Aunt  Stroke Maternal Aunt  Heart Disease Maternal Grandmother  No Known Problems Mother Social History Tobacco Use  Smoking status: Former Smoker  Smokeless tobacco: Never Used Substance Use Topics  Alcohol use: No  
  Comment: social  
  
 
Review of Systems Pertinent items are noted in HPI. Objective:  
 
Visit Vitals /73 (BP 1 Location: Left arm, BP Patient Position: Sitting) Pulse 80 Temp 95.9 °F (35.5 °C) (Oral) Resp 18 Ht 5' 8\" (1.727 m) Wt 156 lb (70.8 kg) SpO2 100% BMI 23.72 kg/m² General:  alert, cooperative, no distress Abdomen: soft, nontender, nondistended, no masses or organomegaly. Back:  CVA tenderness absent :  defer exam  
 
Laboratory:  
Urine dipstick shows positive for RBC's, positive for protein and positive for leukocytes. Micro exam: not done. Sent to the lab Assessment/Plan:  
 
Acute cystitis 1. nitrofurantoin 2. Maintain adequate hydration 3. May use OTC pyridium as desired, which will turn urine orange/red color 4. Follow up if symptoms not improving, and prn. ICD-10-CM ICD-9-CM 1. Acute cystitis with hematuria N30.01 595.0 nitrofurantoin, macrocrystal-monohydrate, (MACROBID) 100 mg capsule REFERRAL TO PRIMARY CARE  
2. Vaginal candida B37.3 112.1 fluconazole (DIFLUCAN) 150 mg tablet Courtney Chilel

## 2018-12-05 NOTE — PROGRESS NOTES
Chief Complaint Patient presents with  Urinary Burning  Urinary Frequency Pt c/o urinary urgency, urinary burning and urinary frequency x 1 week,pt state she does have history of kidney stones. This note will not be viewable in [address]

## 2018-12-05 NOTE — PATIENT INSTRUCTIONS
Urinary Tract Infection in Women: Care Instructions Your Care Instructions A urinary tract infection, or UTI, is a general term for an infection anywhere between the kidneys and the urethra (where urine comes out). Most UTIs are bladder infections. They often cause pain or burning when you urinate. UTIs are caused by bacteria and can be cured with antibiotics. Be sure to complete your treatment so that the infection goes away. Follow-up care is a key part of your treatment and safety. Be sure to make and go to all appointments, and call your doctor if you are having problems. It's also a good idea to know your test results and keep a list of the medicines you take. How can you care for yourself at home? · Take your antibiotics as directed. Do not stop taking them just because you feel better. You need to take the full course of antibiotics. · Drink extra water and other fluids for the next day or two. This may help wash out the bacteria that are causing the infection. (If you have kidney, heart, or liver disease and have to limit fluids, talk with your doctor before you increase your fluid intake.) · Avoid drinks that are carbonated or have caffeine. They can irritate the bladder. · Urinate often. Try to empty your bladder each time. · To relieve pain, take a hot bath or lay a heating pad set on low over your lower belly or genital area. Never go to sleep with a heating pad in place. To prevent UTIs · Drink plenty of water each day. This helps you urinate often, which clears bacteria from your system. (If you have kidney, heart, or liver disease and have to limit fluids, talk with your doctor before you increase your fluid intake.) · Urinate when you need to. · Urinate right after you have sex. · Change sanitary pads often. · Avoid douches, bubble baths, feminine hygiene sprays, and other feminine hygiene products that have deodorants. · After going to the bathroom, wipe from front to back. When should you call for help? Call your doctor now or seek immediate medical care if: 
  · Symptoms such as fever, chills, nausea, or vomiting get worse or appear for the first time.  
  · You have new pain in your back just below your rib cage. This is called flank pain.  
  · There is new blood or pus in your urine.  
  · You have any problems with your antibiotic medicine.  
 Watch closely for changes in your health, and be sure to contact your doctor if: 
  · You are not getting better after taking an antibiotic for 2 days.  
  · Your symptoms go away but then come back. Where can you learn more? Go to http://michele-lucy.info/. Enter N704 in the search box to learn more about \"Urinary Tract Infection in Women: Care Instructions. \" Current as of: March 21, 2018 Content Version: 11.8 © 8611-5818 Healthwise, [employer]. Care instructions adapted under license by [employer] (which disclaims liability or warranty for this information). If you have questions about a medical condition or this instruction, always ask your healthcare professional. [address] any warranty or liability for your use of this information.

## 2018-12-06 LAB
BACTERIA UR CULT: ABNORMAL
BACTERIA UR CULT: ABNORMAL

## 2018-12-07 ENCOUNTER — OFFICE VISIT (OUTPATIENT)
Dept: FAMILY MEDICINE CLINIC | Age: 28
End: 2018-12-07

## 2018-12-07 ENCOUNTER — TELEPHONE (OUTPATIENT)
Dept: FAMILY MEDICINE CLINIC | Age: 28
End: 2018-12-07

## 2018-12-07 VITALS
OXYGEN SATURATION: 99 % | HEART RATE: 117 BPM | RESPIRATION RATE: 16 BRPM | WEIGHT: 136 LBS | HEIGHT: 68 IN | TEMPERATURE: 96.1 F | BODY MASS INDEX: 20.61 KG/M2 | SYSTOLIC BLOOD PRESSURE: 123 MMHG | DIASTOLIC BLOOD PRESSURE: 75 MMHG

## 2018-12-07 DIAGNOSIS — N30.01 ACUTE CYSTITIS WITH HEMATURIA: Primary | ICD-10-CM

## 2018-12-07 DIAGNOSIS — R39.15 URGENCY OF URINATION: ICD-10-CM

## 2018-12-07 DIAGNOSIS — R30.0 DYSURIA: ICD-10-CM

## 2018-12-07 LAB
BILIRUB UR QL STRIP: NORMAL
GLUCOSE UR-MCNC: NORMAL MG/DL
KETONES P FAST UR STRIP-MCNC: NORMAL MG/DL
PH UR STRIP: 5 [PH] (ref 4.6–8)
PROT UR QL STRIP: NORMAL
SP GR UR STRIP: 1.02 (ref 1–1.03)
UA UROBILINOGEN AMB POC: NORMAL (ref 0.2–1)
URINALYSIS CLARITY POC: CLEAR
URINALYSIS COLOR POC: NORMAL
URINE BLOOD POC: NEGATIVE
URINE LEUKOCYTES POC: NORMAL
URINE NITRITES POC: POSITIVE

## 2018-12-07 RX ORDER — AMOXICILLIN 875 MG/1
875 TABLET, FILM COATED ORAL 2 TIMES DAILY
Qty: 14 TAB | Refills: 0 | Status: SHIPPED | OUTPATIENT
Start: 2018-12-07 | End: 2018-12-14

## 2018-12-07 NOTE — PROGRESS NOTES
Chief Complaint   Patient presents with    Urgency     Urgency with urination has returned, burning with more frequency     Results for orders placed or performed in visit on 12/07/18   AMB POC URINALYSIS DIP STICK AUTO W/O MICRO     Status: None   Result Value Ref Range Status    Color (UA POC) Orange  Final    Clarity (UA POC) Clear  Final    Glucose (UA POC) 1+ Negative Final    Bilirubin (UA POC) 3+ Negative Final    Ketones (UA POC) 2+ Negative Final    Specific gravity (UA POC) 1.020 1.001 - 1.035 Final    Blood (UA POC) Negative Negative Final    pH (UA POC) 5.0 4.6 - 8.0 Final    Protein (UA POC) 3+ Negative Final    Urobilinogen (UA POC) 8 mg/dL 0.2 - 1 Final    Nitrites (UA POC) Positive Negative Final    Leukocyte esterase (UA POC) 3+ Negative Final   Results for orders placed or performed in visit on 05/30/17   AMB POC URINALYSIS DIP STICK MANUAL W/O MICRO     Status: Abnormal   Result Value Ref Range Status    Color (UA POC) Dark Yellow  Final    Clarity (UA POC) Slightly Cloudy  Final    Glucose (UA POC) Negative Negative Final    Bilirubin (UA POC) 1+ Negative Final    Ketones (UA POC) Trace Negative Final    Specific gravity (UA POC) 1.030 1.001 - 1.035 Final    Blood (UA POC) 2+ Negative Final    pH (UA POC) 5.5 4.6 - 8.0 Final    Protein (UA POC) 1+ Negative mg/dL Final    Urobilinogen (UA POC) 1 mg/dL 0.2 - 1 Final    Nitrites (UA POC) Negative Negative Final    Leukocyte esterase (UA POC) 1+ Negative Final

## 2018-12-07 NOTE — PROGRESS NOTES
HISTORY OF PRESENT ILLNESS  Ivan Lowe is a 25 y.o. female. Patient reports worsening dysuria, urgency, and frequency of urination. Symptoms started last week. She was seen 2 days ago and started on Macrobid, but reports no relief. Urine culture was positive for Group B strep. Denies fever, chills, or flank pain. Patient reports history of kidney infection. Patient is also taking pyridium. Visit Vitals  /75   Pulse (!) 117   Temp 96.1 °F (35.6 °C) (Oral)   Resp 16   Ht 5' 8\" (1.727 m)   Wt 136 lb (61.7 kg)   SpO2 99%   BMI 20.68 kg/m²       HPI    Review of Systems   Constitutional: Negative for fever. Genitourinary: Positive for dysuria, frequency and urgency. Negative for flank pain. Physical Exam   Constitutional: She is oriented to person, place, and time. She appears well-developed and well-nourished. Abdominal: There is no CVA tenderness. Neurological: She is alert and oriented to person, place, and time. Skin: Skin is warm and dry. Psychiatric: She has a normal mood and affect. Results for orders placed or performed in visit on 12/07/18   AMB POC URINALYSIS DIP STICK AUTO W/O MICRO   Result Value Ref Range    Color (UA POC) Orange     Clarity (UA POC) Clear     Glucose (UA POC) 1+ Negative    Bilirubin (UA POC) 3+ Negative    Ketones (UA POC) 2+ Negative    Specific gravity (UA POC) 1.020 1.001 - 1.035    Blood (UA POC) Negative Negative    pH (UA POC) 5.0 4.6 - 8.0    Protein (UA POC) 3+ Negative    Urobilinogen (UA POC) 8 mg/dL 0.2 - 1    Nitrites (UA POC) Positive Negative    Leukocyte esterase (UA POC) 3+ Negative       ASSESSMENT and PLAN    ICD-10-CM ICD-9-CM    1. Acute cystitis with hematuria N30.01 595.0    2. Urgency of urination R39.15 788.63 AMB POC URINALYSIS DIP STICK AUTO W/O MICRO   3.  Dysuria R30.0 788.1      Orders Placed This Encounter    AMB POC URINALYSIS DIP STICK AUTO W/O MICRO    amoxicillin (AMOXIL) 875 mg tablet   antibiotic switched to amoxicillin due to positive GBS  Push hydration

## 2018-12-07 NOTE — PATIENT INSTRUCTIONS
Urinary Tract Infection in Women: Care Instructions  Your Care Instructions    A urinary tract infection, or UTI, is a general term for an infection anywhere between the kidneys and the urethra (where urine comes out). Most UTIs are bladder infections. They often cause pain or burning when you urinate. UTIs are caused by bacteria and can be cured with antibiotics. Be sure to complete your treatment so that the infection goes away. Follow-up care is a key part of your treatment and safety. Be sure to make and go to all appointments, and call your doctor if you are having problems. It's also a good idea to know your test results and keep a list of the medicines you take. How can you care for yourself at home? · Take your antibiotics as directed. Do not stop taking them just because you feel better. You need to take the full course of antibiotics. · Drink extra water and other fluids for the next day or two. This may help wash out the bacteria that are causing the infection. (If you have kidney, heart, or liver disease and have to limit fluids, talk with your doctor before you increase your fluid intake.)  · Avoid drinks that are carbonated or have caffeine. They can irritate the bladder. · Urinate often. Try to empty your bladder each time. · To relieve pain, take a hot bath or lay a heating pad set on low over your lower belly or genital area. Never go to sleep with a heating pad in place. To prevent UTIs  · Drink plenty of water each day. This helps you urinate often, which clears bacteria from your system. (If you have kidney, heart, or liver disease and have to limit fluids, talk with your doctor before you increase your fluid intake.)  · Urinate when you need to. · Urinate right after you have sex. · Change sanitary pads often. · Avoid douches, bubble baths, feminine hygiene sprays, and other feminine hygiene products that have deodorants.   · After going to the bathroom, wipe from front to back.  When should you call for help? Call your doctor now or seek immediate medical care if:    · Symptoms such as fever, chills, nausea, or vomiting get worse or appear for the first time.     · You have new pain in your back just below your rib cage. This is called flank pain.     · There is new blood or pus in your urine.     · You have any problems with your antibiotic medicine.    Watch closely for changes in your health, and be sure to contact your doctor if:    · You are not getting better after taking an antibiotic for 2 days.     · Your symptoms go away but then come back. Where can you learn more? Go to http://michele-lucy.info/. Enter Q242 in the search box to learn more about \"Urinary Tract Infection in Women: Care Instructions. \"  Current as of: March 21, 2018  Content Version: 11.8  © 9171-1341 [employer]. Care instructions adapted under license by [employer] (which disclaims liability or warranty for this information). If you have questions about a medical condition or this instruction, always ask your healthcare professional. [address] any warranty or liability for your use of this information.

## 2019-01-25 ENCOUNTER — OFFICE VISIT (OUTPATIENT)
Dept: NEUROLOGY | Age: 29
End: 2019-01-25

## 2019-01-25 VITALS
WEIGHT: 141 LBS | BODY MASS INDEX: 21.37 KG/M2 | SYSTOLIC BLOOD PRESSURE: 118 MMHG | HEIGHT: 68 IN | DIASTOLIC BLOOD PRESSURE: 80 MMHG | HEART RATE: 84 BPM | RESPIRATION RATE: 16 BRPM

## 2019-01-25 DIAGNOSIS — G43.111 INTRACTABLE MIGRAINE WITH AURA WITH STATUS MIGRAINOSUS: Primary | ICD-10-CM

## 2019-01-25 RX ORDER — PROMETHAZINE HYDROCHLORIDE 25 MG/1
25 TABLET ORAL
Qty: 20 TAB | Refills: 0 | Status: SHIPPED | OUTPATIENT
Start: 2019-01-25 | End: 2019-02-13 | Stop reason: ALTCHOICE

## 2019-01-25 RX ORDER — KETOROLAC TROMETHAMINE 10 MG/1
TABLET, FILM COATED ORAL
Qty: 30 TAB | Refills: 0 | Status: SHIPPED | OUTPATIENT
Start: 2019-01-25 | End: 2019-07-21 | Stop reason: SDUPTHER

## 2019-01-25 RX ORDER — HYDROXYZINE PAMOATE 25 MG/1
CAPSULE ORAL
Qty: 10 CAP | Refills: 0 | Status: SHIPPED | OUTPATIENT
Start: 2019-01-25 | End: 2019-01-25 | Stop reason: ALTCHOICE

## 2019-01-25 NOTE — PROGRESS NOTES
Chief Complaint   Patient presents with    Migraine       HPI    Reina Ward is a 23-year-old woman here to follow-up earlier than expected. She has a history of very refractory migraines. She was admitted in October 2018 for severe breakthrough migraine with subsequent left facial symptoms and ptosis. She had imaging done which was all benign. For the past week she had another breakthrough severe headache come out with the same subsequent symptoms affecting the left side of her face. She denies double vision or slurred speech or unusual unilateral global and diffuse and photophobia and nausea. She had been on Aimovig but has severe constipation. That was discontinued and emgality was ordered but she has been not been able to pick it up due to authorization insurance issues. Review of Systems   Constitutional: Positive for malaise/fatigue. Eyes: Positive for photophobia. Negative for double vision. Gastrointestinal: Positive for nausea. Neurological: Positive for tingling and headaches. Negative for speech change. All other systems reviewed and are negative.       Past Medical History:   Diagnosis Date    Anxiety and depression     Bartholin's gland abscess     x8    Endometriosis     Migraine     Ovarian cyst     Panic attack      Family History   Problem Relation Age of Onset    Diabetes Maternal Aunt     Heart Disease Maternal Aunt     Stroke Maternal Aunt     Heart Disease Maternal Grandmother     No Known Problems Mother      Social History     Socioeconomic History    Marital status: SINGLE     Spouse name: Not on file    Number of children: Not on file    Years of education: Not on file    Highest education level: Not on file   Social Needs    Financial resource strain: Not on file    Food insecurity - worry: Not on file    Food insecurity - inability: Not on file   [employer] needs - medical: Not on file   [employer] needs - non-medical: Not on file Occupational History    Not on file   Tobacco Use    Smoking status: Former Smoker    Smokeless tobacco: Never Used   Substance and Sexual Activity    Alcohol use: Yes     Alcohol/week: 0.6 oz     Types: 1 Glasses of wine per week     Comment: social    Drug use: No    Sexual activity: Yes     Partners: Male     Birth control/protection: Pill   Other Topics Concern    Not on file   Social History Narrative    Not on file     Allergies   Allergen Reactions    Benadryl [Diphenhydramine Hcl] Other (comments)     Makes me feel funny. Need ativan if I get benadryl    Compazine [Prochlorperazine Edisylate] Anxiety    Depacon [Valproate Sodium] Other (comments)     Pt reports having restlessness with this medication.  Haldol [Haloperidol Lactate] Other (comments)    Promethazine Other (comments)     \"It makes me feel really funny, nausea and dizzy\"    Reglan [Metoclopramide] Other (comments)         Current Outpatient Medications   Medication Sig    promethazine (PHENERGAN) 25 mg tablet Take 1 Tab by mouth every six (6) hours as needed for Nausea.  ketorolac (TORADOL) 10 mg tablet Take 3 tabs at once for severe headache by mouth. May repeat once in 24 hours. Max use twice a week.  galcanezumab-gnlm (EMGALITY PEN) 120 mg/mL injection 2 mL by SubCUTAneous route once for 1 dose.  galcanezumab-gnlm (EMGALITY PEN) 120 mg/mL injection 1 mL by SubCUTAneous route every thirty (30) days.  ALPRAZolam (XANAX) 1 mg tablet Take 1 mg by mouth once as needed (panic attack).  topiramate (TOPAMAX) 100 mg tablet Take 1 Tab by mouth nightly.  adapalene-benzoyl peroxide 0.1-2.5 % glwp APPLY TO AFFECTED AREAS AT BEDTIME X 30 DAYS (AS NEEDED)    ACZONE 7.5 % glwp APPLY TO AFFECTED AREA ONCE DAILY IN THE MORNING (AS NEEDED)    ASPIRIN/ACETAMINOPHEN/CAFFEINE (MIGRAINE RELIEF PO) Take 2 Tabs by mouth once as needed (Migraine).  clonazePAM (KLONOPIN) 1 mg tablet Take 1 mg by mouth three (3) times daily.  escitalopram (LEXAPRO) 20 mg tablet Take 20 mg by mouth nightly. Indications: Generalized Anxiety Disorder     No current facility-administered medications for this visit. Neurologic Exam     Mental Status   WD/WN adult in NAD, normal grooming  VSS  A&O x 3    PERRL, nonicteric, left ptosis, EOMI, no Clarisa's  Face is symmetric, tongue midline  Speech is fluent and clear  No limb ataxia. No abnl movements. Moving all extemities spontaneously and symmetric  Normal gait    CVS RRR  Lungs nonlabored  Skin is warm and dry         Visit Vitals  /80   Pulse 84   Resp 16   Ht 5' 8\" (1.727 m)   Wt 64 kg (141 lb)   LMP 01/11/2019 (Approximate)   BMI 21.44 kg/m²       Assessment and Plan   Diagnoses and all orders for this visit:    1. Intractable migraine with aura with status migrainosus    2. Chronic migraine    Other orders  -     promethazine (PHENERGAN) 25 mg tablet; Take 1 Tab by mouth every six (6) hours as needed for Nausea. -     ketorolac (TORADOL) 10 mg tablet; Take 3 tabs at once for severe headache by mouth. May repeat once in 24 hours. Max use twice a week. -     galcanezumab-gnlm (EMGALITY PEN) 120 mg/mL injection; 2 mL by SubCUTAneous route once for 1 dose.  -     galcanezumab-gnlm (EMGALITY PEN) 120 mg/mL injection; 1 mL by SubCUTAneous route every thirty (30) days. 18-year-old woman with refractory intractable migraines who is having another typical severe exacerbation. We are going to try to avoid ER and inpatient management and try to have some improvement at home. Going to give her Toradol to take today with Phenergan. She cannot have Thorazine or Haldol due to side effects and medication interaction with Lexapro. I am going to have her start today emgality with the loading dose here in the office and then start maintenance single injections every 30 days. I discussed with her that she is going to have to let the symptoms slowly improved.   [facility]. Questions answered. I will see her at her next visit.         [address] [facility], [address]  [employer]ipl[employer]

## 2019-01-25 NOTE — PROGRESS NOTES
Pt here for migraine that has lasted for over a week. L eye is drooping and L side of face numb. Has used her migraine meds without relief. Depression screen already completed.

## 2019-02-07 ENCOUNTER — TELEPHONE (OUTPATIENT)
Dept: NEUROLOGY | Age: 29
End: 2019-02-07

## 2019-02-13 ENCOUNTER — OFFICE VISIT (OUTPATIENT)
Dept: FAMILY MEDICINE CLINIC | Age: 29
End: 2019-02-13

## 2019-02-13 ENCOUNTER — APPOINTMENT (OUTPATIENT)
Dept: ULTRASOUND IMAGING | Age: 29
End: 2019-02-13
Attending: EMERGENCY MEDICINE
Payer: COMMERCIAL

## 2019-02-13 ENCOUNTER — HOSPITAL ENCOUNTER (EMERGENCY)
Age: 29
Discharge: HOME | End: 2019-02-13
Attending: STUDENT IN AN ORGANIZED HEALTH CARE EDUCATION/TRAINING PROGRAM
Payer: COMMERCIAL

## 2019-02-13 ENCOUNTER — APPOINTMENT (OUTPATIENT)
Dept: CT IMAGING | Age: 29
End: 2019-02-13
Attending: EMERGENCY MEDICINE
Payer: COMMERCIAL

## 2019-02-13 VITALS
RESPIRATION RATE: 14 BRPM | HEART RATE: 75 BPM | OXYGEN SATURATION: 98 % | DIASTOLIC BLOOD PRESSURE: 56 MMHG | TEMPERATURE: 98.3 F | SYSTOLIC BLOOD PRESSURE: 95 MMHG

## 2019-02-13 VITALS
BODY MASS INDEX: 21.76 KG/M2 | WEIGHT: 143.6 LBS | SYSTOLIC BLOOD PRESSURE: 103 MMHG | DIASTOLIC BLOOD PRESSURE: 70 MMHG | HEART RATE: 101 BPM | TEMPERATURE: 96.5 F | OXYGEN SATURATION: 99 % | RESPIRATION RATE: 16 BRPM | HEIGHT: 68 IN

## 2019-02-13 DIAGNOSIS — N30.01 ACUTE CYSTITIS WITH HEMATURIA: Primary | ICD-10-CM

## 2019-02-13 DIAGNOSIS — N12 PYELONEPHRITIS: Primary | ICD-10-CM

## 2019-02-13 LAB
ALBUMIN SERPL-MCNC: 4 G/DL (ref 3.5–5)
ALBUMIN/GLOB SERPL: 1.2 {RATIO} (ref 1.1–2.2)
ALP SERPL-CCNC: 49 U/L (ref 45–117)
ALT SERPL-CCNC: 17 U/L (ref 12–78)
ANION GAP SERPL CALC-SCNC: 5 MMOL/L (ref 5–15)
APPEARANCE UR: ABNORMAL
AST SERPL-CCNC: 18 U/L (ref 15–37)
BACTERIA URNS QL MICRO: ABNORMAL /HPF
BASOPHILS # BLD: 0.1 K/UL (ref 0–0.1)
BASOPHILS NFR BLD: 2 % (ref 0–1)
BILIRUB SERPL-MCNC: 0.4 MG/DL (ref 0.2–1)
BILIRUB UR QL CFM: NEGATIVE
BILIRUB UR QL STRIP: NORMAL
BUN SERPL-MCNC: 9 MG/DL (ref 6–20)
BUN/CREAT SERPL: 11 (ref 12–20)
CALCIUM SERPL-MCNC: 8.2 MG/DL (ref 8.5–10.1)
CHLORIDE SERPL-SCNC: 111 MMOL/L (ref 97–108)
CO2 SERPL-SCNC: 24 MMOL/L (ref 21–32)
COLOR UR: ABNORMAL
COMMENT, HOLDF: NORMAL
CREAT SERPL-MCNC: 0.83 MG/DL (ref 0.55–1.02)
DIFFERENTIAL METHOD BLD: ABNORMAL
EOSINOPHIL # BLD: 0.1 K/UL (ref 0–0.4)
EOSINOPHIL NFR BLD: 2 % (ref 0–7)
EPITH CASTS URNS QL MICRO: ABNORMAL /LPF
ERYTHROCYTE [DISTWIDTH] IN BLOOD BY AUTOMATED COUNT: 12.3 % (ref 11.5–14.5)
GLOBULIN SER CALC-MCNC: 3.3 G/DL (ref 2–4)
GLUCOSE SERPL-MCNC: 78 MG/DL (ref 65–100)
GLUCOSE UR STRIP.AUTO-MCNC: NEGATIVE MG/DL
GLUCOSE UR-MCNC: NORMAL MG/DL
HCG UR QL: NEGATIVE
HCT VFR BLD AUTO: 40 % (ref 35–47)
HGB BLD-MCNC: 12.7 G/DL (ref 11.5–16)
HGB UR QL STRIP: NEGATIVE
HYALINE CASTS URNS QL MICRO: ABNORMAL /LPF (ref 0–5)
IMM GRANULOCYTES # BLD AUTO: 0 K/UL (ref 0–0.04)
IMM GRANULOCYTES NFR BLD AUTO: 0 % (ref 0–0.5)
KETONES P FAST UR STRIP-MCNC: NORMAL MG/DL
KETONES UR QL STRIP.AUTO: 15 MG/DL
LEUKOCYTE ESTERASE UR QL STRIP.AUTO: ABNORMAL
LYMPHOCYTES # BLD: 2.5 K/UL (ref 0.8–3.5)
LYMPHOCYTES NFR BLD: 51 % (ref 12–49)
MCH RBC QN AUTO: 29.3 PG (ref 26–34)
MCHC RBC AUTO-ENTMCNC: 31.8 G/DL (ref 30–36.5)
MCV RBC AUTO: 92.4 FL (ref 80–99)
MONOCYTES # BLD: 0.3 K/UL (ref 0–1)
MONOCYTES NFR BLD: 7 % (ref 5–13)
NEUTS SEG # BLD: 1.9 K/UL (ref 1.8–8)
NEUTS SEG NFR BLD: 38 % (ref 32–75)
NITRITE UR QL STRIP.AUTO: POSITIVE
NRBC # BLD: 0 K/UL (ref 0–0.01)
NRBC BLD-RTO: 0 PER 100 WBC
PH UR STRIP: 5 [PH] (ref 4.6–8)
PH UR STRIP: 5 [PH] (ref 5–8)
PLATELET # BLD AUTO: 203 K/UL (ref 150–400)
PMV BLD AUTO: 10.2 FL (ref 8.9–12.9)
POTASSIUM SERPL-SCNC: 3.9 MMOL/L (ref 3.5–5.1)
PROT SERPL-MCNC: 7.3 G/DL (ref 6.4–8.2)
PROT UR QL STRIP: NORMAL
PROT UR STRIP-MCNC: 30 MG/DL
RBC # BLD AUTO: 4.33 M/UL (ref 3.8–5.2)
RBC #/AREA URNS HPF: ABNORMAL /HPF (ref 0–5)
SAMPLES BEING HELD,HOLD: NORMAL
SODIUM SERPL-SCNC: 140 MMOL/L (ref 136–145)
SP GR UR REFRACTOMETRY: 1.03 (ref 1–1.03)
SP GR UR STRIP: 1.01 (ref 1–1.03)
UA UROBILINOGEN AMB POC: NORMAL (ref 0.2–1)
UR CULT HOLD, URHOLD: NORMAL
URINALYSIS CLARITY POC: CLEAR
URINALYSIS COLOR POC: NORMAL
URINE BLOOD POC: NEGATIVE
URINE LEUKOCYTES POC: NORMAL
URINE NITRITES POC: POSITIVE
UROBILINOGEN UR QL STRIP.AUTO: 4 EU/DL (ref 0.2–1)
WBC # BLD AUTO: 4.9 K/UL (ref 3.6–11)
WBC URNS QL MICRO: ABNORMAL /HPF (ref 0–4)

## 2019-02-13 PROCEDURE — 74177 CT ABD & PELVIS W/CONTRAST: CPT

## 2019-02-13 PROCEDURE — 76830 TRANSVAGINAL US NON-OB: CPT

## 2019-02-13 PROCEDURE — 76856 US EXAM PELVIC COMPLETE: CPT

## 2019-02-13 PROCEDURE — 74011636320 HC RX REV CODE- 636/320: Performed by: RADIOLOGY

## 2019-02-13 PROCEDURE — 96361 HYDRATE IV INFUSION ADD-ON: CPT

## 2019-02-13 PROCEDURE — 36415 COLL VENOUS BLD VENIPUNCTURE: CPT

## 2019-02-13 PROCEDURE — 80053 COMPREHEN METABOLIC PANEL: CPT

## 2019-02-13 PROCEDURE — 74011000258 HC RX REV CODE- 258: Performed by: RADIOLOGY

## 2019-02-13 PROCEDURE — 96375 TX/PRO/DX INJ NEW DRUG ADDON: CPT

## 2019-02-13 PROCEDURE — 85025 COMPLETE CBC W/AUTO DIFF WBC: CPT

## 2019-02-13 PROCEDURE — 99283 EMERGENCY DEPT VISIT LOW MDM: CPT

## 2019-02-13 PROCEDURE — 74011000258 HC RX REV CODE- 258: Performed by: EMERGENCY MEDICINE

## 2019-02-13 PROCEDURE — 81001 URINALYSIS AUTO W/SCOPE: CPT

## 2019-02-13 PROCEDURE — 81025 URINE PREGNANCY TEST: CPT

## 2019-02-13 PROCEDURE — 74011250636 HC RX REV CODE- 250/636: Performed by: EMERGENCY MEDICINE

## 2019-02-13 PROCEDURE — 96376 TX/PRO/DX INJ SAME DRUG ADON: CPT

## 2019-02-13 PROCEDURE — 96365 THER/PROPH/DIAG IV INF INIT: CPT

## 2019-02-13 PROCEDURE — 87086 URINE CULTURE/COLONY COUNT: CPT

## 2019-02-13 RX ORDER — HYDROMORPHONE HYDROCHLORIDE 2 MG/ML
1 INJECTION, SOLUTION INTRAMUSCULAR; INTRAVENOUS; SUBCUTANEOUS ONCE
Status: COMPLETED | OUTPATIENT
Start: 2019-02-13 | End: 2019-02-13

## 2019-02-13 RX ORDER — OXYCODONE AND ACETAMINOPHEN 5; 325 MG/1; MG/1
1 TABLET ORAL
Qty: 10 TAB | Refills: 0 | Status: SHIPPED | OUTPATIENT
Start: 2019-02-13 | End: 2019-03-08 | Stop reason: ALTCHOICE

## 2019-02-13 RX ORDER — SODIUM CHLORIDE 0.9 % (FLUSH) 0.9 %
10 SYRINGE (ML) INJECTION
Status: COMPLETED | OUTPATIENT
Start: 2019-02-13 | End: 2019-02-13

## 2019-02-13 RX ORDER — ONDANSETRON 2 MG/ML
4 INJECTION INTRAMUSCULAR; INTRAVENOUS
Status: COMPLETED | OUTPATIENT
Start: 2019-02-13 | End: 2019-02-13

## 2019-02-13 RX ORDER — ONDANSETRON 2 MG/ML
4 INJECTION INTRAMUSCULAR; INTRAVENOUS ONCE
Status: COMPLETED | OUTPATIENT
Start: 2019-02-13 | End: 2019-02-13

## 2019-02-13 RX ORDER — SODIUM CHLORIDE 9 MG/ML
1000 INJECTION, SOLUTION INTRAVENOUS ONCE
Status: COMPLETED | OUTPATIENT
Start: 2019-02-13 | End: 2019-02-13

## 2019-02-13 RX ORDER — CEFUROXIME AXETIL 250 MG/1
TABLET ORAL
Refills: 0 | COMMUNITY
Start: 2019-02-11 | End: 2019-02-13

## 2019-02-13 RX ORDER — MORPHINE SULFATE 2 MG/ML
4 INJECTION, SOLUTION INTRAMUSCULAR; INTRAVENOUS
Status: COMPLETED | OUTPATIENT
Start: 2019-02-13 | End: 2019-02-13

## 2019-02-13 RX ORDER — HYDROMORPHONE HYDROCHLORIDE 2 MG/ML
0.5 INJECTION, SOLUTION INTRAMUSCULAR; INTRAVENOUS; SUBCUTANEOUS ONCE
Status: COMPLETED | OUTPATIENT
Start: 2019-02-13 | End: 2019-02-13

## 2019-02-13 RX ORDER — CEFDINIR 300 MG/1
300 CAPSULE ORAL 2 TIMES DAILY
Qty: 20 CAP | Refills: 0 | Status: SHIPPED | OUTPATIENT
Start: 2019-02-13 | End: 2019-02-23

## 2019-02-13 RX ORDER — KETOROLAC TROMETHAMINE 30 MG/ML
30 INJECTION, SOLUTION INTRAMUSCULAR; INTRAVENOUS
Status: COMPLETED | OUTPATIENT
Start: 2019-02-13 | End: 2019-02-13

## 2019-02-13 RX ORDER — ONDANSETRON 4 MG/1
4 TABLET, ORALLY DISINTEGRATING ORAL
Qty: 10 TAB | Refills: 0 | Status: SHIPPED | OUTPATIENT
Start: 2019-02-13 | End: 2019-04-30 | Stop reason: SDUPTHER

## 2019-02-13 RX ADMIN — ONDANSETRON 4 MG: 2 INJECTION INTRAMUSCULAR; INTRAVENOUS at 13:23

## 2019-02-13 RX ADMIN — HYDROMORPHONE HYDROCHLORIDE 0.5 MG: 2 INJECTION, SOLUTION INTRAMUSCULAR; INTRAVENOUS; SUBCUTANEOUS at 17:27

## 2019-02-13 RX ADMIN — ONDANSETRON 4 MG: 2 INJECTION INTRAMUSCULAR; INTRAVENOUS at 15:49

## 2019-02-13 RX ADMIN — SODIUM CHLORIDE 1000 ML: 900 INJECTION, SOLUTION INTRAVENOUS at 13:24

## 2019-02-13 RX ADMIN — HYDROMORPHONE HYDROCHLORIDE 1 MG: 2 INJECTION, SOLUTION INTRAMUSCULAR; INTRAVENOUS; SUBCUTANEOUS at 15:47

## 2019-02-13 RX ADMIN — CEFTRIAXONE 1 G: 1 INJECTION, POWDER, FOR SOLUTION INTRAMUSCULAR; INTRAVENOUS at 17:26

## 2019-02-13 RX ADMIN — Medication 10 ML: at 14:53

## 2019-02-13 RX ADMIN — SODIUM CHLORIDE 100 ML: 900 INJECTION, SOLUTION INTRAVENOUS at 14:53

## 2019-02-13 RX ADMIN — IOPAMIDOL 100 ML: 755 INJECTION, SOLUTION INTRAVENOUS at 14:52

## 2019-02-13 RX ADMIN — KETOROLAC TROMETHAMINE 30 MG: 30 INJECTION, SOLUTION INTRAMUSCULAR at 14:24

## 2019-02-13 RX ADMIN — MORPHINE SULFATE 4 MG: 2 INJECTION, SOLUTION INTRAMUSCULAR; INTRAVENOUS at 13:23

## 2019-02-13 NOTE — PROGRESS NOTES
Chief Complaint   Patient presents with    Urinary Pain     Patient stated diagnosed with Cystitis on Saturday at [facility] and was told to come back if pain worsened.  Patient states pain in low back and abdomen still     Results for orders placed or performed in visit on 02/13/19   AMB POC URINALYSIS DIP STICK MANUAL W/O MICRO     Status: None   Result Value Ref Range Status    Color (UA POC) Kerri  Final    Clarity (UA POC) Clear  Final    Glucose (UA POC) 1+ Negative Final    Bilirubin (UA POC) 3+ Negative Final    Ketones (UA POC) 1+ Negative Final    Specific gravity (UA POC) 1.015 1.001 - 1.035 Final    Blood (UA POC) Negative Negative Final    pH (UA POC) 5.0 4.6 - 8.0 Final    Protein (UA POC) 3+ Negative Final    Urobilinogen (UA POC) 8 mg/dL 0.2 - 1 Final    Nitrites (UA POC) Positive Negative Final    Leukocyte esterase (UA POC) 3+ Negative Final   Results for orders placed or performed in visit on 12/07/18   AMB POC URINALYSIS DIP STICK AUTO W/O MICRO     Status: None   Result Value Ref Range Status    Color (UA POC) Orange  Final    Clarity (UA POC) Clear  Final    Glucose (UA POC) 1+ Negative Final    Bilirubin (UA POC) 3+ Negative Final    Ketones (UA POC) 2+ Negative Final    Specific gravity (UA POC) 1.020 1.001 - 1.035 Final    Blood (UA POC) Negative Negative Final    pH (UA POC) 5.0 4.6 - 8.0 Final    Protein (UA POC) 3+ Negative Final    Urobilinogen (UA POC) 8 mg/dL 0.2 - 1 Final    Nitrites (UA POC) Positive Negative Final    Leukocyte esterase (UA POC) 3+ Negative Final

## 2019-02-13 NOTE — DISCHARGE INSTRUCTIONS
Stop ceftin. Omnicef:1 pill every 12 hours for 10 days  Zofran:1 pill every 8 hours if needed for nausea  Percocet:1 pill every 6 hours if needed for pain. No alcohol or driving. Be aware of sedating effects. Return to ER for any fever, chills, vomiting despite zofran, inablity to take antibiotics. Kidney Infection: Care Instructions  Your Care Instructions    A kidney infection (pyelonephritis) is a type of urinary tract infection, or UTI. Most UTIs are bladder infections. Kidney infections tend to make people much sicker than bladder infections do. A kidney infection is also more serious because it can cause lasting damage if it is not treated quickly. Follow-up care is a key part of your treatment and safety. Be sure to make and go to all appointments, and call your doctor if you are having problems. It's also a good idea to know your test results and keep a list of the medicines you take. How can you care for yourself at home? · Take your antibiotics as directed. Do not stop taking them just because you feel better. You need to take the full course of antibiotics. · Drink plenty of water, enough so that your urine is light yellow or clear like water. This may help wash out bacteria that are causing the infection. If you have kidney, heart, or liver disease and have to limit fluids, talk with your doctor before you increase the amount of fluids you drink. · Urinate often. Try to empty your bladder each time. · To relieve pain, take a hot shower or lay a heating pad (set on low) over your lower belly. Never go to sleep with a heating pad in place. Put a thin cloth between the heating pad and your skin. To help prevent kidney infections  · Drink plenty of water each day. This helps you urinate often, which clears bacteria from your system. If you have kidney, heart, or liver disease and have to limit fluids, talk with your doctor before you increase the amount of fluids you drink.   · Urinate when you have the urge. Do not hold your urine for a long time. Urinate before you go to sleep. · If you have symptoms of a bladder infection, such as burning when you urinate or having to urinate often, call your doctor so you can treat the problem before it gets worse. If you do not treat a bladder infection quickly, it can spread to the kidney. · Men should keep the tip of the penis clean. If you are a woman, keep these ideas in mind:  · Urinate right after you have sex. · Change sanitary pads often. Avoid douches, feminine hygiene sprays, and other feminine hygiene products that have deodorants. · After going to the bathroom, wipe from front to back. When should you call for help? Call your doctor now or seek immediate medical care if:    · You have symptoms that a kidney infection is getting worse. These may include:  ? Pain or burning when you urinate. ? A frequent need to urinate without being able to pass much urine. ? Pain in the flank, which is just below the rib cage and above the waist on either side of the back. ? Blood in the urine. ? A fever.     · You are vomiting or nauseated.    Watch closely for changes in your health, and be sure to contact your doctor if:    · You do not get better as expected. Where can you learn more? Go to http://michele-lucy.info/. Enter D095 in the search box to learn more about \"Kidney Infection: Care Instructions. \"  Current as of: March 14, 2018  Content Version: 11.9  © 9622-5818 [employer]. Care instructions adapted under license by [employer] (which disclaims liability or warranty for this information). If you have questions about a medical condition or this instruction, always ask your healthcare professional. [address] any warranty or liability for your use of this information.

## 2019-02-13 NOTE — LETTER
[address] 
[address] [place], [place] [address] [id number] 
[phone] Work/School Note Date: 2/13/2019 To Whom It May concern: 
 
Chary Bhat was seen and treated today in the emergency room by the following provider(s): 
Attending Provider: Payton Casas MD 
Physician Assistant: Abhijeet Poole PA-C. Chary Bhat may return to work on 2/18/19.  
 
Sincerely, 
 
 
 
 
Krystyna Arboleda PA-C

## 2019-02-13 NOTE — ED TRIAGE NOTES
TRIAGE:Pt arrives with c/o dysuria, urinary frequency and urgency, abdominal pain, back pain and nausea since Monday. Was started on antibiotics and azo for cystitis.

## 2019-02-13 NOTE — ED PROVIDER NOTES
Pt with cc of uti. [facility] and diagonsed with cystitis. Pt taking azo and ceftin. Pt reports no improvement. Onset on Monday. Pt continues with burning and stinging. Pt with also frequency and urgency. No fever. + Nausea but no vomiting. Pt reports associated squeezing sensation to lower abdominal . Pt also with bilateral lower back pain. No cp, sob. Pt taking motrin for pain. No relief. Social hx Nonsmoker No alcohol Bladder Infection Associated symptoms include nausea, frequency, urgency and flank pain. Pertinent negatives include no chills and no vomiting. Past Medical History:  
Diagnosis Date  Anxiety and depression  Bartholin's gland abscess x8  Endometriosis  Migraine  Ovarian cyst   
 Panic attack Past Surgical History:  
Procedure Laterality Date  HX APPENDECTOMY  04/26/2017  HX BARTHOLIN CYST MARSUPIALIZATION    
 2011, 2018  HX BREAST LUMPECTOMY Right  HX BUNIONECTOMY  HX CYST REMOVAL    
 HX GYN Left   
 bartholin cyst drainage X 6  
 HX GYN Left   
 bartholin cyst marsupilization  HX OTHER SURGICAL    
 laparascopy  HX PELVIC LAPAROSCOPY    
 HX WISDOM TEETH EXTRACTION Family History:  
Problem Relation Age of Onset  Diabetes Maternal Aunt  Heart Disease Maternal Aunt  Stroke Maternal Aunt  Heart Disease Maternal Grandmother  No Known Problems Mother Social History Socioeconomic History  Marital status: SINGLE Spouse name: Not on file  Number of children: Not on file  Years of education: Not on file  Highest education level: Not on file Social Needs  Financial resource strain: Not on file  Food insecurity - worry: Not on file  Food insecurity - inability: Not on file  Transportation needs - medical: Not on file  Transportation needs - non-medical: Not on file Occupational History  Not on file Tobacco Use  
  Smoking status: Former Smoker  Smokeless tobacco: Never Used Substance and Sexual Activity  Alcohol use: Yes Alcohol/week: 0.6 oz Types: 1 Glasses of wine per week Comment: social  
 Drug use: No  
 Sexual activity: Yes  
  Partners: Male Birth control/protection: Pill Other Topics Concern  Not on file Social History Narrative  Not on file ALLERGIES: Benadryl [diphenhydramine hcl]; Compazine [prochlorperazine edisylate]; Depacon [valproate sodium]; Haldol [haloperidol lactate]; Promethazine; and Reglan [metoclopramide] Review of Systems Constitutional: Negative for chills and fever. Respiratory: Negative for cough and shortness of breath. Cardiovascular: Negative for chest pain and palpitations. Gastrointestinal: Positive for abdominal pain and nausea. Negative for blood in stool, diarrhea and vomiting. Genitourinary: Positive for dysuria, flank pain, frequency and urgency. Negative for vaginal discharge and vaginal pain. Musculoskeletal: Positive for back pain. Negative for arthralgias, myalgias, neck pain and neck stiffness. Skin: Negative for rash and wound. Neurological: Negative for dizziness, numbness and headaches. All other systems reviewed and are negative. Vitals:  
 02/13/19 1232 Pulse: (!) 107 SpO2: 99% Physical Exam  
Constitutional: She is oriented to person, place, and time. She appears well-developed and well-nourished. No distress. HENT:  
Head: Normocephalic and atraumatic. Right Ear: External ear normal.  
Left Ear: External ear normal.  
Eyes: Conjunctivae and EOM are normal. Pupils are equal, round, and reactive to light. Neck: Normal range of motion. Neck supple. Cardiovascular: Normal rate, regular rhythm and normal heart sounds. Pulmonary/Chest: Effort normal and breath sounds normal. No respiratory distress. She has no wheezes. Abdominal: Soft. Normal appearance and bowel sounds are normal. She exhibits no shifting dullness, no distension, no pulsatile liver, no abdominal bruit, no pulsatile midline mass and no mass. There is no hepatosplenomegaly, splenomegaly or hepatomegaly. There is tenderness. There is no rigidity, no rebound, no guarding, no CVA tenderness, no tenderness at McBurney's point and negative Molina's sign. Abdomen soft, no peritoneal signs Tender suprapubically and bilateral with CVA tenderness Musculoskeletal: Normal range of motion. She exhibits no edema or tenderness. Neurological: She is alert and oriented to person, place, and time. She has normal reflexes. She displays normal reflexes. No cranial nerve deficit. Coordination normal.  
Skin: Skin is warm and dry. No rash noted. No erythema. Psychiatric: She has a normal mood and affect. Her behavior is normal. Judgment and thought content normal.  
Nursing note and vitals reviewed. MDM Number of Diagnoses or Management Options Pyelonephritis:  
Diagnosis management comments: 26 yo female presenting for uti, abdominal pain, back pain Tender suprapubically, no fever. P: ct, iv fluid, labs The patient is resting comfortably and feels better, is alert and in no distress. The repeat examination is unremarkable and benign; in particular, there is no discomfort at McBurney's point. The history, exam, diagnostic testing, and current condition do not suggest acute appendicitis, bowel obstruction, incarcerated hernia, acute cholecystitis, bowel perforation, major gastrointestinal bleeding, severe diverticulitis, sepsis, or other significant pathology to warrant further testing, continued ED treatment, admission, or surgical evaluation at this point. The vital signs have been stable and are within normal limits at this time.  The patient does not have uncontrollable pain, intractable vomiting, or other significant symptoms. The patient's condition is stable and appropriate for discharge. The patient will pursue further outpatient evaluation with the primary care physician or other designated or consulting physician as indicated in the discharge instructions. Standard narcotic and sedating medication warnings given Patient's results have been reviewed with them. Patient and/or family have verbally conveyed their understanding and agreement of the patient's signs, symptoms, diagnosis, treatment and prognosis and additionally agree to follow up as recommended or return to the Emergency Room should their condition change prior to follow-up. Discharge instructions have also been provided to the patient with some educational information regarding their diagnosis as well a list of reasons why they would want to return to the ER prior to their follow-up appointment should their condition change. Amount and/or Complexity of Data Reviewed Discuss the patient with other providers: yes (ER attending-Davis) Patient Progress Patient progress: stable Procedures Pt case including HPI, PE, and all available lab and radiology results has been discussed with attending physician. Opportunity to evaluate patient has been provided to ER attending. Discharge and prescription plan has been agreed upon.

## 2019-02-13 NOTE — PROGRESS NOTES
Chief Complaint   Patient presents with    Urinary Pain     Patient stated diagnosed with Cystitis on Saturday at [facility] and was told to come back if pain worsened. Patient states pain in low back and abdomen still     she is a 25y.o. year old female who presents for evalution. She is very ill here in the office, her pain in low abdomen, low back and CVA is 9/10 currently. She was diagnosed with a UTI at [facility] 2 days ago and started on ceftin 250 mg bid. She is also taking pyridium. She has not been able to tolerate fluids due to severe nausea that has worsened over the last 24 hours. She denies fever, vomiting. She has a history of having pyelonephritis. She was treated here 2 months ago for UTI; if was Strep B positive. Reviewed PmHx, RxHx, FmHx, SocHx, AllgHx and updated and dated in the chart. Review of Systems - negative except as listed above in the HPI    Objective:     Vitals:    02/13/19 1209   BP: 103/70   Pulse: (!) 101   Resp: 16   Temp: 96.5 °F (35.8 °C)   TempSrc: Oral   SpO2: 99%   Weight: 143 lb 9.6 oz (65.1 kg)   Height: 5' 8\" (1.727 m)       Current Outpatient Medications   Medication Sig    cefUROXime (CEFTIN) 250 mg tablet TAKE 1 TABLET BY MOUTH TWICE A DAY    ketorolac (TORADOL) 10 mg tablet Take 3 tabs at once for severe headache by mouth. May repeat once in 24 hours. Max use twice a week.  galcanezumab-gnlm (EMGALITY PEN) 120 mg/mL injection 1 mL by SubCUTAneous route every thirty (30) days.  ALPRAZolam (XANAX) 1 mg tablet Take 1 mg by mouth once as needed (panic attack).  topiramate (TOPAMAX) 100 mg tablet Take 1 Tab by mouth nightly.  adapalene-benzoyl peroxide 0.1-2.5 % glwp APPLY TO AFFECTED AREAS AT BEDTIME X 30 DAYS (AS NEEDED)    ACZONE 7.5 % glwp APPLY TO AFFECTED AREA ONCE DAILY IN THE MORNING (AS NEEDED)    ASPIRIN/ACETAMINOPHEN/CAFFEINE (MIGRAINE RELIEF PO) Take 2 Tabs by mouth once as needed (Migraine).     clonazePAM (KLONOPIN) 1 mg tablet Take 1 mg by mouth three (3) times daily.  escitalopram (LEXAPRO) 20 mg tablet Take 20 mg by mouth nightly. Indications: Generalized Anxiety Disorder    promethazine (PHENERGAN) 25 mg tablet Take 1 Tab by mouth every six (6) hours as needed for Nausea. No current facility-administered medications for this visit. Physical Examination: General appearance - alert, well appearing, and in no distress  Mental status - alert, oriented to person, place, and time  Chest - clear to auscultation, no wheezes, rales or rhonchi, symmetric air entry  Heart - normal rate, regular rhythm, normal S1, S2, no murmurs, rubs, clicks or gallops  Abdomen - tenderness noted low abdomen and severe pain in low back and bilateral flanks. Back exam - antalgic gait  Musculoskeletal - no joint tenderness, deformity or swelling  Extremities - peripheral pulses normal, no pedal edema, no clubbing or cyanosis      Assessment/ Plan:   Diagnoses and all orders for this visit:    1. Acute cystitis with hematuria  -     AMB POC URINALYSIS DIP STICK MANUAL W/O MICRO     Patient requires IV fluids, pain medications and treatment for possible pyelonephritis. She has been instructed to go to the ED and is in agreement with this plan. She declined an ambulance transport. Follow-up Disposition:  Return if symptoms worsen or fail to improve. I have discussed the diagnosis with the patient and the intended plan as seen in the above orders. The patient has received an after-visit summary and questions were answered concerning future plans. Pt conveyed understanding of plan.     Medication Side Effects and Warnings were discussed with patient      Tommie Collins NP

## 2019-02-14 ENCOUNTER — TELEPHONE (OUTPATIENT)
Dept: NEUROLOGY | Age: 29
End: 2019-02-14

## 2019-02-14 ENCOUNTER — DOCUMENTATION ONLY (OUTPATIENT)
Dept: OTHER | Age: 29
End: 2019-02-14

## 2019-02-14 NOTE — TELEPHONE ENCOUNTER
Called pt and lm for her to call back. She has only had the loading dose given in the office on 1/25/19. I believe the Sandeep Harperar was working but we had to stop it due to constipation.

## 2019-02-14 NOTE — PROGRESS NOTES
Documentation only. No outreach to patient. Notified of ED visit 2/13. Prior attempts to this patient have been unsuccessful or patient declining CM assistance. 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 Chart Review:      ED Prescriptions     Medication Sig Dispense Start Date End Date Auth. Provider   cefdinir (OMNICEF) 300 mg capsule Take 1 Cap by mouth two (2) times a day for 10 days. 20 Cap 2/13/2019 2/23/2019 Gabe Mittal PA-C   oxyCODONE-acetaminophen (PERCOCET) 5-325 mg per tablet Take 1 Tab by mouth every six (6) hours as needed for Pain. Max Daily Amount: 4 Tabs. 10 Tab 2/13/2019  Gabe Mittal PA-C   ondansetron (ZOFRAN ODT) 4 mg disintegrating tablet Take 1 Tab by mouth every eight (8) hours as needed for Nausea.  10 Tab 2/13/2019  Gabe Mittal PA-C   Follow-up Information     Follow up With Specialties Details Why [address] In 4 days  [address] [facility] [address]   [phone]

## 2019-02-14 NOTE — TELEPHONE ENCOUNTER
Re: Emgality    PA renewal request received from [facility]. Pt's auth on file expires 2/19/19. Renewal can be submitted on that date. Per Dr. Jf Gibson office visit note on 1/25/19:  \"She had been on Aimovig but has severe constipation. That was discontinued and emgality was ordered but she has been not been able to pick it up due to authorization insurance issues. \"    Not sure what these insurance authorization issues are. PA for Emgality was completed on 11/19/18. Emgality was approved by [employer] (which has not changed since that PA was done). Auth on file was good 11/19/18 - 2/19/19. Has the pt been able to  this medication since her LOV with Dr. Adelina Herron? Will need to know if med has helped control her migraines before I attempt renewal PA.

## 2019-02-15 LAB
BACTERIA SPEC CULT: NORMAL
CC UR VC: NORMAL
SERVICE CMNT-IMP: NORMAL

## 2019-02-18 ENCOUNTER — OFFICE VISIT (OUTPATIENT)
Dept: GYNECOLOGY | Age: 29
End: 2019-02-18

## 2019-02-18 VITALS
DIASTOLIC BLOOD PRESSURE: 77 MMHG | HEIGHT: 68 IN | HEART RATE: 84 BPM | SYSTOLIC BLOOD PRESSURE: 113 MMHG | BODY MASS INDEX: 21.86 KG/M2 | WEIGHT: 144.2 LBS

## 2019-02-18 DIAGNOSIS — R10.2 PELVIC PAIN: Primary | ICD-10-CM

## 2019-02-18 NOTE — PROGRESS NOTES
[address], [address], [address]  P [phone]  F [phone]    Office Note  Patient ID:  Name:  Henri Fernandez  MRN:  [id number]  :   y.o. Date:  2019      HISTORY OF PRESENT ILLNESS:  Henri Fernandez is a 25 y.o.  premenopausal female who is being seen for pelvic pain and possible ruptured ovarian cyst following a recent ER visit. I had originally met her earlier this year when she was referred for recurrent left-sided Bartholin's gland cysts/abscesses. She had undergone multiple I&Ds and 2 marsupializations. I took her to the OR for excision of the left Bartholin's gland. She has had no further recurrences. She was last seen in the office in 2018 after being seen prior to that in the ER for pain. She was noted to have some fluid in the pelvis, but otherwise her CT and ultrasound were normal.  She has an IUD in place for contraception. She was recently seen in the ER and treated for a UTI. She noted some new pain in the left vaginal area and she is concerned that her Bartholin's gland could be coming back. ROS:   and GI review:  Negative  Cardiopulmonary review:  Negative   Musculoskeletal:  Negative    A comprehensive review of systems was negative except for that written in the History of Present Illness. , 10 point ROS      OB/GYN ROS:  [id number]  Hx of multiple I&Ds and marsupialization x 2 of left-sided Bartholin's, followed by excision in 2018  Patient denies any abnormal bleeding or vaginal discharge.          Problem List:  Patient Active Problem List    Diagnosis Date Noted    Migraine with status migrainosus 10/10/2018    Anxiety and depression     Migraine 2018    PTSD (post-traumatic stress disorder) 2018     PMH:  Past Medical History:   Diagnosis Date    Anxiety and depression     Bartholin's gland abscess     x8    Endometriosis     Migraine     Ovarian cyst     Panic attack     Pyelonephritis       PSH:  Past Surgical History:   Procedure Laterality Date    HX APPENDECTOMY  04/26/2017    HX BARTHOLIN CYST MARSUPIALIZATION      2011, 2018    HX BREAST LUMPECTOMY Right     HX BUNIONECTOMY      HX CYST REMOVAL      HX GYN Left     bartholin cyst drainage X 6    HX GYN Left     bartholin cyst marsupilization    HX OTHER SURGICAL      laparascopy    HX PELVIC LAPAROSCOPY      HX WISDOM TEETH EXTRACTION        Social History:  Social History     Tobacco Use    Smoking status: Former Smoker    Smokeless tobacco: Never Used   Substance Use Topics    Alcohol use: Yes     Alcohol/week: 0.6 oz     Types: 1 Glasses of wine per week     Comment: social      Family History:  Family History   Problem Relation Age of Onset    Diabetes Maternal Aunt     Heart Disease Maternal Aunt     Stroke Maternal Aunt     Heart Disease Maternal Grandmother     No Known Problems Mother       Medications: (reviewed)  Current Outpatient Medications   Medication Sig    cefdinir (OMNICEF) 300 mg capsule Take 1 Cap by mouth two (2) times a day for 10 days.  galcanezumab-gnlm (EMGALITY PEN) 120 mg/mL injection 1 mL by SubCUTAneous route every thirty (30) days.  topiramate (TOPAMAX) 100 mg tablet Take 1 Tab by mouth nightly.  clonazePAM (KLONOPIN) 1 mg tablet Take 1 mg by mouth three (3) times daily.  escitalopram (LEXAPRO) 20 mg tablet Take 20 mg by mouth nightly. Indications: Generalized Anxiety Disorder    oxyCODONE-acetaminophen (PERCOCET) 5-325 mg per tablet Take 1 Tab by mouth every six (6) hours as needed for Pain. Max Daily Amount: 4 Tabs.  ondansetron (ZOFRAN ODT) 4 mg disintegrating tablet Take 1 Tab by mouth every eight (8) hours as needed for Nausea.  ketorolac (TORADOL) 10 mg tablet Take 3 tabs at once for severe headache by mouth. May repeat once in 24 hours. Max use twice a week.     ALPRAZolam (XANAX) 1 mg tablet Take 1 mg by mouth once as needed (panic attack).  adapalene-benzoyl peroxide 0.1-2.5 % glwp APPLY TO AFFECTED AREAS AT BEDTIME X 30 DAYS (AS NEEDED)    ACZONE 7.5 % glwp APPLY TO AFFECTED AREA ONCE DAILY IN THE MORNING (AS NEEDED)    ASPIRIN/ACETAMINOPHEN/CAFFEINE (MIGRAINE RELIEF PO) Take 2 Tabs by mouth once as needed (Migraine). No current facility-administered medications for this visit. Allergies: (reviewed)  Allergies   Allergen Reactions    Benadryl [Diphenhydramine Hcl] Other (comments)     Makes me feel funny. Need ativan if I get benadryl    Compazine [Prochlorperazine Edisylate] Anxiety    Depacon [Valproate Sodium] Other (comments)     Pt reports having restlessness with this medication.  Haldol [Haloperidol Lactate] Other (comments)    Promethazine Other (comments)     \"It makes me feel really funny, nausea and dizzy\"    Reglan [Metoclopramide] Other (comments)          OBJECTIVE:    Physical Exam:  VITAL SIGNS: Vitals:    02/18/19 1544   BP: 113/77   Pulse: 84   Weight: 144 lb 3.2 oz (65.4 kg)   Height: 5' 8\" (1.727 m)     Body mass index is 21.93 kg/m². GENERAL STANTON: Conversant, alert, oriented. No acute distress. HEENT: HEENT. No thyroid enlargement. No JVD. Neck: Supple without restrictions. RESPIRATORY: Clear to auscultation and percussion to the bases. No CVAT. CARDIOVASC: RRR without murmur/rub. GASTROINT: soft, non-tender, without masses or organomegaly   MUSCULOSKEL: no joint tenderness, deformity or swelling   EXTREMITIES: extremities normal, atraumatic, no cyanosis or edema   PELVIC: Vulva and vagina appear normal. No swelling or tenderness in the region of the Bartholin's cyst.  She does have some tenderness along the pelvic floor muscles bilaterally, L>R. Normal cervix. Bimanual exam reveals normal uterus and adnexa. RECTAL: Deferred   SHAREE SURVEY: No suspicious lymphadenopathy or edema noted. NEURO: Grossly intact. No acute deficit.          Lab Results   Component Value Date/Time    WBC 4.9 02/13/2019 01:19 PM    HGB 12.7 02/13/2019 01:19 PM    HCT 40.0 02/13/2019 01:19 PM    PLATELET 551 03/48/3370 01:19 PM    MCV 92.4 02/13/2019 01:19 PM     Lab Results   Component Value Date/Time    Sodium 140 02/13/2019 01:19 PM    Potassium 3.9 02/13/2019 01:19 PM    Chloride 111 (H) 02/13/2019 01:19 PM    CO2 24 02/13/2019 01:19 PM    Anion gap 5 02/13/2019 01:19 PM    Glucose 78 02/13/2019 01:19 PM    BUN 9 02/13/2019 01:19 PM    Creatinine 0.83 02/13/2019 01:19 PM    BUN/Creatinine ratio 11 (L) 02/13/2019 01:19 PM    GFR est AA >60 02/13/2019 01:19 PM    GFR est non-AA >60 02/13/2019 01:19 PM    Calcium 8.2 (L) 02/13/2019 01:19 PM       CT of abdomen/pelvis (6/27/18)  LUNG BASES: Clear. LIVER: No mass or biliary dilatation. GALLBLADDER: Unremarkable. SPLEEN: No mass.     PANCREAS: No mass or ductal dilatation. ADRENALS: Unremarkable. KIDNEYS: No mass, calculus, or hydronephrosis.     GI: No dilatation or wall thickening.     APPENDIX: Absent  PERITONEUM: There is a small amount of fluid in the cul-de-sac. No other signs  of ascites. No free intraperitoneal air. RETROPERITONEUM: No lymphadenopathy or aortic aneurysm.        URINARY BLADDER: No mass or calculus. PELVIS: There is a dominant follicle in the left ovary. IUD noted in the uterus. BONES: No destructive bone lesion. ADDITIONAL COMMENTS: N/A     IMPRESSION:  Nonspecific trace of free fluid in the cul-de-sac. Study otherwise unremarkable. Pelvic ultrasound (6/27/18)  TRANSABDOMINAL FINDINGS:  UTERUS:  The uterus is normal in size and echotexture and measures 7.0 x 3.9 x 5.3 cm.     ENDOMETRIUM:  The endometrial stripe measures 3 mm IUD is noted overlying the uterus. .      RIGHT OVARY:  The right ovary obscured by bowel gas     LEFT OVARY:  The left ovary is obscured by bowel gas     CUL-DE-SAC:  There is no mass or fluid or other abnormality in the adnexa or cul-de-sac.     IMPRESSION:  Uterus is within normal limits.  Ovaries are obscured by bowel gas  Transvaginal sonography will be performed to better evaluate. Please see  separate report.     TRANSVAGINAL FINDINGS:   UTERUS:  The uterus is normal. The uterus measures 9.9 x 4.1 x 6.1 cm. .      ENDOMETRIUM:  The endometrial stripe measures 1mm. IUD overlies the endometrial canal..     RIGHT OVARY:  The right ovary is normal. The right ovary measures 3.5 x 1.4 x 2.4 cm. There  is normal color flow to the right ovary.     LEFT OVARY:  The left ovary is normal. The left ovary measures 3.6 x 2.7 x 2.7 cm. There is  normal color flow to the left ovary. This 2.4 x 2.1 cm follicular cyst.     CUL-DE-SAC:  There is small amount of free fluid in the cul-de-sac.     IMPRESSION: Uterus and ovaries are within normal limits. IUD overlies the  uterus. There is a small amount of free fluid in cul-de-sac. IMPRESSION/PLAN:  Harriett Bruce is a 25 y.o. female with a history of Bartholin's cysts and pelvic pain. Her exam today is normal.  I reassured her that her Bartholin's gland is not coming back. I suspect her pain is from her recent UTI. I told her to keep an eye on it and to call if it doesn't resolve.       Signed By: Heathre Prabhakar MD     2/18/2019/4:05 PM

## 2019-02-18 NOTE — PROGRESS NOTES
Problem visit, possible bartholin cyst, experiencing tightness and discomfort, concerned since gland was removed

## 2019-02-19 NOTE — TELEPHONE ENCOUNTER
Re: Emgality    Tried to submit PA renewal via [facility] and received the following message: \"Message from Plan  Your PA has been resolved, no additional PA is required. For further inquiries please contact the number on the back of the member prescription card. \"    Called and s/w Bethany @ [facility] to get clarification. Per Sandeep Wan, she ran a test claim for today and it went through as paid. I informed her that there is an [facility] on file that expires today and I have been unable to do a PA renewal online. Setup PA over the phone. Per Bethany:    Approved. Auth # [id number]. Auth good 2/19/19 - 5/19/19. Pt should be able to receive her medication from her preferred pharmacy.

## 2019-03-08 ENCOUNTER — OFFICE VISIT (OUTPATIENT)
Dept: FAMILY MEDICINE CLINIC | Age: 29
End: 2019-03-08

## 2019-03-08 VITALS
RESPIRATION RATE: 16 BRPM | OXYGEN SATURATION: 100 % | TEMPERATURE: 97.8 F | DIASTOLIC BLOOD PRESSURE: 70 MMHG | HEART RATE: 90 BPM | SYSTOLIC BLOOD PRESSURE: 122 MMHG | BODY MASS INDEX: 21.98 KG/M2 | WEIGHT: 145 LBS | HEIGHT: 68 IN

## 2019-03-08 DIAGNOSIS — Z87.440 HISTORY OF RECURRENT UTIS: ICD-10-CM

## 2019-03-08 DIAGNOSIS — N89.8 VAGINAL DISCHARGE: Primary | ICD-10-CM

## 2019-03-08 DIAGNOSIS — R82.90 BAD ODOR OF URINE: ICD-10-CM

## 2019-03-08 LAB
BILIRUB UR QL STRIP: NEGATIVE
GLUCOSE UR-MCNC: NEGATIVE MG/DL
KETONES P FAST UR STRIP-MCNC: NEGATIVE MG/DL
PH UR STRIP: 5.5 [PH] (ref 4.6–8)
PROT UR QL STRIP: NEGATIVE
SP GR UR STRIP: 1.03 (ref 1–1.03)
UA UROBILINOGEN AMB POC: NORMAL (ref 0.2–1)
URINALYSIS CLARITY POC: CLEAR
URINALYSIS COLOR POC: YELLOW
URINE BLOOD POC: NEGATIVE
URINE LEUKOCYTES POC: NEGATIVE
URINE NITRITES POC: NEGATIVE

## 2019-03-08 NOTE — PROGRESS NOTES
Chief Complaint   Patient presents with    Urinary Odor     Patient stated urinary odor, unsure if UTI or BV

## 2019-03-09 NOTE — PROGRESS NOTES
Subjective:   25 y.o. female complains of malodorous vaginal discharge for 1 day. .  Denies abnormal vaginal bleeding or significant pelvic pain or  fever. No UTI symptoms. Denies history of known exposure to STD. She did have severe pyelonephritis several weeks ago and required ED treatment. She had only had symptoms for 1 day at that time so she is concerned that it may be returning. She has no urinary symptoms. Patient's last menstrual period was 03/02/2019 (exact date). Objective:   She appears well, afebrile. Abdomen: benign, soft, nontender, no masses. No CVA tenderness. Pelvic Exam: exam declined by the patient. Urine dipstick:  negative for all components. .    Assessment/Plan:   bacterial vaginosis oUTIr   Urine culture and Nu-Swab sent to the lab. Will treat if needed on return of lab tests. Treatment: abstain from coitus during course of treatment  ROV prn if symptoms persist or worsen. ICD-10-CM ICD-9-CM    1. Vaginal discharge N89.8 623.5 REFERRAL TO PRIMARY CARE      CULTURE, URINE      NUSWAB VAGINITIS   2. Bad odor of urine R82.90 791.9 AMB POC URINALYSIS DIP STICK MANUAL W/O MICRO      REFERRAL TO PRIMARY CARE      CULTURE, URINE      NUSWAB VAGINITIS   3. History of recurrent UTIs Z87.440 V13.02 CULTURE, URINE      NUSWAB VAGINITIS   .

## 2019-03-12 DIAGNOSIS — B96.89 BACTERIAL VAGINOSIS: Primary | ICD-10-CM

## 2019-03-12 DIAGNOSIS — N76.0 BACTERIAL VAGINOSIS: Primary | ICD-10-CM

## 2019-03-12 LAB
A VAGINAE DNA VAG QL NAA+PROBE: ABNORMAL SCORE
BACTERIA UR CULT: NORMAL
BVAB2 DNA VAG QL NAA+PROBE: ABNORMAL SCORE
C ALBICANS DNA VAG QL NAA+PROBE: NEGATIVE
C GLABRATA DNA VAG QL NAA+PROBE: NEGATIVE
MEGA1 DNA VAG QL NAA+PROBE: ABNORMAL SCORE
T VAGINALIS RRNA SPEC QL NAA+PROBE: NEGATIVE

## 2019-03-12 RX ORDER — METRONIDAZOLE 500 MG/1
500 TABLET ORAL
Qty: 14 TAB | Refills: 0 | Status: SHIPPED | OUTPATIENT
Start: 2019-03-12 | End: 2019-03-19

## 2019-03-12 NOTE — PROGRESS NOTES
Please advise the patient that her Urine was negative for any Bacteria, however her vaginal swab was positive for bacterial vaginosis. I have sent in a prescription for Flagyl 500 mg twice daily for 1 week. Do not drink alcohol while on this medication. Follow up with GYN if problems continue.   Thank You

## 2019-04-26 ENCOUNTER — TELEPHONE (OUTPATIENT)
Dept: NEUROLOGY | Age: 29
End: 2019-04-26

## 2019-04-26 NOTE — TELEPHONE ENCOUNTER
Message from Linda 55, \"Pls cl re she's taken everything she has been prescribed for her migraine and she's still in a lot of pain. She wants to know what she should do. \"

## 2019-04-27 ENCOUNTER — HOSPITAL ENCOUNTER (EMERGENCY)
Age: 29
Discharge: HOME | End: 2019-04-27
Attending: STUDENT IN AN ORGANIZED HEALTH CARE EDUCATION/TRAINING PROGRAM
Payer: COMMERCIAL

## 2019-04-27 ENCOUNTER — APPOINTMENT (OUTPATIENT)
Dept: CT IMAGING | Age: 29
End: 2019-04-27
Attending: NURSE PRACTITIONER
Payer: COMMERCIAL

## 2019-04-27 ENCOUNTER — HOSPITAL ENCOUNTER (EMERGENCY)
Age: 29
Discharge: HOME | End: 2019-04-28
Attending: EMERGENCY MEDICINE | Admitting: EMERGENCY MEDICINE
Payer: COMMERCIAL

## 2019-04-27 VITALS
WEIGHT: 145 LBS | SYSTOLIC BLOOD PRESSURE: 117 MMHG | RESPIRATION RATE: 18 BRPM | HEIGHT: 68 IN | DIASTOLIC BLOOD PRESSURE: 66 MMHG | HEART RATE: 102 BPM | TEMPERATURE: 98 F | BODY MASS INDEX: 21.98 KG/M2 | OXYGEN SATURATION: 98 %

## 2019-04-27 DIAGNOSIS — G43.001 MIGRAINE WITHOUT AURA AND WITH STATUS MIGRAINOSUS, NOT INTRACTABLE: Primary | ICD-10-CM

## 2019-04-27 DIAGNOSIS — R51.9 INTRACTABLE EPISODIC HEADACHE, UNSPECIFIED HEADACHE TYPE: Primary | ICD-10-CM

## 2019-04-27 DIAGNOSIS — M54.2 CERVICALGIA: ICD-10-CM

## 2019-04-27 LAB
ANION GAP SERPL CALC-SCNC: 4 MMOL/L (ref 5–15)
ANION GAP SERPL CALC-SCNC: 7 MMOL/L (ref 5–15)
BASOPHILS # BLD: 0 K/UL (ref 0–0.1)
BASOPHILS # BLD: 0.1 K/UL (ref 0–0.1)
BASOPHILS NFR BLD: 0 % (ref 0–1)
BASOPHILS NFR BLD: 2 % (ref 0–1)
BUN SERPL-MCNC: 10 MG/DL (ref 6–20)
BUN SERPL-MCNC: 9 MG/DL (ref 6–20)
BUN/CREAT SERPL: 11 (ref 12–20)
BUN/CREAT SERPL: 12 (ref 12–20)
CALCIUM SERPL-MCNC: 8.5 MG/DL (ref 8.5–10.1)
CALCIUM SERPL-MCNC: 8.7 MG/DL (ref 8.5–10.1)
CHLORIDE SERPL-SCNC: 110 MMOL/L (ref 97–108)
CHLORIDE SERPL-SCNC: 111 MMOL/L (ref 97–108)
CO2 SERPL-SCNC: 22 MMOL/L (ref 21–32)
CO2 SERPL-SCNC: 26 MMOL/L (ref 21–32)
COMMENT, HOLDF: NORMAL
CREAT SERPL-MCNC: 0.82 MG/DL (ref 0.55–1.02)
CREAT SERPL-MCNC: 0.84 MG/DL (ref 0.55–1.02)
CRP SERPL-MCNC: <0.29 MG/DL (ref 0–0.6)
DIFFERENTIAL METHOD BLD: ABNORMAL
DIFFERENTIAL METHOD BLD: ABNORMAL
EOSINOPHIL # BLD: 0 K/UL (ref 0–0.4)
EOSINOPHIL # BLD: 0.1 K/UL (ref 0–0.4)
EOSINOPHIL NFR BLD: 0 % (ref 0–7)
EOSINOPHIL NFR BLD: 2 % (ref 0–7)
ERYTHROCYTE [DISTWIDTH] IN BLOOD BY AUTOMATED COUNT: 11.9 % (ref 11.5–14.5)
ERYTHROCYTE [DISTWIDTH] IN BLOOD BY AUTOMATED COUNT: 11.9 % (ref 11.5–14.5)
ERYTHROCYTE [SEDIMENTATION RATE] IN BLOOD: 4 MM/HR (ref 0–20)
GLUCOSE SERPL-MCNC: 127 MG/DL (ref 65–100)
GLUCOSE SERPL-MCNC: 83 MG/DL (ref 65–100)
HCT VFR BLD AUTO: 40 % (ref 35–47)
HCT VFR BLD AUTO: 40.4 % (ref 35–47)
HGB BLD-MCNC: 13 G/DL (ref 11.5–16)
HGB BLD-MCNC: 13 G/DL (ref 11.5–16)
IMM GRANULOCYTES # BLD AUTO: 0 K/UL (ref 0–0.04)
IMM GRANULOCYTES # BLD AUTO: 0 K/UL (ref 0–0.04)
IMM GRANULOCYTES NFR BLD AUTO: 0 % (ref 0–0.5)
IMM GRANULOCYTES NFR BLD AUTO: 1 % (ref 0–0.5)
LYMPHOCYTES # BLD: 0.7 K/UL (ref 0.8–3.5)
LYMPHOCYTES # BLD: 1.9 K/UL (ref 0.8–3.5)
LYMPHOCYTES NFR BLD: 13 % (ref 12–49)
LYMPHOCYTES NFR BLD: 44 % (ref 12–49)
MAGNESIUM SERPL-MCNC: 2.3 MG/DL (ref 1.6–2.4)
MCH RBC QN AUTO: 29.3 PG (ref 26–34)
MCH RBC QN AUTO: 29.5 PG (ref 26–34)
MCHC RBC AUTO-ENTMCNC: 32.2 G/DL (ref 30–36.5)
MCHC RBC AUTO-ENTMCNC: 32.5 G/DL (ref 30–36.5)
MCV RBC AUTO: 90.9 FL (ref 80–99)
MCV RBC AUTO: 91 FL (ref 80–99)
MONOCYTES # BLD: 0.1 K/UL (ref 0–1)
MONOCYTES # BLD: 0.3 K/UL (ref 0–1)
MONOCYTES NFR BLD: 1 % (ref 5–13)
MONOCYTES NFR BLD: 8 % (ref 5–13)
NEUTS SEG # BLD: 1.8 K/UL (ref 1.8–8)
NEUTS SEG # BLD: 4.8 K/UL (ref 1.8–8)
NEUTS SEG NFR BLD: 43 % (ref 32–75)
NEUTS SEG NFR BLD: 86 % (ref 32–75)
NRBC # BLD: 0 K/UL (ref 0–0.01)
NRBC # BLD: 0 K/UL (ref 0–0.01)
NRBC BLD-RTO: 0 PER 100 WBC
NRBC BLD-RTO: 0 PER 100 WBC
PLATELET # BLD AUTO: 200 K/UL (ref 150–400)
PLATELET # BLD AUTO: 219 K/UL (ref 150–400)
PMV BLD AUTO: 10.5 FL (ref 8.9–12.9)
PMV BLD AUTO: 10.9 FL (ref 8.9–12.9)
POTASSIUM SERPL-SCNC: 4 MMOL/L (ref 3.5–5.1)
POTASSIUM SERPL-SCNC: 4.1 MMOL/L (ref 3.5–5.1)
RBC # BLD AUTO: 4.4 M/UL (ref 3.8–5.2)
RBC # BLD AUTO: 4.44 M/UL (ref 3.8–5.2)
RBC MORPH BLD: ABNORMAL
SAMPLES BEING HELD,HOLD: NORMAL
SODIUM SERPL-SCNC: 139 MMOL/L (ref 136–145)
SODIUM SERPL-SCNC: 141 MMOL/L (ref 136–145)
WBC # BLD AUTO: 4.1 K/UL (ref 3.6–11)
WBC # BLD AUTO: 5.6 K/UL (ref 3.6–11)

## 2019-04-27 PROCEDURE — 96361 HYDRATE IV INFUSION ADD-ON: CPT

## 2019-04-27 PROCEDURE — 77030014132 HC TY LUMBR PUNC BD -A

## 2019-04-27 PROCEDURE — 80048 BASIC METABOLIC PNL TOTAL CA: CPT

## 2019-04-27 PROCEDURE — 96374 THER/PROPH/DIAG INJ IV PUSH: CPT

## 2019-04-27 PROCEDURE — 96375 TX/PRO/DX INJ NEW DRUG ADDON: CPT

## 2019-04-27 PROCEDURE — 77030003666 HC NDL SPINAL BD -A

## 2019-04-27 PROCEDURE — 74011250636 HC RX REV CODE- 250/636: Performed by: EMERGENCY MEDICINE

## 2019-04-27 PROCEDURE — 85652 RBC SED RATE AUTOMATED: CPT

## 2019-04-27 PROCEDURE — 86140 C-REACTIVE PROTEIN: CPT

## 2019-04-27 PROCEDURE — 70450 CT HEAD/BRAIN W/O DYE: CPT

## 2019-04-27 PROCEDURE — 99282 EMERGENCY DEPT VISIT SF MDM: CPT

## 2019-04-27 PROCEDURE — 83735 ASSAY OF MAGNESIUM: CPT

## 2019-04-27 PROCEDURE — 85025 COMPLETE CBC W/AUTO DIFF WBC: CPT

## 2019-04-27 PROCEDURE — 74011250636 HC RX REV CODE- 250/636: Performed by: NURSE PRACTITIONER

## 2019-04-27 PROCEDURE — 96365 THER/PROPH/DIAG IV INF INIT: CPT

## 2019-04-27 PROCEDURE — 36415 COLL VENOUS BLD VENIPUNCTURE: CPT

## 2019-04-27 RX ORDER — ONDANSETRON 2 MG/ML
4 INJECTION INTRAMUSCULAR; INTRAVENOUS
Status: COMPLETED | OUTPATIENT
Start: 2019-04-27 | End: 2019-04-27

## 2019-04-27 RX ORDER — SODIUM CHLORIDE 9 MG/ML
1000 INJECTION, SOLUTION INTRAVENOUS ONCE
Status: COMPLETED | OUTPATIENT
Start: 2019-04-27 | End: 2019-04-27

## 2019-04-27 RX ORDER — KETOROLAC TROMETHAMINE 30 MG/ML
30 INJECTION, SOLUTION INTRAMUSCULAR; INTRAVENOUS
Status: COMPLETED | OUTPATIENT
Start: 2019-04-27 | End: 2019-04-27

## 2019-04-27 RX ORDER — ONDANSETRON 2 MG/ML
4 INJECTION INTRAMUSCULAR; INTRAVENOUS ONCE
Status: COMPLETED | OUTPATIENT
Start: 2019-04-27 | End: 2019-04-27

## 2019-04-27 RX ORDER — MAGNESIUM SULFATE 1 G/100ML
1 INJECTION INTRAVENOUS
Status: COMPLETED | OUTPATIENT
Start: 2019-04-27 | End: 2019-04-27

## 2019-04-27 RX ORDER — MAGNESIUM SULFATE HEPTAHYDRATE 40 MG/ML
2 INJECTION, SOLUTION INTRAVENOUS ONCE
Status: COMPLETED | OUTPATIENT
Start: 2019-04-27 | End: 2019-04-27

## 2019-04-27 RX ORDER — DEXAMETHASONE SODIUM PHOSPHATE 4 MG/ML
10 INJECTION, SOLUTION INTRA-ARTICULAR; INTRALESIONAL; INTRAMUSCULAR; INTRAVENOUS; SOFT TISSUE
Status: COMPLETED | OUTPATIENT
Start: 2019-04-27 | End: 2019-04-27

## 2019-04-27 RX ADMIN — SODIUM CHLORIDE 1000 ML: 900 INJECTION, SOLUTION INTRAVENOUS at 11:51

## 2019-04-27 RX ADMIN — ONDANSETRON 4 MG: 2 INJECTION INTRAMUSCULAR; INTRAVENOUS at 11:52

## 2019-04-27 RX ADMIN — KETOROLAC TROMETHAMINE 30 MG: 30 INJECTION, SOLUTION INTRAMUSCULAR at 11:52

## 2019-04-27 RX ADMIN — ONDANSETRON 4 MG: 2 INJECTION INTRAMUSCULAR; INTRAVENOUS at 21:21

## 2019-04-27 RX ADMIN — MAGNESIUM SULFATE HEPTAHYDRATE 1 G: 1 INJECTION, SOLUTION INTRAVENOUS at 21:21

## 2019-04-27 RX ADMIN — MAGNESIUM SULFATE HEPTAHYDRATE 2 G: 40 INJECTION, SOLUTION INTRAVENOUS at 11:51

## 2019-04-27 RX ADMIN — SODIUM CHLORIDE 1000 ML: 900 INJECTION, SOLUTION INTRAVENOUS at 21:21

## 2019-04-27 RX ADMIN — DEXAMETHASONE SODIUM PHOSPHATE 10 MG: 4 INJECTION, SOLUTION INTRAMUSCULAR; INTRAVENOUS at 11:51

## 2019-04-27 NOTE — DISCHARGE INSTRUCTIONS
Patient Education        Learning About How to Have a Healthy Back  What causes back pain? Back pain is often caused by overuse, strain, or injury. For example, people often hurt their backs playing sports or working in the yard, being jolted in a car accident, or lifting something too heavy. Aging plays a part too. Your bones and muscles tend to lose strength as you age, which makes injury more likely. The spongy discs between the bones of the spine (vertebrae) may suffer from wear and tear and no longer provide enough cushion between the bones. A disc that bulges or breaks open (herniated disc) can press on nerves, causing back pain. In some people, back pain is the result of arthritis, broken vertebrae caused by bone loss (osteoporosis), illness, or a spine problem. Although most people have back pain at one time or another, there are steps you can take to make it less likely. How can you have a healthy back? Reduce stress on your back through good posture  Slumping or slouching alone may not cause low back pain. But after the back has been strained or injured, bad posture can make pain worse. · Sleep in a position that maintains your back's normal curves and on a mattress that feels comfortable. Sleep on your side with a pillow between your knees, or sleep on your back with a pillow under your knees. These positions can reduce strain on your back. · Stand and sit up straight. \"Good posture\" generally means your ears, shoulders, and hips are in a straight line. · If you must stand for a long time, put one foot on a stool, ledge, or box. Switch feet every now and then. · Sit in a chair that is low enough to let you place both feet flat on the floor with both knees nearly level with your hips. If your chair or desk is too high, use a footrest to raise your knees. Place a small pillow, a rolled-up towel, or a lumbar roll in the curve of your back if you need extra support.   · Try a kneeling chair, which helps tilt your hips forward. This takes pressure off your lower back. · Try sitting on an exercise ball. It can rock from side to side, which helps keep your back loose. · When driving, keep your knees nearly level with your hips. Sit straight, and drive with both hands on the steering wheel. Your arms should be in a slightly bent position. Reduce stress on your back through careful lifting  · Squat down, bending at the hips and knees only. If you need to, put one knee to the floor and extend your other knee in front of you, bent at a right angle (half kneeling). · Press your chest straight forward. This helps keep your upper back straight while keeping a slight arch in your low back. · Hold the load as close to your body as possible, at the level of your belly button (navel). · Use your feet to change direction, taking small steps. · Lead with your hips as you change direction. Keep your shoulders in line with your hips as you move. · Set down your load carefully, squatting with your knees and hips only. Exercise and stretch your back  · Do some exercise on most days of the week, if your doctor says it is okay. You can walk, run, swim, or cycle. · Stretch your back muscles. Here are a few exercises to try:  ? Lie on your back, and gently pull one bent knee to your chest. Put that foot back on the floor, and then pull the other knee to your chest.  ? Do pelvic tilts. Lie on your back with your knees bent. Tighten your stomach muscles. Pull your belly button (navel) in and up toward your ribs. You should feel like your back is pressing to the floor and your hips and pelvis are slightly lifting off the floor. Hold for 6 seconds while breathing smoothly. ? Sit with your back flat against a wall. · Keep your core muscles strong. The muscles of your back, belly (abdomen), and buttocks support your spine. ? Pull in your belly and imagine pulling your navel toward your spine.  Hold this for 6 seconds, then relax. Remember to keep breathing normally as you tense your muscles. ? Do curl-ups. Always do them with your knees bent. Keep your low back on the floor, and curl your shoulders toward your knees using a smooth, slow motion. Keep your arms folded across your chest. If this bothers your neck, try putting your hands behind your neck (not your head), with your elbows spread apart. ? Lie on your back with your knees bent and your feet flat on the floor. Tighten your belly muscles, and then push with your feet and raise your buttocks up a few inches. Hold this position 6 seconds as you continue to breathe normally, then lower yourself slowly to the floor. Repeat 8 to 12 times. ? If you like group exercise, try Pilates or yoga. These classes have poses that strengthen the core muscles. Lead a healthy lifestyle  · Stay at a healthy weight to avoid strain on your back. · Do not smoke. Smoking increases the risk of osteoporosis, which weakens the spine. If you need help quitting, talk to your doctor about stop-smoking programs and medicines. These can increase your chances of quitting for good. Where can you learn more? Go to http://michele-lucy.info/. Enter L315 in the search box to learn more about \"Learning About How to Have a Healthy Back. \"  Current as of: September 20, 2018  Content Version: 11.9  © 7784-7020 [employer]. Care instructions adapted under license by [employer] (which disclaims liability or warranty for this information). If you have questions about a medical condition or this instruction, always ask your healthcare professional. [place] [address] any warranty or liability for your use of this information.

## 2019-04-27 NOTE — ED PROVIDER NOTES
Presents for evaluation of migraine, neck pain, rigidity, shoulder pain and stiffness, subjective fevers and chills for almost two days. She tried her normal migraine medications, which didn't help and decided to come in for evaluation. She discloses recently having a pediatric patient with meningitis. She has quite a significant migrainous history, and is being followed by Dr. 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 She presented to the ED in October, 2018 with ptosis, facial weakness and slurred speech. She was admitted and had a negative MRI/MRA and was discharged to follow up. She states the only medication that alleviates her headaches is Dilaudid. It is well documented that she has been to the ED over the past year and has received narcotics. While admitted, the neurology service documented a discussion with Ms. Nona Sim regarding narcotic frequency, rebound and that she cannot keep receiving Dilaudid. She denies any cardiopulmonary disease. Past Medical History:  
Diagnosis Date  Anxiety and depression  Bartholin's gland abscess x8  Endometriosis  Migraine  Ovarian cyst   
 Panic attack  Pyelonephritis Past Surgical History:  
Procedure Laterality Date  HX APPENDECTOMY  04/26/2017  HX BARTHOLIN CYST MARSUPIALIZATION    
 2011, 2018  HX BREAST LUMPECTOMY Right  HX BUNIONECTOMY  HX CYST REMOVAL    
 HX GYN Left   
 bartholin cyst drainage X 6  
 HX GYN Left   
 bartholin cyst marsupilization  HX OTHER SURGICAL    
 laparascopy  HX PELVIC LAPAROSCOPY    
 HX WISDOM TEETH EXTRACTION Family History:  
Problem Relation Age of Onset  Diabetes Maternal Aunt  Heart Disease Maternal Aunt  Stroke Maternal Aunt  Heart Disease Maternal Grandmother  No Known Problems Mother Social History Socioeconomic History  Marital status: SINGLE Spouse name: Not on file  Number of children: Not on file  Years of education: Not on file  Highest education level: Not on file Occupational History  Not on file Social Needs  Financial resource strain: Not on file  Food insecurity:  
  Worry: Not on file Inability: Not on file  Transportation needs:  
  Medical: Not on file Non-medical: Not on file Tobacco Use  Smoking status: Former Smoker  Smokeless tobacco: Never Used Substance and Sexual Activity  Alcohol use: Yes Alcohol/week: 0.6 oz Types: 1 Glasses of wine per week Comment: social  
 Drug use: No  
 Sexual activity: Yes  
  Partners: Male Birth control/protection: Pill, Condom Lifestyle  Physical activity:  
  Days per week: Not on file Minutes per session: Not on file  Stress: Not on file Relationships  Social connections:  
  Talks on phone: Not on file Gets together: Not on file Attends Congregation service: Not on file Active member of club or organization: Not on file Attends meetings of clubs or organizations: Not on file Relationship status: Not on file  Intimate partner violence:  
  Fear of current or ex partner: Not on file Emotionally abused: Not on file Physically abused: Not on file Forced sexual activity: Not on file Other Topics Concern  Not on file Social History Narrative  Not on file ALLERGIES: Benadryl [diphenhydramine hcl]; Compazine [prochlorperazine edisylate]; Depacon [valproate sodium]; Haldol [haloperidol lactate]; Promethazine; and Reglan [metoclopramide] Review of Systems Constitutional: Positive for activity change, chills, fatigue and fever. HENT: Negative for ear pain, facial swelling, hearing loss, rhinorrhea and tinnitus. Eyes: Positive for visual disturbance. Negative for photophobia and pain. Respiratory: Negative for cough, chest tightness and shortness of breath. Cardiovascular: Negative for chest pain and palpitations. Gastrointestinal: Negative for abdominal pain. Genitourinary: Negative for difficulty urinating. Musculoskeletal: Positive for back pain, myalgias, neck pain and neck stiffness. Negative for gait problem and joint swelling. Skin: Negative for color change. Neurological: Positive for weakness, light-headedness and headaches. Negative for dizziness, tremors, syncope, facial asymmetry and speech difficulty. Hematological: Negative for adenopathy. Psychiatric/Behavioral: Negative for confusion and hallucinations. Vitals:  
 04/27/19 1230 04/27/19 1245 04/27/19 1300 04/27/19 1315 BP: 112/73 111/71 110/61 117/66 Pulse:      
Resp:      
Temp:      
SpO2: 100% 100% 100% 98% Weight:      
Height:      
      
 
Physical Exam  
Constitutional: She is oriented to person, place, and time. Vital signs are normal. She appears well-developed and well-nourished. Non-toxic appearance. She has a sickly appearance. No distress. HENT:  
Head: Normocephalic and atraumatic. Right Ear: External ear normal.  
Left Ear: External ear normal.  
Nose: Nose normal.  
Mouth/Throat: Oropharynx is clear and moist. No oropharyngeal exudate. Eyes: Pupils are equal, round, and reactive to light. Conjunctivae and EOM are normal.  
Fundoscopic exam: The right eye shows no AV nicking, no hemorrhage and no papilledema. The left eye shows no AV nicking, no hemorrhage and no papilledema. Neck: Trachea normal and phonation normal. Spinous process tenderness and muscular tenderness present. No tracheal tenderness present. Carotid bruit is not present. Kernig's sign noted. No Brudzinski's sign noted. Cardiovascular: Normal rate and regular rhythm. Pulmonary/Chest: Effort normal and breath sounds normal.  
Abdominal: Soft. Bowel sounds are normal. There is no tenderness. Musculoskeletal: Normal range of motion. She exhibits no edema. Neurological: She is alert and oriented to person, place, and time. She displays normal reflexes. No cranial nerve deficit or sensory deficit. She exhibits normal muscle tone. Coordination normal.  
Skin: Skin is warm. Psychiatric: She has a normal mood and affect. Nursing note and vitals reviewed. MDM Number of Diagnoses or Management Options Cervicalgia: new and requires workup Migraine without aura and with status migrainosus, not intractable: established and worsening Diagnosis management comments:  
 
Presents with two days of acute migraine with new neck and back symptoms, which are for of her typical migraine. When I examined her, her neck was uncomfortable and she did have some cervical stiffness. Her ophthalmic examination is normal. The meningeal tests were not that impressive. Given her new symptoms, and concern for possible meninginitis, we did a CT-- which was negative. I reassured her, I didn't see anything life threatening and a spinal tap is not necessary. She was discharged in stable condition, with the recommendation of following up with Dr. Wilian Musa. Amount and/or Complexity of Data Reviewed Clinical lab tests: ordered Tests in the radiology section of CPT®: ordered Tests in the medicine section of CPT®: ordered Review and summarize past medical records: yes Independent visualization of images, tracings, or specimens: yes Risk of Complications, Morbidity, and/or Mortality Presenting problems: moderate Diagnostic procedures: moderate Management options: low Procedures

## 2019-04-27 NOTE — ED TRIAGE NOTES
Pt ambulatory to ED with c/o migraine headache. \"I was seen at [facility] today but headache continues. My neurologist told me to come back in\".

## 2019-04-28 VITALS
TEMPERATURE: 97.6 F | OXYGEN SATURATION: 100 % | HEART RATE: 75 BPM | RESPIRATION RATE: 16 BRPM | SYSTOLIC BLOOD PRESSURE: 106 MMHG | DIASTOLIC BLOOD PRESSURE: 76 MMHG

## 2019-04-28 NOTE — ED TRIAGE NOTES
Patient presents to the emergency department reporting migraine for two days. Patient was seen at [address] [place] [address] today for the same. Patient reports she is allergic to most migraine medications. Patient states she took the maximum dose of Toradol.

## 2019-04-28 NOTE — ED PROVIDER NOTES
25 y.o. female with past medical history significant for Migraines, Anxiety and Depression, who presents ambulatory to the ED with chief complaint of 10/10, constant HA, onset yesterday, with radiation of pain down neck and associated nausea and  vomiting (1 episode). No modifying factors noted. She notes that she has taken Toradol, Excedrin, Caffeine and Topamax, at the direction of her neurologist, w/o relief. She notes that she went to [facility] earlier today, and states: \"they treated me like I was drug-seeking and d/c'd me in pain, but my neurologist told me that I could come back here. \" She notes that dilaudid has worked for her migraines in the past. Pt denies photophobia, fever/chills, CP, SOB, diplopia, blurred vision and abd pain. no numbness/tingling, loss of sensation. Headache described as throbbing generalized across head. Pain 10/10. Similar to previous headaches. There are no other acute medical concerns at this time. Negative Tobacco use (former smoker); Positive EtOH use; Negative Illicit Drug Abuse PCP: None Old chart reviewed. Pt admitted in October. Neurology consult note recommends avoid narcotic use. Note written by Yehuda Jeans, Scribe, as dictated by Alireza Webb PA-C 8:45 PM  
 
The history is provided by the patient and medical records. No  was used. Past Medical History:  
Diagnosis Date  Anxiety and depression  Bartholin's gland abscess x8  Endometriosis  Migraine  Ovarian cyst   
 Panic attack  Pyelonephritis Past Surgical History:  
Procedure Laterality Date  HX APPENDECTOMY  04/26/2017  HX BARTHOLIN CYST MARSUPIALIZATION    
 2011, 2018  HX BREAST LUMPECTOMY Right  HX BUNIONECTOMY  HX CYST REMOVAL    
 HX GYN Left   
 bartholin cyst drainage X 6  
 HX GYN Left   
 bartholin cyst marsupilization  HX OTHER SURGICAL    
 laparascopy  HX PELVIC LAPAROSCOPY  HX WISDOM TEETH EXTRACTION Family History:  
Problem Relation Age of Onset  Diabetes Maternal Aunt  Heart Disease Maternal Aunt  Stroke Maternal Aunt  Heart Disease Maternal Grandmother  No Known Problems Mother Social History Socioeconomic History  Marital status: SINGLE Spouse name: Not on file  Number of children: Not on file  Years of education: Not on file  Highest education level: Not on file Occupational History  Not on file Social Needs  Financial resource strain: Not on file  Food insecurity:  
  Worry: Not on file Inability: Not on file  Transportation needs:  
  Medical: Not on file Non-medical: Not on file Tobacco Use  Smoking status: Former Smoker  Smokeless tobacco: Never Used Substance and Sexual Activity  Alcohol use: Yes Alcohol/week: 0.6 oz Types: 1 Glasses of wine per week Comment: social  
 Drug use: No  
 Sexual activity: Yes  
  Partners: Male Birth control/protection: Pill, Condom Lifestyle  Physical activity:  
  Days per week: Not on file Minutes per session: Not on file  Stress: Not on file Relationships  Social connections:  
  Talks on phone: Not on file Gets together: Not on file Attends Orthodoxy service: Not on file Active member of club or organization: Not on file Attends meetings of clubs or organizations: Not on file Relationship status: Not on file  Intimate partner violence:  
  Fear of current or ex partner: Not on file Emotionally abused: Not on file Physically abused: Not on file Forced sexual activity: Not on file Other Topics Concern  Not on file Social History Narrative  Not on file ALLERGIES: Benadryl [diphenhydramine hcl]; Compazine [prochlorperazine edisylate]; Depacon [valproate sodium]; Haldol [haloperidol lactate]; Promethazine; and Reglan [metoclopramide] Review of Systems Constitutional: Positive for diaphoresis. Negative for chills, fatigue and fever. HENT: Negative. Negative for congestion, ear pain, facial swelling, rhinorrhea, sneezing and sore throat. Eyes: Negative for photophobia, pain, discharge, itching and visual disturbance. Respiratory: Negative for cough, chest tightness and shortness of breath. Cardiovascular: Negative. Negative for chest pain and leg swelling. Gastrointestinal: Positive for nausea and vomiting. Negative for abdominal distention, abdominal pain, constipation and diarrhea. Genitourinary: Negative for difficulty urinating, frequency and urgency. Musculoskeletal: Negative for arthralgias, back pain, joint swelling, neck pain and neck stiffness. Skin: Negative for color change and rash. Neurological: Positive for headaches. Negative for dizziness and numbness. Psychiatric/Behavioral: Negative for confusion and decreased concentration. All other systems reviewed and are negative. Vitals:  
 04/27/19 1940 04/27/19 2049 BP:  117/80 Pulse: (!) 123 (!) 112 Resp:  18 Temp:  98.5 °F (36.9 °C) SpO2: 98% 99% Physical Exam  
 
Constitutional: Oriented to person, place, and time. Appears well-developed and well-nourished. No distress. HENT:  
Head: Normocephalic and atraumatic. Eyes: Conjunctivae and EOM are normal. Pupils are equal, round, and reactive to light. Neck: Normal range of motion. Neck supple. No meningeal signs Cardiovascular: Normal rate and regular rhythm. Pulmonary/Chest: Effort normal and breath sounds normal.  
Abdominal: Soft. There is no tenderness. There is no rebound and no guarding. Musculoskeletal: Normal range of motion. Neurological: Alert and oriented to person, place, and time. Displays normal reflexes. No cranial nerve deficit. Exhibits normal muscle tone. Coordination normal.  
 
 
2+AC reflexes bilaterally 2+Patellar reflexes bilaterally Cranial Nerves: I: smell  Not tested  
II: pupils  Equal, round, reactive to light  
III,VII: ptosis  none  
III,IV,VI: extraocular muscles   normal  
V: mastication  normal  
V: facial light touch sensation   normal  
VII: facial muscle function   symmetric  
VIII: hearing  symmetric  
IX: soft palate elevation   normal  
XI: sternocleidomastoid strength  5/5  
XII: tongue   midline  
   
  
Skin: Skin is warm and dry. Capillary refill takes less than 2 seconds. No erythema. Psychiatric: Normal mood and affect. Behavior is normal. Judgment and thought content normal.  
 
 
MDM Number of Diagnoses or Management Options Diagnosis management comments: 24 yo female with migraine headache. Similar to previous. Seen earlier in day. Pain continues. CT earlier today negative. No neuro deficits. No fever or neck stiffness to suggest meningitis. Pt awake and alert. nontoxic 11:21 PM 
Pt reevaluated. Pt reports no relief from magnesium. I again informed patient due to her allergies I have no other medicines to provide. I have offered patient admission for further attempts at pain control Pt declines admission. Patient's results have been reviewed with them. Patient and/or family have verbally conveyed their understanding and agreement of the patient's signs, symptoms, diagnosis, treatment and prognosis and additionally agree to follow up as recommended or return to the Emergency Room should their condition change prior to follow-up. Discharge instructions have also been provided to the patient with some educational information regarding their diagnosis as well a list of reasons why they would want to return to the ER prior to their follow-up appointment should their condition change. Amount and/or Complexity of Data Reviewed Discuss the patient with other providers: yes (ER attending-Ирина) Patient Progress Patient progress: stable Procedures Pt has been seen and evaluated by attending physician who has reviewed lab work and imaging tests. He agrees with discharge and prescription plan.

## 2019-04-28 NOTE — DISCHARGE INSTRUCTIONS

## 2019-04-28 NOTE — ED NOTES
Stef Aburto reviewed discharge instructions with the patient. The patient verbalized understanding. Patient ambulated out of the emergency department escorted by male . Patient remains in pain. Stef Aburto aware. Patient is in no apparent distress.

## 2019-04-29 ENCOUNTER — PATIENT OUTREACH (OUTPATIENT)
Dept: OTHER | Age: 29
End: 2019-04-29

## 2019-04-29 NOTE — TELEPHONE ENCOUNTER
She might need to see Oriana Orellana. I can order steroids if she thinks that helps. Let me know.   Thank you

## 2019-04-29 NOTE — PROGRESS NOTES
CCM Outreach Patient on report as eligible for Case Management. Left discreet message on voicemail with this CM contact information. Will attempt to contact again to offer [address] [place] [address] Management services. * Former attempts to offer CM services:   UTR or patient declines services. * Migraine HA. With ED visits. Chart Review:   
Back to back ED visits for Migraine HA  4/27-2/28. ED Triage Notes - ED Notes ED Triage Notes by Ruy Gabriel at 04/27/19 4892 Pt c/o migraine since yesterday, now radiates down neck and shoulders. +nausea. Hx of migraines. Presents for evaluation of migraine, neck pain, rigidity, shoulder pain and stiffness, subjective fevers and chills for almost two days. She tried her normal migraine medications, which didn't help and decided to come in for evaluation. She discloses recently having a pediatric patient with meningitis.  
  
She has quite a significant migrainous history, and is being followed by Dr. Randee Osler and states she has a diagnosis of hemiplegic migraine. She presented to the ED in October, 2018 with ptosis, facial weakness and slurred speech. She was admitted and had a negative MRI/MRA and was discharged to follow up. She states the only medication that alleviates her headaches is Dilaudid. It is well documented that she has been to the ED over the past year and has received narcotics.  
  
While admitted, the neurology service documented a discussion with Ms. Marichuy Felipe regarding narcotic frequency, rebound and that she cannot keep receiving Dilaudid.  
  
 
 
Second ED visit ED Triage Notes - ED Notes ED Triage Notes by Ryan Tomas RN at 04/27/19 8330 Pt ambulatory to ED with c/o migraine headache. \"I was seen at [facility] today but headache continues. My neurologist told me to come back in\". ED Triage Notes by Grzegorz Moreno RN at 04/27/19 2050 Patient presents to the emergency department reporting migraine for two days. Patient was seen at [facility] today for the same. Patient reports she is allergic to most migraine medications. Patient states she took the maximum dose of Toradol.

## 2019-04-29 NOTE — TELEPHONE ENCOUNTER
Message from Linda 55 4/28--\"lorraine cl: was seen at [facility] re severe migraines, was sent home in pain.  requesting o/c to be advised what she can do for relief\"

## 2019-04-30 ENCOUNTER — OFFICE VISIT (OUTPATIENT)
Dept: NEUROLOGY | Age: 29
End: 2019-04-30

## 2019-04-30 VITALS
OXYGEN SATURATION: 98 % | HEART RATE: 78 BPM | BODY MASS INDEX: 21.74 KG/M2 | DIASTOLIC BLOOD PRESSURE: 84 MMHG | WEIGHT: 143 LBS | SYSTOLIC BLOOD PRESSURE: 132 MMHG | RESPIRATION RATE: 18 BRPM

## 2019-04-30 DIAGNOSIS — M54.2 CERVICALGIA: ICD-10-CM

## 2019-04-30 DIAGNOSIS — G43.901 STATUS MIGRAINOSUS: Primary | ICD-10-CM

## 2019-04-30 RX ORDER — DIVALPROEX SODIUM 500 MG/1
500 TABLET, DELAYED RELEASE ORAL DAILY
Qty: 7 TAB | Refills: 0 | Status: SHIPPED | OUTPATIENT
Start: 2019-04-30 | End: 2019-05-07

## 2019-04-30 RX ORDER — TIZANIDINE 4 MG/1
4 TABLET ORAL
Qty: 30 TAB | Refills: 1 | Status: SHIPPED | OUTPATIENT
Start: 2019-04-30 | End: 2019-11-21

## 2019-04-30 RX ORDER — DIHYDROERGOTAMINE MESYLATE 4 MG/ML
1 SPRAY NASAL AS NEEDED
Qty: 1 CONTAINER | Refills: 0 | Status: SHIPPED | COMMUNITY
Start: 2019-04-30 | End: 2019-05-02 | Stop reason: SDUPTHER

## 2019-04-30 RX ORDER — ONDANSETRON 4 MG/1
4 TABLET, ORALLY DISINTEGRATING ORAL
Qty: 30 TAB | Refills: 0 | Status: SHIPPED | OUTPATIENT
Start: 2019-04-30 | End: 2019-05-10

## 2019-04-30 NOTE — PROGRESS NOTES
Chief Complaint Patient presents with  
 Headache HPI Arvin Perry is a 25-year-old woman who has a very long history of refractory migraines here to follow-up. She was started on Emaglity at the beginning of the year and has done extremely well with minimal headaches. Unfortunately about 5 days ago she developed a breakthrough migraine that has not relented. She is gone to the emergency room. She typically will experience a severe episode of status migrainosus once or twice a year. She has been admitted before for DHE with minimal success. She cannot have Benadryl or Haldol or Thorazine or Phenergan due to paradoxical effect. Currently the headache is diffuse throbbing with a lot of posterior neck spasm. She tries to get a massage to help combat the neck spasm but her attempt yesterday failed when she became acutely nauseous and vomiting. No fevers. Review of Systems Constitutional: Positive for malaise/fatigue. Negative for chills and fever. Eyes: Positive for photophobia. Negative for double vision. Gastrointestinal: Positive for nausea and vomiting. Neurological: Positive for headaches. All other systems reviewed and are negative. Past Medical History:  
Diagnosis Date  Anxiety and depression  Bartholin's gland abscess x8  Endometriosis  Migraine  Ovarian cyst   
 Panic attack  Pyelonephritis Family History Problem Relation Age of Onset  Diabetes Maternal Aunt  Heart Disease Maternal Aunt  Stroke Maternal Aunt  Heart Disease Maternal Grandmother  No Known Problems Mother Social History Socioeconomic History  Marital status: SINGLE Spouse name: Not on file  Number of children: Not on file  Years of education: Not on file  Highest education level: Not on file Occupational History  Not on file Social Needs  Financial resource strain: Not on file  Food insecurity: Worry: Not on file Inability: Not on file  Transportation needs:  
  Medical: Not on file Non-medical: Not on file Tobacco Use  Smoking status: Former Smoker  Smokeless tobacco: Never Used Substance and Sexual Activity  Alcohol use: Yes Alcohol/week: 0.6 oz Types: 1 Glasses of wine per week Comment: social  
 Drug use: No  
 Sexual activity: Yes  
  Partners: Male Birth control/protection: Pill, Condom Lifestyle  Physical activity:  
  Days per week: Not on file Minutes per session: Not on file  Stress: Not on file Relationships  Social connections:  
  Talks on phone: Not on file Gets together: Not on file Attends Sikhism service: Not on file Active member of club or organization: Not on file Attends meetings of clubs or organizations: Not on file Relationship status: Not on file  Intimate partner violence:  
  Fear of current or ex partner: Not on file Emotionally abused: Not on file Physically abused: Not on file Forced sexual activity: Not on file Other Topics Concern  Not on file Social History Narrative  Not on file Allergies Allergen Reactions  Benadryl [Diphenhydramine Hcl] Other (comments) Makes me feel funny. Need ativan if I get benadryl  Compazine [Prochlorperazine Edisylate] Anxiety  Depacon [Valproate Sodium] Other (comments) Pt reports having restlessness with this medication.  Haldol [Haloperidol Lactate] Other (comments)  Promethazine Other (comments) \"It makes me feel really funny, nausea and dizzy\"  Reglan [Metoclopramide] Other (comments) Current Outpatient Medications Medication Sig  
 dihydroergotamine (MIGRANAL) 0.5 mg/pump act. (4 mg/mL) nasal spray 1 Spray by Nasal route as needed for Migraine. use in one nostril as directed. No more than 4 sprays in one hour  tiZANidine (ZANAFLEX) 4 mg tablet Take 1 Tab by mouth three (3) times daily as needed (muscle spasm).  ondansetron (ZOFRAN ODT) 4 mg disintegrating tablet Take 1 Tab by mouth every eight (8) hours as needed for Nausea.  divalproex DR (DEPAKOTE) 500 mg tablet Take 1 Tab by mouth daily for 7 days.  ketorolac (TORADOL) 10 mg tablet Take 3 tabs at once for severe headache by mouth. May repeat once in 24 hours. Max use twice a week.  galcanezumab-gnlm (EMGALITY PEN) 120 mg/mL injection 1 mL by SubCUTAneous route every thirty (30) days.  ALPRAZolam (XANAX) 1 mg tablet Take 1 mg by mouth once as needed (panic attack).  topiramate (TOPAMAX) 100 mg tablet Take 1 Tab by mouth nightly.  adapalene-benzoyl peroxide 0.1-2.5 % glwp APPLY TO AFFECTED AREAS AT BEDTIME X 30 DAYS (AS NEEDED)  ACZONE 7.5 % glwp APPLY TO AFFECTED AREA ONCE DAILY IN THE MORNING (AS NEEDED)  ASPIRIN/ACETAMINOPHEN/CAFFEINE (MIGRAINE RELIEF PO) Take 2 Tabs by mouth once as needed (Migraine).  clonazePAM (KLONOPIN) 1 mg tablet Take 2 mg by mouth nightly.  escitalopram (LEXAPRO) 20 mg tablet Take 20 mg by mouth nightly. Indications: Generalized Anxiety Disorder No current facility-administered medications for this visit. Neurologic Exam  
 
Mental Status WD/WN adult in NAD, normal grooming VSS 
A&O x 3, nontoxic-appearing PERRL, nonicteric Face is symmetric, tongue midline Speech is fluent and clear No limb ataxia. No abnl movements. Moving all extemities spontaneously and symmetric Normal gait CVS RRR Lungs nonlabored Skin is warm and dry Visit Vitals /84 Pulse 78 Resp 18 Wt 64.9 kg (143 lb) LMP 04/21/2019 (Exact Date) SpO2 98% BMI 21.74 kg/m² Assessment and Plan Diagnoses and all orders for this visit: 1. Status migrainosus 2. Chronic migraine 
-     REFERRAL TO PHYSICAL THERAPY 3. Cervicalgia 
-     REFERRAL TO PHYSICAL THERAPY Other orders -     dihydroergotamine (MIGRANAL) 0.5 mg/pump act. (4 mg/mL) nasal spray; 1 Spray by Nasal route as needed for Migraine. use in one nostril as directed. No more than 4 sprays in one hour -     tiZANidine (ZANAFLEX) 4 mg tablet; Take 1 Tab by mouth three (3) times daily as needed (muscle spasm). -     ondansetron (ZOFRAN ODT) 4 mg disintegrating tablet; Take 1 Tab by mouth every eight (8) hours as needed for Nausea. -     divalproex DR (DEPAKOTE) 500 mg tablet; Take 1 Tab by mouth daily for 7 days. 28-year-old woman who has a history of very refractory migraines who seems to be in the midst of a status migrainosus episode. We are going to try to avoid inpatient management. She typically does not respond to steroids. She is already tried Toradol. We will try Migranal 1 dose tonight Depakote for 7 days daily Zanaflex for severe neck spasm with recommendation for physical therapy/dry needling Zofran as needed off work today and tomorrow. Anything acutely abnormal please come to the hospital. 
 
 
 
[address] [facility], DO 
NEUROLOGIST Diplomate SANDOR

## 2019-04-30 NOTE — PATIENT INSTRUCTIONS
PRESCRIPTION REFILL POLICY [facility] Statement to Patients April 1, 2014 In an effort to ensure the large volume of patient prescription refills is processed in the most efficient and expeditious manner, we are asking our patients to assist us by calling your Pharmacy for all prescription refills, this will include also your  Mail Order Pharmacy. The pharmacy will contact our office electronically to continue the refill process. Please do not wait until the last minute to call your pharmacy. We need at least 48 hours (2days) to fill prescriptions. We also encourage you to call your pharmacy before going to  your prescription to make sure it is ready. With regard to controlled substance prescription refill requests (narcotic refills) that need to be picked up at our office, we ask your cooperation by providing us with at least 72 hours (3days) notice that you will need a refill. We will not refill narcotic prescription refill requests after 4:00pm on any weekday, Monday through Thursday, or after 2:00pm on Fridays, or on the weekends. We encourage everyone to explore another way of getting your prescription refill request processed using [employer], our patient web portal through our electronic medical record system. [employer] is an efficient and effective way to communicate your medication request directly to the office and  downloadable as an charlee on your smart phone . [employer] also features a review functionality that allows you to view your medication list as well as leave messages for your physician. Are you ready to get connected? If so please review the attatched instructions or speak to any of our staff to get you set up right away! Thank you so much for your cooperation. Should you have any questions please contact our Practice Administrator. The Physicians and Staff,  [facility]

## 2019-04-30 NOTE — LETTER
4/30/2019 Ms. Lewis Roll [address] [id number] To Whom It may concern: 
 
Please allow the above named person to miss her massage appointment. She  
Was ill and under my care. If you have any questions, feel free to contact my office. Sincerely, [facility], DO

## 2019-04-30 NOTE — LETTER
NOTIFICATION RETURN TO WORK / SCHOOL 
 
4/30/2019 Ms. Georgia Fofana [address] [place] [id number] To Whom It May Concern: 
 
Georgia Fofana is currently under the care of [facility]. She will return to work/school on: 5/2/2019. If there are questions or concerns please have the patient contact our office. Sincerely, [address] [facility], DO

## 2019-05-01 ENCOUNTER — TELEPHONE (OUTPATIENT)
Dept: NEUROLOGY | Age: 29
End: 2019-05-01

## 2019-05-01 NOTE — TELEPHONE ENCOUNTER
Patient calling from follow up yesterday's visit -- patient said her head is feeling better; neck hurts; muscle spasms are still occurring; patient is getting massage today at noon per recommendation from Dr. Dustin Cornelius. Patient is feeling overall okay. Please advise.     Best # [phone]

## 2019-05-02 RX ORDER — DIHYDROERGOTAMINE MESYLATE 4 MG/ML
1 SPRAY NASAL AS NEEDED
Qty: 1 CONTAINER | Refills: 4 | Status: ON HOLD | OUTPATIENT
Start: 2019-05-02 | End: 2020-01-29

## 2019-05-02 NOTE — TELEPHONE ENCOUNTER
She sounds like she is slowly getting better. Hopefully the massage helped yesterday. Lots of fluids to help hydrate after a massage since she might have a lot of in her muscles afterwards. I am going to order the [address] for her which she got in the office. She can try to use that savings card I gave her. She should only use this medication for severe unrelenting headache.   Should not be used more than once a week at the very most.

## 2019-05-03 ENCOUNTER — PATIENT OUTREACH (OUTPATIENT)
Dept: OTHER | Age: 29
End: 2019-05-03

## 2019-05-03 NOTE — PROGRESS NOTES
CCM Outreach Verified  and address for HIPAA security. Introduced [employer] for patient. Patient does not identify any Care Management needs at this time and declines services. * Patient requests no further calls from [facility].

## 2019-05-09 ENCOUNTER — HOSPITAL ENCOUNTER (EMERGENCY)
Age: 29
Discharge: HOME | End: 2019-05-10
Attending: EMERGENCY MEDICINE
Payer: COMMERCIAL

## 2019-05-09 DIAGNOSIS — R10.2 PELVIC PAIN: Primary | ICD-10-CM

## 2019-05-09 PROCEDURE — 80053 COMPREHEN METABOLIC PANEL: CPT

## 2019-05-09 PROCEDURE — 96375 TX/PRO/DX INJ NEW DRUG ADDON: CPT

## 2019-05-09 PROCEDURE — 83690 ASSAY OF LIPASE: CPT

## 2019-05-09 PROCEDURE — 36415 COLL VENOUS BLD VENIPUNCTURE: CPT

## 2019-05-09 PROCEDURE — 96374 THER/PROPH/DIAG INJ IV PUSH: CPT

## 2019-05-09 PROCEDURE — 74011250636 HC RX REV CODE- 250/636: Performed by: PHYSICIAN ASSISTANT

## 2019-05-09 PROCEDURE — 99284 EMERGENCY DEPT VISIT MOD MDM: CPT

## 2019-05-09 PROCEDURE — 85025 COMPLETE CBC W/AUTO DIFF WBC: CPT

## 2019-05-09 RX ORDER — ONDANSETRON 2 MG/ML
4 INJECTION INTRAMUSCULAR; INTRAVENOUS
Status: COMPLETED | OUTPATIENT
Start: 2019-05-09 | End: 2019-05-09

## 2019-05-09 RX ORDER — MORPHINE SULFATE 2 MG/ML
4 INJECTION, SOLUTION INTRAMUSCULAR; INTRAVENOUS
Status: COMPLETED | OUTPATIENT
Start: 2019-05-09 | End: 2019-05-09

## 2019-05-09 RX ORDER — KETOROLAC TROMETHAMINE 30 MG/ML
15 INJECTION, SOLUTION INTRAMUSCULAR; INTRAVENOUS
Status: COMPLETED | OUTPATIENT
Start: 2019-05-09 | End: 2019-05-09

## 2019-05-09 RX ADMIN — ONDANSETRON 4 MG: 2 INJECTION INTRAMUSCULAR; INTRAVENOUS at 23:46

## 2019-05-09 RX ADMIN — MORPHINE SULFATE 4 MG: 2 INJECTION, SOLUTION INTRAMUSCULAR; INTRAVENOUS at 23:46

## 2019-05-09 RX ADMIN — KETOROLAC TROMETHAMINE 15 MG: 30 INJECTION, SOLUTION INTRAMUSCULAR at 23:46

## 2019-05-10 ENCOUNTER — APPOINTMENT (OUTPATIENT)
Dept: CT IMAGING | Age: 29
End: 2019-05-10
Attending: PHYSICIAN ASSISTANT
Payer: COMMERCIAL

## 2019-05-10 ENCOUNTER — APPOINTMENT (OUTPATIENT)
Dept: ULTRASOUND IMAGING | Age: 29
End: 2019-05-10
Attending: PHYSICIAN ASSISTANT
Payer: COMMERCIAL

## 2019-05-10 VITALS
DIASTOLIC BLOOD PRESSURE: 70 MMHG | SYSTOLIC BLOOD PRESSURE: 116 MMHG | OXYGEN SATURATION: 99 % | RESPIRATION RATE: 16 BRPM | WEIGHT: 132.2 LBS | HEART RATE: 91 BPM | HEIGHT: 66 IN | TEMPERATURE: 98.5 F | BODY MASS INDEX: 21.24 KG/M2

## 2019-05-10 LAB
ALBUMIN SERPL-MCNC: 3.8 G/DL (ref 3.5–5)
ALBUMIN/GLOB SERPL: 1.1 {RATIO} (ref 1.1–2.2)
ALP SERPL-CCNC: 46 U/L (ref 45–117)
ALT SERPL-CCNC: 14 U/L (ref 12–78)
ANION GAP SERPL CALC-SCNC: 8 MMOL/L (ref 5–15)
APPEARANCE UR: CLEAR
AST SERPL-CCNC: 14 U/L (ref 15–37)
BACTERIA URNS QL MICRO: NEGATIVE /HPF
BASOPHILS # BLD: 0.1 K/UL (ref 0–0.1)
BASOPHILS NFR BLD: 1 % (ref 0–1)
BILIRUB SERPL-MCNC: 0.3 MG/DL (ref 0.2–1)
BILIRUB UR QL: NEGATIVE
BUN SERPL-MCNC: 15 MG/DL (ref 6–20)
BUN/CREAT SERPL: 19 (ref 12–20)
C TRACH DNA SPEC QL NAA+PROBE: NEGATIVE
CALCIUM SERPL-MCNC: 8.7 MG/DL (ref 8.5–10.1)
CHLORIDE SERPL-SCNC: 110 MMOL/L (ref 97–108)
CLUE CELLS VAG QL WET PREP: NORMAL
CO2 SERPL-SCNC: 22 MMOL/L (ref 21–32)
COLOR UR: ABNORMAL
CREAT SERPL-MCNC: 0.8 MG/DL (ref 0.55–1.02)
DIFFERENTIAL METHOD BLD: NORMAL
EOSINOPHIL # BLD: 0.1 K/UL (ref 0–0.4)
EOSINOPHIL NFR BLD: 1 % (ref 0–7)
EPITH CASTS URNS QL MICRO: ABNORMAL /LPF
ERYTHROCYTE [DISTWIDTH] IN BLOOD BY AUTOMATED COUNT: 11.9 % (ref 11.5–14.5)
GLOBULIN SER CALC-MCNC: 3.6 G/DL (ref 2–4)
GLUCOSE SERPL-MCNC: 93 MG/DL (ref 65–100)
GLUCOSE UR STRIP.AUTO-MCNC: NEGATIVE MG/DL
HCG UR QL: NEGATIVE
HCT VFR BLD AUTO: 39.1 % (ref 35–47)
HGB BLD-MCNC: 13.4 G/DL (ref 11.5–16)
HGB UR QL STRIP: NEGATIVE
HYALINE CASTS URNS QL MICRO: ABNORMAL /LPF (ref 0–5)
IMM GRANULOCYTES # BLD AUTO: 0 K/UL (ref 0–0.04)
IMM GRANULOCYTES NFR BLD AUTO: 0 % (ref 0–0.5)
KETONES UR QL STRIP.AUTO: NEGATIVE MG/DL
LEUKOCYTE ESTERASE UR QL STRIP.AUTO: NEGATIVE
LIPASE SERPL-CCNC: 188 U/L (ref 73–393)
LYMPHOCYTES # BLD: 3.2 K/UL (ref 0.8–3.5)
LYMPHOCYTES NFR BLD: 42 % (ref 12–49)
MCH RBC QN AUTO: 30 PG (ref 26–34)
MCHC RBC AUTO-ENTMCNC: 34.3 G/DL (ref 30–36.5)
MCV RBC AUTO: 87.5 FL (ref 80–99)
MONOCYTES # BLD: 0.7 K/UL (ref 0–1)
MONOCYTES NFR BLD: 9 % (ref 5–13)
N GONORRHOEA DNA SPEC QL NAA+PROBE: NEGATIVE
NEUTS SEG # BLD: 3.6 K/UL (ref 1.8–8)
NEUTS SEG NFR BLD: 47 % (ref 32–75)
NITRITE UR QL STRIP.AUTO: NEGATIVE
NRBC # BLD: 0 K/UL (ref 0–0.01)
NRBC BLD-RTO: 0 PER 100 WBC
PH UR STRIP: 8.5 [PH] (ref 5–8)
PLATELET # BLD AUTO: 214 K/UL (ref 150–400)
PMV BLD AUTO: 10.6 FL (ref 8.9–12.9)
POTASSIUM SERPL-SCNC: 3.6 MMOL/L (ref 3.5–5.1)
PROT SERPL-MCNC: 7.4 G/DL (ref 6.4–8.2)
PROT UR STRIP-MCNC: NEGATIVE MG/DL
RBC # BLD AUTO: 4.47 M/UL (ref 3.8–5.2)
RBC #/AREA URNS HPF: ABNORMAL /HPF (ref 0–5)
SAMPLE TYPE: NORMAL
SERVICE CMNT-IMP: NORMAL
SODIUM SERPL-SCNC: 140 MMOL/L (ref 136–145)
SP GR UR REFRACTOMETRY: 1.01 (ref 1–1.03)
SPECIMEN SOURCE: NORMAL
T VAGINALIS VAG QL WET PREP: NORMAL
UR CULT HOLD, URHOLD: NORMAL
UROBILINOGEN UR QL STRIP.AUTO: 0.2 EU/DL (ref 0.2–1)
WBC # BLD AUTO: 7.7 K/UL (ref 3.6–11)
WBC URNS QL MICRO: ABNORMAL /HPF (ref 0–4)

## 2019-05-10 PROCEDURE — 87491 CHLMYD TRACH DNA AMP PROBE: CPT

## 2019-05-10 PROCEDURE — 74011636320 HC RX REV CODE- 636/320: Performed by: RADIOLOGY

## 2019-05-10 PROCEDURE — 74011000258 HC RX REV CODE- 258: Performed by: RADIOLOGY

## 2019-05-10 PROCEDURE — 87210 SMEAR WET MOUNT SALINE/INK: CPT

## 2019-05-10 PROCEDURE — 74177 CT ABD & PELVIS W/CONTRAST: CPT

## 2019-05-10 PROCEDURE — 76856 US EXAM PELVIC COMPLETE: CPT

## 2019-05-10 PROCEDURE — 76830 TRANSVAGINAL US NON-OB: CPT

## 2019-05-10 PROCEDURE — 74011250636 HC RX REV CODE- 250/636

## 2019-05-10 PROCEDURE — 74011250636 HC RX REV CODE- 250/636: Performed by: PHYSICIAN ASSISTANT

## 2019-05-10 PROCEDURE — 81025 URINE PREGNANCY TEST: CPT

## 2019-05-10 PROCEDURE — 81001 URINALYSIS AUTO W/SCOPE: CPT

## 2019-05-10 PROCEDURE — 74011250637 HC RX REV CODE- 250/637: Performed by: PHYSICIAN ASSISTANT

## 2019-05-10 PROCEDURE — 96361 HYDRATE IV INFUSION ADD-ON: CPT

## 2019-05-10 PROCEDURE — 96375 TX/PRO/DX INJ NEW DRUG ADDON: CPT

## 2019-05-10 RX ORDER — DICYCLOMINE HYDROCHLORIDE 20 MG/1
20 TABLET ORAL
Status: COMPLETED | OUTPATIENT
Start: 2019-05-10 | End: 2019-05-10

## 2019-05-10 RX ORDER — HYDROMORPHONE HYDROCHLORIDE 1 MG/ML
1 INJECTION, SOLUTION INTRAMUSCULAR; INTRAVENOUS; SUBCUTANEOUS ONCE
Status: COMPLETED | OUTPATIENT
Start: 2019-05-10 | End: 2019-05-10

## 2019-05-10 RX ORDER — HYDROMORPHONE HYDROCHLORIDE 1 MG/ML
INJECTION, SOLUTION INTRAMUSCULAR; INTRAVENOUS; SUBCUTANEOUS
Status: DISCONTINUED
Start: 2019-05-10 | End: 2019-05-10 | Stop reason: HOSPADM

## 2019-05-10 RX ORDER — TRAMADOL HYDROCHLORIDE 50 MG/1
50 TABLET ORAL
Qty: 15 TAB | Refills: 0 | Status: SHIPPED | OUTPATIENT
Start: 2019-05-10 | End: 2019-05-13

## 2019-05-10 RX ORDER — PHENAZOPYRIDINE HYDROCHLORIDE 100 MG/1
200 TABLET, FILM COATED ORAL
Status: COMPLETED | OUTPATIENT
Start: 2019-05-10 | End: 2019-05-10

## 2019-05-10 RX ORDER — DICYCLOMINE HYDROCHLORIDE 20 MG/1
20 TABLET ORAL EVERY 6 HOURS
Qty: 20 TAB | Refills: 0 | Status: SHIPPED | OUTPATIENT
Start: 2019-05-10 | End: 2019-05-15

## 2019-05-10 RX ORDER — ONDANSETRON 4 MG/1
4 TABLET, ORALLY DISINTEGRATING ORAL
Qty: 12 TAB | Refills: 0 | Status: SHIPPED | OUTPATIENT
Start: 2019-05-10 | End: 2019-05-20

## 2019-05-10 RX ORDER — HYDROMORPHONE HYDROCHLORIDE 1 MG/ML
1 INJECTION, SOLUTION INTRAMUSCULAR; INTRAVENOUS; SUBCUTANEOUS
Status: DISCONTINUED | OUTPATIENT
Start: 2019-05-10 | End: 2019-05-10 | Stop reason: HOSPADM

## 2019-05-10 RX ORDER — PHENAZOPYRIDINE HYDROCHLORIDE 200 MG/1
200 TABLET, FILM COATED ORAL 3 TIMES DAILY
Qty: 6 TAB | Refills: 0 | Status: SHIPPED | OUTPATIENT
Start: 2019-05-10 | End: 2019-05-12

## 2019-05-10 RX ORDER — SODIUM CHLORIDE 0.9 % (FLUSH) 0.9 %
10 SYRINGE (ML) INJECTION
Status: COMPLETED | OUTPATIENT
Start: 2019-05-10 | End: 2019-05-10

## 2019-05-10 RX ADMIN — IOPAMIDOL 100 ML: 755 INJECTION, SOLUTION INTRAVENOUS at 01:41

## 2019-05-10 RX ADMIN — HYDROMORPHONE HYDROCHLORIDE 1 MG: 1 INJECTION, SOLUTION INTRAMUSCULAR; INTRAVENOUS; SUBCUTANEOUS at 01:32

## 2019-05-10 RX ADMIN — SODIUM CHLORIDE 100 ML: 900 INJECTION, SOLUTION INTRAVENOUS at 01:41

## 2019-05-10 RX ADMIN — Medication 10 ML: at 01:41

## 2019-05-10 RX ADMIN — DICYCLOMINE HYDROCHLORIDE 20 MG: 20 TABLET ORAL at 02:39

## 2019-05-10 RX ADMIN — PHENAZOPYRIDINE 200 MG: 100 TABLET ORAL at 02:39

## 2019-05-10 RX ADMIN — SODIUM CHLORIDE 1000 ML: 900 INJECTION, SOLUTION INTRAVENOUS at 01:25

## 2019-05-10 NOTE — ED PROVIDER NOTES
25 y.o. female with past medical history significant for endometriosis, ovarian cyst  who presents from home accompanied by her significant other with chief complaint of abdominal pain. Pt states at 2140 tonight she had the sudden onset of lower mid-abdominal pain with pressuring pain in her rectum. She reports taking Zanaflex (prescribed by her neurologist) 10 minutes after onset without any relief. She associates nausea and intermittent \"hot flashes\" with the pain. She reports a history of endometriosis, but underwent a laparoscopic surgery in high school and has issues since. Denies any vaginal bleeding or discharge. Does not have an IUD in place. Denies history of kidney stones. Pt specifically denies any fever, chills, cough, congestion, shortness of breath, chest pain, flank pain, vomiting, diarrhea, dysuria, or urinary frequency. There are no other acute medical concerns at this time. PMHx: Significant for endometriosis, ovarian cyst, migraines, Anxiety and depression, Bartholin's gland abscess PSHx: Significant for appendectomy, laparoscopic surgery for endometriosis, Social Hx: negative tobacco use, social EtOH use, negative Illicit Drug use PCP: None OBGYN: Dr. Johnna Borrego Note written by aHny Fuller, as dictated by Omayra Holloway PA-C 11:38 PM 
 
The history is provided by the patient. No  was used. Past Medical History:  
Diagnosis Date  Anxiety and depression  Bartholin's gland abscess x8  Endometriosis  Migraine  Ovarian cyst   
 Panic attack  Pyelonephritis Past Surgical History:  
Procedure Laterality Date  HX APPENDECTOMY  04/26/2017  HX BARTHOLIN CYST MARSUPIALIZATION    
 2011, 2018  HX BREAST LUMPECTOMY Right  HX BUNIONECTOMY  HX CYST REMOVAL    
 HX GYN Left   
 bartholin cyst drainage X 6  
 HX GYN Left   
 bartholin cyst marsupilization  HX OTHER SURGICAL    
 laparascopy  HX PELVIC LAPAROSCOPY    
 HX WISDOM TEETH EXTRACTION Family History:  
Problem Relation Age of Onset  Diabetes Maternal Aunt  Heart Disease Maternal Aunt  Stroke Maternal Aunt  Heart Disease Maternal Grandmother  No Known Problems Mother Social History Socioeconomic History  Marital status: SINGLE Spouse name: Not on file  Number of children: Not on file  Years of education: Not on file  Highest education level: Not on file Occupational History  Not on file Social Needs  Financial resource strain: Not on file  Food insecurity:  
  Worry: Not on file Inability: Not on file  Transportation needs:  
  Medical: Not on file Non-medical: Not on file Tobacco Use  Smoking status: Former Smoker  Smokeless tobacco: Never Used Substance and Sexual Activity  Alcohol use: Yes Alcohol/week: 0.6 oz Types: 1 Glasses of wine per week Comment: social  
 Drug use: No  
 Sexual activity: Yes  
  Partners: Male Birth control/protection: Pill, Condom Lifestyle  Physical activity:  
  Days per week: Not on file Minutes per session: Not on file  Stress: Not on file Relationships  Social connections:  
  Talks on phone: Not on file Gets together: Not on file Attends Christian service: Not on file Active member of club or organization: Not on file Attends meetings of clubs or organizations: Not on file Relationship status: Not on file  Intimate partner violence:  
  Fear of current or ex partner: Not on file Emotionally abused: Not on file Physically abused: Not on file Forced sexual activity: Not on file Other Topics Concern  Not on file Social History Narrative  Not on file ALLERGIES: Benadryl [diphenhydramine hcl]; Compazine [prochlorperazine edisylate]; Depacon [valproate sodium]; Haldol [haloperidol lactate]; Promethazine; and Reglan [metoclopramide] Review of Systems Constitutional: Negative. HENT: Negative for ear discharge. Eyes: Negative for photophobia, pain, discharge and visual disturbance. Respiratory: Negative for apnea, cough, chest tightness and shortness of breath. Cardiovascular: Negative for chest pain, palpitations and leg swelling. Gastrointestinal: Positive for abdominal pain and nausea. Negative for abdominal distention and blood in stool. Genitourinary: Negative for difficulty urinating, dysuria, flank pain, frequency and hematuria. Musculoskeletal: Negative for back pain, gait problem, joint swelling, myalgias and neck pain. Skin: Negative for color change and pallor. Neurological: Negative for dizziness, syncope, weakness, numbness and headaches. Psychiatric/Behavioral: Negative for behavioral problems and confusion. The patient is not nervous/anxious. Vitals:  
 05/09/19 2334 Pulse: (P) 99 Physical Exam  
Constitutional: She is oriented to person, place, and time. She appears well-developed and well-nourished. She appears distressed (moderate). HENT:  
Head: Normocephalic and atraumatic. Right Ear: External ear normal.  
Left Ear: External ear normal.  
Nose: Nose normal.  
Mouth/Throat: Oropharynx is clear and moist.  
Eyes: Pupils are equal, round, and reactive to light. Conjunctivae and EOM are normal. Right eye exhibits no discharge. Left eye exhibits no discharge. Neck: Normal range of motion. Neck supple. Cardiovascular: Normal rate, regular rhythm, normal heart sounds and intact distal pulses. Pulmonary/Chest: Effort normal and breath sounds normal.  
Abdominal: Soft. Bowel sounds are normal. She exhibits no distension. There is tenderness in the suprapubic area. There is no rebound, no guarding and no CVA tenderness. Genitourinary:  
Genitourinary Comments: Performed by Viji Mccray PA-C.  
 
The external vulva and vagina are normal in appearance, without rash, lesions, discharge, ecchymosis or laceration. The speculum exam demonstrates normal vaginal mucosa without rash, lesions, discharge, ecchymosis or laceration. The cervix is normal in appearance without rash, lesions, discharge, ecchymosis or laceration. The bimanual exam demonstrates a(n) closed cervix without cervical motion tenderness. The uterus is not enlarged, and non-tender, without palpable masses. The adenexa are non-tender. NAVNEET Moore Musculoskeletal: Normal range of motion. She exhibits no edema or tenderness. Neurological: She is alert and oriented to person, place, and time. No cranial nerve deficit. Coordination normal.  
Skin: Skin is warm and dry. No rash noted. Psychiatric: She has a normal mood and affect. Her behavior is normal. Judgment and thought content normal.  
Nursing note and vitals reviewed. Note written by Hany Hayes, as dictated by Katie Logan PA-C 11:38 PM 
 
MDM Number of Diagnoses or Management Options Pelvic pain:  
Diagnosis management comments: 26 yo F with pelvic pain; states sharp pain began with evening; also pain feeling like she need to defecate; VSS; labs and imaging done; no acute findings; pain improved; will have close followup; given s/s to return to ER. Otherwise to see GYN in am. NAVNEET Moore Amount and/or Complexity of Data Reviewed Clinical lab tests: reviewed Tests in the radiology section of CPT®: ordered and reviewed Discuss the patient with other providers: yes Independent visualization of images, tracings, or specimens: yes Procedures Patient has been reassessed. Feeling better. Reviewed labs, medications and radiographics with patient. Discussed case with attending Physician Prosper Rhodes. Agrees with care and will D/C with follow up. Patient's results have been reviewed with them.   Patient and/or family have verbally conveyed their understanding and agreement of the patient's signs, symptoms, diagnosis, treatment and prognosis and additionally agree to follow up as recommended or return to the Emergency Room should their condition change prior to follow-up. Discharge instructions have also been provided to the patient with some educational information regarding their diagnosis as well a list of reasons why they would want to return to the ER prior to their follow-up appointment should their condition change.  
NAVNEET Fernandez

## 2019-05-10 NOTE — DISCHARGE INSTRUCTIONS
Patient Education        Pelvic Pain: Care Instructions  Your Care Instructions    Pelvic pain, or pain in the lower belly, can have many causes. Often pelvic pain is not serious and gets better in a few days. If your pain continues or gets worse, you may need tests and treatment. Tell your doctor about any new symptoms. These may be signs of a serious problem. Follow-up care is a key part of your treatment and safety. Be sure to make and go to all appointments, and call your doctor if you are having problems. It's also a good idea to know your test results and keep a list of the medicines you take. How can you care for yourself at home? · Rest until you feel better. Lie down, and raise your legs by placing a pillow under your knees. · Drink plenty of fluids. You may find that small, frequent sips are easier on your stomach than if you drink a lot at once. Avoid drinks with carbonation or caffeine, such as soda pop, tea, or coffee. · Try eating several small meals instead of 2 or 3 large ones. Eat mild foods, such as rice, dry toast or crackers, bananas, and applesauce. Avoid fatty and spicy foods, other fruits, and alcohol until 48 hours after your symptoms have gone away. · Take an over-the-counter pain medicine, such as acetaminophen (Tylenol), ibuprofen (Advil, Motrin), or naproxen (Aleve). Read and follow all instructions on the label. · Do not take two or more pain medicines at the same time unless the doctor told you to. Many pain medicines have acetaminophen, which is Tylenol. Too much acetaminophen (Tylenol) can be harmful. · You can put a heating pad, a warm cloth, or moist heat on your belly to relieve pain. When should you call for help? Call your doctor now or seek immediate medical care if:    · You have a new or higher fever.     · You have unusual vaginal bleeding.     · You have new or worse belly or pelvic pain.     · You have vaginal discharge that has increased in amount or smells bad.  Watch closely for changes in your health, and be sure to contact your doctor if:    · You do not get better as expected. Where can you learn more? Go to http://michele-lucy.info/. Enter [phone] in the search box to learn more about \"Pelvic Pain: Care Instructions. \"  Current as of: May 14, 2018  Content Version: 11.9  © 0056-0297 [employer]. Care instructions adapted under license by [employer] (which disclaims liability or warranty for this information). If you have questions about a medical condition or this instruction, always ask your healthcare professional. [address] any warranty or liability for your use of this information.

## 2019-05-10 NOTE — ED TRIAGE NOTES
Patient arrives to the ED with c/o pelvic pain which started @ 2140, + nausea, no vomiting or diarrhea noted patient also c/o rectal pressure, patient states that she is not pregnant. Pain also to the LLQ.

## 2019-05-13 LAB
KOH PREP SPEC: NORMAL
SERVICE CMNT-IMP: NORMAL

## 2019-06-24 ENCOUNTER — TELEPHONE (OUTPATIENT)
Dept: NEUROLOGY | Age: 29
End: 2019-06-24

## 2019-06-24 NOTE — TELEPHONE ENCOUNTER
Received [employer] PA approval letter regarding Emgality 120mg/ml #1/30 days  Auth# [id number]  Effective 06/21/19-06/20/2020    PA case closed

## 2019-06-25 NOTE — TELEPHONE ENCOUNTER
Pt calling wanting to know an update on the PA Emgality 120mg/ ml wanting to see if she can get a sample of it in the mean time.  Please call back

## 2019-06-27 NOTE — TELEPHONE ENCOUNTER
LM on vm PA is done and med was approved. She may  at pharmacy. If she has any problem, just let us know. We can give her a sample in the meantime if needed.

## 2019-07-21 ENCOUNTER — HOSPITAL ENCOUNTER (EMERGENCY)
Age: 29
Discharge: HOME | End: 2019-07-22
Attending: EMERGENCY MEDICINE
Payer: COMMERCIAL

## 2019-07-21 DIAGNOSIS — G43.809 OTHER MIGRAINE WITHOUT STATUS MIGRAINOSUS, NOT INTRACTABLE: Primary | ICD-10-CM

## 2019-07-21 LAB
COMMENT, HOLDF: NORMAL
SAMPLES BEING HELD,HOLD: NORMAL

## 2019-07-21 PROCEDURE — 36415 COLL VENOUS BLD VENIPUNCTURE: CPT

## 2019-07-21 PROCEDURE — 99283 EMERGENCY DEPT VISIT LOW MDM: CPT

## 2019-07-21 RX ORDER — MAGNESIUM SULFATE HEPTAHYDRATE 40 MG/ML
2 INJECTION, SOLUTION INTRAVENOUS
Status: COMPLETED | OUTPATIENT
Start: 2019-07-21 | End: 2019-07-22

## 2019-07-21 RX ORDER — KETOROLAC TROMETHAMINE 10 MG/1
TABLET, FILM COATED ORAL
Qty: 30 TAB | Refills: 0 | Status: SHIPPED | OUTPATIENT
Start: 2019-07-21 | End: 2019-11-21

## 2019-07-21 RX ORDER — KETOROLAC TROMETHAMINE 30 MG/ML
30 INJECTION, SOLUTION INTRAMUSCULAR; INTRAVENOUS
Status: COMPLETED | OUTPATIENT
Start: 2019-07-21 | End: 2019-07-22

## 2019-07-21 RX ORDER — ONDANSETRON 2 MG/ML
4 INJECTION INTRAMUSCULAR; INTRAVENOUS
Status: COMPLETED | OUTPATIENT
Start: 2019-07-21 | End: 2019-07-22

## 2019-07-21 RX ORDER — DEXAMETHASONE SODIUM PHOSPHATE 10 MG/ML
10 INJECTION INTRAMUSCULAR; INTRAVENOUS
Status: COMPLETED | OUTPATIENT
Start: 2019-07-21 | End: 2019-07-22

## 2019-07-22 ENCOUNTER — TELEPHONE (OUTPATIENT)
Dept: NEUROLOGY | Age: 29
End: 2019-07-22

## 2019-07-22 ENCOUNTER — HOSPITAL ENCOUNTER (EMERGENCY)
Age: 29
Discharge: HOME | End: 2019-07-23
Attending: EMERGENCY MEDICINE
Payer: COMMERCIAL

## 2019-07-22 VITALS
TEMPERATURE: 98.5 F | DIASTOLIC BLOOD PRESSURE: 71 MMHG | OXYGEN SATURATION: 100 % | RESPIRATION RATE: 18 BRPM | WEIGHT: 138.89 LBS | HEIGHT: 68 IN | BODY MASS INDEX: 21.05 KG/M2 | HEART RATE: 90 BPM | SYSTOLIC BLOOD PRESSURE: 117 MMHG

## 2019-07-22 VITALS
BODY MASS INDEX: 21.22 KG/M2 | RESPIRATION RATE: 12 BRPM | HEART RATE: 60 BPM | SYSTOLIC BLOOD PRESSURE: 102 MMHG | OXYGEN SATURATION: 100 % | WEIGHT: 140 LBS | HEIGHT: 68 IN | TEMPERATURE: 97.7 F | DIASTOLIC BLOOD PRESSURE: 58 MMHG

## 2019-07-22 DIAGNOSIS — G43.901 MIGRAINE WITH STATUS MIGRAINOSUS, NOT INTRACTABLE, UNSPECIFIED MIGRAINE TYPE: Primary | ICD-10-CM

## 2019-07-22 PROCEDURE — 96365 THER/PROPH/DIAG IV INF INIT: CPT

## 2019-07-22 PROCEDURE — 74011250636 HC RX REV CODE- 250/636: Performed by: PHYSICIAN ASSISTANT

## 2019-07-22 PROCEDURE — 96366 THER/PROPH/DIAG IV INF ADDON: CPT

## 2019-07-22 PROCEDURE — 74011250636 HC RX REV CODE- 250/636

## 2019-07-22 PROCEDURE — 99283 EMERGENCY DEPT VISIT LOW MDM: CPT

## 2019-07-22 PROCEDURE — 96375 TX/PRO/DX INJ NEW DRUG ADDON: CPT

## 2019-07-22 PROCEDURE — 74011250637 HC RX REV CODE- 250/637: Performed by: EMERGENCY MEDICINE

## 2019-07-22 PROCEDURE — 74011250636 HC RX REV CODE- 250/636: Performed by: EMERGENCY MEDICINE

## 2019-07-22 RX ORDER — ONDANSETRON 2 MG/ML
INJECTION INTRAMUSCULAR; INTRAVENOUS
Status: COMPLETED
Start: 2019-07-22 | End: 2019-07-22

## 2019-07-22 RX ORDER — BUTALBITAL, ACETAMINOPHEN AND CAFFEINE 50; 325; 40 MG/1; MG/1; MG/1
2 TABLET ORAL
Status: COMPLETED | OUTPATIENT
Start: 2019-07-22 | End: 2019-07-22

## 2019-07-22 RX ORDER — ONDANSETRON 2 MG/ML
4 INJECTION INTRAMUSCULAR; INTRAVENOUS
Status: COMPLETED | OUTPATIENT
Start: 2019-07-22 | End: 2019-07-22

## 2019-07-22 RX ORDER — BUTALBITAL, ACETAMINOPHEN AND CAFFEINE 50; 325; 40 MG/1; MG/1; MG/1
2 TABLET ORAL
Status: DISCONTINUED | OUTPATIENT
Start: 2019-07-22 | End: 2019-07-22

## 2019-07-22 RX ORDER — BUTALBITAL, ACETAMINOPHEN AND CAFFEINE 300; 40; 50 MG/1; MG/1; MG/1
1 CAPSULE ORAL
Qty: 20 CAP | Refills: 0 | Status: ON HOLD | OUTPATIENT
Start: 2019-07-22 | End: 2020-01-29

## 2019-07-22 RX ORDER — DIPHENHYDRAMINE HYDROCHLORIDE 50 MG/ML
INJECTION, SOLUTION INTRAMUSCULAR; INTRAVENOUS
Status: COMPLETED
Start: 2019-07-22 | End: 2019-07-22

## 2019-07-22 RX ORDER — ACETAMINOPHEN 500 MG
1000 TABLET ORAL
Status: DISCONTINUED | OUTPATIENT
Start: 2019-07-22 | End: 2019-07-23 | Stop reason: HOSPADM

## 2019-07-22 RX ORDER — MAGNESIUM SULFATE HEPTAHYDRATE 40 MG/ML
2 INJECTION, SOLUTION INTRAVENOUS
Status: COMPLETED | OUTPATIENT
Start: 2019-07-22 | End: 2019-07-23

## 2019-07-22 RX ORDER — DIPHENHYDRAMINE HYDROCHLORIDE 50 MG/ML
25 INJECTION, SOLUTION INTRAMUSCULAR; INTRAVENOUS
Status: COMPLETED | OUTPATIENT
Start: 2019-07-22 | End: 2019-07-22

## 2019-07-22 RX ORDER — ONDANSETRON 4 MG/1
4 TABLET, ORALLY DISINTEGRATING ORAL
Qty: 20 TAB | Refills: 0 | Status: SHIPPED | OUTPATIENT
Start: 2019-07-22 | End: 2019-11-21

## 2019-07-22 RX ORDER — KETOROLAC TROMETHAMINE 30 MG/ML
30 INJECTION, SOLUTION INTRAMUSCULAR; INTRAVENOUS
Status: COMPLETED | OUTPATIENT
Start: 2019-07-22 | End: 2019-07-22

## 2019-07-22 RX ORDER — DEXAMETHASONE SODIUM PHOSPHATE 10 MG/ML
10 INJECTION INTRAMUSCULAR; INTRAVENOUS
Status: COMPLETED | OUTPATIENT
Start: 2019-07-22 | End: 2019-07-22

## 2019-07-22 RX ORDER — METHYLPREDNISOLONE 4 MG/1
TABLET ORAL
Qty: 1 DOSE PACK | Refills: 0 | Status: SHIPPED | OUTPATIENT
Start: 2019-07-22 | End: 2019-11-21

## 2019-07-22 RX ADMIN — MAGNESIUM SULFATE HEPTAHYDRATE 2 G: 40 INJECTION, SOLUTION INTRAVENOUS at 21:46

## 2019-07-22 RX ADMIN — KETOROLAC TROMETHAMINE 30 MG: 30 INJECTION, SOLUTION INTRAMUSCULAR at 22:13

## 2019-07-22 RX ADMIN — SODIUM CHLORIDE 1000 ML: 900 INJECTION, SOLUTION INTRAVENOUS at 00:31

## 2019-07-22 RX ADMIN — DIPHENHYDRAMINE HYDROCHLORIDE 25 MG: 50 INJECTION, SOLUTION INTRAMUSCULAR; INTRAVENOUS at 01:35

## 2019-07-22 RX ADMIN — DEXAMETHASONE SODIUM PHOSPHATE 10 MG: 10 INJECTION, SOLUTION INTRAMUSCULAR; INTRAVENOUS at 21:49

## 2019-07-22 RX ADMIN — ONDANSETRON 4 MG: 2 INJECTION INTRAMUSCULAR; INTRAVENOUS at 01:35

## 2019-07-22 RX ADMIN — KETOROLAC TROMETHAMINE 30 MG: 30 INJECTION, SOLUTION INTRAMUSCULAR at 00:21

## 2019-07-22 RX ADMIN — MAGNESIUM SULFATE HEPTAHYDRATE 2 G: 40 INJECTION, SOLUTION INTRAVENOUS at 00:19

## 2019-07-22 RX ADMIN — DEXAMETHASONE SODIUM PHOSPHATE 10 MG: 10 INJECTION, SOLUTION INTRAMUSCULAR; INTRAVENOUS at 00:21

## 2019-07-22 RX ADMIN — SODIUM CHLORIDE 1000 ML: 900 INJECTION, SOLUTION INTRAVENOUS at 21:45

## 2019-07-22 RX ADMIN — BUTALBITAL, ACETAMINOPHEN, AND CAFFEINE 2 TABLET: 50; 325; 40 TABLET ORAL at 01:29

## 2019-07-22 RX ADMIN — ONDANSETRON 4 MG: 2 INJECTION INTRAMUSCULAR; INTRAVENOUS at 00:21

## 2019-07-22 RX ADMIN — ONDANSETRON 4 MG: 2 INJECTION INTRAMUSCULAR; INTRAVENOUS at 21:48

## 2019-07-22 NOTE — TELEPHONE ENCOUNTER
* Message from [facility] below:      ----- Message from Chrissy Bundy sent at 7/22/2019 10:54 AM EDT -----  Regarding: Dr Isabela Montalvo is returning nurse call from today, please call pt back at [phone].

## 2019-07-22 NOTE — TELEPHONE ENCOUNTER
* Message below from [address].     \"..suffering from a severe migraines, requesting p/c top be advised of what can be done for relief. \"

## 2019-07-22 NOTE — ED PROVIDER NOTES
30 y.o. female with past medical history significant for panic attacks, chronic headaches, anxiety, depression, h/o pyelonephritis, s/p removal of Bartholin's gland, s/p appendectomy, s/p ovarian cyst removal x3 presents ambulatory with chief complaint of a frontal headache, onset 2 days ago without any noted improvement. The pt explains that she has tried taking nasal DHE, Zofran, Toradol, Zanaflex, and Excedrin with relief up until she wakes up the next. On arrival, she reports associated photophobia, nausea, and pain behind her eyes. DHE nasal spray noted to have run out. Of note, the pt includes that she last had Excedrin, Zofran, and 30 mg Toradol at 1 PM today. Pt denies any fevers or any other symptoms at this time. There are no other acute medical concerns at this time. Social hx: Patient denies current Tobacco use. Reports social EtOH use. Denies illicit drug abuse. PCP: None    Note written by Shawn White, as dictated by Kemi Amezcua, DO 11:46 PM      The history is provided by the patient. No  was used.         Past Medical History:   Diagnosis Date    Anxiety and depression     Bartholin's gland abscess     x8    Endometriosis     Migraine     Ovarian cyst     Panic attack     Pyelonephritis        Past Surgical History:   Procedure Laterality Date    HX APPENDECTOMY  04/26/2017    HX BARTHOLIN CYST MARSUPIALIZATION      2011, 2018    HX BREAST LUMPECTOMY Right     HX BUNIONECTOMY      HX CYST REMOVAL      HX GYN Left     bartholin cyst drainage X 6    HX GYN Left     bartholin cyst marsupilization    HX OTHER SURGICAL      laparascopy    HX PELVIC LAPAROSCOPY      HX WISDOM TEETH EXTRACTION           Family History:   Problem Relation Age of Onset    Diabetes Maternal Aunt     Heart Disease Maternal Aunt     Stroke Maternal Aunt     Heart Disease Maternal Grandmother     No Known Problems Mother        Social History     Socioeconomic History    Marital status: SINGLE     Spouse name: Not on file    Number of children: Not on file    Years of education: Not on file    Highest education level: Not on file   Occupational History    Not on file   Social Needs    Financial resource strain: Not on file    Food insecurity:     Worry: Not on file     Inability: Not on file    Transportation needs:     Medical: Not on file     Non-medical: Not on file   Tobacco Use    Smoking status: Former Smoker    Smokeless tobacco: Never Used   Substance and Sexual Activity    Alcohol use: Yes     Alcohol/week: 1.0 standard drinks     Types: 1 Glasses of wine per week     Comment: social    Drug use: No    Sexual activity: Yes     Partners: Male     Birth control/protection: Pill, Condom   Lifestyle    Physical activity:     Days per week: Not on file     Minutes per session: Not on file    Stress: Not on file   Relationships    Social connections:     Talks on phone: Not on file     Gets together: Not on file     Attends Christian service: Not on file     Active member of club or organization: Not on file     Attends meetings of clubs or organizations: Not on file     Relationship status: Not on file    Intimate partner violence:     Fear of current or ex partner: Not on file     Emotionally abused: Not on file     Physically abused: Not on file     Forced sexual activity: Not on file   Other Topics Concern    Not on file   Social History Narrative    Not on file         ALLERGIES: Benadryl [diphenhydramine hcl]; Compazine [prochlorperazine edisylate]; Depacon [valproate sodium]; Haldol [haloperidol lactate]; Promethazine; and Reglan [metoclopramide]    Review of Systems   Constitutional: Negative for appetite change, chills, fever and unexpected weight change. HENT: Negative for ear pain, hearing loss, rhinorrhea and trouble swallowing. Eyes: Positive for photophobia. Negative for pain and visual disturbance.    Respiratory: Negative for cough, chest tightness and shortness of breath. Cardiovascular: Negative for chest pain and palpitations. Gastrointestinal: Positive for nausea. Negative for abdominal distention, abdominal pain, blood in stool and vomiting. Genitourinary: Negative for dysuria, hematuria and urgency. Musculoskeletal: Negative for back pain and myalgias. Skin: Negative for rash. Neurological: Positive for headaches. Negative for dizziness, syncope, weakness and numbness. Psychiatric/Behavioral: Negative for confusion and suicidal ideas. All other systems reviewed and are negative. Vitals:    07/21/19 2323 07/22/19 0019 07/22/19 0145   BP: 107/58 102/57 102/58   Pulse: 77 60    Resp: 12     Temp: 97.7 °F (36.5 °C)     SpO2: 100%     Weight: 63.5 kg (140 lb)     Height: 5' 8\" (1.727 m)              Physical Exam   Constitutional: She is oriented to person, place, and time. She appears well-developed and well-nourished. No distress. HENT:   Head: Normocephalic and atraumatic. Right Ear: External ear normal.   Left Ear: External ear normal.   Nose: Nose normal.   Mouth/Throat: Oropharynx is clear and moist. No oropharyngeal exudate. Eyes: Pupils are equal, round, and reactive to light. Conjunctivae and EOM are normal. Right eye exhibits no discharge. Left eye exhibits no discharge. No scleral icterus. Fundoscopic exam reveals normal venous pulsation, no papilledema. Neck: Normal range of motion. Neck supple. No JVD present. No tracheal deviation present. Cardiovascular: Normal rate, regular rhythm, normal heart sounds and intact distal pulses. Exam reveals no gallop and no friction rub. No murmur heard. Pulmonary/Chest: Effort normal and breath sounds normal. No stridor. No respiratory distress. She has no decreased breath sounds. She has no wheezes. She has no rhonchi. She has no rales. She exhibits no tenderness. Abdominal: Soft. Bowel sounds are normal. She exhibits no distension.  There is no tenderness. There is no rebound and no guarding. Musculoskeletal: Normal range of motion. She exhibits no edema or tenderness. Neurological: She is alert and oriented to person, place, and time. She has normal strength and normal reflexes. She displays normal reflexes. No cranial nerve deficit or sensory deficit. She exhibits normal muscle tone. Coordination normal. GCS eye subscore is 4. GCS verbal subscore is 5. GCS motor subscore is 6. Skin: Skin is warm and dry. Capillary refill takes less than 2 seconds. No rash noted. She is not diaphoretic. No erythema. No pallor. Psychiatric: She has a normal mood and affect. Her behavior is normal. Judgment and thought content normal.   Nursing note and vitals reviewed. Note written by Destini Mansfield, as dictated by Isabela Ba, DO 11:46 PM     MDM  Number of Diagnoses or Management Options  Other migraine without status migrainosus, not intractable:   Risk of Complications, Morbidity, and/or Mortality  Presenting problems: moderate  Diagnostic procedures: low  Management options: moderate    Patient Progress  Patient progress: improved       Procedures      Chief Complaint   Patient presents with    Migraine       The patient's presenting problems have been discussed, and they are in agreement with the care plan formulated and outlined with them. I have encouraged them to ask questions as they arise throughout their visit.     MEDICATIONS GIVEN:  Medications   magnesium sulfate 2 g/50 ml IVPB (premix or compounded) (0 g IntraVENous IV Completed 7/22/19 0136)   ketorolac (TORADOL) injection 30 mg (30 mg IntraVENous Given 7/22/19 0021)   dexamethasone (PF) (DECADRON) injection 10 mg (10 mg IntraVENous Given 7/22/19 0021)   ondansetron (ZOFRAN) injection 4 mg (4 mg IntraVENous Given 7/22/19 0021)   sodium chloride 0.9 % bolus infusion 1,000 mL (0 mL IntraVENous IV Completed 7/22/19 0136)   butalbital-acetaminophen-caffeine (FIORICET, ESGIC) -40 mg per tablet 2 Tab (2 Tabs Oral Given 7/22/19 0129)   diphenhydrAMINE (BENADRYL) injection 25 mg (25 mg IntraVENous Given 7/22/19 0135)   ondansetron (ZOFRAN) injection 4 mg (4 mg IntraVENous Given 7/22/19 0135)       LABS REVIEWED:  Recent Results (from the past 24 hour(s))   SAMPLES BEING HELD    Collection Time: 07/21/19 11:36 PM   Result Value Ref Range    SAMPLES BEING HELD SST,RD,JAH,DRK GRN,PST,LAV     COMMENT        Add-on orders for these samples will be processed based on acceptable specimen integrity and analyte stability, which may vary by analyte. VITAL SIGNS:  Patient Vitals for the past 24 hrs:   Temp Pulse Resp BP SpO2   07/22/19 0145    102/58    07/22/19 0019  60  102/57    07/21/19 2323 97.7 °F (36.5 °C) 77 12 107/58 100 %       RADIOLOGY RESULTS:  The following have been ordered and reviewed:  No results found. PROGRESS NOTES:  1:34 AM  The patient has been updated on results, and has been planned for discharge. Instructed the patient to follow-up with PCP, and to return to the ED should any new symptoms arise or worsen. Patient agreeable to plan. 1:34 AM  Patient's results have been reviewed with them. Patient and/or family have verbally conveyed their understanding and agreement of the patient's signs, symptoms, diagnosis, treatment and prognosis and additionally agree to follow up as recommended or return to the Emergency Room should their condition change prior to follow-up. Discharge instructions have also been provided to the patient with some educational information regarding their diagnosis as well a list of reasons why they would want to return to the ER prior to their follow-up appointment should their condition change. DIAGNOSIS:    1.  Other migraine without status migrainosus, not intractable        PLAN:  Follow-up Information     Follow up With Specialties Details Why Contact Italo Alvarez, DO Neurology Schedule an appointment as soon as possible for a visit  [facility] [address]  [phone]      [facility] EMERGENCY DEPT Emergency Medicine  If symptoms worsen [address] [facility]  [phone]        Discharge Medication List as of 7/22/2019  1:38 AM      START taking these medications    Details   ondansetron (ZOFRAN ODT) 4 mg disintegrating tablet Take 1 Tab by mouth every eight (8) hours as needed for Nausea. , Print, Disp-20 Tab, R-0      butalbital-acetaminophen-caff (FIORICET) -40 mg per capsule Take 1 Cap by mouth every four (4) hours as needed for Pain., Print, Disp-20 Cap, R-0      methylPREDNISolone (MEDROL, CELESTINO,) 4 mg tablet Take as directed. , Print, Disp-1 Dose Pack, R-0         CONTINUE these medications which have NOT CHANGED    Details   ketorolac (TORADOL) 10 mg tablet Take 3 tabs at once for severe headache by mouth. May repeat once in 24 hours. Max use twice a week., Normal, Disp-30 Tab, R-0      dihydroergotamine (MIGRANAL) 0.5 mg/pump act. (4 mg/mL) nasal spray 1 Spray by Nasal route as needed for Migraine. use in one nostril as directed. No more than 4 sprays in one week, Normal, Disp-1 Container, R-4      tiZANidine (ZANAFLEX) 4 mg tablet Take 1 Tab by mouth three (3) times daily as needed (muscle spasm). , Normal, Disp-30 Tab, R-1      galcanezumab-gnlm (EMGALITY PEN) 120 mg/mL injection 1 mL by SubCUTAneous route every thirty (30) days. , Print, Disp-1 mL, R-11      ALPRAZolam (XANAX) 1 mg tablet Take 1 mg by mouth once as needed (panic attack). , Historical Med      topiramate (TOPAMAX) 100 mg tablet Take 1 Tab by mouth nightly., Normal, Disp-90 Tab, R-3      adapalene-benzoyl peroxide 0.1-2.5 % glwp APPLY TO AFFECTED AREAS AT BEDTIME X 30 DAYS (AS NEEDED), Historical Med, R-3      ACZONE 7.5 % glwp APPLY TO AFFECTED AREA ONCE DAILY IN THE MORNING (AS NEEDED), Historical Med, R-3, [place]      ASPIRIN/ACETAMINOPHEN/CAFFEINE (MIGRAINE RELIEF PO) Take 2 Tabs by mouth once as needed (Migraine). , Historical Med      clonazePAM (KLONOPIN) 1 mg tablet Take 2 mg by mouth nightly., Historical Med, R-5      escitalopram (LEXAPRO) 20 mg tablet Take 20 mg by mouth nightly. Indications: Generalized Anxiety Disorder, Historical Med             ED COURSE: The patient's hospital course has been uncomplicated.

## 2019-07-22 NOTE — TELEPHONE ENCOUNTER
* Message from Aleksandra below:      ----- Message from [facility] sent at 7/22/2019 12:09 PM EDT -----  Regarding: Dr. Dana Castillo  Pt [phone] is returning a call she recvd from the practice. PT says that she went to [facility] ER t-0 for migraines. Pls call to discuss.

## 2019-07-22 NOTE — DISCHARGE INSTRUCTIONS
We hope that we have addressed all of your medical concerns. The examination and treatment you received in the Emergency Department were for an emergent problem and were not intended as complete care. It is important that you follow up with your healthcare provider(s) for ongoing care. If your symptoms worsen or do not improve as expected, and you are unable to reach your usual health care provider(s), you should return to the Emergency Department. Today's healthcare is undergoing tremendous change, and patient satisfaction surveys are one of the many tools to assess the quality of medical care. You may receive a survey from the [employer] regarding your experience in the Emergency Department. I hope that your experience has been completely positive, particularly the medical care that I provided. As such, please participate in the survey; anything less than excellent does not meet my expectations or intentions. [facility] and [address] [place] [address] participate in nationally recognized quality of care measures. If your blood pressure is greater than 120/80, as reported below, we urge that you seek medical care to address the potential of high blood pressure, commonly known as hypertension. Hypertension can be hereditary or can be caused by certain medical conditions, pain, stress, or \"white coat syndrome. \"       Please make an appointment with your health care provider(s) for follow up of your Emergency Department visit. VITALS:   Patient Vitals for the past 8 hrs:   Temp Pulse Resp BP SpO2   07/22/19 0019 -- 60 -- 102/57 --   07/21/19 2323 97.7 °F (36.5 °C) 77 12 107/58 100 %          Thank you for allowing us to provide you with medical care today. We realize that you have many choices for your emergency care needs. Please choose us in the future for any continued health care needs. Michael Amos, [address]: [phone]            Recent Results (from the past 24 hour(s))   SAMPLES BEING HELD    Collection Time: 07/21/19 11:36 PM   Result Value Ref Range    SAMPLES BEING HELD SST,RD,JAH,DRK GRN,PST,LAV     COMMENT        Add-on orders for these samples will be processed based on acceptable specimen integrity and analyte stability, which may vary by analyte. No results found. Patient Education        Migraine Headache: Care Instructions  Your Care Instructions  Migraines are painful, throbbing headaches that often start on one side of the head. They may cause nausea and vomiting and make you sensitive to light, sound, or smell. Without treatment, migraines can last from 4 hours to a few days. Medicines can help prevent migraines or stop them after they have started. Your doctor can help you find which ones work best for you. Follow-up care is a key part of your treatment and safety. Be sure to make and go to all appointments, and call your doctor if you are having problems. It's also a good idea to know your test results and keep a list of the medicines you take. How can you care for yourself at home? · Do not drive if you have taken a prescription pain medicine. · Rest in a quiet, dark room until your headache is gone. Close your eyes, and try to relax or go to sleep. Don't watch TV or read. · Put a cold, moist cloth or cold pack on the painful area for 10 to 20 minutes at a time. Put a thin cloth between the cold pack and your skin. · Use a warm, moist towel or a heating pad set on low to relax tight shoulder and neck muscles. · Have someone gently massage your neck and shoulders. · Take your medicines exactly as prescribed. Call your doctor if you think you are having a problem with your medicine. You will get more details on the specific medicines your doctor prescribes.   · Be careful not to take pain medicine more often than the instructions allow. You could get worse or more frequent headaches when the medicine wears off. To prevent migraines  · Keep a headache diary so you can figure out what triggers your headaches. Avoiding triggers may help you prevent headaches. Record when each headache began, how long it lasted, and what the pain was like. (Was it throbbing, aching, stabbing, or dull?) Write down any other symptoms you had with the headache, such as nausea, flashing lights or dark spots, or sensitivity to bright light or loud noise. Note if the headache occurred near your period. List anything that might have triggered the headache. Triggers may include certain foods (chocolate, cheese, wine) or odors, smoke, bright light, stress, or lack of sleep. · If your doctor has prescribed medicine for your migraines, take it as directed. You may have medicine that you take only when you get a migraine and medicine that you take all the time to help prevent migraines. ? If your doctor has prescribed medicine for when you get a headache, take it at the first sign of a migraine, unless your doctor has given you other instructions. ? If your doctor has prescribed medicine to prevent migraines, take it exactly as prescribed. Call your doctor if you think you are having a problem with your medicine. · Find healthy ways to deal with stress. Migraines are most common during or right after stressful times. Take time to relax before and after you do something that has caused a migraine in the past.  · Try to keep your muscles relaxed by keeping good posture. Check your jaw, face, neck, and shoulder muscles for tension. Try to relax them. When you sit at a desk, change positions often. And make sure to stretch for 30 seconds each hour. · Get plenty of sleep and exercise. · Eat meals on a regular schedule. Avoid foods and drinks that often trigger migraines.  These include chocolate, alcohol (especially red wine and port), aspartame, monosodium glutamate (MSG), and some additives found in foods (such as hot dogs, boyce, cold cuts, aged cheeses, and pickled foods). · Limit caffeine. Don't drink too much coffee, tea, or soda. But don't quit caffeine suddenly. That can also give you migraines. · Do not smoke or allow others to smoke around you. If you need help quitting, talk to your doctor about stop-smoking programs and medicines. These can increase your chances of quitting for good. · If you are taking birth control pills or hormone therapy, talk to your doctor about whether they are triggering your migraines. When should you call for help? Call 911 anytime you think you may need emergency care. For example, call if:    · You have signs of a stroke. These may include:  ? Sudden numbness, paralysis, or weakness in your face, arm, or leg, especially on only one side of your body. ? Sudden vision changes. ? Sudden trouble speaking. ? Sudden confusion or trouble understanding simple statements. ? Sudden problems with walking or balance. ? A sudden, severe headache that is different from past headaches.    Call your doctor now or seek immediate medical care if:    · You have new or worse nausea and vomiting.     · You have a new or higher fever.     · Your headache gets much worse.    Watch closely for changes in your health, and be sure to contact your doctor if:    · You are not getting better after 2 days (48 hours). Where can you learn more? Go to http://michele-lucy.info/. Enter I312 in the search box to learn more about \"Migraine Headache: Care Instructions. \"  Current as of: March 28, 2019  Content Version: 12.1  © 4342-9658 [employer]. Care instructions adapted under license by [employer] (which disclaims liability or warranty for this information).  If you have questions about a medical condition or this instruction, always ask your healthcare professional. Matteo Alvarado disclaims any warranty or liability for your use of this information.

## 2019-07-22 NOTE — TELEPHONE ENCOUNTER
* Message from Aleksandra below:      ----- Message from Torin Osborne sent at 7/22/2019 10:54 AM EDT -----  Regarding: Dr Farida Perry is returning nurse call from today, please call pt back at [phone].

## 2019-07-22 NOTE — ED TRIAGE NOTES
Pt here for migraine since Friday. Pt has nasal DHE, Zofran, Toradol,  and Zanaflex has \"maxed out\" on these at home. Neurologist suggested pt come to ER for further tx. Sensitivity to light and sound with nausea.

## 2019-07-23 RX ORDER — DIVALPROEX SODIUM 500 MG/1
500 TABLET, DELAYED RELEASE ORAL
Qty: 30 TAB | Refills: 1 | Status: ON HOLD | OUTPATIENT
Start: 2019-07-23 | End: 2020-01-29

## 2019-07-23 RX ORDER — LANOLIN ALCOHOL/MO/W.PET/CERES
400 CREAM (GRAM) TOPICAL DAILY
Qty: 30 TAB | Refills: 1 | Status: SHIPPED | OUTPATIENT
Start: 2019-07-23 | End: 2019-11-21

## 2019-07-23 NOTE — ED PROVIDER NOTES
Chemo Shaw is a 30 y.o. female  who presents by private vehicle to ER with c/o Patient presents with:  Headache  Patient presents with complaints of left sided frontal headache with nausea and vomiting. Patient seen in ED last night for same. Patient with extensive history of migraines and is followed by Dr. Donita Bullard at [facility], patient talked to her on the phone today and was started on zanaflex and DHE nasal spray. Patient with multiple allergies and reports that typically magnesium, decadron, zofran and dilaudid will help her migraines. Patient denies any significant neurological symptoms. She specifically denies any fevers, chills,  chest pain, shortness of breath, rash, diarrhea, abdominal pain, urinary/bowel changes, sweating or weight loss. PCP: None   PMHx significant for: Past Medical History:  No date: Anxiety and depression  No date: Bartholin's gland abscess      Comment:  x8  No date: Endometriosis  No date: Migraine  No date: Ovarian cyst  No date: Panic attack  No date: Pyelonephritis   PSHx significant for: Past Surgical History:  04/26/2017: HX APPENDECTOMY  No date: HX BARTHOLIN CYST MARSUPIALIZATION      Comment:  2011, 2018  No date: HX BREAST LUMPECTOMY; Right  No date: HX BUNIONECTOMY  No date: HX CYST REMOVAL  No date: HX GYN; Left      Comment:  bartholin cyst drainage X 6  No date: HX GYN; Left      Comment:  bartholin cyst marsupilization  No date: HX OTHER SURGICAL      Comment:  laparascopy  No date: HX PELVIC LAPAROSCOPY  No date: HX WISDOM TEETH EXTRACTION  Social Hx: Tobacco use: Social History    Tobacco Use      Smoking status: Former Smoker      Smokeless tobacco: Never Used  ; EtOH use: The patient states she drinks 0 per week.; Illicit Drug use: Allergies:   -- Benadryl (Diphenhydramine Hcl) -- Other (comments)    --  Makes me feel funny.   Need ativan if I get             benadryl   -- Compazine (Prochlorperazine Edisylate) -- Anxiety   -- Depacon (Valproate Sodium) -- Other (comments)    --  Pt reports having restlessness with this             medication. -- Haldol (Haloperidol Lactate) -- Other (comments)   -- Promethazine -- Other (comments)    --  \"It makes me feel really funny, nausea and dizzy\"   -- Reglan (Metoclopramide) -- Other (comments)    There are no other complaints, changes or physical findings at this time. Past Medical History:   Diagnosis Date    Anxiety and depression     Bartholin's gland abscess     x8    Endometriosis     Migraine     Ovarian cyst     Panic attack     Pyelonephritis        Past Surgical History:   Procedure Laterality Date    HX APPENDECTOMY  04/26/2017    HX BARTHOLIN CYST MARSUPIALIZATION      2011, 2018    HX BREAST LUMPECTOMY Right     HX BUNIONECTOMY      HX CYST REMOVAL      HX GYN Left     bartholin cyst drainage X 6    HX GYN Left     bartholin cyst marsupilization    HX OTHER SURGICAL      laparascopy    HX PELVIC LAPAROSCOPY      HX WISDOM TEETH EXTRACTION           Family History:   Problem Relation Age of Onset    Diabetes Maternal Aunt     Heart Disease Maternal Aunt     Stroke Maternal Aunt     Heart Disease Maternal Grandmother     No Known Problems Mother        Social History     Socioeconomic History    Marital status: SINGLE     Spouse name: Not on file    Number of children: Not on file    Years of education: Not on file    Highest education level: Not on file   Occupational History    Not on file   Social Needs    Financial resource strain: Not on file    Food insecurity:     Worry: Not on file     Inability: Not on file    Transportation needs:     Medical: Not on file     Non-medical: Not on file   Tobacco Use    Smoking status: Former Smoker    Smokeless tobacco: Never Used   Substance and Sexual Activity    Alcohol use:  Yes     Alcohol/week: 1.0 standard drinks     Types: 1 Glasses of wine per week     Comment: social    Drug use: No    Sexual activity: Yes Partners: Male     Birth control/protection: Pill, Condom   Lifestyle    Physical activity:     Days per week: Not on file     Minutes per session: Not on file    Stress: Not on file   Relationships    Social connections:     Talks on phone: Not on file     Gets together: Not on file     Attends Mosque service: Not on file     Active member of club or organization: Not on file     Attends meetings of clubs or organizations: Not on file     Relationship status: Not on file    Intimate partner violence:     Fear of current or ex partner: Not on file     Emotionally abused: Not on file     Physically abused: Not on file     Forced sexual activity: Not on file   Other Topics Concern    Not on file   Social History Narrative    Not on file         ALLERGIES: Benadryl [diphenhydramine hcl]; Compazine [prochlorperazine edisylate]; Depacon [valproate sodium]; Haldol [haloperidol lactate]; Promethazine; and Reglan [metoclopramide]    Review of Systems   Constitutional: Negative for activity change, chills and fever. HENT: Negative for congestion, rhinorrhea and sore throat. Eyes: Positive for photophobia. Respiratory: Negative for shortness of breath. Cardiovascular: Negative for chest pain and leg swelling. Gastrointestinal: Positive for nausea and vomiting. Negative for abdominal pain and diarrhea. Genitourinary: Negative for dysuria, vaginal bleeding and vaginal discharge. Musculoskeletal: Negative for arthralgias and myalgias. Neurological: Positive for headaches. Negative for dizziness. Psychiatric/Behavioral: Negative for confusion. All other systems reviewed and are negative. Vitals:    07/22/19 2104   BP: 117/71   Pulse: 90   Resp: 18   Temp: 98.5 °F (36.9 °C)   SpO2: 100%   Weight: 63 kg (138 lb 14.2 oz)   Height: 5' 8\" (1.727 m)            Physical Exam   Constitutional: She is oriented to person, place, and time. She appears well-developed. Non-toxic appearance.  She does not have a sickly appearance. She does not appear ill. No distress. HENT:   Head: Normocephalic and atraumatic. Right Ear: External ear normal.   Left Ear: External ear normal.   Nose: Nose normal.   Mouth/Throat: Oropharynx is clear and moist. No oropharyngeal exudate. Eyes: Pupils are equal, round, and reactive to light. Conjunctivae, EOM and lids are normal. Right eye exhibits no discharge. Left eye exhibits no discharge. Neck: Normal range of motion. No tracheal deviation present. No thyromegaly present. Cardiovascular: Normal rate, regular rhythm, normal heart sounds and intact distal pulses. Pulmonary/Chest: Effort normal and breath sounds normal.   Abdominal: Soft. Normal appearance and bowel sounds are normal.   Musculoskeletal: Normal range of motion. Neurological: She is alert and oriented to person, place, and time. She has normal strength. No cranial nerve deficit or sensory deficit. She displays a negative Romberg sign. Skin: Skin is warm and dry. Psychiatric: She has a normal mood and affect. Judgment normal.        MDM  Number of Diagnoses or Management Options  Migraine with status migrainosus, not intractable, unspecified migraine type:   Diagnosis management comments: Assesment/Plan- 30 y.o. Patient presents with:  Headache  differential includes: migraine, drug seeking behavior. PE findings consistent with migraine, patient continues to request dilaudid for pain. Discussed with patient the narcotics are not preferred treatment for migraines. Patient tolerating PO, no having any neurological deficiencies. Recommend neurology follow up. Patient educated on reasons to return to the ED.            Procedures

## 2019-07-23 NOTE — DISCHARGE INSTRUCTIONS
Patient Education        Migraine Headache: Care Instructions  Your Care Instructions  Migraines are painful, throbbing headaches that often start on one side of the head. They may cause nausea and vomiting and make you sensitive to light, sound, or smell. Without treatment, migraines can last from 4 hours to a few days. Medicines can help prevent migraines or stop them after they have started. Your doctor can help you find which ones work best for you. Follow-up care is a key part of your treatment and safety. Be sure to make and go to all appointments, and call your doctor if you are having problems. It's also a good idea to know your test results and keep a list of the medicines you take. How can you care for yourself at home? · Do not drive if you have taken a prescription pain medicine. · Rest in a quiet, dark room until your headache is gone. Close your eyes, and try to relax or go to sleep. Don't watch TV or read. · Put a cold, moist cloth or cold pack on the painful area for 10 to 20 minutes at a time. Put a thin cloth between the cold pack and your skin. · Use a warm, moist towel or a heating pad set on low to relax tight shoulder and neck muscles. · Have someone gently massage your neck and shoulders. · Take your medicines exactly as prescribed. Call your doctor if you think you are having a problem with your medicine. You will get more details on the specific medicines your doctor prescribes. · Be careful not to take pain medicine more often than the instructions allow. You could get worse or more frequent headaches when the medicine wears off. To prevent migraines  · Keep a headache diary so you can figure out what triggers your headaches. Avoiding triggers may help you prevent headaches. Record when each headache began, how long it lasted, and what the pain was like.  (Was it throbbing, aching, stabbing, or dull?) Write down any other symptoms you had with the headache, such as nausea, flashing lights or dark spots, or sensitivity to bright light or loud noise. Note if the headache occurred near your period. List anything that might have triggered the headache. Triggers may include certain foods (chocolate, cheese, wine) or odors, smoke, bright light, stress, or lack of sleep. · If your doctor has prescribed medicine for your migraines, take it as directed. You may have medicine that you take only when you get a migraine and medicine that you take all the time to help prevent migraines. ? If your doctor has prescribed medicine for when you get a headache, take it at the first sign of a migraine, unless your doctor has given you other instructions. ? If your doctor has prescribed medicine to prevent migraines, take it exactly as prescribed. Call your doctor if you think you are having a problem with your medicine. · Find healthy ways to deal with stress. Migraines are most common during or right after stressful times. Take time to relax before and after you do something that has caused a migraine in the past.  · Try to keep your muscles relaxed by keeping good posture. Check your jaw, face, neck, and shoulder muscles for tension. Try to relax them. When you sit at a desk, change positions often. And make sure to stretch for 30 seconds each hour. · Get plenty of sleep and exercise. · Eat meals on a regular schedule. Avoid foods and drinks that often trigger migraines. These include chocolate, alcohol (especially red wine and port), aspartame, monosodium glutamate (MSG), and some additives found in foods (such as hot dogs, boyce, cold cuts, aged cheeses, and pickled foods). · Limit caffeine. Don't drink too much coffee, tea, or soda. But don't quit caffeine suddenly. That can also give you migraines. · Do not smoke or allow others to smoke around you. If you need help quitting, talk to your doctor about stop-smoking programs and medicines.  These can increase your chances of quitting for good.  · If you are taking birth control pills or hormone therapy, talk to your doctor about whether they are triggering your migraines. When should you call for help? Call 911 anytime you think you may need emergency care. For example, call if:    · You have signs of a stroke. These may include:  ? Sudden numbness, paralysis, or weakness in your face, arm, or leg, especially on only one side of your body. ? Sudden vision changes. ? Sudden trouble speaking. ? Sudden confusion or trouble understanding simple statements. ? Sudden problems with walking or balance. ? A sudden, severe headache that is different from past headaches.    Call your doctor now or seek immediate medical care if:    · You have new or worse nausea and vomiting.     · You have a new or higher fever.     · Your headache gets much worse.    Watch closely for changes in your health, and be sure to contact your doctor if:    · You are not getting better after 2 days (48 hours). Where can you learn more? Go to http://michele-lucy.info/. Enter E876 in the search box to learn more about \"Migraine Headache: Care Instructions. \"  Current as of: March 28, 2019  Content Version: 12.1  © 1802-8638 [employer]. Care instructions adapted under license by [employer] (which disclaims liability or warranty for this information). If you have questions about a medical condition or this instruction, always ask your healthcare professional. [place] [address] any warranty or liability for your use of this information.

## 2019-07-23 NOTE — TELEPHONE ENCOUNTER
Patient states her migraine has not improved. It began Saturday night and she has been treated in the ER twice. She is in bed at this time and reports her pain level as an \"8\". She stated Dr. Vipin Ladd authorized he to take \"DHE\" last night. She has not had any medication today. She stated last night she took Toradol, Zofran and Zanaflex. She reports extreme nausea but no vomiting. Please advise.

## 2019-07-23 NOTE — TELEPHONE ENCOUNTER
Patient returning call to Salud Barone LPN. Patient wishes to relay that she doesn't feel good. Please advise.       Best # [phone]

## 2019-07-23 NOTE — TELEPHONE ENCOUNTER
I see she was given a Medrol Dosepak. Please continue taking that until its completed. I want to try Depakote again orally. I think when she had a in the past it was combined with Benadryl and Phenergan which is why she felt restlessness. I will order Depakote to take daily. Keep taking magnesium every bedtime 400 mg. I do not have any good options to order. If her headache is extremely severe again I think she needs to go to the emergency room to be admitted for DHE protocol. She can do this at any of the [facility]

## 2019-07-23 NOTE — TELEPHONE ENCOUNTER
Patient calling again in regards to previous messages; patient claims that pain is awful and that she is declining. Please advise.       Best # [phone]

## 2019-07-23 NOTE — TELEPHONE ENCOUNTER
I reviewed Dr. Kurt Davis adviskati with patient. She expressed understanding and agreed to this plan.

## 2019-07-23 NOTE — ED TRIAGE NOTES
Triage: + Migraine that started on Saturday was here last night for same but reports that the MD here last night would not give Dilaudid and reports that this is what helps with her Migraines. + double vision, + nausea, + photosensitivity. Took DHE nasal, Zanaflex and zofran today per Neurologist without relief.

## 2019-11-01 ENCOUNTER — TELEPHONE (OUTPATIENT)
Dept: NEUROLOGY | Age: 29
End: 2019-11-01

## 2019-11-01 NOTE — TELEPHONE ENCOUNTER
Re: Emgality Approval     EUKDCP:[id number];KSVPAP:[id number]; Review Type:Prior Auth; Coverage Start Date:10/02/2019; Coverage End Date:10/31/2020; Faxed approval info to [facility].     Called patient to inform her of approval.  Pt stated that she no longer needs to  sample and will go directly to pharmacy for medication.

## 2019-11-01 NOTE — TELEPHONE ENCOUNTER
Pt calling about Emgality. New insurance ([place]). Pharmacy is not able to fill prescription without a PA. She is behind on her shot and because she cant get it, wanted to know if we have any samples.

## 2019-11-21 ENCOUNTER — APPOINTMENT (OUTPATIENT)
Dept: MRI IMAGING | Age: 29
End: 2019-11-21
Attending: PHYSICIAN ASSISTANT
Payer: COMMERCIAL

## 2019-11-21 ENCOUNTER — HOSPITAL ENCOUNTER (EMERGENCY)
Age: 29
Discharge: HOME | End: 2019-11-21
Attending: STUDENT IN AN ORGANIZED HEALTH CARE EDUCATION/TRAINING PROGRAM
Payer: COMMERCIAL

## 2019-11-21 ENCOUNTER — APPOINTMENT (OUTPATIENT)
Dept: CT IMAGING | Age: 29
End: 2019-11-21
Attending: STUDENT IN AN ORGANIZED HEALTH CARE EDUCATION/TRAINING PROGRAM
Payer: COMMERCIAL

## 2019-11-21 ENCOUNTER — APPOINTMENT (OUTPATIENT)
Dept: GENERAL RADIOLOGY | Age: 29
End: 2019-11-21
Attending: STUDENT IN AN ORGANIZED HEALTH CARE EDUCATION/TRAINING PROGRAM
Payer: COMMERCIAL

## 2019-11-21 VITALS
HEART RATE: 93 BPM | RESPIRATION RATE: 18 BRPM | DIASTOLIC BLOOD PRESSURE: 66 MMHG | SYSTOLIC BLOOD PRESSURE: 108 MMHG | TEMPERATURE: 97.7 F | OXYGEN SATURATION: 98 %

## 2019-11-21 DIAGNOSIS — M54.2 NECK PAIN: Primary | ICD-10-CM
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
## 2019-11-21 PROCEDURE — 96374 THER/PROPH/DIAG INJ IV PUSH: CPT

## 2019-11-21 PROCEDURE — 99285 EMERGENCY DEPT VISIT HI MDM: CPT

## 2019-11-21 PROCEDURE — 96375 TX/PRO/DX INJ NEW DRUG ADDON: CPT

## 2019-11-21 PROCEDURE — 72125 CT NECK SPINE W/O DYE: CPT

## 2019-11-21 PROCEDURE — 74011250636 HC RX REV CODE- 250/636: Performed by: EMERGENCY MEDICINE

## 2019-11-21 PROCEDURE — 74011250637 HC RX REV CODE- 250/637: Performed by: STUDENT IN AN ORGANIZED HEALTH CARE EDUCATION/TRAINING PROGRAM

## 2019-11-21 PROCEDURE — 72141 MRI NECK SPINE W/O DYE: CPT

## 2019-11-21 PROCEDURE — 71046 X-RAY EXAM CHEST 2 VIEWS: CPT

## 2019-11-21 PROCEDURE — 74011250636 HC RX REV CODE- 250/636: Performed by: STUDENT IN AN ORGANIZED HEALTH CARE EDUCATION/TRAINING PROGRAM

## 2019-11-21 PROCEDURE — 72072 X-RAY EXAM THORAC SPINE 3VWS: CPT

## 2019-11-21 PROCEDURE — 96376 TX/PRO/DX INJ SAME DRUG ADON: CPT

## 2019-11-21 PROCEDURE — 70450 CT HEAD/BRAIN W/O DYE: CPT

## 2019-11-21 PROCEDURE — 74011250636 HC RX REV CODE- 250/636: Performed by: PHYSICIAN ASSISTANT

## 2019-11-21 RX ORDER — PREDNISONE 10 MG/1
TABLET ORAL
Qty: 21 TAB | Refills: 0 | Status: ON HOLD | OUTPATIENT
Start: 2019-11-21 | End: 2020-01-29

## 2019-11-21 RX ORDER — DEXAMETHASONE SODIUM PHOSPHATE 4 MG/ML
10 INJECTION, SOLUTION INTRA-ARTICULAR; INTRALESIONAL; INTRAMUSCULAR; INTRAVENOUS; SOFT TISSUE ONCE
Status: COMPLETED | OUTPATIENT
Start: 2019-11-21 | End: 2019-11-21

## 2019-11-21 RX ORDER — HYDROCODONE BITARTRATE AND ACETAMINOPHEN 5; 325 MG/1; MG/1
1 TABLET ORAL
Qty: 10 TAB | Refills: 0 | Status: SHIPPED | OUTPATIENT
Start: 2019-11-21 | End: 2019-11-24

## 2019-11-21 RX ORDER — HYDROMORPHONE HYDROCHLORIDE 2 MG/ML
0.5 INJECTION, SOLUTION INTRAMUSCULAR; INTRAVENOUS; SUBCUTANEOUS ONCE
Status: COMPLETED | OUTPATIENT
Start: 2019-11-21 | End: 2019-11-21

## 2019-11-21 RX ORDER — IBUPROFEN 600 MG/1
600 TABLET ORAL
Qty: 20 TAB | Refills: 0 | Status: ON HOLD | OUTPATIENT
Start: 2019-11-21 | End: 2020-01-29

## 2019-11-21 RX ORDER — ACETAMINOPHEN 500 MG
1000 TABLET ORAL
Status: COMPLETED | OUTPATIENT
Start: 2019-11-21 | End: 2019-11-21

## 2019-11-21 RX ORDER — DIAZEPAM 5 MG/1
5 TABLET ORAL
Status: COMPLETED | OUTPATIENT
Start: 2019-11-21 | End: 2019-11-21

## 2019-11-21 RX ORDER — KETOROLAC TROMETHAMINE 30 MG/ML
30 INJECTION, SOLUTION INTRAMUSCULAR; INTRAVENOUS
Status: COMPLETED | OUTPATIENT
Start: 2019-11-21 | End: 2019-11-21

## 2019-11-21 RX ORDER — OXYCODONE HYDROCHLORIDE 5 MG/1
5 TABLET ORAL
Status: COMPLETED | OUTPATIENT
Start: 2019-11-21 | End: 2019-11-21

## 2019-11-21 RX ORDER — METHOCARBAMOL 750 MG/1
750 TABLET, FILM COATED ORAL 4 TIMES DAILY
Status: DISCONTINUED | OUTPATIENT
Start: 2019-11-21 | End: 2019-11-21 | Stop reason: HOSPADM

## 2019-11-21 RX ORDER — HYDROMORPHONE HYDROCHLORIDE 2 MG/ML
1 INJECTION, SOLUTION INTRAMUSCULAR; INTRAVENOUS; SUBCUTANEOUS
Status: COMPLETED | OUTPATIENT
Start: 2019-11-21 | End: 2019-11-21

## 2019-11-21 RX ORDER — MORPHINE SULFATE 2 MG/ML
4 INJECTION, SOLUTION INTRAMUSCULAR; INTRAVENOUS
Status: COMPLETED | OUTPATIENT
Start: 2019-11-21 | End: 2019-11-21

## 2019-11-21 RX ADMIN — METHOCARBAMOL TABLETS 750 MG: 500 TABLET, COATED ORAL at 16:05

## 2019-11-21 RX ADMIN — METHOCARBAMOL TABLETS 750 MG: 500 TABLET, COATED ORAL at 09:24

## 2019-11-21 RX ADMIN — OXYCODONE HYDROCHLORIDE 5 MG: 5 TABLET ORAL at 10:27

## 2019-11-21 RX ADMIN — DIAZEPAM 5 MG: 5 TABLET ORAL at 13:37

## 2019-11-21 RX ADMIN — HYDROMORPHONE HYDROCHLORIDE 0.5 MG: 2 INJECTION, SOLUTION INTRAMUSCULAR; INTRAVENOUS; SUBCUTANEOUS at 16:18

## 2019-11-21 RX ADMIN — DEXAMETHASONE SODIUM PHOSPHATE 10 MG: 4 INJECTION, SOLUTION INTRAMUSCULAR; INTRAVENOUS at 11:15

## 2019-11-21 RX ADMIN — ACETAMINOPHEN 1000 MG: 500 TABLET ORAL at 09:59

## 2019-11-21 RX ADMIN — MORPHINE SULFATE 4 MG: 2 INJECTION, SOLUTION INTRAMUSCULAR; INTRAVENOUS at 12:27

## 2019-11-21 RX ADMIN — HYDROMORPHONE HYDROCHLORIDE 1 MG: 2 INJECTION, SOLUTION INTRAMUSCULAR; INTRAVENOUS; SUBCUTANEOUS at 13:38

## 2019-11-21 RX ADMIN — KETOROLAC TROMETHAMINE 30 MG: 30 INJECTION INTRAMUSCULAR; INTRAVENOUS at 15:51

## 2019-11-21 NOTE — LETTER
[facility] EMERGENCY DEPT 
[address] [place] [address] 
[phone] Work/School Note Date: 11/21/2019 To Whom It May concern: 
 
Daiana Briseno was seen and treated today in the emergency room by the following provider(s): 
Attending Provider: Francesca Carolina MD. Daiana Briseno may return to work on 11/25/19. Sincerely, Delio Clement

## 2019-11-21 NOTE — ED NOTES
TRANSFER - OUT REPORT:    Telephone verbal report given to Ciaran Alonso RN @ [facility](name) on Amy Height  being transferred to [facility] ER for MRI. Opportunity for questions and clarification was provided.       Patient transported with:   [facility]
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
## 2019-11-21 NOTE — ED NOTES
TRANSFER - OUT REPORT:    Verbal report given to LOREN Tristan(name) on Jacque Smith  being transferred to [facility] ED(unit) for routine progression of care       Report consisted of patients Situation, Background, Assessment and   Recommendations(SBAR). Information from the following report(s) ED Summary was reviewed with the receiving nurse. Lines:   Peripheral IV 11/21/19 Right Antecubital (Active)   Site Assessment Clean, dry, & intact 11/21/2019 11:43 AM   Phlebitis Assessment 0 11/21/2019 11:43 AM   Infiltration Assessment 0 11/21/2019 11:43 AM   Dressing Type Transparent 11/21/2019 11:43 AM   Hub Color/Line Status Flushed 11/21/2019 11:43 AM        Opportunity for questions and clarification was provided. Patient transported with:  C collar in place    Discharge or Transfer Assessment: Patient A&O x4 and in no distress. Physical re-examination reveals some improvement in pts condition with reassessment of vital signs completed at the time of admission transfer and/or discharge.

## 2019-11-21 NOTE — PROGRESS NOTES
CT of cervical spine with widening between the odontoid and the right arch of C1. I do not see a clear fracture here or significant edema consistent with a subluxation. Patient in severe pain, but neurologically intact. I spoke with Dr. Francesca Paez. We will put in for a MRI of the C spine- imaging must go to occiput to identify atlanto-occipital and atlantoaxial injury. Will followup once MRI is complete.           DORINDA SotoC  Orthopaedic Surgery PA  [address] [facility]

## 2019-11-21 NOTE — PROGRESS NOTES
Explained results of MRI and CT scan to patient and significant other. Dr. Rehana Yen at bedside for conversation. There is no acute injury to the transverse ligament, posterior and anterior longitudinal ligaments. MRI reviewed independently by 3 radiologists including [facility] radiologist.  Dr. Baylee Robbins (spine surgery) also reviewed. No acute injury. I can not explain the shift of the odontoid, but has progressively worsened since imaging in 2014. There is no step off or fracture. There is no indication for surgical intervention today. Toradol IV ok. Decadron given. Home on PO analgesics and steroid taper. Needs cervical collar until office visit. Flexion and extension in the office.     Romayne Bickers, PA-C  Orthopaedic Surgery PA  [address] [facility]

## 2019-11-21 NOTE — LETTER
[address] 
[facility] EMERGENCY DEPT 
[address] [place] [id number] [phone] Work/School Note Date: 11/21/2019 To Whom It May concern: 
 
Gabriele Gutierrez was seen and treated today in the emergency room by the following provider(s): 
Attending Provider: Christ Miller MD. Gabriele Gutierrez may return to work on 11/25/19. Sincerely, Kristy Young
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
## 2019-11-21 NOTE — ED NOTES
TRANSFER - OUT REPORT:    Verbal report given to Adelaide Lentz RN(name) on [facility]  being transferred to [facility] ED(unit) for routine progression of care       Report consisted of patients Situation, Background, Assessment and   Recommendations(SBAR). Information from the following report(s) ED Summary was reviewed with the receiving nurse. Lines:   Peripheral IV 11/21/19 Right Antecubital (Active)   Site Assessment Clean, dry, & intact 11/21/2019 11:43 AM   Phlebitis Assessment 0 11/21/2019 11:43 AM   Infiltration Assessment 0 11/21/2019 11:43 AM   Dressing Type Transparent 11/21/2019 11:43 AM   Hub Color/Line Status Flushed 11/21/2019 11:43 AM        Opportunity for questions and clarification was provided.       Patient transported with:  C-collar intact

## 2019-11-21 NOTE — LETTER
[address] 
[facility] EMERGENCY DEPT 
[address] [place] [address] [phone] Work/School Note Date: 11/21/2019 To Whom It May concern: 
 
Catia Briones was seen and treated today in the emergency room by the following provider(s): 
Attending Provider: Doyle Brown MD. Catia Briones may return to work on 11/25/19. Sincerely, Marilee Bueno

## 2019-11-21 NOTE — ED NOTES
PROGRESS NOTE:  3:51 PM  Patient reports that she has not received Toradol, will give now. The patient's results have been reviewed with them and/or available family. She will follow-up with orthopedic surgery and be discharged in a cervical collar. Patient and/or family verbally conveyed their understanding and agreement of the patient's signs, symptoms, diagnosis, treatment and prognosis and additionally agree to follow up as recommended in the discharge instructions or to return to the Emergency Room should their condition change prior to their follow-up appointment. The patient/family verbally agrees with the care-plan and verbally conveys that all of their questions have been answered. The discharge instructions have also been provided to the patient and/or family with some educational information regarding the patient's diagnosis as well a list of reasons why the patient would want to return to the ER prior to their follow-up appointment, should their condition change.

## 2019-11-21 NOTE — ED NOTES
RN spoke with MRI to see status of patient imaging. MRI states multiple people ahead of this patient and \"it will be a while. \" MD aware, will order pt more pain medication.

## 2019-11-21 NOTE — ED NOTES
Discharge given by physician. Pt verbalized understanding. Understands to keep C-collar in place until ortho appointment.

## 2020-01-28 ENCOUNTER — HOSPITAL ENCOUNTER (OUTPATIENT)
Age: 30
Setting detail: OBSERVATION
Discharge: HOME | End: 2020-01-29
Attending: EMERGENCY MEDICINE | Admitting: FAMILY MEDICINE
Payer: COMMERCIAL

## 2020-01-28 ENCOUNTER — HOSPITAL ENCOUNTER (EMERGENCY)
Dept: MRI IMAGING | Age: 30
Discharge: HOME | End: 2020-01-28
Attending: EMERGENCY MEDICINE
Payer: COMMERCIAL

## 2020-01-28 DIAGNOSIS — M54.2 NECK PAIN: Primary | ICD-10-CM

## 2020-01-28 PROBLEM — M54.9 BACK PAIN: Status: ACTIVE | Noted: 2020-01-28

## 2020-01-28 PROBLEM — M62.838 MUSCLE SPASM: Status: ACTIVE | Noted: 2020-01-28

## 2020-01-28 LAB
ANION GAP SERPL CALC-SCNC: 8 MMOL/L (ref 5–15)
BASOPHILS # BLD: 0.1 K/UL (ref 0–0.1)
BASOPHILS NFR BLD: 1 % (ref 0–1)
BUN SERPL-MCNC: 7 MG/DL (ref 6–20)
BUN/CREAT SERPL: 11 (ref 12–20)
CALCIUM SERPL-MCNC: 8.3 MG/DL (ref 8.5–10.1)
CHLORIDE SERPL-SCNC: 111 MMOL/L (ref 97–108)
CO2 SERPL-SCNC: 21 MMOL/L (ref 21–32)
CREAT SERPL-MCNC: 0.65 MG/DL (ref 0.55–1.02)
DIFFERENTIAL METHOD BLD: NORMAL
EOSINOPHIL # BLD: 0.2 K/UL (ref 0–0.4)
EOSINOPHIL NFR BLD: 3 % (ref 0–7)
ERYTHROCYTE [DISTWIDTH] IN BLOOD BY AUTOMATED COUNT: 12.9 % (ref 11.5–14.5)
GLUCOSE SERPL-MCNC: 98 MG/DL (ref 65–100)
HCG SERPL QL: NEGATIVE
HCT VFR BLD AUTO: 36 % (ref 35–47)
HGB BLD-MCNC: 12.1 G/DL (ref 11.5–16)
IMM GRANULOCYTES # BLD AUTO: 0 K/UL (ref 0–0.04)
IMM GRANULOCYTES NFR BLD AUTO: 0 % (ref 0–0.5)
LYMPHOCYTES # BLD: 2.9 K/UL (ref 0.8–3.5)
LYMPHOCYTES NFR BLD: 46 % (ref 12–49)
MCH RBC QN AUTO: 29 PG (ref 26–34)
MCHC RBC AUTO-ENTMCNC: 33.6 G/DL (ref 30–36.5)
MCV RBC AUTO: 86.3 FL (ref 80–99)
MONOCYTES # BLD: 0.4 K/UL (ref 0–1)
MONOCYTES NFR BLD: 6 % (ref 5–13)
NEUTS SEG # BLD: 2.9 K/UL (ref 1.8–8)
NEUTS SEG NFR BLD: 44 % (ref 32–75)
NRBC # BLD: 0 K/UL (ref 0–0.01)
NRBC BLD-RTO: 0 PER 100 WBC
PLATELET # BLD AUTO: 192 K/UL (ref 150–400)
PMV BLD AUTO: 10.1 FL (ref 8.9–12.9)
POTASSIUM SERPL-SCNC: 3.4 MMOL/L (ref 3.5–5.1)
RBC # BLD AUTO: 4.17 M/UL (ref 3.8–5.2)
SODIUM SERPL-SCNC: 140 MMOL/L (ref 136–145)
WBC # BLD AUTO: 6.5 K/UL (ref 3.6–11)

## 2020-01-28 PROCEDURE — 93005 ELECTROCARDIOGRAM TRACING: CPT

## 2020-01-28 PROCEDURE — 36415 COLL VENOUS BLD VENIPUNCTURE: CPT

## 2020-01-28 PROCEDURE — 99218 HC RM OBSERVATION: CPT

## 2020-01-28 PROCEDURE — 96374 THER/PROPH/DIAG INJ IV PUSH: CPT

## 2020-01-28 PROCEDURE — 74011250636 HC RX REV CODE- 250/636: Performed by: EMERGENCY MEDICINE

## 2020-01-28 PROCEDURE — 85025 COMPLETE CBC W/AUTO DIFF WBC: CPT

## 2020-01-28 PROCEDURE — 99285 EMERGENCY DEPT VISIT HI MDM: CPT

## 2020-01-28 PROCEDURE — 74011250637 HC RX REV CODE- 250/637: Performed by: FAMILY MEDICINE

## 2020-01-28 PROCEDURE — 72141 MRI NECK SPINE W/O DYE: CPT

## 2020-01-28 PROCEDURE — 96376 TX/PRO/DX INJ SAME DRUG ADON: CPT

## 2020-01-28 PROCEDURE — 96375 TX/PRO/DX INJ NEW DRUG ADDON: CPT

## 2020-01-28 PROCEDURE — 84703 CHORIONIC GONADOTROPIN ASSAY: CPT

## 2020-01-28 PROCEDURE — 80048 BASIC METABOLIC PNL TOTAL CA: CPT

## 2020-01-28 RX ORDER — POTASSIUM CHLORIDE 750 MG/1
20 TABLET, FILM COATED, EXTENDED RELEASE ORAL
Status: COMPLETED | OUTPATIENT
Start: 2020-01-28 | End: 2020-01-28

## 2020-01-28 RX ORDER — HYDROMORPHONE HYDROCHLORIDE 1 MG/ML
0.5 INJECTION, SOLUTION INTRAMUSCULAR; INTRAVENOUS; SUBCUTANEOUS ONCE
Status: COMPLETED | OUTPATIENT
Start: 2020-01-28 | End: 2020-01-28

## 2020-01-28 RX ORDER — KETOROLAC TROMETHAMINE 30 MG/ML
30 INJECTION, SOLUTION INTRAMUSCULAR; INTRAVENOUS
Status: COMPLETED | OUTPATIENT
Start: 2020-01-28 | End: 2020-01-28

## 2020-01-28 RX ORDER — DIAZEPAM 10 MG/2ML
5 INJECTION INTRAMUSCULAR
Status: COMPLETED | OUTPATIENT
Start: 2020-01-28 | End: 2020-01-28

## 2020-01-28 RX ADMIN — KETOROLAC TROMETHAMINE 30 MG: 30 INJECTION, SOLUTION INTRAMUSCULAR; INTRAVENOUS at 20:08

## 2020-01-28 RX ADMIN — HYDROMORPHONE HYDROCHLORIDE 0.5 MG: 1 INJECTION, SOLUTION INTRAMUSCULAR; INTRAVENOUS; SUBCUTANEOUS at 23:21

## 2020-01-28 RX ADMIN — DIAZEPAM 5 MG: 5 INJECTION, SOLUTION INTRAMUSCULAR; INTRAVENOUS at 20:08

## 2020-01-28 RX ADMIN — HYDROMORPHONE HYDROCHLORIDE 0.5 MG: 1 INJECTION, SOLUTION INTRAMUSCULAR; INTRAVENOUS; SUBCUTANEOUS at 20:43

## 2020-01-28 RX ADMIN — POTASSIUM CHLORIDE 20 MEQ: 750 TABLET, FILM COATED, EXTENDED RELEASE ORAL at 23:21

## 2020-01-29 ENCOUNTER — APPOINTMENT (OUTPATIENT)
Dept: CT IMAGING | Age: 30
End: 2020-01-29
Attending: PHYSICIAN ASSISTANT
Payer: COMMERCIAL

## 2020-01-29 ENCOUNTER — TELEPHONE (OUTPATIENT)
Dept: NEUROLOGY | Age: 30
End: 2020-01-29

## 2020-01-29 ENCOUNTER — APPOINTMENT (OUTPATIENT)
Dept: GENERAL RADIOLOGY | Age: 30
End: 2020-01-29
Attending: PHYSICIAN ASSISTANT
Payer: COMMERCIAL

## 2020-01-29 VITALS
RESPIRATION RATE: 16 BRPM | HEIGHT: 68 IN | BODY MASS INDEX: 21.22 KG/M2 | OXYGEN SATURATION: 98 % | SYSTOLIC BLOOD PRESSURE: 93 MMHG | TEMPERATURE: 97.9 F | WEIGHT: 140 LBS | DIASTOLIC BLOOD PRESSURE: 67 MMHG | HEART RATE: 104 BPM

## 2020-01-29 LAB
ANION GAP SERPL CALC-SCNC: 5 MMOL/L (ref 5–15)
ATRIAL RATE: 103 BPM
BASOPHILS # BLD: 0.1 K/UL (ref 0–0.1)
BASOPHILS NFR BLD: 1 % (ref 0–1)
BUN SERPL-MCNC: 7 MG/DL (ref 6–20)
BUN/CREAT SERPL: 10 (ref 12–20)
CALCIUM SERPL-MCNC: 8.2 MG/DL (ref 8.5–10.1)
CALCULATED P AXIS, ECG09: 83 DEGREES
CALCULATED R AXIS, ECG10: 76 DEGREES
CALCULATED T AXIS, ECG11: 61 DEGREES
CHLORIDE SERPL-SCNC: 111 MMOL/L (ref 97–108)
CO2 SERPL-SCNC: 25 MMOL/L (ref 21–32)
CREAT SERPL-MCNC: 0.69 MG/DL (ref 0.55–1.02)
DIAGNOSIS, 93000: NORMAL
DIFFERENTIAL METHOD BLD: ABNORMAL
EOSINOPHIL # BLD: 0.2 K/UL (ref 0–0.4)
EOSINOPHIL NFR BLD: 4 % (ref 0–7)
ERYTHROCYTE [DISTWIDTH] IN BLOOD BY AUTOMATED COUNT: 12.9 % (ref 11.5–14.5)
GLUCOSE SERPL-MCNC: 94 MG/DL (ref 65–100)
HCT VFR BLD AUTO: 36.1 % (ref 35–47)
HGB BLD-MCNC: 11.7 G/DL (ref 11.5–16)
IMM GRANULOCYTES # BLD AUTO: 0 K/UL (ref 0–0.04)
IMM GRANULOCYTES NFR BLD AUTO: 1 % (ref 0–0.5)
LYMPHOCYTES # BLD: 2.4 K/UL (ref 0.8–3.5)
LYMPHOCYTES NFR BLD: 46 % (ref 12–49)
MCH RBC QN AUTO: 29 PG (ref 26–34)
MCHC RBC AUTO-ENTMCNC: 32.4 G/DL (ref 30–36.5)
MCV RBC AUTO: 89.4 FL (ref 80–99)
MONOCYTES # BLD: 0.5 K/UL (ref 0–1)
MONOCYTES NFR BLD: 10 % (ref 5–13)
NEUTS SEG # BLD: 2 K/UL (ref 1.8–8)
NEUTS SEG NFR BLD: 38 % (ref 32–75)
NRBC # BLD: 0 K/UL (ref 0–0.01)
NRBC BLD-RTO: 0 PER 100 WBC
P-R INTERVAL, ECG05: 120 MS
PLATELET # BLD AUTO: 176 K/UL (ref 150–400)
PMV BLD AUTO: 10.7 FL (ref 8.9–12.9)
POTASSIUM SERPL-SCNC: 4.2 MMOL/L (ref 3.5–5.1)
Q-T INTERVAL, ECG07: 374 MS
QRS DURATION, ECG06: 74 MS
QTC CALCULATION (BEZET), ECG08: 489 MS
RBC # BLD AUTO: 4.04 M/UL (ref 3.8–5.2)
SODIUM SERPL-SCNC: 141 MMOL/L (ref 136–145)
VENTRICULAR RATE, ECG03: 103 BPM
WBC # BLD AUTO: 5.2 K/UL (ref 3.6–11)

## 2020-01-29 PROCEDURE — 74011250636 HC RX REV CODE- 250/636: Performed by: FAMILY MEDICINE

## 2020-01-29 PROCEDURE — 74011000250 HC RX REV CODE- 250: Performed by: INTERNAL MEDICINE

## 2020-01-29 PROCEDURE — 74011250637 HC RX REV CODE- 250/637: Performed by: INTERNAL MEDICINE

## 2020-01-29 PROCEDURE — 72125 CT NECK SPINE W/O DYE: CPT

## 2020-01-29 PROCEDURE — 99218 HC RM OBSERVATION: CPT

## 2020-01-29 PROCEDURE — 36415 COLL VENOUS BLD VENIPUNCTURE: CPT

## 2020-01-29 PROCEDURE — 51798 US URINE CAPACITY MEASURE: CPT

## 2020-01-29 PROCEDURE — 72050 X-RAY EXAM NECK SPINE 4/5VWS: CPT

## 2020-01-29 PROCEDURE — 74011250636 HC RX REV CODE- 250/636: Performed by: PHYSICIAN ASSISTANT

## 2020-01-29 PROCEDURE — 97110 THERAPEUTIC EXERCISES: CPT

## 2020-01-29 PROCEDURE — 96376 TX/PRO/DX INJ SAME DRUG ADON: CPT

## 2020-01-29 PROCEDURE — 94760 N-INVAS EAR/PLS OXIMETRY 1: CPT

## 2020-01-29 PROCEDURE — 80048 BASIC METABOLIC PNL TOTAL CA: CPT

## 2020-01-29 PROCEDURE — 97161 PT EVAL LOW COMPLEX 20 MIN: CPT

## 2020-01-29 PROCEDURE — 85025 COMPLETE CBC W/AUTO DIFF WBC: CPT

## 2020-01-29 RX ORDER — SODIUM CHLORIDE 0.9 % (FLUSH) 0.9 %
5-40 SYRINGE (ML) INJECTION AS NEEDED
Status: DISCONTINUED | OUTPATIENT
Start: 2020-01-29 | End: 2020-01-29 | Stop reason: HOSPADM

## 2020-01-29 RX ORDER — HYDROMORPHONE HYDROCHLORIDE 1 MG/ML
0.5 INJECTION, SOLUTION INTRAMUSCULAR; INTRAVENOUS; SUBCUTANEOUS
Status: DISCONTINUED | OUTPATIENT
Start: 2020-01-29 | End: 2020-01-29 | Stop reason: HOSPADM

## 2020-01-29 RX ORDER — ACETAMINOPHEN AND CODEINE PHOSPHATE 120; 12 MG/5ML; MG/5ML
1 SOLUTION ORAL
Status: DISCONTINUED | OUTPATIENT
Start: 2020-01-29 | End: 2020-01-29 | Stop reason: HOSPADM

## 2020-01-29 RX ORDER — LIDOCAINE 4 G/100G
1 PATCH TOPICAL EVERY 24 HOURS
Status: DISCONTINUED | OUTPATIENT
Start: 2020-01-29 | End: 2020-01-29 | Stop reason: HOSPADM

## 2020-01-29 RX ORDER — ACETAMINOPHEN AND CODEINE PHOSPHATE 120; 12 MG/5ML; MG/5ML
1 SOLUTION ORAL
COMMUNITY
End: 2022-10-14

## 2020-01-29 RX ORDER — TOPIRAMATE 50 MG/1
50 TABLET, FILM COATED ORAL
COMMUNITY
End: 2021-04-06 | Stop reason: SDUPTHER

## 2020-01-29 RX ORDER — CYCLOBENZAPRINE HCL 10 MG
10 TABLET ORAL 3 TIMES DAILY
Qty: 30 TAB | Refills: 0 | OUTPATIENT
Start: 2020-01-29 | End: 2022-01-04

## 2020-01-29 RX ORDER — ACETAMINOPHEN 325 MG/1
650 TABLET ORAL EVERY 6 HOURS
Status: DISCONTINUED | OUTPATIENT
Start: 2020-01-29 | End: 2020-01-29 | Stop reason: HOSPADM

## 2020-01-29 RX ORDER — SODIUM CHLORIDE 0.9 % (FLUSH) 0.9 %
5-40 SYRINGE (ML) INJECTION EVERY 8 HOURS
Status: DISCONTINUED | OUTPATIENT
Start: 2020-01-29 | End: 2020-01-29 | Stop reason: HOSPADM

## 2020-01-29 RX ORDER — TOPIRAMATE 25 MG/1
50 TABLET ORAL
Status: DISCONTINUED | OUTPATIENT
Start: 2020-01-29 | End: 2020-01-29 | Stop reason: HOSPADM

## 2020-01-29 RX ORDER — ONDANSETRON 2 MG/ML
4 INJECTION INTRAMUSCULAR; INTRAVENOUS
Status: DISCONTINUED | OUTPATIENT
Start: 2020-01-29 | End: 2020-01-29 | Stop reason: HOSPADM

## 2020-01-29 RX ORDER — KETOROLAC TROMETHAMINE 30 MG/ML
30 INJECTION, SOLUTION INTRAMUSCULAR; INTRAVENOUS EVERY 6 HOURS
Status: DISCONTINUED | OUTPATIENT
Start: 2020-01-29 | End: 2020-01-29 | Stop reason: HOSPADM

## 2020-01-29 RX ORDER — LIDOCAINE 4 G/100G
1 PATCH TOPICAL EVERY 12 HOURS
Qty: 10 PATCH | Refills: 0 | Status: SHIPPED | OUTPATIENT
Start: 2020-01-29 | End: 2020-02-03

## 2020-01-29 RX ORDER — ACETAMINOPHEN 325 MG/1
650 TABLET ORAL EVERY 6 HOURS
Qty: 24 TAB | Refills: 0 | Status: SHIPPED | OUTPATIENT
Start: 2020-01-29 | End: 2020-02-01

## 2020-01-29 RX ORDER — CYCLOBENZAPRINE HCL 10 MG
10 TABLET ORAL 3 TIMES DAILY
Status: DISCONTINUED | OUTPATIENT
Start: 2020-01-29 | End: 2020-01-29 | Stop reason: HOSPADM

## 2020-01-29 RX ORDER — ESCITALOPRAM OXALATE 10 MG/1
20 TABLET ORAL
Status: DISCONTINUED | OUTPATIENT
Start: 2020-01-29 | End: 2020-01-29 | Stop reason: HOSPADM

## 2020-01-29 RX ORDER — CLONAZEPAM 1 MG/1
2 TABLET ORAL
Status: DISCONTINUED | OUTPATIENT
Start: 2020-01-29 | End: 2020-01-29 | Stop reason: HOSPADM

## 2020-01-29 RX ADMIN — KETOROLAC TROMETHAMINE 30 MG: 30 INJECTION, SOLUTION INTRAMUSCULAR; INTRAVENOUS at 12:29

## 2020-01-29 RX ADMIN — Medication 10 ML: at 06:24

## 2020-01-29 RX ADMIN — HYDROMORPHONE HYDROCHLORIDE 0.5 MG: 1 INJECTION, SOLUTION INTRAMUSCULAR; INTRAVENOUS; SUBCUTANEOUS at 05:35

## 2020-01-29 RX ADMIN — CYCLOBENZAPRINE 10 MG: 10 TABLET, FILM COATED ORAL at 17:15

## 2020-01-29 RX ADMIN — CYCLOBENZAPRINE 10 MG: 10 TABLET, FILM COATED ORAL at 09:53

## 2020-01-29 RX ADMIN — HYDROMORPHONE HYDROCHLORIDE 0.5 MG: 1 INJECTION, SOLUTION INTRAMUSCULAR; INTRAVENOUS; SUBCUTANEOUS at 01:41

## 2020-01-29 RX ADMIN — ACETAMINOPHEN 650 MG: 325 TABLET ORAL at 09:53

## 2020-01-29 RX ADMIN — ACETAMINOPHEN 650 MG: 325 TABLET ORAL at 17:15

## 2020-01-29 RX ADMIN — Medication 10 ML: at 01:41

## 2020-01-29 NOTE — PROGRESS NOTES
Bedside and Verbal shift change report given to Thaddeus (oncoming nurse) by Jessica Low (offgoing nurse). Report included the following information SBAR, Kardex, Procedure Summary, Intake/Output and MAR. Bladder scan 0.

## 2020-01-29 NOTE — PROGRESS NOTES
Waiting for final read on CT, but no change in position of dens from her 2019 CT/MRI from my read. [facility] to over-read. Once done and if they don't see worsening position or stress fracture ok to DC home. There does not appear to be worsening instability and xrays done today (flexion/extension) also show no worsening instability. Recommend a soft collar for myofascial relief. Ok to DC home today from my standpoint. No emergent need for stabilization of C2. Needs followup with Dr. Roland Frausto in 3-4 days to prevent kick back to the ER- needs outpatient therapy with dry needling, scapular stabilization, and cervical strengthening. Ok to send on muscle relaxant and analgesic if desired, but would not write for long term narcotics.       William Thrasher PA-C  Orthopaedic Surgery PA  [address] [facility]

## 2020-01-29 NOTE — TELEPHONE ENCOUNTER
I called pt and she is just being discharged from [facility]. She was given Zanaflex by on call  last night.   No message for Dr PAUL

## 2020-01-29 NOTE — ED NOTES
Patient continues to have neck pain but has no other complaints at this time. Rates pain at 10/10. Does not have to use the restroom at this time and denies having needs. Patient in a position of comfort. Side rails up x 2, personal belongings and call bell within reach. Will continue to monitor and re-assess as appropriate. 2013  Pain medication administered. Pt is more tearful and yelling out how much in pain she is. She states she forgot to mention to the MD that for the last month she's had tinnitus and left side of the face would get numb periodically. 2045  Dilaudid administered for pt's 10/10 pain. MRI screen completed and charted. 2105  Pt taken back by MRI. Per , pt stated to him prior to transport that the pain med had \"taken the edge off\". 2207  Pt back from MRI. Responded to call bell, pt stated that her pain was coming back and is at a 9/10. Pt is much more relaxed and calm since her initial assessment. 10:23 PM Patient called out for more pain medication. Dr. Vikki Guardado informed and is going to evaluate the patient.      2325  Dilaudid administered for 10/10 neck pain
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
## 2020-01-29 NOTE — PROGRESS NOTES
Occupational Therapy Note:  Orders received and appreciated. Chart reviewed. Pt currently RICK for CT and unavailable. Will follow up at later time with OT eval.  Thank you for the consult.   Brenda Aragon, OTR/L, CBIS

## 2020-01-29 NOTE — PROGRESS NOTES
Occupational Therapy Note:  Orders received and appreciated. Chart reviewed. Spoke with RN, PT and PT. Pt reports cervical and back pain/spasms requiring cautious ambulation and increased time, but able to perform ADLs without assist.  Pt and PT agree that no OT services required at this time. Will complete OT order. Thank you for the consult.   Grisel Aragon, OTR/L, CBIS

## 2020-01-29 NOTE — ED NOTES
TRANSFER - OUT REPORT:    Verbal report given to Lorrie Thompson RN (name) on Hortencia Hammond  being transferred to [place]'s Floor (unit) for routine progression of care       Report consisted of patients Situation, Background, Assessment and   Recommendations(SBAR). Information from the following report(s) SBAR, Kardex, ED Summary, Procedure Summary, Intake/Output, MAR, Recent Results, Med Rec Status and Cardiac Rhythm Sinus Tachy was reviewed with the receiving nurse. Lines:   Peripheral IV 01/28/20 Left Antecubital (Active)   Site Assessment Clean, dry, & intact 1/28/2020  7:11 PM   Phlebitis Assessment 0 1/28/2020  7:11 PM   Infiltration Assessment 0 1/28/2020  7:11 PM   Dressing Status Clean, dry, & intact 1/28/2020  7:11 PM        Opportunity for questions and clarification was provided.       Patient transported with:   [address]

## 2020-01-29 NOTE — PROGRESS NOTES
Physical Therapy:    Consult received, chart reviewed and RN consulted regarding patient. RN notes patient up ad surjit but moving cautiously, still c/o spasms. Currently RICK in CT. Will f/u as able and appropriate later today.       Terence Reveles, MS, PT
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
## 2020-01-29 NOTE — DISCHARGE INSTRUCTIONS
HOSPITALIST DISCHARGE INSTRUCTIONS  NAME: Murphy Garcia   :  1990   MRN:  [id number]     Date/Time:  2020 9:49 AM    ADMIT DATE: 2020     DISCHARGE DATE: 2020     PRINCIPAL DISCHARGE DIAGNOSES:  Neck pain, suspect from muscle spasms    MEDICATIONS:  · It is important that you take the medication exactly as they are prescribed. Note the changes and additions to your medications. Be sure you understand these changes before you are discharged today. · Keep your medication in the bottles provided by the pharmacist and keep a list of the medication names, dosages, and times to be taken in your wallet. · Do not take other medications without consulting your doctor. Pain Management: per above medications    What to do at Home    Recommended diet:  Resume previous diet    Recommended activity: PT Eval and Treat    If you experience any of the following symptoms then please call your primary care physician or return to the emergency room if you cannot get hold of your doctor:  Severe neck pain, headache, facial numbness, arm weakness or numbness, or other severe concerning symptoms    Follow Up: Follow-up Information     Follow up With Specialties Details Why Contact Info    Pt own medication- Norethindrone   Return to zach at discharge     Follow up with orthopedics as instructed                Information obtained by :  I understand that if any problems occur once I am at home I am to contact my physician. I understand and acknowledge receipt of the instructions indicated above.                                                                                                                                            Physician's or R.N.'s Signature                                                                  Date/Time                                                                                                                                              Patient or Representative Signature                                                          Date/Time
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
## 2020-01-29 NOTE — DISCHARGE SUMMARY
Physician Discharge Summary     Patient ID:  Arvis Hashimoto  [id number]  27 y.o.  1990    Admit date: 1/28/2020    Discharge date: 1/29/2020    Admission Diagnoses: Acute neck pain [M54.2]  Back pain [M54.9]  Muscle spasm [M62.838]    Principal Discharge Diagnoses:    Acute neck pain    OTHER PROBLEMS ADDRESSEDS  Principal Diagnosis <principal problem not specified>                                            Active Problems:    Migraine (2/27/2018)      PTSD (post-traumatic stress disorder) (2/27/2018)      Anxiety and depression ()      Back pain (1/28/2020)      Muscle spasm (1/28/2020)      Acute neck pain (1/28/2020)       Patient Active Problem List   Diagnosis Code    Migraine G43.909    PTSD (post-traumatic stress disorder) F43.10    Anxiety and depression F41.9, F32.9    Migraine with status migrainosus G43.901    Back pain M54.9    Muscle spasm M62.838    Acute neck pain M54.2         Hospital Course:   Ms. Rosendo Bain is a pleasant 31 yo WF w/ hx of migraines, depression/anxiety presenting with acute neck pain. No headache, chest pain, or dyspnea. Evaluated by ortho, thought Mmofascial periscapular pain. No acute findings on CT or MRI C-spine. Better after lidocaine topical, and Flexeril. Cleared by ortho for discharge. Outpatient follow up recommended. Advised outpatient PT    Pt discharged in improved and stable condition. Procedures performed: none    Imaging studies: see above  MRI C-spine  Normal cervical spinal cord. No significant change since last year. XR C-spine   No evidence of instability or fracture  Addendum: As noted on previous CT, there is left lateral subluxation of the  odontoid process in relation to the C1 vertebral body on the odontoid view. CT C-spine  : The asymmetry from right to left of odontoid with respect to C1 is  unchanged from the prior examination of 11/21/2019. No structural abnormalities  identified.       PCP: None    Consults: Orthopedic Surgery    Discharge Exam:  Patient Vitals for the past 24 hrs:   Temp Pulse Resp BP SpO2   01/29/20 1451 97.9 °F (36.6 °C) (!) 104 16 93/67 98 %   01/29/20 1133 97.7 °F (36.5 °C) 62 16 93/57 98 %   01/29/20 0757 97.6 °F (36.4 °C) 95 16 129/86 98 %   01/29/20 0417 98.6 °F (37 °C) 95 16 110/74 100 %   01/29/20 0033 98.5 °F (36.9 °C) 92 16 114/71 98 %   01/29/20 0000 98 °F (36.7 °C) (!) 112 16 106/71 98 %   01/28/20 2330 -- -- -- 104/63 --     GEN: NAD  CV: RRR  RESP CTAB  NEURO: no focal weakness or numbness    Disposition: home    Patient Instructions:   Current Discharge Medication List      START taking these medications    Details   !! OTHER,NON-FORMULARY, PT evaluate and treat  Qty: 1 Each, Refills: 0      acetaminophen (TYLENOL) 325 mg tablet Take 2 Tabs by mouth every six (6) hours for 3 days. Do not drink alcohol  Qty: 24 Tab, Refills: 0      cyclobenzaprine (FLEXERIL) 10 mg tablet Take 1 Tab by mouth three (3) times daily. May cause drowsiness  Qty: 30 Tab, Refills: 0      lidocaine 4 % patch 1 Patch by TransDERmal route every twelve (12) hours every twelve (12) hours for 5 days. Apply to affected area  Qty: 10 Patch, Refills: 0       !! - Potential duplicate medications found. Please discuss with provider. CONTINUE these medications which have NOT CHANGED    Details   norethindrone (KOBY) 0.35 mg tab Take 1 Tab by mouth nightly. topiramate (TOPAMAX) 50 mg tablet Take 50 mg by mouth nightly. !! Sharon Grady with zinc one by mouth at bedtime      galcanezumab-gnlm (EMGALITY PEN) 120 mg/mL injection 1 mL by SubCUTAneous route every thirty (30) days. Qty: 1 mL, Refills: 11      clonazePAM (KLONOPIN) 1 mg tablet Take 2 mg by mouth nightly. Refills: 5      escitalopram (LEXAPRO) 20 mg tablet Take 20 mg by mouth nightly. Indications: Generalized Anxiety Disorder       !! - Potential duplicate medications found. Please discuss with provider.           Activity: See discharge instructions  Diet: See discharge instructions  Wound Care: See discharge instructions    Follow-up Information     Follow up With Specialties Details Why Contact Info    Pt own medication- Norethindrone   Return to patietn at discharge     Follow up with orthopedics as instructed              I spent 35 minutes on this discharge.     Signed:  Marcus Villeda MD  1/29/2020  9:50 AM
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
## 2020-01-29 NOTE — PROGRESS NOTES
Patient received scripts and a copy of instructions. All new medications and instructions were read and explained to her. Patient handed a work note.  Verbalized understanding
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
## 2020-01-29 NOTE — PROGRESS NOTES
Spiritual Care Assessment/Progress Note  [address]      NAME: Tin Tomas      MRN: [id number]  AGE: 30 y.o.  SEX: female  Faith Affiliation: No preference   Language: English     1/29/2020     Total Time (in minutes): 14     Spiritual Assessment begun in [facility] 4[facility] POST SURG ORT 1 through conversation with:         [x]Patient        [] Family    [] Friend(s)        Reason for Consult: Advance medical directive consult     Spiritual beliefs: (Please include comment if needed)     [] Identifies with a samantha tradition:         [] Supported by a samantha community:            [x] Claims no spiritual orientation:           [] Seeking spiritual identity:                [] Adheres to an individual form of spirituality:           [] Not able to assess:                           Identified resources for coping:      [] Prayer                               [] Music                  [] Guided Imagery     [] Family/friends                 [] Pet visits     [] Devotional reading                         [] Unknown     [] Other:                                        Interventions offered during this visit: (See comments for more details)    Patient Interventions: Affirmation of emotions/emotional suffering, Initial/Spiritual assessment, patient floor           Plan of Care:     [] Support spiritual and/or cultural needs    [] Support AMD and/or advance care planning process      [] Support grieving process   [] Coordinate Rites and/or Rituals    [] Coordination with community clergy   [x] No spiritual needs identified at this time   [] Detailed Plan of Care below (See Comments)  [] Make referral to Music Therapy  [] Make referral to Pet Therapy     [] Make referral to Addiction services  [] Make referral to [facility]  [] Make referral to Spiritual Care Partner  [] No future visits requested        [] Follow up visits as needed     Comments: Responded to In Basket request for and Advance Medical Directive (AMD) consult on 4 Post Surg. Miss Luis Enrique Farias indicated she was too tired to consider and AMD at this time. She has a spouse, no adult children, and her parents. Informed her that her spouse would be primary decision maker and her parents would be secondary decision maker by state law. Left her an AMD form and Your Right to decide booklet for her to review. Advised of  Availability. Miss Luis Enrique Farias indicated she does not really have specific beliefs, she chose not to elaborate at this time. Provided supportive presence.     Visited by: Andrew Her., MS., [address] ([id number])

## 2020-01-29 NOTE — PROGRESS NOTES
1/29/2020  5:44 PM  Reason for Admission:   Acute neck pain                   RUR Score:          5           Plan for utilizing home health:      No HH needs indicated. Current Advanced Directive/Advance Care Plan: Not on file. Pt defers to [facility]. Transition of Care Plan:                  Home with family assistance and outpatient follow-up. PTA, pt was independent with ADLs and employed. Beryl Hernandez MA    Care Management Interventions  PCP Verified by CM: Yes(Pt reports intent to see her mother's PCP. )  Mode of Transport at Discharge:  Other (see comment)(Fiance)  MyChart Signup: No  Discharge Durable Medical Equipment: No  Physical Therapy Consult: Yes  Occupational Therapy Consult: Yes  Speech Therapy Consult: No  Current Support Network: Lives with Spouse  Confirm Follow Up Transport: Family  Discharge Location  Discharge Placement: Home with family assistance
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
## 2020-01-29 NOTE — PROGRESS NOTES
Admission Medication Reconciliation:     Information obtained from:    Patient  RxQuery data available¹:  YES    Comments/Recommendations:   Family can provide the birth control in the original package if ordered  Updated PTA medication list  Verified preferred pharmacy  Reviewed patient's allergies     ¹RxQuery pharmacy benefit data reflects medications filled and processed through the patient's insurance, however   this data does NOT capture whether the medication was picked up or is currently being taken by the patient. Prior to Admission Medications   Prescriptions Last Dose Informant Taking? OTHER,NON-FORMULARY, 2020 Self Yes   Sig: Elderberry with zinc one by mouth at bedtime   clonazePAM (KLONOPIN) 1 mg tablet 2020 Self Yes   Sig: Take 2 mg by mouth nightly. escitalopram (LEXAPRO) 20 mg tablet 2020 Self Yes   Sig: Take 20 mg by mouth nightly. Indications: Generalized Anxiety Disorder   galcanezumab-gnlm (EMGALITY PEN) 120 mg/mL injection 2020 Self Yes   Si mL by SubCUTAneous route every thirty (30) days. norethindrone (KOBY) 0.35 mg tab 2020 at Unknown time Self Yes   Sig: Take 1 Tab by mouth nightly. topiramate (TOPAMAX) 50 mg tablet 2020 at Unknown time Self Yes   Sig: Take 50 mg by mouth nightly. Facility-Administered Medications: None         Please contact the main inpatient pharmacy with any questions or concerns at [phone] and we will direct you to the clinical pharmacist covering this patient's care while in-house.    Angelica Mosley, PharmD, BCPS

## 2020-01-29 NOTE — PROGRESS NOTES
[address] January 29, 2020       RE: Chen Baxter      To Whom It May Concern,    This is to certify that Chen Baxter was hospitalized at [facility] from 1/28/2020 to 1/29/2020 and may may return to school on 2/3. Please feel free to contact my office if you have any questions or concerns. Thank you for your assistance in this matter.       Sincerely,  Maryjane Colvin MD  [phone]

## 2020-01-29 NOTE — ACP (ADVANCE CARE PLANNING)
Responded to In [employer] request for and Advance Medical Directive (AMD) consult on 4 Post Surg. Miss Mitali Portillo indicated she was too tired to consider and AMD at this time. She has a spouse, no adult children, and her parents. Informed her that her spouse would be primary decision maker and her parents would be secondary decision maker by state law. Left her an AMD form and Your Right to decide booklet for her to review.   Advised of  Availability   Visited by: Johnathan Reveles., MS., [address] ([id number])

## 2020-03-04 NOTE — TELEPHONE ENCOUNTER
Pt called for refills of her Emgality. [facility] her [facility] on [facility]. Lov  4/20/19    Pending  3-16-20 with you.
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
## 2020-04-09 ENCOUNTER — VIRTUAL VISIT (OUTPATIENT)
Dept: NEUROLOGY | Age: 30
End: 2020-04-09

## 2020-04-09 VITALS — WEIGHT: 145 LBS | HEIGHT: 68 IN | BODY MASS INDEX: 21.98 KG/M2

## 2020-04-09 NOTE — PROGRESS NOTES
Chief Complaint   Patient presents with    Migraine       HPI    Na Rendon is a 70-year-old woman known to me for a very long history of refractory chronic migraine. Since I saw her last she has been utilizing Emaglity with excellent results very rare minimal breakthrough migraine. She is also had significant changes socially since our last meeting. 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 She did have covid-like symptoms a couple months ago but recovered well. Her mother was a known positive. Additionally she is now  living with her . She is very happy with her current level of migraine control. bkgd:  Na Rendon is a 70-year-old woman who has a very long history of refractory migraines here to follow-up. She was started on Emaglity at the beginning of the year and has done extremely well with minimal headaches. Unfortunately about 5 days ago she developed a breakthrough migraine that has not relented. She is gone to the emergency room. She typically will experience a severe episode of status migrainosus once or twice a year. She has been admitted before for DHE with minimal success. She cannot have Benadryl or Haldol or Thorazine or Phenergan due to paradoxical effect. Currently the headache is diffuse throbbing with a lot of posterior neck spasm. She tries to get a massage to help combat the neck spasm but her attempt yesterday failed when she became acutely nauseous and vomiting. No fevers.     -------------------------------------------------------------------------------------------------------------------------------------------------------------------------------------------------------------------------------------------------  This is a telemedicine visit that was performed with the originating site at the patients's HOME and the distant site at [facility] outpatient Neurology clinic at [address] [place] [address] in [place], [place]. Verbal consent to participate in the video visit was obtained and 2 factor identification was completed (name and  verified). This visit occurred during the [address] emergency and a telemedicine visit was done in lieu of an office visit. I discussed with the patient the nature of our telemedicine visit is that:  - I would evaluate the patient and recommend diagnostic and treatment based on my assessment  - Our sessions are not being recorded and that personal health information is protected  - Our team will provide follow-up care in person if when the patient needs it. Review of Systems   Eyes: Negative for double vision. Neurological: Positive for headaches. All other systems reviewed and are negative. Past Medical History:   Diagnosis Date    Anxiety and depression     Bartholin's gland abscess     x8    Endometriosis     Migraine     Ovarian cyst     Panic attack     Pyelonephritis      Family History   Problem Relation Age of Onset    Diabetes Maternal Aunt     Heart Disease Maternal Aunt     Stroke Maternal Aunt     Heart Disease Maternal Grandmother     No Known Problems Mother      Social History     Socioeconomic History    Marital status: SINGLE     Spouse name: Not on file    Number of children: Not on file    Years of education: Not on file    Highest education level: Not on file   Occupational History    Not on file   Social Needs    Financial resource strain: Not on file    Food insecurity     Worry: Not on file     Inability: Not on file    Transportation needs     Medical: Not on file     Non-medical: Not on file   Tobacco Use    Smoking status: Former Smoker    Smokeless tobacco: Never Used   Substance and Sexual Activity    Alcohol use:  Yes     Alcohol/week: 1.0 standard drinks     Types: 1 Glasses of wine per week     Comment: social    Drug use: No    Sexual activity: Yes     Partners: Male     Birth control/protection: Pill, Condom   Lifestyle    Physical activity     Days per week: Not on file     Minutes per session: Not on file    Stress: Not on file   Relationships    Social connections     Talks on phone: Not on file     Gets together: Not on file     Attends Buddhist service: Not on file     Active member of club or organization: Not on file     Attends meetings of clubs or organizations: Not on file     Relationship status: Not on file    Intimate partner violence     Fear of current or ex partner: Not on file     Emotionally abused: Not on file     Physically abused: Not on file     Forced sexual activity: Not on file   Other Topics Concern    Not on file   Social History Narrative    Not on file     Allergies   Allergen Reactions    Benadryl [Diphenhydramine Hcl] Other (comments)     Makes me feel funny. Need ativan if I get benadryl    Compazine [Prochlorperazine Edisylate] Anxiety    Depacon [Valproate Sodium] Other (comments)     Pt reports having restlessness with this medication.  Haldol [Haloperidol Lactate] Other (comments)    Promethazine Other (comments)     \"It makes me feel really funny, nausea and dizzy\"    Reglan [Metoclopramide] Other (comments)         Current Outpatient Medications   Medication Sig    galcanezumab-gnlm (EMGALITY PEN) 120 mg/mL injection 1 mL by SubCUTAneous route every thirty (30) days.  topiramate (TOPAMAX) 50 mg tablet Take 50 mg by mouth nightly. Kiara Bang with zinc one by mouth at bedtime    clonazePAM (KLONOPIN) 1 mg tablet Take 2 mg by mouth nightly.  escitalopram (LEXAPRO) 20 mg tablet Take 20 mg by mouth nightly. Indications: Generalized Anxiety Disorder    norethindrone (KOBY) 0.35 mg tab Take 1 Tab by mouth nightly. Juanyadira Delacruz, PT evaluate and treat    cyclobenzaprine (FLEXERIL) 10 mg tablet Take 1 Tab by mouth three (3) times daily.  May cause drowsiness     No current facility-administered medications for this visit. Neurologic Exam     Mental Status   WD/WN adult in NAD, normal grooming  VSS  A&O x 3    PERRL, nonicteric  Face is symmetric, tongue midline  Speech is fluent and clear  No limb ataxia. No abnl movements. Moving all extemities spontaneously and symmetric  Normal gait               Visit Vitals  Ht 5' 8\" (1.727 m)   Wt 65.8 kg (145 lb)   LMP 03/10/2020 (Exact Date)   BMI 22.05 kg/m²       Assessment and Plan   Diagnoses and all orders for this visit:    1. Chronic migraine      75-year-old woman with a very long history of refractory chronic migraine highly influenced by stressors. Currently she is doing exceptionally well. She has made multiple distinctive changes in her life that has been beneficial.  We are going to maintain Emaglity as is. No changes. All medications remain the same. If she has increasing symptoms she will let us know. We also talked at length about her possible covid symptoms which seem to have resolved at this point and she is also completed some quarantine. I would like to see her early next year. I reviewed and decided to continue the current medications. This clinical note was dictated with an electronic dictation software that can make unintentional errors. If there are any questions, please contact me directly for clarification. More than 50% of this 20-minute visit was spent discussing her history of refractory migraines and medication history also discussing her therapies and plans moving forward.     2 [facility], [facility] [address]  Diplomate SANDOR

## 2020-06-16 ENCOUNTER — TELEPHONE (OUTPATIENT)
Dept: NEUROLOGY | Age: 30
End: 2020-06-16

## 2020-06-16 NOTE — TELEPHONE ENCOUNTER
Pt calling stating she was in the Er last night due to migraine, does have a vv on Monday. Pt states she just doesn't know what to do. Please call.

## 2020-06-17 ENCOUNTER — VIRTUAL VISIT (OUTPATIENT)
Dept: NEUROLOGY | Age: 30
End: 2020-06-17

## 2020-06-17 DIAGNOSIS — F32.A ANXIETY AND DEPRESSION: ICD-10-CM

## 2020-06-17 DIAGNOSIS — F43.10 PTSD (POST-TRAUMATIC STRESS DISORDER): ICD-10-CM

## 2020-06-17 DIAGNOSIS — G43.901 STATUS MIGRAINOSUS: Primary | ICD-10-CM

## 2020-06-17 DIAGNOSIS — F41.9 ANXIETY AND DEPRESSION: ICD-10-CM
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
## 2020-06-17 NOTE — PATIENT INSTRUCTIONS
PRESCRIPTION REFILL POLICY [facility] Statement to Patients April 1, 2014 In an effort to ensure the large volume of patient prescription refills is processed in the most efficient and expeditious manner, we are asking our patients to assist us by calling your Pharmacy for all prescription refills, this will include also your  Mail Order Pharmacy. The pharmacy will contact our office electronically to continue the refill process. Please do not wait until the last minute to call your pharmacy. We need at least 48 hours (2days) to fill prescriptions. We also encourage you to call your pharmacy before going to  your prescription to make sure it is ready. With regard to controlled substance prescription refill requests (narcotic refills) that need to be picked up at our office, we ask your cooperation by providing us with at least 72 hours (3days) notice that you will need a refill. We will not refill narcotic prescription refill requests after 4:00pm on any weekday, Monday through Thursday, or after 2:00pm on Fridays, or on the weekends. We encourage everyone to explore another way of getting your prescription refill request processed using [employer], our patient web portal through our electronic medical record system. [employer] is an efficient and effective way to communicate your medication request directly to the office and  downloadable as an charlee on your smart phone . [employer] also features a review functionality that allows you to view your medication list as well as leave messages for your physician. Are you ready to get connected? If so please review the attatched instructions or speak to any of our staff to get you set up right away! Thank you so much for your cooperation. Should you have any questions please contact our Practice Administrator. The Physicians and Staff,  [facility]

## 2020-06-18 ENCOUNTER — TELEPHONE (OUTPATIENT)
Dept: NEUROLOGY | Age: 30
End: 2020-06-18

## 2020-06-18 NOTE — TELEPHONE ENCOUNTER
Patient calling in, tearful, stating \"I have been at [facility] for the last three days with a migraine, and they give me fluids, Dilaudid and magnesium and I feel better and then the next day the headache comes back just as bad. I don't know what to do, nothing else has helped at al, but nothing seems to help for more than a day. \"

## 2020-06-18 NOTE — TELEPHONE ENCOUNTER
Unfortunately if her pain is that severe there is nothing oral that can order at this point. I would recommend she go to 1 of our affiliated hospitals for consideration of admission and management from neurology for headache control. I will CC Dr. Wyatt Navarrete who is covering [place]. Belinda Botello has somewhat rare episodes like these that oftentimes require a few days in the hospital for pain control.

## 2020-06-18 NOTE — TELEPHONE ENCOUNTER
Per Dr. Angi Long patient's VV needs to be rescheduled out till after July 7th, 2020. Cannot have 2 virtual visits within 2 weeks per insurance. Called and LVM for the patient to call our office back.

## 2020-06-18 NOTE — TELEPHONE ENCOUNTER
Aki Fraser, usually with her it is a few days of multiple medications consisting of DHE, steroids, valproic acid magnesium, antiemetics, etc.  I have never given her Dilaudid. If she is admitted for IV therapy I would recommend a psych consult as well. Typically when this occurs there may be some type of psychological trigger involved. Thank you.

## 2020-06-18 NOTE — TELEPHONE ENCOUNTER
Patient called and advised to go to the ER, [facility] [place] if she is able. She agreed.

## 2020-06-20 ENCOUNTER — HOSPITAL ENCOUNTER (EMERGENCY)
Age: 30
Discharge: HOME | End: 2020-06-20
Attending: EMERGENCY MEDICINE | Admitting: EMERGENCY MEDICINE
Payer: COMMERCIAL

## 2020-06-20 ENCOUNTER — TELEPHONE (OUTPATIENT)
Dept: NEUROLOGY | Age: 30
End: 2020-06-20

## 2020-06-20 VITALS
HEIGHT: 68 IN | DIASTOLIC BLOOD PRESSURE: 62 MMHG | OXYGEN SATURATION: 100 % | WEIGHT: 145 LBS | TEMPERATURE: 97.8 F | HEART RATE: 84 BPM | RESPIRATION RATE: 18 BRPM | SYSTOLIC BLOOD PRESSURE: 99 MMHG | BODY MASS INDEX: 21.98 KG/M2

## 2020-06-20 DIAGNOSIS — G43.009 MIGRAINE WITHOUT AURA AND WITHOUT STATUS MIGRAINOSUS, NOT INTRACTABLE: Primary | ICD-10-CM

## 2020-06-20 DIAGNOSIS — E87.6 HYPOKALEMIA: ICD-10-CM

## 2020-06-20 LAB
ALBUMIN SERPL-MCNC: 3.7 G/DL (ref 3.5–5)
ALBUMIN/GLOB SERPL: 1.2 {RATIO} (ref 1.1–2.2)
ALP SERPL-CCNC: 40 U/L (ref 45–117)
ALT SERPL-CCNC: 14 U/L (ref 12–78)
ANION GAP SERPL CALC-SCNC: 4 MMOL/L (ref 5–15)
AST SERPL-CCNC: 3 U/L (ref 15–37)
BASOPHILS # BLD: 0.1 K/UL (ref 0–0.1)
BASOPHILS NFR BLD: 1 % (ref 0–1)
BILIRUB SERPL-MCNC: 0.4 MG/DL (ref 0.2–1)
BUN SERPL-MCNC: 10 MG/DL (ref 6–20)
BUN/CREAT SERPL: 11 (ref 12–20)
CALCIUM SERPL-MCNC: 8.6 MG/DL (ref 8.5–10.1)
CHLORIDE SERPL-SCNC: 113 MMOL/L (ref 97–108)
CO2 SERPL-SCNC: 24 MMOL/L (ref 21–32)
CREAT SERPL-MCNC: 0.92 MG/DL (ref 0.55–1.02)
DIFFERENTIAL METHOD BLD: ABNORMAL
EOSINOPHIL # BLD: 0.1 K/UL (ref 0–0.4)
EOSINOPHIL NFR BLD: 1 % (ref 0–7)
ERYTHROCYTE [DISTWIDTH] IN BLOOD BY AUTOMATED COUNT: 12.8 % (ref 11.5–14.5)
GLOBULIN SER CALC-MCNC: 3.2 G/DL (ref 2–4)
GLUCOSE SERPL-MCNC: 101 MG/DL (ref 65–100)
HCT VFR BLD AUTO: 39.8 % (ref 35–47)
HGB BLD-MCNC: 13.1 G/DL (ref 11.5–16)
IMM GRANULOCYTES # BLD AUTO: 0.1 K/UL (ref 0–0.04)
IMM GRANULOCYTES NFR BLD AUTO: 1 % (ref 0–0.5)
LYMPHOCYTES # BLD: 4 K/UL (ref 0.8–3.5)
LYMPHOCYTES NFR BLD: 57 % (ref 12–49)
MAGNESIUM SERPL-MCNC: 2 MG/DL (ref 1.6–2.4)
MCH RBC QN AUTO: 29 PG (ref 26–34)
MCHC RBC AUTO-ENTMCNC: 32.9 G/DL (ref 30–36.5)
MCV RBC AUTO: 88.1 FL (ref 80–99)
MONOCYTES # BLD: 0.5 K/UL (ref 0–1)
MONOCYTES NFR BLD: 7 % (ref 5–13)
NEUTS SEG # BLD: 2.4 K/UL (ref 1.8–8)
NEUTS SEG NFR BLD: 33 % (ref 32–75)
NRBC # BLD: 0 K/UL (ref 0–0.01)
NRBC BLD-RTO: 0 PER 100 WBC
PLATELET # BLD AUTO: 229 K/UL (ref 150–400)
PMV BLD AUTO: 10.4 FL (ref 8.9–12.9)
POTASSIUM SERPL-SCNC: 3.2 MMOL/L (ref 3.5–5.1)
PROT SERPL-MCNC: 6.9 G/DL (ref 6.4–8.2)
RBC # BLD AUTO: 4.52 M/UL (ref 3.8–5.2)
SODIUM SERPL-SCNC: 141 MMOL/L (ref 136–145)
WBC # BLD AUTO: 7.1 K/UL (ref 3.6–11)

## 2020-06-20 PROCEDURE — 96375 TX/PRO/DX INJ NEW DRUG ADDON: CPT

## 2020-06-20 PROCEDURE — 96374 THER/PROPH/DIAG INJ IV PUSH: CPT

## 2020-06-20 PROCEDURE — 74011250636 HC RX REV CODE- 250/636: Performed by: PHYSICIAN ASSISTANT

## 2020-06-20 PROCEDURE — 85025 COMPLETE CBC W/AUTO DIFF WBC: CPT

## 2020-06-20 PROCEDURE — 99283 EMERGENCY DEPT VISIT LOW MDM: CPT

## 2020-06-20 PROCEDURE — 36415 COLL VENOUS BLD VENIPUNCTURE: CPT

## 2020-06-20 PROCEDURE — 83735 ASSAY OF MAGNESIUM: CPT

## 2020-06-20 PROCEDURE — 80053 COMPREHEN METABOLIC PANEL: CPT

## 2020-06-20 RX ORDER — KETOROLAC TROMETHAMINE 30 MG/ML
15 INJECTION, SOLUTION INTRAMUSCULAR; INTRAVENOUS
Status: COMPLETED | OUTPATIENT
Start: 2020-06-20 | End: 2020-06-20

## 2020-06-20 RX ORDER — METHYLPREDNISOLONE 4 MG/1
TABLET ORAL
Qty: 1 DOSE PACK | Refills: 0 | Status: SHIPPED | OUTPATIENT
Start: 2020-06-20 | End: 2020-06-21 | Stop reason: SDUPTHER

## 2020-06-20 RX ORDER — DEXAMETHASONE SODIUM PHOSPHATE 10 MG/ML
6 INJECTION INTRAMUSCULAR; INTRAVENOUS
Status: COMPLETED | OUTPATIENT
Start: 2020-06-20 | End: 2020-06-20

## 2020-06-20 RX ORDER — ONDANSETRON 2 MG/ML
4 INJECTION INTRAMUSCULAR; INTRAVENOUS
Status: COMPLETED | OUTPATIENT
Start: 2020-06-20 | End: 2020-06-20

## 2020-06-20 RX ORDER — POTASSIUM BICARBONATE 782 MG/1
20 TABLET, EFFERVESCENT ORAL 2 TIMES DAILY
Qty: 2 EACH | Refills: 0 | Status: SHIPPED | OUTPATIENT
Start: 2020-06-20 | End: 2020-06-21

## 2020-06-20 RX ADMIN — KETOROLAC TROMETHAMINE 15 MG: 30 INJECTION, SOLUTION INTRAMUSCULAR at 13:33

## 2020-06-20 RX ADMIN — DEXAMETHASONE SODIUM PHOSPHATE 6 MG: 10 INJECTION, SOLUTION INTRAMUSCULAR; INTRAVENOUS at 13:32

## 2020-06-20 RX ADMIN — ONDANSETRON 4 MG: 2 INJECTION INTRAMUSCULAR; INTRAVENOUS at 13:33

## 2020-06-20 RX ADMIN — SODIUM CHLORIDE 1000 ML: 900 INJECTION, SOLUTION INTRAVENOUS at 13:31

## 2020-06-20 NOTE — DISCHARGE INSTRUCTIONS
Please follow up with your neurologist on Monday regarding this week's visits to the emergency departments. You can naturally increase your potassium intake by eating potassium rich foods such as tomatoes and nuts.

## 2020-06-20 NOTE — ED TRIAGE NOTES
Pt reports Migraine sxs starting this morning. Pt has had multiple Migraines this week. Pt was admitted to [facility] Thursday. Pt's neurologist told her to come here if her migraines returned.

## 2020-06-20 NOTE — ED PROVIDER NOTES
HPI   The emergency department today with complaint of 10 out of 10 headache located on the left side. She states that she began experiencing headache abruptly on Monday. She was seen at [address] [place] [address] emergency department on Monday, Tuesday, Wednesday and again on Thursday. She was admitted on Thursday night and discharged yesterday around 4:00. She reports that all of these migraine treatments that she received included dexamethasone, IV magnesium,  Zofran and Dilaudid. Past Medical History:   Diagnosis Date    Anxiety and depression     Bartholin's gland abscess     x8    Endometriosis     Migraine     Ovarian cyst     Panic attack     Pyelonephritis        Past Surgical History:   Procedure Laterality Date    HX APPENDECTOMY  04/26/2017    HX BARTHOLIN CYST MARSUPIALIZATION      2011, 2018    HX BREAST LUMPECTOMY Right     HX BUNIONECTOMY      HX CYST REMOVAL      HX GYN Left     bartholin cyst drainage X 6    HX GYN Left     bartholin cyst marsupilization    HX HEENT      HX OTHER SURGICAL      laparascopy    HX PELVIC LAPAROSCOPY      HX WISDOM TEETH EXTRACTION           Family History:   Problem Relation Age of Onset    Diabetes Maternal Aunt     Heart Disease Maternal Aunt     Stroke Maternal Aunt     Heart Disease Maternal Grandmother     No Known Problems Mother        Social History     Socioeconomic History    Marital status: SINGLE     Spouse name: Not on file    Number of children: Not on file    Years of education: Not on file    Highest education level: Not on file   Occupational History    Not on file   Social Needs    Financial resource strain: Not on file    Food insecurity     Worry: Not on file     Inability: Not on file    Transportation needs     Medical: Not on file     Non-medical: Not on file   Tobacco Use    Smoking status: Former Smoker    Smokeless tobacco: Never Used   Substance and Sexual Activity    Alcohol use:  Yes     Alcohol/week: 1.0 standard drinks     Types: 1 Glasses of wine per week     Comment: social    Drug use: No    Sexual activity: Yes     Partners: Male     Birth control/protection: Pill, Condom   Lifestyle    Physical activity     Days per week: Not on file     Minutes per session: Not on file    Stress: Not on file   Relationships    Social connections     Talks on phone: Not on file     Gets together: Not on file     Attends Catholic service: Not on file     Active member of club or organization: Not on file     Attends meetings of clubs or organizations: Not on file     Relationship status: Not on file    Intimate partner violence     Fear of current or ex partner: Not on file     Emotionally abused: Not on file     Physically abused: Not on file     Forced sexual activity: Not on file   Other Topics Concern    Not on file   Social History Narrative    Not on file         ALLERGIES: Benadryl [diphenhydramine hcl]; Compazine [prochlorperazine edisylate]; Depacon [valproate sodium]; Haldol [haloperidol lactate]; Promethazine; and Reglan [metoclopramide]    Review of Systems   Constitutional: Negative for activity change, appetite change, fatigue and fever. HENT: Negative for ear pain, rhinorrhea, sore throat and trouble swallowing. Eyes: Positive for photophobia and visual disturbance. Respiratory: Negative for cough, chest tightness and shortness of breath. Cardiovascular: Negative for chest pain, palpitations and leg swelling. Gastrointestinal: Positive for nausea. Negative for abdominal pain, diarrhea and vomiting. Endocrine: Negative for polyuria. Genitourinary: Negative for dysuria, frequency and urgency. Musculoskeletal: Negative for back pain and neck pain. Skin: Negative for color change. Neurological: Positive for headaches. Negative for dizziness, weakness, light-headedness and numbness. Hematological: Negative for adenopathy. Does not bruise/bleed easily.    Psychiatric/Behavioral: The patient is nervous/anxious. Vitals:    06/20/20 1258   BP: 99/62   Pulse: (!) 117   Resp: 18   Temp: 97.8 °F (36.6 °C)   SpO2: 100%   Weight: 65.8 kg (145 lb)   Height: 5' 8\" (1.727 m)            Physical Exam  Vitals signs and nursing note reviewed. Constitutional:       General: She is not in acute distress. Appearance: Normal appearance. She is normal weight. She is not ill-appearing, toxic-appearing or diaphoretic. HENT:      Head: Normocephalic and atraumatic. Right Ear: External ear normal.      Left Ear: External ear normal.      Nose: Nose normal. No rhinorrhea. Mouth/Throat:      Mouth: Mucous membranes are moist.      Pharynx: Oropharynx is clear. No oropharyngeal exudate or posterior oropharyngeal erythema. Eyes:      Extraocular Movements: Extraocular movements intact. Pupils: Pupils are equal, round, and reactive to light. Neck:      Musculoskeletal: Normal range of motion and neck supple. No neck rigidity or muscular tenderness. Cardiovascular:      Rate and Rhythm: Regular rhythm. Tachycardia present. Pulses: Normal pulses. Heart sounds: Normal heart sounds. Pulmonary:      Effort: Pulmonary effort is normal. No respiratory distress. Breath sounds: Normal breath sounds. No wheezing, rhonchi or rales. Abdominal:      General: Abdomen is flat. Palpations: Abdomen is soft. Musculoskeletal: Normal range of motion. Skin:     General: Skin is warm. Neurological:      General: No focal deficit present. Mental Status: She is alert and oriented to person, place, and time. Psychiatric:         Mood and Affect: Mood normal.         Behavior: Behavior normal.          MDM      Pt presents to ED for return of migraine headache. She was urged by Dr. Pancho Villeda to come to [facility]. I spoke  with Dr. Kemal Reaves by phone, who asked that we perform a migraine cocktail, agreed that narcotics are not indicated for this.   Also asked that we administer DHE if possible, this would be the primary medication that the patient would receive if admitted inpatient for pain control. Dr. Stewart Public indicated that if we are able to reduce patient's headache, she should receive medrol dose pack for home use to prevent rebound headache. Patient stated that she had a life-threatening reaction to IV DHE 3 years ago here in this facility. She states that she is scared to take that medication again. Nurse was present in the room when patient states that she is willing to try anything, and is willing to attempt Benadryl to relieve her headache and hopefully make her sleepy. We have performed basic labs including mag level since pt has received IV magnesium and dexamethasone four days this week    His headache is reduced from a 10 out of 10 to a 5 out of 10, and she states that she is satisfied with this. She is receiving Medrol Dosepak to go home with as states that she has plenty of Zofran at home if needed for nausea. Travis Haywood  .     Procedures

## 2020-06-20 NOTE — TELEPHONE ENCOUNTER
Received a page from the answering service to call pt, Booker Alcocer was just discharged from [facility], and she is still having migraines and Dr. Porter July had wanted her to go to [facility] call to advise. \"   Returned call and no answer. Left detailed but generic message on generic VM. If she had DHE protocol at [facility], then would not repeat it. If she is in that much pain she can represent to the ED.

## 2020-06-21 ENCOUNTER — TELEPHONE (OUTPATIENT)
Dept: NEUROLOGY | Age: 30
End: 2020-06-21

## 2020-06-21 DIAGNOSIS — G43.901 STATUS MIGRAINOSUS: Primary | ICD-10-CM

## 2020-06-21 DIAGNOSIS — G47.00 INSOMNIA, UNSPECIFIED TYPE: ICD-10-CM

## 2020-06-21 RX ORDER — ZOLPIDEM TARTRATE 10 MG/1
10 TABLET ORAL
Qty: 5 TAB | Refills: 0 | OUTPATIENT
Start: 2020-06-21 | End: 2022-01-04

## 2020-06-21 RX ORDER — METHYLPREDNISOLONE 4 MG/1
TABLET ORAL
Qty: 1 DOSE PACK | Refills: 0 | Status: SHIPPED | OUTPATIENT
Start: 2020-06-21 | End: 2020-08-14 | Stop reason: ALTCHOICE

## 2020-06-21 NOTE — TELEPHONE ENCOUNTER
Pt called again through answering service about [facility]HA. Not different than typical except lasting longer. She was admitted to [facility] last week x 3 days, went to [facility] ED yesterday and given Toradol and Dexameth with decrease in pain. D/c home. Pt states she had a severe reaction to DHE and cannot take this. She is having trouble sleeping. Klonopin for anxiety is not working. Having nausea and has zofran at home. Cannot take benadryl, compazine, or phenergan due to skin crawling with these meds. She has taken [place] in past for insomnia. Will send medrol dose pack and ambien for sleep.  (It looks like a medrol dose pack was sent to her pharmacy yesterday from ED, pt seemed unaware when I spoke with her.)

## 2020-06-22 ENCOUNTER — TELEPHONE (OUTPATIENT)
Dept: NEUROLOGY | Age: 30
End: 2020-06-22

## 2020-06-22 DIAGNOSIS — G43.111 INTRACTABLE MIGRAINE WITH AURA WITH STATUS MIGRAINOSUS: Primary | ICD-10-CM

## 2020-06-22 NOTE — TELEPHONE ENCOUNTER
Hello message:     \"Message: She was just discharged from [facility], and she is still having migraines and Dr Robertson Monday had wanted her to go to [facility], pls call to advise\"

## 2020-06-23 NOTE — TELEPHONE ENCOUNTER
Called and spoke with the patient, she stated her headache \"is a lttle better today\" but agreed with referral to [facility] Neurology.

## 2020-06-23 NOTE — TELEPHONE ENCOUNTER
This looks like it is an old message. Unfortunately I think we have exhausted everything we have to offer here and I think it is time we escalate her to a academic center. I will place a referral to [address] [place] [address] neurology. If her pain is completely unbearable and not responding to any oral medications she may unfortunately have to return to urgent care or the emergency room.

## 2020-06-26 ENCOUNTER — DOCUMENTATION ONLY (OUTPATIENT)
Dept: NEUROLOGY | Age: 30
End: 2020-06-26

## 2020-07-08 ENCOUNTER — TELEPHONE (OUTPATIENT)
Dept: NEUROLOGY | Age: 30
End: 2020-07-08

## 2020-07-10 ENCOUNTER — TELEPHONE (OUTPATIENT)
Dept: NEUROLOGY | Age: 30
End: 2020-07-10

## 2020-07-20 ENCOUNTER — TELEPHONE (OUTPATIENT)
Dept: NEUROLOGY | Age: 30
End: 2020-07-20

## 2020-07-20 ENCOUNTER — OFFICE VISIT (OUTPATIENT)
Dept: NEUROLOGY | Age: 30
End: 2020-07-20

## 2020-07-20 VITALS
BODY MASS INDEX: 21.98 KG/M2 | OXYGEN SATURATION: 98 % | RESPIRATION RATE: 16 BRPM | HEIGHT: 68 IN | HEART RATE: 91 BPM | WEIGHT: 145 LBS | SYSTOLIC BLOOD PRESSURE: 92 MMHG | DIASTOLIC BLOOD PRESSURE: 60 MMHG

## 2020-07-20 DIAGNOSIS — G43.709 CHRONIC MIGRAINE W/O AURA W/O STATUS MIGRAINOSUS, NOT INTRACTABLE: Primary | ICD-10-CM

## 2020-07-20 RX ORDER — FREMANEZUMAB-VFRM 225 MG/1.5ML
1 INJECTION SUBCUTANEOUS
Qty: 1 SYRINGE | Refills: 11 | Status: SHIPPED | OUTPATIENT
Start: 2020-07-20 | End: 2021-07-19

## 2020-07-20 RX ORDER — ONABOTULINUMTOXINA 200 [USP'U]/1
INJECTION, POWDER, LYOPHILIZED, FOR SOLUTION INTRADERMAL; INTRAMUSCULAR
Qty: 200 UNITS | Refills: 3 | Status: SHIPPED | OUTPATIENT
Start: 2020-07-20 | End: 2021-08-30

## 2020-07-20 RX ORDER — FREMANEZUMAB-VFRM 225 MG/1.5ML
1 INJECTION SUBCUTANEOUS ONCE
Qty: 1 SYRINGE | Refills: 0 | Status: SHIPPED | COMMUNITY
Start: 2020-07-20 | End: 2020-07-20

## 2020-07-20 NOTE — TELEPHONE ENCOUNTER
Patient called, notified and will be coming to the office for Ajovy injection as recommended by Dr. Eddie Millard.

## 2020-07-20 NOTE — PROGRESS NOTES
Chief Complaint   Patient presents with    Migraine       HPI    Niels Christiansen is a 58-year-old woman I have been seeing long-term from chronic migraine. In recent months she has had a severe exacerbation of her migraines prompting multiple ER visits and management. She was previously on Emaglity with good results but unfortunately it has stopped working. She is also having concomitant neck pain getting worse. She saw orthospine and there is consideration she might need some type of surgery in the future. Coinciding with the increase in neck pain have been the increase in headaches. She was on vacation recently for a week in the [facility] and the headaches were less but still present. She is aggressively hydrating with electrolyte liquids. Stress level is okay. Professionally and personally there are no issues. Medication migraine history: Gabapentin, valproic acid, topiramate, Aimovig, Emaglity, Botox, beta blockers, tricyclic antidepressants            Review of Systems   Eyes: Positive for photophobia. Negative for double vision. Gastrointestinal: Positive for nausea. Musculoskeletal: Positive for neck pain. Neurological: Positive for headaches. All other systems reviewed and are negative.       Past Medical History:   Diagnosis Date    Anxiety and depression     Bartholin's gland abscess     x8    Endometriosis     Migraine     Ovarian cyst     Panic attack     Pyelonephritis      Family History   Problem Relation Age of Onset    Diabetes Maternal Aunt     Heart Disease Maternal Aunt     Stroke Maternal Aunt     Heart Disease Maternal Grandmother     No Known Problems Mother      Social History     Socioeconomic History    Marital status: SINGLE     Spouse name: Not on file    Number of children: Not on file    Years of education: Not on file    Highest education level: Not on file   Occupational History    Not on file   Social Needs    Financial resource strain: Not on file   [facility] Food insecurity     Worry: Not on file     Inability: Not on file    Transportation needs     Medical: Not on file     Non-medical: Not on file   Tobacco Use    Smoking status: Former Smoker    Smokeless tobacco: Never Used   Substance and Sexual Activity    Alcohol use: Yes     Alcohol/week: 1.0 standard drinks     Types: 1 Glasses of wine per week     Comment: social    Drug use: No    Sexual activity: Yes     Partners: Male     Birth control/protection: Pill, Condom   Lifestyle    Physical activity     Days per week: Not on file     Minutes per session: Not on file    Stress: Not on file   Relationships    Social connections     Talks on phone: Not on file     Gets together: Not on file     Attends Yazidism service: Not on file     Active member of club or organization: Not on file     Attends meetings of clubs or organizations: Not on file     Relationship status: Not on file    Intimate partner violence     Fear of current or ex partner: Not on file     Emotionally abused: Not on file     Physically abused: Not on file     Forced sexual activity: Not on file   Other Topics Concern    Not on file   Social History Narrative    Not on file     Allergies   Allergen Reactions    Benadryl [Diphenhydramine Hcl] Other (comments)     Makes me feel funny. Need ativan if I get benadryl    Compazine [Prochlorperazine Edisylate] Anxiety    Depacon [Valproate Sodium] Other (comments)     Pt reports having restlessness with this medication.  Haldol [Haloperidol Lactate] Other (comments)    Promethazine Other (comments)     \"It makes me feel really funny, nausea and dizzy\"    Reglan [Metoclopramide] Other (comments)         Current Outpatient Medications   Medication Sig    fremanezumab-vfrm (Ajovy Autoinjector) 225 mg/1.5 mL atIn 1 Syringe by SubCUTAneous route once for 1 dose.  fremanezumab-vfrm (Ajovy Autoinjector) 225 mg/1.5 mL atIn 1 Syringe by SubCUTAneous route every thirty (30) days.     onabotulinumtoxinA (Botox) 200 unit injection Inject selected muscles head and neck bilaterally every 12 weeks for migraine prevention, 155 units    methylPREDNISolone (Medrol, Manan,) 4 mg tablet Take as directed on package    zolpidem (AMBIEN) 10 mg tablet Take 1 Tab by mouth nightly as needed for Sleep. Max Daily Amount: 10 mg.    galcanezumab-gnlm (EMGALITY PEN) 120 mg/mL injection 1 mL by SubCUTAneous route every thirty (30) days.  norethindrone (KOBY) 0.35 mg tab Take 1 Tab by mouth nightly.  topiramate (TOPAMAX) 50 mg tablet Take 50 mg by mouth nightly. Karolina Andrews with zinc one by mouth at bedtime   Ondina Abt, PT evaluate and treat    cyclobenzaprine (FLEXERIL) 10 mg tablet Take 1 Tab by mouth three (3) times daily. May cause drowsiness    clonazePAM (KLONOPIN) 1 mg tablet Take 2 mg by mouth nightly.  escitalopram (LEXAPRO) 20 mg tablet Take 20 mg by mouth nightly. Indications: Generalized Anxiety Disorder     No current facility-administered medications for this visit. Neurologic Exam     Mental Status   WD/WN adult in NAD, normal grooming  VSS  A&O x 3    PERRL, nonicteric  Face is symmetric, tongue midline  Speech is fluent and clear  No limb ataxia. No abnl movements. Moving all extemities spontaneously and symmetric  Normal gait    CVS RRR  Lungs nonlabored  Skin is warm and dry         Visit Vitals  BP 92/60   Pulse 91   Resp 16   Ht 5' 8\" (1.727 m)   Wt 65.8 kg (145 lb)   SpO2 98%   BMI 22.05 kg/m²       Assessment and Plan   Diagnoses and all orders for this visit:    1. Chronic migraine w/o aura w/o status migrainosus, not intractable    Other orders  -     onabotulinumtoxinA (Botox) 200 unit injection; Inject selected muscles head and neck bilaterally every 12 weeks for migraine prevention, 155 units      22-year-old woman who has chronic migraine having a severe exacerbation.   I suspect that the C-spine pain and disease may be contributing. I would like her to revisit with orthopedic spine for reassessment for any other options. We are going to do a dose of Ajovy here today in the office. I think we should reinitiate Botox. She had stopped it previously due to cost.  She is seeking neurology which I think is appropriate for consideration of any advanced therapies to include ketamine infusion. I reviewed and decided to continue the current medications. This clinical note was dictated with an electronic dictation software that can make unintentional errors. If there are any questions, please contact me directly for clarification.       [facility] [address]  Diplomate ABPN

## 2020-07-20 NOTE — TELEPHONE ENCOUNTER
Okay, she should keep her appointment with [facility] whenever she is able to be seen. Stop Emaglity injections. I would like her to come to the office so we can give her a dose of Ajovy. We can give her the medication and she can inject self administer on her own. I think we need to consider Botox injections. If she is okay with pursuing Botox injections let me know and I will get the process started. I have placed a prescription for Ajovy to her pharmacy. I have also placed a one-time order in this medical record as a sample.

## 2020-07-20 NOTE — PROGRESS NOTES
Ms. Ward Schwartz presents today to follow up migraines. She reports eight migraines over the past month. Her last migraine was this past Saturday. Patient currently has a headache that she rates as a \"5\".

## 2020-07-20 NOTE — TELEPHONE ENCOUNTER
Re: Ivanna Correia approved    Approval rec'd from [address] via [facility] # [id number]  Effective 06/20/20/07/20/21    Fax sent to [facility]

## 2020-07-20 NOTE — TELEPHONE ENCOUNTER
Re: Botox approved    Approval rec'd from [address] [facility]   Effective 06/20/20-07/20/21  PA # [id number]    Approval for [id number] rec'd from [facility] via verbal submission.   Effective 07/20/20-07/19/21  PA # 07/20/20-07/19/21    [facility] is [facility] ph: [phone]

## 2020-07-20 NOTE — TELEPHONE ENCOUNTER
Pt stated that Dr Derrick Melissa is not avail bale until December, and Dr Bernardo Ceron isn't avail bale until September. Pt states she has been in the ER 6 times in the past month and just doesn't know what to do. Please call.

## 2020-07-20 NOTE — PATIENT INSTRUCTIONS
[facility]   Statement to Patients  April 1, 2014      In an effort to ensure the large volume of patient prescription refills is processed in the most efficient and expeditious manner, we are asking our patients to assist us by calling your Pharmacy for all prescription refills, this will include also your  Mail Order Pharmacy. The pharmacy will contact our office electronically to continue the refill process. Please do not wait until the last minute to call your pharmacy. We need at least 48 hours (2days) to fill prescriptions. We also encourage you to call your pharmacy before going to  your prescription to make sure it is ready. With regard to controlled substance prescription refill requests (narcotic refills) that need to be picked up at our office, we ask your cooperation by providing us with at least 72 hours (3days) notice that you will need a refill. We will not refill narcotic prescription refill requests after 4:00pm on any weekday, Monday through Thursday, or after 2:00pm on Fridays, or on the weekends. We encourage everyone to explore another way of getting your prescription refill request processed using [employer], our patient web portal through our electronic medical record system. [employer] is an efficient and effective way to communicate your medication request directly to the office and  downloadable as an charlee on your smart phone . [employer] also features a review functionality that allows you to view your medication list as well as leave messages for your physician. Are you ready to get connected? If so please review the attatched instructions or speak to any of our staff to get you set up right away! Thank you so much for your cooperation. Should you have any questions please contact our Practice Administrator.     The Physicians and Staff,  [facility]

## 2020-08-11 ENCOUNTER — TELEPHONE (OUTPATIENT)
Dept: NEUROLOGY | Facility: CLINIC | Age: 30
End: 2020-08-11

## 2020-08-11 NOTE — TELEPHONE ENCOUNTER
Patient called, verified, stated she spoke with Dr. Zack Parra who is on call last night 08/11/2020. Patient states her migraine \"is better\" and she scheduled appointment for BOTOX on 08/20/2020.

## 2020-08-13 ENCOUNTER — TELEPHONE (OUTPATIENT)
Dept: NEUROLOGY | Facility: CLINIC | Age: 30
End: 2020-08-13

## 2020-08-14 ENCOUNTER — TELEPHONE (OUTPATIENT)
Dept: NEUROLOGY | Facility: CLINIC | Age: 30
End: 2020-08-14

## 2020-08-14 RX ORDER — METHYLPREDNISOLONE 4 MG/1
TABLET ORAL
Qty: 1 DOSE PACK | Refills: 0 | Status: SHIPPED | OUTPATIENT
Start: 2020-08-14 | End: 2021-11-02 | Stop reason: SDUPTHER

## 2020-08-14 RX ORDER — DIHYDROERGOTAMINE MESYLATE 4 MG/ML
1 SPRAY NASAL AS NEEDED
Qty: 1 BOTTLE | Refills: 0 | Status: SHIPPED | OUTPATIENT
Start: 2020-08-14 | End: 2022-10-14

## 2020-08-14 RX ORDER — DIVALPROEX SODIUM 500 MG/1
500 TABLET, DELAYED RELEASE ORAL
Qty: 7 TAB | Refills: 0 | Status: SHIPPED | OUTPATIENT
Start: 2020-08-14 | End: 2020-08-21

## 2020-08-14 NOTE — TELEPHONE ENCOUNTER
Called patient again, line busy. Spoke with boyfriend- emergency contact. He says patient went to [facility] ED. Will call back if they need anything further.

## 2020-08-14 NOTE — TELEPHONE ENCOUNTER
Okay I am going to try the following:    Please take Depakote every night for 7 nights only. I know it is listed as an allergy but I do not think it is an allergy I think it is a sensitivity. Please try it. If she feels anxious with that she can try Xanax that she already has. I am going to send a steroid pack she can start today. I will also send a dose of Migranal nasal spray which is like DHE. She needs to keep her appointment with Dr. Sammi Yates at [facility].

## 2020-08-14 NOTE — TELEPHONE ENCOUNTER
Called patient, she says she has a terrible headache. She has been to the ED twice, again last night. She wants to know if  suggests going to [facility] or [facility]. C/o neck pain as well.

## 2020-08-17 ENCOUNTER — TELEPHONE (OUTPATIENT)
Dept: NEUROLOGY | Facility: CLINIC | Age: 30
End: 2020-08-17

## 2020-08-17 NOTE — TELEPHONE ENCOUNTER
Re: Lloyd Brock approved    Approval rec'd from [employer] via [facility]    Effective 07/18/20-08/17/21  PA # [id number]    Fax sent to [facility]

## 2020-08-18 ENCOUNTER — DOCUMENTATION ONLY (OUTPATIENT)
Dept: NEUROLOGY | Facility: CLINIC | Age: 30
End: 2020-08-18

## 2020-08-18 NOTE — PROGRESS NOTES
Patient's BOTOX received in office from [address] [place] [address]    Lot: [id number]  Exp: 04/30/2020

## 2020-08-20 ENCOUNTER — OFFICE VISIT (OUTPATIENT)
Dept: NEUROLOGY | Facility: CLINIC | Age: 30
End: 2020-08-20
Payer: COMMERCIAL

## 2020-08-20 VITALS
DIASTOLIC BLOOD PRESSURE: 70 MMHG | OXYGEN SATURATION: 97 % | HEIGHT: 68 IN | TEMPERATURE: 98.1 F | HEART RATE: 102 BPM | WEIGHT: 145 LBS | SYSTOLIC BLOOD PRESSURE: 118 MMHG | BODY MASS INDEX: 21.98 KG/M2

## 2020-08-20 DIAGNOSIS — G43.711 INTRACTABLE CHRONIC MIGRAINE WITHOUT AURA AND WITH STATUS MIGRAINOSUS: Primary | ICD-10-CM

## 2020-08-20 PROCEDURE — 64615 CHEMODENERV MUSC MIGRAINE: CPT | Performed by: PSYCHIATRY & NEUROLOGY

## 2020-08-20 RX ORDER — B2/MAGNESIUM CIT,OXID/FEVERFEW 200-180-50
TABLET ORAL
COMMUNITY

## 2020-08-20 RX ORDER — BUTALBITAL, ACETAMINOPHEN AND CAFFEINE 50; 325; 40 MG/1; MG/1; MG/1
TABLET ORAL
COMMUNITY
Start: 2020-06-16

## 2020-08-20 RX ORDER — DICLOFENAC SODIUM 25 MG/1
TABLET, DELAYED RELEASE ORAL
COMMUNITY
Start: 2020-05-14 | End: 2022-10-14

## 2020-08-20 NOTE — PROGRESS NOTES
Botox Injection Note     2020     Patient:  Amarilis Slater   YOB: 1990  Date of Visit: 2020      Indication: patient has chronic migraine headaches greater than 15 days per month lasting more than 4 hours each. She has failed or not tolerated 2 or more medication preventatives. Written consent was signed and verified by me. Risks and benefits were discussed to include possible cosmetic asymmetry which is not permament or life threatening. Patient name and  was confirmed prior to procedure. Time out performed. Procedure:   Botox concentration: 200 units in 2 ml of preservative-free normal saline. 1:1 dilution. LOT#:  [id number]  EXP:  2023    Injections and distribution as follow :      Units/site  Sites Loc Subtotal    procerus 5 1 ML 5   corrugaters 2.5 2 BL 5   frontalis 5 8 BL 40   Temporalis 10 4 BL 40   Occipitalis 10 2 BL 20   Cervical paraspinals 5 4 BL 20   Trapezius 10 4 BL 40         200 units Botox were reconstituted, 170 units injected as above and the remainder was unavoidably wasted. Patient tolerated procedure well. Return in 3 months for repeat injections.     _____________________________   Joselyn Araya,   [address], ABPN

## 2020-08-20 NOTE — PROGRESS NOTES
Chief Complaint   Patient presents with    Headache     follow up, here for BOTOX     Visit Vitals  /70 (BP 1 Location: Left arm, BP Patient Position: Sitting)   Pulse (!) 102   Temp 98.1 °F (36.7 °C) (Oral)   Ht 5' 8\" (1.727 m)   Wt 65.8 kg (145 lb)   SpO2 97%   BMI 22.05 kg/m²

## 2020-10-30 ENCOUNTER — TELEPHONE (OUTPATIENT)
Dept: NEUROLOGY | Facility: CLINIC | Age: 30
End: 2020-10-30

## 2020-10-30 NOTE — TELEPHONE ENCOUNTER
Patient called, verified, and advised that her BOTOX appointment is on 11/19/2020.  Patient stated she will advise the pharmacy to ship

## 2020-10-30 NOTE — TELEPHONE ENCOUNTER
Pt wants to ensure at her upcoming visit she will be getting her Botox injection. It is working so she wants to continue the injection therapy.       Please call this number before 3pm -[phone]

## 2020-11-12 ENCOUNTER — DOCUMENTATION ONLY (OUTPATIENT)
Dept: NEUROLOGY | Age: 30
End: 2020-11-12

## 2020-11-13 ENCOUNTER — TELEPHONE (OUTPATIENT)
Dept: NEUROLOGY | Age: 30
End: 2020-11-13

## 2020-11-13 NOTE — TELEPHONE ENCOUNTER
Spoke with patient, she states she had an injection on 11/11 had injections-steroid/dexamethesone. Yesterday she was fine, and this morning she was fine but now noting burning pain from neck to shoulder with tingling to fingers, also burning sensation down to mid-back. Denies any extremity weakness. She feels the pain of the actual injection was worse than what she got the injection for On call for her pain specialist doctor says the injections probably hit a nerve root. She does not feel like she needs to seek out ED evaluation for symptoms. Informed her that [address] [place] was out of the office today, but to call back or call her pain specialist if she needed anything further. She verbalized understanding.

## 2020-11-17 ENCOUNTER — DOCUMENTATION ONLY (OUTPATIENT)
Dept: NEUROLOGY | Age: 30
End: 2020-11-17

## 2020-11-18 ENCOUNTER — TELEPHONE (OUTPATIENT)
Dept: NEUROLOGY | Age: 30
End: 2020-11-18

## 2020-11-19 ENCOUNTER — OFFICE VISIT (OUTPATIENT)
Dept: NEUROLOGY | Age: 30
End: 2020-11-19
Payer: COMMERCIAL

## 2020-11-19 VITALS
WEIGHT: 145 LBS | HEIGHT: 68 IN | HEART RATE: 88 BPM | BODY MASS INDEX: 21.98 KG/M2 | SYSTOLIC BLOOD PRESSURE: 88 MMHG | DIASTOLIC BLOOD PRESSURE: 60 MMHG | RESPIRATION RATE: 16 BRPM | OXYGEN SATURATION: 99 %

## 2020-11-19 DIAGNOSIS — G43.719 INTRACTABLE CHRONIC MIGRAINE WITHOUT AURA AND WITHOUT STATUS MIGRAINOSUS: Primary | ICD-10-CM

## 2020-11-19 PROCEDURE — 64615 CHEMODENERV MUSC MIGRAINE: CPT | Performed by: PSYCHIATRY & NEUROLOGY

## 2020-11-19 NOTE — PROGRESS NOTES
Patient here for Botox. She is doing exceptionally well on a combination of Botox, Ajovy, facet injections to the C-spine. Virtually 0 breakthrough migraines. She has a history of extremely debilitating migraines and when she does have breakthrough now they are very mild without affecting quality of life.

## 2020-11-19 NOTE — PATIENT INSTRUCTIONS
PRESCRIPTION REFILL POLICY [facility] Statement to Patients April 1, 2014 In an effort to ensure the large volume of patient prescription refills is processed in the most efficient and expeditious manner, we are asking our patients to assist us by calling your Pharmacy for all prescription refills, this will include also your  Mail Order Pharmacy. The pharmacy will contact our office electronically to continue the refill process. Please do not wait until the last minute to call your pharmacy. We need at least 48 hours (2days) to fill prescriptions. We also encourage you to call your pharmacy before going to  your prescription to make sure it is ready. With regard to controlled substance prescription refill requests (narcotic refills) that need to be picked up at our office, we ask your cooperation by providing us with at least 72 hours (3days) notice that you will need a refill. We will not refill narcotic prescription refill requests after 4:00pm on any weekday, Monday through Thursday, or after 2:00pm on Fridays, or on the weekends. We encourage everyone to explore another way of getting your prescription refill request processed using [employer], our patient web portal through our electronic medical record system. [employer] is an efficient and effective way to communicate your medication request directly to the office and  downloadable as an charlee on your smart phone . [employer] also features a review functionality that allows you to view your medication list as well as leave messages for your physician. Are you ready to get connected? If so please review the attatched instructions or speak to any of our staff to get you set up right away! Thank you so much for your cooperation. Should you have any questions please contact our Practice Administrator. The Physicians and Staff,  [facility]

## 2020-11-19 NOTE — PROGRESS NOTES
Botox Injection Note     2020     Patient:  Brandon Green   YOB: 1990  Date of Visit: 2020      Indication: patient has chronic migraine headaches greater than 15 days per month lasting more than 4 hours each. She has failed or not tolerated 2 or more medication preventatives. Written consent was signed and verified by me. Risks and benefits were discussed to include possible cosmetic asymmetry which is not permament or life threatening. Patient name and  was confirmed prior to procedure. Time out performed. Procedure:   Botox concentration: 200 units in 2 ml of preservative-free normal saline. 1:1 dilution. LOT#:  [id number]  EXP:  2023    Injections and distribution as follow :      Units/site  Sites Loc Subtotal    procerus 5 1 ML 5   corrugaters 2.5 2 BL 5   frontalis 5 8 BL 40   Temporalis 10 4 BL 40   Occipitalis 10 2 BL 20   Cervical paraspinals 5 4 BL 20   Trapezius 10 4 BL 40         200 units Botox were reconstituted, 170 units injected as above and the remainder was unavoidably wasted. Patient tolerated procedure well. Return in 3 months for repeat injections.     _____________________________   Ollie Ceballos DO  [address], ABPN

## 2020-12-22 ENCOUNTER — TELEPHONE (OUTPATIENT)
Dept: NEUROLOGY | Age: 30
End: 2020-12-22

## 2020-12-22 NOTE — TELEPHONE ENCOUNTER
Does she have any Flexeril left? That would be a good choice to start for some muscle relaxing assistance. Deep massage warm compresses.

## 2020-12-22 NOTE — TELEPHONE ENCOUNTER
----- Message from Cesario Silva sent at 12/22/2020  8:42 AM EST -----  Regarding: Dr. Cyndie Eduardo telephone    Caller's first and last name: n/a  Reason for call: advice  Callback required yes/no and why:yes   Best contact number(s): [phone]  Details to clarify the request: pt cant talk to a physician at or her neck and arm pain place until later and she is having pains, pt would like a call back from  on some advise for her pain and what she can take to help relieve it

## 2020-12-22 NOTE — TELEPHONE ENCOUNTER
Called and LVM for the patient with Dr. Clayton Rojas recommendations, requested a call back if patient is out of flexeril or if there are any additional questions.

## 2021-02-04 ENCOUNTER — DOCUMENTATION ONLY (OUTPATIENT)
Dept: NEUROLOGY | Age: 31
End: 2021-02-04

## 2021-02-10 ENCOUNTER — TELEPHONE (OUTPATIENT)
Dept: NEUROLOGY | Age: 31
End: 2021-02-10

## 2021-02-10 NOTE — TELEPHONE ENCOUNTER
----- Message from Delfina Wu sent at 2/10/2021  9:33 AM EST -----  Regarding: Dr. Vickie Cherry Message/Vendor Calls    Caller's first and last name: Pt      Reason for call: appt for botox      Callback required yes/no and why: Yes, to verify with pt if she is able to come in or if this visit needs to be rescheduled      Best contact number(s): [phone]      Details to clarify the request:Pt is scheduled for a botox injection tomorrow (02/11) and said she received her second Covid 19 vaccine( Erik Sites) on 02/09 and said this morning she's not feeling very well. She was unsure whether she should keep this appt or if she should reschedule. Please advise.       Delfina Wu

## 2021-02-23 ENCOUNTER — HOSPITAL ENCOUNTER (EMERGENCY)
Age: 31
Discharge: HOSPICE | End: 2021-02-23
Attending: EMERGENCY MEDICINE
Payer: COMMERCIAL

## 2021-02-23 ENCOUNTER — APPOINTMENT (OUTPATIENT)
Dept: ULTRASOUND IMAGING | Age: 31
End: 2021-02-23
Attending: EMERGENCY MEDICINE
Payer: COMMERCIAL

## 2021-02-23 ENCOUNTER — APPOINTMENT (OUTPATIENT)
Dept: CT IMAGING | Age: 31
End: 2021-02-23
Attending: EMERGENCY MEDICINE
Payer: COMMERCIAL

## 2021-02-23 VITALS
DIASTOLIC BLOOD PRESSURE: 83 MMHG | BODY MASS INDEX: 22.09 KG/M2 | SYSTOLIC BLOOD PRESSURE: 114 MMHG | OXYGEN SATURATION: 96 % | RESPIRATION RATE: 16 BRPM | WEIGHT: 145.72 LBS | HEIGHT: 68 IN | HEART RATE: 103 BPM | TEMPERATURE: 97.8 F

## 2021-02-23 DIAGNOSIS — R10.84 ABDOMINAL PAIN, GENERALIZED: ICD-10-CM

## 2021-02-23 DIAGNOSIS — R10.9 FLANK PAIN: Primary | ICD-10-CM

## 2021-02-23 LAB
ALBUMIN SERPL-MCNC: 3.7 G/DL (ref 3.5–5)
ALBUMIN/GLOB SERPL: 1.2 {RATIO} (ref 1.1–2.2)
ALP SERPL-CCNC: 40 U/L (ref 45–117)
ALT SERPL-CCNC: 16 U/L (ref 12–78)
ANION GAP SERPL CALC-SCNC: 12 MMOL/L (ref 5–15)
APPEARANCE UR: CLEAR
AST SERPL-CCNC: 15 U/L (ref 15–37)
BASOPHILS # BLD: 0.1 K/UL (ref 0–0.1)
BASOPHILS NFR BLD: 1 % (ref 0–1)
BILIRUB SERPL-MCNC: 0.5 MG/DL (ref 0.2–1)
BILIRUB UR QL: NEGATIVE
BUN SERPL-MCNC: 6 MG/DL (ref 6–20)
BUN/CREAT SERPL: 8 (ref 12–20)
CALCIUM SERPL-MCNC: 8.7 MG/DL (ref 8.5–10.1)
CHLORIDE SERPL-SCNC: 107 MMOL/L (ref 97–108)
CO2 SERPL-SCNC: 23 MMOL/L (ref 21–32)
COLOR UR: NORMAL
COMMENT, HOLDF: NORMAL
CREAT SERPL-MCNC: 0.77 MG/DL (ref 0.55–1.02)
DIFFERENTIAL METHOD BLD: NORMAL
EOSINOPHIL # BLD: 0.1 K/UL (ref 0–0.4)
EOSINOPHIL NFR BLD: 2 % (ref 0–7)
ERYTHROCYTE [DISTWIDTH] IN BLOOD BY AUTOMATED COUNT: 13.1 % (ref 11.5–14.5)
GLOBULIN SER CALC-MCNC: 3.2 G/DL (ref 2–4)
GLUCOSE SERPL-MCNC: 94 MG/DL (ref 65–100)
GLUCOSE UR STRIP.AUTO-MCNC: NEGATIVE MG/DL
HCG UR QL: NEGATIVE
HCT VFR BLD AUTO: 38.7 % (ref 35–47)
HGB BLD-MCNC: 12.5 G/DL (ref 11.5–16)
HGB UR QL STRIP: NEGATIVE
IMM GRANULOCYTES # BLD AUTO: 0 K/UL (ref 0–0.04)
IMM GRANULOCYTES NFR BLD AUTO: 0 % (ref 0–0.5)
KETONES UR QL STRIP.AUTO: NEGATIVE MG/DL
LEUKOCYTE ESTERASE UR QL STRIP.AUTO: NEGATIVE
LYMPHOCYTES # BLD: 1.9 K/UL (ref 0.8–3.5)
LYMPHOCYTES NFR BLD: 41 % (ref 12–49)
MCH RBC QN AUTO: 27.5 PG (ref 26–34)
MCHC RBC AUTO-ENTMCNC: 32.3 G/DL (ref 30–36.5)
MCV RBC AUTO: 85.2 FL (ref 80–99)
MONOCYTES # BLD: 0.4 K/UL (ref 0–1)
MONOCYTES NFR BLD: 8 % (ref 5–13)
NEUTS SEG # BLD: 2.2 K/UL (ref 1.8–8)
NEUTS SEG NFR BLD: 48 % (ref 32–75)
NITRITE UR QL STRIP.AUTO: NEGATIVE
NRBC # BLD: 0 K/UL (ref 0–0.01)
NRBC BLD-RTO: 0 PER 100 WBC
PH UR STRIP: 8 [PH] (ref 5–8)
PLATELET # BLD AUTO: 246 K/UL (ref 150–400)
PMV BLD AUTO: 10.4 FL (ref 8.9–12.9)
POTASSIUM SERPL-SCNC: 3.8 MMOL/L (ref 3.5–5.1)
PROT SERPL-MCNC: 6.9 G/DL (ref 6.4–8.2)
PROT UR STRIP-MCNC: NEGATIVE MG/DL
RBC # BLD AUTO: 4.54 M/UL (ref 3.8–5.2)
SAMPLES BEING HELD,HOLD: NORMAL
SODIUM SERPL-SCNC: 142 MMOL/L (ref 136–145)
SP GR UR REFRACTOMETRY: 1.01 (ref 1–1.03)
UROBILINOGEN UR QL STRIP.AUTO: 0.2 EU/DL (ref 0.2–1)
WBC # BLD AUTO: 4.7 K/UL (ref 3.6–11)

## 2021-02-23 PROCEDURE — 81003 URINALYSIS AUTO W/O SCOPE: CPT

## 2021-02-23 PROCEDURE — 85025 COMPLETE CBC W/AUTO DIFF WBC: CPT

## 2021-02-23 PROCEDURE — 80053 COMPREHEN METABOLIC PANEL: CPT

## 2021-02-23 PROCEDURE — 76856 US EXAM PELVIC COMPLETE: CPT

## 2021-02-23 PROCEDURE — 96376 TX/PRO/DX INJ SAME DRUG ADON: CPT

## 2021-02-23 PROCEDURE — 74011250636 HC RX REV CODE- 250/636: Performed by: EMERGENCY MEDICINE

## 2021-02-23 PROCEDURE — 99285 EMERGENCY DEPT VISIT HI MDM: CPT

## 2021-02-23 PROCEDURE — 96374 THER/PROPH/DIAG INJ IV PUSH: CPT

## 2021-02-23 PROCEDURE — 96375 TX/PRO/DX INJ NEW DRUG ADDON: CPT

## 2021-02-23 PROCEDURE — 74176 CT ABD & PELVIS W/O CONTRAST: CPT

## 2021-02-23 PROCEDURE — 81025 URINE PREGNANCY TEST: CPT

## 2021-02-23 RX ORDER — KETOROLAC TROMETHAMINE 30 MG/ML
30 INJECTION, SOLUTION INTRAMUSCULAR; INTRAVENOUS
Status: COMPLETED | OUTPATIENT
Start: 2021-02-23 | End: 2021-02-23

## 2021-02-23 RX ORDER — POLYETHYLENE GLYCOL 3350 17 G/17G
17 POWDER, FOR SOLUTION ORAL DAILY
Qty: 235 G | Refills: 0 | Status: SHIPPED | OUTPATIENT
Start: 2021-02-23 | End: 2022-10-14

## 2021-02-23 RX ORDER — HYDROMORPHONE HYDROCHLORIDE 1 MG/ML
1 INJECTION, SOLUTION INTRAMUSCULAR; INTRAVENOUS; SUBCUTANEOUS
Status: COMPLETED | OUTPATIENT
Start: 2021-02-23 | End: 2021-02-23

## 2021-02-23 RX ORDER — MORPHINE SULFATE 2 MG/ML
6 INJECTION, SOLUTION INTRAMUSCULAR; INTRAVENOUS
Status: COMPLETED | OUTPATIENT
Start: 2021-02-23 | End: 2021-02-23

## 2021-02-23 RX ORDER — ONDANSETRON 2 MG/ML
4 INJECTION INTRAMUSCULAR; INTRAVENOUS
Status: COMPLETED | OUTPATIENT
Start: 2021-02-23 | End: 2021-02-23

## 2021-02-23 RX ORDER — ONDANSETRON 4 MG/1
4 TABLET, ORALLY DISINTEGRATING ORAL
Qty: 10 TAB | Refills: 0 | OUTPATIENT
Start: 2021-02-23 | End: 2022-01-25

## 2021-02-23 RX ORDER — HYDROCODONE BITARTRATE AND ACETAMINOPHEN 5; 325 MG/1; MG/1
1 TABLET ORAL
Qty: 18 TAB | Refills: 0 | Status: SHIPPED | OUTPATIENT
Start: 2021-02-23 | End: 2021-02-26

## 2021-02-23 RX ADMIN — HYDROMORPHONE HYDROCHLORIDE 1 MG: 1 INJECTION, SOLUTION INTRAMUSCULAR; INTRAVENOUS; SUBCUTANEOUS at 12:21

## 2021-02-23 RX ADMIN — ONDANSETRON 4 MG: 2 INJECTION INTRAMUSCULAR; INTRAVENOUS at 09:33

## 2021-02-23 RX ADMIN — MORPHINE SULFATE 6 MG: 2 INJECTION, SOLUTION INTRAMUSCULAR; INTRAVENOUS at 09:35

## 2021-02-23 RX ADMIN — KETOROLAC TROMETHAMINE 30 MG: 30 INJECTION, SOLUTION INTRAMUSCULAR at 09:43

## 2021-02-23 RX ADMIN — SODIUM CHLORIDE 1000 ML: 9 INJECTION, SOLUTION INTRAVENOUS at 09:41

## 2021-02-23 RX ADMIN — MORPHINE SULFATE 6 MG: 2 INJECTION, SOLUTION INTRAMUSCULAR; INTRAVENOUS at 10:44

## 2021-02-23 NOTE — ED PROVIDER NOTES
Ms. Bernardo Her is a 31yo female who presents to the ER with complaints of right-sided flank and abdominal pain. She said that she has a history of kidney stones and this feels like her kidney stones. Her pain started suddenly at 7:45 AM.  He starts in her right flank and radiates down to her right lower abdomen and pelvis. She reports nausea during this time but no vomiting. She denies any changes with her urine or bowel movements. No blood in her urine. No pain when she urinates. She denies fevers or chills. She said that she felt well prior to this starting this morning.            Past Medical History:   Diagnosis Date    Anxiety and depression     Bartholin's gland abscess     x8    Endometriosis     Kidney stones     Migraine     Ovarian cyst     Panic attack     Pyelonephritis        Past Surgical History:   Procedure Laterality Date    HX APPENDECTOMY  04/26/2017    HX BARTHOLIN CYST MARSUPIALIZATION      2011, 2018    HX BREAST LUMPECTOMY Right     HX BUNIONECTOMY      HX CYST REMOVAL      HX GYN Left     bartholin cyst drainage X 6    HX GYN Left     bartholin cyst marsupilization    HX HEENT      HX OTHER SURGICAL      laparascopy    HX PELVIC LAPAROSCOPY      HX WISDOM TEETH EXTRACTION           Family History:   Problem Relation Age of Onset    Diabetes Maternal Aunt     Heart Disease Maternal Aunt     Stroke Maternal Aunt     Heart Disease Maternal Grandmother     No Known Problems Mother        Social History     Socioeconomic History    Marital status: SINGLE     Spouse name: Not on file    Number of children: Not on file    Years of education: Not on file    Highest education level: Not on file   Occupational History    Not on file   Social Needs    Financial resource strain: Not on file    Food insecurity     Worry: Not on file     Inability: Not on file    Transportation needs     Medical: Not on file     Non-medical: Not on file   Tobacco Use    Smoking status: Former Smoker    Smokeless tobacco: Never Used   Substance and Sexual Activity    Alcohol use: Yes     Alcohol/week: 1.0 standard drinks     Types: 1 Glasses of wine per week     Comment: social    Drug use: No    Sexual activity: Yes     Partners: Male     Birth control/protection: Pill, Condom   Lifestyle    Physical activity     Days per week: Not on file     Minutes per session: Not on file    Stress: Not on file   Relationships    Social connections     Talks on phone: Not on file     Gets together: Not on file     Attends Episcopal service: Not on file     Active member of club or organization: Not on file     Attends meetings of clubs or organizations: Not on file     Relationship status: Not on file    Intimate partner violence     Fear of current or ex partner: Not on file     Emotionally abused: Not on file     Physically abused: Not on file     Forced sexual activity: Not on file   Other Topics Concern    Not on file   Social History Narrative    Not on file         ALLERGIES: Benadryl [diphenhydramine hcl], Compazine [prochlorperazine edisylate], Depacon [valproate sodium], Haldol [haloperidol lactate], Promethazine, and Reglan [metoclopramide]    Review of Systems   Constitutional: Negative for chills and fever. HENT: Negative for rhinorrhea and sore throat. Respiratory: Negative for cough and shortness of breath. Cardiovascular: Negative for chest pain. Gastrointestinal: Positive for abdominal pain and nausea. Negative for diarrhea and vomiting. Genitourinary: Positive for flank pain. Negative for dysuria and urgency. Musculoskeletal: Negative for arthralgias and back pain. Skin: Negative for rash. Neurological: Negative for dizziness, weakness and light-headedness.        Vitals:    02/23/21 0914   BP: 119/74   Pulse: (!) 118   Resp: 18   Temp: 98.2 °F (36.8 °C)   SpO2: 97%   Weight: 66.1 kg (145 lb 11.6 oz)   Height: 5' 8\" (1.727 m)            Physical Exam     Vital signs reviewed. Nursing notes reviewed. Const: Appears uncomfortable   head:  Atraumatic, normocephalic  Eyes:  PERRL, conjunctiva normal, no scleral icterus  Neck:  Supple, trachea midline  Cardiovascular: Regular rate   resp:  No resp distress, no increased work of breathing  Abd:  Soft, right lower quadrant tenderness, non-distended, no rebound, no guarding, right-sided CVA tenderness  MSK:  No pedal edema, normal ROM  Neuro:  Alert and oriented x3, no cranial nerve defect  Skin:  Warm, dry, intact  Psych: normal mood and affect, behavior is normal, judgement and thought content is normal          MDM  Number of Diagnoses or Management Options     Amount and/or Complexity of Data Reviewed  Clinical lab tests: reviewed and ordered  Tests in the radiology section of CPT®: ordered and reviewed  Review and summarize past medical records: yes    Patient Progress  Patient progress: stable          Ms. Harrison Naidu is a 31yo female who presents to the ER with complaints of flank pain and abdominal pain. She states that she feels much better at the time of discharge. No radiopaque kidney stone on CT. No signs of ovarian torsion on ultrasound. Pt. To f/u with her PCP or return to the ER with new or worsening sx.       Procedures

## 2021-02-24 ENCOUNTER — HOSPITAL ENCOUNTER (EMERGENCY)
Age: 31
Discharge: HOME | End: 2021-02-24
Attending: EMERGENCY MEDICINE
Payer: COMMERCIAL

## 2021-02-24 VITALS
TEMPERATURE: 98.4 F | BODY MASS INDEX: 21.99 KG/M2 | RESPIRATION RATE: 18 BRPM | HEIGHT: 68 IN | WEIGHT: 145.06 LBS | SYSTOLIC BLOOD PRESSURE: 118 MMHG | DIASTOLIC BLOOD PRESSURE: 81 MMHG | HEART RATE: 81 BPM | OXYGEN SATURATION: 98 %

## 2021-02-24 DIAGNOSIS — R10.31 ABDOMINAL PAIN, RIGHT LOWER QUADRANT: ICD-10-CM

## 2021-02-24 DIAGNOSIS — K59.00 CONSTIPATION, UNSPECIFIED CONSTIPATION TYPE: Primary | ICD-10-CM

## 2021-02-24 LAB
ALBUMIN SERPL-MCNC: 3.7 G/DL (ref 3.5–5)
ALBUMIN/GLOB SERPL: 1.2 {RATIO} (ref 1.1–2.2)
ALP SERPL-CCNC: 39 U/L (ref 45–117)
ALT SERPL-CCNC: 19 U/L (ref 12–78)
ANION GAP SERPL CALC-SCNC: 11 MMOL/L (ref 5–15)
AST SERPL-CCNC: 15 U/L (ref 15–37)
BASOPHILS # BLD: 0.1 K/UL (ref 0–0.1)
BASOPHILS NFR BLD: 1 % (ref 0–1)
BILIRUB SERPL-MCNC: 0.5 MG/DL (ref 0.2–1)
BUN SERPL-MCNC: 7 MG/DL (ref 6–20)
BUN/CREAT SERPL: 9 (ref 12–20)
CALCIUM SERPL-MCNC: 8.5 MG/DL (ref 8.5–10.1)
CHLORIDE SERPL-SCNC: 107 MMOL/L (ref 97–108)
CO2 SERPL-SCNC: 23 MMOL/L (ref 21–32)
CREAT SERPL-MCNC: 0.81 MG/DL (ref 0.55–1.02)
DIFFERENTIAL METHOD BLD: NORMAL
EOSINOPHIL # BLD: 0.1 K/UL (ref 0–0.4)
EOSINOPHIL NFR BLD: 1 % (ref 0–7)
ERYTHROCYTE [DISTWIDTH] IN BLOOD BY AUTOMATED COUNT: 13.1 % (ref 11.5–14.5)
GLOBULIN SER CALC-MCNC: 3.1 G/DL (ref 2–4)
GLUCOSE SERPL-MCNC: 91 MG/DL (ref 65–100)
HCT VFR BLD AUTO: 37.7 % (ref 35–47)
HGB BLD-MCNC: 12 G/DL (ref 11.5–16)
IMM GRANULOCYTES # BLD AUTO: 0 K/UL (ref 0–0.04)
IMM GRANULOCYTES NFR BLD AUTO: 0 % (ref 0–0.5)
LYMPHOCYTES # BLD: 1.7 K/UL (ref 0.8–3.5)
LYMPHOCYTES NFR BLD: 42 % (ref 12–49)
MCH RBC QN AUTO: 27.5 PG (ref 26–34)
MCHC RBC AUTO-ENTMCNC: 31.8 G/DL (ref 30–36.5)
MCV RBC AUTO: 86.5 FL (ref 80–99)
MONOCYTES # BLD: 0.3 K/UL (ref 0–1)
MONOCYTES NFR BLD: 8 % (ref 5–13)
NEUTS SEG # BLD: 2 K/UL (ref 1.8–8)
NEUTS SEG NFR BLD: 48 % (ref 32–75)
NRBC # BLD: 0 K/UL (ref 0–0.01)
NRBC BLD-RTO: 0 PER 100 WBC
PLATELET # BLD AUTO: 210 K/UL (ref 150–400)
PMV BLD AUTO: 9.5 FL (ref 8.9–12.9)
POTASSIUM SERPL-SCNC: 3.8 MMOL/L (ref 3.5–5.1)
PROT SERPL-MCNC: 6.8 G/DL (ref 6.4–8.2)
RBC # BLD AUTO: 4.36 M/UL (ref 3.8–5.2)
SODIUM SERPL-SCNC: 141 MMOL/L (ref 136–145)
WBC # BLD AUTO: 4.2 K/UL (ref 3.6–11)

## 2021-02-24 PROCEDURE — 74011250637 HC RX REV CODE- 250/637: Performed by: EMERGENCY MEDICINE

## 2021-02-24 PROCEDURE — 80053 COMPREHEN METABOLIC PANEL: CPT

## 2021-02-24 PROCEDURE — 36415 COLL VENOUS BLD VENIPUNCTURE: CPT

## 2021-02-24 PROCEDURE — 99285 EMERGENCY DEPT VISIT HI MDM: CPT

## 2021-02-24 PROCEDURE — 85025 COMPLETE CBC W/AUTO DIFF WBC: CPT

## 2021-02-24 PROCEDURE — 96375 TX/PRO/DX INJ NEW DRUG ADDON: CPT

## 2021-02-24 PROCEDURE — 96374 THER/PROPH/DIAG INJ IV PUSH: CPT

## 2021-02-24 PROCEDURE — 74011250636 HC RX REV CODE- 250/636: Performed by: EMERGENCY MEDICINE

## 2021-02-24 RX ORDER — ONDANSETRON 2 MG/ML
4 INJECTION INTRAMUSCULAR; INTRAVENOUS ONCE
Status: COMPLETED | OUTPATIENT
Start: 2021-02-24 | End: 2021-02-24

## 2021-02-24 RX ORDER — KETOROLAC TROMETHAMINE 30 MG/ML
30 INJECTION, SOLUTION INTRAMUSCULAR; INTRAVENOUS
Status: COMPLETED | OUTPATIENT
Start: 2021-02-24 | End: 2021-02-24

## 2021-02-24 RX ORDER — ACETAMINOPHEN 325 MG/1
650 TABLET ORAL
Status: COMPLETED | OUTPATIENT
Start: 2021-02-24 | End: 2021-02-24

## 2021-02-24 RX ORDER — DEXAMETHASONE SODIUM PHOSPHATE 10 MG/ML
4 INJECTION INTRAMUSCULAR; INTRAVENOUS
Status: COMPLETED | OUTPATIENT
Start: 2021-02-24 | End: 2021-02-24

## 2021-02-24 RX ADMIN — SODIUM CHLORIDE 1000 ML: 9 INJECTION, SOLUTION INTRAVENOUS at 10:52

## 2021-02-24 RX ADMIN — KETOROLAC TROMETHAMINE 30 MG: 30 INJECTION, SOLUTION INTRAMUSCULAR at 11:48

## 2021-02-24 RX ADMIN — DEXAMETHASONE SODIUM PHOSPHATE 4 MG: 10 INJECTION, SOLUTION INTRAMUSCULAR; INTRAVENOUS at 11:48

## 2021-02-24 RX ADMIN — ACETAMINOPHEN 650 MG: 325 TABLET ORAL at 13:11

## 2021-02-24 RX ADMIN — ONDANSETRON 4 MG: 2 INJECTION INTRAMUSCULAR; INTRAVENOUS at 11:48

## 2021-02-24 NOTE — ED PROVIDER NOTES
Patient was seen in the emergency department on yesterday for flank pain she received a work-up to rule out kidney stone and ovarian cyst or torsion. Her work-up in the emergency department was negative at that time. The patient presents today complaining of worsening pain with absolutely no relief since her presentation previously. She denies any fevers or chills. But she does state right lower quadrant pain right flank pain and pain radiating into her suprapubic area. She denies any vomiting. Patient does have a history of migraines and reports allergies to many of the classic migraine interventions. The history is provided by the patient. Flank Pain   This is a recurrent problem. The problem has been gradually worsening. The problem occurs constantly. Patient reports not work related injury. The pain is severe. Pertinent negatives include no fever, no numbness, no dysuria, no paresthesias, no paresis and no tingling.         Past Medical History:   Diagnosis Date    Anxiety and depression     Bartholin's gland abscess     x8    Endometriosis     Kidney stones     Migraine     Ovarian cyst     Panic attack     Pyelonephritis        Past Surgical History:   Procedure Laterality Date    HX APPENDECTOMY  04/26/2017    HX BARTHOLIN CYST MARSUPIALIZATION      2011, 2018    HX BREAST LUMPECTOMY Right     HX BUNIONECTOMY      HX CYST REMOVAL      HX GYN Left     bartholin cyst drainage X 6    HX GYN Left     bartholin cyst marsupilization    HX HEENT      HX OTHER SURGICAL      laparascopy    HX PELVIC LAPAROSCOPY      HX WISDOM TEETH EXTRACTION           Family History:   Problem Relation Age of Onset    Diabetes Maternal Aunt     Heart Disease Maternal Aunt     Stroke Maternal Aunt     Heart Disease Maternal Grandmother     No Known Problems Mother        Social History     Socioeconomic History    Marital status: SINGLE     Spouse name: Not on file    Number of children: Not on file  Years of education: Not on file    Highest education level: Not on file   Occupational History    Not on file   Social Needs    Financial resource strain: Not on file    Food insecurity     Worry: Not on file     Inability: Not on file    Transportation needs     Medical: Not on file     Non-medical: Not on file   Tobacco Use    Smoking status: Former Smoker    Smokeless tobacco: Never Used   Substance and Sexual Activity    Alcohol use: Yes     Alcohol/week: 1.0 standard drinks     Types: 1 Glasses of wine per week     Comment: social    Drug use: No    Sexual activity: Yes     Partners: Male     Birth control/protection: Pill, Condom   Lifestyle    Physical activity     Days per week: Not on file     Minutes per session: Not on file    Stress: Not on file   Relationships    Social connections     Talks on phone: Not on file     Gets together: Not on file     Attends Bahai service: Not on file     Active member of club or organization: Not on file     Attends meetings of clubs or organizations: Not on file     Relationship status: Not on file    Intimate partner violence     Fear of current or ex partner: Not on file     Emotionally abused: Not on file     Physically abused: Not on file     Forced sexual activity: Not on file   Other Topics Concern    Not on file   Social History Narrative    Not on file         ALLERGIES: Benadryl [diphenhydramine hcl], Compazine [prochlorperazine edisylate], Depacon [valproate sodium], Haldol [haloperidol lactate], Promethazine, and Reglan [metoclopramide]    Review of Systems   Constitutional: Negative for fever. Genitourinary: Positive for flank pain. Negative for dysuria. Neurological: Negative for tingling, numbness and paresthesias. All other systems reviewed and are negative.       Vitals:    02/24/21 1045 02/24/21 1230 02/24/21 1608 02/24/21 1609   BP: 108/86 106/78 118/81    Pulse:   81    Resp:   18    Temp:   98.4 °F (36.9 °C)    SpO2: 98% 99% 98% 98%   Weight:       Height:                Physical Exam  Vitals signs and nursing note reviewed. Constitutional:       General: She is in acute distress. Appearance: Normal appearance. HENT:      Head: Normocephalic and atraumatic. Eyes:      Extraocular Movements: Extraocular movements intact. Conjunctiva/sclera: Conjunctivae normal.      Pupils: Pupils are equal, round, and reactive to light. Neck:      Musculoskeletal: Normal range of motion. Cardiovascular:      Rate and Rhythm: Normal rate and regular rhythm. Pulses: Normal pulses. Heart sounds: Normal heart sounds. Pulmonary:      Effort: Pulmonary effort is normal.      Breath sounds: Normal breath sounds. Abdominal:      General: There is no distension. Palpations: Abdomen is soft. Tenderness: There is abdominal tenderness. There is no guarding or rebound. Musculoskeletal: Normal range of motion. General: Tenderness present. Skin:     General: Skin is warm. Capillary Refill: Capillary refill takes less than 2 seconds. Neurological:      General: No focal deficit present. Mental Status: She is alert and oriented to person, place, and time. Psychiatric:         Mood and Affect: Mood normal.         Behavior: Behavior normal.         Thought Content: Thought content normal.         Judgment: Judgment normal.            Tenderness to palpation over the right iliac crest and into the right flank. There is also tenderness to palpation in the suprapubic area. There is no guarding or rebound.   MDM  Number of Diagnoses or Management Options  Abdominal pain, right lower quadrant: established and worsening  Constipation, unspecified constipation type: established and improving     Amount and/or Complexity of Data Reviewed  Clinical lab tests: reviewed  Decide to obtain previous medical records or to obtain history from someone other than the patient: yes  Review and summarize past medical records: yes    Risk of Complications, Morbidity, and/or Mortality  Presenting problems: moderate  Diagnostic procedures: moderate  Management options: moderate  General comments: Review of interval imaging from yesterday indicates that there was evidence of significant fecal burden on the patient's exam.  Based on her exam findings today and vital signs I do believe that the patient's discomfort is related to increased stool burden. Patient states that she just started taking MiraLAX on yesterday and has not had much results. Patient states that Toradol and Tylenol have not improved her pain in any way. I talked with patient about efforts for debulking her stool gave her the option for an enema here in the emergency department or magnesium citrate to take at home informing her that an enema would provide the most rapid debulking of her bowel as well as indicate whether or not she will have relief of this pain she has been dealing with. Patient opted for the enema in the emergency department and following completion of the enema the patient had good results and reported relief of her abdominal pain. She was discharged in good condition. Patient Progress  Patient progress: stable         Procedures      LABORATORY TESTS:   Labs Reviewed   METABOLIC PANEL, COMPREHENSIVE - Abnormal; Notable for the following components:       Result Value    BUN/Creatinine ratio 9 (*)     Alk. phosphatase 39 (*)     All other components within normal limits   CBC WITH AUTOMATED DIFF   SAMPLES BEING HELD           IMAGING RESULTS: Review of CT abdomen from yesterday demonstrating increased stool burden.         MEDICATIONS GIVEN:  Medications   sodium chloride 0.9 % bolus infusion 1,000 mL (0 mL IntraVENous IV Completed 2/24/21 1251)   ketorolac (TORADOL) injection 30 mg (30 mg IntraVENous Given 2/24/21 1148)   dexamethasone (PF) (DECADRON) 10 mg/mL injection 4 mg (4 mg IntraVENous Given 2/24/21 1148)   ondansetron (ZOFRAN) injection 4 mg (4 mg IntraVENous Given 2/24/21 1148)   acetaminophen (TYLENOL) tablet 650 mg (650 mg Oral Given 2/24/21 1311)       IMPRESSION:  1. Constipation, unspecified constipation type    2. Abdominal pain, right lower quadrant        PLAN:  1. Discharge to Home  2.   Return to ED if worse

## 2021-02-24 NOTE — ED TRIAGE NOTES
Pt was seen yesterday for right flank pain, pt states that the pain did ease but it came back 8am this morning. Pt states that she has right flank pain that radiates into her right leg. Pt states that she is nauseated.

## 2021-02-24 NOTE — ED NOTES
Ambulatory to rest room,upon completion of toileting, patient rang out,needed to be wheeled back to room. Pt states emptying her bladder causes pain to be worse, pain is now mostly in the pubic area.

## 2021-02-24 NOTE — ED NOTES
Patient discharged with vital signs stable, AVS, in no acute distress. Reports relief following enema. Recommended pt continue miralax to promote regularity.

## 2021-03-02 ENCOUNTER — HOSPITAL ENCOUNTER (EMERGENCY)
Age: 31
Discharge: HOME | End: 2021-03-02
Attending: EMERGENCY MEDICINE
Payer: COMMERCIAL

## 2021-03-02 ENCOUNTER — APPOINTMENT (OUTPATIENT)
Dept: ULTRASOUND IMAGING | Age: 31
End: 2021-03-02
Attending: EMERGENCY MEDICINE
Payer: COMMERCIAL

## 2021-03-02 VITALS
SYSTOLIC BLOOD PRESSURE: 121 MMHG | WEIGHT: 145 LBS | BODY MASS INDEX: 21.98 KG/M2 | HEIGHT: 68 IN | TEMPERATURE: 97.5 F | HEART RATE: 106 BPM | OXYGEN SATURATION: 97 % | DIASTOLIC BLOOD PRESSURE: 77 MMHG | RESPIRATION RATE: 22 BRPM

## 2021-03-02 DIAGNOSIS — R10.2 PELVIC PAIN: Primary | ICD-10-CM

## 2021-03-02 LAB
ALBUMIN SERPL-MCNC: 3.8 G/DL (ref 3.5–5)
ALBUMIN/GLOB SERPL: 1.3 {RATIO} (ref 1.1–2.2)
ALP SERPL-CCNC: 37 U/L (ref 45–117)
ALT SERPL-CCNC: 16 U/L (ref 12–78)
ANION GAP SERPL CALC-SCNC: 5 MMOL/L (ref 5–15)
APPEARANCE UR: ABNORMAL
AST SERPL-CCNC: 8 U/L (ref 15–37)
BACTERIA URNS QL MICRO: ABNORMAL /HPF
BASOPHILS # BLD: 0.1 K/UL (ref 0–0.1)
BASOPHILS NFR BLD: 1 % (ref 0–1)
BILIRUB SERPL-MCNC: 0.3 MG/DL (ref 0.2–1)
BILIRUB UR QL: NEGATIVE
BUN SERPL-MCNC: 13 MG/DL (ref 6–20)
BUN/CREAT SERPL: 19 (ref 12–20)
CALCIUM SERPL-MCNC: 8.6 MG/DL (ref 8.5–10.1)
CHLORIDE SERPL-SCNC: 112 MMOL/L (ref 97–108)
CO2 SERPL-SCNC: 25 MMOL/L (ref 21–32)
COLOR UR: ABNORMAL
CREAT SERPL-MCNC: 0.68 MG/DL (ref 0.55–1.02)
DIFFERENTIAL METHOD BLD: ABNORMAL
EOSINOPHIL # BLD: 0.1 K/UL (ref 0–0.4)
EOSINOPHIL NFR BLD: 2 % (ref 0–7)
EPITH CASTS URNS QL MICRO: ABNORMAL /LPF
ERYTHROCYTE [DISTWIDTH] IN BLOOD BY AUTOMATED COUNT: 13.3 % (ref 11.5–14.5)
GLOBULIN SER CALC-MCNC: 3 G/DL (ref 2–4)
GLUCOSE SERPL-MCNC: 78 MG/DL (ref 65–100)
GLUCOSE UR STRIP.AUTO-MCNC: NEGATIVE MG/DL
HCT VFR BLD AUTO: 37.6 % (ref 35–47)
HGB BLD-MCNC: 12.1 G/DL (ref 11.5–16)
HGB UR QL STRIP: NEGATIVE
IMM GRANULOCYTES # BLD AUTO: 0 K/UL (ref 0–0.04)
IMM GRANULOCYTES NFR BLD AUTO: 1 % (ref 0–0.5)
KETONES UR QL STRIP.AUTO: NEGATIVE MG/DL
LEUKOCYTE ESTERASE UR QL STRIP.AUTO: NEGATIVE
LYMPHOCYTES # BLD: 2.3 K/UL (ref 0.8–3.5)
LYMPHOCYTES NFR BLD: 35 % (ref 12–49)
MCH RBC QN AUTO: 28 PG (ref 26–34)
MCHC RBC AUTO-ENTMCNC: 32.2 G/DL (ref 30–36.5)
MCV RBC AUTO: 87 FL (ref 80–99)
MONOCYTES # BLD: 0.5 K/UL (ref 0–1)
MONOCYTES NFR BLD: 7 % (ref 5–13)
NEUTS SEG # BLD: 3.6 K/UL (ref 1.8–8)
NEUTS SEG NFR BLD: 54 % (ref 32–75)
NITRITE UR QL STRIP.AUTO: NEGATIVE
NRBC # BLD: 0 K/UL (ref 0–0.01)
NRBC BLD-RTO: 0 PER 100 WBC
PH UR STRIP: 6 [PH] (ref 5–8)
PLATELET # BLD AUTO: 204 K/UL (ref 150–400)
PMV BLD AUTO: 10.3 FL (ref 8.9–12.9)
POTASSIUM SERPL-SCNC: 3.5 MMOL/L (ref 3.5–5.1)
PROT SERPL-MCNC: 6.8 G/DL (ref 6.4–8.2)
PROT UR STRIP-MCNC: NEGATIVE MG/DL
RBC # BLD AUTO: 4.32 M/UL (ref 3.8–5.2)
RBC #/AREA URNS HPF: ABNORMAL /HPF (ref 0–5)
SODIUM SERPL-SCNC: 142 MMOL/L (ref 136–145)
SP GR UR REFRACTOMETRY: 1.03 (ref 1–1.03)
UA: UC IF INDICATED,UAUC: ABNORMAL
UROBILINOGEN UR QL STRIP.AUTO: 0.2 EU/DL (ref 0.2–1)
WBC # BLD AUTO: 6.6 K/UL (ref 3.6–11)
WBC URNS QL MICRO: ABNORMAL /HPF (ref 0–4)

## 2021-03-02 PROCEDURE — 76830 TRANSVAGINAL US NON-OB: CPT

## 2021-03-02 PROCEDURE — 74011250636 HC RX REV CODE- 250/636: Performed by: EMERGENCY MEDICINE

## 2021-03-02 PROCEDURE — 36415 COLL VENOUS BLD VENIPUNCTURE: CPT

## 2021-03-02 PROCEDURE — 74011000250 HC RX REV CODE- 250: Performed by: EMERGENCY MEDICINE

## 2021-03-02 PROCEDURE — 85025 COMPLETE CBC W/AUTO DIFF WBC: CPT

## 2021-03-02 PROCEDURE — 81001 URINALYSIS AUTO W/SCOPE: CPT

## 2021-03-02 PROCEDURE — 96375 TX/PRO/DX INJ NEW DRUG ADDON: CPT

## 2021-03-02 PROCEDURE — 96374 THER/PROPH/DIAG INJ IV PUSH: CPT

## 2021-03-02 PROCEDURE — 99285 EMERGENCY DEPT VISIT HI MDM: CPT

## 2021-03-02 PROCEDURE — 80053 COMPREHEN METABOLIC PANEL: CPT

## 2021-03-02 PROCEDURE — 96376 TX/PRO/DX INJ SAME DRUG ADON: CPT

## 2021-03-02 RX ORDER — KETAMINE HYDROCHLORIDE 10 MG/ML
0.2 INJECTION, SOLUTION INTRAMUSCULAR; INTRAVENOUS ONCE
Status: COMPLETED | OUTPATIENT
Start: 2021-03-02 | End: 2021-03-02

## 2021-03-02 RX ORDER — MORPHINE SULFATE 2 MG/ML
4 INJECTION, SOLUTION INTRAMUSCULAR; INTRAVENOUS
Status: COMPLETED | OUTPATIENT
Start: 2021-03-02 | End: 2021-03-02

## 2021-03-02 RX ORDER — KETOROLAC TROMETHAMINE 30 MG/ML
15 INJECTION, SOLUTION INTRAMUSCULAR; INTRAVENOUS
Status: COMPLETED | OUTPATIENT
Start: 2021-03-02 | End: 2021-03-02

## 2021-03-02 RX ORDER — MORPHINE SULFATE 2 MG/ML
7 INJECTION, SOLUTION INTRAMUSCULAR; INTRAVENOUS ONCE
Status: COMPLETED | OUTPATIENT
Start: 2021-03-02 | End: 2021-03-02

## 2021-03-02 RX ORDER — MORPHINE SULFATE 4 MG/ML
7 INJECTION INTRAVENOUS ONCE
Status: DISCONTINUED | OUTPATIENT
Start: 2021-03-02 | End: 2021-03-02

## 2021-03-02 RX ORDER — ONDANSETRON 2 MG/ML
4 INJECTION INTRAMUSCULAR; INTRAVENOUS
Status: COMPLETED | OUTPATIENT
Start: 2021-03-02 | End: 2021-03-02

## 2021-03-02 RX ADMIN — KETAMINE HYDROCHLORIDE 13.2 MG: 10 INJECTION, SOLUTION INTRAMUSCULAR; INTRAVENOUS at 21:52

## 2021-03-02 RX ADMIN — ONDANSETRON 4 MG: 2 INJECTION INTRAMUSCULAR; INTRAVENOUS at 18:21

## 2021-03-02 RX ADMIN — MORPHINE SULFATE 4 MG: 2 INJECTION, SOLUTION INTRAMUSCULAR; INTRAVENOUS at 18:22

## 2021-03-02 RX ADMIN — MORPHINE SULFATE 7 MG: 2 INJECTION, SOLUTION INTRAMUSCULAR; INTRAVENOUS at 20:53

## 2021-03-02 RX ADMIN — KETOROLAC TROMETHAMINE 15 MG: 30 INJECTION, SOLUTION INTRAMUSCULAR at 18:22

## 2021-03-02 NOTE — ED NOTES
Pt reports pain 9/10 after administration of pain medication notified MD     Pt tolerated liquids well however remains in pain     2100 Pt of to [address]     2145 Pt reports still being in pain after pain administration pt states pain 10/10, Notified MD

## 2021-03-02 NOTE — LETTER
NOTIFICATION RETURN TO WORK / SCHOOL 
 
3/2/2021 10:53 PM 
 
Ms. Jeff Fisher [address] [place] [id number] To Whom It May Concern: 
 
Jeff Fisher is currently under the care of [facility] EMERGENCY DEPT. She will return to work/school on: [id number] If there are questions or concerns please have the patient contact our office.  
 
 
 
Sincerely, 
 
 
Moshe Martinez

## 2021-03-02 NOTE — ED PROVIDER NOTES
EMERGENCY DEPARTMENT HISTORY AND PHYSICAL EXAM          Date: 3/2/2021  Patient Name: Ewell Olszewski    History of Presenting Illness     Chief Complaint   Patient presents with    Abdominal Pain     lower abd/pelvic area pain. diagnosed with an ovarian cyst and UTI on Friday, is on at home keflex. Reports she is still having urinary frequency and dysuria. denies chance of pregnancy as shes had tubal litigation       History Provided By: Patient    HPI: Ewell Olszewski is a 26 y.o. female, pmhx 80, depression, ovarian cyst, who presents ambulatory to the ED c/o right pelvic pain    Patient explains that she was seen at outside facility [facility] doctors last week and was told she has a cyst which is \"pouring blood into my peritoneum\". She was discharged home with pain medicine and Zofran. She has not been taking the Norco because she is been trying to work and so today at work she got behind on her pain. She did take a dose of Motrin before her shift earlier today with no relief of her symptoms. She is now complaining of 10 out of 10 right lower quadrant pain associated with nausea. Patient specifically denies any recent fevers, chills, nausea, vomiting, diarrhea, abd pain, CP, SOB, urinary sxs, changes in BM, or headache. PCP: Naeem Madrid MD    Allergies: Multiple including Benadryl, Compazine, Haldol, Reglan  Social Hx: Former tobacco, -vaping, -EtOH, -Illicit Drugs; There are no other complaints, changes, or physical findings at this time. Current Facility-Administered Medications   Medication Dose Route Frequency Provider Last Rate Last Admin    morphine injection 7 mg  7 mg IntraVENous ONCE TermeerGrady MD         Current Outpatient Medications   Medication Sig Dispense Refill    ondansetron (Zofran ODT) 4 mg disintegrating tablet Take 1 Tab by mouth every eight (8) hours as needed for Nausea.  10 Tab 0    polyethylene glycol (Miralax) 17 gram/dose powder Take 17 g by mouth daily. 1 tablespoon with 8 oz of water daily 235 g 0    B2-magnesium cit,oxid-feverfew (MigreLief) 200-180-50 mg tab MigreLief   BID      butalbital-acetaminophen-caffeine (FIORICET, ESGIC) -40 mg per tablet       diclofenac EC (VOLTAREN) 25 mg EC tablet       methylPREDNISolone (MEDROL DOSEPACK) 4 mg tablet Per directions 1 Dose Pack 0    dihydroergotamine (MIGRANAL) 0.5 mg/pump act. (4 mg/mL) nasal spray 1 Spray by Nasal route as needed for Migraine. use in one nostril as directed. No more than 4 sprays in one hour 1 Bottle 0    fremanezumab-vfrm (Ajovy Autoinjector) 225 mg/1.5 mL atIn 1 Syringe by SubCUTAneous route every thirty (30) days. 1 Syringe 11    onabotulinumtoxinA (Botox) 200 unit injection Inject selected muscles head and neck bilaterally every 12 weeks for migraine prevention, 155 units 200 Units 3    zolpidem (AMBIEN) 10 mg tablet Take 1 Tab by mouth nightly as needed for Sleep. Max Daily Amount: 10 mg. 5 Tab 0    galcanezumab-gnlm (EMGALITY PEN) 120 mg/mL injection 1 mL by SubCUTAneous route every thirty (30) days. 1 mL 11    norethindrone (KOBY) 0.35 mg tab Take 1 Tab by mouth nightly.  topiramate (TOPAMAX) 50 mg tablet Take 50 mg by mouth nightly. Angie Posadaow with zinc one by mouth at bedtime     Eden Lassiter, PT evaluate and treat 1 Each 0    cyclobenzaprine (FLEXERIL) 10 mg tablet Take 1 Tab by mouth three (3) times daily. May cause drowsiness 30 Tab 0    clonazePAM (KLONOPIN) 1 mg tablet Take 2 mg by mouth nightly. 5    escitalopram (LEXAPRO) 20 mg tablet Take 20 mg by mouth nightly.  Indications: Generalized Anxiety Disorder         Past History     Past Medical History:  Past Medical History:   Diagnosis Date    Anxiety and depression     Bartholin's gland abscess     x8    Endometriosis     Kidney stones     Migraine     Ovarian cyst     Panic attack     Pyelonephritis        Past Surgical History:  Past Surgical History:   Procedure Laterality Date    HX APPENDECTOMY  04/26/2017    HX BARTHOLIN CYST MARSUPIALIZATION      2011, 2018    HX BREAST LUMPECTOMY Right     HX BUNIONECTOMY      HX CYST REMOVAL      HX GYN Left     bartholin cyst drainage X 6    HX GYN Left     bartholin cyst marsupilization    HX HEENT      HX OTHER SURGICAL      laparascopy    HX PELVIC LAPAROSCOPY      HX WISDOM TEETH EXTRACTION         Family History:  Family History   Problem Relation Age of Onset    Diabetes Maternal Aunt     Heart Disease Maternal Aunt     Stroke Maternal Aunt     Heart Disease Maternal Grandmother     No Known Problems Mother        Social History:  Social History     Tobacco Use    Smoking status: Former Smoker    Smokeless tobacco: Never Used   Substance Use Topics    Alcohol use: Yes     Alcohol/week: 1.0 standard drinks     Types: 1 Glasses of wine per week     Comment: social    Drug use: No       Allergies: Allergies   Allergen Reactions    Benadryl [Diphenhydramine Hcl] Other (comments)     Makes me feel funny. Need ativan if I get benadryl    Compazine [Prochlorperazine Edisylate] Anxiety    Depacon [Valproate Sodium] Other (comments)     Pt reports having restlessness with this medication.  Haldol [Haloperidol Lactate] Other (comments)    Promethazine Other (comments)     \"It makes me feel really funny, nausea and dizzy\"    Reglan [Metoclopramide] Other (comments)         Review of Systems   Review of Systems   Constitutional: Negative for activity change, appetite change, chills, fever and unexpected weight change. HENT: Negative for congestion. Eyes: Negative for pain and visual disturbance. Respiratory: Negative for cough and shortness of breath. Cardiovascular: Negative for chest pain. Gastrointestinal: Negative for abdominal pain, diarrhea, nausea and vomiting. Genitourinary: Positive for pelvic pain.  Negative for decreased urine volume, difficulty urinating, dyspareunia, dysuria, flank pain, frequency, hematuria and vaginal bleeding. Musculoskeletal: Negative for back pain. Skin: Negative for rash. Neurological: Negative for headaches. Physical Exam   Physical Exam  Vitals signs and nursing note reviewed. Constitutional:       Appearance: She is well-developed. She is not diaphoretic. Comments: Young female sitting up in bed appearing in moderate distress secondary to pain   HENT:      Head: Normocephalic and atraumatic. Eyes:      General:         Right eye: No discharge. Left eye: No discharge. Conjunctiva/sclera: Conjunctivae normal.      Pupils: Pupils are equal, round, and reactive to light. Neck:      Musculoskeletal: Normal range of motion and neck supple. Cardiovascular:      Rate and Rhythm: Regular rhythm. Tachycardia present. Heart sounds: Normal heart sounds. No murmur. Pulmonary:      Effort: Pulmonary effort is normal. No respiratory distress. Breath sounds: Normal breath sounds. No wheezing or rales. Abdominal:      General: Abdomen is flat. Bowel sounds are normal. There is no distension. Palpations: Abdomen is soft. Tenderness: There is no abdominal tenderness. Comments: Exam limited by pain   Musculoskeletal: Normal range of motion. Skin:     General: Skin is warm and dry. Findings: No rash. Neurological:      Mental Status: She is alert and oriented to person, place, and time. Cranial Nerves: No cranial nerve deficit. Motor: No abnormal muscle tone.        Diagnostic Study Results     Labs -     Recent Results (from the past 12 hour(s))   CBC WITH AUTOMATED DIFF    Collection Time: 03/02/21  3:46 PM   Result Value Ref Range    WBC 6.6 3.6 - 11.0 K/uL    RBC 4.32 3.80 - 5.20 M/uL    HGB 12.1 11.5 - 16.0 g/dL    HCT 37.6 35.0 - 47.0 %    MCV 87.0 80.0 - 99.0 FL    MCH 28.0 26.0 - 34.0 PG    MCHC 32.2 30.0 - 36.5 g/dL    RDW 13.3 11.5 - 14.5 %    PLATELET 069 118 - 731 K/uL    MPV 10.3 8.9 - 12.9 FL    NRBC 0.0 0  WBC    ABSOLUTE NRBC 0.00 0.00 - 0.01 K/uL    NEUTROPHILS 54 32 - 75 %    LYMPHOCYTES 35 12 - 49 %    MONOCYTES 7 5 - 13 %    EOSINOPHILS 2 0 - 7 %    BASOPHILS 1 0 - 1 %    IMMATURE GRANULOCYTES 1 (H) 0.0 - 0.5 %    ABS. NEUTROPHILS 3.6 1.8 - 8.0 K/UL    ABS. LYMPHOCYTES 2.3 0.8 - 3.5 K/UL    ABS. MONOCYTES 0.5 0.0 - 1.0 K/UL    ABS. EOSINOPHILS 0.1 0.0 - 0.4 K/UL    ABS. BASOPHILS 0.1 0.0 - 0.1 K/UL    ABS. IMM. GRANS. 0.0 0.00 - 0.04 K/UL    DF AUTOMATED     METABOLIC PANEL, COMPREHENSIVE    Collection Time: 03/02/21  3:46 PM   Result Value Ref Range    Sodium 142 136 - 145 mmol/L    Potassium 3.5 3.5 - 5.1 mmol/L    Chloride 112 (H) 97 - 108 mmol/L    CO2 25 21 - 32 mmol/L    Anion gap 5 5 - 15 mmol/L    Glucose 78 65 - 100 mg/dL    BUN 13 6 - 20 MG/DL    Creatinine 0.68 0.55 - 1.02 MG/DL    BUN/Creatinine ratio 19 12 - 20      GFR est AA >60 >60 ml/min/1.73m2    GFR est non-AA >60 >60 ml/min/1.73m2    Calcium 8.6 8.5 - 10.1 MG/DL    Bilirubin, total 0.3 0.2 - 1.0 MG/DL    ALT (SGPT) 16 12 - 78 U/L    AST (SGOT) 8 (L) 15 - 37 U/L    Alk.  phosphatase 37 (L) 45 - 117 U/L    Protein, total 6.8 6.4 - 8.2 g/dL    Albumin 3.8 3.5 - 5.0 g/dL    Globulin 3.0 2.0 - 4.0 g/dL    A-G Ratio 1.3 1.1 - 2.2     URINALYSIS W/ REFLEX CULTURE    Collection Time: 03/02/21  3:46 PM    Specimen: Urine   Result Value Ref Range    Color YELLOW/STRAW      Appearance HAZY (A) CLEAR      Specific gravity 1.028 1.003 - 1.030      pH (UA) 6.0 5.0 - 8.0      Protein Negative NEG mg/dL    Glucose Negative NEG mg/dL    Ketone Negative NEG mg/dL    Bilirubin Negative NEG      Blood Negative NEG      Urobilinogen 0.2 0.2 - 1.0 EU/dL    Nitrites Negative NEG      Leukocyte Esterase Negative NEG      WBC 0-4 0 - 4 /hpf    RBC 10-20 0 - 5 /hpf    Epithelial cells MANY (A) FEW /lpf    Bacteria 1+ (A) NEG /hpf    UA:UC IF INDICATED CULTURE NOT INDICATED BY UA RESULT CNI         Radiologic Studies - US TRANSVAGINAL    (Results Pending)     CT Results  (Last 48 hours)    None        CXR Results  (Last 48 hours)    None            Medical Decision Making   I am the first provider for this patient. I reviewed the vital signs, available nursing notes, past medical history, past surgical history, family history and social history. Vital Signs-Reviewed the patient's vital signs. Patient Vitals for the past 12 hrs:   Temp Pulse Resp BP SpO2   03/02/21 1923     99 %   03/02/21 1900    127/74 99 %   03/02/21 1539 97.5 °F (36.4 °C) (!) 110 16 107/81 100 %       Pulse Oximetry Analysis - 99% on RA    Records Reviewed: Nursing Notes and Old Medical Records  Outpatient records reviewed from February 23 and February 24. She was seen for similar symptoms had a CT abdomen pelvis normal ultrasound pelvis normal without ovarian cyst or hemoperitoneum. She was discharged on Norco and Zofran. Since [facility] chart notes patient has had 25 ER visits since August 2020    Provider Notes (Medical Decision Making):   MDM: Leoma March female presenting for pelvic pain. She had an extensive work-up last week including a normal CT abdomen and pelvis. She was seen 24 hours later with repeat normal evaluation and diagnosed with constipation. Currently she is hemodynamically stable although extremely tachycardic but is worked up. Abdominal exam is difficult as she is preventing exam due to pain and will not allow me to palpate the right side of her abdomen. Symptomatic management to be given and we will monitor vitals closely. ED Course:   Initial assessment performed. The patients presenting problems have been discussed, and they are in agreement with the care plan formulated and outlined with them. I have encouraged them to ask questions as they arise throughout their visit. PROGRESS NOTE:    ED Course as of Mar 02 2051   Tue Mar 02, 2021   1927 Patient continues with pain. Requesting further medications.     [JT] 2050 Patient is crying, continues with 10 out of 10 pain. Has not received second dose of medication. Patient signed out to Dr. Dawson Miller. Ultrasound has been ordered. Still unable to get a abdominal exam as patient is bent over in pain. [JT]      ED Course User Index  [JT] Pan Dodd MD        Discharge note:  Pt re-evaluated and noted to be feeling better, ready for discharge. Updated pt on all final ultrasound findings. Will follow up as instructed. All questions have been answered, pt voiced understanding and agreement with plan. Specific return precautions provided as well as instructions to return to the ED should sx worsen at any time. Vital signs stable for discharge. Critical Care Time:   0      Diagnosis     Clinical Impression:   1. Pelvic pain        PLAN:  1. Current Discharge Medication List        2. Follow-up Information     Follow up With Specialties Details Why Contact Info    Delma Page MD Internal Medicine Schedule an appointment as soon as possible for a visit   [address] [place] [address]  [phone]      [facility] EMERGENCY DEPT Emergency Medicine   [address] [place] [address]  [phone]        Return to ED if worse     Disposition:  Home       Please note, this dictation was completed with GiveNext, the computer voice recognition software. Quite often unanticipated grammatical, syntax, homophones, and other interpretive errors are inadvertently transcribed by the computer software. Please disregard these errors. Please excuse any errors that have escaped final proof reading.

## 2021-03-02 NOTE — LETTER
NOTIFICATION RETURN TO WORK / SCHOOL 
 
3/2/2021 10:54 PM 
 
Ms. Brittni Webb [address] [place] [id number] To Whom It May Concern: 
 
Brittni Webb is currently under the care of [facility] EMERGENCY DEPT. She will return to work/school on: 3/5 If there are questions or concerns please have the patient contact our office.  
 
 
 
Sincerely, 
 
 
Phil Lanes

## 2021-03-03 NOTE — ED NOTES
Pt discharged by MD  Pt provided with discharge instructions Rx and instructions on follow up care.  Pt out of ED in wheelchair

## 2021-03-04 ENCOUNTER — OFFICE VISIT (OUTPATIENT)
Dept: NEUROLOGY | Age: 31
End: 2021-03-04
Payer: COMMERCIAL

## 2021-03-04 VITALS
BODY MASS INDEX: 21.82 KG/M2 | HEART RATE: 64 BPM | SYSTOLIC BLOOD PRESSURE: 110 MMHG | DIASTOLIC BLOOD PRESSURE: 64 MMHG | OXYGEN SATURATION: 98 % | WEIGHT: 144 LBS | HEIGHT: 68 IN

## 2021-03-04 DIAGNOSIS — G43.719 INTRACTABLE CHRONIC MIGRAINE WITHOUT AURA AND WITHOUT STATUS MIGRAINOSUS: Primary | ICD-10-CM

## 2021-03-04 PROCEDURE — 64615 CHEMODENERV MUSC MIGRAINE: CPT | Performed by: PSYCHIATRY & NEUROLOGY

## 2021-03-04 NOTE — PROGRESS NOTES
Botox Injection Note     3/4/2021     Patient:  Christine Cesar   YOB: 1990  Date of Visit: 3/4/2021      Indication: patient has chronic migraine headaches greater than 15 days per month lasting more than 4 hours each. She has failed or not tolerated 2 or more medication preventatives. Written consent was signed and verified by me. Risks and benefits were discussed to include possible cosmetic asymmetry which is not permament or life threatening. Patient name and  was confirmed prior to procedure. Time out performed. Procedure:   Botox concentration: 200 units in 2 ml of preservative-free normal saline. 1:1 dilution. LOT#:  [id number]  EXP:  10 2023    Injections and distribution as follow :      Units/site  Sites Loc Subtotal    procerus 5 1 ML 5   corrugaters 2.5 2 BL 5   frontalis 5 8 BL 40   Temporalis 10 4 BL 40   Occipitalis 10 2 BL 20   Cervical paraspinals 5 4 BL 20   Trapezius 10 4 BL 40         200 units Botox were reconstituted, 170 units injected as above and the remainder was unavoidably wasted. Patient tolerated procedure well. Return in 3 months for repeat injections.     _____________________________   Roxi Oconnell DO  [address], ABPN

## 2021-04-06 NOTE — TELEPHONE ENCOUNTER
Requested Prescriptions     Pending Prescriptions Disp Refills    topiramate (Topamax) 50 mg tablet        Sig: Take 1 Tab by mouth nightly. Patient hasn't been able to get a hold of the psychiatrist to get this filled. Please advise.

## 2021-04-13 RX ORDER — TOPIRAMATE 50 MG/1
50 TABLET, FILM COATED ORAL
Qty: 90 TAB | Refills: 2 | Status: SHIPPED | OUTPATIENT
Start: 2021-04-13 | End: 2022-04-01

## 2021-04-20 ENCOUNTER — TELEPHONE (OUTPATIENT)
Dept: NEUROLOGY | Age: 31
End: 2021-04-20

## 2021-04-20 NOTE — TELEPHONE ENCOUNTER
----- Message from Dain Deleon sent at 4/20/2021 11:02 AM EDT -----  Regarding: Dr. Kimberly Danielson  General Message/Vendor Calls    Caller's first and last name: Pt      Reason for call: botox appt      Callback required yes/no and why: Yes      Best contact number(s): [phone]      Details to clarify the request: Ms. Evaristo Del Real is scheduled for her botox injection on 06/03/21, but is supposed to have surgery on 06/02. She would like to know if Dr. Elvi Dsouza would like her to reschedule her botox before her surgery on the 2nd or if she should reschedule later on. Please advise.       Dain Deleon

## 2021-04-27 ENCOUNTER — TELEPHONE (OUTPATIENT)
Dept: NEUROLOGY | Age: 31
End: 2021-04-27

## 2021-04-27 NOTE — TELEPHONE ENCOUNTER
----- Message from [employer] sent at 4/27/2021 12:48 PM EDT -----  Regarding: /Telephone     Caller's first and last name:Pt  Reason for call:botox procedure  Callback required yes/no and why:Yes  Best contact number(s):[phone]  Details to clarify the request:Pt would like to schedule her botox procedure for the 2nd week in June if possible.

## 2021-06-03 NOTE — ED NOTES
Patient requesting pain medication; PA made aware; pain 10/10 Patient's mother called the clinic.  She reports that 11/7/19 patient had a soft bowel movement then Sunday and Monday he has hard stools, he strained and had bleeding with the stool.  Patient is a picky eater and not following the high fiber diet.  Patient's mother is interested in following up with the Constipation clinic as recommended.  She would like to discuss with patient's father first before cancelling the appointment 11/14/19.  She was inquiring about the length of time to be scheduled at the constipation clinic.  Please place the referral.  Patient's mother will call to cancel the appointment if need be.

## 2021-06-11 ENCOUNTER — TELEPHONE (OUTPATIENT)
Dept: NEUROLOGY | Age: 31
End: 2021-06-11

## 2021-06-11 NOTE — TELEPHONE ENCOUNTER
----- Message from Marie Bro sent at 6/11/2021  1:00 PM EDT -----  Regarding: Dr. Claude Daring first and last name: N/A  Reason for call: Update   Best contact number(s): [phone]  Details to clarify the request: Pt stated that she would like to reschedule her appointment that's on 06/24/2021 at 9:30 am . Pt also stated that her OBGYN wants to know if it would be ok if pt is able to use a  estrogen patch? Pt stated that she would like a call back as soon as possible.

## 2021-06-14 NOTE — TELEPHONE ENCOUNTER
Okay to reschedule. I have no restrictions on her hormone therapy if it is indicated by OB to be necessary for her health.

## 2021-06-14 NOTE — TELEPHONE ENCOUNTER
Called back and LVM for the patient advising Dr. Leonor Sears does not have any restrictions in regards to the therapy patient is pursuing. Also advised, per Dr. Leonor Sears, ok to reschedule her upcoming appointment for a later time.

## 2021-06-18 ENCOUNTER — TELEPHONE (OUTPATIENT)
Dept: NEUROLOGY | Age: 31
End: 2021-06-18

## 2021-06-18 NOTE — TELEPHONE ENCOUNTER
----- Message from Savita Ny sent at 6/18/2021 12:47 PM EDT -----  Regarding: DO Brayan/Telephone  General Message/Vendor Calls    Caller's first and last name: Patient       Reason for call: would like to schedule a Botox appt      Callback required yes/no and why: yes      Best contact number(s): [phone]      Details to clarify the request:      Savita Ny

## 2021-06-29 ENCOUNTER — DOCUMENTATION ONLY (OUTPATIENT)
Dept: NEUROLOGY | Age: 31
End: 2021-06-29

## 2021-07-08 ENCOUNTER — OFFICE VISIT (OUTPATIENT)
Dept: NEUROLOGY | Age: 31
End: 2021-07-08
Payer: COMMERCIAL

## 2021-07-08 VITALS
BODY MASS INDEX: 20.31 KG/M2 | HEART RATE: 92 BPM | WEIGHT: 134 LBS | DIASTOLIC BLOOD PRESSURE: 68 MMHG | HEIGHT: 68 IN | SYSTOLIC BLOOD PRESSURE: 112 MMHG | OXYGEN SATURATION: 99 %

## 2021-07-08 DIAGNOSIS — G43.719 INTRACTABLE CHRONIC MIGRAINE WITHOUT AURA AND WITHOUT STATUS MIGRAINOSUS: Primary | ICD-10-CM

## 2021-07-08 PROCEDURE — 64615 CHEMODENERV MUSC MIGRAINE: CPT | Performed by: PSYCHIATRY & NEUROLOGY

## 2021-07-08 RX ORDER — TRIAMCINOLONE ACETONIDE 1 MG/G
CREAM TOPICAL
COMMUNITY
Start: 2021-06-28 | End: 2022-10-14

## 2021-07-08 RX ORDER — ESTRADIOL 0.1 MG/D
FILM, EXTENDED RELEASE TRANSDERMAL
COMMUNITY
Start: 2021-06-23

## 2021-07-08 RX ORDER — PHENAZOPYRIDINE HYDROCHLORIDE 200 MG/1
TABLET, FILM COATED ORAL
COMMUNITY
Start: 2021-06-14 | End: 2022-10-14

## 2021-07-08 RX ORDER — HYDROXYZINE 25 MG/1
TABLET, FILM COATED ORAL
COMMUNITY
Start: 2021-06-23 | End: 2022-10-14

## 2021-07-08 RX ORDER — OXYCODONE AND ACETAMINOPHEN 5; 325 MG/1; MG/1
TABLET ORAL
COMMUNITY
Start: 2021-06-14 | End: 2022-01-04

## 2021-07-08 RX ORDER — METRONIDAZOLE 500 MG/1
TABLET ORAL
COMMUNITY
Start: 2021-06-18 | End: 2022-10-14

## 2021-07-08 NOTE — PROGRESS NOTES
Chief Complaint   Patient presents with    Procedure     BOTOX     Visit Vitals  /68 (BP 1 Location: Right upper arm, BP Patient Position: Sitting)   Pulse 92   Ht 5' 8\" (1.727 m)   Wt 134 lb (60.8 kg)   SpO2 99%   BMI 20.37 kg/m²

## 2021-07-08 NOTE — PROGRESS NOTES
Botox Injection Note     2021     Patient:  Eitan Teran   YOB: 1990  Date of Visit: 2021      Indication: patient has chronic migraine headaches greater than 15 days per month lasting more than 4 hours each. She has failed or not tolerated 2 or more medication preventatives. Written consent was signed and verified by me. Risks and benefits were discussed to include possible cosmetic asymmetry which is not permament or life threatening. Patient name and  was confirmed prior to procedure. Time out performed. Procedure:   Botox concentration: 200 units in 2 ml of preservative-free normal saline. 1:1 dilution. LOT#:  [id number]  EXP:   202    Injections and distribution as follow :      Units/site  Sites Loc Subtotal    procerus 5 1 ML 5   corrugaters 2.5 2 BL 5   frontalis 5 8 BL 40   Temporalis 10 4 BL 40   Occipitalis 10 2 BL 20   Cervical paraspinals 5 4 BL 20   Trapezius 10 4 BL 40         200 units Botox were reconstituted, 170 units injected as above and the remainder was unavoidably wasted. Patient tolerated procedure well. Return in 3 months for repeat injections.     _____________________________   Roberto Carlos Herrera, DO  [address], ABPN

## 2021-07-29 ENCOUNTER — TELEPHONE (OUTPATIENT)
Dept: NEUROLOGY | Age: 31
End: 2021-07-29

## 2021-09-01 ENCOUNTER — TELEPHONE (OUTPATIENT)
Dept: NEUROLOGY | Age: 31
End: 2021-09-01

## 2021-09-01 NOTE — TELEPHONE ENCOUNTER
Re: Botox    Rcvd update from [facility] that botox is not covered, see auth  21, [facility] was SSP. Entered pt into Botox 1 and submitted BV. Awaiting update.

## 2021-09-17 NOTE — TELEPHONE ENCOUNTER
Re: Botox    Rcvd BV back, cpt 31936 does not require PA but PD is recommended, submitted request through [employer]. #[id number]    Submitted PA for  through pharmacy benefits [employer] [facility]. [facility] Key# [id number]    Awaiting updates.

## 2021-09-17 NOTE — TELEPHONE ENCOUNTER
----- Message from [facility] sent at 2021  7:31 AM EDT -----  Regarding: FW: MD Schmidt/ telephone  Please advise  ----- Message -----  From: Patrice Vences  Sent: 9/3/2021   4:29 PM EDT  To: Franck Rai Office  Subject: MD Schmidt/ telephone                           Patient's first and last name:      Riddhi Toscano  :   1990    Caller's first and last name: [facility]     Reason for call:  Pre Authorization    Callback required yes/no and why: yes    Best contact number(s): [phone]     Details to clarify the request: Pt's pharmacy is waiting requesting PA for Botox. Case# [id number] Please advise update status.

## 2021-09-27 NOTE — TELEPHONE ENCOUNTER
Re: Botox    Rcvd CPT [id number] approval effective under [id number] effective 09/17/2021-09/16/2022 for 4 units. Case# [id number],  is approved through [facility], SSP is [facility], called them at [phone] and confirmed delivery is set got 10/05/21. Sent update to nurse.

## 2021-10-07 ENCOUNTER — OFFICE VISIT (OUTPATIENT)
Dept: NEUROLOGY | Age: 31
End: 2021-10-07
Payer: COMMERCIAL

## 2021-10-07 VITALS
RESPIRATION RATE: 16 BRPM | WEIGHT: 134 LBS | OXYGEN SATURATION: 98 % | DIASTOLIC BLOOD PRESSURE: 70 MMHG | BODY MASS INDEX: 20.31 KG/M2 | SYSTOLIC BLOOD PRESSURE: 98 MMHG | HEIGHT: 68 IN | HEART RATE: 70 BPM

## 2021-10-07 DIAGNOSIS — G43.719 INTRACTABLE CHRONIC MIGRAINE WITHOUT AURA AND WITHOUT STATUS MIGRAINOSUS: Primary | ICD-10-CM

## 2021-10-07 PROCEDURE — 64615 CHEMODENERV MUSC MIGRAINE: CPT | Performed by: PSYCHIATRY & NEUROLOGY

## 2021-10-07 NOTE — PROGRESS NOTES
Botox Injection Note     10/7/2021     Patient:  Faiza Cody   YOB: 1990  Date of Visit: 10/7/2021      Indication: patient has chronic migraine headaches greater than 15 days per month lasting more than 4 hours each. She has failed or not tolerated 2 or more medication preventatives. Written consent was signed and verified by me. Risks and benefits were discussed to include possible cosmetic asymmetry which is not permament or life threatening. Patient name and  was confirmed prior to procedure. Time out performed. Procedure:   Botox concentration: 200 units in 2 ml of preservative-free normal saline. 1:1 dilution. LOT#:  [id number]  EXP:   2024    Injections and distribution as follow :      Units/site  Sites Loc Subtotal    procerus 5 1 ML 5   corrugaters 2.5 2 BL 5   frontalis 5 8 BL 40   Temporalis 10 4 BL 40   Occipitalis 10 2 BL 20   Cervical paraspinals 5 4 BL 20   Trapezius 10 4 BL 40         200 units Botox were reconstituted, 170 units injected as above and the remainder was unavoidably wasted. Patient tolerated procedure well. Return in 3 months for repeat injections.     _____________________________   Romina Fiddler, DO  [address], ABPN

## 2021-10-07 NOTE — PATIENT INSTRUCTIONS
[address] [facility]   Statement to Patients  April 1, 2014      In an effort to ensure the large volume of patient prescription refills is processed in the most efficient and expeditious manner, we are asking our patients to assist us by calling your Pharmacy for all prescription refills, this will include also your  Mail Order Pharmacy. The pharmacy will contact our office electronically to continue the refill process. Please do not wait until the last minute to call your pharmacy. We need at least 48 hours (2days) to fill prescriptions. We also encourage you to call your pharmacy before going to  your prescription to make sure it is ready. With regard to controlled substance prescription refill requests (narcotic refills) that need to be picked up at our office, we ask your cooperation by providing us with at least 72 hours (3days) notice that you will need a refill. We will not refill narcotic prescription refill requests after 4:00pm on any weekday, Monday through Thursday, or after 2:00pm on Fridays, or on the weekends. We encourage everyone to explore another way of getting your prescription refill request processed using [employer], our patient web portal through our electronic medical record system. [employer] is an efficient and effective way to communicate your medication request directly to the office and  downloadable as an charlee on your smart phone . [employer] also features a review functionality that allows you to view your medication list as well as leave messages for your physician. Are you ready to get connected? If so please review the attatched instructions or speak to any of our staff to get you set up right away! Thank you so much for your cooperation. Should you have any questions please contact our Practice Administrator.     The Physicians and Staff,  [facility]

## 2021-11-01 ENCOUNTER — HOSPITAL ENCOUNTER (EMERGENCY)
Age: 31
Discharge: HOME | End: 2021-11-01
Attending: STUDENT IN AN ORGANIZED HEALTH CARE EDUCATION/TRAINING PROGRAM
Payer: COMMERCIAL

## 2021-11-01 VITALS
WEIGHT: 143.74 LBS | DIASTOLIC BLOOD PRESSURE: 70 MMHG | BODY MASS INDEX: 21.78 KG/M2 | OXYGEN SATURATION: 95 % | TEMPERATURE: 98.4 F | HEIGHT: 68 IN | HEART RATE: 89 BPM | SYSTOLIC BLOOD PRESSURE: 117 MMHG | RESPIRATION RATE: 18 BRPM

## 2021-11-01 DIAGNOSIS — G43.809 OTHER MIGRAINE WITHOUT STATUS MIGRAINOSUS, NOT INTRACTABLE: Primary | ICD-10-CM

## 2021-11-01 PROCEDURE — 96375 TX/PRO/DX INJ NEW DRUG ADDON: CPT

## 2021-11-01 PROCEDURE — 99284 EMERGENCY DEPT VISIT MOD MDM: CPT

## 2021-11-01 PROCEDURE — 96365 THER/PROPH/DIAG IV INF INIT: CPT

## 2021-11-01 PROCEDURE — 74011250636 HC RX REV CODE- 250/636: Performed by: STUDENT IN AN ORGANIZED HEALTH CARE EDUCATION/TRAINING PROGRAM

## 2021-11-01 PROCEDURE — 74011250636 HC RX REV CODE- 250/636: Performed by: EMERGENCY MEDICINE

## 2021-11-01 RX ORDER — DEXAMETHASONE SODIUM PHOSPHATE 4 MG/ML
10 INJECTION, SOLUTION INTRA-ARTICULAR; INTRALESIONAL; INTRAMUSCULAR; INTRAVENOUS; SOFT TISSUE
Status: COMPLETED | OUTPATIENT
Start: 2021-11-01 | End: 2021-11-01

## 2021-11-01 RX ORDER — HYDROMORPHONE HYDROCHLORIDE 1 MG/ML
0.5 INJECTION, SOLUTION INTRAMUSCULAR; INTRAVENOUS; SUBCUTANEOUS ONCE
Status: COMPLETED | OUTPATIENT
Start: 2021-11-01 | End: 2021-11-01

## 2021-11-01 RX ORDER — MAGNESIUM SULFATE HEPTAHYDRATE 40 MG/ML
2 INJECTION, SOLUTION INTRAVENOUS
Status: COMPLETED | OUTPATIENT
Start: 2021-11-01 | End: 2021-11-01

## 2021-11-01 RX ORDER — LORAZEPAM 2 MG/ML
1 INJECTION INTRAMUSCULAR
Status: COMPLETED | OUTPATIENT
Start: 2021-11-01 | End: 2021-11-01

## 2021-11-01 RX ORDER — KETOROLAC TROMETHAMINE 30 MG/ML
15 INJECTION, SOLUTION INTRAMUSCULAR; INTRAVENOUS
Status: COMPLETED | OUTPATIENT
Start: 2021-11-01 | End: 2021-11-01

## 2021-11-01 RX ORDER — DIPHENHYDRAMINE HYDROCHLORIDE 50 MG/ML
25 INJECTION, SOLUTION INTRAMUSCULAR; INTRAVENOUS
Status: COMPLETED | OUTPATIENT
Start: 2021-11-01 | End: 2021-11-01

## 2021-11-01 RX ORDER — ONDANSETRON 2 MG/ML
4 INJECTION INTRAMUSCULAR; INTRAVENOUS
Status: COMPLETED | OUTPATIENT
Start: 2021-11-01 | End: 2021-11-01

## 2021-11-01 RX ADMIN — HYDROMORPHONE HYDROCHLORIDE 0.5 MG: 1 INJECTION, SOLUTION INTRAMUSCULAR; INTRAVENOUS; SUBCUTANEOUS at 18:54

## 2021-11-01 RX ADMIN — SODIUM CHLORIDE 1000 ML: 9 INJECTION, SOLUTION INTRAVENOUS at 17:33

## 2021-11-01 RX ADMIN — MAGNESIUM SULFATE HEPTAHYDRATE 2 G: 40 INJECTION, SOLUTION INTRAVENOUS at 18:58

## 2021-11-01 RX ADMIN — DEXAMETHASONE SODIUM PHOSPHATE 10 MG: 4 INJECTION, SOLUTION INTRAMUSCULAR; INTRAVENOUS at 18:54

## 2021-11-01 RX ADMIN — KETOROLAC TROMETHAMINE 15 MG: 30 INJECTION, SOLUTION INTRAMUSCULAR at 17:31

## 2021-11-01 RX ADMIN — DIPHENHYDRAMINE HYDROCHLORIDE 25 MG: 50 INJECTION, SOLUTION INTRAMUSCULAR; INTRAVENOUS at 17:33

## 2021-11-01 RX ADMIN — ONDANSETRON 4 MG: 2 INJECTION INTRAMUSCULAR; INTRAVENOUS at 17:32

## 2021-11-01 RX ADMIN — LORAZEPAM 1 MG: 2 INJECTION INTRAMUSCULAR; INTRAVENOUS at 17:32

## 2021-11-01 NOTE — ED NOTES
Verbal shift change report given to Suki Hays (oncoming nurse) by Codey Rivero (offgoing nurse). Report included the following information SBAR, Kardex, ED Summary and MAR.

## 2021-11-01 NOTE — ED PROVIDER NOTES
EMERGENCY DEPARTMENT HISTORY AND PHYSICAL EXAM      Date: 11/1/2021  Patient Name: Christine Cesar    History of Presenting Illness     Chief Complaint   Patient presents with    Headache     X 2 days. Pt is followed by Dr Ignacio Allen. Pt reports hx of migraines and has been taking meds. Pt reports first one in 9 months on current meds. Pt reports nausea. HPI: Christine Cesar, 33 y.o. female presents to the ED with cc of headache. She reports a long history of migraines, follows with a neurologist, gets frequent Botox injections and also takes Topamax. States that this is the first migraine she has had in about 9 months, she thinks it was triggered by exposure to cold weather on Saturday. It started after that, she describes as a right-sided headache that feels similar to her prior migraines. Was not worst at onset. Reports associated photophobia and phonophobia. No fevers, no weakness numbness or tingling in the arms or legs. There are no other complaints, changes, or physical findings at this time. PCP: Lachelle Barger MD    No current facility-administered medications on file prior to encounter. Current Outpatient Medications on File Prior to Encounter   Medication Sig Dispense Refill    onabotulinumtoxinA (Botox) 200 unit injection INJECT 155 UNITS INTO SELECTED MUSCLES OF THE HEAD AND NECK BILATERALLY EVERY 12 WEEKS FOR MIGRAINE PREVENTION. DISCARD UNUSED PORTION. 1 Vial 3    Ajovy Autoinjector 225 mg/1.5 mL auto-injector INJECT 1 SYRINGE BY SUBCUTANEOUS ROUTE EVERY THIRTY (30) DAYS.  1 Syringe 10    estradioL (VIVELLE) 0.1 mg/24 hr APPLY 1 PATCH TWICE WEEKLY      hydrOXYzine HCL (ATARAX) 25 mg tablet TAKE 1 TABLET BY MOUTH 4 TIMES PER DAY AS NEEDED FOR ITCHING      metroNIDAZOLE (FLAGYL) 500 mg tablet TAKE 1 TABLET BY MOUTH TWICE A DAY FOR 7 DAYS      oxyCODONE-acetaminophen (PERCOCET) 5-325 mg per tablet TAKE 1 TABLET BY MOUTH EVERY 4 HOURS AS NEEDED FOR PAIN  phenazopyridine (PYRIDIUM) 200 mg tablet TAKE 1 TABLET BY MOUTH 3 TIMES A DAY      triamcinolone acetonide (KENALOG) 0.1 % topical cream APPLY TWICE A DAY TO AFFECTED AREAS ON BODY AS NEEDED FOR FLARES/ITCHING      topiramate (Topamax) 50 mg tablet Take 1 Tab by mouth nightly. 90 Tab 2    ondansetron (Zofran ODT) 4 mg disintegrating tablet Take 1 Tab by mouth every eight (8) hours as needed for Nausea. 10 Tab 0    polyethylene glycol (Miralax) 17 gram/dose powder Take 17 g by mouth daily. 1 tablespoon with 8 oz of water daily 235 g 0    B2-magnesium cit,oxid-feverfew (MigreLief) 200-180-50 mg tab MigreLief   BID      butalbital-acetaminophen-caffeine (FIORICET, ESGIC) -40 mg per tablet       diclofenac EC (VOLTAREN) 25 mg EC tablet       methylPREDNISolone (MEDROL DOSEPACK) 4 mg tablet Per directions 1 Dose Pack 0    dihydroergotamine (MIGRANAL) 0.5 mg/pump act. (4 mg/mL) nasal spray 1 Spray by Nasal route as needed for Migraine. use in one nostril as directed. No more than 4 sprays in one hour 1 Bottle 0    zolpidem (AMBIEN) 10 mg tablet Take 1 Tab by mouth nightly as needed for Sleep. Max Daily Amount: 10 mg. 5 Tab 0    norethindrone (KOBY) 0.35 mg tab Take 1 Tab by mouth nightly. Adelene Cross with zinc one by mouth at bedtime     Kerri Abreu, PT evaluate and treat 1 Each 0    cyclobenzaprine (FLEXERIL) 10 mg tablet Take 1 Tab by mouth three (3) times daily. May cause drowsiness 30 Tab 0    clonazePAM (KLONOPIN) 1 mg tablet Take 2 mg by mouth nightly. 5    escitalopram (LEXAPRO) 20 mg tablet Take 20 mg by mouth nightly.  Indications: Generalized Anxiety Disorder         Past History     Past Medical History:  Past Medical History:   Diagnosis Date    Anxiety and depression     Bartholin's gland abscess     x8    Endometriosis     Kidney stones     Migraine     Ovarian cyst     Panic attack     Pyelonephritis        Past Surgical History:  Past Surgical History:   Procedure Laterality Date    HX APPENDECTOMY  04/26/2017    HX BARTHOLIN CYST MARSUPIALIZATION      2011, 2018    HX BREAST LUMPECTOMY Right     HX BUNIONECTOMY      HX CYST REMOVAL      HX GYN Left     bartholin cyst drainage X 6    HX GYN Left     bartholin cyst marsupilization    HX HEENT      HX OTHER SURGICAL      laparascopy    HX PELVIC LAPAROSCOPY      HX WISDOM TEETH EXTRACTION         Family History:  Family History   Problem Relation Age of Onset    Diabetes Maternal Aunt     Heart Disease Maternal Aunt     Stroke Maternal Aunt     Heart Disease Maternal Grandmother     No Known Problems Mother        Social History:  Social History     Tobacco Use    Smoking status: Former Smoker    Smokeless tobacco: Never Used   Substance Use Topics    Alcohol use: Yes     Alcohol/week: 1.0 standard drinks     Types: 1 Glasses of wine per week     Comment: social    Drug use: No       Allergies: Allergies   Allergen Reactions    Metoclopramide Hcl Anaphylaxis    Benadryl [Diphenhydramine Hcl] Other (comments)     Makes me feel funny. Need ativan if I get benadryl    Compazine [Prochlorperazine Edisylate] Anxiety    Depacon [Valproate Sodium] Other (comments)     Pt reports having restlessness with this medication.  Haldol [Haloperidol Lactate] Other (comments)    Other Medication Hives     Surgical glue    Promethazine Other (comments)     \"It makes me feel really funny, nausea and dizzy\"    Reglan [Metoclopramide] Other (comments)         Review of Systems   no fever  Reports headache  No eye pain  no shortness of breath  no chest pain  no abdominal pain  no dysuria  no leg pain  No rash  No lymphadenopathy  No weight loss    Physical Exam   Physical Exam  Constitutional:       General: She is not in acute distress. Appearance: She is not toxic-appearing. HENT:      Head: Normocephalic. Eyes:      Extraocular Movements: Extraocular movements intact. Cardiovascular:      Rate and Rhythm: Normal rate and regular rhythm. Pulmonary:      Effort: Pulmonary effort is normal.      Breath sounds: Normal breath sounds. Abdominal:      Palpations: Abdomen is soft. Tenderness: There is no abdominal tenderness. Musculoskeletal:         General: No tenderness or deformity. Cervical back: Neck supple. Skin:     General: Skin is warm and dry. Neurological:      General: No focal deficit present. Mental Status: She is alert. Comments: 5/5 strength with bicep flexion and extension bilaterally, 5/5 strength with ankle flexion and extension bilaterally. Sensation to light touch intact over upper and lower extremities bilaterally. Psychiatric:         Mood and Affect: Mood normal.         Diagnostic Study Results     Labs -   No results found for this or any previous visit (from the past 24 hour(s)). Radiologic Studies -   No orders to display     CT Results  (Last 48 hours)    None        CXR Results  (Last 48 hours)    None            Medical Decision Making   I am the first provider for this patient. I reviewed the vital signs, available nursing notes, past medical history, past surgical history, family history and social history. Vital Signs-Reviewed the patient's vital signs. Patient Vitals for the past 24 hrs:   Temp Pulse Resp BP SpO2   11/01/21 1858  89  121/82    11/01/21 1529 98.4 °F (36.9 °C) 87 18 112/76 99 %         Provider Notes (Medical Decision Making):   75-year-old female presenting with headache. Likely migraine headache, she states this feels similar to her prior migraines. No thunderclap onset, no fevers, exam unremarkable, neck supple, presentation unlikely int CNS infection, mass, or any other acute intracranial emergency. She is given headache medications. ED Course:     Initial assessment performed.  The patients presenting problems have been discussed, and they are in agreement with the care plan formulated and outlined with them. I have encouraged them to ask questions as they arise throughout their visit. Patient requests steroids, Dilaudid and magnesium, she states this usually makes her headaches feel better. On reevaluation, patient is resting comfortably and states that they feel improved. Patient is counseled on supportive care and return precautions. Will return to the ED for any worsening pain, any fevers, any weakness or numbness focally, or any new or worrisome symptoms. Will followup with primary care doctor, neurologist within 5-7 days. Critical Care Time:         Disposition:  Home     PLAN:  1. Current Discharge Medication List        2.    Follow-up Information    None       Return to ED if worse     Diagnosis     Clinical Impression: acute migraine headache

## 2021-11-02 ENCOUNTER — TELEPHONE (OUTPATIENT)
Dept: NEUROLOGY | Age: 31
End: 2021-11-02

## 2021-11-02 DIAGNOSIS — G43.111 INTRACTABLE MIGRAINE WITH AURA WITH STATUS MIGRAINOSUS: Primary | ICD-10-CM

## 2021-11-02 RX ORDER — METHYLPREDNISOLONE 4 MG/1
TABLET ORAL
Qty: 1 DOSE PACK | Refills: 0 | Status: SHIPPED | OUTPATIENT
Start: 2021-11-02 | End: 2022-05-12

## 2021-11-02 NOTE — TELEPHONE ENCOUNTER
Okay Medrol pack ordered. I also ordered Ubrelvy to use acutely as needed. Tell her to check for a co-pay card on the website. Thank you.

## 2021-11-02 NOTE — TELEPHONE ENCOUNTER
----- Message from [facility] sent at 11/2/2021 12:31 PM EDT -----  Regarding: /Telephone  General Message/Vendor Calls    Caller's first and last name:NA      Reason for call:Er Visit      Callback required yes/no and why:Y      Best contact number(s):[phone]      Details to clarify the request:Was seen At [facility] For migraine wants to know if dr. Lucian Haynes write a script for steroid pack for pain       Kianna Reyes

## 2021-11-18 ENCOUNTER — TELEPHONE (OUTPATIENT)
Dept: NEUROLOGY | Age: 31
End: 2021-11-18

## 2021-12-15 ENCOUNTER — TELEPHONE (OUTPATIENT)
Dept: NEUROLOGY | Age: 31
End: 2021-12-15

## 2021-12-15 NOTE — TELEPHONE ENCOUNTER
Re: Botox    Called [facility] and set up delivery for next appt delivery scheduled for 12/21/21. Update sent to nurse.

## 2022-01-04 ENCOUNTER — HOSPITAL ENCOUNTER (EMERGENCY)
Age: 32
Discharge: HOME | End: 2022-01-04
Attending: EMERGENCY MEDICINE
Payer: COMMERCIAL

## 2022-01-04 ENCOUNTER — APPOINTMENT (OUTPATIENT)
Dept: MRI IMAGING | Age: 32
End: 2022-01-04
Attending: EMERGENCY MEDICINE
Payer: COMMERCIAL

## 2022-01-04 VITALS
RESPIRATION RATE: 16 BRPM | OXYGEN SATURATION: 98 % | SYSTOLIC BLOOD PRESSURE: 127 MMHG | DIASTOLIC BLOOD PRESSURE: 76 MMHG | BODY MASS INDEX: 21.78 KG/M2 | HEIGHT: 68 IN | WEIGHT: 143.74 LBS | HEART RATE: 109 BPM | TEMPERATURE: 97.5 F

## 2022-01-04 DIAGNOSIS — M54.41 ACUTE RIGHT-SIDED LOW BACK PAIN WITH RIGHT-SIDED SCIATICA: ICD-10-CM

## 2022-01-04 DIAGNOSIS — M62.838 MUSCLE SPASM: Primary | ICD-10-CM

## 2022-01-04 LAB
ALBUMIN SERPL-MCNC: 3.8 G/DL (ref 3.5–5)
ALBUMIN/GLOB SERPL: 1.1 {RATIO} (ref 1.1–2.2)
ALP SERPL-CCNC: 50 U/L (ref 45–117)
ALT SERPL-CCNC: 17 U/L (ref 12–78)
ANION GAP SERPL CALC-SCNC: 8 MMOL/L (ref 5–15)
AST SERPL-CCNC: 14 U/L (ref 15–37)
BASOPHILS # BLD: 0.1 K/UL (ref 0–0.1)
BASOPHILS NFR BLD: 1 % (ref 0–1)
BILIRUB SERPL-MCNC: 0.3 MG/DL (ref 0.2–1)
BUN SERPL-MCNC: 10 MG/DL (ref 6–20)
BUN/CREAT SERPL: 12 (ref 12–20)
CALCIUM SERPL-MCNC: 8.9 MG/DL (ref 8.5–10.1)
CHLORIDE SERPL-SCNC: 109 MMOL/L (ref 97–108)
CO2 SERPL-SCNC: 24 MMOL/L (ref 21–32)
COMMENT, HOLDF: NORMAL
CREAT SERPL-MCNC: 0.86 MG/DL (ref 0.55–1.02)
CRP SERPL-MCNC: <0.29 MG/DL (ref 0–0.6)
DIFFERENTIAL METHOD BLD: ABNORMAL
EOSINOPHIL # BLD: 0 K/UL (ref 0–0.4)
EOSINOPHIL NFR BLD: 0 % (ref 0–7)
ERYTHROCYTE [DISTWIDTH] IN BLOOD BY AUTOMATED COUNT: 13.4 % (ref 11.5–14.5)
ERYTHROCYTE [SEDIMENTATION RATE] IN BLOOD: 7 MM/HR (ref 0–20)
GLOBULIN SER CALC-MCNC: 3.6 G/DL (ref 2–4)
GLUCOSE SERPL-MCNC: 108 MG/DL (ref 65–100)
HCT VFR BLD AUTO: 40.1 % (ref 35–47)
HGB BLD-MCNC: 13.3 G/DL (ref 11.5–16)
IMM GRANULOCYTES # BLD AUTO: 0 K/UL (ref 0–0.04)
IMM GRANULOCYTES NFR BLD AUTO: 1 % (ref 0–0.5)
LYMPHOCYTES # BLD: 0.8 K/UL (ref 0.8–3.5)
LYMPHOCYTES NFR BLD: 12 % (ref 12–49)
MCH RBC QN AUTO: 29.2 PG (ref 26–34)
MCHC RBC AUTO-ENTMCNC: 33.2 G/DL (ref 30–36.5)
MCV RBC AUTO: 87.9 FL (ref 80–99)
MONOCYTES # BLD: 0.1 K/UL (ref 0–1)
MONOCYTES NFR BLD: 1 % (ref 5–13)
NEUTS SEG # BLD: 6.1 K/UL (ref 1.8–8)
NEUTS SEG NFR BLD: 85 % (ref 32–75)
NRBC # BLD: 0 K/UL (ref 0–0.01)
NRBC BLD-RTO: 0 PER 100 WBC
PLATELET # BLD AUTO: 250 K/UL (ref 150–400)
PMV BLD AUTO: 9.9 FL (ref 8.9–12.9)
POTASSIUM SERPL-SCNC: 4 MMOL/L (ref 3.5–5.1)
PROT SERPL-MCNC: 7.4 G/DL (ref 6.4–8.2)
RBC # BLD AUTO: 4.56 M/UL (ref 3.8–5.2)
SAMPLES BEING HELD,HOLD: NORMAL
SODIUM SERPL-SCNC: 141 MMOL/L (ref 136–145)
WBC # BLD AUTO: 7.1 K/UL (ref 3.6–11)

## 2022-01-04 PROCEDURE — 99282 EMERGENCY DEPT VISIT SF MDM: CPT

## 2022-01-04 PROCEDURE — 36415 COLL VENOUS BLD VENIPUNCTURE: CPT

## 2022-01-04 PROCEDURE — 85652 RBC SED RATE AUTOMATED: CPT

## 2022-01-04 PROCEDURE — 96374 THER/PROPH/DIAG INJ IV PUSH: CPT

## 2022-01-04 PROCEDURE — 80053 COMPREHEN METABOLIC PANEL: CPT

## 2022-01-04 PROCEDURE — 72148 MRI LUMBAR SPINE W/O DYE: CPT

## 2022-01-04 PROCEDURE — 86140 C-REACTIVE PROTEIN: CPT

## 2022-01-04 PROCEDURE — 96375 TX/PRO/DX INJ NEW DRUG ADDON: CPT

## 2022-01-04 PROCEDURE — 85025 COMPLETE CBC W/AUTO DIFF WBC: CPT

## 2022-01-04 PROCEDURE — 74011250637 HC RX REV CODE- 250/637: Performed by: EMERGENCY MEDICINE

## 2022-01-04 PROCEDURE — 74011250636 HC RX REV CODE- 250/636: Performed by: EMERGENCY MEDICINE

## 2022-01-04 RX ORDER — DIAZEPAM 5 MG/1
5 TABLET ORAL
Qty: 20 TABLET | Refills: 0 | Status: SHIPPED | OUTPATIENT
Start: 2022-01-04 | End: 2022-10-14

## 2022-01-04 RX ORDER — KETOROLAC TROMETHAMINE 30 MG/ML
30 INJECTION, SOLUTION INTRAMUSCULAR; INTRAVENOUS
Status: COMPLETED | OUTPATIENT
Start: 2022-01-04 | End: 2022-01-04

## 2022-01-04 RX ORDER — DIAZEPAM 5 MG/1
5 TABLET ORAL
Status: COMPLETED | OUTPATIENT
Start: 2022-01-04 | End: 2022-01-04

## 2022-01-04 RX ORDER — HYDROMORPHONE HYDROCHLORIDE 1 MG/ML
0.5 INJECTION, SOLUTION INTRAMUSCULAR; INTRAVENOUS; SUBCUTANEOUS
Status: COMPLETED | OUTPATIENT
Start: 2022-01-04 | End: 2022-01-04

## 2022-01-04 RX ORDER — DEXAMETHASONE SODIUM PHOSPHATE 10 MG/ML
10 INJECTION INTRAMUSCULAR; INTRAVENOUS ONCE
Status: COMPLETED | OUTPATIENT
Start: 2022-01-04 | End: 2022-01-04

## 2022-01-04 RX ORDER — NALOXONE HYDROCHLORIDE 4 MG/.1ML
SPRAY NASAL
Qty: 1 EACH | Refills: 0 | Status: SHIPPED | OUTPATIENT
Start: 2022-01-04 | End: 2022-10-14

## 2022-01-04 RX ADMIN — HYDROMORPHONE HYDROCHLORIDE 0.5 MG: 1 INJECTION, SOLUTION INTRAMUSCULAR; INTRAVENOUS; SUBCUTANEOUS at 15:51

## 2022-01-04 RX ADMIN — DIAZEPAM 5 MG: 5 TABLET ORAL at 18:00

## 2022-01-04 RX ADMIN — KETOROLAC TROMETHAMINE 30 MG: 30 INJECTION, SOLUTION INTRAMUSCULAR at 15:46

## 2022-01-04 RX ADMIN — DEXAMETHASONE SODIUM PHOSPHATE 10 MG: 10 INJECTION, SOLUTION INTRAMUSCULAR; INTRAVENOUS at 15:48

## 2022-01-04 NOTE — ED TRIAGE NOTES
Pt reports she was bending over to move a steering wheel for a video game console (about 100 lbs) 3 days ago and since then she has had lower back pain radiating down her right leg. Has been taking motrin, stretching, and taking a steroid dose pack without relief. Worse with movement.

## 2022-01-04 NOTE — ED PROVIDER NOTES
Jeannie Hernández is a 32 yo F with low back pain radiating down right leg and saddle anesthesia. She states that 3 days ago she was trying to lift a 100lb video game console and felt a pop and had shooting pain down right leg. She has been taking ibuprofen, medrol dose pack and  Tizanidine but it has not helped. She states she is no able to move and called her PCP who advised that she come to the ED for evalaution. She denies fever or chills. She has not had a BM since her pain started. When she wipes after the bathroom she feels numb. Past Medical History:   Diagnosis Date    Anxiety and depression     Bartholin's gland abscess     x8    Endometriosis     Kidney stones     Migraine     Ovarian cyst     Panic attack     Pyelonephritis        Past Surgical History:   Procedure Laterality Date    HX APPENDECTOMY  04/26/2017    HX BARTHOLIN CYST MARSUPIALIZATION      2011, 2018    HX BREAST LUMPECTOMY Right     HX BUNIONECTOMY      HX CYST REMOVAL      HX GYN Left     bartholin cyst drainage X 6    HX GYN Left     bartholin cyst marsupilization    HX HEENT      HX OTHER SURGICAL      laparascopy    HX PELVIC LAPAROSCOPY      HX WISDOM TEETH EXTRACTION           Family History:   Problem Relation Age of Onset    Diabetes Maternal Aunt     Heart Disease Maternal Aunt     Stroke Maternal Aunt     Heart Disease Maternal Grandmother     No Known Problems Mother        Social History     Socioeconomic History    Marital status: SINGLE     Spouse name: Not on file    Number of children: Not on file    Years of education: Not on file    Highest education level: Not on file   Occupational History    Not on file   Tobacco Use    Smoking status: Former Smoker    Smokeless tobacco: Never Used   Substance and Sexual Activity    Alcohol use:  Yes     Alcohol/week: 1.0 standard drink     Types: 1 Glasses of wine per week     Comment: social    Drug use: No    Sexual activity: Yes Partners: Male     Birth control/protection: Pill, Condom   Other Topics Concern    Not on file   Social History Narrative    Not on file     Social Determinants of Health     Financial Resource Strain:     Difficulty of Paying Living Expenses: Not on file   Food Insecurity:     Worried About Running Out of Food in the Last Year: Not on file    Mark of Food in the Last Year: Not on file   Transportation Needs:     Lack of Transportation (Medical): Not on file    Lack of Transportation (Non-Medical): Not on file   Physical Activity:     Days of Exercise per Week: Not on file    Minutes of Exercise per Session: Not on file   Stress:     Feeling of Stress : Not on file   Social Connections:     Frequency of Communication with Friends and Family: Not on file    Frequency of Social Gatherings with Friends and Family: Not on file    Attends Baptism Services: Not on file    Active Member of [address] [place] [address] [employer] or Organizations: Not on file    Attends Club or Organization Meetings: Not on file    Marital Status: Not on file   Intimate Partner Violence:     Fear of Current or Ex-Partner: Not on file    Emotionally Abused: Not on file    Physically Abused: Not on file    Sexually Abused: Not on file   Housing Stability:     Unable to Pay for Housing in the Last Year: Not on file    Number of [place] in the Last Year: Not on file    Unstable Housing in the Last Year: Not on file         ALLERGIES: Metoclopramide hcl, Benadryl [diphenhydramine hcl], Compazine [prochlorperazine edisylate], Depacon [valproate sodium], Haldol [haloperidol lactate], Other medication, Promethazine, and Reglan [metoclopramide]    Review of Systems   Constitutional: Negative for fever. HENT: Negative for sore throat. Eyes: Negative for visual disturbance. Respiratory: Negative for cough. Cardiovascular: Negative for chest pain. Gastrointestinal: Negative for abdominal pain. Genitourinary: Negative for dysuria. Musculoskeletal: Positive for back pain. Skin: Negative for rash. Neurological: Positive for numbness (saddle anesthesia). Negative for headaches. Vitals:    01/04/22 1453   BP: 127/76   Pulse: (!) 109   Resp: 16   Temp: 97.5 °F (36.4 °C)   SpO2: 98%   Weight: 65.2 kg (143 lb 11.8 oz)   Height: 5' 8\" (1.727 m)            Physical Exam  Vitals and nursing note reviewed. Constitutional:       General: She is not in acute distress. Appearance: She is well-developed. HENT:      Head: Normocephalic and atraumatic. Eyes:      Conjunctiva/sclera: Conjunctivae normal.   Neck:      Trachea: Phonation normal.   Cardiovascular:      Rate and Rhythm: Normal rate. Pulmonary:      Effort: Pulmonary effort is normal. No respiratory distress. Abdominal:      General: There is no distension. Genitourinary:     Rectum: No tenderness. Abnormal anal tone. Musculoskeletal:         General: No tenderness. Normal range of motion. Cervical back: Normal range of motion. Lumbar back: Spasms present. No deformity. Right hip: Decreased strength. Skin:     General: Skin is warm and dry. Neurological:      Mental Status: She is alert. She is not disoriented. Motor: No abnormal muscle tone. MDM         5:16 PM  MRI resulted and negative for Cauda equina,  But significant for right disc extrusion narrowing the right lateral recess. Patient reassessed and updated on results. Will prescribe valium to take in addition to the Medrol already prescribed by PCP. Referred to Neurosurgery.     Procedures

## 2022-01-07 ENCOUNTER — TELEPHONE (OUTPATIENT)
Dept: NEUROLOGY | Age: 32
End: 2022-01-07

## 2022-01-25 ENCOUNTER — HOSPITAL ENCOUNTER (EMERGENCY)
Age: 32
Discharge: HOME | End: 2022-01-25
Attending: EMERGENCY MEDICINE
Payer: COMMERCIAL

## 2022-01-25 VITALS
TEMPERATURE: 97.6 F | SYSTOLIC BLOOD PRESSURE: 112 MMHG | RESPIRATION RATE: 16 BRPM | DIASTOLIC BLOOD PRESSURE: 74 MMHG | WEIGHT: 142.2 LBS | OXYGEN SATURATION: 100 % | HEART RATE: 93 BPM | BODY MASS INDEX: 21.62 KG/M2

## 2022-01-25 DIAGNOSIS — G43.811 OTHER MIGRAINE WITH STATUS MIGRAINOSUS, INTRACTABLE: Primary | ICD-10-CM

## 2022-01-25 PROCEDURE — 99282 EMERGENCY DEPT VISIT SF MDM: CPT

## 2022-01-25 PROCEDURE — 96365 THER/PROPH/DIAG IV INF INIT: CPT

## 2022-01-25 PROCEDURE — 96375 TX/PRO/DX INJ NEW DRUG ADDON: CPT

## 2022-01-25 PROCEDURE — 96366 THER/PROPH/DIAG IV INF ADDON: CPT

## 2022-01-25 PROCEDURE — 74011250636 HC RX REV CODE- 250/636: Performed by: EMERGENCY MEDICINE

## 2022-01-25 PROCEDURE — 96376 TX/PRO/DX INJ SAME DRUG ADON: CPT

## 2022-01-25 PROCEDURE — 74011000250 HC RX REV CODE- 250: Performed by: EMERGENCY MEDICINE

## 2022-01-25 RX ORDER — KETOROLAC TROMETHAMINE 30 MG/ML
30 INJECTION, SOLUTION INTRAMUSCULAR; INTRAVENOUS
Status: COMPLETED | OUTPATIENT
Start: 2022-01-25 | End: 2022-01-25

## 2022-01-25 RX ORDER — KETAMINE HYDROCHLORIDE 50 MG/ML
0.5 INJECTION, SOLUTION INTRAMUSCULAR; INTRAVENOUS
Status: COMPLETED | OUTPATIENT
Start: 2022-01-25 | End: 2022-01-25

## 2022-01-25 RX ORDER — MAGNESIUM SULFATE HEPTAHYDRATE 40 MG/ML
2 INJECTION, SOLUTION INTRAVENOUS
Status: COMPLETED | OUTPATIENT
Start: 2022-01-25 | End: 2022-01-25

## 2022-01-25 RX ORDER — DEXAMETHASONE 2 MG/1
TABLET ORAL
Qty: 24 TABLET | Refills: 0 | Status: SHIPPED | OUTPATIENT
Start: 2022-01-25 | End: 2022-10-14

## 2022-01-25 RX ORDER — HYDROMORPHONE HYDROCHLORIDE 1 MG/ML
1 INJECTION, SOLUTION INTRAMUSCULAR; INTRAVENOUS; SUBCUTANEOUS
Status: COMPLETED | OUTPATIENT
Start: 2022-01-25 | End: 2022-01-25

## 2022-01-25 RX ORDER — ONDANSETRON 2 MG/ML
4 INJECTION INTRAMUSCULAR; INTRAVENOUS
Status: COMPLETED | OUTPATIENT
Start: 2022-01-25 | End: 2022-01-25

## 2022-01-25 RX ORDER — DEXAMETHASONE SODIUM PHOSPHATE 4 MG/ML
10 INJECTION, SOLUTION INTRA-ARTICULAR; INTRALESIONAL; INTRAMUSCULAR; INTRAVENOUS; SOFT TISSUE
Status: COMPLETED | OUTPATIENT
Start: 2022-01-25 | End: 2022-01-25

## 2022-01-25 RX ORDER — HYDROMORPHONE HYDROCHLORIDE 2 MG/1
2 TABLET ORAL
Qty: 10 TABLET | Refills: 0 | Status: SHIPPED | OUTPATIENT
Start: 2022-01-25 | End: 2022-01-30

## 2022-01-25 RX ORDER — ONDANSETRON 4 MG/1
4 TABLET, ORALLY DISINTEGRATING ORAL
Qty: 10 TABLET | Refills: 0 | Status: SHIPPED | OUTPATIENT
Start: 2022-01-25

## 2022-01-25 RX ORDER — HYDROMORPHONE HYDROCHLORIDE 2 MG/1
2 TABLET ORAL
Qty: 10 TABLET | Refills: 0 | Status: SHIPPED | OUTPATIENT
Start: 2022-01-25 | End: 2022-01-25 | Stop reason: SDUPTHER

## 2022-01-25 RX ADMIN — SODIUM CHLORIDE 500 ML: 9 INJECTION, SOLUTION INTRAVENOUS at 12:29

## 2022-01-25 RX ADMIN — DEXAMETHASONE SODIUM PHOSPHATE 10 MG: 4 INJECTION, SOLUTION INTRAMUSCULAR; INTRAVENOUS at 12:29

## 2022-01-25 RX ADMIN — KETOROLAC TROMETHAMINE 30 MG: 30 INJECTION, SOLUTION INTRAMUSCULAR; INTRAVENOUS at 12:28

## 2022-01-25 RX ADMIN — ONDANSETRON 4 MG: 2 INJECTION INTRAMUSCULAR; INTRAVENOUS at 15:23

## 2022-01-25 RX ADMIN — HYDROMORPHONE HYDROCHLORIDE 1 MG: 1 INJECTION, SOLUTION INTRAMUSCULAR; INTRAVENOUS; SUBCUTANEOUS at 15:23

## 2022-01-25 RX ADMIN — ONDANSETRON 4 MG: 2 INJECTION INTRAMUSCULAR; INTRAVENOUS at 14:11

## 2022-01-25 RX ADMIN — KETAMINE HYDROCHLORIDE 32.5 MG: 50 INJECTION, SOLUTION INTRAMUSCULAR; INTRAVENOUS at 12:29

## 2022-01-25 RX ADMIN — MAGNESIUM SULFATE HEPTAHYDRATE 2 G: 40 INJECTION, SOLUTION INTRAVENOUS at 12:29

## 2022-01-25 RX ADMIN — ONDANSETRON 4 MG: 2 INJECTION INTRAMUSCULAR; INTRAVENOUS at 12:28

## 2022-01-25 NOTE — ED NOTES
Pt arrives to ED room from triage with a cc of a migraine since last night. Pt has hx of migraines and does give herself injections each month to prevent migraines, but was able to give this months injection due to not receiving it yet. Pt states that if she missed an injection she does have severe breakthrough migraines. Pt took tylenol and motrin last night without success. Pt has tried different migraine medications in the past but has allergic reactions to them. Pt is resting comfortably in stretcher with side rails up and call bell within reach.

## 2022-01-26 NOTE — ED PROVIDER NOTES
EMERGENCY DEPARTMENT HISTORY AND PHYSICAL EXAM      Date: 1/25/2022  Patient Name: Jermaine Yuan    History of Presenting Illness     Chief Complaint   Patient presents with    Migraine     Migraine pain since last night, pain at left side of face. Sx unresolved with tylenol/motrin/zofran at home. History Provided By: Patient    HPI: Jermaine Yuan, 33 y.o. female presents to the ED with cc of headache. Patient states she has a history of migraines this headache she currently has is consistent with her migraines in the past.  She states that it is a sharp stabbing pain on the left-hand side of her head that radiates into the suboccipital area. She states pain is rated 10 out of 10 with no alleviating factors. She does have photophobia. She states that she had been on medications however she is out of it and currently waiting on her prior off for refill. She states that she had been using over-the-counter medications at home but there have been no significant improvement in her symptoms. She denies any blurred vision, loss of sensation or loss of strength. She denies any fever or chills. She denies any chest pain or shortness of breath. She denies any abdominal pain or diarrhea. She has had nausea but there is been no vomiting. There are no other complaints, changes, or physical findings at this time. PCP: James Whittaker MD    No current facility-administered medications on file prior to encounter. Current Outpatient Medications on File Prior to Encounter   Medication Sig Dispense Refill    naloxone (NARCAN) 4 mg/actuation nasal spray Use 1 spray intranasally, then discard. Repeat with new spray every 2 min as needed for opioid overdose symptoms, alternating nostrils. 1 Each 0    diazePAM (Valium) 5 mg tablet Take 1 Tablet by mouth every eight (8) hours as needed for Muscle Spasm(s).  Max Daily Amount: 15 mg. 20 Tablet 0    methylPREDNISolone (MEDROL DOSEPACK) 4 mg tablet Per directions 1 Dose Pack 0    ubrogepant (Ubrelvy) 100 mg tablet Take 1 Tablet by mouth daily as needed for Migraine. 10 Tablet 5    onabotulinumtoxinA (Botox) 200 unit injection INJECT 155 UNITS INTO SELECTED MUSCLES OF THE HEAD AND NECK BILATERALLY EVERY 12 WEEKS FOR MIGRAINE PREVENTION. DISCARD UNUSED PORTION. 1 Vial 3    Ajovy Autoinjector 225 mg/1.5 mL auto-injector INJECT 1 SYRINGE BY SUBCUTANEOUS ROUTE EVERY THIRTY (30) DAYS. 1 Syringe 10    estradioL (VIVELLE) 0.1 mg/24 hr APPLY 1 PATCH TWICE WEEKLY      hydrOXYzine HCL (ATARAX) 25 mg tablet TAKE 1 TABLET BY MOUTH 4 TIMES PER DAY AS NEEDED FOR ITCHING      metroNIDAZOLE (FLAGYL) 500 mg tablet TAKE 1 TABLET BY MOUTH TWICE A DAY FOR 7 DAYS      phenazopyridine (PYRIDIUM) 200 mg tablet TAKE 1 TABLET BY MOUTH 3 TIMES A DAY      triamcinolone acetonide (KENALOG) 0.1 % topical cream APPLY TWICE A DAY TO AFFECTED AREAS ON BODY AS NEEDED FOR FLARES/ITCHING      topiramate (Topamax) 50 mg tablet Take 1 Tab by mouth nightly. 90 Tab 2    polyethylene glycol (Miralax) 17 gram/dose powder Take 17 g by mouth daily. 1 tablespoon with 8 oz of water daily 235 g 0    B2-magnesium cit,oxid-feverfew (MigreLief) 200-180-50 mg tab MigreLief   BID      butalbital-acetaminophen-caffeine (FIORICET, ESGIC) -40 mg per tablet       diclofenac EC (VOLTAREN) 25 mg EC tablet       dihydroergotamine (MIGRANAL) 0.5 mg/pump act. (4 mg/mL) nasal spray 1 Spray by Nasal route as needed for Migraine. use in one nostril as directed. No more than 4 sprays in one hour 1 Bottle 0    norethindrone (KOBY) 0.35 mg tab Take 1 Tab by mouth nightly. Blaire Duos with zinc one by mouth at bedtime     Francisco Vasquez, PT evaluate and treat 1 Each 0    clonazePAM (KLONOPIN) 1 mg tablet Take 2 mg by mouth nightly. 5    escitalopram (LEXAPRO) 20 mg tablet Take 20 mg by mouth nightly.  Indications: Generalized Anxiety Disorder         Past History Past Medical History:  Past Medical History:   Diagnosis Date    Anxiety and depression     Bartholin's gland abscess     x8    Endometriosis     Kidney stones     Migraine     Ovarian cyst     Panic attack     Pyelonephritis        Past Surgical History:  Past Surgical History:   Procedure Laterality Date    HX APPENDECTOMY  04/26/2017    HX BARTHOLIN CYST MARSUPIALIZATION      2011, 2018    HX BREAST LUMPECTOMY Right     HX BUNIONECTOMY      HX CYST REMOVAL      HX GYN Left     bartholin cyst drainage X 6    HX GYN Left     bartholin cyst marsupilization    HX HEENT      HX OTHER SURGICAL      laparascopy    HX PELVIC LAPAROSCOPY      HX WISDOM TEETH EXTRACTION         Family History:  Family History   Problem Relation Age of Onset    Diabetes Maternal Aunt     Heart Disease Maternal Aunt     Stroke Maternal Aunt     Heart Disease Maternal Grandmother     No Known Problems Mother        Social History:  Social History     Tobacco Use    Smoking status: Former Smoker    Smokeless tobacco: Never Used   Substance Use Topics    Alcohol use: Yes     Alcohol/week: 1.0 standard drink     Types: 1 Glasses of wine per week     Comment: social    Drug use: No       Allergies: Allergies   Allergen Reactions    Metoclopramide Hcl Anaphylaxis    Benadryl [Diphenhydramine Hcl] Other (comments)     Makes me feel funny. Need ativan if I get benadryl    Compazine [Prochlorperazine Edisylate] Anxiety    Depacon [Valproate Sodium] Other (comments)     Pt reports having restlessness with this medication.  Haldol [Haloperidol Lactate] Other (comments)    Other Medication Hives     Surgical glue    Promethazine Other (comments)     \"It makes me feel really funny, nausea and dizzy\"    Reglan [Metoclopramide] Other (comments)         Review of Systems   Review of Systems   Constitutional: Negative. Negative for appetite change, chills, fatigue and fever. HENT: Negative.   Negative for congestion, rhinorrhea, sinus pressure and sore throat. Eyes: Negative. Respiratory: Negative. Negative for cough, choking, chest tightness, shortness of breath and wheezing. Cardiovascular: Negative. Negative for chest pain, palpitations and leg swelling. Gastrointestinal: Positive for nausea. Negative for abdominal pain, constipation, diarrhea and vomiting. Endocrine: Negative. Genitourinary: Negative. Negative for difficulty urinating, dysuria, flank pain and urgency. Musculoskeletal: Negative. Skin: Negative. Neurological: Positive for headaches. Negative for dizziness, speech difficulty, weakness, light-headedness and numbness. Psychiatric/Behavioral: Negative. All other systems reviewed and are negative. Physical Exam   Physical Exam  Vitals and nursing note reviewed. Constitutional:       Appearance: She is well-developed. Comments: Appears very uncomfortable     HENT:      Head: Normocephalic and atraumatic. Right Ear: External ear normal.      Left Ear: External ear normal.      Mouth/Throat:      Mouth: Mucous membranes are moist.   Eyes:      General: No scleral icterus. Extraocular Movements: Extraocular movements intact. Conjunctiva/sclera: Conjunctivae normal.      Pupils: Pupils are equal, round, and reactive to light. Comments: + photophobia     Neck:      Vascular: No JVD. Trachea: No tracheal deviation. Cardiovascular:      Rate and Rhythm: Normal rate and regular rhythm. Heart sounds: Normal heart sounds. No murmur heard. Pulmonary:      Effort: Pulmonary effort is normal. No respiratory distress. Breath sounds: Normal breath sounds. No stridor. No wheezing or rales. Abdominal:      General: Bowel sounds are normal. There is no distension. Palpations: Abdomen is soft. Tenderness: There is no abdominal tenderness. There is no guarding or rebound. Musculoskeletal:         General: No tenderness.  Normal range of motion. Cervical back: Normal range of motion and neck supple. Right lower leg: No edema. Left lower leg: No edema. Skin:     General: Skin is warm and dry. Capillary Refill: Capillary refill takes less than 2 seconds. Neurological:      Mental Status: She is alert and oriented to person, place, and time. Cranial Nerves: No cranial nerve deficit. Comments: No nystagmus, no dysmetria, no focal motor or sensory deficits   Psychiatric:         Mood and Affect: Mood normal.         Behavior: Behavior normal.         Diagnostic Study Results     Labs -  None    Radiologic Studies -   No orders to display     \    Medical Decision Making   I am the first provider for this patient. I reviewed the vital signs, available nursing notes, past medical history, past surgical history, family history and social history. Vital Signs-Reviewed the patient's vital signs. Records Reviewed: Nursing Notes, Old Medical Records, Previous Radiology Studies and Previous Laboratory Studies, seen 1/4/22- Muscle spas, followed by Dr. Socorro Apley (Neuro)     Provider Notes (Medical Decision Making):   DDx- migraine, tension headache       ED Course:   Initial assessment performed. The patients presenting problems have been discussed, and they are in agreement with the care plan formulated and outlined with them. I have encouraged them to ask questions as they arise throughout their visit. Patient has been on several medications in the past.  We'll give an IV push of Decadron in addition to Zofran and Toradol. I have mixed 2 g of mag sulfate and normal saline of 400 mL as well as 32.5 mg of ketamine. This was given his fusion over 40-minute period time. She states following this that her pain was still severe in nature. We'll give a dose of Dilaudid. After receiving Dilaudid patient's headache had improved by about 50%.   We'll discharge her home with a steroid dose taper as well as a few pain medication doses for reoccurrence. Patient tolerated the infusion well with no other complications. Disposition:  DC home     DISCHARGE PLAN:  1. Discharge Medication List as of 1/25/2022  4:08 PM      START taking these medications    Details   dexAMETHasone (DECADRON) 2 mg tablet Take 10mg on day 1, then 8mg on day 2/3, then 6mg on day 4/5, then 4mg on day 6/7, then 2mg x 1, Normal, Disp-24 Tablet, R-0      ondansetron (ZOFRAN ODT) 4 mg disintegrating tablet Take 1 Tablet by mouth every eight (8) hours as needed for Nausea or Vomiting., Normal, Disp-10 Tablet, R-0         CONTINUE these medications which have CHANGED    Details   HYDROmorphone (Dilaudid) 2 mg tablet Take 1 Tablet by mouth every six (6) hours as needed for Pain for up to 5 days. Max Daily Amount: 8 mg. Indications: excessive pain, Normal, Disp-10 Tablet, R-0         CONTINUE these medications which have NOT CHANGED    Details   naloxone (NARCAN) 4 mg/actuation nasal spray Use 1 spray intranasally, then discard. Repeat with new spray every 2 min as needed for opioid overdose symptoms, alternating nostrils. , Normal, Disp-1 Each, R-0      diazePAM (Valium) 5 mg tablet Take 1 Tablet by mouth every eight (8) hours as needed for Muscle Spasm(s). Max Daily Amount: 15 mg., Normal, Disp-20 Tablet, R-0      methylPREDNISolone (MEDROL DOSEPACK) 4 mg tablet Per directions, Normal, Disp-1 Dose Pack, R-0      ubrogepant (Ubrelvy) 100 mg tablet Take 1 Tablet by mouth daily as needed for Migraine., Normal, Disp-10 Tablet, R-5      onabotulinumtoxinA (Botox) 200 unit injection INJECT 155 UNITS INTO SELECTED MUSCLES OF THE HEAD AND NECK BILATERALLY EVERY 12 WEEKS FOR MIGRAINE PREVENTION.  DISCARD UNUSED PORTION., Normal, Disp-1 Vial, R-3      Ajovy Autoinjector 225 mg/1.5 mL auto-injector INJECT 1 SYRINGE BY SUBCUTANEOUS ROUTE EVERY THIRTY (30) DAYS., Normal, Disp-1 Syringe, R-10      estradioL (VIVELLE) 0.1 mg/24 hr APPLY 1 PATCH TWICE WEEKLY, Historical Med      hydrOXYzine HCL (ATARAX) 25 mg tablet TAKE 1 TABLET BY MOUTH 4 TIMES PER DAY AS NEEDED FOR ITCHING, Historical Med      metroNIDAZOLE (FLAGYL) 500 mg tablet TAKE 1 TABLET BY MOUTH TWICE A DAY FOR 7 DAYS, Historical Med      phenazopyridine (PYRIDIUM) 200 mg tablet TAKE 1 TABLET BY MOUTH 3 TIMES A DAY, Historical Med      triamcinolone acetonide (KENALOG) 0.1 % topical cream APPLY TWICE A DAY TO AFFECTED AREAS ON BODY AS NEEDED FOR FLARES/ITCHING, Historical Med      topiramate (Topamax) 50 mg tablet Take 1 Tab by mouth nightly., Normal, Disp-90 Tab, R-2      polyethylene glycol (Miralax) 17 gram/dose powder Take 17 g by mouth daily. 1 tablespoon with 8 oz of water daily, Print, Disp-235 g, R-0      B2-magnesium cit,oxid-feverfew (MigreLief) 200-180-50 mg tab MigreLief   BID, Historical Med      butalbital-acetaminophen-caffeine (FIORICET, ESGIC) -40 mg per tablet Historical Med      diclofenac EC (VOLTAREN) 25 mg EC tablet Historical Med      dihydroergotamine (MIGRANAL) 0.5 mg/pump act. (4 mg/mL) nasal spray 1 Spray by Nasal route as needed for Migraine. use in one nostril as directed. No more than 4 sprays in one hour, Normal, Disp-1 Bottle,R-0      norethindrone (KOBY) 0.35 mg tab Take 1 Tab by mouth nightly., Historical Med      !! Jozef Hipps with zinc one by mouth at bedtime, Historical Med      !! OTHER,NON-FORMULARY, PT evaluate and treat, Print, Disp-1 Each, R-0      clonazePAM (KLONOPIN) 1 mg tablet Take 2 mg by mouth nightly., Historical Med, R-5      escitalopram (LEXAPRO) 20 mg tablet Take 20 mg by mouth nightly. Indications: Generalized Anxiety Disorder, Historical Med       !! - Potential duplicate medications found. Please discuss with provider. 2.   Follow-up Information     Follow up With Specialties Details Why [facility]    Glenda Car, [address] [place] [address] Internal Medicine   [address] [place] [address]  [phone]          3.   Return to ED if worse     Diagnosis     Clinical Impression:   1. Other migraine with status migrainosus, intractable        Attestations:    Kasia Wallace, DO    Please note that this dictation was completed with Taulia, the computer voice recognition software. Quite often unanticipated grammatical, syntax, homophones, and other interpretive errors are inadvertently transcribed by the computer software. Please disregard these errors. Please excuse any errors that have escaped final proofreading. Thank you.

## 2022-02-03 ENCOUNTER — OFFICE VISIT (OUTPATIENT)
Dept: NEUROLOGY | Age: 32
End: 2022-02-03
Payer: COMMERCIAL

## 2022-02-03 DIAGNOSIS — G43.719 INTRACTABLE CHRONIC MIGRAINE WITHOUT AURA AND WITHOUT STATUS MIGRAINOSUS: Primary | ICD-10-CM

## 2022-02-03 PROCEDURE — 64615 CHEMODENERV MUSC MIGRAINE: CPT | Performed by: PSYCHIATRY & NEUROLOGY

## 2022-03-10 ENCOUNTER — TELEPHONE (OUTPATIENT)
Dept: NEUROLOGY | Age: 32
End: 2022-03-10

## 2022-03-10 NOTE — TELEPHONE ENCOUNTER
Re: Botox    Spoke with [facility] rep and set up delivery for 03/24/22.     Pt will need next appt scheduled

## 2022-03-18 PROBLEM — F43.10 PTSD (POST-TRAUMATIC STRESS DISORDER): Status: ACTIVE | Noted: 2018-02-27

## 2022-03-18 PROBLEM — G43.909 MIGRAINE: Status: ACTIVE | Noted: 2018-02-27

## 2022-03-18 PROBLEM — M62.838 MUSCLE SPASM: Status: ACTIVE | Noted: 2020-01-28

## 2022-03-18 PROBLEM — G43.901 MIGRAINE WITH STATUS MIGRAINOSUS: Status: ACTIVE | Noted: 2018-10-10

## 2022-03-19 PROBLEM — M54.9 BACK PAIN: Status: ACTIVE | Noted: 2020-01-28

## 2022-03-20 PROBLEM — M54.2 ACUTE NECK PAIN: Status: ACTIVE | Noted: 2020-01-28

## 2022-04-01 RX ORDER — TOPIRAMATE 50 MG/1
TABLET, FILM COATED ORAL
Qty: 90 TABLET | Refills: 2 | Status: SHIPPED | OUTPATIENT
Start: 2022-04-01 | End: 2022-10-14

## 2022-04-04 ENCOUNTER — TELEPHONE (OUTPATIENT)
Dept: NEUROLOGY | Age: 32
End: 2022-04-04

## 2022-04-08 NOTE — PROGRESS NOTES
Botox Injection Note     2/3/2022     Patient:  Alon Shaw   YOB: 1990  Date of Visit: 2/3/2022      Indication: patient has chronic migraine headaches greater than 15 days per month lasting more than 4 hours each. She has failed or not tolerated 2 or more medication preventatives. Written consent was signed and verified by me. Risks and benefits were discussed to include possible cosmetic asymmetry which is not permament or life threatening. Patient name and  was confirmed prior to procedure. Time out performed. Procedure:   Botox concentration: 200 units in 2 ml of preservative-free normal saline. 1:1 dilution. LOT#:  [id number]  EXP:   2024    Injections and distribution as follow :      Units/site  Sites Loc Subtotal    procerus 5 1 ML 5   corrugaters 2.5 2 BL 5   frontalis 5 8 BL 40   Temporalis 10 4 BL 40   Occipitalis 10 2 BL 20   Cervical paraspinals 5 4 BL 20   Trapezius 10 4 BL 40         200 units Botox were reconstituted, 170 units injected as above and the remainder was unavoidably wasted. Patient tolerated procedure well. Return in 3 months for repeat injections.     _____________________________   Alondra Necessary, DO  [address], ABPN None

## 2022-05-12 ENCOUNTER — OFFICE VISIT (OUTPATIENT)
Dept: NEUROLOGY | Age: 32
End: 2022-05-12
Payer: COMMERCIAL

## 2022-05-12 VITALS
DIASTOLIC BLOOD PRESSURE: 82 MMHG | WEIGHT: 150 LBS | OXYGEN SATURATION: 98 % | HEART RATE: 74 BPM | HEIGHT: 68 IN | BODY MASS INDEX: 22.73 KG/M2 | SYSTOLIC BLOOD PRESSURE: 118 MMHG

## 2022-05-12 DIAGNOSIS — G43.719 INTRACTABLE CHRONIC MIGRAINE WITHOUT AURA AND WITHOUT STATUS MIGRAINOSUS: Primary | ICD-10-CM

## 2022-05-12 PROCEDURE — 64615 CHEMODENERV MUSC MIGRAINE: CPT | Performed by: PSYCHIATRY & NEUROLOGY

## 2022-05-12 RX ORDER — DAPSONE 75 MG/G
GEL TOPICAL
COMMUNITY
Start: 2022-04-27 | End: 2022-10-14

## 2022-05-12 RX ORDER — ARIPIPRAZOLE 2 MG/1
2 TABLET ORAL DAILY
COMMUNITY
Start: 2022-03-15

## 2022-05-12 RX ORDER — ADAPALENE AND BENZOYL PEROXIDE .1; 2.5 G/100G; G/100G
GEL TOPICAL
COMMUNITY
Start: 2022-03-30 | End: 2022-10-14

## 2022-05-12 RX ORDER — BUSPIRONE HYDROCHLORIDE 10 MG/1
TABLET ORAL
COMMUNITY
Start: 2022-04-25

## 2022-05-12 NOTE — PROGRESS NOTES
Chief Complaint   Patient presents with    Procedure     Botox     Visit Vitals  /82 (BP 1 Location: Left upper arm, BP Patient Position: Sitting)   Pulse 74   Ht 5' 8\" (1.727 m)   Wt 150 lb (68 kg)   SpO2 98%   BMI 22.81 kg/m²

## 2022-05-12 NOTE — PROGRESS NOTES
Botox Injection Note     2022     Patient:  Jayson Sheridan   YOB: 1990  Date of Visit: 2022      Indication: patient has chronic migraine headaches greater than 15 days per month lasting more than 4 hours each. She has failed or not tolerated 2 or more medication preventatives. Written consent was signed and verified by me. Risks and benefits were discussed to include possible cosmetic asymmetry which is not permament or life threatening. Patient name and  was confirmed prior to procedure. Time out performed. Procedure:   Botox concentration: 200 units in 2 ml of preservative-free normal saline. 1:1 dilution. LOT#:  [id number]  EXP:   2024    Injections and distribution as follow :      Units/site  Sites Loc Subtotal    procerus 5 1 ML 5   corrugaters 2.5 2 BL 5   frontalis 5 8 BL 40   Temporalis 10 4 BL 40   Occipitalis 10 2 BL 20   Cervical paraspinals 5 4 BL 20   Trapezius 10 4 BL 40         200 units Botox were reconstituted, 170 units injected as above and the remainder was unavoidably wasted. Patient tolerated procedure well. Return in 3 months for repeat injections.     _____________________________   Shania Griffiths,   [address], ABPN

## 2022-06-22 NOTE — LETTER
[address] 
[facility] EMERGENCY DEPT 
[address] [facility] [place] [address] [phone] Work/School Note Date: 11/21/2019 To Whom It May concern: 
 
Lala Batres was seen and treated today in the emergency room by the following provider(s): 
Attending Provider: Vandana Lynch MD. Lala Batres may return to work on 11/25/19. Sincerely, Juan Carlos Marx Patient/Caregiver provided printed discharge information.

## 2022-08-15 ENCOUNTER — TELEPHONE (OUTPATIENT)
Dept: NEUROLOGY | Age: 32
End: 2022-08-15

## 2022-08-18 ENCOUNTER — OFFICE VISIT (OUTPATIENT)
Dept: NEUROLOGY | Age: 32
End: 2022-08-18
Payer: COMMERCIAL

## 2022-08-18 VITALS
WEIGHT: 151 LBS | OXYGEN SATURATION: 98 % | HEIGHT: 68 IN | BODY MASS INDEX: 22.88 KG/M2 | HEART RATE: 92 BPM | SYSTOLIC BLOOD PRESSURE: 118 MMHG | DIASTOLIC BLOOD PRESSURE: 72 MMHG

## 2022-08-18 DIAGNOSIS — G43.719 INTRACTABLE CHRONIC MIGRAINE WITHOUT AURA AND WITHOUT STATUS MIGRAINOSUS: Primary | ICD-10-CM

## 2022-08-18 PROCEDURE — 64615 CHEMODENERV MUSC MIGRAINE: CPT | Performed by: PSYCHIATRY & NEUROLOGY

## 2022-08-18 NOTE — PROGRESS NOTES
Botox Injection Note     2022     Patient:  Karol Diaz   YOB: 1990  Date of Visit: 2022      Indication: patient has chronic migraine headaches greater than 15 days per month lasting more than 4 hours each. She has failed or not tolerated 2 or more medication preventatives. Written consent was signed and verified by me. Risks and benefits were discussed to include possible cosmetic asymmetry which is not permament or life threatening. Patient name and  was confirmed prior to procedure. Time out performed. Procedure:   Botox concentration: 200 units in 2 ml of preservative-free normal saline. 1:1 dilution  LOT#:  [id number]  EXP:   2025    Injections and distribution as follow :      Units/site  Sites Loc Subtotal    procerus 5 1 ML 5   corrugaters 2.5 2 BL 5   frontalis 5 8 BL 40   Temporalis 10 4 BL 40   Occipitalis 10 2 BL 20   Cervical paraspinals 5 4 BL 20   Trapezius 10 4 BL 40         200 units Botox were reconstituted, 170 units injected as above and the remainder was unavoidably wasted. Patient tolerated procedure well. Return in 3 months for repeat injections.     _____________________________   Rahul Danielson, DO  [address], ABPN

## 2022-08-18 NOTE — PROGRESS NOTES
Chief Complaint   Patient presents with    Procedure     Botox     Visit Vitals  /72 (BP 1 Location: Left upper arm, BP Patient Position: Sitting)   Pulse 92   Ht 5' 8\" (1.727 m)   Wt 151 lb (68.5 kg)   SpO2 98%   BMI 22.96 kg/m²

## 2022-10-14 ENCOUNTER — OFFICE VISIT (OUTPATIENT)
Dept: NEUROLOGY | Age: 32
End: 2022-10-14
Payer: COMMERCIAL

## 2022-10-14 VITALS
HEART RATE: 114 BPM | RESPIRATION RATE: 18 BRPM | DIASTOLIC BLOOD PRESSURE: 74 MMHG | WEIGHT: 151 LBS | OXYGEN SATURATION: 97 % | BODY MASS INDEX: 22.96 KG/M2 | SYSTOLIC BLOOD PRESSURE: 122 MMHG

## 2022-10-14 DIAGNOSIS — F41.9 ANXIETY AND DEPRESSION: ICD-10-CM

## 2022-10-14 DIAGNOSIS — F32.A ANXIETY AND DEPRESSION: ICD-10-CM

## 2022-10-14 DIAGNOSIS — G43.719 INTRACTABLE CHRONIC MIGRAINE WITHOUT AURA AND WITHOUT STATUS MIGRAINOSUS: Primary | ICD-10-CM

## 2022-10-14 PROCEDURE — 99214 OFFICE O/P EST MOD 30 MIN: CPT | Performed by: PSYCHIATRY & NEUROLOGY

## 2022-10-14 RX ORDER — FREMANEZUMAB-VFRM 225 MG/1.5ML
INJECTION SUBCUTANEOUS
Qty: 4.5 ML | Refills: 3 | Status: SHIPPED | OUTPATIENT
Start: 2022-10-14

## 2022-10-14 NOTE — PROGRESS NOTES
Chief Complaint   Patient presents with    Follow-up     Reports migraines are doing well. Very infrequent episodes that are relieved by PRNs. JYOTI      Laverne Villanueva is a 55-year-old woman I have been seeing long-term from chronic migraine. She is on a regimen of Botox injections every 12 weeks combined with Ajovy monthly. She is on other oral medications as well. Since being on Botox and Ajovy she has done very well clinically. No emergency room or urgent care visits. Botox needed to be added because she was still having more than 15 days of migraine pain despite Ajovy. Now with Botox on board she has less than 7 severe breakthrough migraines while Botox is effective. Additionally since I saw her last she is having more psychiatric concerns with anxiety and depression. Having more panic attacks. She needs to reestablish with psychiatry. She is contemplating a job change. Medication migraine history: Gabapentin, valproic acid, topiramate, Aimovig, Emaglity, Botox, beta blockers, tricyclic antidepressants, Ajovy, lexapro            Review of Systems   Eyes:  Positive for photophobia. Negative for double vision. Gastrointestinal:  Positive for nausea. Musculoskeletal:  Positive for neck pain. Neurological:  Positive for headaches. All other systems reviewed and are negative.     Past Medical History:   Diagnosis Date    Anxiety and depression     Bartholin's gland abscess     x8    Endometriosis     Kidney stones     Migraine     Ovarian cyst     Panic attack     Pyelonephritis      Family History   Problem Relation Age of Onset    Diabetes Maternal Aunt     Heart Disease Maternal Aunt     Stroke Maternal Aunt     Heart Disease Maternal Grandmother     No Known Problems Mother      Social History     Socioeconomic History    Marital status: SINGLE     Spouse name: Not on file    Number of children: Not on file    Years of education: Not on file    Highest education level: Not on file Occupational History    Not on file   Tobacco Use    Smoking status: Former    Smokeless tobacco: Never   Substance and Sexual Activity    Alcohol use: Yes     Alcohol/week: 1.0 standard drink     Types: 1 Glasses of wine per week     Comment: social    Drug use: No    Sexual activity: Yes     Partners: Male     Birth control/protection: Pill, Condom   Other Topics Concern    Not on file   Social History Narrative    Not on file     Social Determinants of Health     Financial Resource Strain: Not on file   Food Insecurity: Not on file   Transportation Needs: Not on file   Physical Activity: Not on file   Stress: Not on file   Social Connections: Not on file   Intimate Partner Violence: Not on file   Housing Stability: Not on file     Allergies   Allergen Reactions    Metoclopramide Hcl Anaphylaxis    Benadryl [Diphenhydramine Hcl] Other (comments)     Makes me feel funny. Need ativan if I get benadryl    Compazine [Prochlorperazine Edisylate] Anxiety    Depacon [Valproate Sodium] Other (comments)     Pt reports having restlessness with this medication. Haldol [Haloperidol Lactate] Other (comments)    Other Medication Hives     Surgical glue    Promethazine Other (comments)     \"It makes me feel really funny, nausea and dizzy\"    Reglan [Metoclopramide] Other (comments)         Current Outpatient Medications   Medication Sig    fremanezumab-vfrm (Ajovy Autoinjector) 225 mg/1.5 mL auto-injector INJECT 1 SYRINGE BY SUBCUTANEOUS ROUTE EVERY THIRTY (30) DAYS. ARIPiprazole (ABILIFY) 2 mg tablet Take 2 mg by mouth daily. busPIRone (BUSPAR) 10 mg tablet TAKE 1/2 TABLET BY MOUTH TWICE DAILY FOR 7 DAYS, THEN TAKE 1 TABLET BY MOUTH TWICE DAILY    ondansetron (ZOFRAN ODT) 4 mg disintegrating tablet Take 1 Tablet by mouth every eight (8) hours as needed for Nausea or Vomiting.     onabotulinumtoxinA (Botox) 200 unit injection INJECT 155 UNITS INTO SELECTED MUSCLES OF THE HEAD AND NECK BILATERALLY EVERY 12 WEEKS FOR MIGRAINE PREVENTION. DISCARD UNUSED PORTION. estradioL (VIVELLE) 0.1 mg/24 hr APPLY 1 PATCH TWICE WEEKLY    B2-magnesium cit,oxid-feverfew (MigreLief) 200-180-50 mg tab MigreLief   BID    butalbital-acetaminophen-caffeine (FIORICET, ESGIC) -40 mg per tablet     OTHER,NON-FORMULARY, Elderberry with zinc one by mouth at bedtime    clonazePAM (KLONOPIN) 1 mg tablet Take 2 mg by mouth nightly. escitalopram oxalate (LEXAPRO) 20 mg tablet Take 20 mg by mouth nightly. Indications: Generalized Anxiety Disorder    OTHER,NON-FORMULARY, PT evaluate and treat (Patient not taking: Reported on 10/14/2022)     No current facility-administered medications for this visit. Neurologic Exam     Mental Status   WD/WN adult in NAD, normal grooming  VSS  A&O x 3    PERRL, nonicteric  Face is symmetric, tongue midline  Speech is fluent and clear  No limb ataxia. No abnl movements. Moving all extemities spontaneously and symmetric  Normal gait    CVS RRR  Lungs nonlabored  Skin is warm and dry       Visit Vitals  /74   Pulse (!) 114   Resp 18   Wt 151 lb (68.5 kg)   LMP 02/08/2021   SpO2 97%   BMI 22.96 kg/m²       Assessment and Plan   Diagnoses and all orders for this visit:    1. Intractable chronic migraine without aura and without status migrainosus  -     fremanezumab-vfrm (Ajovy Autoinjector) 225 mg/1.5 mL auto-injector; INJECT 1 SYRINGE BY SUBCUTANEOUS ROUTE EVERY THIRTY (30) DAYS. 2. Anxiety and depression    66-year-old woman who has a very long history of chronic refractory migraine having good control and stability utilizing Botox injections. I recommend we continue Botox out of medical necessity. I will see her next month for her injections. No change to her other medications. I would like her to follow-up with JUNIOR Watson in about 5 months. We talked about her anxiety and depression extensively. I have given her the names of medical groups in town she can try to establish with.   She understands. 30 minutes of time was taken in total today reviewing the medical record, face-to-face time discussing her medications and symptoms, and time completing documentation. I reviewed and decided to continue the current medications. This clinical note was dictated with an electronic dictation software that can make unintentional errors. If there are any questions, please contact me directly for clarification.       [address] [place] [address], [facility] [address]  Diplomate ABPN

## 2022-10-26 NOTE — TELEPHONE ENCOUNTER
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 (11/20/2019) - managed by Dr Doris Cooper  -PSA remains undetectable  -nominal stress urinary incontinence  Continue Kegel exercises  -follow-up 6 months PSA prior to visit for continued surveillance  2  Erectile dysfunction  -  Patient defers at this time  Formerly [facility], [facility]      Chief Complaint     Prostate cancer follow-up    History of Present Illness     Blanca Etienne is a 74 y o  patient of Dr Doris Cooper with a history of Lakeland 7 prostate cancer status post robotic prostatectomy (11/20/2019) presenting for follow-up      Patient was initially on active surveillance for George 6 prostate cancer  Patient had a rising PSA and underwent a multiparametric prostate MRI and ultimately a targeted biopsy revealing Lakeland 7 disease  Patient underwent robotic prostatectomy 76/69/7381 without complication      Patient had a recent PSA which is undetectable at <0 1 (10/17/22)      Patient is overall very happy with his urination at this time  Has very rare stress urinary incontinence  Stress urinary incontinence typically end of the day with some muscle fatigue  already completed pelvic floor physical therapy  Denies any difficulty with voiding  Denies any dysuria or gross hematuria      Patient denies much erectile capability at this time however still has been able to climax          Laboratory     Lab Results   Component Value Date    CREATININE 0 90 03/14/2022       Lab Results   Component Value Date    PSA <0 1 10/17/2022    PSA <0 1 04/18/2022    PSA <0 1 10/01/2021         Review of Systems     Review of Systems   Constitutional: Negative for activity change, appetite change, chills, diaphoresis, fatigue, fever and unexpected weight change  Respiratory: Negative for chest tightness and shortness of breath      Cardiovascular: Negative for chest pain, palpitations and leg Addressed. April 12, 2022  Wolfgangnoe Wilson    Your employee/student was seen in our clinic today.  A concern for COVID-19 has been identified and testing is in progress. We are asking you to excuse absences while following self-isolation protocol per Center for Disease Control (CDC) guidelines.  The patient will be able to access test results through our electronic delivery system called [employer].     If the results of testing are positive then the patient should follow the CDC guidelines.  These are isolation for minimum of 5 days and at least 24 hours have passed since the last fever without the use of fever reducing medications and all other symptoms have improved.    Negative result without close contact:  If your employee was tested due to their symptoms without close contact to someone with COVID-19 and who tested negative, your employee should not return to work for work activities until 24 hours after symptoms fully improve.    Negative result with close contact:  Patient was tested due to close contact with someone who tested positive for COVID-19, patient to self isolate for 5 days after their exposure.    Negative result with positive household member:  If the patient was exposed to a positive hospital member, they should still quarantine for a period of 5 days.  The 5-day period begins once a household member is isolated.  If unable to quarantine, the CDC advises an additional 5-day quarantine.  From the COVID-19 household member.    In general, repeat testing is not necessary and not offered through our [facility]. This is the only note that will be provided from [facility] for this visit.  Your employee/student will require an appointment with a primary care provider if FMLA or disability forms are required. If you have any questions please do not hesitate to call me at the phone number listed below.    Sincerely,    Diane Harrison, APRNABIL  [phone]  Electronically Signed       swelling  Gastrointestinal: Negative for abdominal distention, abdominal pain, constipation, diarrhea, nausea and vomiting  Genitourinary: Negative for decreased urine volume, difficulty urinating, dysuria, enuresis, flank pain, frequency, genital sores, hematuria and urgency  Musculoskeletal: Negative for back pain, gait problem and myalgias  Skin: Negative for color change, pallor, rash and wound  Psychiatric/Behavioral: Negative for behavioral problems  The patient is not nervous/anxious  Allergies     Allergies   Allergen Reactions   • Lisinopril Syncope   • Penicillins Other (See Comments)     Childhood allergy        Physical Exam     Physical Exam  Constitutional:       Appearance: Normal appearance  He is normal weight  HENT:      Head: Normocephalic and atraumatic  Nose: Nose normal    Eyes:      General:         Right eye: No discharge  Left eye: No discharge  Conjunctiva/sclera: Conjunctivae normal    Pulmonary:      Effort: Pulmonary effort is normal  No respiratory distress  Skin:     General: Skin is warm and dry  Coloration: Skin is not pale  Findings: No erythema or rash  Neurological:      General: No focal deficit present  Mental Status: He is alert and oriented to person, place, and time     Psychiatric:         Mood and Affect: Mood normal          Behavior: Behavior normal            Vital Signs     Vitals:    10/26/22 0754   BP: 148/86   Pulse: 77   SpO2: 97%   Weight: 67 6 kg (149 lb)   Height: 5' 8" (1 727 m)         Current Medications       Current Outpatient Medications:   •  ALPRAZolam (XANAX) 0 25 mg tablet, Take 1 tablet (0 25 mg total) by mouth 2 (two) times a day as needed for anxiety, Disp: 60 tablet, Rfl: 1  •  atorvastatin (LIPITOR) 10 mg tablet, TAKE 1 TABLET BY MOUTH DAILY, Disp: 90 tablet, Rfl: 3  •  Cholecalciferol (VITAMIN D3) 1000 units CAPS, Take 1 capsule by mouth daily  , Disp: , Rfl:   •  glucose blood (FREESTYLE LITE) test strip, Test 2X Daily E11 9, Disp: 100 each, Rfl: 5  •  metFORMIN (GLUCOPHAGE) 500 mg tablet, TAKE ONE TABLET BY MOUTH DAILY WITH BREAKFAST, Disp: 90 tablet, Rfl: 3  •  metoprolol succinate (TOPROL-XL) 50 mg 24 hr tablet, TAKE 1 TABLET BY MOUTH EVERY DAY, Disp: 90 tablet, Rfl: 0  •  sildenafil (VIAGRA) 25 MG tablet, Take 1 tablet (25 mg total) by mouth daily as needed for erectile dysfunction, Disp: 30 tablet, Rfl: 2      Active Problems     Patient Active Problem List   Diagnosis   • Prostate cancer ([facility] )   • Anxiety   • Hyperlipidemia   • Hypertension   • Microscopic hematuria   • Type 2 diabetes mellitus (HCC)   • Vitamin D deficiency         Past Medical History     Past Medical History:   Diagnosis Date   • Diabetes mellitus ([facility] )     Type II   • Prostate cancer ([place] [address] )    • White coat syndrome with diagnosis of hypertension          Surgical History     Past Surgical History:   Procedure Laterality Date   • COLONOSCOPY     • DE LAP,PROSTATECTOMY,RADICAL,W/NERVE SPARE,INCL ROBOTIC N/A 11/20/2019    Procedure: PROSTATECTOMY RADICAL W ROBOTICS, bilat PLND;  Surgeon: Maximilian Topete MD;  Location: [facility];  Service: Urology   • PROSTATE BIOPSY     • WISDOM TOOTH EXTRACTION           Family History     Family History   Problem Relation Age of Onset   • Cancer Mother         breast   • Cancer Father         pancreatic         Social History     Social History       Radiology

## 2022-11-02 ENCOUNTER — TELEPHONE (OUTPATIENT)
Dept: NEUROLOGY | Age: 32
End: 2022-11-02

## 2022-11-02 NOTE — TELEPHONE ENCOUNTER
Message from Linda 55,    \"Experiencing migraine and wants call back. \"    Call patient - [phone]

## 2022-11-02 NOTE — TELEPHONE ENCOUNTER
Called patient twice. No answer. Left a VM stating that I was calling regarding to her call for her migraines.

## 2022-11-04 DIAGNOSIS — G43.719 INTRACTABLE CHRONIC MIGRAINE WITHOUT AURA AND WITHOUT STATUS MIGRAINOSUS: Primary | ICD-10-CM

## 2022-11-04 RX ORDER — ONABOTULINUMTOXINA 200 [USP'U]/1
INJECTION, POWDER, LYOPHILIZED, FOR SOLUTION INTRADERMAL; INTRAMUSCULAR
Qty: 1 EACH | Refills: 3 | Status: SHIPPED | OUTPATIENT
Start: 2022-11-04

## 2022-11-09 ENCOUNTER — TELEPHONE (OUTPATIENT)
Dept: NEUROLOGY | Age: 32
End: 2022-11-09

## 2022-11-09 NOTE — TELEPHONE ENCOUNTER
Re: Botox    Unable to locate term date of PA. Rcvd PA request in [facility] key# [id number], awaiting update.     [facility] would be the [facility] still

## 2022-11-17 ENCOUNTER — OFFICE VISIT (OUTPATIENT)
Dept: NEUROLOGY | Age: 32
End: 2022-11-17
Payer: COMMERCIAL

## 2022-11-17 VITALS
HEART RATE: 102 BPM | BODY MASS INDEX: 21.98 KG/M2 | HEIGHT: 68 IN | OXYGEN SATURATION: 99 % | DIASTOLIC BLOOD PRESSURE: 78 MMHG | SYSTOLIC BLOOD PRESSURE: 122 MMHG | WEIGHT: 145 LBS

## 2022-11-17 DIAGNOSIS — G43.719 INTRACTABLE CHRONIC MIGRAINE WITHOUT AURA AND WITHOUT STATUS MIGRAINOSUS: Primary | ICD-10-CM

## 2022-11-17 PROCEDURE — 64615 CHEMODENERV MUSC MIGRAINE: CPT | Performed by: PSYCHIATRY & NEUROLOGY

## 2022-11-17 RX ORDER — KETOROLAC TROMETHAMINE 10 MG/1
TABLET, FILM COATED ORAL
COMMUNITY
Start: 2022-10-27

## 2022-11-17 RX ORDER — OXYCODONE AND ACETAMINOPHEN 5; 325 MG/1; MG/1
TABLET ORAL
COMMUNITY
Start: 2022-10-27

## 2022-11-17 RX ORDER — CYCLOBENZAPRINE HCL 10 MG
TABLET ORAL
COMMUNITY
Start: 2022-09-27

## 2022-11-17 NOTE — PROGRESS NOTES
Botox Injection Note     2022     Patient:  Olivia Jacobs   YOB: 1990  Date of Visit: 2022      Indication: patient has chronic migraine headaches greater than 15 days per month lasting more than 4 hours each. She has failed or not tolerated 2 or more medication preventatives. Written consent was signed and verified by me. Risks and benefits were discussed to include possible cosmetic asymmetry which is not permament or life threatening. Patient name and  was confirmed prior to procedure. Time out performed. Procedure:   Botox concentration: 200 units in 2 ml of preservative-free normal saline. 1:1 dilution  LOT#:  [id number]  EXP:   2025    Injections and distribution as follow :      Units/site  Sites Loc Subtotal    procerus 5 1 ML 5   corrugaters 2.5 2 BL 5   frontalis 5 8 BL 40   Temporalis 10 4 BL 40   Occipitalis 10 2 BL 20   Cervical paraspinals 5 4 BL 20   Trapezius 10 4 BL 40         200 units Botox were reconstituted, 170 units injected as above and the remainder was unavoidably wasted. Patient tolerated procedure well. Return in 3 months for repeat injections.     _____________________________   Verner Garb, DO  [address], ABPN

## 2022-11-17 NOTE — PROGRESS NOTES
Chief Complaint   Patient presents with    Procedure     Botox     Visit Vitals  /78 (BP 1 Location: Left upper arm, BP Patient Position: Sitting)   Pulse (!) 102   Ht 5' 8\" (1.727 m)   Wt 145 lb (65.8 kg)   SpO2 99%   BMI 22.05 kg/m²

## 2022-12-23 NOTE — PROGRESS NOTES
ALINA Call:      Patient on report as with discharge from  ED visit 10/13 with break through migraine - last ED 09/19 for same migraine. Attempt to contact patient for transitions of care. Left discreet message on voicemail with this CM contact information. Will attempt to contact again. Chart Review:    ED [facility] on 10/13 -    Noted call in for Dr. Vladislav Knapp office - requesting apt. Diagnosis   Clinical Impression:   1.  Migraine without aura and without status migrainosus, not intractable          Plan:  1:   Follow-up Information     Follow up With Details Comments Contact Tim Olivier MD   Please follow up with your neurologist as scheduled 1 [facility]  [phone]     [facility] EMERGENCY DEPT   As needed, If symptoms worsen [address] [facility] [address] [phone] 79 yo female w/ pmhx of depression, unsteady gait, hypothyroidism, diabetes type 2, osteoarthritis admitted to [facility] for mechanical fall, likely secondary to debility from covid 19. Patient did not hit her head. Initially imaging showed no acute fracture. Labs remained stable during admission. Her diet remained poor (she doesn't like to eat breakfast) which was supplemented with family bringing food and supplements with meals. At this time patient remained without symptoms related to covid in terms of cough, SOB, hypoxia, or CXR changes. At this time medically ready for discharge to [facility] 77 yo female w/ pmhx of depression, unsteady gait, hypothyroidism, diabetes type 2, osteoarthritis admitted to [facility] for mechanical fall, likely secondary to debility from covid 19. Patient did not hit her head. Initially imaging showed no acute fracture. Labs remained stable during admission. Her diet remained poor (she doesn't like to eat breakfast) which was supplemented with family bringing food and supplements with meals. At this time patient remained without symptoms related to covid in terms of cough, SOB, hypoxia, or CXR changes. At this time medically ready for discharge to [facility].    77 yo female w/ pmhx of depression, unsteady gait, hypothyroidism, diabetes type 2, osteoarthritis admitted to [facility] for mechanical fall, likely secondary to debility from covid 19      # recurrent mechanical falls: CT brain- no acute infarct, no fx, no tumor  - CT pelvis- no fx, PT evaluation- recommends [facility]    # COVID 19 without pneumonia  - CXR-  Small calcified granuloma or bone island projects over the left lower lung. The lungs are otherwise clear.  - no evidence of active covid 19 pna or cytokine storm syndrome.     Stop IVF.     #DM II on metformin at home  - AjH4X-2.6, Lantus 4 units /day.     # Hypothyroidism, synthroid 50 mcg po daily    # Depression/Anxiety, effexor, buspar     DISPO: Stable for discharge to [facility] on Tuesday.  Family at bedside, agree with rehab.

## 2023-01-10 ENCOUNTER — TELEPHONE (OUTPATIENT)
Dept: NEUROLOGY | Age: 33
End: 2023-01-10

## 2023-01-18 NOTE — TELEPHONE ENCOUNTER
Re: Botox    Created PA request in [facility] under NP Echo    Matt#  [id number], submitted and awaiting update.

## 2023-01-26 NOTE — TELEPHONE ENCOUNTER
Re: Botox    Rcvd PA approval for  effective 01/18/23-01/18/24 auth# [id number]    Scanned to chart. Tried to submit PA request for cpt 50363 and kept getting error that pt is not active on dates selected. Reviewed availity and see plan terms 02/01/2023. Called pt and she said she goes tomorrow to sign up for benefits. She said once she get new info she will call the office to update. Sent update to nurse.

## 2023-02-08 ENCOUNTER — TELEPHONE (OUTPATIENT)
Dept: NEUROLOGY | Age: 33
End: 2023-02-08

## 2023-02-08 NOTE — TELEPHONE ENCOUNTER
Re: Botox    Rcvd call from pt with updated insurance info. Loaded into chart. She said she wants to continue botox at [facility] Sent message to nurse for script.

## 2023-02-23 ENCOUNTER — TELEPHONE (OUTPATIENT)
Dept: NEUROLOGY | Age: 33
End: 2023-02-23

## 2023-02-23 NOTE — TELEPHONE ENCOUNTER
Message from Deandra Atwood, please call me regarding scheduling Botox appointment with Dr. Eric Webb. \"    Previous [place] patient

## 2023-03-01 NOTE — TELEPHONE ENCOUNTER
Patient called back, verified, advised that her planned termed, and she stated she had new insurance. I advised her to send her new insurance cards, and then allow up to 6 weeks for a PA. Patient also given the phone number to call and schedule with Chen Negro to schedule.

## 2023-03-03 ENCOUNTER — TELEPHONE (OUTPATIENT)
Dept: NEUROLOGY | Age: 33
End: 2023-03-03

## 2023-03-08 ENCOUNTER — TELEPHONE (OUTPATIENT)
Dept: NEUROLOGY | Age: 33
End: 2023-03-08

## 2023-03-09 ENCOUNTER — HOSPITAL ENCOUNTER (EMERGENCY)
Age: 33
Discharge: HOME | End: 2023-03-09
Attending: EMERGENCY MEDICINE
Payer: COMMERCIAL

## 2023-03-09 VITALS
SYSTOLIC BLOOD PRESSURE: 128 MMHG | TEMPERATURE: 98.7 F | OXYGEN SATURATION: 99 % | HEART RATE: 74 BPM | DIASTOLIC BLOOD PRESSURE: 83 MMHG | RESPIRATION RATE: 18 BRPM

## 2023-03-09 DIAGNOSIS — G43.709 CHRONIC MIGRAINE WITHOUT AURA WITHOUT STATUS MIGRAINOSUS, NOT INTRACTABLE: Primary | ICD-10-CM

## 2023-03-09 LAB
ALBUMIN SERPL-MCNC: 4 G/DL (ref 3.5–5)
ALBUMIN/GLOB SERPL: 1.2 (ref 1.1–2.2)
ALP SERPL-CCNC: 48 U/L (ref 45–117)
ALT SERPL-CCNC: 16 U/L (ref 12–78)
ANION GAP SERPL CALC-SCNC: 5 MMOL/L (ref 5–15)
AST SERPL-CCNC: 10 U/L (ref 15–37)
BASOPHILS # BLD: 0 K/UL (ref 0–0.1)
BASOPHILS NFR BLD: 0 % (ref 0–1)
BILIRUB SERPL-MCNC: 0.6 MG/DL (ref 0.2–1)
BUN SERPL-MCNC: 8 MG/DL (ref 6–20)
BUN/CREAT SERPL: 8 (ref 12–20)
CALCIUM SERPL-MCNC: 9.1 MG/DL (ref 8.5–10.1)
CHLORIDE SERPL-SCNC: 110 MMOL/L (ref 97–108)
CO2 SERPL-SCNC: 25 MMOL/L (ref 21–32)
COMMENT, HOLDF: NORMAL
CREAT SERPL-MCNC: 0.96 MG/DL (ref 0.55–1.02)
DIFFERENTIAL METHOD BLD: ABNORMAL
EOSINOPHIL # BLD: 0 K/UL (ref 0–0.4)
EOSINOPHIL NFR BLD: 0 % (ref 0–7)
ERYTHROCYTE [DISTWIDTH] IN BLOOD BY AUTOMATED COUNT: 12.6 % (ref 11.5–14.5)
GLOBULIN SER CALC-MCNC: 3.4 G/DL (ref 2–4)
GLUCOSE SERPL-MCNC: 123 MG/DL (ref 65–100)
HCT VFR BLD AUTO: 39.6 % (ref 35–47)
HGB BLD-MCNC: 13 G/DL (ref 11.5–16)
IMM GRANULOCYTES # BLD AUTO: 0 K/UL (ref 0–0.04)
IMM GRANULOCYTES NFR BLD AUTO: 0 % (ref 0–0.5)
LYMPHOCYTES # BLD: 1.1 K/UL (ref 0.8–3.5)
LYMPHOCYTES NFR BLD: 12 % (ref 12–49)
MCH RBC QN AUTO: 28.4 PG (ref 26–34)
MCHC RBC AUTO-ENTMCNC: 32.8 G/DL (ref 30–36.5)
MCV RBC AUTO: 86.7 FL (ref 80–99)
MONOCYTES # BLD: 0.1 K/UL (ref 0–1)
MONOCYTES NFR BLD: 1 % (ref 5–13)
NEUTS SEG # BLD: 7.6 K/UL (ref 1.8–8)
NEUTS SEG NFR BLD: 87 % (ref 32–75)
NRBC # BLD: 0 K/UL (ref 0–0.01)
NRBC BLD-RTO: 0 PER 100 WBC
PLATELET # BLD AUTO: 194 K/UL (ref 150–400)
PMV BLD AUTO: 10.3 FL (ref 8.9–12.9)
POTASSIUM SERPL-SCNC: 4.5 MMOL/L (ref 3.5–5.1)
PROT SERPL-MCNC: 7.4 G/DL (ref 6.4–8.2)
RBC # BLD AUTO: 4.57 M/UL (ref 3.8–5.2)
SAMPLES BEING HELD,HOLD: NORMAL
SODIUM SERPL-SCNC: 140 MMOL/L (ref 136–145)
WBC # BLD AUTO: 8.9 K/UL (ref 3.6–11)

## 2023-03-09 PROCEDURE — 85025 COMPLETE CBC W/AUTO DIFF WBC: CPT

## 2023-03-09 PROCEDURE — 96365 THER/PROPH/DIAG IV INF INIT: CPT

## 2023-03-09 PROCEDURE — 36415 COLL VENOUS BLD VENIPUNCTURE: CPT

## 2023-03-09 PROCEDURE — 80053 COMPREHEN METABOLIC PANEL: CPT

## 2023-03-09 PROCEDURE — 74011250636 HC RX REV CODE- 250/636: Performed by: NURSE PRACTITIONER

## 2023-03-09 PROCEDURE — 96375 TX/PRO/DX INJ NEW DRUG ADDON: CPT

## 2023-03-09 PROCEDURE — 99284 EMERGENCY DEPT VISIT MOD MDM: CPT

## 2023-03-09 RX ORDER — ONDANSETRON 2 MG/ML
4 INJECTION INTRAMUSCULAR; INTRAVENOUS
Status: COMPLETED | OUTPATIENT
Start: 2023-03-09 | End: 2023-03-09

## 2023-03-09 RX ORDER — MAGNESIUM SULFATE 1 G/100ML
1 INJECTION INTRAVENOUS ONCE
Status: COMPLETED | OUTPATIENT
Start: 2023-03-09 | End: 2023-03-09

## 2023-03-09 RX ORDER — KETOROLAC TROMETHAMINE 30 MG/ML
30 INJECTION, SOLUTION INTRAMUSCULAR; INTRAVENOUS
Status: COMPLETED | OUTPATIENT
Start: 2023-03-09 | End: 2023-03-09

## 2023-03-09 RX ORDER — HYDROMORPHONE HYDROCHLORIDE 1 MG/ML
1 INJECTION, SOLUTION INTRAMUSCULAR; INTRAVENOUS; SUBCUTANEOUS
Status: COMPLETED | OUTPATIENT
Start: 2023-03-09 | End: 2023-03-09

## 2023-03-09 RX ADMIN — ONDANSETRON 4 MG: 2 INJECTION INTRAMUSCULAR; INTRAVENOUS at 22:01

## 2023-03-09 RX ADMIN — KETOROLAC TROMETHAMINE 30 MG: 30 INJECTION, SOLUTION INTRAMUSCULAR at 22:01

## 2023-03-09 RX ADMIN — MAGNESIUM SULFATE HEPTAHYDRATE 1 G: 1 INJECTION, SOLUTION INTRAVENOUS at 22:02

## 2023-03-09 RX ADMIN — HYDROMORPHONE HYDROCHLORIDE 1 MG: 1 INJECTION, SOLUTION INTRAMUSCULAR; INTRAVENOUS; SUBCUTANEOUS at 22:01

## 2023-03-09 RX ADMIN — SODIUM CHLORIDE 1000 ML: 9 INJECTION, SOLUTION INTRAVENOUS at 22:09

## 2023-03-09 RX ADMIN — METHYLPREDNISOLONE SODIUM SUCCINATE 40 MG: 40 INJECTION, POWDER, FOR SOLUTION INTRAMUSCULAR; INTRAVENOUS at 22:01

## 2023-03-09 NOTE — TELEPHONE ENCOUNTER
Message from Linda 55,  \"Please call to advise the status of my Botox prior authorization , I have been to the ER twice. \"

## 2023-03-09 NOTE — Clinical Note
[address]  [address] [phone]  [phone]    Work/School Note    Date: 3/9/2023    To Whom It May concern:    Arthur Wilson was seen and treated today in the emergency room by the following provider(s):  Attending Provider: Yudy Singh MD  Nurse Practitioner: Corby Toure NP. Arthur Wilson is excused from work/school on 03/09/23 and 03/10/23. She is medically clear to return to work/school on 3/11/2023.        Sincerely,          Karen Mallory NP

## 2023-03-10 NOTE — ED PROVIDER NOTES
JYOTI Dickinson is a 27 y.o. female with Hx of Richie, depression, Bartholin cyst, endometriosis, kidney stones, migraines, panic attack, ovarian cyst who presents ambulatory by herself to [facility] ED with cc of HA. Patient reports that she has had a typical migraine over the last 2 days. She states that she has been seen at both [facility] and [facility] emergency departments for this concern. She has been given prescriptions for Fioricet, magnesium, Medrol and hydroxyzine with no relief of her pain. Per chart review, patient normally has improvement of symptoms with Toradol, steroids, Mg, Dilaudid administration. Patient does report associated symptoms of nausea and photophobia with her headaches. Normally receives Botox injections for her migraines, but has not been able to obtain prior authorization from her insurance company. This is well-documented in the medical record. PCP: Gee Raymundo MD    There are no other complaints, changes or physical findings at this time.       Past Medical History:   Diagnosis Date    Anxiety and depression     Bartholin's gland abscess     x8    Endometriosis     Kidney stones     Migraine     Ovarian cyst     Panic attack     Pyelonephritis        Past Surgical History:   Procedure Laterality Date    HX APPENDECTOMY  04/26/2017    HX BARTHOLIN CYST MARSUPIALIZATION      2011, 2018    HX BREAST LUMPECTOMY Right     HX BUNIONECTOMY      HX CYST REMOVAL      HX GYN Left     bartholin cyst drainage X 6    HX GYN Left     bartholin cyst marsupilization    HX HEENT      HX OTHER SURGICAL      laparascopy    HX PELVIC LAPAROSCOPY      HX WISDOM TEETH EXTRACTION           Family History:   Problem Relation Age of Onset    Diabetes Maternal Aunt     Heart Disease Maternal Aunt     Stroke Maternal Aunt     Heart Disease Maternal Grandmother     No Known Problems Mother        Social History     Socioeconomic History    Marital status: SINGLE     Spouse name: Not on file    Number of children: Not on file    Years of education: Not on file    Highest education level: Not on file   Occupational History    Not on file   Tobacco Use    Smoking status: Former    Smokeless tobacco: Never   Vaping Use    Vaping Use: Never used   Substance and Sexual Activity    Alcohol use: Yes     Alcohol/week: 1.0 standard drink     Types: 1 Glasses of wine per week     Comment: social    Drug use: No    Sexual activity: Yes     Partners: Male     Birth control/protection: Pill, Condom   Other Topics Concern    Not on file   Social History Narrative    Not on file     Social Determinants of Health     Financial Resource Strain: Not on file   Food Insecurity: Not on file   Transportation Needs: Not on file   Physical Activity: Not on file   Stress: Not on file   Social Connections: Not on file   Intimate Partner Violence: Not on file   Housing Stability: Not on file         ALLERGIES: Metoclopramide hcl, Benadryl [diphenhydramine hcl], Compazine [prochlorperazine edisylate], Depacon [valproate sodium], Haldol [haloperidol lactate], Other medication, Promethazine, Reglan [metoclopramide], and Zoloft [sertraline]    Review of Systems   Constitutional:  Negative for activity change, appetite change, chills and fever. HENT:  Negative for congestion, rhinorrhea and sore throat. Eyes:  Positive for photophobia and visual disturbance. Respiratory:  Negative for cough and shortness of breath. Cardiovascular:  Negative for chest pain. Gastrointestinal:  Positive for nausea. Negative for abdominal pain, diarrhea and vomiting. Genitourinary:  Negative for dysuria, flank pain, frequency and urgency. Musculoskeletal:  Negative for arthralgias and neck pain. Skin:  Negative for color change and rash. Neurological:  Positive for headaches. Negative for dizziness, speech difficulty, weakness, light-headedness and numbness.    Psychiatric/Behavioral:  Negative for agitation, behavioral problems and confusion. All other systems reviewed and are negative. Vitals:    03/09/23 1925   BP: 122/82   Pulse: 100   Resp: 18   Temp: 97.6 °F (36.4 °C)   SpO2: 100%            Physical Exam  Vitals and nursing note reviewed. Constitutional:       General: She is not in acute distress. Appearance: She is well-developed. HENT:      Head: Normocephalic and atraumatic. Right Ear: External ear normal.      Left Ear: External ear normal.   Eyes:      Conjunctiva/sclera: Conjunctivae normal.      Pupils: Pupils are equal, round, and reactive to light. Cardiovascular:      Rate and Rhythm: Normal rate and regular rhythm. Heart sounds: Normal heart sounds. Pulmonary:      Effort: Pulmonary effort is normal.      Breath sounds: Normal breath sounds. Abdominal:      Palpations: Abdomen is soft. Tenderness: There is no abdominal tenderness. There is no guarding or rebound. Musculoskeletal:         General: Normal range of motion. Cervical back: Normal range of motion and neck supple. Skin:     General: Skin is warm and dry. Neurological:      Mental Status: She is alert and oriented to person, place, and time. Psychiatric:         Behavior: Behavior normal.         Thought Content: Thought content normal.         Judgment: Judgment normal.        Medical Decision Making  Differential diagnoses include cluster headache, migraine, dehydration, hemiplegic migraine, tension headache    Patient presents to the emergency department with concern for chronic migraine over the last 2 days. Denies any history of trauma or falls to suspect acute intracranial process, therefore CT deferred. Patient states the pain is her typical chronic migraine pain. Additionally, labs were obtained before I evaluated the patient, all of which were mostly within normal limits.   I provided the patient with medication to help manage her migraine in the emergency department and she reported improvement of symptoms. Incidentally, because the patient has had so many ER visits with [facility] in the last 2 weeks, I did discuss with her that providing patients with Dilaudid without the documentation from the neurologist, can become very challenging as Dilaudid is an addictive medication. She stated understanding of my concerns and will arrange for outpatient follow-up. Amount and/or Complexity of Data Reviewed  Labs: ordered. Decision-making details documented in ED Course. Risk  Prescription drug management. Procedures      LABORATORY TESTS:  Recent Results (from the past 12 hour(s))   CBC WITH AUTOMATED DIFF    Collection Time: 03/09/23  7:43 PM   Result Value Ref Range    WBC 8.9 3.6 - 11.0 K/uL    RBC 4.57 3.80 - 5.20 M/uL    HGB 13.0 11.5 - 16.0 g/dL    HCT 39.6 35.0 - 47.0 %    MCV 86.7 80.0 - 99.0 FL    MCH 28.4 26.0 - 34.0 PG    MCHC 32.8 30.0 - 36.5 g/dL    RDW 12.6 11.5 - 14.5 %    PLATELET 970 828 - 338 K/uL    MPV 10.3 8.9 - 12.9 FL    NRBC 0.0 0  WBC    ABSOLUTE NRBC 0.00 0.00 - 0.01 K/uL    NEUTROPHILS 87 (H) 32 - 75 %    LYMPHOCYTES 12 12 - 49 %    MONOCYTES 1 (L) 5 - 13 %    EOSINOPHILS 0 0 - 7 %    BASOPHILS 0 0 - 1 %    IMMATURE GRANULOCYTES 0 0.0 - 0.5 %    ABS. NEUTROPHILS 7.6 1.8 - 8.0 K/UL    ABS. LYMPHOCYTES 1.1 0.8 - 3.5 K/UL    ABS. MONOCYTES 0.1 0.0 - 1.0 K/UL    ABS. EOSINOPHILS 0.0 0.0 - 0.4 K/UL    ABS. BASOPHILS 0.0 0.0 - 0.1 K/UL    ABS. IMM.  GRANS. 0.0 0.00 - 0.04 K/UL    DF AUTOMATED     METABOLIC PANEL, COMPREHENSIVE    Collection Time: 03/09/23  7:43 PM   Result Value Ref Range    Sodium 140 136 - 145 mmol/L    Potassium 4.5 3.5 - 5.1 mmol/L    Chloride 110 (H) 97 - 108 mmol/L    CO2 25 21 - 32 mmol/L    Anion gap 5 5 - 15 mmol/L    Glucose 123 (H) 65 - 100 mg/dL    BUN 8 6 - 20 MG/DL    Creatinine 0.96 0.55 - 1.02 MG/DL    BUN/Creatinine ratio 8 (L) 12 - 20      eGFR >60 >60 ml/min/1.73m2    Calcium 9.1 8.5 - 10.1 MG/DL    Bilirubin, total 0.6 0.2 - 1.0 MG/DL    ALT (SGPT) 16 12 - 78 U/L    AST (SGOT) 10 (L) 15 - 37 U/L    Alk. phosphatase 48 45 - 117 U/L    Protein, total 7.4 6.4 - 8.2 g/dL    Albumin 4.0 3.5 - 5.0 g/dL    Globulin 3.4 2.0 - 4.0 g/dL    A-G Ratio 1.2 1.1 - 2.2     SAMPLES BEING HELD    Collection Time: 03/09/23  7:43 PM   Result Value Ref Range    SAMPLES BEING HELD  1 red 1 blue     COMMENT        Add-on orders for these samples will be processed based on acceptable specimen integrity and analyte stability, which may vary by analyte. IMAGING RESULTS:  No orders to display       MEDICATIONS GIVEN:  Medications   HYDROmorphone (DILAUDID) injection 1 mg (1 mg IntraVENous Given 3/9/23 2201)   magnesium sulfate 1 g/100 ml IVPB (premix or compounded) (0 g IntraVENous IV Completed 3/9/23 2302)   ketorolac (TORADOL) injection 30 mg (30 mg IntraVENous Given 3/9/23 2201)   sodium chloride 0.9 % bolus infusion 1,000 mL (0 mL IntraVENous IV Completed 3/9/23 2309)   methylPREDNISolone (PF) (SOLU-MEDROL) injection 40 mg (40 mg IntraVENous Given 3/9/23 2201)   ondansetron (ZOFRAN) injection 4 mg (4 mg IntraVENous Given 3/9/23 2201)       IMPRESSION:  1. Chronic migraine without aura without status migrainosus, not intractable        PLAN:  1. Discharge Medication List as of 3/9/2023 10:55 PM        2. Follow-up Information       Follow up With Specialties Details Why Contact Info    Loraine Norton MD Neurology Schedule an appointment as soon as possible for a visit   [facility] [address] [address]  [phone]      [address] [facility] Emergency Medicine Go to  As needed, If symptoms worsen [address] [facility]  [phone]          3. Return to ED if worse   Please note that this dictation was completed with [employer], the [employer] voice recognition software. Quite often unanticipated grammatical, syntax, homophones, and other interpretive errors are inadvertently transcribed by the computer software.   Please disregard these errors. Please excuse any errors that have escaped final proofreading.

## 2023-03-10 NOTE — ED TRIAGE NOTES
TRIAGE NOTE:  Patient arrives with c/o migraine since yesterday. Patient reports went to [facility] and [facility] last night. Patient reports she just left [facility] cause patient states \"They told me it would be best to go were my neurologist works\". Patient reports nausea and vomiting and sensitivity to light.

## 2023-03-10 NOTE — DISCHARGE INSTRUCTIONS
We have discussed that your lab work is reassuring, however, given the more recurrent nature of your ER visits for migraine management, you need to talk with your neurologist office tomorrow to gain better insight into what to do regarding ongoing management. If they feel that is acceptable for you to have Dilaudid for breakthrough migraine pain, it would be helpful if this was documented. Please continue to take all of your medications as directed, return to the ER for any worsening or worrisome symptoms.

## 2023-03-11 ENCOUNTER — HOSPITAL ENCOUNTER (EMERGENCY)
Age: 33
Discharge: HOME | End: 2023-03-11
Attending: STUDENT IN AN ORGANIZED HEALTH CARE EDUCATION/TRAINING PROGRAM
Payer: COMMERCIAL

## 2023-03-11 VITALS
SYSTOLIC BLOOD PRESSURE: 128 MMHG | RESPIRATION RATE: 16 BRPM | HEART RATE: 90 BPM | DIASTOLIC BLOOD PRESSURE: 71 MMHG | TEMPERATURE: 97.3 F | OXYGEN SATURATION: 98 %

## 2023-03-11 DIAGNOSIS — G43.709 CHRONIC MIGRAINE WITHOUT AURA WITHOUT STATUS MIGRAINOSUS, NOT INTRACTABLE: Primary | ICD-10-CM

## 2023-03-11 LAB
COMMENT, HOLDF: NORMAL
SAMPLES BEING HELD,HOLD: NORMAL

## 2023-03-11 PROCEDURE — 96366 THER/PROPH/DIAG IV INF ADDON: CPT

## 2023-03-11 PROCEDURE — 99284 EMERGENCY DEPT VISIT MOD MDM: CPT

## 2023-03-11 PROCEDURE — 96372 THER/PROPH/DIAG INJ SC/IM: CPT

## 2023-03-11 PROCEDURE — 74011636637 HC RX REV CODE- 636/637: Performed by: NURSE PRACTITIONER

## 2023-03-11 PROCEDURE — 96365 THER/PROPH/DIAG IV INF INIT: CPT

## 2023-03-11 PROCEDURE — 96375 TX/PRO/DX INJ NEW DRUG ADDON: CPT

## 2023-03-11 PROCEDURE — 74011250636 HC RX REV CODE- 250/636: Performed by: NURSE PRACTITIONER

## 2023-03-11 RX ORDER — SUMATRIPTAN 6 MG/.5ML
6 INJECTION, SOLUTION SUBCUTANEOUS
Status: COMPLETED | OUTPATIENT
Start: 2023-03-11 | End: 2023-03-11

## 2023-03-11 RX ORDER — KETOROLAC TROMETHAMINE 30 MG/ML
30 INJECTION, SOLUTION INTRAMUSCULAR; INTRAVENOUS
Status: COMPLETED | OUTPATIENT
Start: 2023-03-11 | End: 2023-03-11

## 2023-03-11 RX ORDER — DROPERIDOL 2.5 MG/ML
1.25 INJECTION, SOLUTION INTRAMUSCULAR; INTRAVENOUS ONCE
Status: COMPLETED | OUTPATIENT
Start: 2023-03-11 | End: 2023-03-11

## 2023-03-11 RX ORDER — LORAZEPAM 2 MG/ML
0.5 INJECTION, SOLUTION INTRAMUSCULAR; INTRAVENOUS
Status: COMPLETED | OUTPATIENT
Start: 2023-03-11 | End: 2023-03-11

## 2023-03-11 RX ORDER — DIPHENHYDRAMINE HYDROCHLORIDE 50 MG/ML
25 INJECTION, SOLUTION INTRAMUSCULAR; INTRAVENOUS
Status: COMPLETED | OUTPATIENT
Start: 2023-03-11 | End: 2023-03-11

## 2023-03-11 RX ORDER — MAGNESIUM SULFATE HEPTAHYDRATE 40 MG/ML
2 INJECTION, SOLUTION INTRAVENOUS ONCE
Status: COMPLETED | OUTPATIENT
Start: 2023-03-11 | End: 2023-03-11

## 2023-03-11 RX ORDER — ONDANSETRON 2 MG/ML
4 INJECTION INTRAMUSCULAR; INTRAVENOUS
Status: COMPLETED | OUTPATIENT
Start: 2023-03-11 | End: 2023-03-11

## 2023-03-11 RX ADMIN — MAGNESIUM SULFATE HEPTAHYDRATE 2 G: 40 INJECTION, SOLUTION INTRAVENOUS at 18:33

## 2023-03-11 RX ADMIN — DROPERIDOL 1.25 MG: 2.5 INJECTION, SOLUTION INTRAMUSCULAR; INTRAVENOUS at 20:40

## 2023-03-11 RX ADMIN — SODIUM CHLORIDE 1000 ML: 9 INJECTION, SOLUTION INTRAVENOUS at 18:28

## 2023-03-11 RX ADMIN — METHYLPREDNISOLONE SODIUM SUCCINATE 125 MG: 125 INJECTION, POWDER, FOR SOLUTION INTRAMUSCULAR; INTRAVENOUS at 18:33

## 2023-03-11 RX ADMIN — ONDANSETRON 4 MG: 2 INJECTION INTRAMUSCULAR; INTRAVENOUS at 18:28

## 2023-03-11 RX ADMIN — LORAZEPAM 0.5 MG: 2 INJECTION, SOLUTION INTRAMUSCULAR; INTRAVENOUS at 18:30

## 2023-03-11 RX ADMIN — SUMATRIPTAN 6 MG: 6 INJECTION SUBCUTANEOUS at 18:33

## 2023-03-11 RX ADMIN — KETOROLAC TROMETHAMINE 30 MG: 30 INJECTION, SOLUTION INTRAMUSCULAR at 18:32

## 2023-03-11 RX ADMIN — DIPHENHYDRAMINE HYDROCHLORIDE 25 MG: 50 INJECTION, SOLUTION INTRAMUSCULAR; INTRAVENOUS at 18:29

## 2023-03-11 NOTE — ED TRIAGE NOTES
Pt ambulatory to ED with c/o migraine HA with N/V. Pt reports this being her 5th visit this week for same.

## 2023-03-11 NOTE — ED PROVIDER NOTES
JYOTI Duffy is a 27 y.o. female with Hx of anxiety, depression, Bartholin's gland abscess, endometriosis, kidney stones, migraines, ovarian cyst, panic attacks who presents ambulatory to [facility] ED with cc of migraine. Patient reports that she has had a typical migraine over the last 2 days. She states that she has been seen at both [facility] and [facility] emergency departments for this concern. She was seen in our ER 2 days ago with the same presentation, reported improvement of symptoms up until today. The patient has had 5 emergency room visits this week for her migraines. She has been given prescriptions for Fioricet, magnesium, Medrol and hydroxyzine with no relief of her pain. Reports taking Fioricet and Toradol prior to arrival with no relief. Per chart review, patient states that she has improvement of symptoms with Toradol, steroids, Mg, Dilaudid administration. Patient does report associated symptoms of nausea and photophobia with her headaches. Reports that she has had episodes of vomiting today. States that she tried to get in touch with neurology at both [facility] and [place]. [facility] today\" no one was on-call. \"     Normally receives Botox injections for her migraines, but has not been able to obtain prior authorization from her insurance company. She denies any falls or head injuries from her last evaluation. She denies any unilateral weakness, numbness, tingling, speech, gait or visual changes. She denies chance of pregnancy. Denies tobacco, alcohol, substance abuse. PCP: Matthieu Squires MD    There are no other complaints, changes or physical findings at this time.       Past Medical History:   Diagnosis Date    Anxiety and depression     Bartholin's gland abscess     x8    Endometriosis     Kidney stones     Migraine     Ovarian cyst     Panic attack     Pyelonephritis        Past Surgical History:   Procedure Laterality Date    HX APPENDECTOMY  04/26/2017    HX BARTHOLIN CYST MARSUPIALIZATION      2011, 2018    HX BREAST LUMPECTOMY Right     HX BUNIONECTOMY      HX CYST REMOVAL      HX GYN Left     bartholin cyst drainage X 6    HX GYN Left     bartholin cyst marsupilization    HX HEENT      HX OTHER SURGICAL      laparascopy    HX PELVIC LAPAROSCOPY      HX WISDOM TEETH EXTRACTION           Family History:   Problem Relation Age of Onset    Diabetes Maternal Aunt     Heart Disease Maternal Aunt     Stroke Maternal Aunt     Heart Disease Maternal Grandmother     No Known Problems Mother        Social History     Socioeconomic History    Marital status:      Spouse name: Not on file    Number of children: Not on file    Years of education: Not on file    Highest education level: Not on file   Occupational History    Not on file   Tobacco Use    Smoking status: Former    Smokeless tobacco: Never   Vaping Use    Vaping Use: Never used   Substance and Sexual Activity    Alcohol use: Yes     Alcohol/week: 1.0 standard drink     Types: 1 Glasses of wine per week     Comment: social    Drug use: No    Sexual activity: Yes     Partners: Male     Birth control/protection: Pill, Condom   Other Topics Concern    Not on file   Social History Narrative    Not on file     Social Determinants of Health     Financial Resource Strain: Not on file   Food Insecurity: Not on file   Transportation Needs: Not on file   Physical Activity: Not on file   Stress: Not on file   Social Connections: Not on file   Intimate Partner Violence: Not on file   Housing Stability: Not on file         ALLERGIES: Metoclopramide hcl, Benadryl [diphenhydramine hcl], Compazine [prochlorperazine edisylate], Depacon [valproate sodium], Haldol [haloperidol lactate], Other medication, Promethazine, Reglan [metoclopramide], and Zoloft [sertraline]    Review of Systems   Constitutional:  Positive for appetite change. Negative for activity change, chills and fever.    HENT:  Negative for congestion, rhinorrhea and sore throat. Eyes:  Positive for photophobia and visual disturbance. Respiratory:  Negative for cough and shortness of breath. Cardiovascular:  Negative for chest pain. Gastrointestinal:  Positive for nausea. Negative for abdominal pain, diarrhea and vomiting. Genitourinary:  Negative for dysuria, flank pain, frequency and urgency. Musculoskeletal:  Negative for arthralgias, back pain, gait problem, joint swelling, myalgias and neck pain. Skin:  Negative for color change and rash. Neurological:  Positive for headaches. Negative for dizziness, weakness, light-headedness and numbness. Psychiatric/Behavioral:  Negative for agitation, behavioral problems and confusion. All other systems reviewed and are negative. Vitals:    03/11/23 1737 03/11/23 2059   BP: 136/89 128/71   Pulse: 95 90   Resp: 14 16   Temp: 97.3 °F (36.3 °C)    SpO2: 98% 98%            Physical Exam  Vitals and nursing note reviewed. Constitutional:       General: She is not in acute distress. Appearance: She is well-developed. HENT:      Head: Normocephalic and atraumatic. Right Ear: External ear normal.      Left Ear: External ear normal.   Eyes:      Conjunctiva/sclera: Conjunctivae normal.      Pupils: Pupils are equal, round, and reactive to light. Cardiovascular:      Rate and Rhythm: Normal rate and regular rhythm. Heart sounds: Normal heart sounds. Pulmonary:      Effort: Pulmonary effort is normal.      Breath sounds: Normal breath sounds. Abdominal:      Palpations: Abdomen is soft. Musculoskeletal:         General: Normal range of motion. Cervical back: Normal range of motion and neck supple. Skin:     General: Skin is warm and dry. Neurological:      Mental Status: She is alert and oriented to person, place, and time. Psychiatric:         Mood and Affect: Affect is blunt and flat. Speech: Speech is delayed. Behavior: Behavior is withdrawn.          Thought Content: Thought content normal.         Judgment: Judgment normal.        Medical Decision Making  Differential diagnoses include cluster headache, migraine, dehydration, hemiplegic migraine, tension headache     Patient presents to the emergency department with concern for chronic migraine over the last 2 days. Denies any history of trauma or falls to suspect acute intracranial process, therefore CT deferred. Patient states the pain is her typical chronic migraine pain. Had labs done this week, and from what I can tell all of which were mostly within normal limits. I provided the patient with medication to help manage her migraine in the emergency department. I spoke with Neurologist on call, who states that Dilaudid is not a recommendation for breakthrough migraines. Recommends other treatments, patient is allergic to most of them, and/or requires Ativan to be administered with them. Incidentally, because the patient has had so many ER visits , I did discuss with her that providing patients with Dilaudid without the documentation from the neurologist, can become very challenging as Dilaudid is an addictive medication. Pt with multiple ER visits requesting or needing Dilaudid/ Ativan with medications. She stated understanding of my concerns and will arrange for outpatient follow-up. Risk  Prescription drug management. Procedures    5:46 PM  Spoke with on call neurologist, who recommends other therapies than Dilaudid for breakthrough migraines. Presentation, management, and disposition were discussed with the attending physician, Dr. Damian Tran, who is in agreement with plan of care. Patient is an unexpected return visit within 48 hours , and the attending will see the patient.        LABORATORY TESTS:  Recent Results (from the past 12 hour(s))   SAMPLES BEING HELD    Collection Time: 03/11/23  5:48 PM   Result Value Ref Range    SAMPLES BEING HELD 1red,1blu,1lav,1pst     COMMENT        Add-on orders for these samples will be processed based on acceptable specimen integrity and analyte stability, which may vary by analyte. IMAGING RESULTS:  No orders to display       MEDICATIONS GIVEN:  Medications   ondansetron (ZOFRAN) injection 4 mg (4 mg IntraVENous Given 3/11/23 1828)   sodium chloride 0.9 % bolus infusion 1,000 mL (0 mL IntraVENous IV Completed 3/11/23 2036)   methylPREDNISolone (PF) (Solu-MEDROL) injection 125 mg (125 mg IntraVENous Given 3/11/23 1833)   magnesium sulfate 2 g/50 ml IVPB (premix or compounded) (0 g IntraVENous IV Completed 3/11/23 2036)   ketorolac (TORADOL) injection 30 mg (30 mg IntraVENous Given 3/11/23 1832)   SUMAtriptan (IMITREX) injection 6 mg (6 mg SubCUTAneous Given 3/11/23 1833)   diphenhydrAMINE (BENADRYL) injection 25 mg (25 mg IntraVENous Given 3/11/23 1829)   LORazepam (ATIVAN) 2 mg/mL injection 0.5 mg (0.5 mg IntraVENous Given 3/11/23 1830)   droperidoL (INAPSINE) injection 1.25 mg (1.25 mg IntraMUSCular Given 3/11/23 2040)       IMPRESSION:  1. Chronic migraine without aura without status migrainosus, not intractable        PLAN:  1. Discharge Medication List as of 3/11/2023  6:55 PM        2. Follow-up Information       Follow up With Specialties Details Why Contact Info    [address] [facility] Emergency Medicine Go to  As needed, If symptoms worsen Ariana Jack  [address] [facility], [address], DO Neurology Schedule an appointment as soon as possible for a visit   [address] [place] [address]  [phone]            3.  Return to ED if worse

## 2023-03-12 ENCOUNTER — HOSPITAL ENCOUNTER (EMERGENCY)
Age: 33
Discharge: HOME | End: 2023-03-12
Attending: EMERGENCY MEDICINE
Payer: COMMERCIAL

## 2023-03-12 VITALS
TEMPERATURE: 97.6 F | HEIGHT: 68 IN | SYSTOLIC BLOOD PRESSURE: 121 MMHG | OXYGEN SATURATION: 96 % | RESPIRATION RATE: 18 BRPM | HEART RATE: 103 BPM | DIASTOLIC BLOOD PRESSURE: 81 MMHG | BODY MASS INDEX: 20.46 KG/M2 | WEIGHT: 135 LBS

## 2023-03-12 DIAGNOSIS — G43.001 MIGRAINE WITHOUT AURA AND WITH STATUS MIGRAINOSUS, NOT INTRACTABLE: Primary | ICD-10-CM

## 2023-03-12 PROCEDURE — 74011250636 HC RX REV CODE- 250/636

## 2023-03-12 PROCEDURE — 74011636637 HC RX REV CODE- 636/637

## 2023-03-12 PROCEDURE — 96361 HYDRATE IV INFUSION ADD-ON: CPT

## 2023-03-12 PROCEDURE — 96374 THER/PROPH/DIAG INJ IV PUSH: CPT

## 2023-03-12 PROCEDURE — 96375 TX/PRO/DX INJ NEW DRUG ADDON: CPT

## 2023-03-12 PROCEDURE — 99284 EMERGENCY DEPT VISIT MOD MDM: CPT

## 2023-03-12 PROCEDURE — 96372 THER/PROPH/DIAG INJ SC/IM: CPT

## 2023-03-12 RX ORDER — KETOROLAC TROMETHAMINE 30 MG/ML
30 INJECTION, SOLUTION INTRAMUSCULAR; INTRAVENOUS ONCE
Status: COMPLETED | OUTPATIENT
Start: 2023-03-12 | End: 2023-03-12

## 2023-03-12 RX ORDER — MAGNESIUM SULFATE HEPTAHYDRATE 40 MG/ML
2 INJECTION, SOLUTION INTRAVENOUS ONCE
Status: COMPLETED | OUTPATIENT
Start: 2023-03-12 | End: 2023-03-12

## 2023-03-12 RX ORDER — DIPHENHYDRAMINE HCL 25 MG
25 CAPSULE ORAL ONCE
Status: DISCONTINUED | OUTPATIENT
Start: 2023-03-12 | End: 2023-03-12

## 2023-03-12 RX ORDER — MAGNESIUM SULFATE HEPTAHYDRATE 40 MG/ML
2 INJECTION, SOLUTION INTRAVENOUS ONCE
Status: DISCONTINUED | OUTPATIENT
Start: 2023-03-12 | End: 2023-03-12

## 2023-03-12 RX ORDER — ONDANSETRON 2 MG/ML
4 INJECTION INTRAMUSCULAR; INTRAVENOUS
Status: COMPLETED | OUTPATIENT
Start: 2023-03-12 | End: 2023-03-12

## 2023-03-12 RX ORDER — SUMATRIPTAN 6 MG/.5ML
6 INJECTION, SOLUTION SUBCUTANEOUS
Status: COMPLETED | OUTPATIENT
Start: 2023-03-12 | End: 2023-03-12

## 2023-03-12 RX ORDER — DROPERIDOL 2.5 MG/ML
1.25 INJECTION, SOLUTION INTRAMUSCULAR; INTRAVENOUS ONCE
Status: COMPLETED | OUTPATIENT
Start: 2023-03-12 | End: 2023-03-12

## 2023-03-12 RX ORDER — DEXAMETHASONE SODIUM PHOSPHATE 10 MG/ML
10 INJECTION INTRAMUSCULAR; INTRAVENOUS ONCE
Status: COMPLETED | OUTPATIENT
Start: 2023-03-12 | End: 2023-03-12

## 2023-03-12 RX ADMIN — DROPERIDOL 1.25 MG: 2.5 INJECTION, SOLUTION INTRAMUSCULAR; INTRAVENOUS at 21:58

## 2023-03-12 RX ADMIN — DEXAMETHASONE SODIUM PHOSPHATE 10 MG: 10 INJECTION INTRAMUSCULAR; INTRAVENOUS at 21:57

## 2023-03-12 RX ADMIN — ONDANSETRON 4 MG: 2 INJECTION INTRAMUSCULAR; INTRAVENOUS at 21:58

## 2023-03-12 RX ADMIN — SODIUM CHLORIDE 1000 ML: 9 INJECTION, SOLUTION INTRAVENOUS at 21:57

## 2023-03-12 RX ADMIN — SUMATRIPTAN 6 MG: 6 INJECTION, SOLUTION SUBCUTANEOUS at 23:39

## 2023-03-12 RX ADMIN — KETOROLAC TROMETHAMINE 30 MG: 30 INJECTION, SOLUTION INTRAMUSCULAR; INTRAVENOUS at 21:58

## 2023-03-12 RX ADMIN — MAGNESIUM SULFATE HEPTAHYDRATE 2 G: 40 INJECTION, SOLUTION INTRAVENOUS at 23:39

## 2023-03-12 NOTE — Clinical Note
[address]  [facility] EMERGENCY DEPT  [address] [facility] [id number] [phone]    Work/School Note    Date: 3/12/2023    To Whom It May concern:    Gerda Parsons was seen and treated today in the emergency room by the following provider(s):  Attending Provider: Lossie Hashimoto, MD  Resident: London Gupta, [address] [place] [address] is excused from work/school on 03/12/23 and 03/13/23. She is medically clear to return to work/school on 3/14/2023.        Sincerely,          Kelly Posadas MD

## 2023-03-13 ENCOUNTER — TELEPHONE (OUTPATIENT)
Dept: NEUROLOGY | Age: 33
End: 2023-03-13

## 2023-03-13 NOTE — ED TRIAGE NOTES
Patient with history of migraines, states her Botox is late due to insurance issues and headaches have been worsening. Today took Toradol and Fioricet at home without improvement. Denies changes in vision. States this is a typical migraine for her, but worse. Denies numbness, weakness, or tingling.

## 2023-03-13 NOTE — ED PROVIDER NOTES
Patient is a 26 yo female with a PMHx of anxiety/depression, endometriosis, kidney stones, panic attack, and migraines who presents to ED with chief complaint of a migraine that is continuous since three days ago. She receives regular botox injections for her migraines and they are delayed due to insurance. She feels the pain throughout her head radiating into the base of her skull. Worse with light and sound, associated with nausea. Denies vision/hearing changes, dizziness/lightheadedness, difficulty ambulating, cough/cold symptoms, fever/chills, or any other symtpoms. Headache   Associated symptoms include nausea. Pertinent negatives include no fever, no palpitations, no shortness of breath and no dizziness.       Past Medical History:   Diagnosis Date    Anxiety and depression     Bartholin's gland abscess     x8    Endometriosis     Kidney stones     Migraine     Ovarian cyst     Panic attack     Pyelonephritis        Past Surgical History:   Procedure Laterality Date    HX APPENDECTOMY  04/26/2017    HX BARTHOLIN CYST MARSUPIALIZATION      2011, 2018    HX BREAST LUMPECTOMY Right     HX BUNIONECTOMY      HX CYST REMOVAL      HX GYN Left     bartholin cyst drainage X 6    HX GYN Left     bartholin cyst marsupilization    HX HEENT      HX OTHER SURGICAL      laparascopy    HX PELVIC LAPAROSCOPY      HX WISDOM TEETH EXTRACTION           Family History:   Problem Relation Age of Onset    Diabetes Maternal Aunt     Heart Disease Maternal Aunt     Stroke Maternal Aunt     Heart Disease Maternal Grandmother     No Known Problems Mother        Social History     Socioeconomic History    Marital status:      Spouse name: Not on file    Number of children: Not on file    Years of education: Not on file    Highest education level: Not on file   Occupational History    Not on file   Tobacco Use    Smoking status: Former    Smokeless tobacco: Never   Vaping Use    Vaping Use: Never used   Substance and Sexual Activity    Alcohol use: Yes     Alcohol/week: 1.0 standard drink     Types: 1 Glasses of wine per week     Comment: social    Drug use: No    Sexual activity: Yes     Partners: Male     Birth control/protection: Pill, Condom   Other Topics Concern    Not on file   Social History Narrative    Not on file     Social Determinants of Health     Financial Resource Strain: Not on file   Food Insecurity: Not on file   Transportation Needs: Not on file   Physical Activity: Not on file   Stress: Not on file   Social Connections: Not on file   Intimate Partner Violence: Not on file   Housing Stability: Not on file         ALLERGIES: Metoclopramide hcl, Benadryl [diphenhydramine hcl], Compazine [prochlorperazine edisylate], Depacon [valproate sodium], Haldol [haloperidol lactate], Other medication, Promethazine, Reglan [metoclopramide], and Zoloft [sertraline]    Review of Systems   Constitutional:  Negative for chills and fever. HENT:  Negative for postnasal drip and rhinorrhea. Eyes:  Positive for photophobia. Negative for visual disturbance. Respiratory:  Negative for cough and shortness of breath. Cardiovascular:  Negative for chest pain and palpitations. Gastrointestinal:  Positive for nausea. Negative for abdominal pain and diarrhea. Endocrine: Negative for polydipsia and polyuria. Genitourinary:  Negative for dysuria and hematuria. Musculoskeletal:  Negative for back pain, gait problem and neck pain. Skin:  Negative for pallor and wound. Neurological:  Positive for headaches. Negative for dizziness, light-headedness and numbness. Psychiatric/Behavioral:  Negative for behavioral problems and confusion. Vitals:    03/12/23 2118   BP: 121/81   Pulse: (!) 103   Resp: 18   Temp: 97.6 °F (36.4 °C)   SpO2: 96%   Weight: 61.2 kg (135 lb)   Height: 5' 8\" (1.727 m)            Physical Exam  Constitutional:       Appearance: Normal appearance.    HENT:      Mouth/Throat:      Mouth: Mucous membranes are moist.      Pharynx: Oropharynx is clear. Eyes:      Extraocular Movements: Extraocular movements intact. Conjunctiva/sclera: Conjunctivae normal.      Pupils: Pupils are equal, round, and reactive to light. Cardiovascular:      Rate and Rhythm: Normal rate and regular rhythm. Pulses: Normal pulses. Heart sounds: Normal heart sounds. Pulmonary:      Effort: Pulmonary effort is normal.      Breath sounds: Normal breath sounds. Abdominal:      General: Abdomen is flat. Palpations: Abdomen is soft. Musculoskeletal:      Cervical back: Neck supple. No rigidity. Skin:     General: Skin is warm and dry. Capillary Refill: Capillary refill takes less than 2 seconds. Neurological:      Mental Status: She is alert and oriented to person, place, and time. Psychiatric:         Mood and Affect: Mood normal.         Behavior: Behavior normal.        Medical Decision Making  Migraine, tension HA, cluster HA    Amount and/or Complexity of Data Reviewed  External Data Reviewed: labs and notes. Risk  OTC drugs. Prescription drug management. Critical Care  Total time providing critical care: < 30 minutes    ED Course as of 03/13/23 0154   Sun Mar 12, 2023   2006 The patient presented to the emergency department with a headache. The patient is now resting comfortably and feels better, is alert, talkative, interactive and in no distress. The patient appears well and is able to tolerate PO fluids. The repeat examination is unremarkable and benign. The patient is neurologically intact, has a normal mental status, and is ambulatory in the ED.  The history, exam, diagnostic testing (if any) and the patient's current condition do not suggest meningitis, stroke, sepsis, subarachnoid hemorrhage, intracranial bleeding, encephalitis, temporal arteritis or other significant pathology to warrant further testing, continued ED treatment, admission, neurological consultation, or other specialist evaluation at this point. The vital signs have been stable. The patient's condition is stable and appropriate for discharge. The patient will pursue further outpatient evaluation with the primary care physician or other designated or consulting physician as indicated in the discharge instructions. [IO]      ED Course User Index  [IO] Dena Bermudez MD       Procedures      22:29 Patient with no improvement s/p 1L NS bolus, decadron, toradol, zofran, droperidol. Allergy to valproate. Will trial imitrex and Mag Sulf for relief.

## 2023-03-14 ENCOUNTER — TELEPHONE (OUTPATIENT)
Dept: NEUROLOGY | Age: 33
End: 2023-03-14

## 2023-03-14 ENCOUNTER — HOSPITAL ENCOUNTER (EMERGENCY)
Age: 33
Discharge: HOME | End: 2023-03-14
Attending: EMERGENCY MEDICINE | Admitting: EMERGENCY MEDICINE
Payer: COMMERCIAL

## 2023-03-14 ENCOUNTER — VIRTUAL VISIT (OUTPATIENT)
Dept: NEUROLOGY | Age: 33
End: 2023-03-14
Payer: COMMERCIAL

## 2023-03-14 VITALS
RESPIRATION RATE: 11 BRPM | SYSTOLIC BLOOD PRESSURE: 145 MMHG | HEART RATE: 62 BPM | WEIGHT: 135 LBS | DIASTOLIC BLOOD PRESSURE: 77 MMHG | OXYGEN SATURATION: 98 % | BODY MASS INDEX: 20.46 KG/M2 | TEMPERATURE: 98 F | HEIGHT: 68 IN

## 2023-03-14 DIAGNOSIS — G43.001 MIGRAINE WITHOUT AURA AND WITH STATUS MIGRAINOSUS, NOT INTRACTABLE: Primary | ICD-10-CM

## 2023-03-14 DIAGNOSIS — G43.719 INTRACTABLE CHRONIC MIGRAINE WITHOUT AURA AND WITHOUT STATUS MIGRAINOSUS: Primary | ICD-10-CM

## 2023-03-14 PROCEDURE — 96365 THER/PROPH/DIAG IV INF INIT: CPT | Performed by: PHYSICIAN ASSISTANT

## 2023-03-14 PROCEDURE — 74011000258 HC RX REV CODE- 258: Performed by: PHYSICIAN ASSISTANT

## 2023-03-14 PROCEDURE — 99284 EMERGENCY DEPT VISIT MOD MDM: CPT | Performed by: PHYSICIAN ASSISTANT

## 2023-03-14 PROCEDURE — 96375 TX/PRO/DX INJ NEW DRUG ADDON: CPT | Performed by: PHYSICIAN ASSISTANT

## 2023-03-14 PROCEDURE — 99214 OFFICE O/P EST MOD 30 MIN: CPT | Performed by: NURSE PRACTITIONER

## 2023-03-14 PROCEDURE — 74011250637 HC RX REV CODE- 250/637: Performed by: PHYSICIAN ASSISTANT

## 2023-03-14 PROCEDURE — 74011250636 HC RX REV CODE- 250/636: Performed by: PHYSICIAN ASSISTANT

## 2023-03-14 PROCEDURE — 96376 TX/PRO/DX INJ SAME DRUG ADON: CPT | Performed by: PHYSICIAN ASSISTANT

## 2023-03-14 RX ORDER — PREDNISONE 10 MG/1
TABLET ORAL
Qty: 42 TABLET | Refills: 0 | Status: SHIPPED | OUTPATIENT
Start: 2023-03-14

## 2023-03-14 RX ORDER — ACETAMINOPHEN 500 MG
1000 TABLET ORAL ONCE
Status: COMPLETED | OUTPATIENT
Start: 2023-03-14 | End: 2023-03-14

## 2023-03-14 RX ORDER — DEXAMETHASONE SODIUM PHOSPHATE 4 MG/ML
10 INJECTION, SOLUTION INTRA-ARTICULAR; INTRALESIONAL; INTRAMUSCULAR; INTRAVENOUS; SOFT TISSUE
Status: COMPLETED | OUTPATIENT
Start: 2023-03-14 | End: 2023-03-14

## 2023-03-14 RX ORDER — LORAZEPAM 1 MG/1
1 TABLET ORAL
Status: COMPLETED | OUTPATIENT
Start: 2023-03-14 | End: 2023-03-14

## 2023-03-14 RX ORDER — ONDANSETRON 2 MG/ML
4 INJECTION INTRAMUSCULAR; INTRAVENOUS ONCE
Status: COMPLETED | OUTPATIENT
Start: 2023-03-14 | End: 2023-03-14

## 2023-03-14 RX ORDER — MAGNESIUM SULFATE 1 G/100ML
1 INJECTION INTRAVENOUS ONCE
Status: COMPLETED | OUTPATIENT
Start: 2023-03-14 | End: 2023-03-14

## 2023-03-14 RX ORDER — KETOROLAC TROMETHAMINE 30 MG/ML
30 INJECTION, SOLUTION INTRAMUSCULAR; INTRAVENOUS ONCE
Status: COMPLETED | OUTPATIENT
Start: 2023-03-14 | End: 2023-03-14

## 2023-03-14 RX ORDER — DIHYDROERGOTAMINE MESYLATE 1 MG/ML
1 INJECTION, SOLUTION INTRAMUSCULAR; INTRAVENOUS; SUBCUTANEOUS ONCE
Status: DISCONTINUED | OUTPATIENT
Start: 2023-03-14 | End: 2023-03-14

## 2023-03-14 RX ORDER — CYCLOBENZAPRINE HCL 10 MG
10 TABLET ORAL
Status: DISCONTINUED | OUTPATIENT
Start: 2023-03-14 | End: 2023-03-14

## 2023-03-14 RX ADMIN — MAGNESIUM SULFATE IN DEXTROSE 1 G: 10 INJECTION, SOLUTION INTRAVENOUS at 10:07

## 2023-03-14 RX ADMIN — ACETAMINOPHEN 1000 MG: 500 TABLET ORAL at 10:44

## 2023-03-14 RX ADMIN — DIHYDROERGOTAMINE MESYLATE 1 MG: 1 INJECTION, SOLUTION INTRAMUSCULAR; INTRAVENOUS; SUBCUTANEOUS at 13:13

## 2023-03-14 RX ADMIN — ONDANSETRON 4 MG: 2 INJECTION INTRAMUSCULAR; INTRAVENOUS at 10:00

## 2023-03-14 RX ADMIN — LORAZEPAM 1 MG: 1 TABLET ORAL at 15:10

## 2023-03-14 RX ADMIN — KETOROLAC TROMETHAMINE 30 MG: 30 INJECTION, SOLUTION INTRAMUSCULAR; INTRAVENOUS at 10:00

## 2023-03-14 RX ADMIN — SODIUM CHLORIDE 1000 ML: 9 INJECTION, SOLUTION INTRAVENOUS at 10:00

## 2023-03-14 RX ADMIN — ONDANSETRON 4 MG: 2 INJECTION INTRAMUSCULAR; INTRAVENOUS at 13:58

## 2023-03-14 RX ADMIN — DEXAMETHASONE SODIUM PHOSPHATE 10 MG: 4 INJECTION, SOLUTION INTRAMUSCULAR; INTRAVENOUS at 10:00

## 2023-03-14 NOTE — ED NOTES
Pt presents ambulatory to ED complaining of left sided headache x 1 week. Patient states her migraine broke last night but came back this AM at 0300. Patient states she has not been able to take her dose of Botox for migraines and this has caused her to have ongoing headache. Patient has been seen at multiple ERs with no relief. Call her neurologist who will work to get her seen in office. Pt is alert and oriented x 4, RR even and unlabored, skin is warm and dry. Lights dimmed for comfort and patient provided with multiple blankets, call bell within reach. Assesment completed and pt updated on plan of care. Emergency Department Nursing Plan of Care       The Nursing Plan of Care is developed from the Nursing assessment and Emergency Department Attending provider initial evaluation. The plan of care may be reviewed in the ED Provider note.     The Plan of Care was developed with the following considerations:   Patient / Family readiness to learn indicated by:verbalized understanding  Persons(s) to be included in education: patient  Barriers to Learning/Limitations:No    Signed     Rema Duran    3/14/2023   9:32 AM

## 2023-03-14 NOTE — ED NOTES
Patient called out to nurse's station, states her pain is worse. This RN to bedside, patient states her pain has increased significantly since receiving IV medication. Patient is tearful and rocking back and forth in bed. Provider notified.

## 2023-03-14 NOTE — TELEPHONE ENCOUNTER
Patient had a VV appt this morning and stated she talked with NP Jordyn Multani but now in the ER and requesting to speak with her again.

## 2023-03-14 NOTE — TELEPHONE ENCOUNTER
Patient would like a call from the nurse to get details regarding her infusion treatments.     Please contact

## 2023-03-14 NOTE — ED NOTES
Patient has been instructed that they have been given ativan* which contains opioids, benzodiazepines, or other sedating drugs. Patient is aware that they  will need to refrain from driving or operating heavy machinery after taking this medication. Patient also instructed that they need to avoid drinking alcohol and using other products containing opioids, benzodiazepines, or other sedating drugs. Patient verbalized understanding.

## 2023-03-14 NOTE — ED PROVIDER NOTES
[facility] EMERGENCY DEPT  EMERGENCY DEPARTMENT ENCOUNTER       Pt Name: Alvin Nyhan  MRN: [id number]  [place] 1990  Date of evaluation: 3/14/2023  Provider: NAVNEET Brooks   PCP: Corin Sellers MD  Note Started: 9:08 AM 3/14/23     CHIEF COMPLAINT       Chief Complaint   Patient presents with    Headache      HISTORY OF PRESENT ILLNESS: 1 or more elements      History From: Patient, neurologist Guillermo Culver NP), prior ED notes  HPI Limitations : None     Alvin Nyhan is a 27 y.o. female who presents BIB self with CC of migraine headache. The patient tells me she has had waxing and waning migraine headache symptoms over the last week. She was seen in outside hospital ED yesterday and her symptoms improved before returning again this morning. She describes the headache as a sharp pressure sensation felt on the entire left side of her head. There is associated photophobia and nausea without vomiting. The character of this migraine is consistent with prior migraine headaches, though more severe than is typical for her. She is established with [facility] neurology. Her migraines were previously well controlled with Botox and Ajovy, though due to an insurance change 1 month ago she is 1 month late with the Botox injection. She states her neurology office is in the process of completing a prior authorization to get this covered for her. She denies trauma, fever, congestion, change in visual acuity, vomiting, numbness, tingling, weakness, speech change. She tells me she has been seen at various ED's over the last week for this problem. Nursing Notes were all reviewed and agreed with or any disagreements were addressed in the HPI. REVIEW OF SYSTEMS      Review of Systems   Constitutional:  Negative for diaphoresis and fever. Eyes:  Positive for photophobia. Negative for visual disturbance. Gastrointestinal:  Positive for nausea. Negative for vomiting.    Musculoskeletal:  Negative for neck stiffness. Skin:  Negative for rash. Neurological:  Positive for headaches. Positives and Pertinent negatives as per HPI. PAST HISTORY     Past Medical History:  Past Medical History:   Diagnosis Date    Anxiety and depression     Bartholin's gland abscess     x8    Endometriosis     Kidney stones     Migraine     Ovarian cyst     Panic attack     Pyelonephritis        Past Surgical History:  Past Surgical History:   Procedure Laterality Date    HX APPENDECTOMY  04/26/2017    HX BARTHOLIN CYST MARSUPIALIZATION      2011, 2018    HX BREAST LUMPECTOMY Right     HX BUNIONECTOMY      HX CYST REMOVAL      HX GYN Left     bartholin cyst drainage X 6    HX GYN Left     bartholin cyst marsupilization    HX HEENT      HX OTHER SURGICAL      laparascopy    HX PELVIC LAPAROSCOPY      HX WISDOM TEETH EXTRACTION         Family History:  Family History   Problem Relation Age of Onset    Diabetes Maternal Aunt     Heart Disease Maternal Aunt     Stroke Maternal Aunt     Heart Disease Maternal Grandmother     No Known Problems Mother        Social History:  Social History     Tobacco Use    Smoking status: Former    Smokeless tobacco: Never   Vaping Use    Vaping Use: Never used   Substance Use Topics    Alcohol use: Yes     Alcohol/week: 1.0 standard drink     Types: 1 Glasses of wine per week     Comment: social    Drug use: No       Allergies: Allergies   Allergen Reactions    Metoclopramide Hcl Anaphylaxis    Benadryl [Diphenhydramine Hcl] Other (comments)     Makes me feel funny. Need ativan if I get benadryl    Compazine [Prochlorperazine Edisylate] Anxiety    Depacon [Valproate Sodium] Other (comments)     Pt reports having restlessness with this medication.      Haldol [Haloperidol Lactate] Other (comments)    Other Medication Hives     Surgical glue    Promethazine Other (comments)     \"It makes me feel really funny, nausea and dizzy\"    Reglan [Metoclopramide] Other (comments)    Zoloft [Sertraline] Other (comments)     Suicidal thoughts         CURRENT MEDICATIONS      Discharge Medication List as of 3/14/2023  2:38 PM        CONTINUE these medications which have NOT CHANGED    Details   onabotulinumtoxinA (Botox) 200 unit injection INJECT 155 UNITS INTO SELECTED MUSCLES OF THE HEAD AND NECK BILATERALLY EVERY 12 WEEKS FOR MIGRAINE PREVENTION. DISCARD UNUSED PORTION., Normal, Disp-1 Each, R-3      cyclobenzaprine (FLEXERIL) 10 mg tablet Historical Med      ketorolac (TORADOL) 10 mg tablet Historical Med      oxyCODONE-acetaminophen (PERCOCET) 5-325 mg per tablet Historical Med      fremanezumab-vfrm (Ajovy Autoinjector) 225 mg/1.5 mL auto-injector INJECT 1 SYRINGE BY SUBCUTANEOUS ROUTE EVERY THIRTY (30) DAYS., Normal, Disp-4.5 mL, R-3      ARIPiprazole (ABILIFY) 2 mg tablet Take 2 mg by mouth daily. , Historical Med      busPIRone (BUSPAR) 10 mg tablet TAKE 1/2 TABLET BY MOUTH TWICE DAILY FOR 7 DAYS, THEN TAKE 1 TABLET BY MOUTH TWICE DAILY, Historical Med      ondansetron (ZOFRAN ODT) 4 mg disintegrating tablet Take 1 Tablet by mouth every eight (8) hours as needed for Nausea or Vomiting., Normal, Disp-10 Tablet, R-0      estradioL (VIVELLE) 0.1 mg/24 hr APPLY 1 PATCH TWICE WEEKLY, Historical Med      B2-magnesium cit,oxid-feverfew (MigreLief) 200-180-50 mg tab MigreLief   BID, Historical Med      butalbital-acetaminophen-caffeine (FIORICET, ESGIC) -40 mg per tablet Historical Med      !! OTHER,NON-FORMULARY, Elderberry with zinc one by mouth at bedtime, Historical Med      !! OTHER,NON-FORMULARY, PT evaluate and treat, Print, Disp-1 Each, R-0      clonazePAM (KLONOPIN) 1 mg tablet Take 2 mg by mouth nightly., Historical Med, R-5      escitalopram oxalate (LEXAPRO) 20 mg tablet Take 20 mg by mouth nightly. Indications: Generalized Anxiety Disorder, Historical Med       !! - Potential duplicate medications found. Please discuss with provider.           PHYSICAL EXAM      ED Triage Vitals [03/14/23 0849]   ED Encounter Vitals Group      BP (!) 127/92      Pulse (Heart Rate) 85      Resp Rate 16      Temp 98 °F (36.7 °C)      Temp src       O2 Sat (%) 97 %      Weight 135 lb      Height 5' 8\"        Physical Exam  Vitals and nursing note reviewed. Constitutional:       Comments: Appears uncomfortable, intermittently tearful   HENT:      Head: Normocephalic and atraumatic. Nose: Nose normal.      Mouth/Throat:      Mouth: Mucous membranes are moist.   Eyes:      Extraocular Movements: Extraocular movements intact. Conjunctiva/sclera: Conjunctivae normal.      Pupils: Pupils are equal, round, and reactive to light. Cardiovascular:      Rate and Rhythm: Normal rate and regular rhythm. Pulmonary:      Effort: Pulmonary effort is normal.      Breath sounds: Normal breath sounds. Abdominal:      Palpations: Abdomen is soft. Tenderness: There is no abdominal tenderness. There is no guarding or rebound. Musculoskeletal:      Cervical back: Normal range of motion and neck supple. No rigidity. Comments: 5/5 strength and sensation b/l UE/LEs. Gait is steady and symmetrical.   Skin:     General: Skin is warm and dry. Neurological:      General: No focal deficit present. Mental Status: She is alert and oriented to person, place, and time. Cranial Nerves: No cranial nerve deficit. Sensory: No sensory deficit. Motor: No weakness. Coordination: Coordination normal.      Gait: Gait normal.   Psychiatric:         Mood and Affect: Mood normal.         Behavior: Behavior normal.        DIAGNOSTIC RESULTS   LABS:     No results found for this or any previous visit (from the past 12 hour(s)). EKG: When ordered, EKG's are interpreted by the Emergency Department Physician in the absence of a cardiologist.  Please see their note for interpretation of EKG.       RADIOLOGY:  Non-plain film images such as CT, Ultrasound and MRI are read by the radiologist. Plain radiographic images are visualized and preliminarily interpreted by the ED Provider with the below findings:          Interpretation per the Radiologist below, if available at the time of this note:     No results found. PROCEDURES   Unless otherwise noted below, none  Procedures     CRITICAL CARE TIME       EMERGENCY DEPARTMENT COURSE and DIFFERENTIAL DIAGNOSIS/MDM   Vitals:    Vitals:    03/14/23 1325 03/14/23 1340 03/14/23 1355 03/14/23 1500   BP: (!) 154/88 (!) 152/97 (!) 116/93 (!) 145/77   Pulse: (!) 46 (!) 54 64 62   Resp: 12 12  11   Temp:       SpO2: 100% 98% 99% 98%   Weight:       Height:            Patient was given the following medications:  Medications   sodium chloride 0.9 % bolus infusion 1,000 mL (0 mL IntraVENous IV Completed 3/14/23 1051)   ketorolac (TORADOL) injection 30 mg (30 mg IntraVENous Given 3/14/23 1000)   ondansetron (ZOFRAN) injection 4 mg (4 mg IntraVENous Given 3/14/23 1000)   magnesium sulfate 1 g/100 ml IVPB (premix or compounded) (0 g IntraVENous IV Completed 3/14/23 1111)   dexamethasone (DECADRON) 4 mg/mL injection 10 mg (10 mg IntraVENous Given 3/14/23 1000)   acetaminophen (TYLENOL) tablet 1,000 mg (1,000 mg Oral Given 3/14/23 1044)   dihydroergotamine (DHE-45) 1 mg in 0.9% sodium chloride 50 mL IVPB (0 mg IntraVENous IV Completed 3/14/23 1328)   ondansetron (ZOFRAN) injection 4 mg (4 mg IntraVENous Given 3/14/23 1358)   LORazepam (ATIVAN) tablet 1 mg (1 mg Oral Given 3/14/23 1510)       CONSULTS: (Who and What was discussed)    Neurology 9:39 AM- Spoke with patient's neurology NP, Adryan Gifford. She was seen briefly this morning via virtual visit. The patient was previously well controlled with Botox injections and Ajovy. Due to an insurance change and a pending prior authorization, the patient is 1 month late for her Botox injections. Adryan Gifford advises against use of Dilaudid due to worsening rebound headaches.   Unfortunately the patient is allergic to many of the medications neurology prefers to use. NP Walter recommends IV zofran, toradol, IVF, mg, decadon 10 mg. Thirty 30 min after these medications have finished, give 1 mg DHE over 15 min. She would also recommend starting a high dose prednisone taper over 12 days, starting at 60 mg. The neurology office will set up outpatient infusions of a modified bj protocol that will hopefully start tomorrow at [facility]. Chronic Conditions: migraines, anxiety    Social Determinants affecting Dx or Tx:  recent change in insurance benefits causing lapse in medications    Records Reviewed (source and summary of external records): Prior medical records, Previous Radiology studies, and Nursing notes. Reviewed [facility] Moreno doc and it appears this is the pt's 9th ED visit since 3/6/23 for this problem. OD risk score on  is 430. CC/HPI Summary, DDx, ED Course, and Reassessment:     26-year-old female with history of severe intractable migraine headaches presents with status migrainosus, with her headache being intermittent over the last week. She has had 9 ED visits since 3/6/2023 for this same headache. She is persistently requesting Dilaudid, stating this is the only medication that works for her headaches. As stated above, her neurologist was very clear that they do not want her to have Dilaudid for her migraines due to risk of rebound headaches. We will plan to administer the regimen as outlined above per the patient's neurologist.  No trauma or headache red flag signs/symptoms to suggest the need for imaging at this time. No suspicion for meningitis or infectious cause of this headache. Prior to planned administration of DHE, the patient is now claiming she is allergic. Reportedly on a prior ED visit she became \"life-threateningly hypotensive\" after receiving the medication. I performed an extensive chart review. It appears she has received intranasal DHE on multiple prior occasions.   She received IV DHE on January 6, 2016 and subsequent evaluations following medication administration were uneventful. I discussed this with the patient and her mother, who is now at bedside. They are agreeable to receiving this medication with very close monitoring. I advised her we would intervene appropriately if there is any decompensation or evidence of medication reaction. ED Course as of 03/14/23 1631   Tue Mar 14, 2023   1433 Pt reassessed. She is reporting migraine headache improvement after DHE. She is endorsing nausea, so a second dose of Zofran was given. We will also give her some crackers, she states she has not eaten at all today. She is agreeable to discharge with plans for outpatient infusion therapy tomorrow as requested by her neurologist.  We discussed the use of a high-dose steroid taper. Offered a prescription for Zofran, but she states she already has this waiting to be picked up at the pharmacy. [AS]      ED Course User Index  [AS] NAVNEET Magaña       Disposition Considerations (Tests not done, Shared Decision Making, Pt Expectation of Test or Tx.): as above     FINAL IMPRESSION     1. Migraine without aura and with status migrainosus, not intractable          DISPOSITION/PLAN   Discharged    Discharge Note: The patient is stable for discharge home. The signs, symptoms, diagnosis, and discharge instructions have been discussed, understanding conveyed, and agreed upon. The patient is to follow up as recommended or return to ER should their symptoms worsen.       PATIENT REFERRED TO:  Follow-up Information       Follow up With Specialties Details Why Contact Info    [facility] EMERGENCY DEPT Emergency Medicine  As needed, If symptoms worsen [address]    Yury Snell NP Neurology Call  For follow up, and for instructions on tomorrow's outpatient infusion treatment [address] [facility] [phone]                DISCHARGE MEDICATIONS:  Discharge Medication List as of 3/14/2023  2:38 PM        START taking these medications    Details   predniSONE (DELTASONE) 10 mg tablet Each tab is 10 mg. Take 60 mg (6 tabs) daily for 2 days, then 50 mg (5 tabs) for 2 days, then 40 mg (4 tabs) for 2 days, then 30 mg (3 tabs) for 2 days, then 20 mg (2 tabs) for 2 days, then 10 mg (1 tab) for 2 days. , Normal, Disp-42 Tablet, R-0           CONTINUE these medications which have NOT CHANGED    Details   onabotulinumtoxinA (Botox) 200 unit injection INJECT 155 UNITS INTO SELECTED MUSCLES OF THE HEAD AND NECK BILATERALLY EVERY 12 WEEKS FOR MIGRAINE PREVENTION. DISCARD UNUSED PORTION., Normal, Disp-1 Each, R-3      cyclobenzaprine (FLEXERIL) 10 mg tablet Historical Med      ketorolac (TORADOL) 10 mg tablet Historical Med      oxyCODONE-acetaminophen (PERCOCET) 5-325 mg per tablet Historical Med      fremanezumab-vfrm (Ajovy Autoinjector) 225 mg/1.5 mL auto-injector INJECT 1 SYRINGE BY SUBCUTANEOUS ROUTE EVERY THIRTY (30) DAYS., Normal, Disp-4.5 mL, R-3      ARIPiprazole (ABILIFY) 2 mg tablet Take 2 mg by mouth daily. , Historical Med      busPIRone (BUSPAR) 10 mg tablet TAKE 1/2 TABLET BY MOUTH TWICE DAILY FOR 7 DAYS, THEN TAKE 1 TABLET BY MOUTH TWICE DAILY, Historical Med      ondansetron (ZOFRAN ODT) 4 mg disintegrating tablet Take 1 Tablet by mouth every eight (8) hours as needed for Nausea or Vomiting., Normal, Disp-10 Tablet, R-0      estradioL (VIVELLE) 0.1 mg/24 hr APPLY 1 PATCH TWICE WEEKLY, Historical Med      B2-magnesium cit,oxid-feverfew (MigreLief) 200-180-50 mg tab MigreLief   BID, Historical Med      butalbital-acetaminophen-caffeine (FIORICET, ESGIC) -40 mg per tablet Historical Med      !! OTHER,NON-FORMULARY, Elderberry with zinc one by mouth at bedtime, Historical Med      !! OTHER,NON-FORMULARY, PT evaluate and treat, Print, Disp-1 Each, R-0      clonazePAM (KLONOPIN) 1 mg tablet Take 2 mg by mouth nightly., Historical Med, R-5      escitalopram oxalate (LEXAPRO) 20 mg tablet Take 20 mg by mouth nightly. Indications: Generalized Anxiety Disorder, Historical Med       !! - Potential duplicate medications found. Please discuss with provider. DISCONTINUED MEDICATIONS:  Discharge Medication List as of 3/14/2023  2:38 PM          ED Attending Involvement : I have seen and evaluated the patient. My supervision physician was available for consultation. I am the Primary Clinician of Record. [place], [place] (electronically signed)    (Please note that parts of this dictation were completed with voice recognition software. Quite often unanticipated grammatical, syntax, homophones, and other interpretive errors are inadvertently transcribed by the computer software. Please disregards these errors.  Please excuse any errors that have escaped final proofreading.)

## 2023-03-14 NOTE — ED NOTES
The patient left the Emergency Department ambulatory with mom, alert and oriented and in no acute distress. The patient was encouraged to call or return to the ED for worsening issues or problems and was encouraged to schedule a follow up appointment for continuing care. The patient verbalized understanding of discharge instructions and prescriptions, all questions were answered. The patient has no further concerns at this time.

## 2023-03-14 NOTE — TELEPHONE ENCOUNTER
Pt already had a virtual visit with the NP this morning. She has already moved on to the next person. Provider wants her to have modified raskins will send over order to [facility] so they schedule that for her.

## 2023-03-14 NOTE — Clinical Note
[facility] EMERGENCY DEPT  [address] [facility] [id number] [phone]    Work/School Note    Date: 3/14/2023    To Whom It May concern:    Rosalba Horn was seen and treated today in the emergency room by the following provider(s):  Attending Provider: Clarence Pendleton MD  Physician Assistant: Stef Otero. Rosalba Horn is excused from work/school on 3/14/2023 through 3/16/2023. She is medically clear to return to work/school on 3/17/2023.          Sincerely,          Stef Traylor

## 2023-03-14 NOTE — PROGRESS NOTES
Spiritual Care Partner Volunteer Greg Tpaia visited Ms. Ashley Romero at [facility] in [facility] EMERGENCY DEPT on 3/14/2023   Documented by:  Blank Howell

## 2023-03-14 NOTE — TELEPHONE ENCOUNTER
Returned her call. Advised her the infusion center has the order to get her scheduled for another 2 days of colton. Message sent to PA specialist about inquiring about botox PA since insurance lapsed she hasn't had a treatment since November and it was working for her. She has verbalized understanding.

## 2023-03-14 NOTE — ED NOTES
This RN to bedside. Patient states she is having a panic attack. RN provided therapeutic presence and distraction. Patient states her headache is better, but continues to have nausea. Willing to try some ginger ale and crackers.

## 2023-03-14 NOTE — ED NOTES
This RN went to hang the DHE and patient is in room crying and seems anxious. Pt mother at bedside and reports that the patient had a terrible reaction to this medication 4 years ago. Pt states that after this medication she went unconscious at [facility] when they gave the DHE.

## 2023-03-14 NOTE — PROGRESS NOTES
[address] [facility],[address]  [address]   [place] [address] [place] [address] [facility]   [phone] Office   [phone] Fax           Date:  23     Name:  Kelle Finn  :  1990  MRN:  [id number]     PCP:  Julita Moffett MD    Shena Curiel is a 27 y.o. female who was seen by synchronous (real-time) audio-video technology on 3/14/2023 for No chief complaint on file. Subjective: This is a former patient of Dr. Yosef Aden who was stable on Ajovy and Botox until recently. She is presently over due for Botox by about a month at this point and there has been a significant increase in refractory migraine activity over the last three weeks. Current Outpatient Medications   Medication Sig    predniSONE (DELTASONE) 10 mg tablet Each tab is 10 mg. Take 60 mg (6 tabs) daily for 2 days, then 50 mg (5 tabs) for 2 days, then 40 mg (4 tabs) for 2 days, then 30 mg (3 tabs) for 2 days, then 20 mg (2 tabs) for 2 days, then 10 mg (1 tab) for 2 days. onabotulinumtoxinA (Botox) 200 unit injection INJECT 155 UNITS INTO SELECTED MUSCLES OF THE HEAD AND NECK BILATERALLY EVERY 12 WEEKS FOR MIGRAINE PREVENTION. DISCARD UNUSED PORTION. cyclobenzaprine (FLEXERIL) 10 mg tablet     ketorolac (TORADOL) 10 mg tablet     oxyCODONE-acetaminophen (PERCOCET) 5-325 mg per tablet     fremanezumab-vfrm (Ajovy Autoinjector) 225 mg/1.5 mL auto-injector INJECT 1 SYRINGE BY SUBCUTANEOUS ROUTE EVERY THIRTY (30) DAYS. ARIPiprazole (ABILIFY) 2 mg tablet Take 2 mg by mouth daily.     busPIRone (BUSPAR) 10 mg tablet TAKE 1/2 TABLET BY MOUTH TWICE DAILY FOR 7 DAYS, THEN TAKE 1 TABLET BY MOUTH TWICE DAILY    ondansetron (ZOFRAN ODT) 4 mg disintegrating tablet Take 1 Tablet by mouth every eight (8) hours as needed for Nausea or Vomiting.    estradioL (VIVELLE) 0.1 mg/24 hr APPLY 1 PATCH TWICE WEEKLY    B2-magnesium cit,oxid-feverfew (MigreLief) 200-180-50 mg tab MigreLief   BID    butalbital-acetaminophen-caffeine (FIORICET, ESGIC) -40 mg per tablet     OTHER,NON-FORMULARY, Elderberry with zinc one by mouth at bedtime    OTHER,NON-FORMULARY, PT evaluate and treat (Patient not taking: No sig reported)    clonazePAM (KLONOPIN) 1 mg tablet Take 2 mg by mouth nightly. escitalopram oxalate (LEXAPRO) 20 mg tablet Take 20 mg by mouth nightly. Indications: Generalized Anxiety Disorder     No current facility-administered medications for this visit. Allergies   Allergen Reactions    Metoclopramide Hcl Anaphylaxis    Benadryl [Diphenhydramine Hcl] Other (comments)     Makes me feel funny. Need ativan if I get benadryl    Compazine [Prochlorperazine Edisylate] Anxiety    Depacon [Valproate Sodium] Other (comments)     Pt reports having restlessness with this medication. Haldol [Haloperidol Lactate] Other (comments)    Other Medication Hives     Surgical glue    Promethazine Other (comments)     \"It makes me feel really funny, nausea and dizzy\"    Reglan [Metoclopramide] Other (comments)    Zoloft [Sertraline] Other (comments)     Suicidal thoughts        Past Medical History:   Diagnosis Date    Anxiety and depression     Bartholin's gland abscess     x8    Endometriosis     Kidney stones     Migraine     Ovarian cyst     Panic attack     Pyelonephritis      Past Surgical History:   Procedure Laterality Date    HX APPENDECTOMY  04/26/2017    HX BARTHOLIN CYST MARSUPIALIZATION      2011, 2018    HX BREAST LUMPECTOMY Right     HX BUNIONECTOMY      HX CYST REMOVAL      HX GYN Left     bartholin cyst drainage X 6    HX GYN Left     bartholin cyst marsupilization    HX HEENT      HX OTHER SURGICAL      laparascopy    HX PELVIC LAPAROSCOPY      HX WISDOM TEETH EXTRACTION        reports that she has quit smoking. She has never used smokeless tobacco. She reports current alcohol use of about 1.0 standard drink per week.  She reports that she does not use drugs. family history includes Diabetes in her maternal aunt; Heart Disease in her maternal aunt and maternal grandmother; No Known Problems in her mother; Stroke in her maternal aunt. ROS    Objective:     Patient-Reported Vitals 6/17/2020   Patient-Reported Weight 145lb   Patient-Reported Height 5f8        General:  Well defined, nourished, and groomed individual in no acute distress. Psych:  Good mood and bright affect    NEUROLOGICAL EXAMINATION:     Mental Status:   Alert and oriented to person, place, and time with recent and remote memory intact. Attention span and concentration are normal. Speech is fluent with a full fund of knowledge. Cranial Nerves:  I: smell Not tested   II: visual fields Not assessed   II: pupils Equal, round, reactive to light   II: optic disc Not assessed   III,VII: ptosis none   III,IV,VI: extraocular muscles  Full ROM   V: mastication normal   V: facial light touch sensation  Not assessed   VII: facial muscle function   symmetric   VIII: hearing symmetric   IX: soft palate elevation  normal   XI: trapezius strength  Not assessed   XI: sternocleidomastoid strength Not assessed   XI: neck flexion strength  Not assessed   XII: tongue  midline     Motor Examination: Normal tone and bulk. Strength was not assessed      Sensory exam:  Not assessed     Coordination:  No resting or intention tremor    Gait and Station:  No muscle wasting or fasiculations noted. Reflexes:  Not assessed    Assessment & Plan:       ICD-10-CM ICD-9-CM    1. Intractable chronic migraine without aura and without status migrainosus  G43.719 346.71         Patient was at [place] [address] today at the time of her visit. She has been to the ER several times due to a severe migraine over the last 2-3 weeks. Prior to this, she indicates that she was doing well on her regimen of Botox and Ajovy.  Unfortunately, she is overdue for the Botox due to a change in her insurance and a lack of a new prior authorization. We will try to get this initiated. In the meantime, I did speak to someone in the ED about providing her with a modified outpatient Eliceo. Due to allergy to Reglan, Benadryl, and Depacon, the regimen was changed to include Decadron, Zofran, Magnesium 500mg, and DHE. I will try to get her set up for two more days of this regimen and asked that she be provided with a prednisone taper upon discharge from the ER. Follow up after she has Botox      We discussed the expected course, resolution and complications of the diagnosis(es) in detail. Medication risks, benefits, costs, interactions, and alternatives were discussed as indicated. I advised her to contact the office if her condition worsens, changes or fails to improve as anticipated. She expressed understanding with the diagnosis(es) and plan. Marcos Irene, was evaluated through a synchronous (real-time) audio-video encounter. The patient (or guardian if applicable) is aware that this is a billable service, which includes applicable co-pays. This Virtual Visit was conducted with patient's (and/or legal guardian's) consent. The visit was conducted pursuant to the emergency declaration under the [facility], [facility] waiver authority and the [employer] General Act. Patient identification was verified, and a caregiver was present when appropriate. The patient was located at:  Other: [facility] ER  The provider was located at: Home: [address] [facility]

## 2023-03-14 NOTE — ED TRIAGE NOTES
Patient presents to the ED with c/o headache since last night. Pt reports being [facility] and was given Toradol with no relief. Pt reports taking Toradol and Fioricet around 630 this morning with no relief. Pt reports photophobia and denies vision changes.

## 2023-03-16 ENCOUNTER — HOSPITAL ENCOUNTER (EMERGENCY)
Age: 33
Discharge: HOME | End: 2023-03-16
Attending: EMERGENCY MEDICINE
Payer: COMMERCIAL

## 2023-03-16 ENCOUNTER — APPOINTMENT (OUTPATIENT)
Dept: CT IMAGING | Age: 33
End: 2023-03-16
Attending: EMERGENCY MEDICINE
Payer: COMMERCIAL

## 2023-03-16 ENCOUNTER — HOSPITAL ENCOUNTER (OUTPATIENT)
Dept: INFUSION THERAPY | Age: 33
Discharge: HOME | End: 2023-03-16
Payer: COMMERCIAL

## 2023-03-16 VITALS
RESPIRATION RATE: 17 BRPM | HEIGHT: 68 IN | TEMPERATURE: 97.7 F | BODY MASS INDEX: 19.25 KG/M2 | WEIGHT: 127 LBS | SYSTOLIC BLOOD PRESSURE: 151 MMHG | OXYGEN SATURATION: 100 % | DIASTOLIC BLOOD PRESSURE: 86 MMHG | HEART RATE: 57 BPM

## 2023-03-16 VITALS
OXYGEN SATURATION: 100 % | BODY MASS INDEX: 19.25 KG/M2 | DIASTOLIC BLOOD PRESSURE: 83 MMHG | SYSTOLIC BLOOD PRESSURE: 155 MMHG | TEMPERATURE: 98.7 F | HEART RATE: 60 BPM | WEIGHT: 127 LBS | RESPIRATION RATE: 18 BRPM | HEIGHT: 68 IN

## 2023-03-16 DIAGNOSIS — G43.919 INTRACTABLE MIGRAINE WITHOUT STATUS MIGRAINOSUS, UNSPECIFIED MIGRAINE TYPE: Primary | ICD-10-CM

## 2023-03-16 DIAGNOSIS — R51.9 INTRACTABLE HEADACHE, UNSPECIFIED CHRONICITY PATTERN, UNSPECIFIED HEADACHE TYPE: Primary | ICD-10-CM

## 2023-03-16 LAB
COMMENT, HOLDF: NORMAL
SAMPLES BEING HELD,HOLD: NORMAL

## 2023-03-16 PROCEDURE — 74011250636 HC RX REV CODE- 250/636: Performed by: NURSE PRACTITIONER

## 2023-03-16 PROCEDURE — 96366 THER/PROPH/DIAG IV INF ADDON: CPT

## 2023-03-16 PROCEDURE — 96375 TX/PRO/DX INJ NEW DRUG ADDON: CPT

## 2023-03-16 PROCEDURE — 96365 THER/PROPH/DIAG IV INF INIT: CPT

## 2023-03-16 PROCEDURE — 70450 CT HEAD/BRAIN W/O DYE: CPT

## 2023-03-16 PROCEDURE — 99284 EMERGENCY DEPT VISIT MOD MDM: CPT

## 2023-03-16 PROCEDURE — 74011000258 HC RX REV CODE- 258: Performed by: NURSE PRACTITIONER

## 2023-03-16 PROCEDURE — 74011250636 HC RX REV CODE- 250/636: Performed by: EMERGENCY MEDICINE

## 2023-03-16 RX ORDER — KETOROLAC TROMETHAMINE 30 MG/ML
15 INJECTION, SOLUTION INTRAMUSCULAR; INTRAVENOUS
Status: COMPLETED | OUTPATIENT
Start: 2023-03-16 | End: 2023-03-16

## 2023-03-16 RX ORDER — HEPARIN 100 UNIT/ML
500 SYRINGE INTRAVENOUS AS NEEDED
Start: 2023-03-16

## 2023-03-16 RX ORDER — LORAZEPAM 2 MG/ML
0.5 INJECTION INTRAMUSCULAR ONCE
Status: COMPLETED | OUTPATIENT
Start: 2023-03-16 | End: 2023-03-16

## 2023-03-16 RX ORDER — DEXAMETHASONE SODIUM PHOSPHATE 100 MG/10ML
10 INJECTION INTRAMUSCULAR; INTRAVENOUS ONCE
Status: CANCELLED | OUTPATIENT
Start: 2023-03-16 | End: 2023-03-16

## 2023-03-16 RX ORDER — MAGNESIUM SULFATE HEPTAHYDRATE 40 MG/ML
2 INJECTION, SOLUTION INTRAVENOUS ONCE
Status: COMPLETED | OUTPATIENT
Start: 2023-03-16 | End: 2023-03-16

## 2023-03-16 RX ORDER — SODIUM CHLORIDE 9 MG/ML
5-40 INJECTION INTRAVENOUS AS NEEDED
OUTPATIENT
Start: 2023-03-16

## 2023-03-16 RX ORDER — ONDANSETRON 2 MG/ML
8 INJECTION INTRAMUSCULAR; INTRAVENOUS AS NEEDED
OUTPATIENT
Start: 2023-03-16

## 2023-03-16 RX ORDER — DEXAMETHASONE SODIUM PHOSPHATE 4 MG/ML
10 INJECTION, SOLUTION INTRA-ARTICULAR; INTRALESIONAL; INTRAMUSCULAR; INTRAVENOUS; SOFT TISSUE ONCE
Status: DISCONTINUED | OUTPATIENT
Start: 2023-03-16 | End: 2023-03-16

## 2023-03-16 RX ORDER — SODIUM CHLORIDE 9 MG/ML
25 INJECTION, SOLUTION INTRAVENOUS CONTINUOUS
Status: DISPENSED | OUTPATIENT
Start: 2023-03-16 | End: 2023-03-16

## 2023-03-16 RX ORDER — DEXAMETHASONE SODIUM PHOSPHATE 100 MG/10ML
10 INJECTION INTRAMUSCULAR; INTRAVENOUS ONCE
Status: COMPLETED | OUTPATIENT
Start: 2023-03-16 | End: 2023-03-16

## 2023-03-16 RX ORDER — SODIUM CHLORIDE 9 MG/ML
5-250 INJECTION, SOLUTION INTRAVENOUS AS NEEDED
OUTPATIENT
Start: 2023-03-16

## 2023-03-16 RX ORDER — DIAZEPAM 10 MG/2ML
5 INJECTION INTRAMUSCULAR ONCE
Status: COMPLETED | OUTPATIENT
Start: 2023-03-16 | End: 2023-03-16

## 2023-03-16 RX ORDER — ONDANSETRON 2 MG/ML
8 INJECTION INTRAMUSCULAR; INTRAVENOUS ONCE
Status: COMPLETED | OUTPATIENT
Start: 2023-03-16 | End: 2023-03-16

## 2023-03-16 RX ORDER — DIPHENHYDRAMINE HYDROCHLORIDE 50 MG/ML
50 INJECTION, SOLUTION INTRAMUSCULAR; INTRAVENOUS ONCE
Status: COMPLETED | OUTPATIENT
Start: 2023-03-16 | End: 2023-03-16

## 2023-03-16 RX ORDER — HYDROCORTISONE SODIUM SUCCINATE 100 MG/2ML
100 INJECTION, POWDER, FOR SOLUTION INTRAMUSCULAR; INTRAVENOUS AS NEEDED
OUTPATIENT
Start: 2023-03-16

## 2023-03-16 RX ORDER — ALBUTEROL SULFATE 0.83 MG/ML
2.5 SOLUTION RESPIRATORY (INHALATION) AS NEEDED
Start: 2023-03-16

## 2023-03-16 RX ORDER — LORAZEPAM 2 MG/ML
2 INJECTION INTRAMUSCULAR ONCE
Status: COMPLETED | OUTPATIENT
Start: 2023-03-16 | End: 2023-03-16

## 2023-03-16 RX ORDER — SODIUM CHLORIDE 0.9 % (FLUSH) 0.9 %
5-40 SYRINGE (ML) INJECTION AS NEEDED
OUTPATIENT
Start: 2023-03-16

## 2023-03-16 RX ORDER — DIPHENHYDRAMINE HYDROCHLORIDE 50 MG/ML
50 INJECTION, SOLUTION INTRAMUSCULAR; INTRAVENOUS AS NEEDED
Start: 2023-03-16

## 2023-03-16 RX ORDER — DIPHENHYDRAMINE HYDROCHLORIDE 50 MG/ML
25 INJECTION, SOLUTION INTRAMUSCULAR; INTRAVENOUS AS NEEDED
Start: 2023-03-16

## 2023-03-16 RX ORDER — ONDANSETRON 2 MG/ML
8 INJECTION INTRAMUSCULAR; INTRAVENOUS ONCE
Status: CANCELLED
Start: 2023-03-16 | End: 2023-03-16

## 2023-03-16 RX ORDER — EPINEPHRINE 1 MG/ML
0.3 INJECTION, SOLUTION, CONCENTRATE INTRAVENOUS AS NEEDED
OUTPATIENT
Start: 2023-03-16

## 2023-03-16 RX ORDER — ACETAMINOPHEN 325 MG/1
650 TABLET ORAL AS NEEDED
Start: 2023-03-16

## 2023-03-16 RX ADMIN — MAGNESIUM SULFATE HEPTAHYDRATE 2 G: 40 INJECTION, SOLUTION INTRAVENOUS at 14:12

## 2023-03-16 RX ADMIN — ONDANSETRON 8 MG: 2 INJECTION INTRAMUSCULAR; INTRAVENOUS at 09:39

## 2023-03-16 RX ADMIN — DIAZEPAM 5 MG: 5 INJECTION, SOLUTION INTRAMUSCULAR; INTRAVENOUS at 14:12

## 2023-03-16 RX ADMIN — DIHYDROERGOTAMINE MESYLATE 1 MG: 1 INJECTION, SOLUTION INTRAMUSCULAR; INTRAVENOUS; SUBCUTANEOUS at 11:21

## 2023-03-16 RX ADMIN — KETOROLAC TROMETHAMINE 15 MG: 30 INJECTION, SOLUTION INTRAMUSCULAR; INTRAVENOUS at 14:11

## 2023-03-16 RX ADMIN — MAGNESIUM SULFATE HEPTAHYDRATE 500 MG: 500 INJECTION, SOLUTION INTRAMUSCULAR; INTRAVENOUS at 09:48

## 2023-03-16 RX ADMIN — DIPHENHYDRAMINE HYDROCHLORIDE 50 MG: 50 INJECTION, SOLUTION INTRAMUSCULAR; INTRAVENOUS at 16:17

## 2023-03-16 RX ADMIN — SODIUM CHLORIDE 25 ML/HR: 9 INJECTION, SOLUTION INTRAVENOUS at 09:30

## 2023-03-16 RX ADMIN — LORAZEPAM 0.5 MG: 2 INJECTION INTRAMUSCULAR; INTRAVENOUS at 12:32

## 2023-03-16 RX ADMIN — SODIUM CHLORIDE 1000 ML: 9 INJECTION, SOLUTION INTRAVENOUS at 14:12

## 2023-03-16 RX ADMIN — LORAZEPAM 2 MG: 2 INJECTION INTRAMUSCULAR; INTRAVENOUS at 16:17

## 2023-03-16 RX ADMIN — DEXAMETHASONE SODIUM PHOSPHATE 10 MG: 10 INJECTION INTRAMUSCULAR; INTRAVENOUS at 09:31

## 2023-03-16 NOTE — ED PROVIDER NOTES
26-year-old with a history of migraine presents with chief complaint of headache, neck pain and upper back pain. Patient reports she was receiving an infusion for treatment of migraines when she began having a headache which wraps around the front of her head bilaterally. She states that this headache is different than her normal migraine. She denies focal deficits. Past Medical History:   Diagnosis Date    Anxiety and depression     Bartholin's gland abscess     x8    Endometriosis     Kidney stones     Migraine     Ovarian cyst     Panic attack     Pyelonephritis        Past Surgical History:   Procedure Laterality Date    HX APPENDECTOMY  04/26/2017    HX BARTHOLIN CYST MARSUPIALIZATION      2011, 2018    HX BREAST LUMPECTOMY Right     HX BUNIONECTOMY      HX CYST REMOVAL      HX GYN Left     bartholin cyst drainage X 6    HX GYN Left     bartholin cyst marsupilization    HX HEENT      HX OTHER SURGICAL      laparascopy    HX PELVIC LAPAROSCOPY      HX WISDOM TEETH EXTRACTION           Family History:   Problem Relation Age of Onset    Diabetes Maternal Aunt     Heart Disease Maternal Aunt     Stroke Maternal Aunt     Heart Disease Maternal Grandmother     No Known Problems Mother        Social History     Socioeconomic History    Marital status:      Spouse name: Not on file    Number of children: Not on file    Years of education: Not on file    Highest education level: Not on file   Occupational History    Not on file   Tobacco Use    Smoking status: Former    Smokeless tobacco: Never   Vaping Use    Vaping Use: Never used   Substance and Sexual Activity    Alcohol use:  Yes     Alcohol/week: 1.0 standard drink     Types: 1 Glasses of wine per week     Comment: social    Drug use: No    Sexual activity: Yes     Partners: Male     Birth control/protection: Pill, Condom   Other Topics Concern    Not on file   Social History Narrative    Not on file     Social Determinants of Health     Financial Resource Strain: Not on file   Food Insecurity: Not on file   Transportation Needs: Not on file   Physical Activity: Not on file   Stress: Not on file   Social Connections: Not on file   Intimate Partner Violence: Not on file   Housing Stability: Not on file         ALLERGIES: Metoclopramide hcl, Benadryl [diphenhydramine hcl], Compazine [prochlorperazine edisylate], Depacon [valproate sodium], Haldol [haloperidol lactate], Other medication, Promethazine, Reglan [metoclopramide], and Zoloft [sertraline]    Review of Systems   Neurological:  Positive for headaches. Vitals:    03/16/23 1340   BP: (!) 155/83   Pulse: 60   Resp: 18   Temp: 98.7 °F (37.1 °C)   SpO2: 100%   Weight: 57.6 kg (127 lb)   Height: 5' 8\" (1.727 m)            Physical Exam  Vitals and nursing note reviewed. Constitutional:       General: She is not in acute distress. Appearance: Normal appearance. She is not ill-appearing, toxic-appearing or diaphoretic. HENT:      Head: Normocephalic and atraumatic. Mouth/Throat:      Mouth: Mucous membranes are moist.   Eyes:      Extraocular Movements: Extraocular movements intact. Cardiovascular:      Rate and Rhythm: Normal rate. Pulses: Normal pulses. Pulmonary:      Effort: Pulmonary effort is normal. No respiratory distress. Abdominal:      General: There is no distension. Musculoskeletal:         General: Normal range of motion. Cervical back: Normal range of motion. Skin:     General: Skin is dry. Neurological:      General: No focal deficit present. Mental Status: She is alert and oriented to person, place, and time. Psychiatric:         Mood and Affect: Mood normal.        Medical Decision Making  Patient presents with headache which is abnormal for her migraines. A CT of the head was obtained to evaluate for intracranial hemorrhage or mass. CT negative. Patient treated with IV fluids, magnesium and Toradol.   She did not receive sufficient improvement in her headache; therefore, Benadryl and Ativan were added. Patient reports improvement in headache and is comfortable with discharge. Discussed my clinical impression(s), any labs and/or radiology results with the patient. I answered any questions and addressed any concerns. Discussed the importance of following up with their primary care physician and/or specialist(s). Discussed signs or symptoms that would warrant return back to the ER for further evaluation. The patient is agreeable with discharge. Amount and/or Complexity of Data Reviewed  Radiology: ordered. Risk  Prescription drug management.            Procedures

## 2023-03-16 NOTE — DISCHARGE INSTRUCTIONS
Thank you for allowing us to provide you with medical care today. We realize that you have many choices for your emergency care needs. We thank you for choosing [facility]. Please choose us in the future for any continued health care needs. The exam and treatment you received in the Emergency Department were for an emergent problem and are not intended as complete care. It is important that you follow up with a doctor, nurse practitioner, or physician's assistant for ongoing care. If your symptoms worsen or you do not improve as expected and you are unable to reach your usual health care provider, you should return to the Emergency Department. We are available 24 hours a day. Please make an appointment with your health care provider(s) for follow up of your Emergency Department visit. Take this sheet with you when you go to your follow-up visit.

## 2023-03-16 NOTE — PROGRESS NOTES
[facility] VISIT NOTE  Date: 2023    Name: Ej Rojas    MRN: [id number]         : 1990    Ms. Anirudh Day  arrived ambulatory and in no distress for migraine treatment. Pt reports migraine x 4 days, currently rated at 7 on 10 scale. No other complaints voiced at this time. Left PIV established without difficulty. Ms. Pagan Bryson vitals were reviewed. Patient Vitals for the past 12 hrs:   Temp Pulse Resp BP SpO2   23 0835 97.7 °F (36.5 °C) 88 17 105/66 99 %             Medications received:     Medications Administered       0.9% sodium chloride infusion       Admin Date  2023 Action  New Bag Dose  25 mL/hr Rate  25 mL/hr Route  IntraVENous Administered By  Ucon Maxim              dexamethasone (DECADRON) 10 mg/mL injection 10 mg       Admin Date  2023 Action  Given Dose  10 mg Route  IntraVENous Administered By  Ginger Maxim              dihydroergotamine (DHE-45) 1 mg in 0.9% sodium chloride 50 mL, overfill volume 5 mL IVPB       Admin Date  2023 Action  New Bag Dose  1 mg Rate  224 mL/hr Route  IntraVENous Administered By  Ginger Maxim              LORazepam (ATIVAN) injection 0.5 mg       Admin Date  2023 Action  Given Dose  0.5 mg Route  IntraVENous Administered By  Ucon Maxim              magnesium sulfate 500 mg in 0.9% sodium chloride, overfill volume 111 mL IVPB       Admin Date  2023 Action  New Bag Dose  500 mg Rate  111 mL/hr Route  IntraVENous Administered By  Ginger Maxim              [facility]ansetron [facility]) injection 8 mg       Admin Date  2023 Action  Given Dose  8 mg Route  IntraVENous Administered By  Ginger Maxim                    Post DHE infusion, Pt c/o severe h/a with increased BP at 151/86, and facial flushing. Neuro NP Tiffany Boo notified. Ativan ordered and given. Pt states not change in symptoms. 20 mins post ativan pt complains of severe neck pain and h/a wrapping around her head like a crown.  She states she has never felt this bad before. Pt chose to be taken to the ED for further evaluation. EMS arrived and left with pt at 1320. Left PIV flushed and capped for treatment at the [facility] tomorrow 3/17/23 @ 0800.     Tati Martin  March 16, 2023    Future Appointments:  Future Appointments   Date Time Provider Diane Mcgowan   3/17/2023  8:00 AM SS INF1 CH3 <4H [facility] Alex Kerns

## 2023-03-16 NOTE — ED TRIAGE NOTES
Pt to er via ems from [facility] where she had her DHE infusion for chronic migraines. Pt states she began to feel worse immediately after infusion and her headache began to spread around her head and down her neck. Pt denies any sob,chest pain, tingling, or numbness. Pt endorses some nausea.

## 2023-03-16 NOTE — ED NOTES
Pt given dc instructions and education. IV placed today taken out and OPIC IV left in as patient has another infusion tomorrow and was told to leave it in. Pt and mother verbalized understanding. Pt ambulatory out of ER w/ steady gait.

## 2023-03-17 ENCOUNTER — HOSPITAL ENCOUNTER (OUTPATIENT)
Dept: INFUSION THERAPY | Age: 33
Discharge: HOME | End: 2023-03-17
Payer: COMMERCIAL

## 2023-03-17 ENCOUNTER — TELEPHONE (OUTPATIENT)
Dept: NEUROLOGY | Age: 33
End: 2023-03-17

## 2023-03-17 VITALS
DIASTOLIC BLOOD PRESSURE: 83 MMHG | HEART RATE: 54 BPM | BODY MASS INDEX: 19.32 KG/M2 | WEIGHT: 127.5 LBS | HEIGHT: 68 IN | OXYGEN SATURATION: 98 % | RESPIRATION RATE: 16 BRPM | SYSTOLIC BLOOD PRESSURE: 141 MMHG | TEMPERATURE: 98.1 F

## 2023-03-17 PROCEDURE — 74011250636 HC RX REV CODE- 250/636: Performed by: SPECIALIST

## 2023-03-17 PROCEDURE — 74011000258 HC RX REV CODE- 258: Performed by: SPECIALIST

## 2023-03-17 PROCEDURE — 96375 TX/PRO/DX INJ NEW DRUG ADDON: CPT

## 2023-03-17 PROCEDURE — 96365 THER/PROPH/DIAG IV INF INIT: CPT

## 2023-03-17 RX ORDER — DEXAMETHASONE SODIUM PHOSPHATE 100 MG/10ML
10 INJECTION INTRAMUSCULAR; INTRAVENOUS ONCE
Status: COMPLETED | OUTPATIENT
Start: 2023-03-17 | End: 2023-03-17

## 2023-03-17 RX ORDER — LORAZEPAM 2 MG/ML
0.5 INJECTION INTRAMUSCULAR ONCE
Status: COMPLETED | OUTPATIENT
Start: 2023-03-17 | End: 2023-03-17

## 2023-03-17 RX ORDER — ONDANSETRON 2 MG/ML
8 INJECTION INTRAMUSCULAR; INTRAVENOUS ONCE
Status: COMPLETED | OUTPATIENT
Start: 2023-03-17 | End: 2023-03-17

## 2023-03-17 RX ORDER — SODIUM CHLORIDE 9 MG/ML
25 INJECTION, SOLUTION INTRAVENOUS CONTINUOUS
Status: DISPENSED | OUTPATIENT
Start: 2023-03-17 | End: 2023-03-17

## 2023-03-17 RX ADMIN — SODIUM CHLORIDE 25 ML/HR: 9 INJECTION, SOLUTION INTRAVENOUS at 09:32

## 2023-03-17 RX ADMIN — DEXAMETHASONE SODIUM PHOSPHATE 10 MG: 10 INJECTION INTRAMUSCULAR; INTRAVENOUS at 09:29

## 2023-03-17 RX ADMIN — ONDANSETRON 8 MG: 2 INJECTION INTRAMUSCULAR; INTRAVENOUS at 09:29

## 2023-03-17 RX ADMIN — LORAZEPAM 0.5 MG: 2 INJECTION INTRAMUSCULAR; INTRAVENOUS at 09:35

## 2023-03-17 RX ADMIN — DIHYDROERGOTAMINE MESYLATE 1 MG: 1 INJECTION, SOLUTION INTRAMUSCULAR; INTRAVENOUS; SUBCUTANEOUS at 11:30

## 2023-03-17 RX ADMIN — MAGNESIUM SULFATE HEPTAHYDRATE 500 MG: 500 INJECTION, SOLUTION INTRAMUSCULAR; INTRAVENOUS at 10:10

## 2023-03-17 NOTE — PROGRESS NOTES
[facility] Progress Note    Date: March 17, 2023        0800: [facility] Assessment completed. PIV in place from yesterdays infusion to Right AC with positive blood return. Criteria for treatment was met. Patient Vitals for the past 12 hrs:   Temp Pulse Resp BP SpO2   03/17/23 1150 98.1 °F (36.7 °C) (!) 54 16 (!) 141/83 98 %   03/17/23 0801 97.3 °F (36.3 °C) 93 16 101/62 100 %        Medications Administered       0.9% sodium chloride infusion       Admin Date  03/17/2023 Action  New Bag Dose  25 mL/hr Rate  25 mL/hr Route  IntraVENous Administered By  Rudy Lock RN              dexamethasone (DECADRON) 10 mg/mL injection 10 mg       Admin Date  03/17/2023 Action  Given Dose  10 mg Route  IntraVENous Administered By  Rudy Lock RN              dihydroergotamine (DHE-45) 1 mg in 0.9% sodium chloride 50 mL, overfill volume 5 mL IVPB       Admin Date  03/17/2023 Action  New Bag Dose  1 mg Rate  224 mL/hr Route  IntraVENous Administered By  Rudy Lock RN              LORazepam (ATIVAN) injection 0.5 mg       Admin Date  03/17/2023 Action  Given Dose  0.5 mg Route  IntraVENous Administered By  Rudy Lock RN              magnesium sulfate 500 mg in 0.9% sodium chloride, overfill volume 111 mL IVPB       Admin Date  03/17/2023 Action  New Bag Dose  500 mg Rate  111 mL/hr Route  IntraVENous Administered By  Rudy Lock RN              ondansetron [facility]) injection 8 mg       Admin Date  03/17/2023 Action  Given Dose  8 mg Route  IntraVENous Administered By  Rudy Lock RN                    Ms. Vern Olmos tolerated the infusion, and had no complaints. PIV flushed and removed. 2x2 and bandaid placed    Ms. Vern Olmos was discharged from [place] [address] in stable condition. No future appointments.       Goldie Jaramillo, JOHN, RN  March 17, 2023

## 2023-03-17 NOTE — TELEPHONE ENCOUNTER
S/w Zoey Del Toro at [facility], pt had 1st Eliceo's yesterday, developed significant HA yesterday that warranted EMS transport to ED. (Notes in chart)    Pt is back for 2nd Eliceo's round. They wanted to know if pt may have ativan 0.5 mg as premed and if they may give ordered magnesium again since she had 2 doses yesterday. Since Cara Donis is not here, Dr. Pooja Wood was able to review note and sign addended order to include ativan as premed and okay to give mag.     Faxed to Zoey Del Toro at [facility]

## 2023-03-17 NOTE — TELEPHONE ENCOUNTER
[place] spoke with Trinity Amador at [facility], pt had 1st Eliceo's yesterday, developed significant HA yesterday that warranted EMS transport to ED. (Notes in chart)     Pt is back for 2nd Eliceo's round. They wanted to know if pt may have ativan 0.5 mg as premed and if they may give ordered magnesium again since she had 2 doses yesterday. Since Snatana Mendosa is not here, Dr. Judson Wooten was able to review note and sign addended order to include ativan as premed and okay to give mag.      Faxed to Trinity Amador at [facility]

## 2023-03-17 NOTE — CALL BACK NOTE
JOHN Fletcher states she informed RN with Dr Levar Parsons that patient was seen in ED yesterday and the medications received at that visit after [facility] treatment for migraine on 3/16/23

## 2023-03-20 ENCOUNTER — TELEPHONE (OUTPATIENT)
Dept: NEUROLOGY | Age: 33
End: 2023-03-20

## 2023-03-20 DIAGNOSIS — G43.919 INTRACTABLE MIGRAINE WITHOUT STATUS MIGRAINOSUS, UNSPECIFIED MIGRAINE TYPE: Primary | ICD-10-CM

## 2023-03-20 RX ORDER — ONDANSETRON 2 MG/ML
8 INJECTION INTRAMUSCULAR; INTRAVENOUS ONCE
Start: 2023-03-20 | End: 2023-03-20

## 2023-03-20 RX ORDER — DEXAMETHASONE SODIUM PHOSPHATE 100 MG/10ML
10 INJECTION INTRAMUSCULAR; INTRAVENOUS ONCE
OUTPATIENT
Start: 2023-03-20 | End: 2023-03-20

## 2023-03-20 NOTE — TELEPHONE ENCOUNTER
Botox- Key: [id number]    Outcome:    Available without authorization.     CPT- [id number]  Tracking ID: [id number]- PENDING    CPT- - Pending

## 2023-03-22 ENCOUNTER — TELEPHONE (OUTPATIENT)
Dept: NEUROLOGY | Age: 33
End: 2023-03-22

## 2023-03-27 ENCOUNTER — TELEPHONE (OUTPATIENT)
Dept: NEUROLOGY | Age: 33
End: 2023-03-27

## 2023-03-28 NOTE — TELEPHONE ENCOUNTER
Patient called and she is asking if she can get her appointment for Botox before her insurance ended this month.

## 2023-03-29 NOTE — TELEPHONE ENCOUNTER
Give her some samples of ajovy     As for BOTOX-   Does [facility] have one there?  Or can we expedite it before the end of this month   Thanks

## 2023-03-31 ENCOUNTER — OFFICE VISIT (OUTPATIENT)
Dept: NEUROLOGY | Age: 33
End: 2023-03-31

## 2023-03-31 DIAGNOSIS — G43.719 INTRACTABLE CHRONIC MIGRAINE WITHOUT AURA AND WITHOUT STATUS MIGRAINOSUS: Primary | ICD-10-CM

## 2023-03-31 RX ORDER — BUTALBITAL, ACETAMINOPHEN AND CAFFEINE 50; 325; 40 MG/1; MG/1; MG/1
TABLET ORAL
Qty: 60 TABLET | Refills: 4 | Status: SHIPPED | OUTPATIENT
Start: 2023-03-31

## 2023-03-31 RX ORDER — SUMATRIPTAN 100 MG/1
TABLET, FILM COATED ORAL
Qty: 27 TABLET | Refills: 1 | Status: SHIPPED | OUTPATIENT
Start: 2023-03-31

## 2023-03-31 RX ORDER — CYCLOBENZAPRINE HCL 10 MG
10 TABLET ORAL 2 TIMES DAILY
Qty: 180 TABLET | Refills: 1 | Status: SHIPPED | OUTPATIENT
Start: 2023-03-31

## 2023-03-31 RX ORDER — KETOROLAC TROMETHAMINE 10 MG/1
TABLET, FILM COATED ORAL
Qty: 30 TABLET | Refills: 0 | Status: SHIPPED | OUTPATIENT
Start: 2023-03-31

## 2023-03-31 NOTE — PROGRESS NOTES
[address] [facility]  OFFICE PROCEDURE PROGRESS NOTE        Chart reviewed for the following:   I, [de-identified] M JUNIOR Dodge, have reviewed the History, Physical and updated the Allergic reactions for [place] performed immediately prior to start of procedure:   I, Catarino Landau, NP, have performed the following reviews on Lorette Cast prior to the start of the procedure:            * Patient was identified by name and date of birth   * Agreement on procedure being performed was verified  * Risks and Benefits explained to the patient  * Procedure site verified and marked as necessary  * Patient was positioned for comfort  * Consent was signed and verified     Time: 1400      Date of procedure: 3/31/2023    Procedure performed by:  Catarino Landau, NP    Provider assisted by: None    Patient assisted by: None    How tolerated by patient: tolerated the procedure well with no complications    Post Procedural Pain Scale: 2 - Hurts Little Bit    Comments: None      Botox Injection Note       Indication: patient has chronic recurrent migraine, has 7-10 less migraine days per month with botox injections    Procedure:   Botox concentration: 200 units in 4 ml of preservative-free normal saline. 31 sites injections, distribution as follow      Units/site  Sites Sides Subtotal    Procerus 5 1 1 5    5 1 2 10   Frontalis 5 2 2 20   Temporalis 5 4 2 40   Occipitalis 5 3 2 30   Upper cervical paraspinalis 5 2 2 20   Trapezius 5 3 2 30         200 units Botox were reconstituted, 155 units injected as above and the remainder was unavoidably wasted.      Patient tolerated procedure well.     _____________________________   Danette Peraza

## 2023-05-21 RX ORDER — BUSPIRONE HYDROCHLORIDE 10 MG/1
TABLET ORAL
COMMUNITY
Start: 2022-04-25

## 2023-05-21 RX ORDER — ESCITALOPRAM OXALATE 20 MG/1
20 TABLET ORAL
COMMUNITY

## 2023-05-21 RX ORDER — ARIPIPRAZOLE 2 MG/1
2 TABLET ORAL DAILY
COMMUNITY
Start: 2022-03-15

## 2023-05-21 RX ORDER — ESTRADIOL 0.1 MG/D
FILM, EXTENDED RELEASE TRANSDERMAL
COMMUNITY
Start: 2021-06-23

## 2023-05-21 RX ORDER — CYCLOBENZAPRINE HCL 10 MG
10 TABLET ORAL 2 TIMES DAILY
COMMUNITY
Start: 2023-03-31

## 2023-05-21 RX ORDER — CLONAZEPAM 1 MG/1
2 TABLET ORAL NIGHTLY
COMMUNITY
Start: 2015-10-15

## 2023-05-21 RX ORDER — BUTALBITAL, ACETAMINOPHEN AND CAFFEINE 50; 325; 40 MG/1; MG/1; MG/1
TABLET ORAL
COMMUNITY
Start: 2023-03-31

## 2023-05-26 RX ORDER — PREDNISONE 10 MG/1
TABLET ORAL
COMMUNITY
Start: 2023-03-14

## 2023-05-26 RX ORDER — KETOROLAC TROMETHAMINE 10 MG/1
TABLET, FILM COATED ORAL
COMMUNITY
Start: 2023-03-31

## 2023-05-26 RX ORDER — SUMATRIPTAN 100 MG/1
TABLET, FILM COATED ORAL
COMMUNITY
Start: 2023-03-31

## 2023-05-26 RX ORDER — ONDANSETRON 4 MG/1
4 TABLET, ORALLY DISINTEGRATING ORAL EVERY 8 HOURS PRN
COMMUNITY
Start: 2022-01-25

## 2023-05-26 RX ORDER — OXYCODONE HYDROCHLORIDE AND ACETAMINOPHEN 5; 325 MG/1; MG/1
TABLET ORAL
COMMUNITY
Start: 2022-10-27

## 2023-05-26 RX ORDER — FREMANEZUMAB-VFRM 225 MG/1.5ML
INJECTION SUBCUTANEOUS
COMMUNITY
Start: 2022-10-14

## 2023-06-07 ENCOUNTER — TELEPHONE (OUTPATIENT)
Age: 33
End: 2023-06-07

## 2023-07-18 NOTE — TELEPHONE ENCOUNTER
rcvd PA approval effective 06/29/21-07/29/22. Isotretinoin Pregnancy And Lactation Text: This medication is Pregnancy Category X and is considered extremely dangerous during pregnancy. It is unknown if it is excreted in breast milk.

## 2023-09-05 ENCOUNTER — TELEPHONE (OUTPATIENT)
Age: 33
End: 2023-09-05

## 2023-09-05 NOTE — TELEPHONE ENCOUNTER
Patient would like a call from [facility] regarding her being at the ER 9/4/23 due to a severe migraine. Patient would like to know her Botox status.     Please contact

## 2023-09-06 ENCOUNTER — TELEPHONE (OUTPATIENT)
Age: 33
End: 2023-09-06

## 2023-09-06 NOTE — TELEPHONE ENCOUNTER
Saad Jaime wants to know if the PA for this patient's botox was approved yet.  Thanks Audrey Goodell

## 2023-09-11 ENCOUNTER — TELEPHONE (OUTPATIENT)
Age: 33
End: 2023-09-11

## 2023-09-11 NOTE — TELEPHONE ENCOUNTER
Patient is calling to inform that she called her insurance and they advised that they have not received anything. Patient needs a PA for Botox.

## 2023-09-14 ENCOUNTER — TELEPHONE (OUTPATIENT)
Age: 33
End: 2023-09-14

## 2023-09-14 NOTE — TELEPHONE ENCOUNTER
Patient called very upset because she is over a month behind on her Botox shot. Her insurance informed her they do not have a request for a PA. Stated her last appointment was canceled because there was no PA. She would like a call from the nurse or doctor. Patient has a migraine as we speak. She has taken motrin and her Ajovy shot and nothing is working. Botox helps her the best.    Please contact.

## 2023-09-18 NOTE — TELEPHONE ENCOUNTER
Re: Botox    Called [facility] at [phone] and requested PA for  over the phone, added [employer] as SSP.  is now approved auth# [id number] effective 09/18/23-09/18/24          For cpt 741[address] called [phone] with Jordan Cantrell and PA is not required. Call ref# [id number].

## 2023-09-19 ENCOUNTER — PROCEDURE VISIT (OUTPATIENT)
Age: 33
End: 2023-09-19
Payer: COMMERCIAL

## 2023-09-19 ENCOUNTER — TELEPHONE (OUTPATIENT)
Age: 33
End: 2023-09-19

## 2023-09-19 DIAGNOSIS — G43.719 CHRONIC MIGRAINE WITHOUT AURA, INTRACTABLE, WITHOUT STATUS MIGRAINOSUS: Primary | ICD-10-CM

## 2023-09-19 PROCEDURE — 64615 CHEMODENERV MUSC MIGRAINE: CPT | Performed by: NURSE PRACTITIONER

## 2023-09-19 RX ORDER — KETOROLAC TROMETHAMINE 30 MG/ML
INJECTION, SOLUTION INTRAMUSCULAR; INTRAVENOUS
Qty: 10 ML | Refills: 3 | Status: SHIPPED | OUTPATIENT
Start: 2023-09-19

## 2023-09-19 RX ORDER — DEXAMETHASONE 2 MG/1
TABLET ORAL
Qty: 20 TABLET | Refills: 5 | Status: SHIPPED | OUTPATIENT
Start: 2023-09-19

## 2023-09-19 RX ORDER — HYDROMORPHONE HYDROCHLORIDE 2 MG/1
2-4 TABLET ORAL EVERY 12 HOURS PRN
Qty: 15 TABLET | Refills: 0 | Status: SHIPPED | OUTPATIENT
Start: 2023-09-19 | End: 2023-10-19

## 2023-09-19 NOTE — TELEPHONE ENCOUNTER
Called [facility] pharmacy and spoke with Cha on phone for 45 minutes explained PA was approved. To be shipped 9/21/23.

## 2023-09-19 NOTE — PROGRESS NOTES
OFFICE PROCEDURE PROGRESS NOTE        Chart reviewed for the following:   David NY APRN - NP, have reviewed the History, Physical and updated the Allergic reactions for [address] performed immediately prior to start of procedure:   David NY APRN - NP, have performed the following reviews on [facility] prior to the start of the procedure:            * Patient was identified by name and date of birth   * Agreement on procedure being performed was verified  * Risks and Benefits explained to the patient  * Procedure site verified and marked as necessary  * Patient was positioned for comfort  * Consent was signed and verified     Time: 1400      Date of procedure: 9/19/2023    Procedure performed by:  CHERYLE Oneal NP    Provider assisted by: None    Patient assisted by: None    How tolerated by patient: tolerated the procedure well with no complications    Post Procedural Pain Scale: 2 - Hurts Little Bit    Comments: None    PVS:[id number]  EXP: 10/2025          Botox Injection Note       Indication: patient has chronic recurrent migraine, has 7-10 less migraine days per month with botox injections    Procedure:   Botox concentration: 200 units in 4 ml of preservative-free normal saline. 31 sites injections, distribution as follow      Units/site  Sites Sides Subtotal    Procerus 5 1 1 5    5 1 2 10   Frontalis 5 2 2 20   Temporalis 5 4 2 40   Occipitalis 5 3 2 30   Upper cervical paraspinalis 5 2 2 20   Trapezius 5 3 2 30         200 units Botox were reconstituted, 155 units injected as above and the remainder was unavoidably wasted. Patient tolerated procedure well.     _____________________________   Wes Eubanks     Long discussion on keeping her out of ER   Will use decadron, IM Toradol, zofran, and dilaudid PRN   Discussed in full   Side effects, risks.

## 2023-09-21 ENCOUNTER — TELEPHONE (OUTPATIENT)
Age: 33
End: 2023-09-21

## 2023-10-06 ENCOUNTER — OFFICE VISIT (OUTPATIENT)
Age: 33
End: 2023-10-06
Payer: COMMERCIAL

## 2023-10-06 VITALS — OXYGEN SATURATION: 98 % | DIASTOLIC BLOOD PRESSURE: 68 MMHG | SYSTOLIC BLOOD PRESSURE: 102 MMHG | HEART RATE: 95 BPM

## 2023-10-06 DIAGNOSIS — G43.719 CHRONIC MIGRAINE WITHOUT AURA, INTRACTABLE, WITHOUT STATUS MIGRAINOSUS: Primary | ICD-10-CM

## 2023-10-06 PROCEDURE — G8427 DOCREV CUR MEDS BY ELIG CLIN: HCPCS | Performed by: NURSE PRACTITIONER

## 2023-10-06 PROCEDURE — G8420 CALC BMI NORM PARAMETERS: HCPCS | Performed by: NURSE PRACTITIONER

## 2023-10-06 PROCEDURE — 4004F PT TOBACCO SCREEN RCVD TLK: CPT | Performed by: NURSE PRACTITIONER

## 2023-10-06 PROCEDURE — 99214 OFFICE O/P EST MOD 30 MIN: CPT | Performed by: NURSE PRACTITIONER

## 2023-10-06 PROCEDURE — G8484 FLU IMMUNIZE NO ADMIN: HCPCS | Performed by: NURSE PRACTITIONER

## 2023-10-06 RX ORDER — RIMEGEPANT SULFATE 75 MG/75MG
TABLET, ORALLY DISINTEGRATING ORAL
COMMUNITY

## 2023-10-06 NOTE — PROGRESS NOTES
Since second botox hasnt had a MHA or HA  Started magnesium at night  Handling meds and injections well  Mentioned needing a PA for Toradol solution and syringes

## 2023-10-09 RX ORDER — KETOROLAC TROMETHAMINE 30 MG/ML
INJECTION, SOLUTION INTRAMUSCULAR; INTRAVENOUS
Qty: 10 ML | Refills: 3 | Status: SHIPPED | OUTPATIENT
Start: 2023-10-09

## 2023-10-09 NOTE — PROGRESS NOTES
Irena Barker is a 26 y.o. female who presents with the following  Chief Complaint   Patient presents with    Follow-up           HPI    Patient comes in for follow-up for Botox  This is her second treatment  She has had a significant improvement  She is down from chronic migraine at 30-month to only 6-8 now  Lasting 2-4 hours     She has noticed they are still the same in regard to pain and localization  They are unilateral  Sharp, stabbing pains. Nausea, dizziness    She is doing well at work   Has not needed any rescue  Has the dilaudid and none have been taken   Needs a PA on toradal.         Allergies   Allergen Reactions    Metoclopramide Anaphylaxis and Other (See Comments)    Diphenhydramine Other (See Comments)     Makes me feel funny. Need ativan if I get benadryl    Haloperidol Lactate Other (See Comments)    Promethazine Other (See Comments)     \"It makes me feel really funny, nausea and dizzy\"    Sertraline Other (See Comments)     Suicidal thoughts    Valproate Sodium Other (See Comments)     Pt reports having restlessness with this medication. Prochlorperazine Edisylate Anxiety       Current Outpatient Medications   Medication Sig Dispense Refill    Magnesium 400 MG CAPS Take by mouth      Rimegepant Sulfate (NURTEC) 75 MG TBDP Take by mouth      HYDROmorphone (DILAUDID) 2 MG tablet Take 1-2 tablets by mouth every 12 hours as needed for Pain (FOR SEVERE MIGRAINE) for up to 30 days. Max Daily Amount: 8 mg 15 tablet 0    dexamethasone (DECADRON) 2 MG tablet Take 3 tablets by mouth daily until HA cycle is broken PRN UP to 3 days in a row. For SEVERE MIGRAINE 20 tablet 5    Onabotulinumtoxin A (BOTOX) 200 units injection INJECT 155 UNITS INTO SELECTED MUSCLES OF THE HEAD AND NECK BILATERALLY EVERY 12 WEEKS FOR MIGRAINE PREVENTION. DISCARD UNUSED PORTION.  1 each 2    ondansetron (ZOFRAN-ODT) 4 MG disintegrating tablet Take 1 tablet by mouth every 8 hours as needed      SUMAtriptan

## 2023-11-01 ENCOUNTER — TELEPHONE (OUTPATIENT)
Age: 33
End: 2023-11-01

## 2023-11-01 NOTE — TELEPHONE ENCOUNTER
Patient wanted to notify Tiffanie Horn she was in the ER on 10/31/23 with a severe migraine. Stated she did all the steps Tiffanie Horn discussed with her last appointment (medications).     Patient has an upcoming appointment 12/5/23

## 2023-11-15 ENCOUNTER — TELEPHONE (OUTPATIENT)
Age: 33
End: 2023-11-15

## 2023-12-05 ENCOUNTER — PROCEDURE VISIT (OUTPATIENT)
Age: 33
End: 2023-12-05
Payer: COMMERCIAL

## 2023-12-05 DIAGNOSIS — G43.719 CHRONIC MIGRAINE WITHOUT AURA, INTRACTABLE, WITHOUT STATUS MIGRAINOSUS: Primary | ICD-10-CM

## 2023-12-05 PROCEDURE — 64615 CHEMODENERV MUSC MIGRAINE: CPT | Performed by: NURSE PRACTITIONER

## 2023-12-05 RX ORDER — ONDANSETRON HYDROCHLORIDE 8 MG/1
8 TABLET, FILM COATED ORAL
Qty: 30 TABLET | Refills: 4 | Status: SHIPPED | OUTPATIENT
Start: 2023-12-05

## 2023-12-05 NOTE — PROGRESS NOTES
OFFICE PROCEDURE PROGRESS NOTE        Chart reviewed for the following:   David NY APRN - NP, have reviewed the History, Physical and updated the Allergic reactions for [address] performed immediately prior to start of procedure:   David NY APRN - NP, have performed the following reviews on [facility] prior to the start of the procedure:            * Patient was identified by name and date of birth   * Agreement on procedure being performed was verified  * Risks and Benefits explained to the patient  * Procedure site verified and marked as necessary  * Patient was positioned for comfort  * Consent was signed and verified     Time: 1520      Date of procedure: 12/5/2023    Procedure performed by:  CHERYLE Lugo NP    Provider assisted by: None    Patient assisted by: None    How tolerated by patient: tolerated the procedure well with no complications    Post Procedural Pain Scale: 2 - Hurts Little Bit    Comments: None    KQE:[id number]  EXP: 02/2026          Botox Injection Note       Indication: patient has chronic recurrent migraine, has 7-10 less migraine days per month with botox injections    Procedure:   Botox concentration: 200 units in 4 ml of preservative-free normal saline. 31 sites injections, distribution as follow      Units/site  Sites Sides Subtotal    Procerus 5 1 1 5    5 1 2 10   Frontalis 5 2 2 20   Temporalis 5 4 2 40   Occipitalis 5 3 2 30   Upper cervical paraspinalis 5 2 2 20   Trapezius 5 3 2 30         200 units Botox were reconstituted, 155 units injected as above and the remainder was unavoidably wasted.      Patient tolerated procedure well.     _____________________________   Ian Fierro

## 2024-01-02 DIAGNOSIS — G43.719 CHRONIC MIGRAINE WITHOUT AURA, INTRACTABLE, WITHOUT STATUS MIGRAINOSUS: Primary | ICD-10-CM

## 2024-01-02 RX ORDER — NARATRIPTAN 2.5 MG/1
2.5 TABLET ORAL
Qty: 9 TABLET | Refills: 3 | Status: SHIPPED | OUTPATIENT
Start: 2024-01-02 | End: 2024-01-02

## 2024-01-02 RX ORDER — PREDNISONE 10 MG/1
TABLET ORAL
Qty: 42 TABLET | Refills: 0 | Status: SHIPPED | OUTPATIENT
Start: 2024-01-02 | End: 2024-01-05 | Stop reason: ALTCHOICE

## 2024-01-05 ENCOUNTER — OFFICE VISIT (OUTPATIENT)
Age: 34
End: 2024-01-05
Payer: COMMERCIAL

## 2024-01-05 VITALS
SYSTOLIC BLOOD PRESSURE: 112 MMHG | DIASTOLIC BLOOD PRESSURE: 76 MMHG | HEART RATE: 110 BPM | RESPIRATION RATE: 18 BRPM | OXYGEN SATURATION: 98 %

## 2024-01-05 DIAGNOSIS — G43.719 CHRONIC MIGRAINE WITHOUT AURA, INTRACTABLE, WITHOUT STATUS MIGRAINOSUS: Primary | ICD-10-CM

## 2024-01-05 PROCEDURE — G8484 FLU IMMUNIZE NO ADMIN: HCPCS | Performed by: NURSE PRACTITIONER

## 2024-01-05 PROCEDURE — 99215 OFFICE O/P EST HI 40 MIN: CPT | Performed by: NURSE PRACTITIONER

## 2024-01-05 PROCEDURE — G8420 CALC BMI NORM PARAMETERS: HCPCS | Performed by: NURSE PRACTITIONER

## 2024-01-05 PROCEDURE — G8427 DOCREV CUR MEDS BY ELIG CLIN: HCPCS | Performed by: NURSE PRACTITIONER

## 2024-01-05 PROCEDURE — 4004F PT TOBACCO SCREEN RCVD TLK: CPT | Performed by: NURSE PRACTITIONER

## 2024-01-05 ASSESSMENT — PATIENT HEALTH QUESTIONNAIRE - PHQ9: DEPRESSION UNABLE TO ASSESS: PT REFUSES

## 2024-01-09 NOTE — PROGRESS NOTES
limits.    Lower thoracic spine: No herniation or stenosis.    L1-L2: Mild facet arthropathy. No stenosis    L2-L3: Mild facet arthropathy. No stenosis    L3-L4: Moderate facet arthropathy. No stenosis    L4-L5: Moderate facet arthropathy. No stenosis    L5-S1: Disc bulge with superimposed right subarticular disc extrusion with  annular fissure narrowing the right lateral recess. Marked facet arthropathy. No  spinal stenosis. Mild left foraminal stenosis    Impression  1.  Negative for cauda equina compression.  2.  L5-S1 right subarticular disc extrusion with annular fissure narrowing the  right lateral recess. No significant lumbar spinal stenosis. L5-S1 mild left  foraminal stenosis.      CT Result (most recent):  CT HEAD WO CONTRAST 03/16/2023    Narrative  HEAD CT WITHOUT CONTRAST: 3/16/2023 2:44 PM    INDICATION: headache    COMPARISON: 11/21/2019.    PROCEDURE: Axial images of the head were obtained without contrast. Coronal and  sagittal reformats were performed. CT dose reduction was achieved through use of  a standardized protocol tailored for this examination and automatic exposure  control for dose modulation.    FINDINGS: The ventricles and sulci are appropriate in size and configuration for  age. No loss of gray-white differentiation to suggest late acute or early  subacute infarction. No mass effect or intracranial hemorrhage.    Impression  No acute intracranial abnormality.      EEG Result:      Carotid Doppler:        Recent Labs:  Lab Results   Component Value Date    WBC 8.9 03/09/2023    HGB 13.0 03/09/2023    HCT 39.6 03/09/2023    MCV 86.7 03/09/2023     03/09/2023     Lab Results   Component Value Date     03/09/2023    K 4.5 03/09/2023     (H) 03/09/2023    CO2 25 03/09/2023    BUN 8 03/09/2023    CREATININE 0.96 03/09/2023    GLUCOSE 123 (H) 03/09/2023    CALCIUM 9.1 03/09/2023    PROT 7.4 03/09/2023    LABALBU 4.0 03/09/2023    BILITOT 0.6 03/09/2023    ALKPHOS 48

## 2024-03-12 ENCOUNTER — TELEPHONE (OUTPATIENT)
Age: 34
End: 2024-03-12

## 2024-03-13 ENCOUNTER — TELEPHONE (OUTPATIENT)
Age: 34
End: 2024-03-13

## 2024-03-14 ENCOUNTER — PROCEDURE VISIT (OUTPATIENT)
Age: 34
End: 2024-03-14
Payer: COMMERCIAL

## 2024-03-14 DIAGNOSIS — G43.719 CHRONIC MIGRAINE WITHOUT AURA, INTRACTABLE, WITHOUT STATUS MIGRAINOSUS: Primary | ICD-10-CM

## 2024-03-14 DIAGNOSIS — G43.909 EPISODIC MIGRAINE: ICD-10-CM

## 2024-03-14 PROCEDURE — 4004F PT TOBACCO SCREEN RCVD TLK: CPT | Performed by: NURSE PRACTITIONER

## 2024-03-14 PROCEDURE — G8428 CUR MEDS NOT DOCUMENT: HCPCS | Performed by: NURSE PRACTITIONER

## 2024-03-14 PROCEDURE — G8484 FLU IMMUNIZE NO ADMIN: HCPCS | Performed by: NURSE PRACTITIONER

## 2024-03-14 PROCEDURE — 64615 CHEMODENERV MUSC MIGRAINE: CPT | Performed by: NURSE PRACTITIONER

## 2024-03-14 PROCEDURE — G8420 CALC BMI NORM PARAMETERS: HCPCS | Performed by: NURSE PRACTITIONER

## 2024-03-14 PROCEDURE — 99214 OFFICE O/P EST MOD 30 MIN: CPT | Performed by: NURSE PRACTITIONER

## 2024-03-14 NOTE — PROGRESS NOTES
Pat See is a 33 y.o. female who presents with the following  Chief Complaint   Patient presents with    Procedure     Botox        HPI    Botox is still working well     Doing well with this   Had a few spells in which she went to the ER   She goes to [facility].       She does have episodic migraines in between her Botox  Botox is dropped from 30 migraines a month to about 6-8    She has been on Qulipta and Nurtec and Ubrelvy  She been on Topamax and epanolol  She has an allergy to Zoloft and Depakote  She is also been on Haldol when had a reaction to that  She is currently taking Lexapro  She does use the Toradol injection as needed  She also feels like something on top of what were doing could help  She has been on injectables of all 3… Aimovig, Emgality, and Ajovy  None of these have worked to help decrease the headaches even more  She is never been on Vyepti so we will try this        Allergies   Allergen Reactions    Metoclopramide Anaphylaxis and Other (See Comments)    Diphenhydramine Other (See Comments)     Makes me feel funny.  Need ativan if I get benadryl    Haloperidol Lactate Other (See Comments)    Promethazine Other (See Comments)     \"It makes me feel really funny, nausea and dizzy\"    Sertraline Other (See Comments)     Suicidal thoughts    Valproate Sodium Other (See Comments)     Pt reports having restlessness with this medication.     Prochlorperazine Edisylate Anxiety       Current Outpatient Medications   Medication Sig Dispense Refill    naratriptan (AMERGE) 2.5 MG tablet Take 1 tablet by mouth once as needed for Migraine 2.5 mg at onset of headache, may repeat in 4 hours if needed 9 tablet 3    ondansetron (ZOFRAN) 8 MG tablet Take 1 tablet by mouth every 6-8 hours as needed for Nausea or Vomiting 30 tablet 4    ketorolac (TORADOL) 60 MG/2ML injection Inject 2 ML IM at HA onset PRN up to 3 days a week.  FOR SEVERE MIGRAINE- please send PA 10 mL 3    Rimegepant Sulfate

## 2024-03-14 NOTE — PROGRESS NOTES
OFFICE PROCEDURE PROGRESS NOTE        Chart reviewed for the following:   David NY APRN - NP, have reviewed the History, Physical and updated the Allergic reactions for Pat [place]     TIME OUT performed immediately prior to start of procedure:   David NY APRN - NP, have performed the following reviews on Pat A [place] prior to the start of the procedure:            * Patient was identified by name and date of birth   * Agreement on procedure being performed was verified  * Risks and Benefits explained to the patient  * Procedure site verified and marked as necessary  * Patient was positioned for comfort  * Consent was signed and verified     Time: 1400      Date of procedure: 3/14/2024    Procedure performed by:  CHERYLE Mills NP    Provider assisted by: None    Patient assisted by: None    How tolerated by patient: tolerated the procedure well with no complications    Post Procedural Pain Scale: 2 - Hurts Little Bit    Comments: None    LOT: [id number]  EXP: 06/2026          Botox Injection Note       Indication: patient has chronic recurrent migraine, has 7-10 less migraine days per month with botox injections    Procedure:   Botox concentration: 200 units in 4 ml of preservative-free normal saline.   31 sites injections, distribution as follow      Units/site  Sites Sides Subtotal    Procerus 5 1 1 5    5 1 2 10   Frontalis 5 2 2 20   Temporalis 5 4 2 40   Occipitalis 5 3 2 30   Upper cervical paraspinalis 5 2 2 20   Trapezius 5 3 2 30         200 units Botox were reconstituted, 155 units injected as above and the remainder was unavoidably wasted.     Patient tolerated procedure well.     _____________________________   DAVID LANDEROS

## 2024-05-23 DIAGNOSIS — G43.719 CHRONIC MIGRAINE WITHOUT AURA, INTRACTABLE, WITHOUT STATUS MIGRAINOSUS: ICD-10-CM

## 2024-05-23 RX ORDER — DEXAMETHASONE 2 MG/1
TABLET ORAL
Qty: 20 TABLET | Refills: 5 | Status: SHIPPED | OUTPATIENT
Start: 2024-05-23

## 2024-05-31 ENCOUNTER — TELEPHONE (OUTPATIENT)
Age: 34
End: 2024-05-31

## 2024-05-31 NOTE — TELEPHONE ENCOUNTER
Botox Drug Acquisition: BUY AND BILL  Drug: BOTOX 200 units Dx: G43.719  Insurance: [facility] [employer]  Submission Type: [facility] [employer]  [facility]:   Reference #: [id number]   Approval Range: 05/31/24 to 05/31/25    Scanned approval letter to media

## 2024-06-04 DIAGNOSIS — G43.719 CHRONIC MIGRAINE WITHOUT AURA, INTRACTABLE, WITHOUT STATUS MIGRAINOSUS: Primary | ICD-10-CM

## 2024-06-04 RX ORDER — ZONISAMIDE 50 MG/1
50 CAPSULE ORAL 2 TIMES DAILY
Qty: 60 CAPSULE | Refills: 4 | Status: SHIPPED | OUTPATIENT
Start: 2024-06-04

## 2024-06-13 ENCOUNTER — PROCEDURE VISIT (OUTPATIENT)
Age: 34
End: 2024-06-13
Payer: COMMERCIAL

## 2024-06-13 DIAGNOSIS — G43.719 CHRONIC MIGRAINE WITHOUT AURA, INTRACTABLE, WITHOUT STATUS MIGRAINOSUS: ICD-10-CM

## 2024-06-13 PROCEDURE — 64615 CHEMODENERV MUSC MIGRAINE: CPT | Performed by: NURSE PRACTITIONER

## 2024-06-13 RX ORDER — SUMATRIPTAN 100 MG/1
TABLET, FILM COATED ORAL
Qty: 9 TABLET | Refills: 5 | Status: SHIPPED | OUTPATIENT
Start: 2024-06-13

## 2024-06-13 RX ORDER — KETOROLAC TROMETHAMINE 30 MG/ML
INJECTION, SOLUTION INTRAMUSCULAR; INTRAVENOUS
Qty: 10 ML | Refills: 3 | Status: SHIPPED | OUTPATIENT
Start: 2024-06-13

## 2024-06-13 RX ORDER — HYDROMORPHONE HYDROCHLORIDE 2 MG/1
2-4 TABLET ORAL EVERY 12 HOURS PRN
Qty: 15 TABLET | Refills: 0 | Status: SHIPPED | OUTPATIENT
Start: 2024-06-13 | End: 2024-07-13

## 2024-06-13 RX ORDER — DEXAMETHASONE 2 MG/1
TABLET ORAL
Qty: 20 TABLET | Refills: 5 | Status: SHIPPED | OUTPATIENT
Start: 2024-06-13

## 2024-06-13 NOTE — PROGRESS NOTES
OFFICE PROCEDURE PROGRESS NOTE        Chart reviewed for the following:   David NY APRN - NP, have reviewed the History, Physical and updated the Allergic reactions for Pat A Mayi     TIME OUT performed immediately prior to start of procedure:   David NY APRN - NP, have performed the following reviews on Pat A [place] prior to the start of the procedure:            * Patient was identified by name and date of birth   * Agreement on procedure being performed was verified  * Risks and Benefits explained to the patient  * Procedure site verified and marked as necessary  * Patient was positioned for comfort  * Consent was signed and verified     Time: 1340      Date of procedure: 6/13/2024    Procedure performed by:  CHERYLE Mills NP    Provider assisted by: None    Patient assisted by: None    How tolerated by patient: tolerated the procedure well with no complications    Post Procedural Pain Scale: 2 - Hurts Little Bit    Comments: None          Botox Injection Note       Indication: patient has chronic recurrent migraine, has 7-10 less migraine days per month with botox injections    Procedure:   Botox concentration: 200 units in 4 ml of preservative-free normal saline.   31 sites injections, distribution as follow      Units/site  Sites Sides Subtotal    Procerus 5 1 1 5    5 1 2 10   Frontalis 5 2 2 20   Temporalis 5 4 2 40   Occipitalis 5 3 2 30   Upper cervical paraspinalis 5 2 2 20   Trapezius 5 3 2 30         200 units Botox were reconstituted, 155 units injected as above and the remainder was unavoidably wasted.     Patient tolerated procedure well.     _____________________________   DAVID LANDEROS

## 2024-07-09 ENCOUNTER — HOSPITAL ENCOUNTER (EMERGENCY)
Facility: HOSPITAL | Age: 34
Discharge: HOME | End: 2024-07-09
Attending: EMERGENCY MEDICINE
Payer: COMMERCIAL

## 2024-07-09 VITALS
HEIGHT: 68 IN | DIASTOLIC BLOOD PRESSURE: 79 MMHG | TEMPERATURE: 98.2 F | SYSTOLIC BLOOD PRESSURE: 101 MMHG | HEART RATE: 61 BPM | OXYGEN SATURATION: 100 % | RESPIRATION RATE: 18 BRPM | BODY MASS INDEX: 22.73 KG/M2 | WEIGHT: 150 LBS

## 2024-07-09 DIAGNOSIS — R51.9 ACUTE INTRACTABLE HEADACHE, UNSPECIFIED HEADACHE TYPE: Primary | ICD-10-CM

## 2024-07-09 LAB
ALBUMIN SERPL-MCNC: 3.9 G/DL (ref 3.5–5)
ALBUMIN/GLOB SERPL: 1.2 (ref 1.1–2.2)
ALP SERPL-CCNC: 67 U/L (ref 45–117)
ALT SERPL-CCNC: 26 U/L (ref 12–78)
ANION GAP SERPL CALC-SCNC: 6 MMOL/L (ref 5–15)
AST SERPL-CCNC: 25 U/L (ref 15–37)
BASOPHILS # BLD: 0.1 K/UL (ref 0–0.1)
BASOPHILS NFR BLD: 1 % (ref 0–1)
BILIRUB SERPL-MCNC: 0.5 MG/DL (ref 0.2–1)
BUN SERPL-MCNC: 15 MG/DL (ref 6–20)
BUN/CREAT SERPL: 15 (ref 12–20)
CALCIUM SERPL-MCNC: 8.6 MG/DL (ref 8.5–10.1)
CHLORIDE SERPL-SCNC: 112 MMOL/L (ref 97–108)
CO2 SERPL-SCNC: 25 MMOL/L (ref 21–32)
CREAT SERPL-MCNC: 1.03 MG/DL (ref 0.55–1.02)
DIFFERENTIAL METHOD BLD: ABNORMAL
EOSINOPHIL # BLD: 0 K/UL (ref 0–0.4)
EOSINOPHIL NFR BLD: 0 % (ref 0–7)
ERYTHROCYTE [DISTWIDTH] IN BLOOD BY AUTOMATED COUNT: 12.7 % (ref 11.5–14.5)
GLOBULIN SER CALC-MCNC: 3.2 G/DL (ref 2–4)
GLUCOSE SERPL-MCNC: 103 MG/DL (ref 65–100)
HCT VFR BLD AUTO: 40.1 % (ref 35–47)
HGB BLD-MCNC: 13.2 G/DL (ref 11.5–16)
IMM GRANULOCYTES # BLD AUTO: 0 K/UL (ref 0–0.04)
IMM GRANULOCYTES NFR BLD AUTO: 0 % (ref 0–0.5)
LYMPHOCYTES # BLD: 1.6 K/UL (ref 0.8–3.5)
LYMPHOCYTES NFR BLD: 28 % (ref 12–49)
MCH RBC QN AUTO: 29.3 PG (ref 26–34)
MCHC RBC AUTO-ENTMCNC: 32.9 G/DL (ref 30–36.5)
MCV RBC AUTO: 89.1 FL (ref 80–99)
MONOCYTES # BLD: 0.1 K/UL (ref 0–1)
MONOCYTES NFR BLD: 2 % (ref 5–13)
NEUTS SEG # BLD: 3.9 K/UL (ref 1.8–8)
NEUTS SEG NFR BLD: 69 % (ref 32–75)
NRBC # BLD: 0 K/UL (ref 0–0.01)
NRBC BLD-RTO: 0 PER 100 WBC
PLATELET # BLD AUTO: 199 K/UL (ref 150–400)
PMV BLD AUTO: 10.1 FL (ref 8.9–12.9)
POTASSIUM SERPL-SCNC: 3.8 MMOL/L (ref 3.5–5.1)
PROT SERPL-MCNC: 7.1 G/DL (ref 6.4–8.2)
RBC # BLD AUTO: 4.5 M/UL (ref 3.8–5.2)
SODIUM SERPL-SCNC: 143 MMOL/L (ref 136–145)
WBC # BLD AUTO: 5.7 K/UL (ref 3.6–11)

## 2024-07-09 PROCEDURE — 6360000002 HC RX W HCPCS: Performed by: EMERGENCY MEDICINE

## 2024-07-09 PROCEDURE — 80053 COMPREHEN METABOLIC PANEL: CPT

## 2024-07-09 PROCEDURE — 99284 EMERGENCY DEPT VISIT MOD MDM: CPT

## 2024-07-09 PROCEDURE — 36415 COLL VENOUS BLD VENIPUNCTURE: CPT

## 2024-07-09 PROCEDURE — 85025 COMPLETE CBC W/AUTO DIFF WBC: CPT

## 2024-07-09 PROCEDURE — 96375 TX/PRO/DX INJ NEW DRUG ADDON: CPT

## 2024-07-09 PROCEDURE — 96374 THER/PROPH/DIAG INJ IV PUSH: CPT

## 2024-07-09 PROCEDURE — 2580000003 HC RX 258: Performed by: EMERGENCY MEDICINE

## 2024-07-09 PROCEDURE — 96361 HYDRATE IV INFUSION ADD-ON: CPT

## 2024-07-09 RX ORDER — ONDANSETRON 2 MG/ML
4 INJECTION INTRAMUSCULAR; INTRAVENOUS
Status: COMPLETED | OUTPATIENT
Start: 2024-07-09 | End: 2024-07-09

## 2024-07-09 RX ORDER — 0.9 % SODIUM CHLORIDE 0.9 %
1000 INTRAVENOUS SOLUTION INTRAVENOUS ONCE
Status: COMPLETED | OUTPATIENT
Start: 2024-07-09 | End: 2024-07-09

## 2024-07-09 RX ORDER — LORAZEPAM 2 MG/ML
0.5 INJECTION INTRAMUSCULAR ONCE
Status: COMPLETED | OUTPATIENT
Start: 2024-07-09 | End: 2024-07-09

## 2024-07-09 RX ORDER — NAPROXEN 500 MG/1
500 TABLET ORAL 2 TIMES DAILY WITH MEALS
Qty: 15 TABLET | Refills: 0 | Status: SHIPPED | OUTPATIENT
Start: 2024-07-09

## 2024-07-09 RX ADMIN — SODIUM CHLORIDE 1000 ML: 9 INJECTION, SOLUTION INTRAVENOUS at 20:00

## 2024-07-09 RX ADMIN — ONDANSETRON 4 MG: 2 INJECTION INTRAMUSCULAR; INTRAVENOUS at 21:34

## 2024-07-09 RX ADMIN — LORAZEPAM 0.5 MG: 2 INJECTION, SOLUTION INTRAMUSCULAR; INTRAVENOUS at 20:01

## 2024-07-09 ASSESSMENT — LIFESTYLE VARIABLES
HOW OFTEN DO YOU HAVE A DRINK CONTAINING ALCOHOL: 2-4 TIMES A MONTH
HOW MANY STANDARD DRINKS CONTAINING ALCOHOL DO YOU HAVE ON A TYPICAL DAY: 1 OR 2

## 2024-07-09 ASSESSMENT — PAIN DESCRIPTION - LOCATION: LOCATION: HEAD

## 2024-07-09 ASSESSMENT — PAIN - FUNCTIONAL ASSESSMENT: PAIN_FUNCTIONAL_ASSESSMENT: 0-10

## 2024-07-09 ASSESSMENT — PAIN SCALES - GENERAL: PAINLEVEL_OUTOF10: 9

## 2024-07-09 NOTE — ED TRIAGE NOTES
Ambulatory to ED for migraine with sensitivity to light/sound and nausea starting around 1100 today. Reports once CHEUNG started took flexeril, zofran, nurtec, dilauded, and toradol.     Pt referred from [facility] d/t her neurologist being a BS doc. Prior to leaving she received toradol, fluids and magnesium.

## 2024-07-10 NOTE — ED PROVIDER NOTES
[facility] EMERGENCY DEPT  EMERGENCY DEPARTMENT ENCOUNTER      Pt Name: Pat See  MRN: [id number]  Birthdate 1990  Date of evaluation: 7/9/2024  Provider: Wing Esteban MD    CHIEF COMPLAINT       Chief Complaint   Patient presents with    Migraine         HISTORY OF PRESENT ILLNESS   (Location/Symptom, Timing/Onset, Context/Setting, Quality, Duration, Modifying Factors, Severity)  Note limiting factors.   HPI  34-year-old female, Pat See, self-transported to the Emergency Department and provided the history herself. She has a past medical history of migraines, panic attacks, and pyelonephritis. The patient presents with a chief complaint of a breakthrough headache that began at 11:00 AM today, described as a sharp pain under her left eye. The headache has persisted since onset. She reports associated light sensitivity, nausea, and hearing and touch sensitivity. The patient has taken multiple medications including Sumatriptan, Nurtec, Zofran, Decadron, Flexeril, hydromorphone, and received a Toradol shot,and earlier today at another facility, [facility], where she was also given fluids, more toradol and magnesium. She has a known allergy to Compazine, Reglan, and Depakote. The patient's last visit to the emergency department was in March of 2023, where she was transported by EMS due to a worsening migraine during an infusion, which resulted in elevated blood pressure.  She did not improve after Toradol and magnesium then and improved with Benadryl and Ativan.  She does currently not want Benadryl.      Review of External Medical Records:     Nursing Notes were reviewed.    REVIEW OF SYSTEMS    (2-9 systems for level 4, 10 or more for level 5)     Review of Systems    Except as noted above the remainder of the review of systems was reviewed and negative.       PAST MEDICAL HISTORY     Past Medical History:   Diagnosis Date    Anxiety and depression     Bartholin's gland abscess     x8

## 2024-07-10 NOTE — DISCHARGE INSTRUCTIONS
Continue the current migraine regimen that you do at home.  Call your neurologist in the morning.      Instead of ketorolac also known as Toradol, take naproxen 500 mg at midnight and continue it every 12 hours.  Do not take both Toradol and naproxen as they are similar medications.

## 2024-07-11 DIAGNOSIS — G43.719 CHRONIC MIGRAINE WITHOUT AURA, INTRACTABLE, WITHOUT STATUS MIGRAINOSUS: Primary | ICD-10-CM

## 2024-07-11 RX ORDER — ZONISAMIDE 100 MG/1
100 CAPSULE ORAL 2 TIMES DAILY
Qty: 180 CAPSULE | Refills: 1 | Status: SHIPPED | OUTPATIENT
Start: 2024-07-11

## 2024-07-11 RX ORDER — ALMOTRIPTAN 12.5 MG/1
TABLET, FILM COATED ORAL
Qty: 9 TABLET | Refills: 5 | Status: SHIPPED | OUTPATIENT
Start: 2024-07-11

## 2024-09-05 ENCOUNTER — OFFICE VISIT (OUTPATIENT)
Age: 34
End: 2024-09-05

## 2024-09-05 DIAGNOSIS — G43.719 CHRONIC MIGRAINE WITHOUT AURA, INTRACTABLE, WITHOUT STATUS MIGRAINOSUS: Primary | ICD-10-CM

## 2024-09-06 NOTE — PROGRESS NOTES
OFFICE PROCEDURE PROGRESS NOTE        Chart reviewed for the following:   David NY APRN - NP, have reviewed the History, Physical and updated the Allergic reactions for Pat A Mayi     TIME OUT performed immediately prior to start of procedure:   David NY APRN - NP, have performed the following reviews on Pat A [place] prior to the start of the procedure:            * Patient was identified by name and date of birth   * Agreement on procedure being performed was verified  * Risks and Benefits explained to the patient  * Procedure site verified and marked as necessary  * Patient was positioned for comfort  * Consent was signed and verified     Time: 1400      Date of procedure: 9/5/2026    Procedure performed by:  CHERYLE Mills NP    Provider assisted by: None    Patient assisted by: None    How tolerated by patient: tolerated the procedure well with no complications    Post Procedural Pain Scale: 2 - Hurts Little Bit    Comments: None          Botox Injection Note       Indication: patient has chronic recurrent migraine, has 7-10 less migraine days per month with botox injections    Procedure:   Botox concentration: 200 units in 4 ml of preservative-free normal saline.   31 sites injections, distribution as follow      Units/site  Sites Sides Subtotal    Procerus 5 1 1 5    5 1 2 10   Frontalis 5 2 2 20   Temporalis 5 4 2 40   Occipitalis 5 3 2 30   Upper cervical paraspinalis 5 2 2 20   Trapezius 5 3 2 30         200 units Botox were reconstituted, 155 units injected as above and the remainder was unavoidably wasted.     Patient tolerated procedure well.     _____________________________   DAVID LANDEROS

## 2024-09-20 ENCOUNTER — PATIENT MESSAGE (OUTPATIENT)
Age: 34
End: 2024-09-20

## 2024-09-20 DIAGNOSIS — G43.719 CHRONIC MIGRAINE WITHOUT AURA, INTRACTABLE, WITHOUT STATUS MIGRAINOSUS: Primary | ICD-10-CM

## 2024-09-20 RX ORDER — LASMIDITAN 100 MG/1
TABLET ORAL
Qty: 8 TABLET | Refills: 4 | Status: SHIPPED | OUTPATIENT
Start: 2024-09-20

## 2024-09-20 RX ORDER — KETOROLAC TROMETHAMINE 30 MG/ML
INJECTION, SOLUTION INTRAMUSCULAR; INTRAVENOUS
Qty: 10 ML | Refills: 3 | Status: SHIPPED | OUTPATIENT
Start: 2024-09-20

## 2024-09-24 ENCOUNTER — TELEPHONE (OUTPATIENT)
Age: 34
End: 2024-09-24

## 2024-09-24 NOTE — TELEPHONE ENCOUNTER
RE:Juanita    Case submitted via EPIC approved 09/23/2024-09/23/2025, letter in media states that medication does not require PA.    Nurse notified

## 2024-10-09 ENCOUNTER — HOSPITAL ENCOUNTER (EMERGENCY)
Facility: HOSPITAL | Age: 34
Discharge: HOME | End: 2024-10-09
Attending: EMERGENCY MEDICINE
Payer: COMMERCIAL

## 2024-10-09 ENCOUNTER — APPOINTMENT (OUTPATIENT)
Facility: HOSPITAL | Age: 34
End: 2024-10-09
Payer: COMMERCIAL

## 2024-10-09 VITALS
HEIGHT: 68 IN | WEIGHT: 138.67 LBS | RESPIRATION RATE: 16 BRPM | SYSTOLIC BLOOD PRESSURE: 111 MMHG | HEART RATE: 103 BPM | DIASTOLIC BLOOD PRESSURE: 78 MMHG | OXYGEN SATURATION: 100 % | TEMPERATURE: 98.8 F | BODY MASS INDEX: 21.02 KG/M2

## 2024-10-09 DIAGNOSIS — R51.9 NONINTRACTABLE HEADACHE, UNSPECIFIED CHRONICITY PATTERN, UNSPECIFIED HEADACHE TYPE: Primary | ICD-10-CM

## 2024-10-09 DIAGNOSIS — M54.2 NECK PAIN: ICD-10-CM

## 2024-10-09 LAB
ALBUMIN SERPL-MCNC: 3.8 G/DL (ref 3.5–5)
ALBUMIN/GLOB SERPL: 1.2 (ref 1.1–2.2)
ALP SERPL-CCNC: 47 U/L (ref 45–117)
ALT SERPL-CCNC: 19 U/L (ref 12–78)
ANION GAP SERPL CALC-SCNC: 9 MMOL/L (ref 2–12)
AST SERPL-CCNC: 17 U/L (ref 15–37)
BASOPHILS # BLD: 0 K/UL (ref 0–0.1)
BASOPHILS NFR BLD: 1 % (ref 0–1)
BILIRUB SERPL-MCNC: 0.6 MG/DL (ref 0.2–1)
BUN SERPL-MCNC: 15 MG/DL (ref 6–20)
BUN/CREAT SERPL: 16 (ref 12–20)
CALCIUM SERPL-MCNC: 8.9 MG/DL (ref 8.5–10.1)
CHLORIDE SERPL-SCNC: 104 MMOL/L (ref 97–108)
CO2 SERPL-SCNC: 29 MMOL/L (ref 21–32)
CREAT SERPL-MCNC: 0.94 MG/DL (ref 0.55–1.02)
DIFFERENTIAL METHOD BLD: ABNORMAL
EOSINOPHIL # BLD: 0 K/UL (ref 0–0.4)
EOSINOPHIL NFR BLD: 0 % (ref 0–7)
ERYTHROCYTE [DISTWIDTH] IN BLOOD BY AUTOMATED COUNT: 12.2 % (ref 11.5–14.5)
GLOBULIN SER CALC-MCNC: 3.1 G/DL (ref 2–4)
GLUCOSE SERPL-MCNC: 96 MG/DL (ref 65–100)
HCT VFR BLD AUTO: 36.3 % (ref 35–47)
HGB BLD-MCNC: 12.1 G/DL (ref 11.5–16)
IMM GRANULOCYTES # BLD AUTO: 0 K/UL (ref 0–0.04)
IMM GRANULOCYTES NFR BLD AUTO: 1 % (ref 0–0.5)
LYMPHOCYTES # BLD: 2 K/UL (ref 0.8–3.5)
LYMPHOCYTES NFR BLD: 32 % (ref 12–49)
MCH RBC QN AUTO: 28.9 PG (ref 26–34)
MCHC RBC AUTO-ENTMCNC: 33.3 G/DL (ref 30–36.5)
MCV RBC AUTO: 86.6 FL (ref 80–99)
MONOCYTES # BLD: 0.4 K/UL (ref 0–1)
MONOCYTES NFR BLD: 6 % (ref 5–13)
NEUTS SEG # BLD: 3.9 K/UL (ref 1.8–8)
NEUTS SEG NFR BLD: 60 % (ref 32–75)
NRBC # BLD: 0 K/UL (ref 0–0.01)
NRBC BLD-RTO: 0 PER 100 WBC
PLATELET # BLD AUTO: 200 K/UL (ref 150–400)
PMV BLD AUTO: 10.2 FL (ref 8.9–12.9)
POTASSIUM SERPL-SCNC: 3.4 MMOL/L (ref 3.5–5.1)
PROT SERPL-MCNC: 6.9 G/DL (ref 6.4–8.2)
RBC # BLD AUTO: 4.19 M/UL (ref 3.8–5.2)
SODIUM SERPL-SCNC: 142 MMOL/L (ref 136–145)
WBC # BLD AUTO: 6.4 K/UL (ref 3.6–11)

## 2024-10-09 PROCEDURE — 80053 COMPREHEN METABOLIC PANEL: CPT

## 2024-10-09 PROCEDURE — 96365 THER/PROPH/DIAG IV INF INIT: CPT

## 2024-10-09 PROCEDURE — 72141 MRI NECK SPINE W/O DYE: CPT

## 2024-10-09 PROCEDURE — 36415 COLL VENOUS BLD VENIPUNCTURE: CPT

## 2024-10-09 PROCEDURE — 85025 COMPLETE CBC W/AUTO DIFF WBC: CPT

## 2024-10-09 PROCEDURE — 6360000002 HC RX W HCPCS: Performed by: EMERGENCY MEDICINE

## 2024-10-09 PROCEDURE — 96375 TX/PRO/DX INJ NEW DRUG ADDON: CPT

## 2024-10-09 PROCEDURE — 99284 EMERGENCY DEPT VISIT MOD MDM: CPT

## 2024-10-09 PROCEDURE — 6370000000 HC RX 637 (ALT 250 FOR IP): Performed by: EMERGENCY MEDICINE

## 2024-10-09 RX ORDER — CYCLOBENZAPRINE HCL 10 MG
10 TABLET ORAL
Status: COMPLETED | OUTPATIENT
Start: 2024-10-09 | End: 2024-10-09

## 2024-10-09 RX ORDER — MORPHINE SULFATE 4 MG/ML
4 INJECTION, SOLUTION INTRAMUSCULAR; INTRAVENOUS
Status: COMPLETED | OUTPATIENT
Start: 2024-10-09 | End: 2024-10-09

## 2024-10-09 RX ORDER — LORAZEPAM 2 MG/ML
1 INJECTION INTRAMUSCULAR ONCE
Status: COMPLETED | OUTPATIENT
Start: 2024-10-09 | End: 2024-10-09

## 2024-10-09 RX ORDER — ONDANSETRON 2 MG/ML
4 INJECTION INTRAMUSCULAR; INTRAVENOUS
Status: COMPLETED | OUTPATIENT
Start: 2024-10-09 | End: 2024-10-09

## 2024-10-09 RX ORDER — MAGNESIUM SULFATE IN WATER 40 MG/ML
2000 INJECTION, SOLUTION INTRAVENOUS ONCE
Status: COMPLETED | OUTPATIENT
Start: 2024-10-09 | End: 2024-10-09

## 2024-10-09 RX ORDER — KETOROLAC TROMETHAMINE 30 MG/ML
15 INJECTION, SOLUTION INTRAMUSCULAR; INTRAVENOUS
Status: COMPLETED | OUTPATIENT
Start: 2024-10-09 | End: 2024-10-09

## 2024-10-09 RX ADMIN — CYCLOBENZAPRINE 10 MG: 10 TABLET, FILM COATED ORAL at 12:18

## 2024-10-09 RX ADMIN — MAGNESIUM SULFATE HEPTAHYDRATE 2000 MG: 40 INJECTION, SOLUTION INTRAVENOUS at 12:36

## 2024-10-09 RX ADMIN — KETOROLAC TROMETHAMINE 15 MG: 30 INJECTION, SOLUTION INTRAMUSCULAR at 12:31

## 2024-10-09 RX ADMIN — ONDANSETRON 4 MG: 2 INJECTION INTRAMUSCULAR; INTRAVENOUS at 14:22

## 2024-10-09 RX ADMIN — LORAZEPAM 1 MG: 2 INJECTION, SOLUTION INTRAMUSCULAR; INTRAVENOUS at 12:34

## 2024-10-09 RX ADMIN — MORPHINE SULFATE 4 MG: 4 INJECTION INTRAVENOUS at 14:24

## 2024-10-09 ASSESSMENT — ENCOUNTER SYMPTOMS
VOMITING: 0
SORE THROAT: 0
COUGH: 0

## 2024-10-09 ASSESSMENT — PAIN SCALES - GENERAL
PAINLEVEL_OUTOF10: 7
PAINLEVEL_OUTOF10: 10
PAINLEVEL_OUTOF10: 4
PAINLEVEL_OUTOF10: 10

## 2024-10-09 ASSESSMENT — PAIN DESCRIPTION - ORIENTATION
ORIENTATION: UPPER
ORIENTATION: MID

## 2024-10-09 ASSESSMENT — PAIN - FUNCTIONAL ASSESSMENT
PAIN_FUNCTIONAL_ASSESSMENT: PREVENTS OR INTERFERES WITH MANY ACTIVE NOT PASSIVE ACTIVITIES
PAIN_FUNCTIONAL_ASSESSMENT: PREVENTS OR INTERFERES SOME ACTIVE ACTIVITIES AND ADLS

## 2024-10-09 ASSESSMENT — PAIN DESCRIPTION - LOCATION
LOCATION: NECK
LOCATION: HEAD;NECK

## 2024-10-09 ASSESSMENT — PAIN DESCRIPTION - DESCRIPTORS
DESCRIPTORS: SHARP
DESCRIPTORS: SHARP;STABBING

## 2024-10-09 NOTE — ED TRIAGE NOTES
Pt ambulated to treatment area with a steady gait. Pt here with atypical left sided headache. Pt with H/O migraine she currently receives infusions for the same. Last infusion yesterday. Here today after trying her normal migraine meds except for her Diluadid. Pt with no relief. Neurologist suggested evaluation since atypical headache for patient. + sensitivity to touch, light, and sound. + nausea.

## 2024-10-09 NOTE — ED PROVIDER NOTES
(ZOFRAN-ODT) 4 MG DISINTEGRATING TABLET    Take 1 tablet by mouth every 8 hours as needed    RIMEGEPANT SULFATE (NURTEC) 75 MG TBDP    Take by mouth    SUMATRIPTAN (IMITREX) 100 MG TABLET    1 at HA onset and repeat in 2 hours if needed.  Max 2 in 24 hours    TOPIRAMATE (TOPAMAX) 50 MG TABLET    Take 1 tablet by mouth 2 times daily       ALLERGIES     Metoclopramide, Diphenhydramine, Haloperidol lactate, Promethazine, Sertraline, Valproate sodium, Dermabond, and Prochlorperazine edisylate    FAMILY HISTORY       Family History   Problem Relation Age of Onset    No Known Problems Mother     Heart Disease Maternal Grandmother     Stroke Maternal Aunt     Heart Disease Maternal Aunt     Diabetes Maternal Aunt           SOCIAL HISTORY       Social History     Socioeconomic History    Marital status:    Tobacco Use    Smoking status: Former    Smokeless tobacco: Never   Substance and Sexual Activity    Alcohol use: Yes     Alcohol/week: 1.0 standard drink of alcohol    Drug use: No           PHYSICAL EXAM    (up to 7 for level 4, 8 or more for level 5)     ED Triage Vitals [10/09/24 1151]   BP Systolic BP Percentile Diastolic BP Percentile Temp Temp Source Pulse Respirations SpO2   133/80 -- -- 98.8 °F (37.1 °C) Oral (!) 103 16 99 %      Height Weight - Scale         1.727 m (5' 8\") 62.9 kg (138 lb 10.7 oz)             Body mass index is 21.08 kg/m².    Physical Exam  HENT:      Head: Normocephalic.      Mouth/Throat:      Mouth: Mucous membranes are moist.   Eyes:      General: No scleral icterus.  Cardiovascular:      Rate and Rhythm: Normal rate.   Pulmonary:      Effort: Pulmonary effort is normal.   Abdominal:      General: There is no distension.   Musculoskeletal:         General: No deformity.      Cervical back: Neck supple.   Skin:     Findings: No rash.   Neurological:      General: No focal deficit present.      Mental Status: She is alert and oriented to person, place, and time.         DIAGNOSTIC

## 2024-10-10 ENCOUNTER — PATIENT MESSAGE (OUTPATIENT)
Age: 34
End: 2024-10-10

## 2024-10-10 DIAGNOSIS — G43.719 CHRONIC MIGRAINE WITHOUT AURA, INTRACTABLE, WITHOUT STATUS MIGRAINOSUS: ICD-10-CM

## 2024-10-11 RX ORDER — DEXAMETHASONE 2 MG/1
TABLET ORAL
Qty: 20 TABLET | Refills: 5 | Status: SHIPPED | OUTPATIENT
Start: 2024-10-11

## 2024-10-29 ENCOUNTER — HOSPITAL ENCOUNTER (EMERGENCY)
Facility: HOSPITAL | Age: 34
Discharge: HOME | End: 2024-10-30
Attending: EMERGENCY MEDICINE
Payer: COMMERCIAL

## 2024-10-29 DIAGNOSIS — R51.9 ACUTE NONINTRACTABLE HEADACHE, UNSPECIFIED HEADACHE TYPE: Primary | ICD-10-CM

## 2024-10-29 LAB
ALBUMIN SERPL-MCNC: 4.1 G/DL (ref 3.5–5)
ALBUMIN/GLOB SERPL: 1.4 (ref 1.1–2.2)
ALP SERPL-CCNC: 46 U/L (ref 45–117)
ALT SERPL-CCNC: 19 U/L (ref 12–78)
ANION GAP SERPL CALC-SCNC: 3 MMOL/L (ref 2–12)
AST SERPL-CCNC: 21 U/L (ref 15–37)
BASOPHILS # BLD: 0.1 K/UL (ref 0–0.1)
BASOPHILS NFR BLD: 2 % (ref 0–1)
BILIRUB SERPL-MCNC: 0.6 MG/DL (ref 0.2–1)
BUN SERPL-MCNC: 11 MG/DL (ref 6–20)
BUN/CREAT SERPL: 14 (ref 12–20)
CALCIUM SERPL-MCNC: 8.8 MG/DL (ref 8.5–10.1)
CHLORIDE SERPL-SCNC: 109 MMOL/L (ref 97–108)
CO2 SERPL-SCNC: 26 MMOL/L (ref 21–32)
CREAT SERPL-MCNC: 0.77 MG/DL (ref 0.55–1.02)
DIFFERENTIAL METHOD BLD: ABNORMAL
EOSINOPHIL # BLD: 0.1 K/UL (ref 0–0.4)
EOSINOPHIL NFR BLD: 2 % (ref 0–7)
ERYTHROCYTE [DISTWIDTH] IN BLOOD BY AUTOMATED COUNT: 11.9 % (ref 11.5–14.5)
GLOBULIN SER CALC-MCNC: 3 G/DL (ref 2–4)
GLUCOSE SERPL-MCNC: 105 MG/DL (ref 65–100)
HCT VFR BLD AUTO: 38.1 % (ref 35–47)
HGB BLD-MCNC: 12.7 G/DL (ref 11.5–16)
IMM GRANULOCYTES # BLD AUTO: 0 K/UL (ref 0–0.04)
IMM GRANULOCYTES NFR BLD AUTO: 0 % (ref 0–0.5)
LYMPHOCYTES # BLD: 2.2 K/UL (ref 0.8–3.5)
LYMPHOCYTES NFR BLD: 48 % (ref 12–49)
MCH RBC QN AUTO: 29.4 PG (ref 26–34)
MCHC RBC AUTO-ENTMCNC: 33.3 G/DL (ref 30–36.5)
MCV RBC AUTO: 88.2 FL (ref 80–99)
MONOCYTES # BLD: 0.2 K/UL (ref 0–1)
MONOCYTES NFR BLD: 5 % (ref 5–13)
NEUTS SEG # BLD: 2 K/UL (ref 1.8–8)
NEUTS SEG NFR BLD: 43 % (ref 32–75)
NRBC # BLD: 0 K/UL (ref 0–0.01)
NRBC BLD-RTO: 0 PER 100 WBC
PLATELET # BLD AUTO: 200 K/UL (ref 150–400)
PMV BLD AUTO: 9.9 FL (ref 8.9–12.9)
POTASSIUM SERPL-SCNC: 3.8 MMOL/L (ref 3.5–5.1)
PROT SERPL-MCNC: 7.1 G/DL (ref 6.4–8.2)
RBC # BLD AUTO: 4.32 M/UL (ref 3.8–5.2)
SODIUM SERPL-SCNC: 138 MMOL/L (ref 136–145)
WBC # BLD AUTO: 4.6 K/UL (ref 3.6–11)

## 2024-10-29 PROCEDURE — 85025 COMPLETE CBC W/AUTO DIFF WBC: CPT

## 2024-10-29 PROCEDURE — 96375 TX/PRO/DX INJ NEW DRUG ADDON: CPT

## 2024-10-29 PROCEDURE — 99284 EMERGENCY DEPT VISIT MOD MDM: CPT

## 2024-10-29 PROCEDURE — 2580000003 HC RX 258: Performed by: EMERGENCY MEDICINE

## 2024-10-29 PROCEDURE — 96365 THER/PROPH/DIAG IV INF INIT: CPT

## 2024-10-29 PROCEDURE — 80053 COMPREHEN METABOLIC PANEL: CPT

## 2024-10-29 PROCEDURE — 96372 THER/PROPH/DIAG INJ SC/IM: CPT

## 2024-10-29 PROCEDURE — 36415 COLL VENOUS BLD VENIPUNCTURE: CPT

## 2024-10-29 PROCEDURE — 6360000002 HC RX W HCPCS: Performed by: EMERGENCY MEDICINE

## 2024-10-29 PROCEDURE — 6370000000 HC RX 637 (ALT 250 FOR IP): Performed by: EMERGENCY MEDICINE

## 2024-10-29 RX ORDER — SUMATRIPTAN 6 MG/.5ML
6 INJECTION, SOLUTION SUBCUTANEOUS ONCE
Status: COMPLETED | OUTPATIENT
Start: 2024-10-29 | End: 2024-10-29

## 2024-10-29 RX ORDER — KETOROLAC TROMETHAMINE 15 MG/ML
15 INJECTION, SOLUTION INTRAMUSCULAR; INTRAVENOUS ONCE
Status: COMPLETED | OUTPATIENT
Start: 2024-10-29 | End: 2024-10-29

## 2024-10-29 RX ORDER — 0.9 % SODIUM CHLORIDE 0.9 %
1000 INTRAVENOUS SOLUTION INTRAVENOUS ONCE
Status: COMPLETED | OUTPATIENT
Start: 2024-10-29 | End: 2024-10-30

## 2024-10-29 RX ORDER — MAGNESIUM SULFATE IN WATER 40 MG/ML
2000 INJECTION, SOLUTION INTRAVENOUS
Status: COMPLETED | OUTPATIENT
Start: 2024-10-29 | End: 2024-10-30

## 2024-10-29 RX ORDER — DIAZEPAM 10 MG/2ML
5 INJECTION, SOLUTION INTRAMUSCULAR; INTRAVENOUS ONCE
Status: COMPLETED | OUTPATIENT
Start: 2024-10-29 | End: 2024-10-29

## 2024-10-29 RX ORDER — ONDANSETRON 2 MG/ML
4 INJECTION INTRAMUSCULAR; INTRAVENOUS ONCE
Status: COMPLETED | OUTPATIENT
Start: 2024-10-29 | End: 2024-10-29

## 2024-10-29 RX ADMIN — SODIUM CHLORIDE 1000 ML: 9 INJECTION, SOLUTION INTRAVENOUS at 23:56

## 2024-10-29 RX ADMIN — DIAZEPAM 5 MG: 5 INJECTION INTRAMUSCULAR; INTRAVENOUS at 23:46

## 2024-10-29 RX ADMIN — MAGNESIUM SULFATE HEPTAHYDRATE 2000 MG: 40 INJECTION, SOLUTION INTRAVENOUS at 23:59

## 2024-10-29 RX ADMIN — KETOROLAC TROMETHAMINE 15 MG: 15 INJECTION, SOLUTION INTRAMUSCULAR; INTRAVENOUS at 23:46

## 2024-10-29 RX ADMIN — ONDANSETRON 4 MG: 2 INJECTION INTRAMUSCULAR; INTRAVENOUS at 23:57

## 2024-10-29 RX ADMIN — SUMATRIPTAN SUCCINATE 6 MG: 6 INJECTION SUBCUTANEOUS at 23:45

## 2024-10-29 ASSESSMENT — PAIN DESCRIPTION - LOCATION
LOCATION: HEAD
LOCATION: HEAD

## 2024-10-29 ASSESSMENT — PAIN DESCRIPTION - ORIENTATION
ORIENTATION: LEFT
ORIENTATION: LEFT

## 2024-10-29 ASSESSMENT — PAIN - FUNCTIONAL ASSESSMENT
PAIN_FUNCTIONAL_ASSESSMENT: 0-10
PAIN_FUNCTIONAL_ASSESSMENT: 0-10

## 2024-10-29 ASSESSMENT — PAIN SCALES - GENERAL
PAINLEVEL_OUTOF10: 10

## 2024-10-29 ASSESSMENT — PAIN DESCRIPTION - FREQUENCY: FREQUENCY: CONTINUOUS

## 2024-10-30 VITALS
BODY MASS INDEX: 21.22 KG/M2 | DIASTOLIC BLOOD PRESSURE: 63 MMHG | HEIGHT: 68 IN | TEMPERATURE: 97.9 F | WEIGHT: 140 LBS | HEART RATE: 67 BPM | OXYGEN SATURATION: 100 % | RESPIRATION RATE: 22 BRPM | SYSTOLIC BLOOD PRESSURE: 97 MMHG

## 2024-10-30 PROCEDURE — 96375 TX/PRO/DX INJ NEW DRUG ADDON: CPT

## 2024-10-30 PROCEDURE — 2500000003 HC RX 250 WO HCPCS: Performed by: EMERGENCY MEDICINE

## 2024-10-30 PROCEDURE — 96366 THER/PROPH/DIAG IV INF ADDON: CPT

## 2024-10-30 RX ORDER — HYDROMORPHONE HYDROCHLORIDE 1 MG/ML
1 INJECTION, SOLUTION INTRAMUSCULAR; INTRAVENOUS; SUBCUTANEOUS
Status: COMPLETED | OUTPATIENT
Start: 2024-10-30 | End: 2024-10-30

## 2024-10-30 RX ADMIN — HYDROMORPHONE HYDROCHLORIDE 1 MG: 1 INJECTION, SOLUTION INTRAMUSCULAR; INTRAVENOUS; SUBCUTANEOUS at 01:04

## 2024-10-30 ASSESSMENT — PAIN SCALES - GENERAL
PAINLEVEL_OUTOF10: 1
PAINLEVEL_OUTOF10: 10

## 2024-10-30 NOTE — ED NOTES
Pt complained of flushing of cheeks and the feeling of \"ants crawling\" on her skin after SubQ sumatriptan administration.    Provider notified and stated that this is a normal reaction to the medicine

## 2024-10-30 NOTE — ED PROVIDER NOTES
point. The vital signs have been stable. The patient's condition is stable and appropriate for discharge. The patient will pursue further outpatient evaluation with the primary care physician or other designated or consulting physician as indicated in the discharge instructions.         REASSESSMENT            CONSULTS:  None    PROCEDURES:  Unless otherwise noted below, none     Procedures      FINAL IMPRESSION      1. Acute nonintractable headache, unspecified headache type          DISPOSITION/PLAN   DISPOSITION Decision To Discharge 10/30/2024 01:40:15 AM      PATIENT REFERRED TO:  David Liu, APRN - NP  [address] [id number] [phone]    Schedule an appointment as soon as possible for a visit         DISCHARGE MEDICATIONS:  New Prescriptions    No medications on file         (Please note that portions of this note were completed with a voice recognition program.  Efforts were made to edit the dictations but occasionally words are mis-transcribed.)    Estrellita Uriarte MD (electronically signed)  Emergency Attending Physician / Physician Assistant / Nurse Practitioner              Estrellita Uriarte MD  10/30/24 0141

## 2025-01-07 ENCOUNTER — TELEPHONE (OUTPATIENT)
Age: 35
End: 2025-01-07

## 2025-01-07 NOTE — TELEPHONE ENCOUNTER
Is calling to inform that they can no longer see pt as the insurance that pt now has is out of network with them.

## 2025-01-08 ENCOUNTER — TELEPHONE (OUTPATIENT)
Age: 35
End: 2025-01-08

## 2025-02-09 ENCOUNTER — TELEPHONE (OUTPATIENT)
Age: 35
End: 2025-02-09

## 2025-02-09 NOTE — TELEPHONE ENCOUNTER
Botox Drug Acquisition: BUY AND BILL  Drug: BOTOX 200 UNITS Dx: G43.719  Insurance: [facility] [employer]  Submission Type: Fax at [phone] 02/09/25 at 8:53 am  [facility]:   Reference #: [id number]  Approval Range: 02/09/25 to 02/08/26

## 2025-02-10 ENCOUNTER — TELEPHONE (OUTPATIENT)
Age: 35
End: 2025-02-10

## 2025-02-10 NOTE — TELEPHONE ENCOUNTER
Please cancel both Botox appts for Feb & May.  I placed a black dot on the calendar for both appts.    Patient feels like she is doing well.  She also stated she would send David a [facility] message.    Thanks.

## 2025-05-06 ENCOUNTER — HOSPITAL ENCOUNTER (INPATIENT)
Facility: HOSPITAL | Age: 35
LOS: 1 days | Discharge: HOME | DRG: 880 | End: 2025-05-06
Attending: EMERGENCY MEDICINE | Admitting: PSYCHIATRY & NEUROLOGY
Payer: COMMERCIAL

## 2025-05-06 VITALS
BODY MASS INDEX: 21.22 KG/M2 | RESPIRATION RATE: 16 BRPM | OXYGEN SATURATION: 100 % | SYSTOLIC BLOOD PRESSURE: 114 MMHG | HEIGHT: 68 IN | DIASTOLIC BLOOD PRESSURE: 74 MMHG | TEMPERATURE: 98.4 F | HEART RATE: 72 BPM | WEIGHT: 140 LBS

## 2025-05-06 DIAGNOSIS — F41.9 ANXIETY: ICD-10-CM

## 2025-05-06 DIAGNOSIS — R45.851 SUICIDAL IDEATION: Primary | ICD-10-CM

## 2025-05-06 PROBLEM — F41.1 GENERALIZED ANXIETY DISORDER: Status: ACTIVE | Noted: 2025-05-06

## 2025-05-06 LAB
ALBUMIN SERPL-MCNC: 3.8 G/DL (ref 3.5–5)
ALBUMIN/GLOB SERPL: 1.1 (ref 1.1–2.2)
ALP SERPL-CCNC: 48 U/L (ref 45–117)
ALT SERPL-CCNC: 32 U/L (ref 12–78)
AMPHET UR QL SCN: NEGATIVE
ANION GAP SERPL CALC-SCNC: 6 MMOL/L (ref 2–12)
APAP SERPL-MCNC: <2 UG/ML (ref 10–30)
AST SERPL-CCNC: 33 U/L (ref 15–37)
BARBITURATES UR QL SCN: NEGATIVE
BASOPHILS # BLD: 0.08 K/UL (ref 0–0.1)
BASOPHILS NFR BLD: 1.1 % (ref 0–1)
BENZODIAZ UR QL: NEGATIVE
BILIRUB SERPL-MCNC: 1 MG/DL (ref 0.2–1)
BUN SERPL-MCNC: 12 MG/DL (ref 6–20)
BUN/CREAT SERPL: 16 (ref 12–20)
CALCIUM SERPL-MCNC: 9.6 MG/DL (ref 8.5–10.1)
CANNABINOIDS UR QL SCN: POSITIVE
CHLORIDE SERPL-SCNC: 113 MMOL/L (ref 97–108)
CO2 SERPL-SCNC: 22 MMOL/L (ref 21–32)
COCAINE UR QL SCN: NEGATIVE
COMMENT:: NORMAL
CREAT SERPL-MCNC: 0.77 MG/DL (ref 0.55–1.02)
DIFFERENTIAL METHOD BLD: ABNORMAL
EOSINOPHIL # BLD: 0.11 K/UL (ref 0–0.4)
EOSINOPHIL NFR BLD: 1.5 % (ref 0–7)
ERYTHROCYTE [DISTWIDTH] IN BLOOD BY AUTOMATED COUNT: 12.7 % (ref 11.5–14.5)
ETHANOL SERPL-MCNC: <10 MG/DL (ref 0–0.08)
GLOBULIN SER CALC-MCNC: 3.4 G/DL (ref 2–4)
GLUCOSE SERPL-MCNC: 86 MG/DL (ref 65–100)
HCT VFR BLD AUTO: 39.9 % (ref 35–47)
HGB BLD-MCNC: 13.8 G/DL (ref 11.5–16)
IMM GRANULOCYTES # BLD AUTO: 0.02 K/UL (ref 0–0.04)
IMM GRANULOCYTES NFR BLD AUTO: 0.3 % (ref 0–0.5)
LYMPHOCYTES # BLD: 2.02 K/UL (ref 0.8–3.5)
LYMPHOCYTES NFR BLD: 28.3 % (ref 12–49)
Lab: ABNORMAL
MCH RBC QN AUTO: 29.6 PG (ref 26–34)
MCHC RBC AUTO-ENTMCNC: 34.6 G/DL (ref 30–36.5)
MCV RBC AUTO: 85.6 FL (ref 80–99)
METHADONE UR QL: NEGATIVE
MONOCYTES # BLD: 0.44 K/UL (ref 0–1)
MONOCYTES NFR BLD: 6.2 % (ref 5–13)
NEUTS SEG # BLD: 4.48 K/UL (ref 1.8–8)
NEUTS SEG NFR BLD: 62.6 % (ref 32–75)
NRBC # BLD: 0 K/UL (ref 0–0.01)
NRBC BLD-RTO: 0 PER 100 WBC
OPIATES UR QL: NEGATIVE
PCP UR QL: NEGATIVE
PLATELET # BLD AUTO: 217 K/UL (ref 150–400)
PMV BLD AUTO: 10 FL (ref 8.9–12.9)
POTASSIUM SERPL-SCNC: 3.5 MMOL/L (ref 3.5–5.1)
PROT SERPL-MCNC: 7.2 G/DL (ref 6.4–8.2)
RBC # BLD AUTO: 4.66 M/UL (ref 3.8–5.2)
SALICYLATES SERPL-MCNC: <1.7 MG/DL (ref 2.8–20)
SODIUM SERPL-SCNC: 141 MMOL/L (ref 136–145)
SPECIMEN HOLD: NORMAL
SPECIMEN HOLD: NORMAL
WBC # BLD AUTO: 7.2 K/UL (ref 3.6–11)

## 2025-05-06 PROCEDURE — 1100000000 HC RM PRIVATE

## 2025-05-06 PROCEDURE — 99285 EMERGENCY DEPT VISIT HI MDM: CPT

## 2025-05-06 PROCEDURE — 90791 PSYCH DIAGNOSTIC EVALUATION: CPT

## 2025-05-06 PROCEDURE — 85025 COMPLETE CBC W/AUTO DIFF WBC: CPT

## 2025-05-06 PROCEDURE — 80143 DRUG ASSAY ACETAMINOPHEN: CPT

## 2025-05-06 PROCEDURE — 80179 DRUG ASSAY SALICYLATE: CPT

## 2025-05-06 PROCEDURE — 6370000000 HC RX 637 (ALT 250 FOR IP): Performed by: EMERGENCY MEDICINE

## 2025-05-06 PROCEDURE — 80053 COMPREHEN METABOLIC PANEL: CPT

## 2025-05-06 PROCEDURE — 82077 ASSAY SPEC XCP UR&BREATH IA: CPT

## 2025-05-06 PROCEDURE — 80307 DRUG TEST PRSMV CHEM ANLYZR: CPT

## 2025-05-06 RX ORDER — LORAZEPAM 1 MG/1
1 TABLET ORAL
Status: COMPLETED | OUTPATIENT
Start: 2025-05-06 | End: 2025-05-06

## 2025-05-06 RX ADMIN — LORAZEPAM 1 MG: 1 TABLET ORAL at 19:58

## 2025-05-06 ASSESSMENT — PAIN SCALES - GENERAL: PAINLEVEL_OUTOF10: 0

## 2025-05-06 NOTE — ED PROVIDER NOTES
note:    No orders to display        LABS:  Labs Reviewed   CBC WITH AUTO DIFFERENTIAL - Abnormal; Notable for the following components:       Result Value    Basophils % 1.1 (*)     All other components within normal limits   COMPREHENSIVE METABOLIC PANEL - Abnormal; Notable for the following components:    Chloride 113 (*)     All other components within normal limits   URINE DRUG SCREEN - Abnormal; Notable for the following components:    THC, TH-Cannabinol, Urine Positive (*)     All other components within normal limits   ACETAMINOPHEN LEVEL - Abnormal; Notable for the following components:    Acetaminophen Level <2 (*)     All other components within normal limits   SALICYLATE LEVEL - Abnormal; Notable for the following components:    Salicylate Lvl <1.7 (*)     All other components within normal limits   URINE CULTURE HOLD SAMPLE   EXTRA TUBES HOLD   ETHANOL   POC PREGNANCY UR-QUAL       All other labs were within normal range or not returned as of this dictation.    EMERGENCY DEPARTMENT COURSE and DIFFERENTIAL DIAGNOSIS/MDM:   Vitals:    Vitals:    05/06/25 1621 05/06/25 2230   BP: 100/64 114/74   Pulse: 68 72   Resp: 22 16   Temp: 98.8 °F (37.1 °C) 98.4 °F (36.9 °C)   TempSrc: Oral Oral   SpO2: 100% 100%   Weight: 63.5 kg (140 lb)    Height: 1.727 m (5' 8\")            Medical Decision Making  Amount and/or Complexity of Data Reviewed  Labs: ordered.    Risk  Prescription drug management.  Decision regarding hospitalization.            REASSESSMENT      6:11 PM  Patient has been evaluated by Telepsych and recommends admission.  Patient agrees with plan.  I had already ordered PO ativan for patients anxiety, unfortunately has not yet received it.      8:56 PM  Labs reviewed are reassuring.  Patient medically appropriate for psychiatric admission.      9:00 PM  Informed by RN, patient feeling better after finally receiving PO ativan.  Now requesting discharge home. As this is a change in prior plan and patient

## 2025-05-06 NOTE — ED TRIAGE NOTES
Arrives via EMS from home for c/o feeling like her medications are off.  Had SI this morning but states it was more intrinsic thoughts & not actually killing herself, requesting to speak with counselor. Woke up around 0545 with a panic attack.  No HI.  Denies plan or means to do so.  Requesting O2 nasal cannula for comfort, not connected to oxygen though.   Thinks she needs help with medication regimen.

## 2025-05-07 NOTE — VIRTUAL HEALTH
Pat See, was evaluated through a synchronous (real-time) audio-video encounter. The patient (and/or guardian if applicable) is aware that this is a billable service, which includes applicable co-pays. This virtual visit was conducted with patient's (and/or legal guardian's) consent. Patient identification was verified, and a caregiver was present when appropriate.  The patient was located at Facility (Appt Department): [facility] EMERGENCY DEPARTMENT  [address] [place] [id number]  Loc: [phone]  The provider was located at Home (City/State): [place]  Confirm you are appropriately licensed, registered, or certified to deliver care in the state where the patient is located as indicated above. If you are not or unsure, please re-schedule the visit: Yes, I confirm.   [place] Consult to Tele-Psych  Consult performed by: Kuldeep Guadarrama, CHERYLE - CNP  Consult ordered by: Ngozi Chaudhry MD        Pat See  [id number]  1990     EMERGENCY DEPARTMENT TELEPSYCHIATRY EVALUATION    05/06/25    Chief Complaint: Anxiety    Per Record:   with h/o anxiety and depression who has a psychiatrist and therapist. She notes she was recently started on Remeron for her anxiety an since then her anxiety has increased and she wakes up in the middle of the night and can't sleep. She has been having thoughts that it would be easier if she would just die but denies any plan.       HPI:   Patient is a 34 y.o.  female who presents for psychiatric evaluation and anxiety. Patient presented to the ED on 05/06/25 from home.  Patient's mom is at bedside and she is agreeable to the interview with her mom present.  Patient is alert and oriented x 4, calm, and cooperative.  Patient appears to be in good spirits but does admit to having some anxiety.  Patient reports that she could not get into her therapist or psychiatrist today and so she went to her family physician; however, he was 
Reconciliation, Ar        Labs:  UDS: not available  ETOH: not available  HCG: Unknown      Mental Status Exam:  Level of consciousness:  Moderate dysattention (reduced clarity of awareness with impaired ability to focus, sustain, or shift attention) and awake   Appearance:  street clothes and in chair.  Does appear stated age. No acute distress.  Behavior/Motor:  hyperactive  Attitude toward examiner:  cooperative  SI/HI: SI  Speech:  hyperverbal , Tone: normal tone  Mood: anxious, depressed, and sad  Affect: mood congruent and anxious  Thought Processes:  tangential.   Thought Content: Suicidal Ideation:  passive  Hallucinations:  Hallucinations: Denies AVOT-H  Cognition:  oriented to person, place, and time   Concentration: distractible  Memory: intact, though not formally tested.  Insight: fair   Judgement: fair   Fund of Knowledge: adequate      Risk Assessment:  C-SSRS Score       5/6/2025     4:17 PM   C-SSRS Suicide Screening   1) Within the past month, have you wished you were dead or wished you could go to sleep and not wake up?  Yes   2) Have you actually had any thoughts of killing yourself?  Yes   3) Have you been thinking about how you might kill yourself?  No   4) Have you had these thoughts and had some intention of acting on them?  No   5) Have you started to work out or worked out the details of how to kill yourself? Do you intend to carry out this plan?  No   6) Have you ever done anything, started to do anything, or prepared to do anything to end your life? No    :    Protective Factors:  Protective: Age >25 and <55, Female gender, Does not have lethal plan, Patient has social or family support, Has outpatient services in place, and Compliant with recommended medications  Risk Factors:  Risk Factors: Depressed mood, Active suicidal ideation, Not attending to self-care/ADLs, and No collateral information to support safety      Overall Level Suicide Risk:     TelePsych CSSRS Risk Level: Low

## 2025-05-07 NOTE — BSMART NOTE
Bsmart progress note:  Pt accepted to [facility] pending HCG result and med clearance note per Salvador in bed access

## 2025-05-07 NOTE — BSMART NOTE
Bsmart progress note:    Writer made aware that Pt is requesting to leave and tele-psych has been re consulted. Bsmart awaiting update on disposition

## 2025-05-07 NOTE — BSMART NOTE
Bsmart progress note    Writer received shift change update from Ania of Pt being agreeable and access being aware of bed .

## 2025-05-07 NOTE — BSMART NOTE
Bsmart progress note:    Writer provided out patient resources for Pt to PHP, psychiatry, and counseling .

## 2025-08-03 ENCOUNTER — APPOINTMENT (OUTPATIENT)
Facility: HOSPITAL | Age: 35
End: 2025-08-03
Payer: COMMERCIAL

## 2025-08-03 ENCOUNTER — HOSPITAL ENCOUNTER (EMERGENCY)
Facility: HOSPITAL | Age: 35
Discharge: HOME | End: 2025-08-03
Attending: EMERGENCY MEDICINE
Payer: COMMERCIAL

## 2025-08-03 VITALS
WEIGHT: 145.06 LBS | RESPIRATION RATE: 18 BRPM | HEART RATE: 78 BPM | TEMPERATURE: 98.1 F | DIASTOLIC BLOOD PRESSURE: 84 MMHG | HEIGHT: 68 IN | OXYGEN SATURATION: 96 % | SYSTOLIC BLOOD PRESSURE: 123 MMHG | BODY MASS INDEX: 21.99 KG/M2

## 2025-08-03 DIAGNOSIS — M54.12 RIGHT CERVICAL RADICULOPATHY: ICD-10-CM

## 2025-08-03 DIAGNOSIS — M54.2 ACUTE NECK PAIN: Primary | ICD-10-CM

## 2025-08-03 LAB
ANION GAP SERPL CALC-SCNC: 12 MMOL/L (ref 2–14)
BASOPHILS # BLD: 0.02 K/UL (ref 0–0.1)
BASOPHILS NFR BLD: 0.2 % (ref 0–1)
BUN SERPL-MCNC: 8 MG/DL (ref 6–20)
BUN/CREAT SERPL: 12 (ref 12–20)
CALCIUM SERPL-MCNC: 9.2 MG/DL (ref 8.6–10)
CHLORIDE SERPL-SCNC: 110 MMOL/L (ref 98–107)
CO2 SERPL-SCNC: 20 MMOL/L (ref 20–29)
CREAT SERPL-MCNC: 0.7 MG/DL (ref 0.6–1)
DIFFERENTIAL METHOD BLD: ABNORMAL
EOSINOPHIL # BLD: 0 K/UL (ref 0–0.4)
EOSINOPHIL NFR BLD: 0 % (ref 0–7)
ERYTHROCYTE [DISTWIDTH] IN BLOOD BY AUTOMATED COUNT: 12.5 % (ref 11.5–14.5)
GLUCOSE SERPL-MCNC: 102 MG/DL (ref 65–100)
HCT VFR BLD AUTO: 38.1 % (ref 35–47)
HGB BLD-MCNC: 12.6 G/DL (ref 11.5–16)
IMM GRANULOCYTES # BLD AUTO: 0.04 K/UL (ref 0–0.04)
IMM GRANULOCYTES NFR BLD AUTO: 0.4 % (ref 0–0.5)
LYMPHOCYTES # BLD: 0.81 K/UL (ref 0.8–3.5)
LYMPHOCYTES NFR BLD: 8.1 % (ref 12–49)
MCH RBC QN AUTO: 29.2 PG (ref 26–34)
MCHC RBC AUTO-ENTMCNC: 33.1 G/DL (ref 30–36.5)
MCV RBC AUTO: 88.4 FL (ref 80–99)
MONOCYTES # BLD: 0.24 K/UL (ref 0–1)
MONOCYTES NFR BLD: 2.4 % (ref 5–13)
NEUTS SEG # BLD: 8.91 K/UL (ref 1.8–8)
NEUTS SEG NFR BLD: 88.9 % (ref 32–75)
NRBC # BLD: 0 K/UL (ref 0–0.01)
NRBC BLD-RTO: 0 PER 100 WBC
PLATELET # BLD AUTO: 216 K/UL (ref 150–400)
PMV BLD AUTO: 9.9 FL (ref 8.9–12.9)
POTASSIUM SERPL-SCNC: 3.8 MMOL/L (ref 3.5–5.1)
RBC # BLD AUTO: 4.31 M/UL (ref 3.8–5.2)
SODIUM SERPL-SCNC: 142 MMOL/L (ref 136–145)
WBC # BLD AUTO: 10 K/UL (ref 3.6–11)

## 2025-08-03 PROCEDURE — 80048 BASIC METABOLIC PNL TOTAL CA: CPT

## 2025-08-03 PROCEDURE — 99284 EMERGENCY DEPT VISIT MOD MDM: CPT

## 2025-08-03 PROCEDURE — 96375 TX/PRO/DX INJ NEW DRUG ADDON: CPT

## 2025-08-03 PROCEDURE — 6360000002 HC RX W HCPCS: Performed by: EMERGENCY MEDICINE

## 2025-08-03 PROCEDURE — 85025 COMPLETE CBC W/AUTO DIFF WBC: CPT

## 2025-08-03 PROCEDURE — 36415 COLL VENOUS BLD VENIPUNCTURE: CPT

## 2025-08-03 PROCEDURE — 96374 THER/PROPH/DIAG INJ IV PUSH: CPT

## 2025-08-03 PROCEDURE — 72125 CT NECK SPINE W/O DYE: CPT

## 2025-08-03 RX ORDER — DIAZEPAM 10 MG/2ML
5 INJECTION, SOLUTION INTRAMUSCULAR; INTRAVENOUS ONCE
Status: COMPLETED | OUTPATIENT
Start: 2025-08-03 | End: 2025-08-03

## 2025-08-03 RX ORDER — HYDROMORPHONE HYDROCHLORIDE 2 MG/1
2 TABLET ORAL EVERY 4 HOURS PRN
Qty: 12 TABLET | Refills: 0 | Status: SHIPPED | OUTPATIENT
Start: 2025-08-03 | End: 2025-08-06

## 2025-08-03 RX ORDER — MORPHINE SULFATE 4 MG/ML
4 INJECTION, SOLUTION INTRAMUSCULAR; INTRAVENOUS
Status: COMPLETED | OUTPATIENT
Start: 2025-08-03 | End: 2025-08-03

## 2025-08-03 RX ORDER — HYDROMORPHONE HYDROCHLORIDE 1 MG/ML
1 INJECTION, SOLUTION INTRAMUSCULAR; INTRAVENOUS; SUBCUTANEOUS
Status: COMPLETED | OUTPATIENT
Start: 2025-08-03 | End: 2025-08-03

## 2025-08-03 RX ORDER — KETOROLAC TROMETHAMINE 30 MG/ML
15 INJECTION, SOLUTION INTRAMUSCULAR; INTRAVENOUS
Status: COMPLETED | OUTPATIENT
Start: 2025-08-03 | End: 2025-08-03

## 2025-08-03 RX ADMIN — MORPHINE SULFATE 4 MG: 4 INJECTION, SOLUTION INTRAMUSCULAR; INTRAVENOUS at 15:53

## 2025-08-03 RX ADMIN — HYDROMORPHONE HYDROCHLORIDE 1 MG: 1 INJECTION, SOLUTION INTRAMUSCULAR; INTRAVENOUS; SUBCUTANEOUS at 17:45

## 2025-08-03 RX ADMIN — KETOROLAC TROMETHAMINE 15 MG: 30 INJECTION, SOLUTION INTRAMUSCULAR at 15:53

## 2025-08-03 RX ADMIN — DIAZEPAM 5 MG: 5 INJECTION, SOLUTION INTRAMUSCULAR; INTRAVENOUS at 17:02

## 2025-08-03 ASSESSMENT — PAIN SCALES - GENERAL
PAINLEVEL_OUTOF10: 2
PAINLEVEL_OUTOF10: 10
PAINLEVEL_OUTOF10: 10

## 2025-08-03 ASSESSMENT — PAIN DESCRIPTION - PAIN TYPE
TYPE: ACUTE PAIN

## 2025-08-03 ASSESSMENT — PAIN DESCRIPTION - ORIENTATION
ORIENTATION: RIGHT

## 2025-08-03 ASSESSMENT — PAIN DESCRIPTION - FREQUENCY: FREQUENCY: INTERMITTENT

## 2025-08-03 ASSESSMENT — PAIN DESCRIPTION - LOCATION
LOCATION: NECK
LOCATION: NECK;SHOULDER
LOCATION: NECK;SHOULDER

## 2025-08-03 ASSESSMENT — PAIN DESCRIPTION - DESCRIPTORS
DESCRIPTORS: ACHING
DESCRIPTORS: SHARP

## 2025-08-03 ASSESSMENT — PAIN - FUNCTIONAL ASSESSMENT: PAIN_FUNCTIONAL_ASSESSMENT: 0-10

## (undated) DEVICE — BAG SPEC REM 224ML W4XL6IN DIA10MM 1 HND GYN DISP ENDOPCH

## (undated) DEVICE — KENDALL SCD EXPRESS SLEEVES, KNEE LENGTH, MEDIUM: Brand: KENDALL SCD

## (undated) DEVICE — ELECTRO LUBE IS A SINGLE PATIENT USE DEVICE THAT IS INTENDED TO BE USED ON ELECTROSURGICAL ELECTRODES TO REDUCE STICKING.: Brand: KEY SURGICAL ELECTRO LUBE

## (undated) DEVICE — INFECTION CONTROL KIT SYS

## (undated) DEVICE — NEEDLE INSUF L120MM DIA2MM DISP FOR PNEUMOPERI ENDOPATH

## (undated) DEVICE — CATH FOL TY IC BAG 16FR 2000ML -- CONVERT TO ITEM 363158

## (undated) DEVICE — SURGICAL PROCEDURE PACK GYN LAPAROSCOPY CUST SMH LF

## (undated) DEVICE — ASTOUND STANDARD SURGICAL GOWN, XL: Brand: CONVERTORS

## (undated) DEVICE — TROCAR ENDOSCP L100MM DIA12MM STBL SL BLDELSS ENDOPATH XCEL

## (undated) DEVICE — UNIVERSAL STAPLER: Brand: ENDO GIA ULTRA

## (undated) DEVICE — Device

## (undated) DEVICE — 1200 GUARD II KIT W/5MM TUBE W/O VAC TUBE: Brand: GUARDIAN

## (undated) DEVICE — CATH URETH INTMIT ROB 16FR FUN -- CONVERT TO ITEM 179520

## (undated) DEVICE — REM POLYHESIVE ADULT PATIENT RETURN ELECTRODE: Brand: VALLEYLAB

## (undated) DEVICE — STERILE POLYISOPRENE POWDER-FREE SURGICAL GLOVES WITH EMOLLIENT COATING: Brand: PROTEXIS

## (undated) DEVICE — SOLUTION IRRIG 3000ML LAC R FLX CONT

## (undated) DEVICE — PAD SANIT NPKN 4IN GRD

## (undated) DEVICE — TROCAR ENDOSCP SHFT L100MM DIA12MM INTEGR STBL ENDOPATH

## (undated) DEVICE — (D)PREP SKN CHLRAPRP APPL 26ML -- CONVERT TO ITEM 371833

## (undated) DEVICE — TRAY PREP DRY W/ PREM GLV 2 APPL 6 SPNG 2 UNDPD 1 OVERWRAP

## (undated) DEVICE — DEVON™ KNEE AND BODY STRAP 60" X 3" (1.5 M X 7.6 CM): Brand: DEVON

## (undated) DEVICE — SUTURE SZ 0 27IN 5/8 CIR UR-6  TAPER PT VIOLET ABSRB VICRYL J603H

## (undated) DEVICE — GLOVE SURG SZ 75 L1212IN FNGR THK138MIL BRN LTX FREE

## (undated) DEVICE — LIGHT HANDLE: Brand: DEVON

## (undated) DEVICE — SUTURE MCRYL SZ 4-0 L27IN ABSRB UD L19MM PS-2 1/2 CIR PRIM Y426H

## (undated) DEVICE — PERI/GYN PACK: Brand: CONVERTORS

## (undated) DEVICE — NEEDLE HYPO 25GA L1.5IN BVL ORIENTED ECLIPSE

## (undated) DEVICE — SURGICAL PROCEDURE PACK BASIN MAJ SET CUST NO CAUT

## (undated) DEVICE — SET EXTN TBNG L BOR 4 W STPCOCK ST 32IN PRIMING VOL 6ML

## (undated) DEVICE — SUTURE MCRYL SZ 3-0 L27IN ABSRB UD L19MM PS-2 3/8 CIR PRIM Y427H

## (undated) DEVICE — ARTICULATION RELOAD WITH TRI-STAPLE TECHNOLOGY: Brand: ENDO GIA

## (undated) DEVICE — SUPER SPONGES,MEDIUM: Brand: DERMACEA

## (undated) DEVICE — ROCKER SWITCH PENCIL BLADE ELECTRODE, HOLSTER: Brand: EDGE

## (undated) DEVICE — GOWN,SIRUS,FABRNF,XL,20/CS: Brand: MEDLINE

## (undated) DEVICE — SUTURE VCRL SZ 3-0 L27IN ABSRB UD L26MM SH 1/2 CIR J416H

## (undated) DEVICE — HEX-LOCKING BLADE ELECTRODE: Brand: EDGE